# Patient Record
Sex: MALE | Race: WHITE | NOT HISPANIC OR LATINO | Employment: OTHER | URBAN - NONMETROPOLITAN AREA
[De-identification: names, ages, dates, MRNs, and addresses within clinical notes are randomized per-mention and may not be internally consistent; named-entity substitution may affect disease eponyms.]

---

## 2017-01-03 RX ORDER — ALBUTEROL SULFATE 2.5 MG/3ML
2.5 SOLUTION RESPIRATORY (INHALATION) EVERY 4 HOURS PRN
Qty: 120 VIAL | Refills: 11 | Status: SHIPPED | OUTPATIENT
Start: 2017-01-03 | End: 2022-07-11

## 2017-01-03 RX ORDER — LOVASTATIN 40 MG/1
TABLET ORAL
Qty: 30 TABLET | Refills: 11 | Status: SHIPPED | OUTPATIENT
Start: 2017-01-03 | End: 2017-02-21 | Stop reason: SDUPTHER

## 2017-01-16 RX ORDER — LOSARTAN POTASSIUM 100 MG/1
100 TABLET ORAL DAILY
Qty: 30 TABLET | Refills: 11 | Status: SHIPPED | OUTPATIENT
Start: 2017-01-16 | End: 2017-05-11 | Stop reason: SDUPTHER

## 2017-01-16 RX ORDER — LOSARTAN POTASSIUM 100 MG/1
TABLET ORAL
Qty: 30 TABLET | Refills: 11 | Status: SHIPPED | OUTPATIENT
Start: 2017-01-16 | End: 2017-01-16 | Stop reason: SDUPTHER

## 2017-01-20 ENCOUNTER — OFFICE VISIT (OUTPATIENT)
Dept: PULMONOLOGY | Facility: CLINIC | Age: 42
End: 2017-01-20

## 2017-01-20 VITALS
WEIGHT: 198 LBS | OXYGEN SATURATION: 96 % | DIASTOLIC BLOOD PRESSURE: 78 MMHG | HEIGHT: 73 IN | SYSTOLIC BLOOD PRESSURE: 122 MMHG | RESPIRATION RATE: 18 BRPM | HEART RATE: 106 BPM | BODY MASS INDEX: 26.24 KG/M2

## 2017-01-20 DIAGNOSIS — R06.83 SNORING: ICD-10-CM

## 2017-01-20 DIAGNOSIS — J44.9 CHRONIC OBSTRUCTIVE PULMONARY DISEASE, UNSPECIFIED COPD TYPE (HCC): ICD-10-CM

## 2017-01-20 DIAGNOSIS — J30.89 OTHER ALLERGIC RHINITIS: ICD-10-CM

## 2017-01-20 DIAGNOSIS — R06.02 SHORTNESS OF BREATH: Primary | ICD-10-CM

## 2017-01-20 DIAGNOSIS — J45.40 MODERATE PERSISTENT ASTHMA WITHOUT COMPLICATION: ICD-10-CM

## 2017-01-20 DIAGNOSIS — G47.19 EXCESSIVE DAYTIME SLEEPINESS: ICD-10-CM

## 2017-01-20 PROCEDURE — 95012 NITRIC OXIDE EXP GAS DETER: CPT | Performed by: INTERNAL MEDICINE

## 2017-01-20 PROCEDURE — 99205 OFFICE O/P NEW HI 60 MIN: CPT | Performed by: INTERNAL MEDICINE

## 2017-01-20 RX ORDER — MONTELUKAST SODIUM 10 MG/1
10 TABLET ORAL NIGHTLY
Qty: 30 TABLET | Refills: 5 | Status: SHIPPED | OUTPATIENT
Start: 2017-01-20 | End: 2021-03-17

## 2017-01-20 NOTE — LETTER
"January 25, 2017     Janak Robertson DO  [address] [address]    Patient: Topher Stoll   YOB: 1975   Date of Visit: 1/20/2017       Dear Dr. Marcelo DO:    Topher Stoll was in my office today. Below is a copy of my note.    If you have questions, please do not hesitate to call me. I look forward to following Topher along with you.         Sincerely,        Melina Olivas MD        CC: Liza Puente RN    CONSULT NOTE    Chief Complaint   Patient presents with   • COPD       Subjective   Topher Stoll is a 41 y.o. male.     History of Present Illness   Patient comes in today for consultation because of shortness of breath for the past few years    The patient says that his shortness of breath is more pronounced during the evening. It is also occasionally associated with wheezing. There have been some episodes of cough that usually follow activity.  He also reports tightness, especially when the weather is humid.    The patient denies any pets at home. There also seems to be a seasonal variation to the symptoms, although he is not entirely sure.    He describes occasional nighttime symptoms.  He also snores and has a tendency to have excessive daytime sleepiness during the morning.  He was actually prescribed oxygen that he uses at night.    The patient does have a family history of asthma, in his brother.  He used to smoke 2 packs per day for about 15 years having quit 10 years ago.    He describes at least 6-7 episodes of \"bronchitis\" per year, that required prednisone    He is complaining of runny nose and dribbling in the back of his throat, that is definitely worse during spring    The following portions of the patient's history were reviewed and updated as appropriate: allergies, current medications, past family history, past medical history, past social history and past surgical history.    Review of Systems   HENT: Negative for sinus pressure, sneezing and sore throat.  " "  Respiratory: Negative for cough, shortness of breath and wheezing.    Cardiovascular: Negative for palpitations and leg swelling.   Psychiatric/Behavioral: Positive for sleep disturbance.   All other systems reviewed and are negative.      Past Medical History   Diagnosis Date   • Abdominal adhesions    • Cervical radiculopathy    • Colitis    • Colitis    • H/O chest x-ray 04/14/2016     No active disease   • H/O chest x-ray 03/28/2016     No active disease by portable imaging   • H/O chest x-ray 03/12/2016     No acute cardiopulmonary process   • Headache    • History of recreational drug use    • Left hip pain    • Lesion of oral mucosa    • Low back pain    • Obstructive chronic bronchitis with exacerbation        Social History   Substance Use Topics   • Smoking status: Former Smoker   • Smokeless tobacco: Current User     Types: Chew   • Alcohol use No      Comment: stopped drinking alcohol         Objective   Visit Vitals   • /78   • Pulse 106   • Resp 18   • Ht 73\" (185.4 cm)   • Wt 198 lb (89.8 kg)   • SpO2 96%   • BMI 26.12 kg/m2     Physical Exam  Constitutional:            General: No acute distress noted. Able to communicate appropriately    Head/Face/Eyes:            No significant facial abnormalities seen.            Extra ocular movement was intact.            Pupils appeared equal    ENT:            Hearing was intact            Sinuses were non tender on palpation.            Nostril showed moderate erythema.            Oropharynx was cobblestoned and crowded.    Neck:             Supple. No JVD noted.              Thyroid gland did not seem to be enlarged    Cardiovascular:              S1 + S2. Regular.    Respiratory:            Respiratory effort was adequate.            Somewhat hyperresonant to percussion.            Good Air Entry Bilaterally with out wheezing or crackles heard.    Musculoskeletal/Extremities:             Normal Gait.              No clubbing, cyanosis noted in the " upper extremities.             No edema noted in the lower extremities bilaterally.    Neurologic/Psychiatric:             Affect appeared fair.             Awake, alert and oriented x 3.             Able to follow simple commands.    Skin:             No rash noted.             Warm and dry.      Assessment/Plan   Topher was seen today for copd.    Diagnoses and all orders for this visit:    Shortness of breath  -     Cancel: Nitric Oxide; Future  -     Nitric Oxide    COPD  -     Pulmonary Function Test; Future    Moderate persistent asthma without complication  -     Cancel: Nitric Oxide; Future  -     Nitric Oxide    Snoring    Excessive daytime sleepiness    Other allergic rhinitis    Other orders  -     Discontinue: Umeclidinium-Vilanterol (ANORO ELLIPTA) 62.5-25 MCG/INH aerosol powder ; Inhale 1 puff Daily for 30 days.  -     montelukast (SINGULAIR) 10 MG tablet; Take 1 tablet by mouth Every Night for 30 days.  -     Fluticasone Furoate-Vilanterol (BREO ELLIPTA) 200-25 MCG/INH aerosol powder ; Inhale 1 puff Daily for 30 days. Rinse mouth with water after use.         Return in about 10 weeks (around 3/31/2017) for Recheck, PFTs, Give pt sleep questionairre.    DISCUSSION(if any):  I have reviewed the patient's imaging studies and shared them with him. Chest x-ray suggests possible hyperinflation?     I have also reviewed his last ER visit that mentions COPD exacerbation.     Laboratory workup was also reviewed which showed normal alpha-1 antitrypsin levels.  His last ABG showed a pH of 7.46 with a PCO2 37 and PO2 68    ==============================  ==============================    I had a long discussion with the patient and told him that his symptoms are more consistent with asthma rather than COPD.    I have started him on Breo along with Singulair    Patient was advised to use albuterol based rescue inhaler for when necessary purposes    Patient was also advised to keep a log of the use of rescue  inhaler.    Although he does not have continuous symptoms suggestive of allergic rhinitis, this seems to be seasonal allergic rhinitis symptoms.    I've asked him to use Flonase twice daily for now    His FeNO level was 86.      Errors in dictation may reflect use of voice recognition software and not all errors in transcription may have been detected prior to signing    This document was electronically signed by Melina Olivas MD January 25, 2017  4:53 PM

## 2017-01-20 NOTE — MR AVS SNAPSHOT
Topher Stoll   1/20/2017 9:15 AM   Office Visit    Dept Phone:  [phone]   Encounter #:  [id number]    Provider:  Melina Olivas MD   Department:  [facility] PULMONARY CRITICAL CARE AND SLEEP                Your Full Care Plan              Today's Medication Changes          These changes are accurate as of: 1/20/17  9:36 AM.  If you have any questions, ask your nurse or doctor.               New Medication(s)Ordered:     Fluticasone Furoate-Vilanterol 200-25 MCG/INH aerosol powder    Commonly known as:  BREO ELLIPTA   Inhale 1 puff Daily for 30 days. Rinse mouth with water after use.   Started by:  Melina Olivas MD       montelukast 10 MG tablet   Commonly known as:  SINGULAIR   Take 1 tablet by mouth Every Night for 30 days.   Started by:  Melina Olivas MD            Where to Get Your Medications      These medications were sent to [address] [phone]  - [phone] [address] [address]     Phone:  [phone]     Fluticasone Furoate-Vilanterol 200-25 MCG/INH aerosol powder     montelukast 10 MG tablet                  Your Updated Medication List          This list is accurate as of: 1/20/17  9:36 AM.  Always use your most recent med list.                * albuterol 108 (90 BASE) MCG/ACT inhaler   Commonly known as:  PROAIR HFA   Inhale 2 puffs Every 4 (Four) Hours As Needed for wheezing. NHALE 1 TO 2 PUFFS EVERY 4 TO 6 HOURS AS NEEDED       * albuterol (2.5 MG/3ML) 0.083% nebulizer solution   Commonly known as:  PROVENTIL   Take 2.5 mg by nebulization Every 4 (Four) Hours As Needed for wheezing.       azithromycin 500 MG tablet   Commonly known as:  ZITHROMAX   Take 1 tablet by mouth Daily.       cefuroxime 500 MG tablet   Commonly known as:  CEFTIN   Take 1 tablet by mouth 2 (Two) Times a Day.       cyclobenzaprine 10 MG tablet   Commonly  known as:  FLEXERIL   Take 1 tablet by mouth 2 (Two) Times a Day As Needed for muscle spasms.       divalproex 500 MG 24 hr tablet   Commonly known as:  DEPAKOTE ER   Take 1 tablet by mouth Daily.       docusate sodium 100 MG capsule   Commonly known as:  COLACE       fluticasone 50 MCG/ACT nasal spray   Commonly known as:  FLONASE   2 sprays into each nostril Daily. USE 1 TO 2 SPRAYS IN EACH NOSTRIL ONCE DAILY       Fluticasone Furoate-Vilanterol 200-25 MCG/INH aerosol powder    Commonly known as:  BREO ELLIPTA   Inhale 1 puff Daily for 30 days. Rinse mouth with water after use.       hydrOXYzine 25 MG tablet   Commonly known as:  ATARAX   1 or 2 tablets every 8 hours as needed for anxiety.       ibuprofen 800 MG tablet   Commonly known as:  ADVIL,MOTRIN       lidocaine 2 % jelly   Commonly known as:  XYLOCAINE       losartan 100 MG tablet   Commonly known as:  COZAAR   Take 1 tablet by mouth Daily.       lovastatin 40 MG tablet   Commonly known as:  MEVACOR   take 1 tablet by mouth every evening       metoprolol tartrate 25 MG tablet   Commonly known as:  LOPRESSOR       montelukast 10 MG tablet   Commonly known as:  SINGULAIR   Take 1 tablet by mouth Every Night for 30 days.       naltrexone 50 MG tablet   Commonly known as:  DEPADE       NEXIUM 40 MG capsule   Generic drug:  esomeprazole   take 1 capsule by mouth once daily       predniSONE 10 MG tablet   Commonly known as:  DELTASONE   6 tablets day 1 5 tablets day 2 4 tablets day 3 3 tablets day 4 2 tablets day 5 1 tablet day 6 Then stop.       * Notice:  This list has 2 medication(s) that are the same as other medications prescribed for you. Read the directions carefully, and ask your doctor or other care provider to review them with you.            You Were Diagnosed With        Codes Comments    Shortness of breath    -  Primary ICD-10-CM: R06.02  ICD-9-CM: 786.05     Chronic obstructive pulmonary disease, unspecified COPD type     ICD-10-CM:  "J44.9  ICD-9-CM: 496     Moderate persistent asthma without complication     ICD-10-CM: J45.40  ICD-9-CM: 493.90     Snoring     ICD-10-CM: R06.83  ICD-9-CM: 786.09     Excessive daytime sleepiness     ICD-10-CM: G47.19  ICD-9-CM: 780.54     Other allergic rhinitis     ICD-10-CM: J30.89  ICD-9-CM: 477.8       Instructions     None    Patient Instructions History      Upcoming Appointments     Visit Type Date Time Department    NEW PATIENT 1/20/2017  9:15 AM [facility]    FOLLOW UP 1/30/2017 10:15 AM [facility]    FOLLOW UP 4/3/2017 11:00 AM [facility]    PFT 4/3/2017 10:00 AM [facility]      MyChart Signup     Our records indicate that your [facility] [employer] account has been deactivated. If you would like to reactivate your account, please email [email or web address] or call [phone] to talk to our [employer] staff.             Other Info from Your Visit           Your Appointments     Jan 30, 2017 10:15 AM EST   Follow Up with Janak Robertson DO   [facility] PRIMARY CARE (--)    [address] [facility] [address]  [facility] [address] [phone]           Arrive 15 minutes prior to appointment.            Apr 03, 2017 10:00 AM EDT   PFT with [facility] PFT [facility] PULMONARY CRITICAL CARE AND SLEEP (--)    [address]  [facility] [address] [phone]            Apr 03, 2017 11:00 AM EDT   Follow Up with Melina Olivas MD   [facility] PULMONARY CRITICAL CARE AND SLEEP (--)    [address]  [facility] [address] [phone]           Arrive 15 minutes prior to appointment.              Allergies     Doxycycline        Reason for Visit     COPD           Vital Signs     Blood Pressure Pulse Respirations Height Weight Oxygen Saturation    122/78 106 18 73\" (185.4 cm) 198 lb (89.8 kg) 96%    Body Mass Index Smoking Status                " 26.12 kg/m2 Former Smoker          Problems and Diagnoses Noted     Shortness of breath    -  Primary    Chronic airway obstruction        Asthma        Snoring        Excessive daytime sleepiness        Other allergic rhinitis

## 2017-01-20 NOTE — PROGRESS NOTES
"CONSULT NOTE    Chief Complaint   Patient presents with   • COPD       Subjective   Topher Stoll is a 41 y.o. male.     History of Present Illness   Patient comes in today for consultation because of shortness of breath for the past few years    The patient says that his shortness of breath is more pronounced during the evening. It is also occasionally associated with wheezing. There have been some episodes of cough that usually follow activity.  He also reports tightness, especially when the weather is humid.    The patient denies any pets at home. There also seems to be a seasonal variation to the symptoms, although he is not entirely sure.    He describes occasional nighttime symptoms.  He also snores and has a tendency to have excessive daytime sleepiness during the morning.  He was actually prescribed oxygen that he uses at night.    The patient does have a family history of asthma, in his brother.  He used to smoke 2 packs per day for about 15 years having quit 10 years ago.    He describes at least 6-7 episodes of \"bronchitis\" per year, that required prednisone    He is complaining of runny nose and dribbling in the back of his throat, that is definitely worse during spring    The following portions of the patient's history were reviewed and updated as appropriate: allergies, current medications, past family history, past medical history, past social history and past surgical history.    Review of Systems   HENT: Negative for sinus pressure, sneezing and sore throat.    Respiratory: Negative for cough, shortness of breath and wheezing.    Cardiovascular: Negative for palpitations and leg swelling.   Psychiatric/Behavioral: Positive for sleep disturbance.   All other systems reviewed and are negative.      Past Medical History   Diagnosis Date   • Abdominal adhesions    • Cervical radiculopathy    • Colitis    • Colitis    • H/O chest x-ray 04/14/2016     No active disease   • H/O chest x-ray 03/28/2016     " "No active disease by portable imaging   • H/O chest x-ray 03/12/2016     No acute cardiopulmonary process   • Headache    • History of recreational drug use    • Left hip pain    • Lesion of oral mucosa    • Low back pain    • Obstructive chronic bronchitis with exacerbation        Social History   Substance Use Topics   • Smoking status: Former Smoker   • Smokeless tobacco: Current User     Types: Chew   • Alcohol use No      Comment: stopped drinking alcohol         Objective   Visit Vitals   • /78   • Pulse 106   • Resp 18   • Ht 73\" (185.4 cm)   • Wt 198 lb (89.8 kg)   • SpO2 96%   • BMI 26.12 kg/m2     Physical Exam  Constitutional:            General: No acute distress noted. Able to communicate appropriately    Head/Face/Eyes:            No significant facial abnormalities seen.            Extra ocular movement was intact.            Pupils appeared equal    ENT:            Hearing was intact            Sinuses were non tender on palpation.            Nostril showed moderate erythema.            Oropharynx was cobblestoned and crowded.    Neck:             Supple. No JVD noted.              Thyroid gland did not seem to be enlarged    Cardiovascular:              S1 + S2. Regular.    Respiratory:            Respiratory effort was adequate.            Somewhat hyperresonant to percussion.            Good Air Entry Bilaterally with out wheezing or crackles heard.    Musculoskeletal/Extremities:             Normal Gait.              No clubbing, cyanosis noted in the upper extremities.             No edema noted in the lower extremities bilaterally.    Neurologic/Psychiatric:             Affect appeared fair.             Awake, alert and oriented x 3.             Able to follow simple commands.    Skin:             No rash noted.             Warm and dry.      Assessment/Plan   Topher was seen today for copd.    Diagnoses and all orders for this visit:    Shortness of breath  -     Cancel: Nitric Oxide; " Future  -     Nitric Oxide    COPD  -     Pulmonary Function Test; Future    Moderate persistent asthma without complication  -     Cancel: Nitric Oxide; Future  -     Nitric Oxide    Snoring    Excessive daytime sleepiness    Other allergic rhinitis    Other orders  -     Discontinue: Umeclidinium-Vilanterol (ANORO ELLIPTA) 62.5-25 MCG/INH aerosol powder ; Inhale 1 puff Daily for 30 days.  -     montelukast (SINGULAIR) 10 MG tablet; Take 1 tablet by mouth Every Night for 30 days.  -     Fluticasone Furoate-Vilanterol (BREO ELLIPTA) 200-25 MCG/INH aerosol powder ; Inhale 1 puff Daily for 30 days. Rinse mouth with water after use.         Return in about 10 weeks (around 3/31/2017) for Recheck, PFTs, Give pt sleep questionairre.    DISCUSSION(if any):  I have reviewed the patient's imaging studies and shared them with him. Chest x-ray suggests possible hyperinflation?     I have also reviewed his last ER visit that mentions COPD exacerbation.     Laboratory workup was also reviewed which showed normal alpha-1 antitrypsin levels.  His last ABG showed a pH of 7.46 with a PCO2 37 and PO2 68    ==============================  ==============================    I had a long discussion with the patient and told him that his symptoms are more consistent with asthma rather than COPD.    I have started him on Breo along with Singulair    Patient was advised to use albuterol based rescue inhaler for when necessary purposes    Patient was also advised to keep a log of the use of rescue inhaler.    Although he does not have continuous symptoms suggestive of allergic rhinitis, this seems to be seasonal allergic rhinitis symptoms.    I've asked him to use Flonase twice daily for now    His FeNO level was 86.      Errors in dictation may reflect use of voice recognition software and not all errors in transcription may have been detected prior to signing    This document was electronically signed by Melina Olivas MD January 25,  2017  4:53 PM

## 2017-02-01 RX ORDER — FLUTICASONE PROPIONATE 50 MCG
SPRAY, SUSPENSION (ML) NASAL
Qty: 1 BOTTLE | Refills: 3 | Status: SHIPPED | OUTPATIENT
Start: 2017-02-01 | End: 2017-02-13 | Stop reason: SDUPTHER

## 2017-02-11 DIAGNOSIS — R06.83 SNORING: ICD-10-CM

## 2017-02-11 DIAGNOSIS — G47.19 EXCESSIVE DAYTIME SLEEPINESS: ICD-10-CM

## 2017-02-11 DIAGNOSIS — G47.33 OSA (OBSTRUCTIVE SLEEP APNEA): Primary | ICD-10-CM

## 2017-02-13 ENCOUNTER — OFFICE VISIT (OUTPATIENT)
Dept: INTERNAL MEDICINE | Facility: CLINIC | Age: 42
End: 2017-02-13

## 2017-02-13 VITALS
BODY MASS INDEX: 27.3 KG/M2 | WEIGHT: 206 LBS | HEART RATE: 89 BPM | DIASTOLIC BLOOD PRESSURE: 90 MMHG | SYSTOLIC BLOOD PRESSURE: 120 MMHG | HEIGHT: 73 IN | OXYGEN SATURATION: 93 %

## 2017-02-13 DIAGNOSIS — Z12.5 ENCOUNTER FOR SPECIAL SCREENING EXAMINATION FOR NEOPLASM OF PROSTATE: ICD-10-CM

## 2017-02-13 DIAGNOSIS — E78.00 PURE HYPERCHOLESTEROLEMIA: ICD-10-CM

## 2017-02-13 DIAGNOSIS — J44.1 CHRONIC OBSTRUCTIVE PULMONARY DISEASE WITH ACUTE EXACERBATION (HCC): ICD-10-CM

## 2017-02-13 DIAGNOSIS — B18.1 CHRONIC VIRAL HEPATITIS B WITHOUT DELTA AGENT AND WITHOUT COMA (HCC): ICD-10-CM

## 2017-02-13 DIAGNOSIS — B18.2 HEP C W/O COMA, CHRONIC (HCC): ICD-10-CM

## 2017-02-13 DIAGNOSIS — I10 ESSENTIAL HYPERTENSION: Primary | ICD-10-CM

## 2017-02-13 DIAGNOSIS — Z79.899 ENCOUNTER FOR LONG-TERM CURRENT USE OF MEDICATION: ICD-10-CM

## 2017-02-13 DIAGNOSIS — E55.9 VITAMIN D DEFICIENCY: ICD-10-CM

## 2017-02-13 DIAGNOSIS — Z13.21 ENCOUNTER FOR VITAMIN DEFICIENCY SCREENING: ICD-10-CM

## 2017-02-13 PROCEDURE — 99214 OFFICE O/P EST MOD 30 MIN: CPT | Performed by: FAMILY MEDICINE

## 2017-02-13 RX ORDER — FLUTICASONE PROPIONATE 50 MCG
2 SPRAY, SUSPENSION (ML) NASAL DAILY
Qty: 3 BOTTLE | Refills: 11 | Status: SHIPPED | OUTPATIENT
Start: 2017-02-13 | End: 2017-07-06

## 2017-02-13 NOTE — PROGRESS NOTES
"Subjective   Topher Stoll is a 41 y.o. male.     History of Present Illness   Topher has been doing well, and gained 50 lbs eating.  He needs to have yearly labs.  He hasn't seen anyone for his hep C has of yet.  He needs that.  He is not fasting today.      The following portions of the patient's history were reviewed and updated as appropriate: allergies, current medications, past social history and problem list.    Review of Systems   Constitutional: Negative for appetite change, chills, fatigue, fever and unexpected weight change.   HENT: Negative for congestion, ear pain, hearing loss, nosebleeds, postnasal drip, rhinorrhea, sore throat, tinnitus and trouble swallowing.    Eyes: Negative for photophobia, discharge and visual disturbance.   Respiratory: Negative for cough, chest tightness, shortness of breath and wheezing.    Cardiovascular: Negative for chest pain, palpitations and leg swelling.   Gastrointestinal: Negative for abdominal distention, abdominal pain, blood in stool, constipation, diarrhea, nausea and vomiting.   Endocrine: Negative for cold intolerance, heat intolerance, polydipsia, polyphagia and polyuria.   Musculoskeletal: Negative for arthralgias, back pain, joint swelling, myalgias, neck pain and neck stiffness.   Skin: Negative for color change, pallor, rash and wound.   Allergic/Immunologic: Negative for environmental allergies, food allergies and immunocompromised state.   Neurological: Negative for dizziness, tremors, seizures, weakness, numbness and headaches.   Hematological: Negative for adenopathy. Does not bruise/bleed easily.   Psychiatric/Behavioral: Negative for agitation, behavioral problems, confusion, hallucinations, self-injury and suicidal ideas. The patient is not nervous/anxious.        Objective   Visit Vitals   • /90   • Pulse 89   • Ht 73\" (185.4 cm)   • Wt 206 lb (93.4 kg)   • SpO2 93%   • BMI 27.18 kg/m2     Physical Exam   Constitutional: He is oriented to " person, place, and time. He appears well-developed and well-nourished. No distress.   HENT:   Head: Normocephalic and atraumatic.   Right Ear: External ear normal.   Left Ear: External ear normal.   Nose: Nose normal.   Mouth/Throat: Oropharynx is clear and moist.   Eyes: Conjunctivae and EOM are normal. Pupils are equal, round, and reactive to light. Right eye exhibits no discharge. Left eye exhibits no discharge. No scleral icterus.   Neck: Normal range of motion. Neck supple. No JVD present. No tracheal deviation present. No thyromegaly present.   Cardiovascular: Normal rate, regular rhythm, normal heart sounds and intact distal pulses.  Exam reveals no gallop and no friction rub.    No murmur heard.  Pulmonary/Chest: Effort normal and breath sounds normal. No stridor. No respiratory distress. He has no wheezes. He has no rales. He exhibits no tenderness.   Abdominal: Soft. Bowel sounds are normal. He exhibits no distension and no mass. There is no tenderness. There is no rebound and no guarding. No hernia.   Musculoskeletal: Normal range of motion. He exhibits no edema, tenderness or deformity.   Lymphadenopathy:     He has no cervical adenopathy.   Neurological: He is alert and oriented to person, place, and time. He has normal reflexes. He displays normal reflexes. No cranial nerve deficit. He exhibits normal muscle tone.   Skin: Skin is warm and dry. No rash noted. No erythema. No pallor.   Psychiatric: He has a normal mood and affect. His behavior is normal. Judgment normal.       Assessment/Plan   Problem List Items Addressed This Visit        Cardiovascular and Mediastinum    Hypertension - Primary    Relevant Orders    Lipid Panel    Comprehensive Metabolic Panel    CBC & Differential    T4, Free    TSH    Vitamin D 25 Hydroxy    POC Urinalysis Dipstick, Automated    Hyperlipidemia    Relevant Orders    Lipid Panel    Comprehensive Metabolic Panel    CBC & Differential    T4, Free    TSH    Vitamin D 25  Hydroxy    POC Urinalysis Dipstick, Automated       Respiratory    Chronic obstructive pulmonary disease with acute exacerbation    Relevant Medications    fluticasone (FLONASE) 50 MCG/ACT nasal spray    Other Relevant Orders    Lipid Panel    Comprehensive Metabolic Panel    CBC & Differential    T4, Free    TSH    Vitamin D 25 Hydroxy    POC Urinalysis Dipstick, Automated       Digestive    Vitamin D deficiency    Relevant Orders    Lipid Panel    Comprehensive Metabolic Panel    CBC & Differential    T4, Free    TSH    Vitamin D 25 Hydroxy    POC Urinalysis Dipstick, Automated    Chronic viral hepatitis B without delta agent and without coma    Relevant Orders    Ambulatory Referral to Gastroenterology    Hep C w/o coma, chronic    Relevant Orders    Ambulatory Referral to Gastroenterology       Other    Encounter for long-term current use of medication    Relevant Orders    Lipid Panel    Comprehensive Metabolic Panel    CBC & Differential    T4, Free    TSH    Vitamin D 25 Hydroxy    POC Urinalysis Dipstick, Automated    Encounter for special screening examination for neoplasm of prostate    Relevant Orders    Lipid Panel    Comprehensive Metabolic Panel    CBC & Differential    T4, Free    TSH    Vitamin D 25 Hydroxy    POC Urinalysis Dipstick, Automated    Encounter for vitamin deficiency screening    Relevant Orders    Lipid Panel    Comprehensive Metabolic Panel    CBC & Differential    T4, Free    TSH    Vitamin D 25 Hydroxy    POC Urinalysis Dipstick, Automated        Labs, get into hepatology clinic, and fu in a month.

## 2017-02-20 ENCOUNTER — HOSPITAL ENCOUNTER (OUTPATIENT)
Dept: SLEEP MEDICINE | Facility: HOSPITAL | Age: 42
Setting detail: THERAPIES SERIES
End: 2017-02-20
Attending: INTERNAL MEDICINE

## 2017-02-20 DIAGNOSIS — G47.19 EXCESSIVE DAYTIME SLEEPINESS: ICD-10-CM

## 2017-02-20 DIAGNOSIS — G47.33 OSA (OBSTRUCTIVE SLEEP APNEA): ICD-10-CM

## 2017-02-20 DIAGNOSIS — R06.83 SNORING: ICD-10-CM

## 2017-02-20 PROCEDURE — 95806 SLEEP STUDY UNATT&RESP EFFT: CPT | Performed by: INTERNAL MEDICINE

## 2017-02-21 RX ORDER — LOVASTATIN 40 MG/1
40 TABLET ORAL NIGHTLY
Qty: 90 TABLET | Refills: 3 | Status: SHIPPED | OUTPATIENT
Start: 2017-02-21

## 2017-02-23 DIAGNOSIS — G47.33 OSA (OBSTRUCTIVE SLEEP APNEA): Primary | ICD-10-CM

## 2017-03-16 ENCOUNTER — TELEPHONE (OUTPATIENT)
Dept: INTERNAL MEDICINE | Facility: CLINIC | Age: 42
End: 2017-03-16

## 2017-03-16 RX ORDER — PREDNISONE 10 MG/1
TABLET ORAL
Qty: 21 TABLET | Refills: 0 | Status: SHIPPED | OUTPATIENT
Start: 2017-03-16 | End: 2017-03-27

## 2017-03-16 NOTE — TELEPHONE ENCOUNTER
----- Message from Estefani Joyner sent at 3/16/2017 11:10 AM EDT -----  Contact: Mom  Patient wanted to know if Prednisone could be called in for him. States that he has wheezing.    ##[facility]##

## 2017-03-27 ENCOUNTER — OFFICE VISIT (OUTPATIENT)
Dept: GASTROENTEROLOGY | Facility: CLINIC | Age: 42
End: 2017-03-27

## 2017-03-27 VITALS
DIASTOLIC BLOOD PRESSURE: 74 MMHG | SYSTOLIC BLOOD PRESSURE: 122 MMHG | WEIGHT: 204 LBS | TEMPERATURE: 98 F | RESPIRATION RATE: 18 BRPM | BODY MASS INDEX: 27.04 KG/M2 | HEART RATE: 93 BPM | HEIGHT: 73 IN

## 2017-03-27 DIAGNOSIS — B18.1 HEPATITIS B, CHRONIC (HCC): ICD-10-CM

## 2017-03-27 DIAGNOSIS — R19.5 OCCULT GI BLEEDING: ICD-10-CM

## 2017-03-27 DIAGNOSIS — B18.2 CHRONIC HEPATITIS C WITHOUT HEPATIC COMA (HCC): Primary | ICD-10-CM

## 2017-03-27 DIAGNOSIS — R12 HEARTBURN: ICD-10-CM

## 2017-03-27 PROCEDURE — 99204 OFFICE O/P NEW MOD 45 MIN: CPT | Performed by: INTERNAL MEDICINE

## 2017-03-27 NOTE — PATIENT INSTRUCTIONS
1. Anti-reflux measures.  2. Low fat diet.  3. Counseling hepatitis B.  4. Counseling hepatitis C.  5. Avoidance of alcohol.  6. Avoidance of drugs of all sorts including marijuana.  7. Avoidance of smoking.  8. Tylenol warning.  However, Tylenol remains safe medication for this patient.  The patient may take Tylenol-acetaminophen 500 mg every 6 hours as needed.  Caution should be exercised by taking medications that may potentially  containing Tylenol suggest NyQuil.  9. Check HCV RNA by PCR quantitative with reflex to genotype.  10. Check hepatitis B surface antigen, hepatitis B surface antibody, hepatitis B core IgM, hepatitis B core total antibody, hepatitis B antigen and antibody.  11. RUQ US:  CBD size, pancreas, and liver   12. Follow-up:  6 weeks.  13. Discussed with the patient in detail.  Opportunity was given to ask questions.  14. The patient may need further evaluation of heme occult positive stools in the future.

## 2017-03-27 NOTE — PROGRESS NOTES
"[address] [facility] [address]    (H) [phone]  (W)     Chief Complaint   Patient presents with   • Liver Eval     History of Present Illness     The patient has history of abnormal liver function tests for the fifth the last 1 year or so.  The patient was found to have \"hepatitis B and hepatitis C\" perhaps a few months ago.  The patient has a long-standing history of drug use.  She started using drugs perhaps at age 14 or 15 initially marijuana and later pain pills.  In 2015 the patient started using IVDA.  Finally the patient quit drugs altogether in October 2016.  Upon review of records in March 2015 the patient was noted to have mild elevation of alkaline phosphatase.  However in April 2016 the patient was noted to have elevation of AST at 184 and ALT elevation of to 97.  In June 2016 he was found to have total bilirubin of 1.6  and ALT of 905.  In May 2016 the patient was found to have hepatitis B surface antigen positive, and HCV antibody positive.  He had undergone cholec: A 19 clean ystectomy in 2009.  He did not have stone disease.  He was also noted to have Hemoccult-positive stools upon review of records.  In May 2016 the patient was found to be HIV negative.    There is history of significant alcohol use over the years.  The patient started alcohol at age 13.  On average the patient after the age of 17 would drink about 12 cans of beer a day.  Later the patient continued to drink alcohol on daily basis.  On the weekends he would drink about 24-30 cans of beer in addition.  The patient quit alcohol in 2012.  There is history of smoking.  He also quit smoking perhaps in 2010.  There is history of multiple sexual partners over the years perhaps over 30-40.  He does not use protection.    The patient has a history of reflux off and on for the last several years.  The reflux is moderately severe.  Symptoms are described as retrosternal burning sensation, and indigestion.  There is " history of occasional regurgitative symptoms.  Frequency being several times per week.  The symptoms are worse at night.  The patient takes acid suppressive therapy with reasonable control of his symptoms.    The patient denies diarrhea or constipation.There is no history of overt GI bleed (Hematemesis, melena or hematochezia).  There is no dysphagia or odynophagia.  He denies recent weight loss.  The patient has reasonably good energy level.     Review of Systems   Constitutional: Negative for appetite change, chills, fatigue, fever and unexpected weight change.   HENT: Negative for mouth sores, nosebleeds and trouble swallowing.    Eyes: Negative for discharge and redness.   Respiratory: Positive for apnea. Negative for cough and shortness of breath.    Cardiovascular: Negative for chest pain, palpitations and leg swelling.   Gastrointestinal: Negative for abdominal distention, abdominal pain, anal bleeding, blood in stool, constipation, diarrhea, nausea and vomiting.   Endocrine: Negative for cold intolerance, heat intolerance and polydipsia.   Genitourinary: Negative for dysuria, hematuria and urgency.   Musculoskeletal: Positive for arthralgias. Negative for joint swelling and myalgias.   Skin: Negative for rash.   Allergic/Immunologic: Negative for food allergies and immunocompromised state.   Neurological: Negative for dizziness, seizures, syncope and headaches.   Hematological: Negative for adenopathy. Does not bruise/bleed easily.   Psychiatric/Behavioral: Negative for dysphoric mood. The patient is not nervous/anxious and is not hyperactive.      Patient Active Problem List   Diagnosis   • Abdominal adhesions   • Atopic rhinitis   • Anxiety   • Arthritis   • Cervical radiculopathy   • Chronic pain syndrome   • Non-specific colitis   • Atherosclerosis of coronary artery   • Diverticulosis of intestine   • Gastroesophageal reflux disease   • Headache   • History of recreational drug use   • Hypertension   •  Hyperlipidemia   • Arthralgia of hip   • Lesion of mucosa   • Low back pain   • Chronic obstructive pulmonary disease with acute exacerbation   • Osteoporosis   • Pulmonary emphysema   • Temporomandibular joint disorder   • Vitamin D deficiency   • BMI 25.0-25.9,adult   • Narcotic drug use   • Rhomboid pain   • Encounter for long-term current use of medication   • Encounter for special screening examination for neoplasm of prostate   • Encounter for vitamin deficiency screening   • Chronic viral hepatitis B without delta agent and without coma   • Hep C w/o coma, chronic   • Excessive daytime sleepiness   • GUILLE (obstructive sleep apnea)   • Snoring     Past Medical History:   Diagnosis Date   • Abdominal adhesions    • Asthma    • Cervical radiculopathy    • Colitis    • Colitis    • H/O chest x-ray 04/14/2016    No active disease   • H/O chest x-ray 03/28/2016    No active disease by portable imaging   • H/O chest x-ray 03/12/2016    No acute cardiopulmonary process   • Headache    • Heart attack    • History of recreational drug use    • Kidney cysts    • Left hip pain    • Lesion of oral mucosa    • Liver disease    • Low back pain    • Obstructive chronic bronchitis with exacerbation    • Sleep apnea     mild     Past Surgical History:   Procedure Laterality Date   • BACK SURGERY     • HERNIA REPAIR     • HIP SURGERY     • SMALL INTESTINE SURGERY      Small bowell resection     Family History   Problem Relation Age of Onset   • Arthritis Other    • Hypertension Other    • Migraines Other    • Heart attack Other    • Stroke Other      Social History   Substance Use Topics   • Smoking status: Former Smoker   • Smokeless tobacco: Current User     Types: Chew      Comment: quit 2005   • Alcohol use No      Comment: stopped drinking alcohol       Current Outpatient Prescriptions:   •  albuterol (PROAIR HFA) 108 (90 BASE) MCG/ACT inhaler, Inhale 2 puffs Every 4 (Four) Hours As Needed for wheezing. NHALE 1 TO 2 PUFFS  "EVERY 4 TO 6 HOURS AS NEEDED, Disp: 1 inhaler, Rfl: 11  •  albuterol (PROVENTIL) (2.5 MG/3ML) 0.083% nebulizer solution, Take 2.5 mg by nebulization Every 4 (Four) Hours As Needed for wheezing., Disp: 120 vial, Rfl: 11  •  cyclobenzaprine (FLEXERIL) 10 MG tablet, Take 1 tablet by mouth 2 (Two) Times a Day As Needed for muscle spasms., Disp: 60 tablet, Rfl: 2  •  divalproex (DEPAKOTE ER) 500 MG 24 hr tablet, Take 1 tablet by mouth Daily., Disp: 30 tablet, Rfl: 11  •  fluticasone (FLONASE) 50 MCG/ACT nasal spray, 2 sprays into each nostril Daily., Disp: 3 bottle, Rfl: 11  •  Fluticasone Furoate-Vilanterol (BREO ELLIPTA IN), Inhale., Disp: , Rfl:   •  losartan (COZAAR) 100 MG tablet, Take 1 tablet by mouth Daily., Disp: 30 tablet, Rfl: 11  •  lovastatin (MEVACOR) 40 MG tablet, Take 1 tablet by mouth Every Night., Disp: 90 tablet, Rfl: 3  •  Multiple Vitamins-Minerals (MULTIVITAL PO), Take  by mouth., Disp: , Rfl:   •  NEXIUM 40 MG capsule, take 1 capsule by mouth once daily, Disp: 30 capsule, Rfl: 11  Allergies   Allergen Reactions   • Doxycycline      /74  Pulse 93  Temp 98 °F (36.7 °C)  Resp 18  Ht 73\" (185.4 cm)  Wt 204 lb (92.5 kg)  BMI 26.91 kg/m2  Physical Exam   Constitutional: He is oriented to person, place, and time. He appears well-developed and well-nourished. No distress.   HENT:   Head: Normocephalic and atraumatic.   Right Ear: Hearing and external ear normal.   Left Ear: Hearing and external ear normal.   Nose: Nose normal.   Mouth/Throat: Oropharynx is clear and moist and mucous membranes are normal. Mucous membranes are not pale, not dry and not cyanotic. No oral lesions. No oropharyngeal exudate.   Eyes: Conjunctivae and EOM are normal. Right eye exhibits no discharge. Left eye exhibits no discharge. No scleral icterus.   Neck: Trachea normal. Neck supple. No JVD present. No edema present. No thyroid mass and no thyromegaly present.   Cardiovascular: Normal rate, regular rhythm, S2 normal " and normal heart sounds.  Exam reveals no gallop, no S3 and no friction rub.    No murmur heard.  Pulmonary/Chest: Effort normal and breath sounds normal. No respiratory distress. He has no wheezes. He has no rales. He exhibits no tenderness.   Abdominal: Soft. Normal appearance and bowel sounds are normal. He exhibits no distension, no ascites and no mass. There is no splenomegaly or hepatomegaly. There is no tenderness. There is no rigidity, no rebound and no guarding. No hernia.   Musculoskeletal: He exhibits no tenderness or deformity.       Vascular Status -  His exam exhibits no right foot edema. His exam exhibits no left foot edema.  Lymphadenopathy:     He has no cervical adenopathy.        Left: No supraclavicular adenopathy present.   Neurological: He is alert and oriented to person, place, and time. He has normal strength. No cranial nerve deficit or sensory deficit. He exhibits normal muscle tone. Coordination normal.   Skin: No rash noted. He is not diaphoretic. No cyanosis. No pallor. Nails show no clubbing.   Psychiatric: He has a normal mood and affect. His behavior is normal. Judgment and thought content normal.   Nursing note and vitals reviewed.  Stigmata of chronic liver disease:  None.  Asterixis:  None.    Laboratory Tests:      Upon review of medical records:    Dated April 28, 2016 sodium 150 potassium 3.8 chloride 105 CO2 28 BUN 8 serum creatinine 0.9 and glucose 109.  Calcium 8.9.  Albumin 4.8.  T bili 0.4   alkaline phosphatase 118.  WBC 11.2 hemoglobin 16.4 hematocrit 51 MCV 86.7 and platelet count 280.  Hepatitis B surface antigen positive.  Hepatitis B surface antibody nonreactive.  Hepatitis C virus antibody positive.  Dated April 29, 2016 alpha-1 antitrypsin level 147.  Phenotype MM.    Dated May 20, 2016 rapid HIV 1/2 nonreactive.  Hepatitis C genotype 3.    Dated December 21, 2016 sodium 141 potassium 4.2 chloride 107 CO2 25 BUN 12 serum creatinine 0.8 and glucose 151.   Calcium 8.9.  Albumin 3.7.  T bili 0.3 AST 38 ALT 99 alkaline phosphatase 82.  WBC 22.9 hemoglobin 13.3 hematocrit 40 MCV 83.6 and platelet count 239.    Abdominal Imaging:  Upon Review of medical records:    Dated April 28, 2016 the patient underwent a liver ultrasound which revealed: Liver is normal in size and echogenicity.  No evidence of focal liver mass.  Hepatic vasculature is patent with normal directional flow.  Portal vein measures 16 mm.  Patient is status post cholecystectomy.  Common duct measures 3.4 mm.  Limited images of the right kidney are unremarkable.    Dated June 24, 2016 the patient underwent an ultrasound of the abdomen which revealed: Limited images of the pancreas are unremarkable.  Liver parenchyma is normal in echogenicity.  Patient is status post cholecystectomy.  No ascites.  Common duct measures 5.7 mm.  Limited images of the right kidney are unremarkable.    Dated December 21, 2016 the patient underwent a CT of the abdomen and pelvis without contrast which revealed: Clear lung bases.  Heart size is normal.  Limited noncontrast images of the liver are normal.  Patient is status post cholecystectomy.  Spleen is normal.  No adrenal masses are seen.  Pancreas has an unremarkable unenhanced appearance.  Aorta is normal in caliber.  No significant free fluid or adenopathy.  There is no nephrolithiasis.  No hydronephrosis.  Postsurgical changes are present within the mid jejunum.  No evidence of a bowel obstruction.  Appendix is not identified.  Urinary bladder is unremarkable.  No significant fluid or adenopathy.  The patient is status post left hip arthroplasty.    Assessment and Plan:      Topher was seen today for liver eval.    Diagnoses and all orders for this visit:    Chronic hepatitis C without hepatic coma    Hepatitis B, chronic    Occult GI bleeding    Heartburn  Comments:  Stable.          Discussion:       Plan     Patient Instructions     1. Anti-reflux measures.  2. Low fat  diet.  3. Counseling hepatitis B.  4. Counseling hepatitis C.  5. Avoidance of alcohol.  6. Avoidance of drugs of all sorts including marijuana.  7. Avoidance of smoking.  8. Tylenol warning.  However, Tylenol remains safe medication for this patient.  The patient may take Tylenol-acetaminophen 500 mg every 6 hours as needed.  Caution should be exercised by taking medications that may potentially  containing Tylenol suggest NyQuil.  9. Check HCV RNA by PCR quantitative with reflex to genotype.  10. Check hepatitis B surface antigen, hepatitis B surface antibody, hepatitis B core IgM, hepatitis B core total antibody, hepatitis B antigen and antibody.  11. RUQ US:  CBD size, pancreas, and liver   12. Follow-up:  6 weeks.  13. Discussed with the patient in detail.  Opportunity was given to ask questions.  14. The patient may need further evaluation of heme occult positive stools in the future.        Patient Care Team:  Janak Robertson DO as PCP - General    Rudi Graves MD

## 2017-04-03 ENCOUNTER — PROCEDURE VISIT (OUTPATIENT)
Dept: PULMONOLOGY | Facility: CLINIC | Age: 42
End: 2017-04-03

## 2017-04-03 ENCOUNTER — TRANSCRIBE ORDERS (OUTPATIENT)
Dept: ADMINISTRATIVE | Facility: HOSPITAL | Age: 42
End: 2017-04-03

## 2017-04-03 ENCOUNTER — OFFICE VISIT (OUTPATIENT)
Dept: PULMONOLOGY | Facility: CLINIC | Age: 42
End: 2017-04-03

## 2017-04-03 ENCOUNTER — LAB (OUTPATIENT)
Dept: LAB | Facility: HOSPITAL | Age: 42
End: 2017-04-03

## 2017-04-03 VITALS
RESPIRATION RATE: 18 BRPM | WEIGHT: 204 LBS | HEIGHT: 73 IN | SYSTOLIC BLOOD PRESSURE: 122 MMHG | DIASTOLIC BLOOD PRESSURE: 84 MMHG | BODY MASS INDEX: 27.04 KG/M2 | HEART RATE: 93 BPM | OXYGEN SATURATION: 96 %

## 2017-04-03 DIAGNOSIS — R06.02 SHORTNESS OF BREATH: Primary | ICD-10-CM

## 2017-04-03 DIAGNOSIS — B18.2 CHRONIC HEPATITIS C WITHOUT HEPATIC COMA (HCC): ICD-10-CM

## 2017-04-03 DIAGNOSIS — J30.89 OTHER ALLERGIC RHINITIS: ICD-10-CM

## 2017-04-03 DIAGNOSIS — B19.10 HEP B W/O COMA: ICD-10-CM

## 2017-04-03 DIAGNOSIS — G47.33 OBSTRUCTIVE SLEEP APNEA: ICD-10-CM

## 2017-04-03 DIAGNOSIS — J45.40 MODERATE PERSISTENT ASTHMA WITHOUT COMPLICATION: ICD-10-CM

## 2017-04-03 DIAGNOSIS — J44.9 CHRONIC OBSTRUCTIVE PULMONARY DISEASE, UNSPECIFIED COPD TYPE (HCC): ICD-10-CM

## 2017-04-03 DIAGNOSIS — R79.89 ABNORMAL LFTS: Primary | ICD-10-CM

## 2017-04-03 LAB
ALBUMIN SERPL-MCNC: 5.1 G/DL (ref 3.5–5)
ALBUMIN/GLOB SERPL: 1.2 G/DL (ref 1–2)
ALP SERPL-CCNC: 143 U/L (ref 38–126)
ALT SERPL W P-5'-P-CCNC: 513 U/L (ref 13–69)
ANION GAP SERPL CALCULATED.3IONS-SCNC: 18.2 MMOL/L
AST SERPL-CCNC: 255 U/L (ref 15–46)
BASOPHILS # BLD AUTO: 0.09 10*3/MM3 (ref 0–0.2)
BASOPHILS NFR BLD AUTO: 0.7 % (ref 0–2.5)
BILIRUB SERPL-MCNC: 1 MG/DL (ref 0.2–1.3)
BUN BLD-MCNC: 16 MG/DL (ref 7–20)
BUN/CREAT SERPL: 13.3 (ref 6.3–21.9)
CALCIUM SPEC-SCNC: 10.1 MG/DL (ref 8.4–10.2)
CHLORIDE SERPL-SCNC: 102 MMOL/L (ref 98–107)
CO2 SERPL-SCNC: 28 MMOL/L (ref 26–30)
CREAT BLD-MCNC: 1.2 MG/DL (ref 0.6–1.3)
DEPRECATED RDW RBC AUTO: 41.1 FL (ref 37–54)
EOSINOPHIL # BLD AUTO: 0.55 10*3/MM3 (ref 0–0.7)
EOSINOPHIL NFR BLD AUTO: 4.5 % (ref 0–7)
ERYTHROCYTE [DISTWIDTH] IN BLOOD BY AUTOMATED COUNT: 12.6 % (ref 11.5–14.5)
GFR SERPL CREATININE-BSD FRML MDRD: 66 ML/MIN/1.73
GLOBULIN UR ELPH-MCNC: 4.1 GM/DL
GLUCOSE BLD-MCNC: 90 MG/DL (ref 74–98)
HCT VFR BLD AUTO: 51.9 % (ref 42–52)
HGB BLD-MCNC: 17 G/DL (ref 14–18)
IMM GRANULOCYTES # BLD: 0.03 10*3/MM3 (ref 0–0.06)
IMM GRANULOCYTES NFR BLD: 0.2 % (ref 0–0.6)
LYMPHOCYTES # BLD AUTO: 2.65 10*3/MM3 (ref 0.6–3.4)
LYMPHOCYTES NFR BLD AUTO: 21.5 % (ref 10–50)
MCH RBC QN AUTO: 28.8 PG (ref 27–31)
MCHC RBC AUTO-ENTMCNC: 32.8 G/DL (ref 30–37)
MCV RBC AUTO: 88 FL (ref 80–94)
MONOCYTES # BLD AUTO: 1 10*3/MM3 (ref 0–0.9)
MONOCYTES NFR BLD AUTO: 8.1 % (ref 0–12)
NEUTROPHILS # BLD AUTO: 8.03 10*3/MM3 (ref 2–6.9)
NEUTROPHILS NFR BLD AUTO: 65 % (ref 37–80)
NRBC BLD MANUAL-RTO: 0 /100 WBC (ref 0–0)
PLATELET # BLD AUTO: 221 10*3/MM3 (ref 130–400)
PMV BLD AUTO: 10.9 FL (ref 6–12)
POTASSIUM BLD-SCNC: 4.2 MMOL/L (ref 3.5–5.1)
PROT SERPL-MCNC: 9.2 G/DL (ref 6.3–8.2)
RBC # BLD AUTO: 5.9 10*6/MM3 (ref 4.7–6.1)
SODIUM BLD-SCNC: 144 MMOL/L (ref 137–145)
WBC NRBC COR # BLD: 12.35 10*3/MM3 (ref 4.8–10.8)

## 2017-04-03 PROCEDURE — 86003 ALLG SPEC IGE CRUDE XTRC EA: CPT | Performed by: INTERNAL MEDICINE

## 2017-04-03 PROCEDURE — 36415 COLL VENOUS BLD VENIPUNCTURE: CPT | Performed by: INTERNAL MEDICINE

## 2017-04-03 PROCEDURE — 87350 HEPATITIS BE AG IA: CPT | Performed by: INTERNAL MEDICINE

## 2017-04-03 PROCEDURE — 86709 HEPATITIS A IGM ANTIBODY: CPT | Performed by: INTERNAL MEDICINE

## 2017-04-03 PROCEDURE — 86704 HEP B CORE ANTIBODY TOTAL: CPT | Performed by: INTERNAL MEDICINE

## 2017-04-03 PROCEDURE — 99214 OFFICE O/P EST MOD 30 MIN: CPT | Performed by: INTERNAL MEDICINE

## 2017-04-03 PROCEDURE — 94726 PLETHYSMOGRAPHY LUNG VOLUMES: CPT | Performed by: INTERNAL MEDICINE

## 2017-04-03 PROCEDURE — 86707 HEPATITIS BE ANTIBODY: CPT | Performed by: INTERNAL MEDICINE

## 2017-04-03 PROCEDURE — 87902 NFCT AGT GNTYP ALYS HEP C: CPT | Performed by: INTERNAL MEDICINE

## 2017-04-03 PROCEDURE — 87522 HEPATITIS C REVRS TRNSCRPJ: CPT | Performed by: INTERNAL MEDICINE

## 2017-04-03 PROCEDURE — 80053 COMPREHEN METABOLIC PANEL: CPT | Performed by: INTERNAL MEDICINE

## 2017-04-03 PROCEDURE — 86706 HEP B SURFACE ANTIBODY: CPT | Performed by: INTERNAL MEDICINE

## 2017-04-03 PROCEDURE — 94060 EVALUATION OF WHEEZING: CPT | Performed by: INTERNAL MEDICINE

## 2017-04-03 PROCEDURE — 85025 COMPLETE CBC W/AUTO DIFF WBC: CPT | Performed by: INTERNAL MEDICINE

## 2017-04-03 PROCEDURE — 82785 ASSAY OF IGE: CPT | Performed by: INTERNAL MEDICINE

## 2017-04-03 PROCEDURE — 94729 DIFFUSING CAPACITY: CPT | Performed by: INTERNAL MEDICINE

## 2017-04-03 PROCEDURE — 87340 HEPATITIS B SURFACE AG IA: CPT | Performed by: INTERNAL MEDICINE

## 2017-04-03 PROCEDURE — 86705 HEP B CORE ANTIBODY IGM: CPT | Performed by: INTERNAL MEDICINE

## 2017-04-03 PROCEDURE — 86708 HEPATITIS A ANTIBODY: CPT | Performed by: INTERNAL MEDICINE

## 2017-04-03 RX ORDER — MONTELUKAST SODIUM 10 MG/1
10 TABLET ORAL NIGHTLY
Qty: 30 TABLET | Refills: 5 | Status: SHIPPED | OUTPATIENT
Start: 2017-04-03 | End: 2017-07-06 | Stop reason: SDUPTHER

## 2017-04-03 NOTE — PROGRESS NOTES
"Chief Complaint   Patient presents with   • Follow-up         Subjective   Topher Stoll is a 41 y.o. male.     History of Present Illness   Comes in today to follow-up on shortness of breath, asthma, allergic rhinitis and obstructive sleep apnea.    Despite using his nasal steroids, he is having occasional symptoms of runny nose and dribbling in the back of his throat.    He is using his Breo on a regular basis and feels that his use of rescue inhaler/nebulizer treatments as significantly reduced.  He did however, use his rescue inhaler more than usual during the exacerbation.    He also describes one recent episode of asthma exacerbation requiring treatment.    He continues to have daytime sleepiness and tiredness.  His sleep study confirming sleep apnea but has not been set up with us AutoPap revised yet.  The patient is aware of his sleep study results but unfortunately his DME company has not set him up with an auto Pap, although an order was sent to them a few weeks ago.    The following portions of the patient's history were reviewed and updated as appropriate: allergies, current medications, past family history, past medical history, past social history and past surgical history.    Review of Systems   HENT: Negative for sinus pressure, sneezing and sore throat.    Respiratory: Negative for cough, shortness of breath and wheezing.        Objective   Visit Vitals   • /84   • Pulse 93   • Resp 18   • Ht 73\" (185.4 cm)   • Wt 204 lb (92.5 kg)   • SpO2 96%   • BMI 26.91 kg/m2       Physical Exam   Constitutional: He is oriented to person, place, and time. He appears well-developed and well-nourished.   HENT:   Sinuses were non tender on palpation.  Nostril showed moderate erythema.  Oropharynx was crowded.  Edentulous   Eyes: EOM are normal.   Neck: Neck supple.   Cardiovascular: Normal rate and regular rhythm.    Pulmonary/Chest: Effort normal. No respiratory distress.   Musculoskeletal: He exhibits no " edema.   Neurological: He is alert and oriented to person, place, and time.   Skin: Skin is warm and dry.   Psychiatric: He has a normal mood and affect.       Assessment/Plan   Topher was seen today for follow-up.    Diagnoses and all orders for this visit:    Shortness of breath    Moderate persistent asthma without complication    Obstructive sleep apnea  -     BIPAP / CPAP Adjustment    Other allergic rhinitis  -     IgE; Future  -     Allergens, Zone 8; Future    Other orders  -     montelukast (SINGULAIR) 10 MG tablet; Take 1 tablet by mouth Every Night.         Return in about 3 months (around 7/3/2017) for Recheck.    DISCUSSION (if any):  I have discussed the patient's PFTs with him which confirmed significant reversibility, once again consistent with primarily asthma.  Although COPD remains as another possibility, the reversibility and his seasonal variation to the symptoms are more consistent with moderate persistent asthma.    ============================  ============================    Due to continued symptoms of allergic rhinitis I have ordered IgE/RAST panel.    He clearly has obstructive sleep apnea and will need therapy with auto Pap and this will be set up.      Errors in dictation may reflect use of voice recognition software and not all errors in transcription may have been detected prior to signing    This document was electronically signed by Melina Olivas MD April 3, 2017  11:39 AM

## 2017-04-04 LAB
HAV AB SER QL IA: NEGATIVE
HAV IGM SERPL QL IA: NEGATIVE
HBV CORE AB SER DONR QL IA: POSITIVE
HBV CORE IGM SERPL QL IA: NEGATIVE
HBV E AG SERPL QL IA: NEGATIVE
HBV SURFACE AB SER QL: NON REACTIVE
HBV SURFACE AG SERPL QL IA: NEGATIVE
TOTAL IGE SMQN RAST: 46 IU/ML (ref 0–100)

## 2017-04-05 LAB — HBV E AB SERPL QL IA: POSITIVE

## 2017-04-06 LAB
A ALTERNATA IGE QN: <0.1 KU/L
A FUMIGATUS IGE QN: <0.1 KU/L
AMER ROACH IGE QN: <0.1 KU/L
BAHIA GRASS IGE QN: <0.1 KU/L
BERMUDA GRASS IGE QN: <0.1 KU/L
BOXELDER IGE QN: <0.1 KU/L
C HERBARUM IGE QN: <0.1 KU/L
CAT DANDER IGG QN: <0.1 KU/L
CMN PIGWEED IGE QN: <0.1 KU/L
COMMON RAGWEED IGE QN: <0.1 KU/L
CONV CLASS DESCRIPTION: NORMAL
D FARINAE IGE QN: <0.1 KU/L
D PTERONYSS IGE QN: <0.1 KU/L
DOG DANDER IGE QN: <0.1 KU/L
ENGL PLANTAIN IGE QN: <0.1 KU/L
HAZELNUT POLN IGE QN: <0.1 KU/L
HCV GENTYP SERPL NAA+PROBE: NORMAL
HCV GENTYP SERPL NAA+PROBE: NORMAL
HCV RNA SERPL NAA+PROBE-ACNC: NORMAL IU/ML
HCV RNA SERPL NAA+PROBE-LOG IU: 6.92 LOG10 IU/ML
JOHNSON GRASS IGE QN: <0.1 KU/L
KENT BLUE GRASS IGE QN: <0.1 KU/L
Lab: NORMAL
M RACEMOSUS IGE QN: <0.1 KU/L
MT JUNIPER IGE QN: <0.1 KU/L
MUGWORT IGE QN: <0.1 KU/L
NETTLE IGE QN: <0.1 KU/L
P NOTATUM IGE QN: <0.1 KU/L
REF LAB TEST REF RANGE: NORMAL
S BOTRYOSUM IGE QN: <0.1 KU/L
SHEEP SORREL IGE QN: <0.1 KU/L
SWEET GUM IGE QN: <0.1 KU/L
T011-IGE MAPLE LEAF SYCAMORE: <0.1 KU/L
WHITE ELM IGE QN: <0.1 KU/L
WHITE HICKORY IGE QN: <0.1 KU/L
WHITE MULBERRY IGE QN: <0.1 KU/L
WHITE OAK IGE QN: <0.1 KU/L

## 2017-05-03 ENCOUNTER — OFFICE VISIT (OUTPATIENT)
Dept: INTERNAL MEDICINE | Facility: CLINIC | Age: 42
End: 2017-05-03

## 2017-05-03 VITALS
HEART RATE: 83 BPM | HEIGHT: 73 IN | DIASTOLIC BLOOD PRESSURE: 80 MMHG | OXYGEN SATURATION: 96 % | BODY MASS INDEX: 27.3 KG/M2 | SYSTOLIC BLOOD PRESSURE: 120 MMHG | WEIGHT: 206 LBS

## 2017-05-03 DIAGNOSIS — R51.9 HEADACHE, UNSPECIFIED HEADACHE TYPE: Primary | ICD-10-CM

## 2017-05-03 DIAGNOSIS — I10 ESSENTIAL HYPERTENSION: ICD-10-CM

## 2017-05-03 DIAGNOSIS — J43.1 PANLOBULAR EMPHYSEMA (HCC): ICD-10-CM

## 2017-05-03 DIAGNOSIS — B18.1 CHRONIC VIRAL HEPATITIS B WITHOUT DELTA AGENT AND WITHOUT COMA (HCC): ICD-10-CM

## 2017-05-03 DIAGNOSIS — F19.91 HISTORY OF RECREATIONAL DRUG USE: ICD-10-CM

## 2017-05-03 DIAGNOSIS — B18.2 HEP C W/O COMA, CHRONIC (HCC): ICD-10-CM

## 2017-05-03 DIAGNOSIS — E55.9 VITAMIN D DEFICIENCY: ICD-10-CM

## 2017-05-03 DIAGNOSIS — E78.00 PURE HYPERCHOLESTEROLEMIA: ICD-10-CM

## 2017-05-03 PROCEDURE — 99212 OFFICE O/P EST SF 10 MIN: CPT | Performed by: FAMILY MEDICINE

## 2017-05-11 RX ORDER — LOSARTAN POTASSIUM 100 MG/1
100 TABLET ORAL DAILY
Qty: 90 TABLET | Refills: 3 | Status: SHIPPED | OUTPATIENT
Start: 2017-05-11 | End: 2022-05-19 | Stop reason: DRUGHIGH

## 2017-05-14 ENCOUNTER — APPOINTMENT (OUTPATIENT)
Dept: GENERAL RADIOLOGY | Facility: HOSPITAL | Age: 42
End: 2017-05-14

## 2017-05-14 ENCOUNTER — HOSPITAL ENCOUNTER (EMERGENCY)
Facility: HOSPITAL | Age: 42
Discharge: HOME | End: 2017-05-14
Attending: EMERGENCY MEDICINE | Admitting: EMERGENCY MEDICINE

## 2017-05-14 VITALS
DIASTOLIC BLOOD PRESSURE: 85 MMHG | RESPIRATION RATE: 18 BRPM | WEIGHT: 195 LBS | TEMPERATURE: 98.4 F | SYSTOLIC BLOOD PRESSURE: 117 MMHG | OXYGEN SATURATION: 97 % | HEIGHT: 72 IN | HEART RATE: 77 BPM | BODY MASS INDEX: 26.41 KG/M2

## 2017-05-14 DIAGNOSIS — J44.1 COPD EXACERBATION (HCC): Primary | ICD-10-CM

## 2017-05-14 LAB
ALBUMIN SERPL-MCNC: 4.6 G/DL (ref 3.5–5)
ALBUMIN/GLOB SERPL: 1.3 G/DL (ref 1–2)
ALP SERPL-CCNC: 99 U/L (ref 38–126)
ALT SERPL W P-5'-P-CCNC: 147 U/L (ref 13–69)
ANION GAP SERPL CALCULATED.3IONS-SCNC: 16.6 MMOL/L
AST SERPL-CCNC: 76 U/L (ref 15–46)
BASOPHILS # BLD AUTO: 0.06 10*3/MM3 (ref 0–0.2)
BASOPHILS NFR BLD AUTO: 0.6 % (ref 0–2.5)
BILIRUB SERPL-MCNC: 0.8 MG/DL (ref 0.2–1.3)
BUN BLD-MCNC: 12 MG/DL (ref 7–20)
BUN/CREAT SERPL: 12 (ref 6.3–21.9)
CALCIUM SPEC-SCNC: 9.1 MG/DL (ref 8.4–10.2)
CHLORIDE SERPL-SCNC: 105 MMOL/L (ref 98–107)
CO2 SERPL-SCNC: 26 MMOL/L (ref 26–30)
CREAT BLD-MCNC: 1 MG/DL (ref 0.6–1.3)
DEPRECATED RDW RBC AUTO: 40.5 FL (ref 37–54)
EOSINOPHIL # BLD AUTO: 0.58 10*3/MM3 (ref 0–0.7)
EOSINOPHIL NFR BLD AUTO: 6.2 % (ref 0–7)
ERYTHROCYTE [DISTWIDTH] IN BLOOD BY AUTOMATED COUNT: 13 % (ref 11.5–14.5)
GFR SERPL CREATININE-BSD FRML MDRD: 82 ML/MIN/1.73
GLOBULIN UR ELPH-MCNC: 3.6 GM/DL
GLUCOSE BLD-MCNC: 98 MG/DL (ref 74–98)
HCT VFR BLD AUTO: 49.1 % (ref 42–52)
HGB BLD-MCNC: 16.4 G/DL (ref 14–18)
HOLD SPECIMEN: NORMAL
HOLD SPECIMEN: NORMAL
IMM GRANULOCYTES # BLD: 0.02 10*3/MM3 (ref 0–0.06)
IMM GRANULOCYTES NFR BLD: 0.2 % (ref 0–0.6)
LYMPHOCYTES # BLD AUTO: 2.89 10*3/MM3 (ref 0.6–3.4)
LYMPHOCYTES NFR BLD AUTO: 31.1 % (ref 10–50)
MCH RBC QN AUTO: 28.8 PG (ref 27–31)
MCHC RBC AUTO-ENTMCNC: 33.4 G/DL (ref 30–37)
MCV RBC AUTO: 86.3 FL (ref 80–94)
MONOCYTES # BLD AUTO: 1 10*3/MM3 (ref 0–0.9)
MONOCYTES NFR BLD AUTO: 10.8 % (ref 0–12)
NEUTROPHILS # BLD AUTO: 4.75 10*3/MM3 (ref 2–6.9)
NEUTROPHILS NFR BLD AUTO: 51.1 % (ref 37–80)
NRBC BLD MANUAL-RTO: 0 /100 WBC (ref 0–0)
NT-PROBNP SERPL-MCNC: 15.4 PG/ML (ref 0–125)
PLATELET # BLD AUTO: 242 10*3/MM3 (ref 130–400)
PMV BLD AUTO: 10.1 FL (ref 6–12)
POTASSIUM BLD-SCNC: 4.6 MMOL/L (ref 3.5–5.1)
PROT SERPL-MCNC: 8.2 G/DL (ref 6.3–8.2)
RBC # BLD AUTO: 5.69 10*6/MM3 (ref 4.7–6.1)
SODIUM BLD-SCNC: 143 MMOL/L (ref 137–145)
TROPONIN I SERPL-MCNC: <0.012 NG/ML (ref 0–0.03)
WBC NRBC COR # BLD: 9.3 10*3/MM3 (ref 4.8–10.8)
WHOLE BLOOD HOLD SPECIMEN: NORMAL
WHOLE BLOOD HOLD SPECIMEN: NORMAL

## 2017-05-14 PROCEDURE — 94799 UNLISTED PULMONARY SVC/PX: CPT

## 2017-05-14 PROCEDURE — 93005 ELECTROCARDIOGRAM TRACING: CPT | Performed by: EMERGENCY MEDICINE

## 2017-05-14 PROCEDURE — 71020 HC CHEST PA AND LATERAL: CPT

## 2017-05-14 PROCEDURE — 94640 AIRWAY INHALATION TREATMENT: CPT

## 2017-05-14 PROCEDURE — 84484 ASSAY OF TROPONIN QUANT: CPT | Performed by: EMERGENCY MEDICINE

## 2017-05-14 PROCEDURE — 83880 ASSAY OF NATRIURETIC PEPTIDE: CPT | Performed by: EMERGENCY MEDICINE

## 2017-05-14 PROCEDURE — 99284 EMERGENCY DEPT VISIT MOD MDM: CPT

## 2017-05-14 PROCEDURE — 80053 COMPREHEN METABOLIC PANEL: CPT | Performed by: EMERGENCY MEDICINE

## 2017-05-14 PROCEDURE — 85025 COMPLETE CBC W/AUTO DIFF WBC: CPT | Performed by: EMERGENCY MEDICINE

## 2017-05-14 RX ORDER — SODIUM CHLORIDE 0.9 % (FLUSH) 0.9 %
10 SYRINGE (ML) INJECTION AS NEEDED
Status: DISCONTINUED | OUTPATIENT
Start: 2017-05-14 | End: 2017-05-14 | Stop reason: HOSPADM

## 2017-05-14 RX ORDER — IPRATROPIUM BROMIDE AND ALBUTEROL SULFATE 2.5; .5 MG/3ML; MG/3ML
3 SOLUTION RESPIRATORY (INHALATION) ONCE
Status: COMPLETED | OUTPATIENT
Start: 2017-05-14 | End: 2017-05-14

## 2017-05-14 RX ORDER — METHYLPREDNISOLONE 4 MG/1
TABLET ORAL
Qty: 21 TABLET | Refills: 0 | Status: SHIPPED | OUTPATIENT
Start: 2017-05-14 | End: 2017-07-06

## 2017-05-14 RX ORDER — LEVOFLOXACIN 500 MG/1
500 TABLET, FILM COATED ORAL DAILY
Qty: 7 TABLET | Refills: 0 | Status: SHIPPED | OUTPATIENT
Start: 2017-05-14 | End: 2017-05-21

## 2017-05-14 RX ADMIN — IPRATROPIUM BROMIDE AND ALBUTEROL SULFATE 3 ML: .5; 3 SOLUTION RESPIRATORY (INHALATION) at 15:16

## 2017-05-26 LAB — VALPROATE SERPL-MCNC: 29.2 MCG/ML (ref 50–100)

## 2017-05-30 ENCOUNTER — TELEPHONE (OUTPATIENT)
Dept: INTERNAL MEDICINE | Facility: CLINIC | Age: 42
End: 2017-05-30

## 2017-05-30 DIAGNOSIS — J44.1 CHRONIC OBSTRUCTIVE PULMONARY DISEASE WITH ACUTE EXACERBATION (HCC): Primary | ICD-10-CM

## 2017-05-30 RX ORDER — PREDNISONE 10 MG/1
TABLET ORAL
Qty: 21 TABLET | Refills: 1 | Status: SHIPPED | OUTPATIENT
Start: 2017-05-30 | End: 2017-07-06

## 2017-07-06 ENCOUNTER — OFFICE VISIT (OUTPATIENT)
Dept: PULMONOLOGY | Facility: CLINIC | Age: 42
End: 2017-07-06

## 2017-07-06 VITALS
BODY MASS INDEX: 27.09 KG/M2 | OXYGEN SATURATION: 96 % | HEART RATE: 77 BPM | HEIGHT: 72 IN | SYSTOLIC BLOOD PRESSURE: 108 MMHG | DIASTOLIC BLOOD PRESSURE: 62 MMHG | RESPIRATION RATE: 16 BRPM | WEIGHT: 200 LBS

## 2017-07-06 DIAGNOSIS — J30.89 OTHER ALLERGIC RHINITIS: ICD-10-CM

## 2017-07-06 DIAGNOSIS — G47.33 OBSTRUCTIVE SLEEP APNEA: ICD-10-CM

## 2017-07-06 DIAGNOSIS — J45.40 MODERATE PERSISTENT ASTHMA WITHOUT COMPLICATION: ICD-10-CM

## 2017-07-06 DIAGNOSIS — R06.02 SHORTNESS OF BREATH: Primary | ICD-10-CM

## 2017-07-06 PROCEDURE — 99214 OFFICE O/P EST MOD 30 MIN: CPT | Performed by: INTERNAL MEDICINE

## 2017-07-06 PROCEDURE — 95012 NITRIC OXIDE EXP GAS DETER: CPT | Performed by: INTERNAL MEDICINE

## 2017-07-06 RX ORDER — ALBUTEROL SULFATE 90 UG/1
2 AEROSOL, METERED RESPIRATORY (INHALATION) EVERY 4 HOURS PRN
Qty: 1 INHALER | Refills: 5 | Status: SHIPPED | OUTPATIENT
Start: 2017-07-06 | End: 2017-08-04 | Stop reason: SDUPTHER

## 2017-07-06 RX ORDER — MONTELUKAST SODIUM 10 MG/1
10 TABLET ORAL NIGHTLY
Qty: 30 TABLET | Refills: 5 | Status: SHIPPED | OUTPATIENT
Start: 2017-07-06 | End: 2018-06-05 | Stop reason: SDUPTHER

## 2017-07-06 RX ORDER — FLUNISOLIDE 0.25 MG/ML
1 SOLUTION NASAL EVERY 12 HOURS
Qty: 1 BOTTLE | Refills: 5 | Status: SHIPPED | OUTPATIENT
Start: 2017-07-06 | End: 2017-12-19 | Stop reason: SDUPTHER

## 2017-07-06 NOTE — PROGRESS NOTES
"Chief Complaint   Patient presents with   • Follow-up   • Shortness of Breath   • Sleep Apnea         Subjective   Topher Stoll is a 41 y.o. male.     History of Present Illness   Patient comes back today for follow up of Sleep apnea. Patient doesn't report any issues with the machine or mask. Patient says that the compliance with the use of the equipment is good.     Patient's symptoms of sleep disturbance/daytime sleepiness have been helped greatly with the use of Auto-PAP, as prescribed.    His asthma is under very good control and apart from one recent exacerbation, 4 weeks ago, he has not had any significant issues.  He is using his medications as prescribed.    His allergic rhinitis is also under very good control with nasal steroids although he has occasional issues despite using Flonase on a regular basis.    The following portions of the patient's history were reviewed and updated as appropriate: allergies, current medications, past family history, past medical history, past social history and past surgical history.    Review of Systems   HENT: Negative for sinus pressure, sneezing and sore throat.    Respiratory: Negative for cough, shortness of breath and wheezing.        Objective   Visit Vitals   • /62   • Pulse 77   • Resp 16   • Ht 72\" (182.9 cm)   • Wt 200 lb (90.7 kg)   • SpO2 96%   • BMI 27.12 kg/m2       Physical Exam   Constitutional: He is oriented to person, place, and time. He appears well-developed and well-nourished.   HENT:   Head: Atraumatic.   Crowded oropharynx.   Nasal erythema noted.   Eyes: EOM are normal.   Neck: Neck supple. No JVD present. No tracheal deviation present. No thyromegaly present.   Cardiovascular: Normal rate and regular rhythm.    Pulmonary/Chest: Effort normal and breath sounds normal. He has no wheezes. He has no rales.   Musculoskeletal: Normal range of motion.   Gait was normal.   Neurological: He is alert and oriented to person, place, and time.   Skin: " Skin is warm and dry.   Vitals reviewed.      Assessment/Plan   Topher was seen today for follow-up, shortness of breath and sleep apnea.    Diagnoses and all orders for this visit:    Shortness of breath    Moderate persistent asthma without complication  -     Nitric Oxide    Obstructive sleep apnea  -     BIPAP / CPAP Adjustment    Other allergic rhinitis    Other orders  -     BREO ELLIPTA 200-25 MCG/INH aerosol powder ; Inhale 1 puff Daily. Rinse mouth with water after use.  -     montelukast (SINGULAIR) 10 MG tablet; Take 1 tablet by mouth Every Night.  -     albuterol (VENTOLIN HFA) 108 (90 BASE) MCG/ACT inhaler; Inhale 2 puffs Every 4 (Four) Hours As Needed for Wheezing or Shortness of Air.  -     flunisolide (NASALIDE) 25 MCG/ACT (0.025%) solution nasal spray; Inhale 1 spray Every 12 (Twelve) Hours.         Return in about 4 months (around 11/6/2017) for Recheck, For Delmy.    DISCUSSION (if any):  Continue treatment with auto PAP.    The patient will benefit from either a chinstrap or fullface mask and this will be requested from his DME company.    Patient's compliance data was reviewed and the compliance is greater than 70%    Humidification setup, hose and mask care discussed.    Asked to have DME service the equipment atleast once every 3-6 months or so.    Use every night for atleast 4 hours stressed.    His FeNO level was 43.    I advised him to continue current treatment for asthma including Singulair, Breo and albuterol MDI.    He was advised to continue nasal steroids for his underlying allergic rhinitis.       Errors in dictation may reflect use of voice recognition software and not all errors in transcription may have been detected prior to signing    This document was electronically signed by Melina Olivas MD July 6, 2017  10:11 AM

## 2017-07-14 ENCOUNTER — APPOINTMENT (OUTPATIENT)
Dept: CT IMAGING | Facility: HOSPITAL | Age: 42
End: 2017-07-14

## 2017-07-14 ENCOUNTER — HOSPITAL ENCOUNTER (EMERGENCY)
Facility: HOSPITAL | Age: 42
Discharge: HOME | End: 2017-07-15
Attending: STUDENT IN AN ORGANIZED HEALTH CARE EDUCATION/TRAINING PROGRAM | Admitting: STUDENT IN AN ORGANIZED HEALTH CARE EDUCATION/TRAINING PROGRAM

## 2017-07-14 DIAGNOSIS — R11.0 NAUSEA: ICD-10-CM

## 2017-07-14 DIAGNOSIS — R10.84 GENERALIZED ABDOMINAL PAIN: Primary | ICD-10-CM

## 2017-07-14 LAB
ALBUMIN SERPL-MCNC: 4.7 G/DL (ref 3.5–5)
ALBUMIN/GLOB SERPL: 1.5 G/DL (ref 1–2)
ALP SERPL-CCNC: 125 U/L (ref 38–126)
ALT SERPL W P-5'-P-CCNC: 52 U/L (ref 13–69)
ANION GAP SERPL CALCULATED.3IONS-SCNC: 18.3 MMOL/L
AST SERPL-CCNC: 32 U/L (ref 15–46)
BACTERIA UR QL AUTO: ABNORMAL /HPF
BASOPHILS # BLD AUTO: 0.09 10*3/MM3 (ref 0–0.2)
BASOPHILS NFR BLD AUTO: 0.9 % (ref 0–2.5)
BILIRUB SERPL-MCNC: 0.9 MG/DL (ref 0.2–1.3)
BILIRUB UR QL STRIP: ABNORMAL
BUN BLD-MCNC: 13 MG/DL (ref 7–20)
BUN/CREAT SERPL: 13 (ref 6.3–21.9)
CALCIUM SPEC-SCNC: 9.3 MG/DL (ref 8.4–10.2)
CHLORIDE SERPL-SCNC: 104 MMOL/L (ref 98–107)
CLARITY UR: CLEAR
CO2 SERPL-SCNC: 24 MMOL/L (ref 26–30)
COLOR UR: ABNORMAL
CREAT BLD-MCNC: 1 MG/DL (ref 0.6–1.3)
DEPRECATED RDW RBC AUTO: 42.7 FL (ref 37–54)
EOSINOPHIL # BLD AUTO: 0.67 10*3/MM3 (ref 0–0.7)
EOSINOPHIL NFR BLD AUTO: 6.3 % (ref 0–7)
ERYTHROCYTE [DISTWIDTH] IN BLOOD BY AUTOMATED COUNT: 14.1 % (ref 11.5–14.5)
GFR SERPL CREATININE-BSD FRML MDRD: 82 ML/MIN/1.73
GLOBULIN UR ELPH-MCNC: 3.1 GM/DL
GLUCOSE BLD-MCNC: 97 MG/DL (ref 74–98)
GLUCOSE UR STRIP-MCNC: NEGATIVE MG/DL
HCT VFR BLD AUTO: 44.1 % (ref 42–52)
HGB BLD-MCNC: 15.1 G/DL (ref 14–18)
HGB UR QL STRIP.AUTO: NEGATIVE
HOLD SPECIMEN: NORMAL
HOLD SPECIMEN: NORMAL
HYALINE CASTS UR QL AUTO: ABNORMAL /LPF
IMM GRANULOCYTES # BLD: 0.01 10*3/MM3 (ref 0–0.06)
IMM GRANULOCYTES NFR BLD: 0.1 % (ref 0–0.6)
KETONES UR QL STRIP: ABNORMAL
LEUKOCYTE ESTERASE UR QL STRIP.AUTO: NEGATIVE
LIPASE SERPL-CCNC: 127 U/L (ref 23–300)
LYMPHOCYTES # BLD AUTO: 3.02 10*3/MM3 (ref 0.6–3.4)
LYMPHOCYTES NFR BLD AUTO: 28.6 % (ref 10–50)
MCH RBC QN AUTO: 28.6 PG (ref 27–31)
MCHC RBC AUTO-ENTMCNC: 34.2 G/DL (ref 30–37)
MCV RBC AUTO: 83.5 FL (ref 80–94)
MONOCYTES # BLD AUTO: 1.06 10*3/MM3 (ref 0–0.9)
MONOCYTES NFR BLD AUTO: 10 % (ref 0–12)
MUCOUS THREADS URNS QL MICRO: ABNORMAL /HPF
NEUTROPHILS # BLD AUTO: 5.71 10*3/MM3 (ref 2–6.9)
NEUTROPHILS NFR BLD AUTO: 54.1 % (ref 37–80)
NITRITE UR QL STRIP: NEGATIVE
NRBC BLD MANUAL-RTO: 0 /100 WBC (ref 0–0)
PH UR STRIP.AUTO: 6 [PH] (ref 5–8)
PLATELET # BLD AUTO: 241 10*3/MM3 (ref 130–400)
PMV BLD AUTO: 10.2 FL (ref 6–12)
POTASSIUM BLD-SCNC: 3.3 MMOL/L (ref 3.5–5.1)
PROT SERPL-MCNC: 7.8 G/DL (ref 6.3–8.2)
PROT UR QL STRIP: ABNORMAL
RBC # BLD AUTO: 5.28 10*6/MM3 (ref 4.7–6.1)
RBC # UR: ABNORMAL /HPF
REF LAB TEST METHOD: ABNORMAL
SODIUM BLD-SCNC: 143 MMOL/L (ref 137–145)
SP GR UR STRIP: >=1.03 (ref 1–1.03)
SQUAMOUS #/AREA URNS HPF: ABNORMAL /HPF
UROBILINOGEN UR QL STRIP: ABNORMAL
WBC NRBC COR # BLD: 10.56 10*3/MM3 (ref 4.8–10.8)
WBC UR QL AUTO: ABNORMAL /HPF
WHOLE BLOOD HOLD SPECIMEN: NORMAL
WHOLE BLOOD HOLD SPECIMEN: NORMAL

## 2017-07-14 PROCEDURE — 96361 HYDRATE IV INFUSION ADD-ON: CPT

## 2017-07-14 PROCEDURE — 99284 EMERGENCY DEPT VISIT MOD MDM: CPT

## 2017-07-14 PROCEDURE — 96360 HYDRATION IV INFUSION INIT: CPT

## 2017-07-14 PROCEDURE — 94640 AIRWAY INHALATION TREATMENT: CPT

## 2017-07-14 PROCEDURE — 0 IOPAMIDOL 61 % SOLUTION: Performed by: STUDENT IN AN ORGANIZED HEALTH CARE EDUCATION/TRAINING PROGRAM

## 2017-07-14 PROCEDURE — 83690 ASSAY OF LIPASE: CPT | Performed by: PHYSICIAN ASSISTANT

## 2017-07-14 PROCEDURE — 0 DIATRIZOATE MEGLUMINE & SODIUM PER 1 ML: Performed by: STUDENT IN AN ORGANIZED HEALTH CARE EDUCATION/TRAINING PROGRAM

## 2017-07-14 PROCEDURE — 81001 URINALYSIS AUTO W/SCOPE: CPT | Performed by: PHYSICIAN ASSISTANT

## 2017-07-14 PROCEDURE — 74177 CT ABD & PELVIS W/CONTRAST: CPT

## 2017-07-14 PROCEDURE — 80053 COMPREHEN METABOLIC PANEL: CPT | Performed by: PHYSICIAN ASSISTANT

## 2017-07-14 PROCEDURE — 85025 COMPLETE CBC W/AUTO DIFF WBC: CPT | Performed by: PHYSICIAN ASSISTANT

## 2017-07-14 RX ORDER — IPRATROPIUM BROMIDE AND ALBUTEROL SULFATE 2.5; .5 MG/3ML; MG/3ML
3 SOLUTION RESPIRATORY (INHALATION) ONCE
Status: COMPLETED | OUTPATIENT
Start: 2017-07-14 | End: 2017-07-14

## 2017-07-14 RX ORDER — SODIUM CHLORIDE 0.9 % (FLUSH) 0.9 %
10 SYRINGE (ML) INJECTION AS NEEDED
Status: DISCONTINUED | OUTPATIENT
Start: 2017-07-14 | End: 2017-07-15 | Stop reason: HOSPADM

## 2017-07-14 RX ADMIN — IOPAMIDOL 90 ML: 612 INJECTION, SOLUTION INTRAVENOUS at 23:37

## 2017-07-14 RX ADMIN — SODIUM CHLORIDE 1000 ML: 9 INJECTION, SOLUTION INTRAVENOUS at 21:02

## 2017-07-14 RX ADMIN — IPRATROPIUM BROMIDE AND ALBUTEROL SULFATE 3 ML: .5; 3 SOLUTION RESPIRATORY (INHALATION) at 22:46

## 2017-07-14 RX ADMIN — DIATRIZOATE MEGLUMINE AND DIATRIZOATE SODIUM 30 ML: 660; 100 LIQUID ORAL; RECTAL at 23:37

## 2017-07-15 VITALS
HEIGHT: 73 IN | HEART RATE: 70 BPM | SYSTOLIC BLOOD PRESSURE: 128 MMHG | BODY MASS INDEX: 26.51 KG/M2 | TEMPERATURE: 97.8 F | RESPIRATION RATE: 16 BRPM | DIASTOLIC BLOOD PRESSURE: 96 MMHG | WEIGHT: 200 LBS | OXYGEN SATURATION: 95 %

## 2017-07-15 PROCEDURE — 63710000001 PROMETHAZINE PER 25 MG: Performed by: PHYSICIAN ASSISTANT

## 2017-07-15 RX ORDER — PROMETHAZINE HYDROCHLORIDE 25 MG/1
25 TABLET ORAL EVERY 6 HOURS PRN
Qty: 15 TABLET | Refills: 0 | Status: SHIPPED | OUTPATIENT
Start: 2017-07-15 | End: 2018-03-17

## 2017-07-15 RX ORDER — PROMETHAZINE HYDROCHLORIDE 25 MG/1
25 TABLET ORAL ONCE
Status: COMPLETED | OUTPATIENT
Start: 2017-07-15 | End: 2017-07-15

## 2017-07-15 RX ADMIN — PROMETHAZINE HYDROCHLORIDE 25 MG: 25 TABLET ORAL at 00:36
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
## 2017-07-18 ENCOUNTER — HOSPITAL ENCOUNTER (EMERGENCY)
Facility: HOSPITAL | Age: 42
Discharge: HOME | End: 2017-07-18
Attending: EMERGENCY MEDICINE | Admitting: EMERGENCY MEDICINE

## 2017-07-18 ENCOUNTER — APPOINTMENT (OUTPATIENT)
Dept: GENERAL RADIOLOGY | Facility: HOSPITAL | Age: 42
End: 2017-07-18
Attending: EMERGENCY MEDICINE

## 2017-07-18 VITALS
BODY MASS INDEX: 26.51 KG/M2 | RESPIRATION RATE: 20 BRPM | SYSTOLIC BLOOD PRESSURE: 134 MMHG | DIASTOLIC BLOOD PRESSURE: 98 MMHG | WEIGHT: 200 LBS | TEMPERATURE: 98 F | HEART RATE: 80 BPM | HEIGHT: 73 IN | OXYGEN SATURATION: 95 %

## 2017-07-18 DIAGNOSIS — J44.1 COPD EXACERBATION (HCC): Primary | ICD-10-CM

## 2017-07-18 LAB
ALBUMIN SERPL-MCNC: 5 G/DL (ref 3.5–5)
ALBUMIN/GLOB SERPL: 1.4 G/DL (ref 1–2)
ALP SERPL-CCNC: 148 U/L (ref 38–126)
ALT SERPL W P-5'-P-CCNC: 80 U/L (ref 13–69)
ANION GAP SERPL CALCULATED.3IONS-SCNC: 19.9 MMOL/L
ARTERIAL PATENCY WRIST A: POSITIVE
ARTERIAL PATENCY WRIST A: POSITIVE
AST SERPL-CCNC: 61 U/L (ref 15–46)
BASE EXCESS BLDA CALC-SCNC: -2 MMOL/L
BASE EXCESS BLDA CALC-SCNC: -2.7 MMOL/L
BASOPHILS # BLD AUTO: 0.07 10*3/MM3 (ref 0–0.2)
BASOPHILS NFR BLD AUTO: 0.9 % (ref 0–2.5)
BDY SITE: ABNORMAL
BDY SITE: ABNORMAL
BILIRUB SERPL-MCNC: 0.7 MG/DL (ref 0.2–1.3)
BUN BLD-MCNC: 15 MG/DL (ref 7–20)
BUN/CREAT SERPL: 13.6 (ref 6.3–21.9)
CALCIUM SPEC-SCNC: 9.9 MG/DL (ref 8.4–10.2)
CHLORIDE SERPL-SCNC: 104 MMOL/L (ref 98–107)
CO2 SERPL-SCNC: 25 MMOL/L (ref 26–30)
COHGB MFR BLD: 0.7 %
COHGB MFR BLD: 0.9 %
CREAT BLD-MCNC: 1.1 MG/DL (ref 0.6–1.3)
D-LACTATE SERPL-SCNC: 1.9 MMOL/L (ref 0.5–2)
DEPRECATED RDW RBC AUTO: 43.6 FL (ref 37–54)
EOSINOPHIL # BLD AUTO: 0.36 10*3/MM3 (ref 0–0.7)
EOSINOPHIL NFR BLD AUTO: 4.6 % (ref 0–7)
ERYTHROCYTE [DISTWIDTH] IN BLOOD BY AUTOMATED COUNT: 14.1 % (ref 11.5–14.5)
GFR SERPL CREATININE-BSD FRML MDRD: 73 ML/MIN/1.73
GLOBULIN UR ELPH-MCNC: 3.5 GM/DL
GLUCOSE BLD-MCNC: 148 MG/DL (ref 74–98)
HCO3 BLDA-SCNC: 21.7 MMOL/L (ref 22–28)
HCO3 BLDA-SCNC: 22.3 MMOL/L (ref 22–28)
HCT VFR BLD AUTO: 51.9 % (ref 42–52)
HGB BLD-MCNC: 17.4 G/DL (ref 14–18)
HGB BLDA-MCNC: 16.6 G/DL (ref 12–18)
HGB BLDA-MCNC: 16.8 G/DL (ref 12–18)
HOLD SPECIMEN: NORMAL
HOLD SPECIMEN: NORMAL
HOROWITZ INDEX BLD+IHG-RTO: 21 %
HOROWITZ INDEX BLD+IHG-RTO: 21 %
IMM GRANULOCYTES # BLD: 0.02 10*3/MM3 (ref 0–0.06)
IMM GRANULOCYTES NFR BLD: 0.3 % (ref 0–0.6)
LYMPHOCYTES # BLD AUTO: 1.23 10*3/MM3 (ref 0.6–3.4)
LYMPHOCYTES NFR BLD AUTO: 15.8 % (ref 10–50)
MCH RBC QN AUTO: 28.5 PG (ref 27–31)
MCHC RBC AUTO-ENTMCNC: 33.5 G/DL (ref 30–37)
MCV RBC AUTO: 85.1 FL (ref 80–94)
METHGB BLD QL: 0.6 %
METHGB BLD QL: 0.7 %
MODALITY: ABNORMAL
MODALITY: ABNORMAL
MONOCYTES # BLD AUTO: 1.07 10*3/MM3 (ref 0–0.9)
MONOCYTES NFR BLD AUTO: 13.8 % (ref 0–12)
NEUTROPHILS # BLD AUTO: 5.02 10*3/MM3 (ref 2–6.9)
NEUTROPHILS NFR BLD AUTO: 64.6 % (ref 37–80)
NRBC BLD MANUAL-RTO: 0 /100 WBC (ref 0–0)
NT-PROBNP SERPL-MCNC: 46.6 PG/ML (ref 0–125)
OXYHGB MFR BLDV: 93.8 % (ref 94–99)
OXYHGB MFR BLDV: 94.4 % (ref 94–99)
PCO2 BLDA: 33.3 MM HG (ref 35–45)
PCO2 BLDA: 33.7 MM HG (ref 35–45)
PH BLDA: 7.42 PH UNITS
PH BLDA: 7.43 PH UNITS
PLATELET # BLD AUTO: 238 10*3/MM3 (ref 130–400)
PMV BLD AUTO: 9.7 FL (ref 6–12)
PO2 BLDA: 70.5 MM HG (ref 75–100)
PO2 BLDA: 77 MM HG (ref 75–100)
POTASSIUM BLD-SCNC: 3.9 MMOL/L (ref 3.5–5.1)
PROT SERPL-MCNC: 8.5 G/DL (ref 6.3–8.2)
RBC # BLD AUTO: 6.1 10*6/MM3 (ref 4.7–6.1)
SAO2 % BLDCOA: 95 %
SAO2 % BLDCOA: 95.9 %
SODIUM BLD-SCNC: 145 MMOL/L (ref 137–145)
TROPONIN I SERPL-MCNC: <0.012 NG/ML (ref 0–0.03)
WBC NRBC COR # BLD: 7.77 10*3/MM3 (ref 4.8–10.8)
WHOLE BLOOD HOLD SPECIMEN: NORMAL
WHOLE BLOOD HOLD SPECIMEN: NORMAL

## 2017-07-18 PROCEDURE — 83050 HGB METHEMOGLOBIN QUAN: CPT

## 2017-07-18 PROCEDURE — 96375 TX/PRO/DX INJ NEW DRUG ADDON: CPT

## 2017-07-18 PROCEDURE — 25010000002 MAGNESIUM SULFATE 2 GM/50ML SOLUTION: Performed by: EMERGENCY MEDICINE

## 2017-07-18 PROCEDURE — 96361 HYDRATE IV INFUSION ADD-ON: CPT

## 2017-07-18 PROCEDURE — 25010000002 METHYLPREDNISOLONE PER 125 MG: Performed by: EMERGENCY MEDICINE

## 2017-07-18 PROCEDURE — 83605 ASSAY OF LACTIC ACID: CPT | Performed by: EMERGENCY MEDICINE

## 2017-07-18 PROCEDURE — 36600 WITHDRAWAL OF ARTERIAL BLOOD: CPT

## 2017-07-18 PROCEDURE — 99284 EMERGENCY DEPT VISIT MOD MDM: CPT

## 2017-07-18 PROCEDURE — 71020 HC CHEST PA AND LATERAL: CPT

## 2017-07-18 PROCEDURE — 93005 ELECTROCARDIOGRAM TRACING: CPT | Performed by: EMERGENCY MEDICINE

## 2017-07-18 PROCEDURE — 94799 UNLISTED PULMONARY SVC/PX: CPT

## 2017-07-18 PROCEDURE — 94640 AIRWAY INHALATION TREATMENT: CPT

## 2017-07-18 PROCEDURE — 83880 ASSAY OF NATRIURETIC PEPTIDE: CPT | Performed by: EMERGENCY MEDICINE

## 2017-07-18 PROCEDURE — 82805 BLOOD GASES W/O2 SATURATION: CPT

## 2017-07-18 PROCEDURE — 87040 BLOOD CULTURE FOR BACTERIA: CPT | Performed by: EMERGENCY MEDICINE

## 2017-07-18 PROCEDURE — 80053 COMPREHEN METABOLIC PANEL: CPT | Performed by: EMERGENCY MEDICINE

## 2017-07-18 PROCEDURE — 96365 THER/PROPH/DIAG IV INF INIT: CPT

## 2017-07-18 PROCEDURE — 82375 ASSAY CARBOXYHB QUANT: CPT

## 2017-07-18 PROCEDURE — 85025 COMPLETE CBC W/AUTO DIFF WBC: CPT | Performed by: EMERGENCY MEDICINE

## 2017-07-18 PROCEDURE — 84484 ASSAY OF TROPONIN QUANT: CPT | Performed by: EMERGENCY MEDICINE

## 2017-07-18 RX ORDER — MAGNESIUM SULFATE HEPTAHYDRATE 40 MG/ML
2 INJECTION, SOLUTION INTRAVENOUS ONCE
Status: COMPLETED | OUTPATIENT
Start: 2017-07-18 | End: 2017-07-18

## 2017-07-18 RX ORDER — DICYCLOMINE HCL 20 MG
20 TABLET ORAL EVERY 6 HOURS PRN
Qty: 10 TABLET | Refills: 0 | Status: ON HOLD | OUTPATIENT
Start: 2017-07-18 | End: 2019-06-17

## 2017-07-18 RX ORDER — SODIUM CHLORIDE 0.9 % (FLUSH) 0.9 %
10 SYRINGE (ML) INJECTION AS NEEDED
Status: DISCONTINUED | OUTPATIENT
Start: 2017-07-18 | End: 2017-07-18 | Stop reason: HOSPADM

## 2017-07-18 RX ORDER — DIPHENOXYLATE HYDROCHLORIDE AND ATROPINE SULFATE 2.5; .025 MG/1; MG/1
1 TABLET ORAL 4 TIMES DAILY PRN
Qty: 8 TABLET | Refills: 0 | Status: ON HOLD | OUTPATIENT
Start: 2017-07-18 | End: 2019-06-17

## 2017-07-18 RX ORDER — IPRATROPIUM BROMIDE AND ALBUTEROL SULFATE 2.5; .5 MG/3ML; MG/3ML
9 SOLUTION RESPIRATORY (INHALATION) ONCE
Status: COMPLETED | OUTPATIENT
Start: 2017-07-18 | End: 2017-07-18

## 2017-07-18 RX ORDER — PREDNISONE 50 MG/1
50 TABLET ORAL DAILY
Qty: 4 TABLET | Refills: 0 | Status: SHIPPED | OUTPATIENT
Start: 2017-07-18 | End: 2017-09-25 | Stop reason: DRUGHIGH

## 2017-07-18 RX ORDER — IPRATROPIUM BROMIDE AND ALBUTEROL SULFATE 2.5; .5 MG/3ML; MG/3ML
3 SOLUTION RESPIRATORY (INHALATION) ONCE
Status: DISCONTINUED | OUTPATIENT
Start: 2017-07-18 | End: 2017-07-18

## 2017-07-18 RX ORDER — LEVOFLOXACIN 750 MG/1
750 TABLET ORAL DAILY
Qty: 7 TABLET | Refills: 0 | Status: SHIPPED | OUTPATIENT
Start: 2017-07-18 | End: 2017-08-08 | Stop reason: ALTCHOICE

## 2017-07-18 RX ORDER — METHYLPREDNISOLONE SODIUM SUCCINATE 125 MG/2ML
125 INJECTION, POWDER, LYOPHILIZED, FOR SOLUTION INTRAMUSCULAR; INTRAVENOUS ONCE
Status: COMPLETED | OUTPATIENT
Start: 2017-07-18 | End: 2017-07-18

## 2017-07-18 RX ADMIN — MAGNESIUM SULFATE HEPTAHYDRATE 2 G: 40 INJECTION, SOLUTION INTRAVENOUS at 09:53

## 2017-07-18 RX ADMIN — IPRATROPIUM BROMIDE AND ALBUTEROL SULFATE 9 ML: .5; 3 SOLUTION RESPIRATORY (INHALATION) at 09:05

## 2017-07-18 RX ADMIN — SODIUM CHLORIDE 1000 ML: 9 INJECTION, SOLUTION INTRAVENOUS at 09:14

## 2017-07-18 RX ADMIN — METHYLPREDNISOLONE SODIUM SUCCINATE 125 MG: 125 INJECTION, POWDER, FOR SOLUTION INTRAMUSCULAR; INTRAVENOUS at 09:08

## 2017-07-18 NOTE — ED PROVIDER NOTES
TRIAGE CHIEF COMPLAINT:     Nursing and triage notes reviewed    Chief Complaint   Patient presents with   • Shortness of Breath      HPI: Topher Stoll is a 41 y.o. male who presents to the emergency department complaining of Shortness of breath, cough, and sputum production.  Patient has a history of COPD.  Patient states over the past 3-4 days he has had a worsening shortness of breath, cough, and whitish colored sputum production.  Patient states that he has not had any fevers or chills.  He denies chest pain.  He states his inhalers don't seem to be working as much as usual.  Denies abdominal pain, nausea, or vomiting.  Patient does not use any oxygen at home.     REVIEW OF SYSTEMS: Otherwise negative unless stated above     PAST MEDICAL HISTORY:   Past Medical History:   Diagnosis Date   • Abdominal adhesions    • Asthma    • Cervical radiculopathy    • Colitis    • Colitis    • H/O chest x-ray 04/14/2016    No active disease   • H/O chest x-ray 03/28/2016    No active disease by portable imaging   • H/O chest x-ray 03/12/2016    No acute cardiopulmonary process   • Headache    • Heart attack    • History of recreational drug use    • Kidney cysts    • Left hip pain    • Lesion of oral mucosa    • Liver disease    • Low back pain    • Obstructive chronic bronchitis with exacerbation    • Sleep apnea     mild        FAMILY HISTORY:   Family History   Problem Relation Age of Onset   • Arthritis Other    • Hypertension Other    • Migraines Other    • Heart attack Other    • Stroke Other         SOCIAL HISTORY:   Social History     Social History   • Marital status:      Spouse name: N/A   • Number of children: N/A   • Years of education: N/A     Occupational History   • Not on file.     Social History Main Topics   • Smoking status: Former Smoker   • Smokeless tobacco: Current User     Types: Chew      Comment: quit 2005   • Alcohol use No      Comment: stopped drinking alcohol   • Drug use: No       Comment: Narcotic drug use   • Sexual activity: Not on file     Other Topics Concern   • Not on file     Social History Narrative        SURGICAL HISTORY:   Past Surgical History:   Procedure Laterality Date   • BACK SURGERY     • HERNIA REPAIR     • HIP SURGERY     • SMALL INTESTINE SURGERY      Small bowell resection        CURRENT MEDICATIONS:      Medication List      ASK your doctor about these medications          * albuterol (2.5 MG/3ML) 0.083% nebulizer solution   Commonly known as:  PROVENTIL   Take 2.5 mg by nebulization Every 4 (Four) Hours As Needed for wheezing.       * albuterol 108 (90 BASE) MCG/ACT inhaler   Commonly known as:  VENTOLIN HFA   Inhale 2 puffs Every 4 (Four) Hours As Needed for Wheezing or Shortness of   Air.       * BREO ELLIPTA 200-25 MCG/INH aerosol powder    Generic drug:  Fluticasone Furoate-Vilanterol   Inhale 1 puff Daily. Rinse mouth with water after use.       * BREO ELLIPTA 200-25 MCG/INH aerosol powder    Generic drug:  Fluticasone Furoate-Vilanterol   inhale 1 puff by mouth daily *RINSE MOUTH WITH WATER AFTER USE*       divalproex 500 MG 24 hr tablet   Commonly known as:  DEPAKOTE ER   Take 1 tablet by mouth Daily.       flunisolide 25 MCG/ACT (0.025%) solution nasal spray   Commonly known as:  NASALIDE   Inhale 1 spray Every 12 (Twelve) Hours.       losartan 100 MG tablet   Commonly known as:  COZAAR   Take 1 tablet by mouth Daily.       lovastatin 40 MG tablet   Commonly known as:  MEVACOR   Take 1 tablet by mouth Every Night.       montelukast 10 MG tablet   Commonly known as:  SINGULAIR   Take 1 tablet by mouth Every Night.       MULTIVITAL PO       NEXIUM 40 MG capsule   Generic drug:  esomeprazole   take 1 capsule by mouth once daily       promethazine 25 MG tablet   Commonly known as:  PHENERGAN   Take 1 tablet by mouth Every 6 (Six) Hours As Needed for Nausea or   Vomiting for up to 15 doses.       * Notice:  This list has 4 medication(s) that are the same as other    medications prescribed for you. Read the directions carefully, and ask   your doctor or other care provider to review them with you.         ALLERGIES: Doxycycline     PHYSICAL EXAM:   VITAL SIGNS:   Vitals:    07/18/17 0856   BP: (!) 144/116   Pulse: (!) 125   Resp: 26   SpO2: 93%      CONSTITUTIONAL: Awake, oriented, Some increased work of breathing   HENT: Atraumatic, normocephalic, oral mucosa pink and moist, airway patent. Nares patent without drainage. External ears normal.   EYES: Conjunctiva clear   NECK: Trachea midline   CARDIOVASCULAR: Tachycardic with a regular rhythm, No murmurs, rubs, gallops   PULMONARY/CHEST: Decreased air movement with diffuse wheezes in all lung fields, there is an increased respiratory rate.  No retractions are noted.  ABDOMINAL: Non-distended, soft, non-tender - no rebound or guarding   NEUROLOGIC: Non-focal, moving all four extremities, no gross sensory or motor deficits.   EXTREMITIES: No clubbing, cyanosis, or edema   SKIN: Warm, Dry, No erythema, No rash     ED COURSE / MEDICAL DECISION MAKING:   Topher Stoll is a 41 y.o. male who presents to the emergency department for evaluation of shortness of breath.  Patient has an oxygen saturation in the mid to low 90s on room air.  He is tachycardic and hypertensive on arrival.  Exam does reveal diffuse wheezes in all lung fields with decreased air movement.  EKG obtained on arrival reveals sinus tachycardia with a rate of 117 bpm.  Some nonspecific findings but no obvious signs of ischemia at this time.  Laboratory tests and a chest x-ray were obtained for further evaluation.  Chest x-ray did not reveal any acute cardiopulmonary process according to the radiologist.  Given patient's tachycardia he did eat the sepsis screening and so a lactic acid and blood cultures were obtained.  Lactic acid was within the normal range.  Patient's white blood count was within the normal range.  Finger of his laboratory testing was clinically  unremarkable including a arterial blood gas.  The patient was treated with IV magnesium, breathing treatments, and IV steroids.  He had significant improvement in his symptoms with these treatments.  Repeat exam revealed decreased respiratory rate, heart rate in the high 70s, and decreased wheezing on exam.  Patient was observed for several hours with continued improvement in his symptoms.  Patient did mention that he was still having some abdominal cramping for which she was seen in the emergency department several days ago.  These records were reviewed.  Patient had a CAT scan that was unremarkable.  Patient unable to produce stool sample so that we could send stool studies.  We'll trial some anti-spasmodic medications and patient is to be placed on Levaquin for his COPD exacerbation.  This will help cover any GI issues the patient is currently having.  I will have him follow with his primary care physician for this issue and return to the emergency department should any of his symptoms continued to grow worse or not began to improve or should he develop any new symptoms.  Patient and family were agreeable to this plan.  Patient discharged in good condition.    DECISION TO DISCHARGE/ADMIT: see ED care timeline     FINAL IMPRESSION:   1 -- COPD exacerbation   2 --   3 --     Electronically signed by: Dinorah Garcia MD, 7/18/2017 9:00 AM       Dinorah Garcia MD  07/18/17 6896

## 2017-07-21 ENCOUNTER — TELEPHONE (OUTPATIENT)
Dept: INTERNAL MEDICINE | Facility: CLINIC | Age: 42
End: 2017-07-21

## 2017-07-21 DIAGNOSIS — J44.1 COPD EXACERBATION (HCC): Primary | ICD-10-CM

## 2017-07-21 RX ORDER — PREDNISONE 10 MG/1
TABLET ORAL
Qty: 21 TABLET | Refills: 0 | Status: SHIPPED | OUTPATIENT
Start: 2017-07-21 | End: 2017-08-04 | Stop reason: SDUPTHER

## 2017-07-21 NOTE — TELEPHONE ENCOUNTER
----- Message from Rosey Warren MA sent at 7/21/2017 11:48 AM EDT -----  Regarding: FW: Med Rx request  Contact: [phone]      ----- Message -----     From: Joi Kahn     Sent: 7/21/2017  11:44 AM       To: Rosey Warren MA  Subject: Med Rx request                                   Pt called requesting rx for Prednisone to [facility].  Sts that Dr Hoffmann gives this to him when he has respiratory issues and he is having difficulty with breathing today.  Please call if any questions.

## 2017-07-23 LAB
BACTERIA SPEC AEROBE CULT: NORMAL
BACTERIA SPEC AEROBE CULT: NORMAL

## 2017-08-04 ENCOUNTER — OFFICE VISIT (OUTPATIENT)
Dept: INTERNAL MEDICINE | Facility: CLINIC | Age: 42
End: 2017-08-04

## 2017-08-04 VITALS
HEART RATE: 119 BPM | WEIGHT: 188 LBS | HEIGHT: 73 IN | BODY MASS INDEX: 24.92 KG/M2 | DIASTOLIC BLOOD PRESSURE: 70 MMHG | OXYGEN SATURATION: 95 % | SYSTOLIC BLOOD PRESSURE: 110 MMHG

## 2017-08-04 DIAGNOSIS — J44.1 COPD EXACERBATION (HCC): ICD-10-CM

## 2017-08-04 DIAGNOSIS — J43.1 PANLOBULAR EMPHYSEMA (HCC): Primary | ICD-10-CM

## 2017-08-04 DIAGNOSIS — R10.31 RIGHT LOWER QUADRANT ABDOMINAL PAIN: ICD-10-CM

## 2017-08-04 PROCEDURE — 99213 OFFICE O/P EST LOW 20 MIN: CPT | Performed by: FAMILY MEDICINE

## 2017-08-04 RX ORDER — ALBUTEROL SULFATE 90 UG/1
2 AEROSOL, METERED RESPIRATORY (INHALATION) EVERY 4 HOURS PRN
Qty: 1 INHALER | Refills: 11 | Status: SHIPPED | OUTPATIENT
Start: 2017-08-04 | End: 2020-02-17 | Stop reason: SDUPTHER

## 2017-08-04 RX ORDER — PREDNISONE 10 MG/1
TABLET ORAL
Qty: 21 TABLET | Refills: 0 | Status: SHIPPED | OUTPATIENT
Start: 2017-08-04 | End: 2017-09-25 | Stop reason: DRUGHIGH

## 2017-08-04 NOTE — PROGRESS NOTES
"Subjective   Topher Stoll is a 41 y.o. male.     History of Present Illness   Topher has been doing well. He needs to have lipids and lft's checked.  He is doing ok with breathing lately.  He did need prednisone a few weeks ago.  VS. NAD.    He has a sore area in his right abdomen that he's noticed a lot of diarrhea lately with as well.    The following portions of the patient's history were reviewed and updated as appropriate: allergies, current medications, past social history and problem list.    Review of Systems   Constitutional: Negative for appetite change, chills, fatigue, fever and unexpected weight change.   HENT: Negative for congestion, ear pain, hearing loss, nosebleeds, postnasal drip, rhinorrhea, sore throat, tinnitus and trouble swallowing.    Eyes: Negative for photophobia, discharge and visual disturbance.   Respiratory: Negative for cough, chest tightness, shortness of breath and wheezing.    Cardiovascular: Negative for chest pain, palpitations and leg swelling.   Gastrointestinal: Negative for abdominal distention, abdominal pain, blood in stool, constipation, diarrhea, nausea and vomiting.   Endocrine: Negative for cold intolerance, heat intolerance, polydipsia, polyphagia and polyuria.   Musculoskeletal: Negative for arthralgias, back pain, joint swelling, myalgias, neck pain and neck stiffness.   Skin: Negative for color change, pallor, rash and wound.   Allergic/Immunologic: Negative for environmental allergies, food allergies and immunocompromised state.   Neurological: Negative for dizziness, tremors, seizures, weakness, numbness and headaches.   Hematological: Negative for adenopathy. Does not bruise/bleed easily.   Psychiatric/Behavioral: Negative for agitation, behavioral problems, confusion, hallucinations, self-injury and suicidal ideas. The patient is not nervous/anxious.        Objective   /70  Pulse 119  Ht 73\" (185.4 cm)  Wt 188 lb (85.3 kg)  SpO2 95%  BMI 24.8 " kg/m2  Physical Exam   Constitutional: He is oriented to person, place, and time. He appears well-developed and well-nourished. No distress.   HENT:   Head: Normocephalic and atraumatic.   Right Ear: External ear normal.   Left Ear: External ear normal.   Nose: Nose normal.   Mouth/Throat: Oropharynx is clear and moist.   Eyes: Conjunctivae and EOM are normal. Pupils are equal, round, and reactive to light. Right eye exhibits no discharge. Left eye exhibits no discharge. No scleral icterus.   Neck: Normal range of motion. Neck supple. No JVD present. No tracheal deviation present. No thyromegaly present.   Cardiovascular: Normal rate, regular rhythm, normal heart sounds and intact distal pulses.  Exam reveals no gallop and no friction rub.    No murmur heard.  Pulmonary/Chest: Effort normal and breath sounds normal. No stridor. No respiratory distress. He has no wheezes. He has no rales. He exhibits no tenderness.   Abdominal: Soft. Bowel sounds are normal. He exhibits distension. He exhibits no mass. There is tenderness. There is no rebound and no guarding. No hernia.   Distention noted throughout.  Tenderness in the RLQ area under previous scar that could be a bit of hernia ventrally.   Musculoskeletal: Normal range of motion. He exhibits no edema, tenderness or deformity.   Lymphadenopathy:     He has no cervical adenopathy.   Neurological: He is alert and oriented to person, place, and time. He has normal reflexes. He displays normal reflexes. No cranial nerve deficit. He exhibits normal muscle tone.   Skin: Skin is warm and dry. No rash noted. No erythema. No pallor.   Psychiatric: He has a normal mood and affect. His behavior is normal. Judgment normal.       Assessment/Plan   Problem List Items Addressed This Visit        Respiratory    Pulmonary emphysema - Primary    Relevant Medications    albuterol (VENTOLIN HFA) 108 (90 BASE) MCG/ACT inhaler    predniSONE (DELTASONE) 10 MG tablet       Nervous and  Auditory    Right lower quadrant abdominal pain    Relevant Orders    CT Abdomen Pelvis Without Contrast      Other Visit Diagnoses     COPD exacerbation        Relevant Medications    albuterol (VENTOLIN HFA) 108 (90 BASE) MCG/ACT inhaler    predniSONE (DELTASONE) 10 MG tablet

## 2017-08-07 ENCOUNTER — APPOINTMENT (OUTPATIENT)
Dept: CT IMAGING | Facility: HOSPITAL | Age: 42
End: 2017-08-07

## 2017-08-07 ENCOUNTER — HOSPITAL ENCOUNTER (EMERGENCY)
Facility: HOSPITAL | Age: 42
Discharge: HOME | End: 2017-08-07
Attending: EMERGENCY MEDICINE | Admitting: EMERGENCY MEDICINE

## 2017-08-07 VITALS
RESPIRATION RATE: 16 BRPM | BODY MASS INDEX: 23.19 KG/M2 | WEIGHT: 175 LBS | OXYGEN SATURATION: 96 % | SYSTOLIC BLOOD PRESSURE: 113 MMHG | HEART RATE: 89 BPM | HEIGHT: 73 IN | TEMPERATURE: 98.8 F | DIASTOLIC BLOOD PRESSURE: 87 MMHG

## 2017-08-07 DIAGNOSIS — R10.84 GENERALIZED ABDOMINAL PAIN: Primary | ICD-10-CM

## 2017-08-07 LAB
ALBUMIN SERPL-MCNC: 4.9 G/DL (ref 3.5–5)
ALBUMIN/GLOB SERPL: 1.4 G/DL (ref 1–2)
ALP SERPL-CCNC: 121 U/L (ref 38–126)
ALT SERPL W P-5'-P-CCNC: 46 U/L (ref 13–69)
ANION GAP SERPL CALCULATED.3IONS-SCNC: 21 MMOL/L
AST SERPL-CCNC: 40 U/L (ref 15–46)
BACTERIA UR QL AUTO: ABNORMAL /HPF
BASOPHILS # BLD AUTO: 0.04 10*3/MM3 (ref 0–0.2)
BASOPHILS NFR BLD AUTO: 0.4 % (ref 0–2.5)
BILIRUB SERPL-MCNC: 1.3 MG/DL (ref 0.2–1.3)
BILIRUB UR QL STRIP: NEGATIVE
BUN BLD-MCNC: 52 MG/DL (ref 7–20)
BUN/CREAT SERPL: 37.1 (ref 6.3–21.9)
CALCIUM SPEC-SCNC: 10.1 MG/DL (ref 8.4–10.2)
CHLORIDE SERPL-SCNC: 88 MMOL/L (ref 98–107)
CLARITY UR: CLEAR
CO2 SERPL-SCNC: 25 MMOL/L (ref 26–30)
COLOR UR: YELLOW
CREAT BLD-MCNC: 1.4 MG/DL (ref 0.6–1.3)
DEPRECATED RDW RBC AUTO: 39.4 FL (ref 37–54)
EOSINOPHIL # BLD AUTO: 0.11 10*3/MM3 (ref 0–0.7)
EOSINOPHIL NFR BLD AUTO: 1.1 % (ref 0–7)
ERYTHROCYTE [DISTWIDTH] IN BLOOD BY AUTOMATED COUNT: 14 % (ref 11.5–14.5)
GFR SERPL CREATININE-BSD FRML MDRD: 56 ML/MIN/1.73
GLOBULIN UR ELPH-MCNC: 3.5 GM/DL
GLUCOSE BLD-MCNC: 181 MG/DL (ref 74–98)
GLUCOSE UR STRIP-MCNC: NEGATIVE MG/DL
HCT VFR BLD AUTO: 52.1 % (ref 42–52)
HGB BLD-MCNC: 18.4 G/DL (ref 14–18)
HGB UR QL STRIP.AUTO: ABNORMAL
HOLD SPECIMEN: NORMAL
HOLD SPECIMEN: NORMAL
HYALINE CASTS UR QL AUTO: ABNORMAL /LPF
IMM GRANULOCYTES # BLD: 0.03 10*3/MM3 (ref 0–0.06)
IMM GRANULOCYTES NFR BLD: 0.3 % (ref 0–0.6)
KETONES UR QL STRIP: NEGATIVE
LEUKOCYTE ESTERASE UR QL STRIP.AUTO: NEGATIVE
LIPASE SERPL-CCNC: 428 U/L (ref 23–300)
LYMPHOCYTES # BLD AUTO: 1.87 10*3/MM3 (ref 0.6–3.4)
LYMPHOCYTES NFR BLD AUTO: 19 % (ref 10–50)
MCH RBC QN AUTO: 28.9 PG (ref 27–31)
MCHC RBC AUTO-ENTMCNC: 35.3 G/DL (ref 30–37)
MCV RBC AUTO: 81.9 FL (ref 80–94)
MONOCYTES # BLD AUTO: 1.08 10*3/MM3 (ref 0–0.9)
MONOCYTES NFR BLD AUTO: 11 % (ref 0–12)
NEUTROPHILS # BLD AUTO: 6.7 10*3/MM3 (ref 2–6.9)
NEUTROPHILS NFR BLD AUTO: 68.2 % (ref 37–80)
NITRITE UR QL STRIP: NEGATIVE
NRBC BLD MANUAL-RTO: 0 /100 WBC (ref 0–0)
PH UR STRIP.AUTO: 5.5 [PH] (ref 5–8)
PLATELET # BLD AUTO: 247 10*3/MM3 (ref 130–400)
PMV BLD AUTO: 10.8 FL (ref 6–12)
POTASSIUM BLD-SCNC: 4 MMOL/L (ref 3.5–5.1)
PROT SERPL-MCNC: 8.4 G/DL (ref 6.3–8.2)
PROT UR QL STRIP: NEGATIVE
RBC # BLD AUTO: 6.36 10*6/MM3 (ref 4.7–6.1)
RBC # UR: ABNORMAL /HPF
REF LAB TEST METHOD: ABNORMAL
SODIUM BLD-SCNC: 130 MMOL/L (ref 137–145)
SP GR UR STRIP: 1.01 (ref 1–1.03)
SQUAMOUS #/AREA URNS HPF: ABNORMAL /HPF
UROBILINOGEN UR QL STRIP: ABNORMAL
WBC NRBC COR # BLD: 9.83 10*3/MM3 (ref 4.8–10.8)
WBC UR QL AUTO: ABNORMAL /HPF
WHOLE BLOOD HOLD SPECIMEN: NORMAL
WHOLE BLOOD HOLD SPECIMEN: NORMAL

## 2017-08-07 PROCEDURE — 0 IOPAMIDOL 61 % SOLUTION: Performed by: EMERGENCY MEDICINE

## 2017-08-07 PROCEDURE — 25010000002 ONDANSETRON PER 1 MG: Performed by: PHYSICIAN ASSISTANT

## 2017-08-07 PROCEDURE — 85025 COMPLETE CBC W/AUTO DIFF WBC: CPT | Performed by: PHYSICIAN ASSISTANT

## 2017-08-07 PROCEDURE — 96361 HYDRATE IV INFUSION ADD-ON: CPT

## 2017-08-07 PROCEDURE — 99284 EMERGENCY DEPT VISIT MOD MDM: CPT

## 2017-08-07 PROCEDURE — 74177 CT ABD & PELVIS W/CONTRAST: CPT

## 2017-08-07 PROCEDURE — 96374 THER/PROPH/DIAG INJ IV PUSH: CPT

## 2017-08-07 PROCEDURE — 25010000002 KETOROLAC TROMETHAMINE PER 15 MG: Performed by: PHYSICIAN ASSISTANT

## 2017-08-07 PROCEDURE — 80053 COMPREHEN METABOLIC PANEL: CPT | Performed by: PHYSICIAN ASSISTANT

## 2017-08-07 PROCEDURE — 83690 ASSAY OF LIPASE: CPT | Performed by: PHYSICIAN ASSISTANT

## 2017-08-07 PROCEDURE — 96375 TX/PRO/DX INJ NEW DRUG ADDON: CPT

## 2017-08-07 PROCEDURE — 81001 URINALYSIS AUTO W/SCOPE: CPT | Performed by: PHYSICIAN ASSISTANT

## 2017-08-07 RX ORDER — PROMETHAZINE HYDROCHLORIDE 25 MG/1
25 TABLET ORAL EVERY 6 HOURS PRN
Qty: 15 TABLET | Refills: 0 | Status: SHIPPED | OUTPATIENT
Start: 2017-08-07 | End: 2018-03-17

## 2017-08-07 RX ORDER — ONDANSETRON 2 MG/ML
4 INJECTION INTRAMUSCULAR; INTRAVENOUS ONCE
Status: COMPLETED | OUTPATIENT
Start: 2017-08-07 | End: 2017-08-07

## 2017-08-07 RX ORDER — SODIUM CHLORIDE 0.9 % (FLUSH) 0.9 %
10 SYRINGE (ML) INJECTION AS NEEDED
Status: DISCONTINUED | OUTPATIENT
Start: 2017-08-07 | End: 2017-08-07 | Stop reason: HOSPADM

## 2017-08-07 RX ORDER — KETOROLAC TROMETHAMINE 30 MG/ML
30 INJECTION, SOLUTION INTRAMUSCULAR; INTRAVENOUS ONCE
Status: COMPLETED | OUTPATIENT
Start: 2017-08-07 | End: 2017-08-07

## 2017-08-07 RX ADMIN — IOPAMIDOL 100 ML: 612 INJECTION, SOLUTION INTRAVENOUS at 19:13

## 2017-08-07 RX ADMIN — KETOROLAC TROMETHAMINE 30 MG: 30 INJECTION, SOLUTION INTRAMUSCULAR at 17:04

## 2017-08-07 RX ADMIN — ONDANSETRON 4 MG: 2 INJECTION INTRAMUSCULAR; INTRAVENOUS at 17:04

## 2017-08-07 RX ADMIN — SODIUM CHLORIDE 1000 ML: 9 INJECTION, SOLUTION INTRAVENOUS at 17:04
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
## 2017-08-08 ENCOUNTER — HOSPITAL ENCOUNTER (OUTPATIENT)
Dept: CT IMAGING | Facility: HOSPITAL | Age: 42
End: 2017-08-08
Attending: FAMILY MEDICINE

## 2017-08-08 ENCOUNTER — OFFICE VISIT (OUTPATIENT)
Dept: INTERNAL MEDICINE | Facility: CLINIC | Age: 42
End: 2017-08-08

## 2017-08-08 VITALS
DIASTOLIC BLOOD PRESSURE: 70 MMHG | BODY MASS INDEX: 23.09 KG/M2 | HEART RATE: 68 BPM | OXYGEN SATURATION: 97 % | SYSTOLIC BLOOD PRESSURE: 110 MMHG | HEIGHT: 73 IN

## 2017-08-08 DIAGNOSIS — B18.1 CHRONIC VIRAL HEPATITIS B WITHOUT DELTA AGENT AND WITHOUT COMA (HCC): ICD-10-CM

## 2017-08-08 DIAGNOSIS — K52.9 COLITIS: Primary | ICD-10-CM

## 2017-08-08 DIAGNOSIS — B18.2 HEP C W/O COMA, CHRONIC (HCC): ICD-10-CM

## 2017-08-08 PROCEDURE — 99213 OFFICE O/P EST LOW 20 MIN: CPT | Performed by: FAMILY MEDICINE

## 2017-08-08 RX ORDER — METRONIDAZOLE 500 MG/1
500 TABLET ORAL 3 TIMES DAILY
Qty: 30 TABLET | Refills: 1 | Status: SHIPPED | OUTPATIENT
Start: 2017-08-08 | End: 2018-03-17

## 2017-08-08 RX ORDER — CIPROFLOXACIN 500 MG/1
500 TABLET, FILM COATED ORAL 2 TIMES DAILY
Qty: 20 TABLET | Refills: 1 | Status: SHIPPED | OUTPATIENT
Start: 2017-08-08 | End: 2017-10-13 | Stop reason: SDUPTHER

## 2017-08-08 NOTE — PROGRESS NOTES
"Subjective   Topher Stoll is a 41 y.o. male.     History of Present Illness   Topher has been having RUQ pain and nausea. He has had cholecystectomy. He had CT in the ER yesterday, and it was normal.  He points to RUQ and states his pain is when he has gas. Could be hepatic flexure adhesions/colitis.      The following portions of the patient's history were reviewed and updated as appropriate: allergies, current medications, past social history and problem list.    Review of Systems   Constitutional: Negative for appetite change, chills, fatigue, fever and unexpected weight change.   HENT: Negative for congestion, ear pain, hearing loss, nosebleeds, postnasal drip, rhinorrhea, sore throat, tinnitus and trouble swallowing.    Eyes: Negative for photophobia, discharge and visual disturbance.   Respiratory: Negative for cough, chest tightness, shortness of breath and wheezing.    Cardiovascular: Negative for chest pain, palpitations and leg swelling.   Gastrointestinal: Positive for abdominal pain and nausea. Negative for abdominal distention, blood in stool, constipation, diarrhea and vomiting.   Endocrine: Negative for cold intolerance, heat intolerance, polydipsia, polyphagia and polyuria.   Musculoskeletal: Negative for arthralgias, back pain, joint swelling, myalgias, neck pain and neck stiffness.   Skin: Negative for color change, pallor, rash and wound.   Allergic/Immunologic: Negative for environmental allergies, food allergies and immunocompromised state.   Neurological: Negative for dizziness, tremors, seizures, weakness, numbness and headaches.   Hematological: Negative for adenopathy. Does not bruise/bleed easily.   Psychiatric/Behavioral: Negative for agitation, behavioral problems, confusion, hallucinations, self-injury and suicidal ideas. The patient is not nervous/anxious.        Objective   /70  Pulse 68  Ht 73\" (185.4 cm)  SpO2 97%  BMI 23.09 kg/m2  Physical Exam   Constitutional: He is " oriented to person, place, and time. He appears well-developed and well-nourished. No distress.   HENT:   Head: Normocephalic and atraumatic.   Right Ear: External ear normal.   Left Ear: External ear normal.   Nose: Nose normal.   Mouth/Throat: Oropharynx is clear and moist.   Eyes: Conjunctivae and EOM are normal. Pupils are equal, round, and reactive to light. Right eye exhibits no discharge. Left eye exhibits no discharge. No scleral icterus.   Neck: Normal range of motion. Neck supple. No JVD present. No tracheal deviation present. No thyromegaly present.   Cardiovascular: Normal rate, regular rhythm, normal heart sounds and intact distal pulses.  Exam reveals no gallop and no friction rub.    No murmur heard.  Pulmonary/Chest: Effort normal and breath sounds normal. No stridor. No respiratory distress. He has no wheezes. He has no rales. He exhibits no tenderness.   Abdominal: Soft. Bowel sounds are normal. He exhibits no distension and no mass. There is tenderness. There is guarding. There is no rebound. No hernia.   UQ pain with no gallbladder present.  Negative CT.   Musculoskeletal: Normal range of motion. He exhibits no edema, tenderness or deformity.   Lymphadenopathy:     He has no cervical adenopathy.   Neurological: He is alert and oriented to person, place, and time. He has normal reflexes. He displays normal reflexes. No cranial nerve deficit. He exhibits normal muscle tone.   Skin: Skin is warm and dry. No rash noted. No erythema. No pallor.   Psychiatric: He has a normal mood and affect. His behavior is normal. Judgment normal.       Assessment/Plan   Problem List Items Addressed This Visit        Digestive    Chronic viral hepatitis B without delta agent and without coma    Relevant Orders    HCV RNA By PCR, Qn Rfx Dolores    Hep C w/o coma, chronic    Relevant Orders    HCV RNA By PCR, Qn Rfx Dolores    Colitis - Primary    Relevant Medications    ciprofloxacin (CIPRO) 500 MG tablet    metroNIDAZOLE  (FLAGYL) 500 MG tablet    Other Relevant Orders    HCV RNA By PCR, Qn Rfx Dolores

## 2017-09-25 DIAGNOSIS — J44.1 COPD EXACERBATION (HCC): ICD-10-CM

## 2017-09-25 RX ORDER — PREDNISONE 10 MG/1
TABLET ORAL
Qty: 21 TABLET | Refills: 0 | Status: SHIPPED | OUTPATIENT
Start: 2017-09-25 | End: 2017-10-13 | Stop reason: SDUPTHER

## 2017-09-25 RX ORDER — PREDNISONE 50 MG/1
50 TABLET ORAL DAILY
Qty: 4 TABLET | Refills: 0 | OUTPATIENT
Start: 2017-09-25

## 2017-09-29 DIAGNOSIS — J44.1 COPD EXACERBATION (HCC): ICD-10-CM

## 2017-10-02 RX ORDER — PREDNISONE 10 MG/1
TABLET ORAL
Qty: 21 TABLET | Refills: 0 | OUTPATIENT
Start: 2017-10-02

## 2017-10-13 ENCOUNTER — OFFICE VISIT (OUTPATIENT)
Dept: INTERNAL MEDICINE | Facility: CLINIC | Age: 42
End: 2017-10-13

## 2017-10-13 VITALS
DIASTOLIC BLOOD PRESSURE: 80 MMHG | BODY MASS INDEX: 24.78 KG/M2 | OXYGEN SATURATION: 97 % | HEART RATE: 68 BPM | HEIGHT: 73 IN | SYSTOLIC BLOOD PRESSURE: 120 MMHG | WEIGHT: 187 LBS

## 2017-10-13 DIAGNOSIS — J44.1 COPD EXACERBATION (HCC): ICD-10-CM

## 2017-10-13 DIAGNOSIS — M25.551 RIGHT HIP PAIN: ICD-10-CM

## 2017-10-13 DIAGNOSIS — B07.9 VIRAL WARTS, UNSPECIFIED TYPE: ICD-10-CM

## 2017-10-13 DIAGNOSIS — L03.114 CELLULITIS OF LEFT UPPER EXTREMITY: Primary | ICD-10-CM

## 2017-10-13 PROCEDURE — 99213 OFFICE O/P EST LOW 20 MIN: CPT | Performed by: FAMILY MEDICINE

## 2017-10-13 PROCEDURE — 17110 DESTRUCTION B9 LES UP TO 14: CPT | Performed by: FAMILY MEDICINE

## 2017-10-13 RX ORDER — PREDNISONE 10 MG/1
TABLET ORAL
Qty: 21 TABLET | Refills: 1 | Status: SHIPPED | OUTPATIENT
Start: 2017-10-13 | End: 2017-11-30 | Stop reason: SDUPTHER

## 2017-10-13 RX ORDER — CIPROFLOXACIN 500 MG/1
500 TABLET, FILM COATED ORAL 2 TIMES DAILY
Qty: 20 TABLET | Refills: 1 | Status: SHIPPED | OUTPATIENT
Start: 2017-10-13 | End: 2018-03-17

## 2017-10-13 NOTE — PROGRESS NOTES
Subjective   Topher Stoll is a 41 y.o. male.     History of Present Illness   Topher has a cut on his left middle finger at the palmar MCP joint from knive blade.  It's been healing well, but is red and hard with scab that's deep and the surrounding skin is edematous.  He also has a knot on his head he wants checked out.  He had left hip replaced in the past from prednisone use.  He's having a lot of right hip pain now as well.        The following portions of the patient's history were reviewed and updated as appropriate: allergies, current medications, past social history and problem list.    Review of Systems   Constitutional: Negative for appetite change, chills, fatigue, fever and unexpected weight change.   HENT: Negative for congestion, ear pain, hearing loss, nosebleeds, postnasal drip, rhinorrhea, sore throat, tinnitus and trouble swallowing.    Eyes: Negative for photophobia, discharge and visual disturbance.   Respiratory: Negative for cough, chest tightness, shortness of breath and wheezing.    Cardiovascular: Negative for chest pain, palpitations and leg swelling.   Gastrointestinal: Negative for abdominal distention, abdominal pain, blood in stool, constipation, diarrhea, nausea and vomiting.   Endocrine: Negative for cold intolerance, heat intolerance, polydipsia, polyphagia and polyuria.   Musculoskeletal: Positive for arthralgias. Negative for back pain, joint swelling, myalgias, neck pain and neck stiffness.   Skin: Negative for color change, pallor, rash and wound.        Laceration  Knot on head   Allergic/Immunologic: Negative for environmental allergies, food allergies and immunocompromised state.   Neurological: Negative for dizziness, tremors, seizures, weakness, numbness and headaches.   Hematological: Negative for adenopathy. Does not bruise/bleed easily.   Psychiatric/Behavioral: Negative for agitation, behavioral problems, confusion, hallucinations, self-injury and suicidal ideas. The  "patient is not nervous/anxious.        Objective   /80  Pulse 68  Ht 73\" (185.4 cm)  Wt 187 lb (84.8 kg)  SpO2 97%  BMI 24.67 kg/m2  Physical Exam   Constitutional: He is oriented to person, place, and time. He appears well-developed and well-nourished. No distress.   HENT:   Head: Normocephalic and atraumatic.   Right Ear: External ear normal.   Left Ear: External ear normal.   Nose: Nose normal.   Mouth/Throat: Oropharynx is clear and moist.   Eyes: Conjunctivae and EOM are normal. Pupils are equal, round, and reactive to light. Right eye exhibits no discharge. Left eye exhibits no discharge. No scleral icterus.   Neck: Normal range of motion. Neck supple. No JVD present. No tracheal deviation present. No thyromegaly present.   Cardiovascular: Normal rate, regular rhythm, normal heart sounds and intact distal pulses.  Exam reveals no gallop and no friction rub.    No murmur heard.  Pulmonary/Chest: Effort normal and breath sounds normal. No stridor. No respiratory distress. He has no wheezes. He has no rales. He exhibits no tenderness.   Abdominal: Soft. Bowel sounds are normal. He exhibits no distension and no mass. There is no tenderness. There is no rebound and no guarding. No hernia.   Musculoskeletal: Normal range of motion. He exhibits no edema, tenderness or deformity.   Right hip pain with flexion, ext, abduction   Lymphadenopathy:     He has no cervical adenopathy.   Neurological: He is alert and oriented to person, place, and time. He has normal reflexes. He displays normal reflexes. No cranial nerve deficit. He exhibits normal muscle tone.   Skin: Skin is warm and dry. No rash noted. No erythema. No pallor.   Wound noted on left middle finger, palmar surface, at the MCP joint.  Erythema and edema around a 1cm central scabbed over wound.    Plantar wart on head.   Psychiatric: He has a normal mood and affect. His behavior is normal. Judgment normal.       Cryo to plantar wart x 3, with no " complications. On left crown of head, 0.5 cm in diameter.    Assessment/Plan   Problem List Items Addressed This Visit        Respiratory    COPD exacerbation    Relevant Medications    predniSONE (DELTASONE) 10 MG tablet       Nervous and Auditory    Right hip pain  Hip xray       Musculoskeletal and Integument    Viral warts  Cryo       Other    Cellulitis of left upper extremity - Primary    Relevant Medications    ciprofloxacin (CIPRO) 500 MG tablet

## 2017-11-03 DIAGNOSIS — R51.9 HEADACHE, UNSPECIFIED HEADACHE TYPE: ICD-10-CM

## 2017-11-03 RX ORDER — DIVALPROEX SODIUM 500 MG/1
TABLET, EXTENDED RELEASE ORAL
Qty: 30 TABLET | Refills: 11 | Status: ON HOLD | OUTPATIENT
Start: 2017-11-03 | End: 2019-06-17

## 2017-11-29 ENCOUNTER — TELEPHONE (OUTPATIENT)
Dept: INTERNAL MEDICINE | Facility: CLINIC | Age: 42
End: 2017-11-29

## 2017-11-29 DIAGNOSIS — J44.1 COPD EXACERBATION (HCC): ICD-10-CM

## 2017-11-30 RX ORDER — PREDNISONE 10 MG/1
TABLET ORAL
Qty: 21 TABLET | Refills: 1 | Status: SHIPPED | OUTPATIENT
Start: 2017-11-30 | End: 2018-05-25

## 2017-12-19 ENCOUNTER — OFFICE VISIT (OUTPATIENT)
Dept: PULMONOLOGY | Facility: CLINIC | Age: 42
End: 2017-12-19

## 2017-12-19 VITALS
HEART RATE: 86 BPM | WEIGHT: 188 LBS | OXYGEN SATURATION: 98 % | HEIGHT: 73 IN | BODY MASS INDEX: 24.92 KG/M2 | SYSTOLIC BLOOD PRESSURE: 122 MMHG | RESPIRATION RATE: 16 BRPM | DIASTOLIC BLOOD PRESSURE: 78 MMHG

## 2017-12-19 DIAGNOSIS — J45.40 MODERATE PERSISTENT ASTHMA WITHOUT COMPLICATION: ICD-10-CM

## 2017-12-19 DIAGNOSIS — M25.552 LEFT HIP PAIN: Primary | ICD-10-CM

## 2017-12-19 DIAGNOSIS — R06.02 SHORTNESS OF BREATH: Primary | ICD-10-CM

## 2017-12-19 DIAGNOSIS — J30.89 OTHER ALLERGIC RHINITIS: ICD-10-CM

## 2017-12-19 DIAGNOSIS — G47.33 OBSTRUCTIVE SLEEP APNEA: ICD-10-CM

## 2017-12-19 PROCEDURE — 99214 OFFICE O/P EST MOD 30 MIN: CPT | Performed by: NURSE PRACTITIONER

## 2017-12-19 RX ORDER — FLUNISOLIDE 0.25 MG/ML
1 SOLUTION NASAL EVERY 12 HOURS
Qty: 1 BOTTLE | Refills: 5 | Status: SHIPPED | OUTPATIENT
Start: 2017-12-19 | End: 2019-01-29 | Stop reason: SDUPTHER

## 2017-12-19 RX ORDER — PREDNISONE 10 MG/1
TABLET ORAL
Refills: 0 | COMMUNITY
Start: 2017-12-06 | End: 2018-05-25

## 2017-12-19 RX ORDER — FLUTICASONE PROPIONATE 50 MCG
SPRAY, SUSPENSION (ML) NASAL
Refills: 1 | COMMUNITY
Start: 2017-12-05 | End: 2017-12-19 | Stop reason: CLARIF

## 2017-12-19 NOTE — PROGRESS NOTES
"Chief Complaint   Patient presents with   • Follow-up   • Shortness of Breath         Subjective   Topher Stoll is a 42 y.o. male.     History of Present Illness   The patient comes in today for follow-up of obstructive sleep apnea and asthma.    He is doing well with AutoPap at the current pressure of 6/14.  He feels more rested since using the machine.  He has received a chinstrap and feels that it helps.  He is not having any difficulty in getting supplies from his [employer] company.    His asthma seems under control at this time.  He continues using Breo daily.  He has not needed his rescue inhaler or nebulizer recently.  He has not been sick or required any steroids for breathing issues since his last visit.    He continues using Nasalide every single day and Singulair at night.    The following portions of the patient's history were reviewed and updated as appropriate: allergies, current medications, past family history, past medical history, past social history and past surgical history.    Review of Systems   HENT: Negative for sinus pressure, sneezing and sore throat.    Respiratory: Positive for cough. Negative for shortness of breath and wheezing.        Objective   Visit Vitals   • /78   • Pulse 86   • Resp 16   • Ht 185.4 cm (72.99\")   • Wt 85.3 kg (188 lb)   • SpO2 98%   • BMI 24.81 kg/m2     Physical Exam   Constitutional: He is oriented to person, place, and time. He appears well-developed.   HENT:   Head: Normocephalic and atraumatic.   Crowded oropharynx with minimal drainage.   Eyes: EOM are normal.   Neck: Neck supple.   Cardiovascular: Normal rate and regular rhythm.    Pulmonary/Chest: Effort normal and breath sounds normal. No respiratory distress. He has no wheezes.   Musculoskeletal: He exhibits no edema.   Gait normal   Neurological: He is alert and oriented to person, place, and time.   Skin: Skin is warm and dry.   Psychiatric: He has a normal mood and affect.   Vitals " reviewed.            Assessment/Plan   Topher was seen today for follow-up and shortness of breath.    Diagnoses and all orders for this visit:    Shortness of breath    Moderate persistent asthma without complication    Obstructive sleep apnea    Other allergic rhinitis    Other orders  -     Fluticasone Furoate-Vilanterol (BREO ELLIPTA) 200-25 MCG/INH aerosol powder ; Inhale 1 puff Daily.  -     flunisolide (NASALIDE) 25 MCG/ACT (0.025%) solution nasal spray; Inhale 1 spray Every 12 (Twelve) Hours.           Return in about 4 months (around 4/19/2018) for Recheck, For Dr. Olivas.    DISCUSSION (if any):  Since she is feeling better and doing well with AutoPap at the current setting of 6/14 I have encouraged him to continue using the machine every night.  He is compliant with CPAP use at 90%.    His asthma is currently controlled on Breo so I have encouraged him to continue this medication.  He has a rescue inhaler to use as needed for shortness of breath or wheezing.    He continues to have minimal postnasal drainage and some cough but I have encouraged him to continue his nasal spray on a regular basis.      Dictated utilizing Dragon dictation.    This document was electronically signed by LISA Hernandez December 19, 2017  1:19 PM

## 2018-01-02 ENCOUNTER — TRANSCRIBE ORDERS (OUTPATIENT)
Dept: ADMINISTRATIVE | Facility: HOSPITAL | Age: 43
End: 2018-01-02

## 2018-01-02 DIAGNOSIS — M25.552 LEFT HIP PAIN: Primary | ICD-10-CM

## 2018-01-09 ENCOUNTER — HOSPITAL ENCOUNTER (OUTPATIENT)
Dept: NUCLEAR MEDICINE | Facility: HOSPITAL | Age: 43
Discharge: HOME | End: 2018-01-09
Attending: ORTHOPAEDIC SURGERY

## 2018-01-09 DIAGNOSIS — M25.552 LEFT HIP PAIN: ICD-10-CM

## 2018-01-09 PROCEDURE — 78315 BONE IMAGING 3 PHASE: CPT

## 2018-01-09 PROCEDURE — A9503 TC99M MEDRONATE: HCPCS | Performed by: ORTHOPAEDIC SURGERY

## 2018-01-09 PROCEDURE — 0 TECHNETIUM MEDRONATE KIT: Performed by: ORTHOPAEDIC SURGERY

## 2018-01-09 RX ORDER — TC 99M MEDRONATE 20 MG/10ML
26.5 INJECTION, POWDER, LYOPHILIZED, FOR SOLUTION INTRAVENOUS
Status: COMPLETED | OUTPATIENT
Start: 2018-01-09 | End: 2018-01-09

## 2018-01-09 RX ADMIN — TC 99M MEDRONATE 26.5 MILLICURIE: 20 INJECTION, POWDER, LYOPHILIZED, FOR SOLUTION INTRAVENOUS at 10:00

## 2018-03-17 ENCOUNTER — HOSPITAL ENCOUNTER (EMERGENCY)
Facility: HOSPITAL | Age: 43
Discharge: HOME | End: 2018-03-17
Attending: EMERGENCY MEDICINE | Admitting: EMERGENCY MEDICINE

## 2018-03-17 ENCOUNTER — APPOINTMENT (OUTPATIENT)
Dept: CT IMAGING | Facility: HOSPITAL | Age: 43
End: 2018-03-17

## 2018-03-17 VITALS
DIASTOLIC BLOOD PRESSURE: 88 MMHG | BODY MASS INDEX: 24.38 KG/M2 | HEART RATE: 86 BPM | RESPIRATION RATE: 18 BRPM | HEIGHT: 72 IN | WEIGHT: 180 LBS | TEMPERATURE: 98.8 F | OXYGEN SATURATION: 98 % | SYSTOLIC BLOOD PRESSURE: 137 MMHG

## 2018-03-17 DIAGNOSIS — R10.13 EPIGASTRIC PAIN: Primary | ICD-10-CM

## 2018-03-17 DIAGNOSIS — K59.00 CONSTIPATION, UNSPECIFIED CONSTIPATION TYPE: ICD-10-CM

## 2018-03-17 DIAGNOSIS — R11.2 NON-INTRACTABLE VOMITING WITH NAUSEA, UNSPECIFIED VOMITING TYPE: ICD-10-CM

## 2018-03-17 LAB
ALBUMIN SERPL-MCNC: 4.6 G/DL (ref 3.5–5)
ALBUMIN/GLOB SERPL: 1.3 G/DL (ref 1–2)
ALP SERPL-CCNC: 132 U/L (ref 38–126)
ALT SERPL W P-5'-P-CCNC: 78 U/L (ref 13–69)
AMPHET+METHAMPHET UR QL: NEGATIVE
AMPHETAMINES UR QL: NEGATIVE
ANION GAP SERPL CALCULATED.3IONS-SCNC: 20.5 MMOL/L
AST SERPL-CCNC: 48 U/L (ref 15–46)
BARBITURATES UR QL SCN: NEGATIVE
BASOPHILS # BLD AUTO: 0.07 10*3/MM3 (ref 0–0.2)
BASOPHILS NFR BLD AUTO: 0.5 % (ref 0–2.5)
BENZODIAZ UR QL SCN: NEGATIVE
BILIRUB SERPL-MCNC: 0.7 MG/DL (ref 0.2–1.3)
BILIRUB UR QL STRIP: NEGATIVE
BUN BLD-MCNC: 11 MG/DL (ref 7–20)
BUN/CREAT SERPL: 13.8 (ref 6.3–21.9)
BUPRENORPHINE SERPL-MCNC: NEGATIVE NG/ML
CALCIUM SPEC-SCNC: 10.2 MG/DL (ref 8.4–10.2)
CANNABINOIDS SERPL QL: POSITIVE
CHLORIDE SERPL-SCNC: 100 MMOL/L (ref 98–107)
CLARITY UR: CLEAR
CO2 SERPL-SCNC: 30 MMOL/L (ref 26–30)
COCAINE UR QL: NEGATIVE
COLOR UR: YELLOW
CREAT BLD-MCNC: 0.8 MG/DL (ref 0.6–1.3)
D-LACTATE SERPL-SCNC: 1.4 MMOL/L (ref 0.5–2)
DEPRECATED RDW RBC AUTO: 40.5 FL (ref 37–54)
EOSINOPHIL # BLD AUTO: 0.15 10*3/MM3 (ref 0–0.7)
EOSINOPHIL NFR BLD AUTO: 1 % (ref 0–7)
ERYTHROCYTE [DISTWIDTH] IN BLOOD BY AUTOMATED COUNT: 13.4 % (ref 11.5–14.5)
GFR SERPL CREATININE-BSD FRML MDRD: 106 ML/MIN/1.73
GLOBULIN UR ELPH-MCNC: 3.5 GM/DL
GLUCOSE BLD-MCNC: 123 MG/DL (ref 74–98)
GLUCOSE UR STRIP-MCNC: NEGATIVE MG/DL
HCT VFR BLD AUTO: 42.5 % (ref 42–52)
HGB BLD-MCNC: 14.2 G/DL (ref 14–18)
HGB UR QL STRIP.AUTO: NEGATIVE
HOLD SPECIMEN: NORMAL
HOLD SPECIMEN: NORMAL
IMM GRANULOCYTES # BLD: 0.14 10*3/MM3 (ref 0–0.06)
IMM GRANULOCYTES NFR BLD: 0.9 % (ref 0–0.6)
KETONES UR QL STRIP: NEGATIVE
LEUKOCYTE ESTERASE UR QL STRIP.AUTO: NEGATIVE
LIPASE SERPL-CCNC: 113 U/L (ref 23–300)
LYMPHOCYTES # BLD AUTO: 4.11 10*3/MM3 (ref 0.6–3.4)
LYMPHOCYTES NFR BLD AUTO: 27.8 % (ref 10–50)
MCH RBC QN AUTO: 27.8 PG (ref 27–31)
MCHC RBC AUTO-ENTMCNC: 33.4 G/DL (ref 30–37)
MCV RBC AUTO: 83.3 FL (ref 80–94)
METHADONE UR QL SCN: NEGATIVE
MONOCYTES # BLD AUTO: 0.99 10*3/MM3 (ref 0–0.9)
MONOCYTES NFR BLD AUTO: 6.7 % (ref 0–12)
NEUTROPHILS # BLD AUTO: 9.34 10*3/MM3 (ref 2–6.9)
NEUTROPHILS NFR BLD AUTO: 63.1 % (ref 37–80)
NITRITE UR QL STRIP: NEGATIVE
NRBC BLD MANUAL-RTO: 0 /100 WBC (ref 0–0)
OPIATES UR QL: NEGATIVE
OXYCODONE UR QL SCN: POSITIVE
PCP UR QL SCN: NEGATIVE
PH UR STRIP.AUTO: 8.5 [PH] (ref 5–8)
PLATELET # BLD AUTO: 461 10*3/MM3 (ref 130–400)
PMV BLD AUTO: 8.5 FL (ref 6–12)
POTASSIUM BLD-SCNC: 3.5 MMOL/L (ref 3.5–5.1)
PROPOXYPH UR QL: NEGATIVE
PROT SERPL-MCNC: 8.1 G/DL (ref 6.3–8.2)
PROT UR QL STRIP: NEGATIVE
RBC # BLD AUTO: 5.1 10*6/MM3 (ref 4.7–6.1)
SODIUM BLD-SCNC: 147 MMOL/L (ref 137–145)
SP GR UR STRIP: 1.01 (ref 1–1.03)
TRICYCLICS UR QL SCN: NEGATIVE
UROBILINOGEN UR QL STRIP: ABNORMAL
WBC NRBC COR # BLD: 14.8 10*3/MM3 (ref 4.8–10.8)
WHOLE BLOOD HOLD SPECIMEN: NORMAL
WHOLE BLOOD HOLD SPECIMEN: NORMAL

## 2018-03-17 PROCEDURE — 80053 COMPREHEN METABOLIC PANEL: CPT | Performed by: EMERGENCY MEDICINE

## 2018-03-17 PROCEDURE — 96374 THER/PROPH/DIAG INJ IV PUSH: CPT

## 2018-03-17 PROCEDURE — 96375 TX/PRO/DX INJ NEW DRUG ADDON: CPT

## 2018-03-17 PROCEDURE — 74177 CT ABD & PELVIS W/CONTRAST: CPT

## 2018-03-17 PROCEDURE — 80306 DRUG TEST PRSMV INSTRMNT: CPT | Performed by: EMERGENCY MEDICINE

## 2018-03-17 PROCEDURE — 25010000002 KETOROLAC TROMETHAMINE PER 15 MG: Performed by: EMERGENCY MEDICINE

## 2018-03-17 PROCEDURE — 85025 COMPLETE CBC W/AUTO DIFF WBC: CPT | Performed by: EMERGENCY MEDICINE

## 2018-03-17 PROCEDURE — 83690 ASSAY OF LIPASE: CPT | Performed by: EMERGENCY MEDICINE

## 2018-03-17 PROCEDURE — 25010000002 DICYCLOMINE PER 20 MG: Performed by: EMERGENCY MEDICINE

## 2018-03-17 PROCEDURE — 99284 EMERGENCY DEPT VISIT MOD MDM: CPT

## 2018-03-17 PROCEDURE — 25010000002 IOPAMIDOL 61 % SOLUTION: Performed by: EMERGENCY MEDICINE

## 2018-03-17 PROCEDURE — 96361 HYDRATE IV INFUSION ADD-ON: CPT

## 2018-03-17 PROCEDURE — 83605 ASSAY OF LACTIC ACID: CPT | Performed by: EMERGENCY MEDICINE

## 2018-03-17 PROCEDURE — 25010000002 PROMETHAZINE PER 50 MG: Performed by: EMERGENCY MEDICINE

## 2018-03-17 PROCEDURE — 81003 URINALYSIS AUTO W/O SCOPE: CPT | Performed by: EMERGENCY MEDICINE

## 2018-03-17 PROCEDURE — 25010000002 ONDANSETRON PER 1 MG: Performed by: EMERGENCY MEDICINE

## 2018-03-17 PROCEDURE — 96372 THER/PROPH/DIAG INJ SC/IM: CPT

## 2018-03-17 RX ORDER — METOCLOPRAMIDE 10 MG/1
10 TABLET ORAL EVERY 6 HOURS PRN
Qty: 10 TABLET | Refills: 0 | Status: ON HOLD | OUTPATIENT
Start: 2018-03-17 | End: 2019-06-17

## 2018-03-17 RX ORDER — POLYETHYLENE GLYCOL 3350 17 G/17G
17 POWDER, FOR SOLUTION ORAL 3 TIMES DAILY PRN
Qty: 500 G | Refills: 0 | Status: ON HOLD | OUTPATIENT
Start: 2018-03-17 | End: 2019-06-17

## 2018-03-17 RX ORDER — FAMOTIDINE 10 MG/ML
20 INJECTION, SOLUTION INTRAVENOUS ONCE
Status: COMPLETED | OUTPATIENT
Start: 2018-03-17 | End: 2018-03-17

## 2018-03-17 RX ORDER — METOCLOPRAMIDE 10 MG/1
10 TABLET ORAL ONCE
Status: COMPLETED | OUTPATIENT
Start: 2018-03-17 | End: 2018-03-17

## 2018-03-17 RX ORDER — DICYCLOMINE HYDROCHLORIDE 10 MG/ML
10 INJECTION INTRAMUSCULAR ONCE
Status: COMPLETED | OUTPATIENT
Start: 2018-03-17 | End: 2018-03-17

## 2018-03-17 RX ORDER — SODIUM CHLORIDE 0.9 % (FLUSH) 0.9 %
10 SYRINGE (ML) INJECTION AS NEEDED
Status: DISCONTINUED | OUTPATIENT
Start: 2018-03-17 | End: 2018-03-17 | Stop reason: HOSPADM

## 2018-03-17 RX ORDER — KETOROLAC TROMETHAMINE 30 MG/ML
30 INJECTION, SOLUTION INTRAMUSCULAR; INTRAVENOUS ONCE
Status: COMPLETED | OUTPATIENT
Start: 2018-03-17 | End: 2018-03-17

## 2018-03-17 RX ORDER — ONDANSETRON 2 MG/ML
8 INJECTION INTRAMUSCULAR; INTRAVENOUS ONCE
Status: COMPLETED | OUTPATIENT
Start: 2018-03-17 | End: 2018-03-17

## 2018-03-17 RX ORDER — PROMETHAZINE HYDROCHLORIDE 25 MG/ML
12.5 INJECTION, SOLUTION INTRAMUSCULAR; INTRAVENOUS ONCE
Status: COMPLETED | OUTPATIENT
Start: 2018-03-17 | End: 2018-03-17

## 2018-03-17 RX ADMIN — FAMOTIDINE 20 MG: 10 INJECTION, SOLUTION INTRAVENOUS at 10:53

## 2018-03-17 RX ADMIN — SODIUM CHLORIDE 1000 ML: 9 INJECTION, SOLUTION INTRAVENOUS at 10:51

## 2018-03-17 RX ADMIN — DICYCLOMINE HYDROCHLORIDE 10 MG: 20 INJECTION, SOLUTION INTRAMUSCULAR at 10:55

## 2018-03-17 RX ADMIN — LIDOCAINE HYDROCHLORIDE: 20 SOLUTION ORAL; TOPICAL at 13:12

## 2018-03-17 RX ADMIN — IOPAMIDOL 100 ML: 612 INJECTION, SOLUTION INTRAVENOUS at 12:01

## 2018-03-17 RX ADMIN — PROMETHAZINE HYDROCHLORIDE 12.5 MG: 25 INJECTION INTRAMUSCULAR; INTRAVENOUS at 10:51

## 2018-03-17 RX ADMIN — METOCLOPRAMIDE HYDROCHLORIDE 10 MG: 10 TABLET ORAL at 13:12

## 2018-03-17 RX ADMIN — KETOROLAC TROMETHAMINE 30 MG: 30 INJECTION, SOLUTION INTRAMUSCULAR; INTRAVENOUS at 10:54

## 2018-03-17 RX ADMIN — ONDANSETRON 8 MG: 2 INJECTION INTRAMUSCULAR; INTRAVENOUS at 11:25

## 2018-03-17 NOTE — ED PROVIDER NOTES
Subjective   History of Present Illness  TRIAGE CHIEF COMPLAINT:   Chief Complaint   Patient presents with   • Abdominal Pain         HPI: Topher Stoll   is a 42 y.o. male   who presents to the emergency department complaining of Abdominal pain, nausea, vomiting.  Patient with a history of asthma, colitis, chronic lower back pain, prior drug abuse, hepatitis C.  Patient states he has had multiple abdominal surgeries in the past.  Reports 5-6 days and constipation and approximately 3 days of epigastric abdominal pain with nausea and vomiting.  Reports multiple episodes of nonbloody nonbilious emesis prior to arrival.  Has not taken anything for pain.  Denies fever, chills, cough, chest pain, shortness of breath, lower abdominal pain, dysuria, medication, melena.            Review of Systems   All other systems reviewed and are negative.      Past Medical History:   Diagnosis Date   • Abdominal adhesions    • Asthma    • Cervical radiculopathy    • Colitis    • Colitis    • H/O chest x-ray 04/14/2016    No active disease   • H/O chest x-ray 03/28/2016    No active disease by portable imaging   • H/O chest x-ray 03/12/2016    No acute cardiopulmonary process   • Headache    • Heart attack    • History of recreational drug use    • Kidney cysts    • Left hip pain    • Lesion of oral mucosa    • Liver disease    • Low back pain    • Obstructive chronic bronchitis with exacerbation    • Sleep apnea     mild       Allergies   Allergen Reactions   • Doxycycline        Past Surgical History:   Procedure Laterality Date   • BACK SURGERY     • HERNIA REPAIR     • SMALL INTESTINE SURGERY      Small bowell resection   • TOTAL HIP ARTHROPLASTY Left        Family History   Problem Relation Age of Onset   • Arthritis Other    • Hypertension Other    • Migraines Other    • Heart attack Other    • Stroke Other        Social History     Social History   • Marital status:      Social History Main Topics   • Smoking status:  Former Smoker   • Smokeless tobacco: Current User     Types: Chew      Comment: quit 2005   • Alcohol use No      Comment: stopped drinking alcohol   • Drug use:       Comment: Narcotic drug use     Other Topics Concern   • Not on file           Objective   Physical Exam    CONSTITUTIONAL: Awake, oriented, appears uncomfortable, ill but non-toxic   HENT: Atraumatic, normocephalic, oral mucosa pink and moist, airway patent. Nares patent without drainage. External ears normal.   EYES: Conjunctiva clear, EOMI, PERRL   NECK: Trachea midline, non-tender, supple   CARDIOVASCULAR: Normal heart rate, Normal rhythm, No murmurs, rubs, gallops   PULMONARY/CHEST: Clear to auscultation, no rhonchi, wheezes, or rales. Symmetrical breath sounds. Non-tender.   ABDOMINAL: Non-distended, soft, mild epigastric tenderness - no rebound or guarding. BS normal.  Well-healed surgical incisions.  NEUROLOGIC: Non-focal, moving all four extremities, no gross sensory or motor deficits.   EXTREMITIES: No clubbing, cyanosis, or edema   SKIN: Warm, Dry, No erythema, No rash     CT Abdomen Pelvis With Contrast   Final Result   Subacute appearing L5 compression fracture      no acute inflammatory changes      Authenticated by Edmar Ashford MD on 03/17/2018 12:04:08 PM              EKG:         Procedures         ED Course  ED Course          ED COURSE / MEDICAL DECISION MAKING:   Nursing notes reviewed.    Patient improved on reevaluation.  Workup unremarkable.  Has intermittent constipation as well as epigastric discomfort with nausea and vomiting.  Prior history of alcohol abuse, may be suffering from gastritis versus peptic ulcer disease.  Takes Nexium at baseline.  Plan for prescriptions, GI referral, close PCP follow-up versus return if worse.    DECISION TO DISCHARGE/ADMIT: see ED care timeline       Electronically signed by: Jake Mejia MD, 3/17/2018 1:48 PM              [facility]    Final diagnoses:   Epigastric pain   Constipation,  unspecified constipation type   Non-intractable vomiting with nausea, unspecified vomiting type            Jkae Mejia MD  03/17/18 1773

## 2018-03-19 ENCOUNTER — EPISODE CHANGES (OUTPATIENT)
Dept: CASE MANAGEMENT | Facility: OTHER | Age: 43
End: 2018-03-19

## 2018-05-09 ENCOUNTER — HOSPITAL ENCOUNTER (EMERGENCY)
Facility: HOSPITAL | Age: 43
Discharge: HOME | End: 2018-05-09
Attending: EMERGENCY MEDICINE | Admitting: EMERGENCY MEDICINE

## 2018-05-09 ENCOUNTER — APPOINTMENT (OUTPATIENT)
Dept: GENERAL RADIOLOGY | Facility: HOSPITAL | Age: 43
End: 2018-05-09

## 2018-05-09 VITALS
BODY MASS INDEX: 25.86 KG/M2 | HEART RATE: 69 BPM | RESPIRATION RATE: 18 BRPM | TEMPERATURE: 98.1 F | HEIGHT: 69 IN | SYSTOLIC BLOOD PRESSURE: 126 MMHG | DIASTOLIC BLOOD PRESSURE: 94 MMHG | OXYGEN SATURATION: 95 % | WEIGHT: 174.6 LBS

## 2018-05-09 DIAGNOSIS — J44.1 COPD EXACERBATION (HCC): Primary | ICD-10-CM

## 2018-05-09 LAB
ALBUMIN SERPL-MCNC: 4.2 G/DL (ref 3.5–5)
ALBUMIN/GLOB SERPL: 1.2 G/DL (ref 1–2)
ALP SERPL-CCNC: 139 U/L (ref 38–126)
ALT SERPL W P-5'-P-CCNC: 97 U/L (ref 13–69)
ANION GAP SERPL CALCULATED.3IONS-SCNC: 17.4 MMOL/L (ref 10–20)
AST SERPL-CCNC: 45 U/L (ref 15–46)
BASOPHILS # BLD AUTO: 0.02 10*3/MM3 (ref 0–0.2)
BASOPHILS NFR BLD AUTO: 0.2 % (ref 0–2.5)
BILIRUB SERPL-MCNC: 0.2 MG/DL (ref 0.2–1.3)
BUN BLD-MCNC: 15 MG/DL (ref 7–20)
BUN/CREAT SERPL: 21.4 (ref 6.3–21.9)
CALCIUM SPEC-SCNC: 9.4 MG/DL (ref 8.4–10.2)
CHLORIDE SERPL-SCNC: 102 MMOL/L (ref 98–107)
CO2 SERPL-SCNC: 29 MMOL/L (ref 26–30)
CREAT BLD-MCNC: 0.7 MG/DL (ref 0.6–1.3)
DEPRECATED RDW RBC AUTO: 41.5 FL (ref 37–54)
EOSINOPHIL # BLD AUTO: 0.06 10*3/MM3 (ref 0–0.7)
EOSINOPHIL NFR BLD AUTO: 0.5 % (ref 0–7)
ERYTHROCYTE [DISTWIDTH] IN BLOOD BY AUTOMATED COUNT: 14.3 % (ref 11.5–14.5)
GFR SERPL CREATININE-BSD FRML MDRD: 123 ML/MIN/1.73
GLOBULIN UR ELPH-MCNC: 3.4 GM/DL
GLUCOSE BLD-MCNC: 105 MG/DL (ref 74–98)
HCT VFR BLD AUTO: 42 % (ref 42–52)
HGB BLD-MCNC: 13.6 G/DL (ref 14–18)
HOLD SPECIMEN: NORMAL
HOLD SPECIMEN: NORMAL
IMM GRANULOCYTES # BLD: 0.05 10*3/MM3 (ref 0–0.06)
IMM GRANULOCYTES NFR BLD: 0.4 % (ref 0–0.6)
LYMPHOCYTES # BLD AUTO: 2.63 10*3/MM3 (ref 0.6–3.4)
LYMPHOCYTES NFR BLD AUTO: 20.7 % (ref 10–50)
MCH RBC QN AUTO: 26 PG (ref 27–31)
MCHC RBC AUTO-ENTMCNC: 32.4 G/DL (ref 30–37)
MCV RBC AUTO: 80.3 FL (ref 80–94)
MONOCYTES # BLD AUTO: 0.54 10*3/MM3 (ref 0–0.9)
MONOCYTES NFR BLD AUTO: 4.3 % (ref 0–12)
NEUTROPHILS # BLD AUTO: 9.38 10*3/MM3 (ref 2–6.9)
NEUTROPHILS NFR BLD AUTO: 73.9 % (ref 37–80)
NRBC BLD MANUAL-RTO: 0 /100 WBC (ref 0–0)
NT-PROBNP SERPL-MCNC: 133 PG/ML (ref 0–125)
PLATELET # BLD AUTO: 326 10*3/MM3 (ref 130–400)
PMV BLD AUTO: 9.6 FL (ref 6–12)
POTASSIUM BLD-SCNC: 3.4 MMOL/L (ref 3.5–5.1)
PROT SERPL-MCNC: 7.6 G/DL (ref 6.3–8.2)
RBC # BLD AUTO: 5.23 10*6/MM3 (ref 4.7–6.1)
SODIUM BLD-SCNC: 145 MMOL/L (ref 137–145)
TROPONIN I SERPL-MCNC: <0.012 NG/ML (ref 0–0.03)
WBC NRBC COR # BLD: 12.68 10*3/MM3 (ref 4.8–10.8)
WHOLE BLOOD HOLD SPECIMEN: NORMAL
WHOLE BLOOD HOLD SPECIMEN: NORMAL

## 2018-05-09 PROCEDURE — 93005 ELECTROCARDIOGRAM TRACING: CPT

## 2018-05-09 PROCEDURE — 96374 THER/PROPH/DIAG INJ IV PUSH: CPT

## 2018-05-09 PROCEDURE — 71046 X-RAY EXAM CHEST 2 VIEWS: CPT

## 2018-05-09 PROCEDURE — 80053 COMPREHEN METABOLIC PANEL: CPT

## 2018-05-09 PROCEDURE — 93005 ELECTROCARDIOGRAM TRACING: CPT | Performed by: EMERGENCY MEDICINE

## 2018-05-09 PROCEDURE — 84484 ASSAY OF TROPONIN QUANT: CPT | Performed by: EMERGENCY MEDICINE

## 2018-05-09 PROCEDURE — 94799 UNLISTED PULMONARY SVC/PX: CPT

## 2018-05-09 PROCEDURE — 83880 ASSAY OF NATRIURETIC PEPTIDE: CPT | Performed by: EMERGENCY MEDICINE

## 2018-05-09 PROCEDURE — 99284 EMERGENCY DEPT VISIT MOD MDM: CPT

## 2018-05-09 PROCEDURE — 85025 COMPLETE CBC W/AUTO DIFF WBC: CPT

## 2018-05-09 PROCEDURE — 25010000002 METHYLPREDNISOLONE PER 125 MG: Performed by: EMERGENCY MEDICINE

## 2018-05-09 PROCEDURE — 94640 AIRWAY INHALATION TREATMENT: CPT

## 2018-05-09 RX ORDER — BENZONATATE 100 MG/1
100 CAPSULE ORAL 3 TIMES DAILY PRN
Qty: 15 CAPSULE | Refills: 0 | Status: ON HOLD | OUTPATIENT
Start: 2018-05-09 | End: 2019-06-17

## 2018-05-09 RX ORDER — PREDNISONE 50 MG/1
50 TABLET ORAL DAILY
Qty: 5 TABLET | Refills: 0 | Status: SHIPPED | OUTPATIENT
Start: 2018-05-09 | End: 2018-05-25

## 2018-05-09 RX ORDER — BENZONATATE 100 MG/1
100 CAPSULE ORAL ONCE
Status: COMPLETED | OUTPATIENT
Start: 2018-05-09 | End: 2018-05-09

## 2018-05-09 RX ORDER — IPRATROPIUM BROMIDE AND ALBUTEROL SULFATE 2.5; .5 MG/3ML; MG/3ML
3 SOLUTION RESPIRATORY (INHALATION) ONCE
Status: COMPLETED | OUTPATIENT
Start: 2018-05-09 | End: 2018-05-09

## 2018-05-09 RX ORDER — SODIUM CHLORIDE 0.9 % (FLUSH) 0.9 %
10 SYRINGE (ML) INJECTION AS NEEDED
Status: DISCONTINUED | OUTPATIENT
Start: 2018-05-09 | End: 2018-05-09 | Stop reason: HOSPADM

## 2018-05-09 RX ORDER — METHYLPREDNISOLONE SODIUM SUCCINATE 125 MG/2ML
125 INJECTION, POWDER, LYOPHILIZED, FOR SOLUTION INTRAMUSCULAR; INTRAVENOUS ONCE
Status: COMPLETED | OUTPATIENT
Start: 2018-05-09 | End: 2018-05-09

## 2018-05-09 RX ADMIN — BENZONATATE 100 MG: 100 CAPSULE, LIQUID FILLED ORAL at 10:50

## 2018-05-09 RX ADMIN — IPRATROPIUM BROMIDE AND ALBUTEROL SULFATE 3 ML: .5; 3 SOLUTION RESPIRATORY (INHALATION) at 10:37

## 2018-05-09 RX ADMIN — METHYLPREDNISOLONE SODIUM SUCCINATE 125 MG: 125 INJECTION, POWDER, FOR SOLUTION INTRAMUSCULAR; INTRAVENOUS at 10:31

## 2018-05-09 NOTE — ED PROVIDER NOTES
Subjective   32-year-old male presents emergency Department with complaints of COPD exacerbation.  Patient states that over the last 5 days he's had wheezing, productive cough, intermittent shortness of breath.  Patient states he's been using his nebulizer as directed.  He is currently on a Medrol Dosepak.  Patient states that his medications are not helping with his symptoms.  Patient's on a CPAP at night.  He is on 2 L nasal cannula as needed.  Patient denies any chest pain.        History provided by:  Patient   used: No    Shortness of Breath   Severity:  Mild  Onset quality:  Gradual  Duration:  5 days  Timing:  Constant  Progression:  Unchanged  Chronicity:  Chronic  Relieved by:  Nothing  Worsened by:  Nothing  Ineffective treatments:  Oxygen  Associated symptoms: cough    Associated symptoms: no abdominal pain, no chest pain, no fever, no neck pain, no rash, no sore throat, no vomiting and no wheezing        Review of Systems   Constitutional: Negative for chills and fever.   HENT: Negative for congestion, rhinorrhea, sore throat and trouble swallowing.    Eyes: Negative for discharge and visual disturbance.   Respiratory: Positive for cough and shortness of breath. Negative for chest tightness and wheezing.    Cardiovascular: Negative for chest pain, palpitations and leg swelling.   Gastrointestinal: Negative for abdominal pain, constipation, diarrhea, nausea and vomiting.   Genitourinary: Negative for dysuria, flank pain and hematuria.   Musculoskeletal: Negative for back pain, myalgias and neck pain.   Skin: Negative for color change and rash.   Neurological: Negative for dizziness, weakness and numbness.   Psychiatric/Behavioral: Negative for self-injury and suicidal ideas.       Past Medical History:   Diagnosis Date   • Abdominal adhesions    • Asthma    • Cervical radiculopathy    • Colitis    • Colitis    • H/O chest x-ray 04/14/2016    No active disease   • H/O chest x-ray  "03/28/2016    No active disease by portable imaging   • H/O chest x-ray 03/12/2016    No acute cardiopulmonary process   • Headache    • Heart attack    • History of recreational drug use    • Kidney cysts    • Left hip pain    • Lesion of oral mucosa    • Liver disease    • Low back pain    • Obstructive chronic bronchitis with exacerbation    • Sleep apnea     mild       Allergies   Allergen Reactions   • Doxycycline Nausea And Vomiting       Past Surgical History:   Procedure Laterality Date   • BACK SURGERY     • HERNIA REPAIR     • SMALL INTESTINE SURGERY      Small bowell resection   • TOTAL HIP ARTHROPLASTY Left        Family History   Problem Relation Age of Onset   • Arthritis Other    • Hypertension Other    • Migraines Other    • Heart attack Other    • Stroke Other        Social History     Social History   • Marital status:      Social History Main Topics   • Smoking status: Former Smoker   • Smokeless tobacco: Current User     Types: Chew      Comment: quit 2005   • Alcohol use No      Comment: stopped drinking alcohol   • Drug use: No      Comment: pt reports \"I QUIT DRUG USE 2 YEARS AGO\"     Other Topics Concern   • Not on file           Objective   Physical Exam   Constitutional: He is oriented to person, place, and time. He appears well-developed and well-nourished.   HENT:   Head: Normocephalic and atraumatic.   Nose: Nose normal.   Mouth/Throat: Oropharynx is clear and moist.   Eyes: Conjunctivae and EOM are normal. Pupils are equal, round, and reactive to light.   Neck: Normal range of motion. Neck supple.   Cardiovascular: Normal rate, regular rhythm, normal heart sounds and intact distal pulses.    Pulmonary/Chest: Effort normal. No respiratory distress. He has wheezes. He exhibits no tenderness.   Abdominal: Soft. Bowel sounds are normal. There is no tenderness. There is no rebound and no guarding.   Musculoskeletal: Normal range of motion. He exhibits no edema, tenderness or " deformity.   Neurological: He is alert and oriented to person, place, and time. No cranial nerve deficit. Coordination normal.   Skin: Skin is warm and dry. No rash noted. No erythema. No pallor.   Psychiatric: He has a normal mood and affect. His behavior is normal. Judgment and thought content normal.   Nursing note and vitals reviewed.      Procedures           ED Course  ED Course                  MDM  Number of Diagnoses or Management Options  COPD exacerbation:   Diagnosis management comments: EKG: Ventricular rate 80, MN interval 146, QTc 420, QRS duration 96, sinus rhythm.      Patient given a DuoNeb, Solu-Medrol, Tessalon Perles.  On reevaluation, patient is breathing easier.  Patient states that he feels much better.  Patient says that he is ready for discharge home.  I'll give the child prescription for prednisone and Tessalon Perles.  Patient told to take medication as directed, to follow-up with his primary care physician this week, and to return to emergency department for worsening symptoms.  Patient is agreeable to plan of care.         Amount and/or Complexity of Data Reviewed  Clinical lab tests: reviewed  Tests in the radiology section of CPT®: reviewed          Final diagnoses:   COPD exacerbation            Temo Foss MD  05/09/18 1112

## 2018-05-20 ENCOUNTER — APPOINTMENT (OUTPATIENT)
Dept: GENERAL RADIOLOGY | Facility: HOSPITAL | Age: 43
End: 2018-05-20

## 2018-05-20 ENCOUNTER — HOSPITAL ENCOUNTER (EMERGENCY)
Facility: HOSPITAL | Age: 43
Discharge: HOME | End: 2018-05-20
Attending: EMERGENCY MEDICINE | Admitting: EMERGENCY MEDICINE

## 2018-05-20 VITALS
TEMPERATURE: 98.1 F | HEART RATE: 75 BPM | RESPIRATION RATE: 18 BRPM | WEIGHT: 175 LBS | SYSTOLIC BLOOD PRESSURE: 108 MMHG | DIASTOLIC BLOOD PRESSURE: 69 MMHG | HEIGHT: 73 IN | BODY MASS INDEX: 23.19 KG/M2 | OXYGEN SATURATION: 96 %

## 2018-05-20 DIAGNOSIS — E86.0 DEHYDRATION: ICD-10-CM

## 2018-05-20 DIAGNOSIS — R42 LIGHTHEADEDNESS: Primary | ICD-10-CM

## 2018-05-20 DIAGNOSIS — R79.89 ELEVATED SERUM CREATININE: ICD-10-CM

## 2018-05-20 LAB
ALBUMIN SERPL-MCNC: 4.2 G/DL (ref 3.5–5)
ALBUMIN/GLOB SERPL: 1.2 G/DL (ref 1–2)
ALP SERPL-CCNC: 152 U/L (ref 38–126)
ALT SERPL W P-5'-P-CCNC: 62 U/L (ref 13–69)
ANION GAP SERPL CALCULATED.3IONS-SCNC: 14.1 MMOL/L (ref 10–20)
AST SERPL-CCNC: 52 U/L (ref 15–46)
BASOPHILS # BLD AUTO: 0.04 10*3/MM3 (ref 0–0.2)
BASOPHILS NFR BLD AUTO: 0.4 % (ref 0–2.5)
BILIRUB SERPL-MCNC: 0.5 MG/DL (ref 0.2–1.3)
BUN BLD-MCNC: 26 MG/DL (ref 7–20)
BUN/CREAT SERPL: 17.3 (ref 6.3–21.9)
CALCIUM SPEC-SCNC: 9.1 MG/DL (ref 8.4–10.2)
CHLORIDE SERPL-SCNC: 97 MMOL/L (ref 98–107)
CO2 SERPL-SCNC: 30 MMOL/L (ref 26–30)
CREAT BLD-MCNC: 1.5 MG/DL (ref 0.6–1.3)
DEPRECATED RDW RBC AUTO: 41.1 FL (ref 37–54)
EOSINOPHIL # BLD AUTO: 0.21 10*3/MM3 (ref 0–0.7)
EOSINOPHIL NFR BLD AUTO: 1.8 % (ref 0–7)
ERYTHROCYTE [DISTWIDTH] IN BLOOD BY AUTOMATED COUNT: 15.1 % (ref 11.5–14.5)
GFR SERPL CREATININE-BSD FRML MDRD: 51 ML/MIN/1.73
GLOBULIN UR ELPH-MCNC: 3.6 GM/DL
GLUCOSE BLD-MCNC: 119 MG/DL (ref 74–98)
HCT VFR BLD AUTO: 44.8 % (ref 42–52)
HGB BLD-MCNC: 14.8 G/DL (ref 14–18)
HOLD SPECIMEN: NORMAL
HOLD SPECIMEN: NORMAL
IMM GRANULOCYTES # BLD: 0.06 10*3/MM3 (ref 0–0.06)
IMM GRANULOCYTES NFR BLD: 0.5 % (ref 0–0.6)
LIPASE SERPL-CCNC: 80 U/L (ref 23–300)
LYMPHOCYTES # BLD AUTO: 2.28 10*3/MM3 (ref 0.6–3.4)
LYMPHOCYTES NFR BLD AUTO: 20 % (ref 10–50)
MCH RBC QN AUTO: 25.8 PG (ref 27–31)
MCHC RBC AUTO-ENTMCNC: 33 G/DL (ref 30–37)
MCV RBC AUTO: 78 FL (ref 80–94)
MONOCYTES # BLD AUTO: 1.01 10*3/MM3 (ref 0–0.9)
MONOCYTES NFR BLD AUTO: 8.9 % (ref 0–12)
NEUTROPHILS # BLD AUTO: 7.79 10*3/MM3 (ref 2–6.9)
NEUTROPHILS NFR BLD AUTO: 68.4 % (ref 37–80)
NRBC BLD MANUAL-RTO: 0 /100 WBC (ref 0–0)
PLATELET # BLD AUTO: 282 10*3/MM3 (ref 130–400)
PMV BLD AUTO: 10.5 FL (ref 6–12)
POTASSIUM BLD-SCNC: 3.1 MMOL/L (ref 3.5–5.1)
PROT SERPL-MCNC: 7.8 G/DL (ref 6.3–8.2)
RBC # BLD AUTO: 5.74 10*6/MM3 (ref 4.7–6.1)
SODIUM BLD-SCNC: 138 MMOL/L (ref 137–145)
TROPONIN I SERPL-MCNC: <0.012 NG/ML (ref 0–0.03)
TROPONIN I SERPL-MCNC: <0.012 NG/ML (ref 0–0.03)
VALPROATE SERPL-MCNC: 16.7 MCG/ML (ref 50–100)
WBC NRBC COR # BLD: 11.39 10*3/MM3 (ref 4.8–10.8)
WHOLE BLOOD HOLD SPECIMEN: NORMAL
WHOLE BLOOD HOLD SPECIMEN: NORMAL

## 2018-05-20 PROCEDURE — 99285 EMERGENCY DEPT VISIT HI MDM: CPT

## 2018-05-20 PROCEDURE — 80053 COMPREHEN METABOLIC PANEL: CPT | Performed by: EMERGENCY MEDICINE

## 2018-05-20 PROCEDURE — 71045 X-RAY EXAM CHEST 1 VIEW: CPT

## 2018-05-20 PROCEDURE — 83690 ASSAY OF LIPASE: CPT | Performed by: EMERGENCY MEDICINE

## 2018-05-20 PROCEDURE — 96360 HYDRATION IV INFUSION INIT: CPT

## 2018-05-20 PROCEDURE — 85025 COMPLETE CBC W/AUTO DIFF WBC: CPT | Performed by: EMERGENCY MEDICINE

## 2018-05-20 PROCEDURE — 93005 ELECTROCARDIOGRAM TRACING: CPT

## 2018-05-20 PROCEDURE — 84484 ASSAY OF TROPONIN QUANT: CPT | Performed by: EMERGENCY MEDICINE

## 2018-05-20 PROCEDURE — 80164 ASSAY DIPROPYLACETIC ACD TOT: CPT | Performed by: EMERGENCY MEDICINE

## 2018-05-20 PROCEDURE — 93005 ELECTROCARDIOGRAM TRACING: CPT | Performed by: EMERGENCY MEDICINE

## 2018-05-20 PROCEDURE — 96361 HYDRATE IV INFUSION ADD-ON: CPT

## 2018-05-20 RX ORDER — SODIUM CHLORIDE 0.9 % (FLUSH) 0.9 %
10 SYRINGE (ML) INJECTION AS NEEDED
Status: DISCONTINUED | OUTPATIENT
Start: 2018-05-20 | End: 2018-05-21 | Stop reason: HOSPADM

## 2018-05-20 RX ORDER — POTASSIUM CHLORIDE 750 MG/1
40 CAPSULE, EXTENDED RELEASE ORAL ONCE
Status: COMPLETED | OUTPATIENT
Start: 2018-05-20 | End: 2018-05-20

## 2018-05-20 RX ADMIN — SODIUM CHLORIDE 1000 ML: 9 INJECTION, SOLUTION INTRAVENOUS at 20:14

## 2018-05-20 RX ADMIN — POTASSIUM CHLORIDE 40 MEQ: 750 CAPSULE, EXTENDED RELEASE ORAL at 21:18

## 2018-05-20 RX ADMIN — SODIUM CHLORIDE 1000 ML: 9 INJECTION, SOLUTION INTRAVENOUS at 22:47

## 2018-05-20 NOTE — ED PROVIDER NOTES
Subjective   Patient presents to the ED with complaints of lightheadedness over the last 2 days. Intermittent. No exacerbating or alleviating factors. Patient says it just comes on. Denies spinning sensation. No falls, syncopal episodes, chest pain, shortness of breath, abdominal pain, numbness, tingling with the lightheadedness. Patient thinks he may be dehydrated. Has not been consuming water.         History provided by:  Patient  Dizziness   Quality:  Lightheadedness  Severity:  Mild  Onset quality:  Gradual  Duration:  2 days  Timing:  Intermittent  Progression:  Unchanged  Context: not with head movement and not with loss of consciousness    Relieved by:  Nothing  Worsened by:  Nothing  Ineffective treatments:  None tried  Associated symptoms: no chest pain, no diarrhea, no headaches, no nausea, no palpitations, no shortness of breath, no syncope, no tinnitus, no vision changes, no vomiting and no weakness        Review of Systems   Constitutional: Negative for chills and fever.   HENT: Negative for congestion, rhinorrhea, sore throat, tinnitus and trouble swallowing.    Eyes: Negative for discharge and visual disturbance.   Respiratory: Negative for cough, chest tightness, shortness of breath and wheezing.    Cardiovascular: Negative for chest pain, palpitations, leg swelling and syncope.   Gastrointestinal: Negative for abdominal pain, constipation, diarrhea, nausea and vomiting.   Genitourinary: Negative for dysuria, flank pain and hematuria.   Musculoskeletal: Negative for back pain, myalgias and neck pain.   Skin: Negative for color change and rash.   Neurological: Positive for dizziness. Negative for weakness, numbness and headaches.   Psychiatric/Behavioral: Negative for self-injury and suicidal ideas.       Past Medical History:   Diagnosis Date   • Abdominal adhesions    • Asthma    • Cervical radiculopathy    • Colitis    • Colitis    • H/O chest x-ray 04/14/2016    No active disease   • H/O chest  "x-ray 03/28/2016    No active disease by portable imaging   • H/O chest x-ray 03/12/2016    No acute cardiopulmonary process   • Headache    • Heart attack    • History of recreational drug use    • Kidney cysts    • Left hip pain    • Lesion of oral mucosa    • Liver disease    • Low back pain    • Obstructive chronic bronchitis with exacerbation    • Sleep apnea     mild       Allergies   Allergen Reactions   • Doxycycline Nausea And Vomiting       Past Surgical History:   Procedure Laterality Date   • BACK SURGERY     • HERNIA REPAIR     • SMALL INTESTINE SURGERY      Small bowell resection   • TOTAL HIP ARTHROPLASTY Left        Family History   Problem Relation Age of Onset   • Arthritis Other    • Hypertension Other    • Migraines Other    • Heart attack Other    • Stroke Other        Social History     Social History   • Marital status:      Social History Main Topics   • Smoking status: Former Smoker   • Smokeless tobacco: Current User     Types: Chew      Comment: quit 2005   • Alcohol use No      Comment: stopped drinking alcohol   • Drug use: No      Comment: pt reports \"I QUIT DRUG USE 2 YEARS AGO\"     Other Topics Concern   • Not on file           Objective   Physical Exam   Constitutional: He is oriented to person, place, and time. He appears well-developed and well-nourished.   HENT:   Head: Normocephalic and atraumatic.   Nose: Nose normal.   Mouth/Throat: Oropharynx is clear and moist.   Eyes: Conjunctivae and EOM are normal. Pupils are equal, round, and reactive to light.   Neck: Normal range of motion. Neck supple.   Cardiovascular: Normal rate, regular rhythm, normal heart sounds and intact distal pulses.    Pulmonary/Chest: Effort normal and breath sounds normal. No respiratory distress. He has no wheezes. He exhibits no tenderness.   Abdominal: Soft. Bowel sounds are normal. There is no tenderness. There is no rebound and no guarding.   Musculoskeletal: Normal range of motion. He " exhibits no edema, tenderness or deformity.   Neurological: He is alert and oriented to person, place, and time. No cranial nerve deficit. Coordination normal.   Skin: Skin is warm and dry. No rash noted. No erythema. No pallor.   Psychiatric: He has a normal mood and affect. His behavior is normal. Judgment and thought content normal.   Nursing note and vitals reviewed.      Procedures           ED Course                  MDM  Number of Diagnoses or Management Options  Dehydration:   Elevated serum creatinine:   Lightheadedness:   Diagnosis management comments: EKG: Ventricular rate 76, CT interval 164, , castration 98, sinus rhythm.  No ischemic changes.  EKG: Ventricular rate 70, CT interval 176, QTc 438, QRS duration 92, sinus rhythm.  No skin changes.    CXR: no acute findings.     Labs obtained.  Patient's creatinine is elevated.  Patient given 2 L of fluids.  Troponin was negative ×2.  Patient's blood pressure is improving with fluids.  On reevaluation, patient states that he is feeling much better.  Patient states that he just been drinking a lot of Mountain Dew instead of water.  Patient states that he has an appointment with his primary care physician tomorrow.  I told him to keep this appointment.  I told him he is going to need to have his lab work redrawn to check and see how his creatinine is doing.  I instructed the patient to drink more water, to take medication as directed, and to return for worsening symptoms. Patient is agreeable with the plan of care.        Amount and/or Complexity of Data Reviewed  Clinical lab tests: reviewed  Tests in the radiology section of CPT®: reviewed          Final diagnoses:   Lightheadedness   Elevated serum creatinine   Dehydration            Temo Foss MD  05/20/18 0412

## 2018-05-25 ENCOUNTER — OFFICE VISIT (OUTPATIENT)
Dept: PULMONOLOGY | Facility: CLINIC | Age: 43
End: 2018-05-25

## 2018-05-25 VITALS
OXYGEN SATURATION: 99 % | DIASTOLIC BLOOD PRESSURE: 78 MMHG | RESPIRATION RATE: 16 BRPM | BODY MASS INDEX: 23.19 KG/M2 | SYSTOLIC BLOOD PRESSURE: 102 MMHG | HEART RATE: 73 BPM | HEIGHT: 73 IN | WEIGHT: 175 LBS

## 2018-05-25 DIAGNOSIS — J30.89 OTHER ALLERGIC RHINITIS: ICD-10-CM

## 2018-05-25 DIAGNOSIS — G47.33 OBSTRUCTIVE SLEEP APNEA: ICD-10-CM

## 2018-05-25 DIAGNOSIS — R06.02 SHORTNESS OF BREATH: ICD-10-CM

## 2018-05-25 DIAGNOSIS — J45.40 MODERATE PERSISTENT ASTHMA WITHOUT COMPLICATION: Primary | ICD-10-CM

## 2018-05-25 PROCEDURE — 99214 OFFICE O/P EST MOD 30 MIN: CPT | Performed by: NURSE PRACTITIONER
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
## 2018-06-05 RX ORDER — MONTELUKAST SODIUM 10 MG/1
TABLET ORAL
Qty: 30 TABLET | Refills: 0 | Status: SHIPPED | OUTPATIENT
Start: 2018-06-05 | End: 2018-07-22 | Stop reason: SDUPTHER

## 2018-07-09 ENCOUNTER — APPOINTMENT (OUTPATIENT)
Dept: GENERAL RADIOLOGY | Facility: HOSPITAL | Age: 43
End: 2018-07-09
Attending: STUDENT IN AN ORGANIZED HEALTH CARE EDUCATION/TRAINING PROGRAM

## 2018-07-09 ENCOUNTER — HOSPITAL ENCOUNTER (EMERGENCY)
Facility: HOSPITAL | Age: 43
Discharge: HOME | End: 2018-07-09
Attending: STUDENT IN AN ORGANIZED HEALTH CARE EDUCATION/TRAINING PROGRAM | Admitting: STUDENT IN AN ORGANIZED HEALTH CARE EDUCATION/TRAINING PROGRAM

## 2018-07-09 VITALS
HEIGHT: 73 IN | RESPIRATION RATE: 22 BRPM | BODY MASS INDEX: 23.86 KG/M2 | SYSTOLIC BLOOD PRESSURE: 120 MMHG | TEMPERATURE: 98.4 F | OXYGEN SATURATION: 94 % | WEIGHT: 180 LBS | HEART RATE: 85 BPM | DIASTOLIC BLOOD PRESSURE: 84 MMHG

## 2018-07-09 DIAGNOSIS — J45.901 ASTHMA WITH ACUTE EXACERBATION, UNSPECIFIED ASTHMA SEVERITY, UNSPECIFIED WHETHER PERSISTENT: Primary | ICD-10-CM

## 2018-07-09 LAB
ALBUMIN SERPL-MCNC: 4.6 G/DL (ref 3.5–5)
ALBUMIN/GLOB SERPL: 1.3 G/DL (ref 1–2)
ALP SERPL-CCNC: 171 U/L (ref 38–126)
ALT SERPL W P-5'-P-CCNC: 102 U/L (ref 13–69)
ANION GAP SERPL CALCULATED.3IONS-SCNC: 17.3 MMOL/L (ref 10–20)
AST SERPL-CCNC: 85 U/L (ref 15–46)
BASOPHILS # BLD AUTO: 0.1 10*3/MM3 (ref 0–0.2)
BASOPHILS NFR BLD AUTO: 1 % (ref 0–2.5)
BILIRUB SERPL-MCNC: 0.5 MG/DL (ref 0.2–1.3)
BUN BLD-MCNC: 10 MG/DL (ref 7–20)
BUN/CREAT SERPL: 12.5 (ref 6.3–21.9)
CALCIUM SPEC-SCNC: 9.8 MG/DL (ref 8.4–10.2)
CHLORIDE SERPL-SCNC: 100 MMOL/L (ref 98–107)
CO2 SERPL-SCNC: 29 MMOL/L (ref 26–30)
CREAT BLD-MCNC: 0.8 MG/DL (ref 0.6–1.3)
DEPRECATED RDW RBC AUTO: 49 FL (ref 37–54)
EOSINOPHIL # BLD AUTO: 0.57 10*3/MM3 (ref 0–0.7)
EOSINOPHIL NFR BLD AUTO: 5.9 % (ref 0–7)
ERYTHROCYTE [DISTWIDTH] IN BLOOD BY AUTOMATED COUNT: 18.9 % (ref 11.5–14.5)
GFR SERPL CREATININE-BSD FRML MDRD: 106 ML/MIN/1.73
GLOBULIN UR ELPH-MCNC: 3.5 GM/DL
GLUCOSE BLD-MCNC: 105 MG/DL (ref 74–98)
HCT VFR BLD AUTO: 47.2 % (ref 42–52)
HGB BLD-MCNC: 15.4 G/DL (ref 14–18)
IMM GRANULOCYTES # BLD: 0.02 10*3/MM3 (ref 0–0.06)
IMM GRANULOCYTES NFR BLD: 0.2 % (ref 0–0.6)
LYMPHOCYTES # BLD AUTO: 1.93 10*3/MM3 (ref 0.6–3.4)
LYMPHOCYTES NFR BLD AUTO: 19.8 % (ref 10–50)
MCH RBC QN AUTO: 25.3 PG (ref 27–31)
MCHC RBC AUTO-ENTMCNC: 32.6 G/DL (ref 30–37)
MCV RBC AUTO: 77.6 FL (ref 80–94)
MONOCYTES # BLD AUTO: 0.63 10*3/MM3 (ref 0–0.9)
MONOCYTES NFR BLD AUTO: 6.5 % (ref 0–12)
NEUTROPHILS # BLD AUTO: 6.49 10*3/MM3 (ref 2–6.9)
NEUTROPHILS NFR BLD AUTO: 66.6 % (ref 37–80)
NRBC BLD MANUAL-RTO: 0 /100 WBC (ref 0–0)
PLATELET # BLD AUTO: 376 10*3/MM3 (ref 130–400)
PMV BLD AUTO: 9.3 FL (ref 6–12)
POTASSIUM BLD-SCNC: 4.3 MMOL/L (ref 3.5–5.1)
PROT SERPL-MCNC: 8.1 G/DL (ref 6.3–8.2)
RBC # BLD AUTO: 6.08 10*6/MM3 (ref 4.7–6.1)
SODIUM BLD-SCNC: 142 MMOL/L (ref 137–145)
WBC NRBC COR # BLD: 9.74 10*3/MM3 (ref 4.8–10.8)

## 2018-07-09 PROCEDURE — 99283 EMERGENCY DEPT VISIT LOW MDM: CPT

## 2018-07-09 PROCEDURE — 96375 TX/PRO/DX INJ NEW DRUG ADDON: CPT

## 2018-07-09 PROCEDURE — 94799 UNLISTED PULMONARY SVC/PX: CPT

## 2018-07-09 PROCEDURE — 85025 COMPLETE CBC W/AUTO DIFF WBC: CPT | Performed by: STUDENT IN AN ORGANIZED HEALTH CARE EDUCATION/TRAINING PROGRAM

## 2018-07-09 PROCEDURE — 94760 N-INVAS EAR/PLS OXIMETRY 1: CPT

## 2018-07-09 PROCEDURE — 25010000002 DEXAMETHASONE SODIUM PHOSPHATE 10 MG/ML SOLUTION: Performed by: STUDENT IN AN ORGANIZED HEALTH CARE EDUCATION/TRAINING PROGRAM

## 2018-07-09 PROCEDURE — 71046 X-RAY EXAM CHEST 2 VIEWS: CPT

## 2018-07-09 PROCEDURE — 96365 THER/PROPH/DIAG IV INF INIT: CPT

## 2018-07-09 PROCEDURE — 25010000002 MAGNESIUM SULFATE 2 GM/50ML SOLUTION: Performed by: STUDENT IN AN ORGANIZED HEALTH CARE EDUCATION/TRAINING PROGRAM

## 2018-07-09 PROCEDURE — 93005 ELECTROCARDIOGRAM TRACING: CPT | Performed by: STUDENT IN AN ORGANIZED HEALTH CARE EDUCATION/TRAINING PROGRAM

## 2018-07-09 PROCEDURE — 94640 AIRWAY INHALATION TREATMENT: CPT

## 2018-07-09 PROCEDURE — 80053 COMPREHEN METABOLIC PANEL: CPT | Performed by: STUDENT IN AN ORGANIZED HEALTH CARE EDUCATION/TRAINING PROGRAM

## 2018-07-09 RX ORDER — ALBUTEROL SULFATE 90 UG/1
2 AEROSOL, METERED RESPIRATORY (INHALATION) EVERY 4 HOURS PRN
Qty: 1 INHALER | Refills: 0 | Status: SHIPPED | OUTPATIENT
Start: 2018-07-09 | End: 2019-01-29 | Stop reason: SDUPTHER

## 2018-07-09 RX ORDER — DEXAMETHASONE SODIUM PHOSPHATE 10 MG/ML
10 INJECTION, SOLUTION INTRAMUSCULAR; INTRAVENOUS ONCE
Status: COMPLETED | OUTPATIENT
Start: 2018-07-09 | End: 2018-07-09

## 2018-07-09 RX ORDER — MONTELUKAST SODIUM 10 MG/1
TABLET ORAL
Qty: 30 TABLET | Refills: 0 | OUTPATIENT
Start: 2018-07-09

## 2018-07-09 RX ORDER — IPRATROPIUM BROMIDE AND ALBUTEROL SULFATE 2.5; .5 MG/3ML; MG/3ML
3 SOLUTION RESPIRATORY (INHALATION) ONCE
Status: COMPLETED | OUTPATIENT
Start: 2018-07-09 | End: 2018-07-09

## 2018-07-09 RX ORDER — MAGNESIUM SULFATE HEPTAHYDRATE 40 MG/ML
2 INJECTION, SOLUTION INTRAVENOUS ONCE
Status: COMPLETED | OUTPATIENT
Start: 2018-07-09 | End: 2018-07-09

## 2018-07-09 RX ADMIN — IPRATROPIUM BROMIDE AND ALBUTEROL SULFATE 3 ML: .5; 3 SOLUTION RESPIRATORY (INHALATION) at 09:14

## 2018-07-09 RX ADMIN — MAGNESIUM SULFATE HEPTAHYDRATE 2 G: 40 INJECTION, SOLUTION INTRAVENOUS at 09:16

## 2018-07-09 RX ADMIN — DEXAMETHASONE SODIUM PHOSPHATE 10 MG: 10 INJECTION, SOLUTION INTRAMUSCULAR; INTRAVENOUS at 09:15

## 2018-07-09 NOTE — ED PROVIDER NOTES
"Subjective   42-year-old male with chronic asthma who presents with 2 days of progressively worsening shortness of breath.  Symptoms are worse with any form of exertion that up until today were better with albuterol.  Denies fever, chills, sweats, nausea, vomiting or diarrhea.  Patient has no chest pain.  He does have severe shortness of breath and wheezing.  He reports compliance with his medication regimen which includes breo ellipta and Singulair            Review of Systems   All other systems reviewed and are negative.      Past Medical History:   Diagnosis Date   • Abdominal adhesions    • Asthma    • Cervical radiculopathy    • Colitis    • Colitis    • H/O chest x-ray 04/14/2016    No active disease   • H/O chest x-ray 03/28/2016    No active disease by portable imaging   • H/O chest x-ray 03/12/2016    No acute cardiopulmonary process   • Headache    • Heart attack    • History of recreational drug use    • Kidney cysts    • Left hip pain    • Lesion of oral mucosa    • Liver disease    • Low back pain    • Obstructive chronic bronchitis with exacerbation (CMS/HCC)    • Sleep apnea     mild       Allergies   Allergen Reactions   • Doxycycline Nausea And Vomiting       Past Surgical History:   Procedure Laterality Date   • BACK SURGERY     • HERNIA REPAIR     • SMALL INTESTINE SURGERY      Small bowell resection   • TOTAL HIP ARTHROPLASTY Left        Family History   Problem Relation Age of Onset   • Arthritis Other    • Hypertension Other    • Migraines Other    • Heart attack Other    • Stroke Other        Social History     Social History   • Marital status:      Social History Main Topics   • Smoking status: Former Smoker   • Smokeless tobacco: Current User     Types: Chew      Comment: quit 2005   • Alcohol use No      Comment: stopped drinking alcohol   • Drug use: No      Comment: pt reports \"I QUIT DRUG USE 2 YEARS AGO\"     Other Topics Concern   • Not on file           Objective   Physical " Exam   Nursing note and vitals reviewed.    GEN: Patient is in moderate respiratory distress and has trouble completing sentences due to work of breathing  Head: Normocephalic, atraumatic  Eyes: Pupils equal round reactive to light  ENT: Posterior pharynx normal in appearance, oral mucosa is moist  Chest: Nontender to palpation  Cardiovascular: Regular rate  Lungs: Tachypneic with a rate of 24, coarse wheezes throughout all airway fields  Abdomen: Soft, nontender, nondistended, no peritoneal signs, prominent midline ventral incision scar that is well-healed  Extremities: No edema, normal appearance  Neuro: GCS 15  Psych: Mood and affect are appropriate    Procedures           ED Course  ED Course as of Jul 09 1051   Mon Jul 09, 2018   0910 EKG shows normal sinus rhythm rate of 92.  No significant ST segments.  Intervals are normal.  This is a normal EKG.  Interpreted by me.  [DT]      ED Course User Index  [DT] Shady Walker MD                  MDM  Number of Diagnoses or Management Options  Asthma with acute exacerbation, unspecified asthma severity, unspecified whether persistent:   Diagnosis management comments: Treatment with a DuoNeb, steroids, and IV magnesium was initiated  Differential diagnosis for this patient would include COPD, asthma, bronchitis, pneumonia, congestive heart failure, pulmonary embolus, acute coronary syndrome, anxiety, or other concerns.  Patient has a prominent history of asthma and clinically that is what he appears to have at this time       XR Chest 2 View (Final result)   Result time 07/09/18 10:06:00   Final result by Megan Lorenzana MD (07/09/18 10:06:00)             Impression:     No acute cardiopulmonary process.           This report was finalized on 7/9/2018 10:06 AM by Megan Lorenzana M.D..        Narrative:     PROCEDURE: XR CHEST 2 VW-        HISTORY: sob     COMPARISON: May 20, 2018.     FINDINGS: The heart is normal in size. The mediastinum is  unremarkable.  The lungs are clear. There is no pneumothorax. There are no acute  osseous abnormalities.                     Amount and/or Complexity of Data Reviewed  Clinical lab tests: reviewed  Tests in the radiology section of CPT®: reviewed  Discussion of test results with the performing providers: yes  Decide to obtain previous medical records or to obtain history from someone other than the patient: yes  Review and summarize past medical records: yes  Independent visualization of images, tracings, or specimens: yes          Final diagnoses:   Asthma with acute exacerbation, unspecified asthma severity, unspecified whether persistent            Shady Walker MD  07/09/18 1055

## 2018-07-11 ENCOUNTER — HOSPITAL ENCOUNTER (EMERGENCY)
Facility: HOSPITAL | Age: 43
Discharge: HOME | End: 2018-07-11
Attending: EMERGENCY MEDICINE | Admitting: EMERGENCY MEDICINE

## 2018-07-11 ENCOUNTER — APPOINTMENT (OUTPATIENT)
Dept: GENERAL RADIOLOGY | Facility: HOSPITAL | Age: 43
End: 2018-07-11

## 2018-07-11 VITALS
HEIGHT: 73 IN | TEMPERATURE: 98.1 F | WEIGHT: 166.8 LBS | RESPIRATION RATE: 18 BRPM | BODY MASS INDEX: 22.11 KG/M2 | DIASTOLIC BLOOD PRESSURE: 89 MMHG | SYSTOLIC BLOOD PRESSURE: 138 MMHG | OXYGEN SATURATION: 93 % | HEART RATE: 66 BPM

## 2018-07-11 DIAGNOSIS — J44.1 COPD EXACERBATION (HCC): Primary | ICD-10-CM

## 2018-07-11 LAB
ALBUMIN SERPL-MCNC: 4.2 G/DL (ref 3.5–5)
ALBUMIN/GLOB SERPL: 1.3 G/DL (ref 1–2)
ALP SERPL-CCNC: 133 U/L (ref 38–126)
ALT SERPL W P-5'-P-CCNC: 66 U/L (ref 13–69)
ANION GAP SERPL CALCULATED.3IONS-SCNC: 14.9 MMOL/L (ref 10–20)
AST SERPL-CCNC: 38 U/L (ref 15–46)
BASOPHILS # BLD AUTO: 0.08 10*3/MM3 (ref 0–0.2)
BASOPHILS NFR BLD AUTO: 0.8 % (ref 0–2.5)
BILIRUB SERPL-MCNC: 0.4 MG/DL (ref 0.2–1.3)
BUN BLD-MCNC: 14 MG/DL (ref 7–20)
BUN/CREAT SERPL: 17.5 (ref 6.3–21.9)
CALCIUM SPEC-SCNC: 9.2 MG/DL (ref 8.4–10.2)
CHLORIDE SERPL-SCNC: 105 MMOL/L (ref 98–107)
CO2 SERPL-SCNC: 27 MMOL/L (ref 26–30)
CREAT BLD-MCNC: 0.8 MG/DL (ref 0.6–1.3)
DEPRECATED RDW RBC AUTO: 51 FL (ref 37–54)
EOSINOPHIL # BLD AUTO: 0.67 10*3/MM3 (ref 0–0.7)
EOSINOPHIL NFR BLD AUTO: 6.6 % (ref 0–7)
ERYTHROCYTE [DISTWIDTH] IN BLOOD BY AUTOMATED COUNT: 18.8 % (ref 11.5–14.5)
GFR SERPL CREATININE-BSD FRML MDRD: 106 ML/MIN/1.73
GLOBULIN UR ELPH-MCNC: 3.2 GM/DL
GLUCOSE BLD-MCNC: 90 MG/DL (ref 74–98)
HCT VFR BLD AUTO: 43.8 % (ref 42–52)
HGB BLD-MCNC: 14.2 G/DL (ref 14–18)
HOLD SPECIMEN: NORMAL
HOLD SPECIMEN: NORMAL
IMM GRANULOCYTES # BLD: 0.03 10*3/MM3 (ref 0–0.06)
IMM GRANULOCYTES NFR BLD: 0.3 % (ref 0–0.6)
LIPASE SERPL-CCNC: 86 U/L (ref 23–300)
LYMPHOCYTES # BLD AUTO: 2.85 10*3/MM3 (ref 0.6–3.4)
LYMPHOCYTES NFR BLD AUTO: 28.2 % (ref 10–50)
MCH RBC QN AUTO: 25.5 PG (ref 27–31)
MCHC RBC AUTO-ENTMCNC: 32.4 G/DL (ref 30–37)
MCV RBC AUTO: 78.8 FL (ref 80–94)
MONOCYTES # BLD AUTO: 0.73 10*3/MM3 (ref 0–0.9)
MONOCYTES NFR BLD AUTO: 7.2 % (ref 0–12)
NEUTROPHILS # BLD AUTO: 5.75 10*3/MM3 (ref 2–6.9)
NEUTROPHILS NFR BLD AUTO: 56.9 % (ref 37–80)
NRBC BLD MANUAL-RTO: 0 /100 WBC (ref 0–0)
NT-PROBNP SERPL-MCNC: 135 PG/ML (ref 0–125)
PLATELET # BLD AUTO: 359 10*3/MM3 (ref 130–400)
PMV BLD AUTO: 9.3 FL (ref 6–12)
POTASSIUM BLD-SCNC: 3.9 MMOL/L (ref 3.5–5.1)
PROT SERPL-MCNC: 7.4 G/DL (ref 6.3–8.2)
RBC # BLD AUTO: 5.56 10*6/MM3 (ref 4.7–6.1)
SODIUM BLD-SCNC: 143 MMOL/L (ref 137–145)
TROPONIN I SERPL-MCNC: <0.012 NG/ML (ref 0–0.03)
WBC NRBC COR # BLD: 10.11 10*3/MM3 (ref 4.8–10.8)
WHOLE BLOOD HOLD SPECIMEN: NORMAL
WHOLE BLOOD HOLD SPECIMEN: NORMAL

## 2018-07-11 PROCEDURE — 25010000002 METHYLPREDNISOLONE PER 40 MG: Performed by: EMERGENCY MEDICINE

## 2018-07-11 PROCEDURE — 71046 X-RAY EXAM CHEST 2 VIEWS: CPT

## 2018-07-11 PROCEDURE — 99284 EMERGENCY DEPT VISIT MOD MDM: CPT

## 2018-07-11 PROCEDURE — 83880 ASSAY OF NATRIURETIC PEPTIDE: CPT | Performed by: EMERGENCY MEDICINE

## 2018-07-11 PROCEDURE — 85025 COMPLETE CBC W/AUTO DIFF WBC: CPT | Performed by: EMERGENCY MEDICINE

## 2018-07-11 PROCEDURE — 80053 COMPREHEN METABOLIC PANEL: CPT | Performed by: EMERGENCY MEDICINE

## 2018-07-11 PROCEDURE — 94799 UNLISTED PULMONARY SVC/PX: CPT

## 2018-07-11 PROCEDURE — 25010000002 ONDANSETRON PER 1 MG: Performed by: EMERGENCY MEDICINE

## 2018-07-11 PROCEDURE — 96375 TX/PRO/DX INJ NEW DRUG ADDON: CPT

## 2018-07-11 PROCEDURE — 94640 AIRWAY INHALATION TREATMENT: CPT

## 2018-07-11 PROCEDURE — 93005 ELECTROCARDIOGRAM TRACING: CPT | Performed by: EMERGENCY MEDICINE

## 2018-07-11 PROCEDURE — 84484 ASSAY OF TROPONIN QUANT: CPT | Performed by: EMERGENCY MEDICINE

## 2018-07-11 PROCEDURE — 83690 ASSAY OF LIPASE: CPT | Performed by: EMERGENCY MEDICINE

## 2018-07-11 PROCEDURE — 94760 N-INVAS EAR/PLS OXIMETRY 1: CPT

## 2018-07-11 PROCEDURE — 96374 THER/PROPH/DIAG INJ IV PUSH: CPT

## 2018-07-11 RX ORDER — IPRATROPIUM BROMIDE AND ALBUTEROL SULFATE 2.5; .5 MG/3ML; MG/3ML
3 SOLUTION RESPIRATORY (INHALATION) ONCE
Status: COMPLETED | OUTPATIENT
Start: 2018-07-11 | End: 2018-07-11

## 2018-07-11 RX ORDER — METHYLPREDNISOLONE SODIUM SUCCINATE 40 MG/ML
80 INJECTION, POWDER, LYOPHILIZED, FOR SOLUTION INTRAMUSCULAR; INTRAVENOUS ONCE
Status: COMPLETED | OUTPATIENT
Start: 2018-07-11 | End: 2018-07-11

## 2018-07-11 RX ORDER — ALBUTEROL SULFATE 2.5 MG/3ML
2.5 SOLUTION RESPIRATORY (INHALATION) ONCE
Status: COMPLETED | OUTPATIENT
Start: 2018-07-11 | End: 2018-07-11

## 2018-07-11 RX ORDER — ONDANSETRON 4 MG/1
4 TABLET, ORALLY DISINTEGRATING ORAL EVERY 8 HOURS PRN
Qty: 12 TABLET | Refills: 0 | Status: ON HOLD | OUTPATIENT
Start: 2018-07-11 | End: 2019-06-17

## 2018-07-11 RX ORDER — ALUMINA, MAGNESIA, AND SIMETHICONE 2400; 2400; 240 MG/30ML; MG/30ML; MG/30ML
15 SUSPENSION ORAL ONCE
Status: COMPLETED | OUTPATIENT
Start: 2018-07-11 | End: 2018-07-11

## 2018-07-11 RX ORDER — FAMOTIDINE 10 MG/ML
20 INJECTION, SOLUTION INTRAVENOUS ONCE
Status: COMPLETED | OUTPATIENT
Start: 2018-07-11 | End: 2018-07-11

## 2018-07-11 RX ORDER — ATROPINE SULFATE 0.4 MG/ML
0.4 AMPUL (ML) INJECTION ONCE
Status: COMPLETED | OUTPATIENT
Start: 2018-07-11 | End: 2018-07-11

## 2018-07-11 RX ORDER — IPRATROPIUM BROMIDE AND ALBUTEROL SULFATE 2.5; .5 MG/3ML; MG/3ML
6 SOLUTION RESPIRATORY (INHALATION) ONCE
Status: COMPLETED | OUTPATIENT
Start: 2018-07-11 | End: 2018-07-11

## 2018-07-11 RX ORDER — ONDANSETRON 2 MG/ML
4 INJECTION INTRAMUSCULAR; INTRAVENOUS ONCE
Status: COMPLETED | OUTPATIENT
Start: 2018-07-11 | End: 2018-07-11

## 2018-07-11 RX ORDER — SODIUM CHLORIDE 0.9 % (FLUSH) 0.9 %
10 SYRINGE (ML) INJECTION AS NEEDED
Status: DISCONTINUED | OUTPATIENT
Start: 2018-07-11 | End: 2018-07-11 | Stop reason: HOSPADM

## 2018-07-11 RX ORDER — RANITIDINE 150 MG/1
150 CAPSULE ORAL 2 TIMES DAILY
Qty: 30 CAPSULE | Refills: 0 | Status: ON HOLD | OUTPATIENT
Start: 2018-07-11 | End: 2019-06-17

## 2018-07-11 RX ORDER — PREDNISONE 10 MG/1
TABLET ORAL
Qty: 36 TABLET | Refills: 0 | OUTPATIENT
Start: 2018-07-11 | End: 2019-01-23

## 2018-07-11 RX ADMIN — IPRATROPIUM BROMIDE AND ALBUTEROL SULFATE 3 ML: .5; 3 SOLUTION RESPIRATORY (INHALATION) at 06:37

## 2018-07-11 RX ADMIN — IPRATROPIUM BROMIDE AND ALBUTEROL SULFATE 6 ML: .5; 3 SOLUTION RESPIRATORY (INHALATION) at 08:02

## 2018-07-11 RX ADMIN — ALBUTEROL SULFATE 2.5 MG: 2.5 SOLUTION RESPIRATORY (INHALATION) at 06:37

## 2018-07-11 RX ADMIN — METHYLPREDNISOLONE SODIUM SUCCINATE 80 MG: 40 INJECTION, POWDER, FOR SOLUTION INTRAMUSCULAR; INTRAVENOUS at 06:36

## 2018-07-11 RX ADMIN — FAMOTIDINE 20 MG: 10 INJECTION INTRAVENOUS at 06:38

## 2018-07-11 RX ADMIN — LIDOCAINE HYDROCHLORIDE 15 ML: 20 SOLUTION ORAL; TOPICAL at 07:03

## 2018-07-11 RX ADMIN — ATROPINE SULFATE 0.4 MG: 0.4 INJECTION, SOLUTION INTRAMUSCULAR; INTRAVENOUS; SUBCUTANEOUS at 07:03

## 2018-07-11 RX ADMIN — ALUMINUM HYDROXIDE, MAGNESIUM HYDROXIDE, AND DIMETHICONE: 400; 400; 40 SUSPENSION ORAL at 07:03

## 2018-07-11 RX ADMIN — ONDANSETRON 4 MG: 2 INJECTION, SOLUTION INTRAMUSCULAR; INTRAVENOUS at 07:03

## 2018-07-11 NOTE — ED PROVIDER NOTES
Subjective   History of Present Illness   42-year-old male with history of COPD presenting with shortness breath, cough, chest tightness and some posttussive emesis.  States he was seen here 2 days ago and had chest x-ray that was reassuring.  Given a dose of Decadron that seemed to help with his symptoms for about 24 hours but then they returned.  Denies any fevers, chills, chest pain but does have some epigastric abdominal pain.  Denies other symptoms.    Review of Systems   Respiratory: Positive for cough, shortness of breath and wheezing.    Gastrointestinal: Positive for abdominal pain and vomiting.   All other systems reviewed and are negative.      Past Medical History:   Diagnosis Date   • Abdominal adhesions    • Asthma    • Cervical radiculopathy    • Colitis    • Colitis    • H/O chest x-ray 04/14/2016    No active disease   • H/O chest x-ray 03/28/2016    No active disease by portable imaging   • H/O chest x-ray 03/12/2016    No acute cardiopulmonary process   • Headache    • Heart attack    • History of recreational drug use    • Kidney cysts    • Left hip pain    • Lesion of oral mucosa    • Liver disease    • Low back pain    • Obstructive chronic bronchitis with exacerbation (CMS/HCC)    • Sleep apnea     mild       Allergies   Allergen Reactions   • Doxycycline Nausea And Vomiting       Past Surgical History:   Procedure Laterality Date   • BACK SURGERY     • HERNIA REPAIR     • SMALL INTESTINE SURGERY      Small bowell resection   • TOTAL HIP ARTHROPLASTY Left        Family History   Problem Relation Age of Onset   • Arthritis Other    • Hypertension Other    • Migraines Other    • Heart attack Other    • Stroke Other        Social History     Social History   • Marital status:      Social History Main Topics   • Smoking status: Former Smoker   • Smokeless tobacco: Current User     Types: Chew      Comment: quit 2005   • Alcohol use No      Comment: stopped drinking alcohol   • Drug use: No  "     Comment: pt reports \"I QUIT DRUG USE 2 YEARS AGO\"     Other Topics Concern   • Not on file           Objective   Physical Exam   Constitutional: He is oriented to person, place, and time. He appears well-developed and well-nourished. No distress.   HENT:   Head: Normocephalic.   Mouth/Throat: Oropharynx is clear and moist.   Eyes: No scleral icterus.   Neck: Normal range of motion. Neck supple. No tracheal deviation present.   Cardiovascular: Normal rate, regular rhythm, normal heart sounds and intact distal pulses.  Exam reveals no gallop and no friction rub.    No murmur heard.  Pulmonary/Chest: Effort normal. No stridor. No respiratory distress. He has wheezes (Diffuse). He has no rales.   Abdominal: Soft. He exhibits no distension and no mass. There is no tenderness (mild epigastric). There is no rebound and no guarding.   Musculoskeletal: He exhibits no edema or deformity.   Neurological: He is alert and oriented to person, place, and time.   Skin: Skin is warm and dry. No rash noted. He is not diaphoretic. No erythema. No pallor.   Psychiatric: He has a normal mood and affect. His behavior is normal.   Nursing note and vitals reviewed.      ECG 12 Lead    Date/Time: 7/11/2018 6:47 AM  Performed by: ARIS GALDAMEZ  Authorized by: ARIS GALDAMEZ   Interpreted by physician  Rhythm: sinus rhythm  Rate: normal  QRS axis: normal  Conduction: conduction normal  ST Segments: ST segments normal  T Waves: T waves normal  Clinical impression: non-specific ECG and low voltage                 ED Course                  MDM  42-year-old male here with cough, wheezing, posttussive emesis and mild epigastric abdominal pain.  Afebrile, vital signs are remarkable for hypertension and slight tachypnea.  He is diffusely wheezy and has some mild epigastric tenderness to palpation.  He sounds like he is having a worsening COPD exacerbation despite receiving Decadron about 36 hours ago.  In terms of his epigastric pain, " this may be gastritis in the setting of steroids versus soreness from posttussive emesis prior low suspicion for a surgical or emergent abdominal process.  Will give a dose of IV steroids, DuoNeb's followed by albuterol breathing treatment, send lab work and a chest x-ray and reassess.  Anticipate he may need to be handed off to the oncoming team.    Final diagnoses:   None      She was checked out to me from the previous physician.  At that time patient awaiting laboratory tests.  His chest x-ray did not reveal any acute abnormalities aside from emphysematous changes.  Patient had improvement with breathing after breathing treatments and steroids.  We will place patient on a prolonged steroid taper as this has helped him in the past.  His labs were otherwise unremarkable.  Patient remained breathing comfortably on room air.  We'll discharge with strict return precautions.       Dinorah Garcia MD  07/11/18 3766

## 2018-07-23 RX ORDER — MONTELUKAST SODIUM 10 MG/1
TABLET ORAL
Qty: 30 TABLET | Refills: 0 | Status: SHIPPED | OUTPATIENT
Start: 2018-07-23 | End: 2018-12-16 | Stop reason: SDUPTHER

## 2018-07-30 ENCOUNTER — EPISODE CHANGES (OUTPATIENT)
Dept: CASE MANAGEMENT | Facility: OTHER | Age: 43
End: 2018-07-30

## 2018-10-24 ENCOUNTER — HOSPITAL ENCOUNTER (EMERGENCY)
Facility: HOSPITAL | Age: 43
Discharge: HOME | End: 2018-10-24
Attending: STUDENT IN AN ORGANIZED HEALTH CARE EDUCATION/TRAINING PROGRAM | Admitting: STUDENT IN AN ORGANIZED HEALTH CARE EDUCATION/TRAINING PROGRAM

## 2018-10-24 ENCOUNTER — APPOINTMENT (OUTPATIENT)
Dept: GENERAL RADIOLOGY | Facility: HOSPITAL | Age: 43
End: 2018-10-24
Attending: STUDENT IN AN ORGANIZED HEALTH CARE EDUCATION/TRAINING PROGRAM

## 2018-10-24 VITALS
DIASTOLIC BLOOD PRESSURE: 85 MMHG | HEIGHT: 73 IN | WEIGHT: 175 LBS | RESPIRATION RATE: 20 BRPM | BODY MASS INDEX: 23.19 KG/M2 | OXYGEN SATURATION: 93 % | HEART RATE: 84 BPM | SYSTOLIC BLOOD PRESSURE: 123 MMHG | TEMPERATURE: 97.6 F

## 2018-10-24 DIAGNOSIS — J44.1 ACUTE EXACERBATION OF CHRONIC OBSTRUCTIVE PULMONARY DISEASE (COPD) (HCC): Primary | ICD-10-CM

## 2018-10-24 LAB
ALBUMIN SERPL-MCNC: 4.5 G/DL (ref 3.5–5)
ALBUMIN/GLOB SERPL: 1.4 G/DL (ref 1–2)
ALP SERPL-CCNC: 102 U/L (ref 38–126)
ALT SERPL W P-5'-P-CCNC: 97 U/L (ref 13–69)
ANION GAP SERPL CALCULATED.3IONS-SCNC: 10.4 MMOL/L (ref 10–20)
AST SERPL-CCNC: 67 U/L (ref 15–46)
BASOPHILS # BLD AUTO: 0.07 10*3/MM3 (ref 0–0.2)
BASOPHILS NFR BLD AUTO: 0.9 % (ref 0–2.5)
BILIRUB SERPL-MCNC: 0.3 MG/DL (ref 0.2–1.3)
BUN BLD-MCNC: 12 MG/DL (ref 7–20)
BUN/CREAT SERPL: 12 (ref 6.3–21.9)
CALCIUM SPEC-SCNC: 9.3 MG/DL (ref 8.4–10.2)
CHLORIDE SERPL-SCNC: 104 MMOL/L (ref 98–107)
CO2 SERPL-SCNC: 30 MMOL/L (ref 26–30)
CREAT BLD-MCNC: 1 MG/DL (ref 0.6–1.3)
DEPRECATED RDW RBC AUTO: 46.8 FL (ref 37–54)
EOSINOPHIL # BLD AUTO: 0.88 10*3/MM3 (ref 0–0.7)
EOSINOPHIL NFR BLD AUTO: 10.8 % (ref 0–7)
ERYTHROCYTE [DISTWIDTH] IN BLOOD BY AUTOMATED COUNT: 15.5 % (ref 11.5–14.5)
GFR SERPL CREATININE-BSD FRML MDRD: 82 ML/MIN/1.73
GLOBULIN UR ELPH-MCNC: 3.3 GM/DL
GLUCOSE BLD-MCNC: 87 MG/DL (ref 74–98)
HCT VFR BLD AUTO: 44.2 % (ref 42–52)
HGB BLD-MCNC: 14.1 G/DL (ref 14–18)
IMM GRANULOCYTES # BLD: 0.02 10*3/MM3 (ref 0–0.06)
IMM GRANULOCYTES NFR BLD: 0.2 % (ref 0–0.6)
LYMPHOCYTES # BLD AUTO: 2.17 10*3/MM3 (ref 0.6–3.4)
LYMPHOCYTES NFR BLD AUTO: 26.7 % (ref 10–50)
MCH RBC QN AUTO: 26.6 PG (ref 27–31)
MCHC RBC AUTO-ENTMCNC: 31.9 G/DL (ref 30–37)
MCV RBC AUTO: 83.2 FL (ref 80–94)
MONOCYTES # BLD AUTO: 0.83 10*3/MM3 (ref 0–0.9)
MONOCYTES NFR BLD AUTO: 10.2 % (ref 0–12)
NEUTROPHILS # BLD AUTO: 4.17 10*3/MM3 (ref 2–6.9)
NEUTROPHILS NFR BLD AUTO: 51.2 % (ref 37–80)
NRBC BLD MANUAL-RTO: 0 /100 WBC (ref 0–0)
PLATELET # BLD AUTO: 231 10*3/MM3 (ref 130–400)
PMV BLD AUTO: 9.2 FL (ref 6–12)
POTASSIUM BLD-SCNC: 4.4 MMOL/L (ref 3.5–5.1)
PROT SERPL-MCNC: 7.8 G/DL (ref 6.3–8.2)
RBC # BLD AUTO: 5.31 10*6/MM3 (ref 4.7–6.1)
SODIUM BLD-SCNC: 140 MMOL/L (ref 137–145)
TROPONIN I SERPL-MCNC: <0.012 NG/ML (ref 0–0.03)
WBC NRBC COR # BLD: 8.14 10*3/MM3 (ref 4.8–10.8)

## 2018-10-24 PROCEDURE — 71046 X-RAY EXAM CHEST 2 VIEWS: CPT

## 2018-10-24 PROCEDURE — 93005 ELECTROCARDIOGRAM TRACING: CPT | Performed by: STUDENT IN AN ORGANIZED HEALTH CARE EDUCATION/TRAINING PROGRAM

## 2018-10-24 PROCEDURE — 84484 ASSAY OF TROPONIN QUANT: CPT | Performed by: STUDENT IN AN ORGANIZED HEALTH CARE EDUCATION/TRAINING PROGRAM

## 2018-10-24 PROCEDURE — 94640 AIRWAY INHALATION TREATMENT: CPT

## 2018-10-24 PROCEDURE — 25010000002 METHYLPREDNISOLONE PER 125 MG: Performed by: STUDENT IN AN ORGANIZED HEALTH CARE EDUCATION/TRAINING PROGRAM

## 2018-10-24 PROCEDURE — 99284 EMERGENCY DEPT VISIT MOD MDM: CPT

## 2018-10-24 PROCEDURE — 96375 TX/PRO/DX INJ NEW DRUG ADDON: CPT

## 2018-10-24 PROCEDURE — 25010000002 MAGNESIUM SULFATE 2 GM/50ML SOLUTION: Performed by: STUDENT IN AN ORGANIZED HEALTH CARE EDUCATION/TRAINING PROGRAM

## 2018-10-24 PROCEDURE — 80053 COMPREHEN METABOLIC PANEL: CPT | Performed by: STUDENT IN AN ORGANIZED HEALTH CARE EDUCATION/TRAINING PROGRAM

## 2018-10-24 PROCEDURE — 85025 COMPLETE CBC W/AUTO DIFF WBC: CPT | Performed by: STUDENT IN AN ORGANIZED HEALTH CARE EDUCATION/TRAINING PROGRAM

## 2018-10-24 PROCEDURE — 94799 UNLISTED PULMONARY SVC/PX: CPT

## 2018-10-24 PROCEDURE — 96366 THER/PROPH/DIAG IV INF ADDON: CPT

## 2018-10-24 PROCEDURE — 96365 THER/PROPH/DIAG IV INF INIT: CPT

## 2018-10-24 RX ORDER — MAGNESIUM SULFATE HEPTAHYDRATE 40 MG/ML
2 INJECTION, SOLUTION INTRAVENOUS ONCE
Status: COMPLETED | OUTPATIENT
Start: 2018-10-24 | End: 2018-10-24

## 2018-10-24 RX ORDER — METHYLPREDNISOLONE SODIUM SUCCINATE 125 MG/2ML
125 INJECTION, POWDER, LYOPHILIZED, FOR SOLUTION INTRAMUSCULAR; INTRAVENOUS ONCE
Status: COMPLETED | OUTPATIENT
Start: 2018-10-24 | End: 2018-10-24

## 2018-10-24 RX ORDER — IPRATROPIUM BROMIDE AND ALBUTEROL SULFATE 2.5; .5 MG/3ML; MG/3ML
3 SOLUTION RESPIRATORY (INHALATION) ONCE
Status: COMPLETED | OUTPATIENT
Start: 2018-10-24 | End: 2018-10-24

## 2018-10-24 RX ORDER — PREDNISONE 20 MG/1
40 TABLET ORAL DAILY
Qty: 10 TABLET | Refills: 0 | OUTPATIENT
Start: 2018-10-24 | End: 2019-01-23

## 2018-10-24 RX ORDER — AZITHROMYCIN 250 MG/1
250 TABLET, FILM COATED ORAL DAILY
Qty: 6 TABLET | Refills: 0 | OUTPATIENT
Start: 2018-10-24 | End: 2019-01-23

## 2018-10-24 RX ADMIN — IPRATROPIUM BROMIDE AND ALBUTEROL SULFATE 3 ML: .5; 3 SOLUTION RESPIRATORY (INHALATION) at 12:04

## 2018-10-24 RX ADMIN — METHYLPREDNISOLONE SODIUM SUCCINATE 125 MG: 125 INJECTION, POWDER, FOR SOLUTION INTRAMUSCULAR; INTRAVENOUS at 09:37

## 2018-10-24 RX ADMIN — MAGNESIUM SULFATE IN WATER 2 G: 40 INJECTION, SOLUTION INTRAVENOUS at 09:37

## 2018-10-24 RX ADMIN — IPRATROPIUM BROMIDE AND ALBUTEROL SULFATE 3 ML: .5; 3 SOLUTION RESPIRATORY (INHALATION) at 09:44

## 2018-10-24 NOTE — ED PROVIDER NOTES
Subjective   42-year-old male that presents with 3 days of progressively worsening shortness of breath.  Symptoms are worse with exertion and currently nothing makes better.  The patient has a long with his doctor tomorrow and was trying to hold out.  He does have a history of COPD.            Review of Systems   All other systems reviewed and are negative.      Past Medical History:   Diagnosis Date   • Abdominal adhesions    • Asthma    • Cervical radiculopathy    • Colitis    • Colitis    • H/O chest x-ray 04/14/2016    No active disease   • H/O chest x-ray 03/28/2016    No active disease by portable imaging   • H/O chest x-ray 03/12/2016    No acute cardiopulmonary process   • Headache    • Heart attack (CMS/HCC)    • History of recreational drug use    • Kidney cysts    • Left hip pain    • Lesion of oral mucosa    • Liver disease    • Low back pain    • Obstructive chronic bronchitis with exacerbation (CMS/HCC)    • Sleep apnea     mild       Allergies   Allergen Reactions   • Doxycycline Nausea And Vomiting       Past Surgical History:   Procedure Laterality Date   • BACK SURGERY     • HERNIA REPAIR     • SMALL INTESTINE SURGERY      Small bowell resection   • TOTAL HIP ARTHROPLASTY Left        Family History   Problem Relation Age of Onset   • Arthritis Other    • Hypertension Other    • Migraines Other    • Heart attack Other    • Stroke Other        Social History     Social History   • Marital status:      Social History Main Topics   • Smoking status: Former Smoker   • Smokeless tobacco: Current User     Types: Chew      Comment: quit 2005   • Alcohol use No      Comment: stopped drinking alcohol   • Drug use: No      Comment: takes suboxone     Other Topics Concern   • Not on file           Objective   Physical Exam   Nursing note and vitals reviewed.    GEN: No acute distress  Head: Normocephalic, atraumatic  Eyes: Pupils equal round reactive to light  ENT: Posterior pharynx normal in  appearance, oral mucosa is moist  Chest: Nontender to palpation  Cardiovascular: Regular rate  Lungs: Prominent coarse wheezes throughout all airway fields  Abdomen: Soft, nontender, nondistended, no peritoneal signs  Extremities: No edema, normal appearance  Neuro: GCS 15  Psych: Mood and affect are appropriate    Procedures           ED Course  ED Course as of Oct 24 1338   Wed Oct 24, 2018   1242 EKG shows sinus rhythm with a rate of 82.  No significant ST segments.  Normal EKG.  Interpreted by me.  [DT]      ED Course User Index  [DT] Shady Walker MD                  MDM  Number of Diagnoses or Management Options  Acute exacerbation of chronic obstructive pulmonary disease (COPD) (CMS/[facility]):   Diagnosis management comments: Differential diagnosis for this patient would include COPD, asthma, bronchitis, pneumonia, congestive heart failure, pulmonary embolus, acute coronary syndrome, anxiety, or other concerns.  Appearance consistent with COPD  No significant laboratory normalities  Chest x-ray shows no acute cardiopulmonary pathology  Patient was treated with IV steroids, IV magnesium, and albuterol nebs with good symptom control       Amount and/or Complexity of Data Reviewed  Clinical lab tests: reviewed  Tests in the radiology section of CPT®: reviewed  Decide to obtain previous medical records or to obtain history from someone other than the patient: yes  Obtain history from someone other than the patient: yes  Review and summarize past medical records: yes  Independent visualization of images, tracings, or specimens: yes          Final diagnoses:   Acute exacerbation of chronic obstructive pulmonary disease (COPD) (CMS/[facility])            Shady Walker MD  10/24/18 1065

## 2018-10-30 ENCOUNTER — PATIENT OUTREACH (OUTPATIENT)
Dept: CASE MANAGEMENT | Facility: OTHER | Age: 43
End: 2018-10-30

## 2018-10-30 NOTE — OUTREACH NOTE
Care Management Plan 10/30/2018   Lifestyle Goals Routine follow-up with doctor(s)   Barriers Other (See Comment)   Barriers SOA with exertion   Self Management Medication Adherence;Use oxygen   Annual Wellness Visit:  Care Coordinator Will Schedule   Care Gaps Addressed Flu Shot   Specific Disease Process Teaching COPD   Does patient have depression diagnosis? No   Advanced Directives: Send Information   Ed Visits past 12 months: 3 or more   Hospitalizations past 12 months 2 or 3   Medication Adherence Medications understood   Health Literacy Good     The main concerns and/or symptoms the patient would like to address are:   Patient states he is still rattling in his chest, and short of breath when he gets up to move around.  Reports he went to see his PCP, Dr. ASTRID Robertson, 10-25-18, the day after being in the ED with SOA.  Patient voiced compliance with getting his Antibiotic filled and taking it, as well as his Prednisone.  States he takes his medication every day as Dr. Robertson prescribes.  Is wearing oxygen at all times; uses CPAP at night; uses Nebulizer for breathing treatments as ordered; uses Inhalers as ordered.  Lives in home with his parents at this time.  Drives self to appointments; parents would drive if needed.      Education/instruction provided by Care Coordinator: Discussed ED instructions; patient voiced understanding.  Discussed COPD management.  Reviewed Gaps in Care and need to schedule Annual Wellness Visit.  Patient gave Care Advisor permission to call PCP and make this appt for him.  Patient states he has taken the Flu shot already this year.  Talked about Advanced Directives; patient requested information be mailed to him.  Care Advisor arranging mailing of information as requested.  Discussed My Chart; patient not interested.    Follow Up Outreach Due: tomorrow or as soon as reach PCP office to make appointment for AWV.    Socorro Almeida RN

## 2018-11-02 ENCOUNTER — PATIENT OUTREACH (OUTPATIENT)
Dept: CASE MANAGEMENT | Facility: OTHER | Age: 43
End: 2018-11-02

## 2018-11-02 NOTE — OUTREACH NOTE
Contacted patient's PCP office, spoke with Sagrario, and obtained date/time for patient's Annual Wellness Visit, Thursday, 12-6-18 at 2:15pm.  Called patient and informed him of his AWV appt.; he wrote it on his calendar and said he would go.  Patient stated he is feeling a lot better now.  Reported no difficulty with his breathing.  Will follow-up as needed.

## 2018-11-09 ENCOUNTER — APPOINTMENT (OUTPATIENT)
Dept: GENERAL RADIOLOGY | Facility: HOSPITAL | Age: 43
End: 2018-11-09
Attending: EMERGENCY MEDICINE

## 2018-11-09 ENCOUNTER — HOSPITAL ENCOUNTER (EMERGENCY)
Facility: HOSPITAL | Age: 43
Discharge: HOME | End: 2018-11-09
Attending: EMERGENCY MEDICINE | Admitting: EMERGENCY MEDICINE

## 2018-11-09 VITALS
TEMPERATURE: 98.4 F | SYSTOLIC BLOOD PRESSURE: 133 MMHG | DIASTOLIC BLOOD PRESSURE: 79 MMHG | WEIGHT: 201.4 LBS | RESPIRATION RATE: 16 BRPM | HEART RATE: 97 BPM | OXYGEN SATURATION: 99 % | BODY MASS INDEX: 26.69 KG/M2 | HEIGHT: 73 IN

## 2018-11-09 DIAGNOSIS — S61.412A LACERATION OF LEFT HAND, FOREIGN BODY PRESENCE UNSPECIFIED, INITIAL ENCOUNTER: ICD-10-CM

## 2018-11-09 DIAGNOSIS — S67.22XA CRUSHING INJURY OF LEFT HAND, INITIAL ENCOUNTER: Primary | ICD-10-CM

## 2018-11-09 PROCEDURE — 99283 EMERGENCY DEPT VISIT LOW MDM: CPT

## 2018-11-09 PROCEDURE — 73130 X-RAY EXAM OF HAND: CPT

## 2018-11-09 PROCEDURE — 73140 X-RAY EXAM OF FINGER(S): CPT

## 2018-11-09 PROCEDURE — 25010000002 TDAP 5-2.5-18.5 LF-MCG/0.5 SUSPENSION: Performed by: EMERGENCY MEDICINE

## 2018-11-09 PROCEDURE — 73110 X-RAY EXAM OF WRIST: CPT

## 2018-11-09 PROCEDURE — 90471 IMMUNIZATION ADMIN: CPT | Performed by: EMERGENCY MEDICINE

## 2018-11-09 PROCEDURE — 90715 TDAP VACCINE 7 YRS/> IM: CPT | Performed by: EMERGENCY MEDICINE

## 2018-11-09 RX ORDER — LIDOCAINE HYDROCHLORIDE 10 MG/ML
10 INJECTION, SOLUTION EPIDURAL; INFILTRATION; INTRACAUDAL; PERINEURAL ONCE
Status: COMPLETED | OUTPATIENT
Start: 2018-11-09 | End: 2018-11-09

## 2018-11-09 RX ADMIN — LIDOCAINE HYDROCHLORIDE 10 ML: 10 INJECTION, SOLUTION EPIDURAL; INFILTRATION; INTRACAUDAL; PERINEURAL at 12:09

## 2018-11-09 RX ADMIN — TETANUS TOXOID, REDUCED DIPHTHERIA TOXOID AND ACELLULAR PERTUSSIS VACCINE, ADSORBED 0.5 ML: 5; 2.5; 8; 8; 2.5 SUSPENSION INTRAMUSCULAR at 13:19

## 2018-11-09 NOTE — ED NOTES
PT has 8 sutures noted to left index finger.  Laceration cleansed with normal saline and chlorhexidine.  Covered with 4 x4's and kerlix.       Adrienne Cazares, RN  11/09/18 7233

## 2018-11-09 NOTE — ED PROVIDER NOTES
Subjective   43-year-old male presents to the ED with chief complaint of left hand injury.  The patient states that he was using hydraulic log splitter when one of the logs kickback and smashed his hand between the log and a steel plate.  He is complaining about pain at the base of the left index finger.  Pain is a dull ache that is worse with range of motion.  He denies numbness or tingling.  Bleeding is controlled from the laceration.  No other injuries.  No prior treatments.  No other complaints this time.            Review of Systems   Musculoskeletal: Positive for joint swelling.        Left Hand injury.   All other systems reviewed and are negative.      Past Medical History:   Diagnosis Date   • Abdominal adhesions    • Asthma    • Cervical radiculopathy    • Colitis    • Colitis    • H/O chest x-ray 04/14/2016    No active disease   • H/O chest x-ray 03/28/2016    No active disease by portable imaging   • H/O chest x-ray 03/12/2016    No acute cardiopulmonary process   • Headache    • Heart attack (CMS/HCC)    • History of recreational drug use    • Kidney cysts    • Left hip pain    • Lesion of oral mucosa    • Liver disease    • Low back pain    • Obstructive chronic bronchitis with exacerbation (CMS/HCC)    • Sleep apnea     mild       Allergies   Allergen Reactions   • Doxycycline Nausea And Vomiting       Past Surgical History:   Procedure Laterality Date   • BACK SURGERY     • HERNIA REPAIR     • SMALL INTESTINE SURGERY      Small bowell resection   • TOTAL HIP ARTHROPLASTY Left        Family History   Problem Relation Age of Onset   • Arthritis Other    • Hypertension Other    • Migraines Other    • Heart attack Other    • Stroke Other        Social History     Social History   • Marital status:      Social History Main Topics   • Smoking status: Former Smoker   • Smokeless tobacco: Current User     Types: Chew      Comment: quit 2005   • Alcohol use No      Comment: stopped drinking alcohol    • Drug use: No      Comment: takes suboxone     Other Topics Concern   • Not on file           Objective   Physical Exam   Constitutional: He is oriented to person, place, and time. He appears well-developed and well-nourished. No distress.   HENT:   Head: Normocephalic and atraumatic.   Nose: Nose normal.   Cardiovascular: Normal rate and regular rhythm.    Pulmonary/Chest: Breath sounds normal. No respiratory distress.   Abdominal: Soft. He exhibits no distension. There is no tenderness.   Musculoskeletal: He exhibits no edema or deformity.   Swelling at the base of the left index finger into the PIP joint.  There is ecchymosis present and tenderness to palpation.  No obvious deformity.  Brisk capillary refill.  Sensation is intact.   Neurological: He is alert and oriented to person, place, and time.   Skin: He is not diaphoretic.   Nursing note and vitals reviewed.      Laceration Repair  Date/Time: 11/9/2018 1:19 PM  Performed by: BRANDON WILLOUGHBY  Authorized by: BRANDON WILLOUGHBY     Consent:     Consent obtained:  Verbal    Consent given by:  Patient    Alternatives discussed:  No treatment  Anesthesia (see MAR for exact dosages):     Anesthesia method:  None  Laceration details:     Location:  Hand    Hand location:  L wrist    Length (cm):  2  Repair type:     Repair type:  Simple  Pre-procedure details:     Preparation:  Patient was prepped and draped in usual sterile fashion  Exploration:     Wound exploration: wound explored through full range of motion      Contaminated: no    Treatment:     Amount of cleaning:  Standard    Irrigation solution:  Tap water    Irrigation method:  Tap    Visualized foreign bodies/material removed: no    Skin repair:     Repair method:  Sutures    Suture size:  5-0    Suture material:  Nylon    Number of sutures:  8  Approximation:     Approximation:  Close    Vermilion border: well-aligned    Post-procedure details:     Dressing:  Open (no dressing)    Patient tolerance of  procedure:  Tolerated well, no immediate complications               ED Course        To left hand injury.  There is a crush injury with a skin tear/laceration at the base of the left index finger on the radial aspect with extension to the palmar surface..  Imaging is negative for acute fractures.  Laceration was repaired.  Patient will be discharged to return in one week for suture removal he is agreeable to this plan.          MDM      Final diagnoses:   Crushing injury of left hand, initial encounter   Laceration of left hand, foreign body presence unspecified, initial encounter            Tanner Ramires, DO  11/09/18 132

## 2018-11-09 NOTE — ED NOTES
No signs and symptoms of reaction noted at injection site.     Adrienne Cazares, RN  11/09/18 9197

## 2018-11-13 ENCOUNTER — EPISODE CHANGES (OUTPATIENT)
Dept: SOCIAL WORK | Facility: HOSPITAL | Age: 43
End: 2018-11-13

## 2018-11-13 ENCOUNTER — EPISODE CHANGES (OUTPATIENT)
Dept: CASE MANAGEMENT | Facility: OTHER | Age: 43
End: 2018-11-13

## 2018-11-16 ENCOUNTER — EPISODE CHANGES (OUTPATIENT)
Dept: CASE MANAGEMENT | Facility: OTHER | Age: 43
End: 2018-11-16

## 2018-11-21 ENCOUNTER — PATIENT OUTREACH (OUTPATIENT)
Dept: CASE MANAGEMENT | Facility: OTHER | Age: 43
End: 2018-11-21

## 2018-11-21 NOTE — OUTREACH NOTE
Follow-up call with patient related to ED visit 11/9/18.  Patient stated he had an accident with his [employer] Splitter and it pinched his hand, cutting it; received 8 stitches.  Reports the stitches were removed a week later, and reports his skin is held together/ intact.  States he puts triple antibiotic ointment on it, and keeps area clean and dry.  No complaints reported about this area.  Patient stated his breathing is doing better.  Reports his blood pressure is not an issue, as he takes his medications daily as prescribed.  No questions or concerns voiced at this time.  Patient voiced understanding of next PCP appt 12-6-18 ([facility]).

## 2018-12-10 RX ORDER — MONTELUKAST SODIUM 10 MG/1
TABLET ORAL
Qty: 30 TABLET | Refills: 0 | OUTPATIENT
Start: 2018-12-10

## 2018-12-17 ENCOUNTER — EPISODE CHANGES (OUTPATIENT)
Dept: CASE MANAGEMENT | Facility: OTHER | Age: 43
End: 2018-12-17

## 2018-12-17 RX ORDER — MONTELUKAST SODIUM 10 MG/1
TABLET ORAL
Qty: 30 TABLET | Refills: 0 | Status: SHIPPED | OUTPATIENT
Start: 2018-12-17 | End: 2019-01-10 | Stop reason: SDUPTHER

## 2019-01-10 RX ORDER — MONTELUKAST SODIUM 10 MG/1
TABLET ORAL
Qty: 30 TABLET | Refills: 0 | Status: SHIPPED | OUTPATIENT
Start: 2019-01-10 | End: 2019-01-29 | Stop reason: SDUPTHER

## 2019-01-21 RX ORDER — MONTELUKAST SODIUM 10 MG/1
TABLET ORAL
Qty: 30 TABLET | Refills: 0 | Status: SHIPPED | OUTPATIENT
Start: 2019-01-21 | End: 2019-01-29

## 2019-01-23 ENCOUNTER — APPOINTMENT (OUTPATIENT)
Dept: GENERAL RADIOLOGY | Facility: HOSPITAL | Age: 44
End: 2019-01-23

## 2019-01-23 ENCOUNTER — HOSPITAL ENCOUNTER (EMERGENCY)
Facility: HOSPITAL | Age: 44
Discharge: HOME | End: 2019-01-23
Attending: EMERGENCY MEDICINE | Admitting: EMERGENCY MEDICINE

## 2019-01-23 VITALS
HEART RATE: 93 BPM | HEIGHT: 73 IN | RESPIRATION RATE: 17 BRPM | OXYGEN SATURATION: 97 % | WEIGHT: 204.8 LBS | SYSTOLIC BLOOD PRESSURE: 118 MMHG | TEMPERATURE: 98 F | DIASTOLIC BLOOD PRESSURE: 76 MMHG | BODY MASS INDEX: 27.14 KG/M2

## 2019-01-23 DIAGNOSIS — M54.2 NECK PAIN: Primary | ICD-10-CM

## 2019-01-23 PROCEDURE — 25010000002 METHYLPREDNISOLONE PER 125 MG: Performed by: EMERGENCY MEDICINE

## 2019-01-23 PROCEDURE — 99283 EMERGENCY DEPT VISIT LOW MDM: CPT

## 2019-01-23 PROCEDURE — 96372 THER/PROPH/DIAG INJ SC/IM: CPT

## 2019-01-23 PROCEDURE — 73030 X-RAY EXAM OF SHOULDER: CPT

## 2019-01-23 RX ORDER — CYCLOBENZAPRINE HCL 10 MG
10 TABLET ORAL 3 TIMES DAILY PRN
Qty: 10 TABLET | Refills: 0 | Status: ON HOLD | OUTPATIENT
Start: 2019-01-23 | End: 2019-06-17

## 2019-01-23 RX ORDER — METHYLPREDNISOLONE SODIUM SUCCINATE 125 MG/2ML
80 INJECTION, POWDER, LYOPHILIZED, FOR SOLUTION INTRAMUSCULAR; INTRAVENOUS ONCE
Status: COMPLETED | OUTPATIENT
Start: 2019-01-23 | End: 2019-01-23

## 2019-01-23 RX ORDER — PREDNISONE 20 MG/1
60 TABLET ORAL DAILY
Qty: 15 TABLET | Refills: 0 | Status: SHIPPED | OUTPATIENT
Start: 2019-01-23 | End: 2019-01-28

## 2019-01-23 RX ORDER — HYDROCODONE BITARTRATE AND ACETAMINOPHEN 5; 325 MG/1; MG/1
2 TABLET ORAL ONCE
Status: COMPLETED | OUTPATIENT
Start: 2019-01-23 | End: 2019-01-23

## 2019-01-23 RX ADMIN — METHYLPREDNISOLONE SODIUM SUCCINATE 80 MG: 125 INJECTION, POWDER, FOR SOLUTION INTRAMUSCULAR; INTRAVENOUS at 09:49

## 2019-01-23 RX ADMIN — HYDROCODONE BITARTRATE AND ACETAMINOPHEN 2 TABLET: 5; 325 TABLET ORAL at 09:48

## 2019-01-23 NOTE — ED PROVIDER NOTES
Subjective   43-year-old male presenting with neck and shoulder pain.  He states that for the last 2-3 weeks he has had pain in his right neck, radiates into his right shoulder and down his right arm.  Worse with any sort of movement.  No alleviating factors.  Has had pain like this on and off for many years but this is been more significant.  Denies any numbness, weakness, chest pain, shortness of breath or other complaints.            Review of Systems   Constitutional: Negative.    HENT: Negative.    Eyes: Negative.    Respiratory: Negative.    Cardiovascular: Negative.    Gastrointestinal: Negative.    Genitourinary: Negative.    Musculoskeletal: Positive for arthralgias and neck pain.   Skin: Negative.    Neurological: Negative.    Psychiatric/Behavioral: Negative.        Past Medical History:   Diagnosis Date   • Abdominal adhesions    • Asthma    • Cervical radiculopathy    • Colitis    • Colitis    • H/O chest x-ray 04/14/2016    No active disease   • H/O chest x-ray 03/28/2016    No active disease by portable imaging   • H/O chest x-ray 03/12/2016    No acute cardiopulmonary process   • Headache    • Heart attack (CMS/HCC)    • History of recreational drug use    • Kidney cysts    • Left hip pain    • Lesion of oral mucosa    • Liver disease    • Low back pain    • Obstructive chronic bronchitis with exacerbation (CMS/HCC)    • Sleep apnea     mild       Allergies   Allergen Reactions   • Doxycycline Nausea And Vomiting       Past Surgical History:   Procedure Laterality Date   • BACK SURGERY     • HERNIA REPAIR     • SMALL INTESTINE SURGERY      Small bowell resection   • TOTAL HIP ARTHROPLASTY Left        Family History   Problem Relation Age of Onset   • Arthritis Other    • Hypertension Other    • Migraines Other    • Heart attack Other    • Stroke Other        Social History     Socioeconomic History   • Marital status:      Spouse name: Not on file   • Number of children: Not on file   • Years  of education: Not on file   • Highest education level: Not on file   Tobacco Use   • Smoking status: Former Smoker   • Smokeless tobacco: Current User     Types: Chew   • Tobacco comment: quit 2005   Substance and Sexual Activity   • Alcohol use: No     Comment: stopped drinking alcohol   • Drug use: No     Comment: takes suboxone           Objective   Physical Exam   Constitutional: He is oriented to person, place, and time. He appears well-developed and well-nourished. No distress.   HENT:   Head: Normocephalic and atraumatic.   Right Ear: External ear normal.   Left Ear: External ear normal.   Nose: Nose normal.   Mouth/Throat: Oropharynx is clear and moist.   Eyes: Conjunctivae and EOM are normal. Pupils are equal, round, and reactive to light.   Neck: Neck supple.   Pain with lateral flexion of the neck to the right, minimal tenderness to the right paraspinal musculature of the cervical spine   Cardiovascular: Normal rate, regular rhythm, normal heart sounds and intact distal pulses.   2+ radials   Pulmonary/Chest: Effort normal and breath sounds normal. No respiratory distress.   Abdominal: Soft. Bowel sounds are normal. He exhibits no distension. There is no tenderness. There is no rebound and no guarding.   Musculoskeletal: Normal range of motion. He exhibits no edema or deformity.   Minimal tenderness right shoulder   Neurological: He is alert and oriented to person, place, and time.   Normal strength and sensation distally   Skin: Skin is warm and dry. No rash noted.   Psychiatric: He has a normal mood and affect. His behavior is normal.   Nursing note and vitals reviewed.      Procedures           ED Course                  MDM  Number of Diagnoses or Management Options  Neck pain:   Diagnosis management comments: 43-year-old male with subacute neck and shoulder pain.  Well-developed, well-nourished man in no distress with normal vital signs and exam as above.  He is neurovascular intact.  Based on his  history and exam I suspect he has either rotator cuff injury or a cervical radiculopathy.  We'll treat symptomatically and check x-ray of the shoulder.  Disposition is likely to home.    DDX: Musculoskeletal pain, strain, fracture, radiculopathy    Imaging is negative. He is feeling better. Will discharge home with outpatient ortho follow up.       Amount and/or Complexity of Data Reviewed  Tests in the radiology section of CPT®: reviewed          Final diagnoses:   Neck pain            Amandeep Branham MD  01/23/19 1035

## 2019-01-29 ENCOUNTER — OFFICE VISIT (OUTPATIENT)
Dept: PULMONOLOGY | Facility: CLINIC | Age: 44
End: 2019-01-29

## 2019-01-29 VITALS
DIASTOLIC BLOOD PRESSURE: 80 MMHG | HEIGHT: 73 IN | BODY MASS INDEX: 27.3 KG/M2 | SYSTOLIC BLOOD PRESSURE: 130 MMHG | RESPIRATION RATE: 18 BRPM | HEART RATE: 86 BPM | WEIGHT: 206 LBS | OXYGEN SATURATION: 95 %

## 2019-01-29 DIAGNOSIS — J30.89 OTHER ALLERGIC RHINITIS: ICD-10-CM

## 2019-01-29 DIAGNOSIS — G47.19 EXCESSIVE DAYTIME SLEEPINESS: ICD-10-CM

## 2019-01-29 DIAGNOSIS — G47.33 OBSTRUCTIVE SLEEP APNEA: Primary | ICD-10-CM

## 2019-01-29 DIAGNOSIS — J45.40 MODERATE PERSISTENT ASTHMA WITHOUT COMPLICATION: ICD-10-CM

## 2019-01-29 PROCEDURE — 99214 OFFICE O/P EST MOD 30 MIN: CPT | Performed by: NURSE PRACTITIONER

## 2019-01-29 RX ORDER — FLUTICASONE PROPIONATE 50 MCG
SPRAY, SUSPENSION (ML) NASAL
Refills: 0 | COMMUNITY
Start: 2019-01-10 | End: 2019-01-29 | Stop reason: SDUPTHER

## 2019-01-29 RX ORDER — FLUTICASONE PROPIONATE 50 MCG
2 SPRAY, SUSPENSION (ML) NASAL DAILY
Qty: 1 BOTTLE | Refills: 5 | Status: SHIPPED | OUTPATIENT
Start: 2019-01-29 | End: 2019-07-31 | Stop reason: SDUPTHER

## 2019-01-29 RX ORDER — VELPATASVIR AND SOFOSBUVIR 100; 400 MG/1; MG/1
TABLET, FILM COATED ORAL
Status: ON HOLD | COMMUNITY
Start: 2019-01-18 | End: 2019-06-17

## 2019-01-29 RX ORDER — MONTELUKAST SODIUM 10 MG/1
10 TABLET ORAL EVERY EVENING
Qty: 30 TABLET | Refills: 5 | Status: SHIPPED | OUTPATIENT
Start: 2019-01-29 | End: 2019-10-11 | Stop reason: SDUPTHER

## 2019-01-29 RX ORDER — ALBUTEROL SULFATE 90 UG/1
2 AEROSOL, METERED RESPIRATORY (INHALATION) EVERY 4 HOURS PRN
Qty: 1 INHALER | Refills: 2 | Status: SHIPPED | OUTPATIENT
Start: 2019-01-29 | End: 2019-06-20 | Stop reason: HOSPADM

## 2019-01-29 RX ORDER — LOSARTAN POTASSIUM 50 MG/1
50 TABLET ORAL DAILY
Refills: 0 | Status: ON HOLD | COMMUNITY
Start: 2019-01-10 | End: 2019-06-17

## 2019-01-29 RX ORDER — TRAZODONE HYDROCHLORIDE 150 MG/1
150 TABLET ORAL NIGHTLY PRN
Refills: 0 | COMMUNITY
Start: 2019-01-11 | End: 2022-10-21 | Stop reason: SDUPTHER

## 2019-01-29 RX ORDER — BUPRENORPHINE HYDROCHLORIDE, NALOXONE HYDROCHLORIDE 8; 2 MG/1; MG/1
2 FILM, SOLUBLE BUCCAL; SUBLINGUAL DAILY
Refills: 0 | Status: ON HOLD | COMMUNITY
Start: 2019-01-11 | End: 2020-01-17 | Stop reason: SDUPTHER

## 2019-01-29 RX ORDER — RANITIDINE 150 MG/1
TABLET ORAL
Refills: 0 | Status: ON HOLD | COMMUNITY
Start: 2019-01-25 | End: 2019-06-17

## 2019-04-18 ENCOUNTER — TRANSCRIBE ORDERS (OUTPATIENT)
Dept: ADMINISTRATIVE | Facility: HOSPITAL | Age: 44
End: 2019-04-18

## 2019-04-18 DIAGNOSIS — G89.29 CHRONIC RIGHT SHOULDER PAIN: Primary | ICD-10-CM

## 2019-04-18 DIAGNOSIS — M25.511 CHRONIC RIGHT SHOULDER PAIN: Primary | ICD-10-CM

## 2019-04-23 ENCOUNTER — HOSPITAL ENCOUNTER (OUTPATIENT)
Dept: MRI IMAGING | Facility: HOSPITAL | Age: 44
Discharge: HOME | End: 2019-04-23
Admitting: ORTHOPAEDIC SURGERY

## 2019-04-23 ENCOUNTER — HOSPITAL ENCOUNTER (OUTPATIENT)
Dept: GENERAL RADIOLOGY | Facility: HOSPITAL | Age: 44
Discharge: HOME | End: 2019-04-23

## 2019-04-23 DIAGNOSIS — M25.511 CHRONIC RIGHT SHOULDER PAIN: ICD-10-CM

## 2019-04-23 DIAGNOSIS — G89.29 CHRONIC RIGHT SHOULDER PAIN: ICD-10-CM

## 2019-04-23 PROCEDURE — 25010000002 IOPAMIDOL 61 % SOLUTION: Performed by: ORTHOPAEDIC SURGERY

## 2019-04-23 PROCEDURE — 77002 NEEDLE LOCALIZATION BY XRAY: CPT

## 2019-04-23 PROCEDURE — 73222 MRI JOINT UPR EXTREM W/DYE: CPT

## 2019-04-23 PROCEDURE — 0 GADOBENATE DIMEGLUMINE 529 MG/ML SOLUTION: Performed by: ORTHOPAEDIC SURGERY

## 2019-04-23 PROCEDURE — A9577 INJ MULTIHANCE: HCPCS | Performed by: ORTHOPAEDIC SURGERY

## 2019-04-23 RX ORDER — LIDOCAINE HYDROCHLORIDE 10 MG/ML
20 INJECTION, SOLUTION INFILTRATION; PERINEURAL ONCE
Status: COMPLETED | OUTPATIENT
Start: 2019-04-23 | End: 2019-04-23

## 2019-04-23 RX ADMIN — IOPAMIDOL 10 ML: 612 INJECTION, SOLUTION INTRAVENOUS at 14:13

## 2019-04-23 RX ADMIN — GADOBENATE DIMEGLUMINE 1 ML: 529 INJECTION, SOLUTION INTRAVENOUS at 13:51

## 2019-04-23 RX ADMIN — LIDOCAINE HYDROCHLORIDE 15 ML: 10 INJECTION, SOLUTION INFILTRATION; PERINEURAL at 14:13

## 2019-05-13 ENCOUNTER — LAB (OUTPATIENT)
Dept: LAB | Facility: HOSPITAL | Age: 44
End: 2019-05-13

## 2019-05-13 ENCOUNTER — TRANSCRIBE ORDERS (OUTPATIENT)
Dept: LAB | Facility: HOSPITAL | Age: 44
End: 2019-05-13

## 2019-05-13 ENCOUNTER — HOSPITAL ENCOUNTER (OUTPATIENT)
Dept: CARDIOLOGY | Facility: HOSPITAL | Age: 44
Discharge: HOME | End: 2019-05-13

## 2019-05-13 ENCOUNTER — HOSPITAL ENCOUNTER (OUTPATIENT)
Dept: GENERAL RADIOLOGY | Facility: HOSPITAL | Age: 44
Discharge: HOME | End: 2019-05-13
Admitting: ORTHOPAEDIC SURGERY

## 2019-05-13 DIAGNOSIS — S43.431A LABRAL TEAR OF SHOULDER, RIGHT, INITIAL ENCOUNTER: ICD-10-CM

## 2019-05-13 DIAGNOSIS — S43.431A LABRAL TEAR OF SHOULDER, RIGHT, INITIAL ENCOUNTER: Primary | ICD-10-CM

## 2019-05-13 LAB
ANION GAP SERPL CALCULATED.3IONS-SCNC: 15.6 MMOL/L (ref 10–20)
BUN BLD-MCNC: 9 MG/DL (ref 7–20)
BUN/CREAT SERPL: 10 (ref 6.3–21.9)
CALCIUM SPEC-SCNC: 9 MG/DL (ref 8.4–10.2)
CHLORIDE SERPL-SCNC: 102 MMOL/L (ref 98–107)
CO2 SERPL-SCNC: 25 MMOL/L (ref 26–30)
CREAT BLD-MCNC: 0.9 MG/DL (ref 0.6–1.3)
DEPRECATED RDW RBC AUTO: 38 FL (ref 37–54)
ERYTHROCYTE [DISTWIDTH] IN BLOOD BY AUTOMATED COUNT: 12.9 % (ref 12.3–15.4)
GFR SERPL CREATININE-BSD FRML MDRD: 92 ML/MIN/1.73
GLUCOSE BLD-MCNC: 120 MG/DL (ref 74–98)
HCT VFR BLD AUTO: 43.9 % (ref 37.5–51)
HGB BLD-MCNC: 14.7 G/DL (ref 13–17.7)
MCH RBC QN AUTO: 27.4 PG (ref 26.6–33)
MCHC RBC AUTO-ENTMCNC: 33.5 G/DL (ref 31.5–35.7)
MCV RBC AUTO: 81.8 FL (ref 79–97)
PLATELET # BLD AUTO: 284 10*3/MM3 (ref 140–450)
PMV BLD AUTO: 9.7 FL (ref 6–12)
POTASSIUM BLD-SCNC: 3.6 MMOL/L (ref 3.5–5.1)
RBC # BLD AUTO: 5.37 10*6/MM3 (ref 4.14–5.8)
SODIUM BLD-SCNC: 139 MMOL/L (ref 137–145)
WBC NRBC COR # BLD: 12.59 10*3/MM3 (ref 3.4–10.8)

## 2019-05-13 PROCEDURE — 80048 BASIC METABOLIC PNL TOTAL CA: CPT

## 2019-05-13 PROCEDURE — 85027 COMPLETE CBC AUTOMATED: CPT

## 2019-05-13 PROCEDURE — 93005 ELECTROCARDIOGRAM TRACING: CPT | Performed by: ORTHOPAEDIC SURGERY

## 2019-05-13 PROCEDURE — 36415 COLL VENOUS BLD VENIPUNCTURE: CPT

## 2019-05-13 PROCEDURE — 71046 X-RAY EXAM CHEST 2 VIEWS: CPT

## 2019-05-14 ENCOUNTER — APPOINTMENT (OUTPATIENT)
Dept: GENERAL RADIOLOGY | Facility: HOSPITAL | Age: 44
End: 2019-05-14

## 2019-05-14 ENCOUNTER — HOSPITAL ENCOUNTER (EMERGENCY)
Facility: HOSPITAL | Age: 44
Discharge: HOME | End: 2019-05-14
Attending: EMERGENCY MEDICINE

## 2019-05-14 VITALS
SYSTOLIC BLOOD PRESSURE: 120 MMHG | OXYGEN SATURATION: 94 % | WEIGHT: 209 LBS | HEART RATE: 80 BPM | HEIGHT: 73 IN | TEMPERATURE: 98.2 F | BODY MASS INDEX: 27.7 KG/M2 | DIASTOLIC BLOOD PRESSURE: 95 MMHG | RESPIRATION RATE: 18 BRPM

## 2019-05-14 DIAGNOSIS — J44.1 COPD WITH ACUTE EXACERBATION (HCC): Primary | ICD-10-CM

## 2019-05-14 LAB
A-A DO2: ABNORMAL MMHG
ALBUMIN SERPL-MCNC: 4.2 G/DL (ref 3.5–5)
ALBUMIN/GLOB SERPL: 1.3 G/DL (ref 1–2)
ALP SERPL-CCNC: 118 U/L (ref 38–126)
ALT SERPL W P-5'-P-CCNC: 25 U/L (ref 13–69)
ANION GAP SERPL CALCULATED.3IONS-SCNC: 13.7 MMOL/L (ref 10–20)
ARTERIAL PATENCY WRIST A: POSITIVE
AST SERPL-CCNC: 27 U/L (ref 15–46)
ATMOSPHERIC PRESS: 735 MMHG
BASE EXCESS BLDA CALC-SCNC: 3.1 MMOL/L (ref 0–2)
BASOPHILS # BLD AUTO: 0.08 10*3/MM3 (ref 0–0.2)
BASOPHILS NFR BLD AUTO: 0.7 % (ref 0–1.5)
BDY SITE: ABNORMAL
BILIRUB SERPL-MCNC: 0.3 MG/DL (ref 0.2–1.3)
BUN BLD-MCNC: 10 MG/DL (ref 7–20)
BUN/CREAT SERPL: 12.5 (ref 6.3–21.9)
CALCIUM SPEC-SCNC: 8.9 MG/DL (ref 8.4–10.2)
CHLORIDE SERPL-SCNC: 100 MMOL/L (ref 98–107)
CO2 SERPL-SCNC: 29 MMOL/L (ref 26–30)
COHGB MFR BLD: <0.6 % (ref 0–2)
CREAT BLD-MCNC: 0.8 MG/DL (ref 0.6–1.3)
DEPRECATED RDW RBC AUTO: 38.4 FL (ref 37–54)
EOSINOPHIL # BLD AUTO: 0.88 10*3/MM3 (ref 0–0.4)
EOSINOPHIL NFR BLD AUTO: 7.6 % (ref 0.3–6.2)
ERYTHROCYTE [DISTWIDTH] IN BLOOD BY AUTOMATED COUNT: 12.7 % (ref 12.3–15.4)
GAS FLOW AIRWAY: 1.5 LPM
GFR SERPL CREATININE-BSD FRML MDRD: 105 ML/MIN/1.73
GLOBULIN UR ELPH-MCNC: 3.3 GM/DL
GLUCOSE BLD-MCNC: 113 MG/DL (ref 74–98)
HCO3 BLDA-SCNC: 28.7 MMOL/L (ref 22–28)
HCT VFR BLD AUTO: 43.1 % (ref 37.5–51)
HCT VFR BLD CALC: 44.1 %
HGB BLD-MCNC: 14.4 G/DL (ref 13–17.7)
HGB BLDA-MCNC: 14.4 G/DL (ref 12–18)
HOLD SPECIMEN: NORMAL
HOROWITZ INDEX BLD+IHG-RTO: 26 %
IMM GRANULOCYTES # BLD AUTO: 0.03 10*3/MM3 (ref 0–0.05)
IMM GRANULOCYTES NFR BLD AUTO: 0.3 % (ref 0–0.5)
LYMPHOCYTES # BLD AUTO: 2.3 10*3/MM3 (ref 0.7–3.1)
LYMPHOCYTES NFR BLD AUTO: 19.8 % (ref 19.6–45.3)
MCH RBC QN AUTO: 27.7 PG (ref 26.6–33)
MCHC RBC AUTO-ENTMCNC: 33.4 G/DL (ref 31.5–35.7)
MCV RBC AUTO: 82.9 FL (ref 79–97)
METHGB BLD QL: 0.8 % (ref 0–1.5)
MODALITY: ABNORMAL
MONOCYTES # BLD AUTO: 0.85 10*3/MM3 (ref 0.1–0.9)
MONOCYTES NFR BLD AUTO: 7.3 % (ref 5–12)
NEUTROPHILS # BLD AUTO: 7.48 10*3/MM3 (ref 1.7–7)
NEUTROPHILS NFR BLD AUTO: 64.3 % (ref 42.7–76)
NOTE: ABNORMAL
NRBC BLD AUTO-RTO: 0 /100 WBC (ref 0–0.2)
NT-PROBNP SERPL-MCNC: 38.7 PG/ML (ref 0–125)
OXYHGB MFR BLDV: 94.2 % (ref 94–99)
PCO2 BLDA: 46.6 MM HG (ref 35–45)
PCO2 TEMP ADJ BLD: ABNORMAL MM HG (ref 35–48)
PH BLDA: 7.4 PH UNITS (ref 7.3–7.5)
PH, TEMP CORRECTED: ABNORMAL PH UNITS
PLATELET # BLD AUTO: 271 10*3/MM3 (ref 140–450)
PMV BLD AUTO: 9.2 FL (ref 6–12)
PO2 BLDA: 74.8 MM HG (ref 75–100)
PO2 TEMP ADJ BLD: ABNORMAL MM HG (ref 83–108)
POTASSIUM BLD-SCNC: 3.7 MMOL/L (ref 3.5–5.1)
PROT SERPL-MCNC: 7.5 G/DL (ref 6.3–8.2)
RBC # BLD AUTO: 5.2 10*6/MM3 (ref 4.14–5.8)
SAO2 % BLDCOA: 95.2 % (ref 94–100)
SODIUM BLD-SCNC: 139 MMOL/L (ref 137–145)
TROPONIN I SERPL-MCNC: <0.012 NG/ML (ref 0–0.03)
VENTILATOR MODE: ABNORMAL
WBC NRBC COR # BLD: 11.62 10*3/MM3 (ref 3.4–10.8)
WHOLE BLOOD HOLD SPECIMEN: NORMAL
WHOLE BLOOD HOLD SPECIMEN: NORMAL

## 2019-05-14 PROCEDURE — 99284 EMERGENCY DEPT VISIT MOD MDM: CPT

## 2019-05-14 PROCEDURE — 25010000002 METHYLPREDNISOLONE PER 125 MG: Performed by: PHYSICIAN ASSISTANT

## 2019-05-14 PROCEDURE — 94640 AIRWAY INHALATION TREATMENT: CPT

## 2019-05-14 PROCEDURE — 82805 BLOOD GASES W/O2 SATURATION: CPT

## 2019-05-14 PROCEDURE — 93005 ELECTROCARDIOGRAM TRACING: CPT | Performed by: EMERGENCY MEDICINE

## 2019-05-14 PROCEDURE — 80053 COMPREHEN METABOLIC PANEL: CPT | Performed by: EMERGENCY MEDICINE

## 2019-05-14 PROCEDURE — 94799 UNLISTED PULMONARY SVC/PX: CPT

## 2019-05-14 PROCEDURE — 96374 THER/PROPH/DIAG INJ IV PUSH: CPT

## 2019-05-14 PROCEDURE — 85025 COMPLETE CBC W/AUTO DIFF WBC: CPT | Performed by: EMERGENCY MEDICINE

## 2019-05-14 PROCEDURE — 82375 ASSAY CARBOXYHB QUANT: CPT

## 2019-05-14 PROCEDURE — 83880 ASSAY OF NATRIURETIC PEPTIDE: CPT | Performed by: EMERGENCY MEDICINE

## 2019-05-14 PROCEDURE — 36600 WITHDRAWAL OF ARTERIAL BLOOD: CPT

## 2019-05-14 PROCEDURE — 63710000001 PREDNISONE PER 1 MG: Performed by: PHYSICIAN ASSISTANT

## 2019-05-14 PROCEDURE — 84484 ASSAY OF TROPONIN QUANT: CPT | Performed by: EMERGENCY MEDICINE

## 2019-05-14 PROCEDURE — 71046 X-RAY EXAM CHEST 2 VIEWS: CPT

## 2019-05-14 PROCEDURE — 83050 HGB METHEMOGLOBIN QUAN: CPT

## 2019-05-14 RX ORDER — PREDNISONE 20 MG/1
40 TABLET ORAL ONCE
Status: COMPLETED | OUTPATIENT
Start: 2019-05-14 | End: 2019-05-14

## 2019-05-14 RX ORDER — IPRATROPIUM BROMIDE AND ALBUTEROL SULFATE 2.5; .5 MG/3ML; MG/3ML
3 SOLUTION RESPIRATORY (INHALATION) ONCE
Status: COMPLETED | OUTPATIENT
Start: 2019-05-14 | End: 2019-05-14

## 2019-05-14 RX ORDER — SODIUM CHLORIDE 0.9 % (FLUSH) 0.9 %
10 SYRINGE (ML) INJECTION AS NEEDED
Status: DISCONTINUED | OUTPATIENT
Start: 2019-05-14 | End: 2019-05-14 | Stop reason: HOSPADM

## 2019-05-14 RX ORDER — AZITHROMYCIN 250 MG/1
TABLET, FILM COATED ORAL
Qty: 6 TABLET | Refills: 0 | Status: ON HOLD | OUTPATIENT
Start: 2019-05-14 | End: 2019-06-17

## 2019-05-14 RX ORDER — METHYLPREDNISOLONE SODIUM SUCCINATE 125 MG/2ML
125 INJECTION, POWDER, LYOPHILIZED, FOR SOLUTION INTRAMUSCULAR; INTRAVENOUS ONCE
Status: COMPLETED | OUTPATIENT
Start: 2019-05-14 | End: 2019-05-14

## 2019-05-14 RX ORDER — PREDNISONE 20 MG/1
20 TABLET ORAL 2 TIMES DAILY
Qty: 10 TABLET | Refills: 0 | Status: ON HOLD | OUTPATIENT
Start: 2019-05-14 | End: 2019-06-17

## 2019-05-14 RX ADMIN — IPRATROPIUM BROMIDE AND ALBUTEROL SULFATE 3 ML: .5; 3 SOLUTION RESPIRATORY (INHALATION) at 14:07

## 2019-05-14 RX ADMIN — PREDNISONE 40 MG: 20 TABLET ORAL at 15:45

## 2019-05-14 RX ADMIN — METHYLPREDNISOLONE SODIUM SUCCINATE 125 MG: 125 INJECTION, POWDER, FOR SOLUTION INTRAMUSCULAR; INTRAVENOUS at 14:04

## 2019-05-14 RX ADMIN — IPRATROPIUM BROMIDE AND ALBUTEROL SULFATE 3 ML: .5; 3 SOLUTION RESPIRATORY (INHALATION) at 14:54

## 2019-05-14 NOTE — ED PROVIDER NOTES
Subjective   Patient is here with complaint of some shortness of air symptoms for the past 3 days, some wheezes he reports, he does have oxygen if he needs it at home does endorse history of COPD no fever chills no chest pain no nausea vomiting reported no abdominal pain reported presents here for further evaluation        History provided by:  Patient      Review of Systems   Constitutional: Negative.    HENT: Negative.    Eyes: Negative.    Respiratory: Positive for cough and shortness of breath.    Cardiovascular: Negative.  Negative for leg swelling.   Gastrointestinal: Negative.    Genitourinary: Negative.    Musculoskeletal: Positive for arthralgias.   Skin: Negative.    Neurological: Negative.    Psychiatric/Behavioral: Negative.    All other systems reviewed and are negative.      Past Medical History:   Diagnosis Date   • Abdominal adhesions    • Asthma    • Cervical radiculopathy    • Colitis    • Colitis    • H/O chest x-ray 04/14/2016    No active disease   • H/O chest x-ray 03/28/2016    No active disease by portable imaging   • H/O chest x-ray 03/12/2016    No acute cardiopulmonary process   • Headache    • Heart attack (CMS/HCC)    • History of recreational drug use    • Kidney cysts    • Left hip pain    • Lesion of oral mucosa    • Liver disease    • Low back pain    • Obstructive chronic bronchitis with exacerbation (CMS/HCC)    • Sleep apnea     mild       Allergies   Allergen Reactions   • Doxycycline Nausea And Vomiting       Past Surgical History:   Procedure Laterality Date   • BACK SURGERY     • HERNIA REPAIR     • SMALL INTESTINE SURGERY      Small bowell resection   • TOTAL HIP ARTHROPLASTY Left        Family History   Problem Relation Age of Onset   • Arthritis Other    • Hypertension Other    • Migraines Other    • Heart attack Other    • Stroke Other        Social History     Socioeconomic History   • Marital status:      Spouse name: Not on file   • Number of children: Not on  file   • Years of education: Not on file   • Highest education level: Not on file   Tobacco Use   • Smoking status: Former Smoker   • Smokeless tobacco: Current User     Types: Chew   • Tobacco comment: quit 2005   Substance and Sexual Activity   • Alcohol use: No     Comment: stopped drinking alcohol   • Drug use: No     Comment: takes suboxone           Objective   Physical Exam   Constitutional: He is oriented to person, place, and time. He appears well-developed and well-nourished. No distress.   Afebrile nontoxic no acute distress   HENT:   Head: Normocephalic and atraumatic.   Mouth/Throat: Oropharynx is clear and moist. No oropharyngeal exudate or posterior oropharyngeal edema.   Posterior pharynx clear uvula midline no airway compromise   Eyes: Conjunctivae and EOM are normal. Pupils are equal, round, and reactive to light.   Neck: Normal range of motion. Neck supple. No tracheal deviation present.   Cardiovascular: Regular rhythm and intact distal pulses.   Pulmonary/Chest: Effort normal. He has wheezes.   Abdominal: Soft. There is no tenderness.   Musculoskeletal: Normal range of motion. He exhibits no edema.        Right lower leg: He exhibits no edema.        Left lower leg: He exhibits no edema.   Lymphadenopathy:     He has no cervical adenopathy.   Neurological: He is alert and oriented to person, place, and time. No cranial nerve deficit or sensory deficit. He exhibits normal muscle tone. Coordination normal.   Skin: Skin is warm and dry. Capillary refill takes less than 2 seconds. No erythema.   Psychiatric: He has a normal mood and affect. His behavior is normal. Judgment and thought content normal.   Nursing note and vitals reviewed.      Procedures           ED Course  ED Course as of May 14 1610   Tue May 14, 2019   1402 EKG interpreted by me reveals sinus rhythm with a rate of 83 bpm.  There are no acute ST segment or T wave changes.  This is a normal-appearing EKG.  [TB]   1445 Patient sitting  up states he does feel better examination he does have some slight expiratory wheezes improved from initial evaluation  [SC]   1512 Chest film per radiology unremarkable no acute cardiopulmonary process  [SC]   1555 Patient feels much better resting comfortably no distress patient eating and drinking fluids we will plan on discharge home strict return to care precautions follow-up with PCP return here for any sudden changes worsening symptoms or concerns  [SC]      ED Course User Index  [SC] Rod Saunders PA-C  [TB] Dinorah Garcia MD                  MDM  Number of Diagnoses or Management Options     Amount and/or Complexity of Data Reviewed  Review and summarize past medical records: yes  Discuss the patient with other providers: yes    Risk of Complications, Morbidity, and/or Mortality  Presenting problems: moderate  Diagnostic procedures: low  Management options: moderate          Final diagnoses:   COPD with acute exacerbation (CMS/[facility])            Rod Saunders PA-C  05/14/19 1603       Rod Saunders PA-C  05/14/19 1610

## 2019-05-14 NOTE — ED NOTES
[facility] CALLED FOR BLOOD GAS AND NEB TX.      Shanell Ruiz, RN  05/14/19 5169

## 2019-06-17 ENCOUNTER — APPOINTMENT (OUTPATIENT)
Dept: CT IMAGING | Facility: HOSPITAL | Age: 44
End: 2019-06-17

## 2019-06-17 ENCOUNTER — HOSPITAL ENCOUNTER (INPATIENT)
Facility: HOSPITAL | Age: 44
LOS: 3 days | Discharge: HOME | End: 2019-06-20
Attending: STUDENT IN AN ORGANIZED HEALTH CARE EDUCATION/TRAINING PROGRAM | Admitting: INTERNAL MEDICINE

## 2019-06-17 DIAGNOSIS — K43.9 HERNIA OF ANTERIOR ABDOMINAL WALL: ICD-10-CM

## 2019-06-17 DIAGNOSIS — K56.690 OTHER PARTIAL INTESTINAL OBSTRUCTION (HCC): Primary | ICD-10-CM

## 2019-06-17 LAB
ALBUMIN SERPL-MCNC: 4.2 G/DL (ref 3.5–5)
ALBUMIN/GLOB SERPL: 1.4 G/DL (ref 1–2)
ALP SERPL-CCNC: 85 U/L (ref 38–126)
ALT SERPL W P-5'-P-CCNC: 34 U/L (ref 13–69)
ANION GAP SERPL CALCULATED.3IONS-SCNC: 13.9 MMOL/L (ref 10–20)
AST SERPL-CCNC: 31 U/L (ref 15–46)
BASOPHILS # BLD AUTO: 0.07 10*3/MM3 (ref 0–0.2)
BASOPHILS NFR BLD AUTO: 0.9 % (ref 0–1.5)
BILIRUB SERPL-MCNC: 0.3 MG/DL (ref 0.2–1.3)
BILIRUB UR QL STRIP: NEGATIVE
BUN BLD-MCNC: 10 MG/DL (ref 7–20)
BUN/CREAT SERPL: 12.5 (ref 6.3–21.9)
CALCIUM SPEC-SCNC: 8.6 MG/DL (ref 8.4–10.2)
CHLORIDE SERPL-SCNC: 102 MMOL/L (ref 98–107)
CLARITY UR: CLEAR
CO2 SERPL-SCNC: 25 MMOL/L (ref 26–30)
COLOR UR: YELLOW
CREAT BLD-MCNC: 0.8 MG/DL (ref 0.6–1.3)
DEPRECATED RDW RBC AUTO: 39.9 FL (ref 37–54)
EOSINOPHIL # BLD AUTO: 0.53 10*3/MM3 (ref 0–0.4)
EOSINOPHIL NFR BLD AUTO: 6.5 % (ref 0.3–6.2)
ERYTHROCYTE [DISTWIDTH] IN BLOOD BY AUTOMATED COUNT: 13.2 % (ref 12.3–15.4)
GFR SERPL CREATININE-BSD FRML MDRD: 105 ML/MIN/1.73
GLOBULIN UR ELPH-MCNC: 3 GM/DL
GLUCOSE BLD-MCNC: 101 MG/DL (ref 74–98)
GLUCOSE UR STRIP-MCNC: NEGATIVE MG/DL
HCT VFR BLD AUTO: 41.8 % (ref 37.5–51)
HGB BLD-MCNC: 13.5 G/DL (ref 13–17.7)
HGB UR QL STRIP.AUTO: NEGATIVE
IMM GRANULOCYTES # BLD AUTO: 0.02 10*3/MM3 (ref 0–0.05)
IMM GRANULOCYTES NFR BLD AUTO: 0.2 % (ref 0–0.5)
KETONES UR QL STRIP: NEGATIVE
LEUKOCYTE ESTERASE UR QL STRIP.AUTO: NEGATIVE
LIPASE SERPL-CCNC: 39 U/L (ref 23–300)
LYMPHOCYTES # BLD AUTO: 1.97 10*3/MM3 (ref 0.7–3.1)
LYMPHOCYTES NFR BLD AUTO: 24.2 % (ref 19.6–45.3)
MCH RBC QN AUTO: 26.7 PG (ref 26.6–33)
MCHC RBC AUTO-ENTMCNC: 32.3 G/DL (ref 31.5–35.7)
MCV RBC AUTO: 82.8 FL (ref 79–97)
MONOCYTES # BLD AUTO: 0.72 10*3/MM3 (ref 0.1–0.9)
MONOCYTES NFR BLD AUTO: 8.9 % (ref 5–12)
NEUTROPHILS # BLD AUTO: 4.82 10*3/MM3 (ref 1.7–7)
NEUTROPHILS NFR BLD AUTO: 59.3 % (ref 42.7–76)
NITRITE UR QL STRIP: NEGATIVE
NRBC BLD AUTO-RTO: 0 /100 WBC (ref 0–0.2)
PH UR STRIP.AUTO: 6.5 [PH] (ref 5–8)
PLATELET # BLD AUTO: 258 10*3/MM3 (ref 140–450)
PMV BLD AUTO: 9.2 FL (ref 6–12)
POTASSIUM BLD-SCNC: 3.9 MMOL/L (ref 3.5–5.1)
PROT SERPL-MCNC: 7.2 G/DL (ref 6.3–8.2)
PROT UR QL STRIP: NEGATIVE
RBC # BLD AUTO: 5.05 10*6/MM3 (ref 4.14–5.8)
SODIUM BLD-SCNC: 137 MMOL/L (ref 137–145)
SP GR UR STRIP: 1.01 (ref 1–1.03)
UROBILINOGEN UR QL STRIP: NORMAL
WBC NRBC COR # BLD: 8.13 10*3/MM3 (ref 3.4–10.8)

## 2019-06-17 PROCEDURE — 74177 CT ABD & PELVIS W/CONTRAST: CPT

## 2019-06-17 PROCEDURE — 80053 COMPREHEN METABOLIC PANEL: CPT | Performed by: PHYSICIAN ASSISTANT

## 2019-06-17 PROCEDURE — 99221 1ST HOSP IP/OBS SF/LOW 40: CPT | Performed by: SURGERY

## 2019-06-17 PROCEDURE — 83690 ASSAY OF LIPASE: CPT | Performed by: PHYSICIAN ASSISTANT

## 2019-06-17 PROCEDURE — 25010000002 MORPHINE PER 10 MG: Performed by: INTERNAL MEDICINE

## 2019-06-17 PROCEDURE — 85025 COMPLETE CBC W/AUTO DIFF WBC: CPT | Performed by: PHYSICIAN ASSISTANT

## 2019-06-17 PROCEDURE — 25010000002 MORPHINE PER 10 MG: Performed by: STUDENT IN AN ORGANIZED HEALTH CARE EDUCATION/TRAINING PROGRAM

## 2019-06-17 PROCEDURE — 25010000002 ONDANSETRON PER 1 MG: Performed by: PHYSICIAN ASSISTANT

## 2019-06-17 PROCEDURE — 25010000002 IOPAMIDOL 61 % SOLUTION: Performed by: STUDENT IN AN ORGANIZED HEALTH CARE EDUCATION/TRAINING PROGRAM

## 2019-06-17 PROCEDURE — 81003 URINALYSIS AUTO W/O SCOPE: CPT | Performed by: PHYSICIAN ASSISTANT

## 2019-06-17 PROCEDURE — 99223 1ST HOSP IP/OBS HIGH 75: CPT | Performed by: INTERNAL MEDICINE

## 2019-06-17 PROCEDURE — 99284 EMERGENCY DEPT VISIT MOD MDM: CPT

## 2019-06-17 RX ORDER — PROMETHAZINE HYDROCHLORIDE 25 MG/ML
12.5 INJECTION, SOLUTION INTRAMUSCULAR; INTRAVENOUS EVERY 6 HOURS PRN
Status: DISCONTINUED | OUTPATIENT
Start: 2019-06-17 | End: 2019-06-20 | Stop reason: HOSPADM

## 2019-06-17 RX ORDER — PROMETHAZINE HYDROCHLORIDE 12.5 MG/1
12.5 TABLET ORAL EVERY 6 HOURS PRN
Status: DISCONTINUED | OUTPATIENT
Start: 2019-06-17 | End: 2019-06-20 | Stop reason: HOSPADM

## 2019-06-17 RX ORDER — SODIUM CHLORIDE 0.9 % (FLUSH) 0.9 %
10 SYRINGE (ML) INJECTION AS NEEDED
Status: DISCONTINUED | OUTPATIENT
Start: 2019-06-17 | End: 2019-06-20 | Stop reason: HOSPADM

## 2019-06-17 RX ORDER — PROMETHAZINE HYDROCHLORIDE 25 MG/1
12.5 SUPPOSITORY RECTAL EVERY 6 HOURS PRN
Status: DISCONTINUED | OUTPATIENT
Start: 2019-06-17 | End: 2019-06-20 | Stop reason: HOSPADM

## 2019-06-17 RX ORDER — FLUTICASONE PROPIONATE 50 MCG
2 SPRAY, SUSPENSION (ML) NASAL DAILY
Status: DISCONTINUED | OUTPATIENT
Start: 2019-06-18 | End: 2019-06-20 | Stop reason: HOSPADM

## 2019-06-17 RX ORDER — BUDESONIDE AND FORMOTEROL FUMARATE DIHYDRATE 80; 4.5 UG/1; UG/1
2 AEROSOL RESPIRATORY (INHALATION)
Status: DISCONTINUED | OUTPATIENT
Start: 2019-06-17 | End: 2019-06-20 | Stop reason: HOSPADM

## 2019-06-17 RX ORDER — ESOMEPRAZOLE MAGNESIUM 40 MG/1
40 CAPSULE, DELAYED RELEASE ORAL
COMMUNITY
End: 2022-03-02 | Stop reason: ALTCHOICE

## 2019-06-17 RX ORDER — MORPHINE SULFATE 2 MG/ML
2 INJECTION, SOLUTION INTRAMUSCULAR; INTRAVENOUS EVERY 4 HOURS PRN
Status: DISCONTINUED | OUTPATIENT
Start: 2019-06-17 | End: 2019-06-20 | Stop reason: HOSPADM

## 2019-06-17 RX ORDER — BISACODYL 10 MG
10 SUPPOSITORY, RECTAL RECTAL DAILY PRN
Status: DISCONTINUED | OUTPATIENT
Start: 2019-06-17 | End: 2019-06-20 | Stop reason: HOSPADM

## 2019-06-17 RX ORDER — NALOXONE HCL 0.4 MG/ML
0.4 VIAL (ML) INJECTION
Status: DISCONTINUED | OUTPATIENT
Start: 2019-06-17 | End: 2019-06-20 | Stop reason: HOSPADM

## 2019-06-17 RX ORDER — SODIUM CHLORIDE 0.9 % (FLUSH) 0.9 %
3 SYRINGE (ML) INJECTION EVERY 12 HOURS SCHEDULED
Status: DISCONTINUED | OUTPATIENT
Start: 2019-06-17 | End: 2019-06-20 | Stop reason: HOSPADM

## 2019-06-17 RX ORDER — SODIUM CHLORIDE 0.9 % (FLUSH) 0.9 %
3-10 SYRINGE (ML) INJECTION AS NEEDED
Status: DISCONTINUED | OUTPATIENT
Start: 2019-06-17 | End: 2019-06-20 | Stop reason: HOSPADM

## 2019-06-17 RX ORDER — MORPHINE SULFATE 4 MG/ML
4 INJECTION, SOLUTION INTRAMUSCULAR; INTRAVENOUS ONCE
Status: COMPLETED | OUTPATIENT
Start: 2019-06-17 | End: 2019-06-17

## 2019-06-17 RX ORDER — PANTOPRAZOLE SODIUM 40 MG/10ML
40 INJECTION, POWDER, LYOPHILIZED, FOR SOLUTION INTRAVENOUS
Status: DISCONTINUED | OUTPATIENT
Start: 2019-06-18 | End: 2019-06-20 | Stop reason: HOSPADM

## 2019-06-17 RX ORDER — SODIUM CHLORIDE, SODIUM LACTATE, POTASSIUM CHLORIDE, CALCIUM CHLORIDE 600; 310; 30; 20 MG/100ML; MG/100ML; MG/100ML; MG/100ML
125 INJECTION, SOLUTION INTRAVENOUS CONTINUOUS
Status: DISCONTINUED | OUTPATIENT
Start: 2019-06-17 | End: 2019-06-20 | Stop reason: HOSPADM

## 2019-06-17 RX ORDER — HYDRALAZINE HYDROCHLORIDE 20 MG/ML
20 INJECTION INTRAMUSCULAR; INTRAVENOUS EVERY 6 HOURS PRN
Status: DISCONTINUED | OUTPATIENT
Start: 2019-06-17 | End: 2019-06-20 | Stop reason: HOSPADM

## 2019-06-17 RX ORDER — ONDANSETRON 2 MG/ML
4 INJECTION INTRAMUSCULAR; INTRAVENOUS ONCE
Status: COMPLETED | OUTPATIENT
Start: 2019-06-17 | End: 2019-06-17

## 2019-06-17 RX ORDER — ALBUTEROL SULFATE 2.5 MG/3ML
2.5 SOLUTION RESPIRATORY (INHALATION) EVERY 4 HOURS PRN
Status: DISCONTINUED | OUTPATIENT
Start: 2019-06-17 | End: 2019-06-20 | Stop reason: HOSPADM

## 2019-06-17 RX ADMIN — SODIUM CHLORIDE 1000 ML: 9 INJECTION, SOLUTION INTRAVENOUS at 16:46

## 2019-06-17 RX ADMIN — ONDANSETRON 4 MG: 2 INJECTION INTRAMUSCULAR; INTRAVENOUS at 16:47

## 2019-06-17 RX ADMIN — SODIUM CHLORIDE, PRESERVATIVE FREE 3 ML: 5 INJECTION INTRAVENOUS at 22:09

## 2019-06-17 RX ADMIN — SODIUM CHLORIDE, POTASSIUM CHLORIDE, SODIUM LACTATE AND CALCIUM CHLORIDE 125 ML/HR: 600; 310; 30; 20 INJECTION, SOLUTION INTRAVENOUS at 22:09

## 2019-06-17 RX ADMIN — MORPHINE SULFATE 4 MG: 4 INJECTION INTRAVENOUS at 16:50

## 2019-06-17 RX ADMIN — MORPHINE SULFATE 2 MG: 2 INJECTION, SOLUTION INTRAMUSCULAR; INTRAVENOUS at 22:09

## 2019-06-17 RX ADMIN — MORPHINE SULFATE 4 MG: 4 INJECTION INTRAVENOUS at 18:40

## 2019-06-17 RX ADMIN — IOPAMIDOL 100 ML: 612 INJECTION, SOLUTION INTRAVENOUS at 17:56

## 2019-06-17 NOTE — ED NOTES
Contacted [facility] for surgery at this time, the call was transferred to DARA Bhandari Chelsea  06/17/19 5596

## 2019-06-17 NOTE — ED PROVIDER NOTES
Subjective   Patient is a 43-year-old male with a history of abdominal adhesions, small bowel obstructions, colitis, MI, liver disease, COPD, and low back pain presenting to the ER for evaluation of abdominal pain.  He states that he has been having stabbing right lower quadrant pain for the past 2 weeks.  Associated symptoms include nausea and dry heaving.  He states that he has had difficulty with bowel movements and has to take a lot of laxatives before he passes stool.  He states his last bowel movement was last night was hard in nature.  He states he has been taking 4-6 over-the-counter laxatives as well as stool softeners and eating prunes to help.  He denies any blood in the bowel movements.  He states the pain is worse when there is pressure on the stomach, denies any alleviating factors.  He states that he had surgery approximately 4 weeks ago on his shoulder but is not currently taking any narcotic medication at home.  He denies any known fever or chills, headache, chest pain, difficulty breathing, cough, emesis, hematemesis, melena or hematochezia, dysuria, hematuria, or any other symptoms.             Review of Systems   All other systems reviewed and are negative.      Past Medical History:   Diagnosis Date   • Abdominal adhesions    • Asthma    • Cervical radiculopathy    • Colitis    • Colitis    • H/O chest x-ray 04/14/2016    No active disease   • H/O chest x-ray 03/28/2016    No active disease by portable imaging   • H/O chest x-ray 03/12/2016    No acute cardiopulmonary process   • Headache    • Heart attack (CMS/HCC)    • History of recreational drug use    • Kidney cysts    • Left hip pain    • Lesion of oral mucosa    • Liver disease    • Low back pain    • Obstructive chronic bronchitis with exacerbation (CMS/HCC)    • Sleep apnea     mild       Allergies   Allergen Reactions   • Doxycycline Nausea And Vomiting       Past Surgical History:   Procedure Laterality Date   • BACK SURGERY     •  HERNIA REPAIR     • SHOULDER LIGAMENT REPAIR      right shoulder   • SMALL INTESTINE SURGERY      Small bowell resection   • TOTAL HIP ARTHROPLASTY Left        Family History   Problem Relation Age of Onset   • Arthritis Other    • Hypertension Other    • Migraines Other    • Heart attack Other    • Stroke Other        Social History     Socioeconomic History   • Marital status:      Spouse name: Not on file   • Number of children: Not on file   • Years of education: Not on file   • Highest education level: Not on file   Tobacco Use   • Smoking status: Former Smoker   • Smokeless tobacco: Current User     Types: Chew   • Tobacco comment: quit 2005   Substance and Sexual Activity   • Alcohol use: No     Comment: stopped drinking alcohol   • Drug use: No     Comment: takes suboxone           Objective   Physical Exam   Nursing note and vitals reviewed.    GEN: No acute distress, sitting upright in stretcher.  He is awake and alert.  He does not appear toxic.  Head: Normocephalic, atraumatic  Eyes: Pupils equal round reactive to light, EOM intact  ENT: Posterior pharynx normal in appearance, oral mucosa is moist, tongue midline.  Chest: Nontender to palpation  Cardiovascular: Regular rate and rhythm   Lungs: Clear to auscultation bilaterally without adventitious sounds,  Abdomen: Large but nondistended.  There is midline scar noted to his abdomen from prior surgeries.  Bowel sounds present all quadrants.  Soft, mildly tender to palpation lateral to the umbilicus on the right side, no guarding or rebound.  Extremities: No edema, normal appearance, full ROM.  Patient has a sling on his right upper extremity.  Radial pulses are 2+.  Posterior tibialis pulses are 2+ and equal.  Neuro: GCS 15  Psych: Mood and affect are appropriate    Procedures           ED Course  ED Course as of Jun 18 0033 Mon Jun 17, 2019   1735 Patient resting comfortably in the stretcher awaiting CT scan. States medication helped with pain.   [LA]   1823 FINAL REPORT    TECHNIQUE:  Routine axial images through the abdomen and pelvis were  obtained following IV and oral contrast administration.    CLINICAL HISTORY:  RLQ pain, nausea, history of bowel obstructions    FINDINGS:  Abdomen: Patient is status post cholecystectomy.  The solid  abdominal organs and ureters are unremarkable.  There are  postoperative changes involving mid small bowel with mild  dilatation of several loops and herniation through the right  anterior lower abdominal wall.  Pelvis: The urinary bladder is  obscured secondary to artifact from bilateral hip arthroplasty.  There is no pelvic or abdominal ascites, adenopathy or acute  osseous abnormality.  Impression     Mid small bowel obstruction, possibly partial, secondary to  lower right anterior abdominal wall hernia.      [LA]   1829 Spoke with Dr. Gomez and he thinks that since the patient has had multiple mesh surgeries at  in the past, it would be better to contact them regarding this obstruction and hernia.  [LA]   1850 Spoke with Dr. Conklin with  Surgery who accepted the patient but they are currently on divert; give that it is not a surgical emergency at this time, will place him on the list for a bed.    [LA]   1925 Spoke with Dr. Bob who does not feel comfortable admitting the patient with no surgical back up in case it becomes emergent.   [LA]   1937 Spoke with Dr. Gomez who states he would be available for consult in case the obstruction became emergent.   [LA]   1953 Dr. Gomez states that he will be available and will come consult the patient at bedside likely this evening, if not first thing in the morning. Consult mary Bob accepted patient for admission to [facility] surgery floor.  [LA]      ED Course User Index  [LA] Elisabet Dillard PA-C                  MDM  Number of Diagnoses or Management Options  Hernia of anterior abdominal wall:   Other partial intestinal obstruction (CMS/HCC):   Diagnosis  management comments: On arrival, patient is afebrile, no acute distress, nontoxic in appearance.  Differential includes bowel obstruction, bowel perforation, constipation, gastritis, colitis, appendicitis, and other concerns.  Lower concern for nephrolithiasis, UTI.  Will obtain basic labs including urine and obtain a CT scan to further evaluate the pain.  Will also give IV fluids, pain and nausea medicine.    Lab work unremarkable.  Patient's pain has improved with morphine.  Urine without signs of infection or hematuria.  CT scan revealed what appears to be a partial mid bowel obstruction secondary to an anterior wall hernia.  Given the patient's previous hernia surgeries have been performed at ,  general surgery was contacted.  Spoke with Dr. Conklin who states that they were currently on divert and given that patient's labs and vitals are stable, not considered a surgical emergency they can accept for transfer at this time, but he was placed on a wait list for an inpatient bed at .  Spoke with Dr. Bob and Dr. Gomez who decided they will admit the patient for pain control while awaiting bed at .  Patient is in agreement with this plan       Amount and/or Complexity of Data Reviewed  Clinical lab tests: reviewed and ordered  Tests in the radiology section of CPT®: reviewed and ordered  Discussion of test results with the performing providers: yes  Review and summarize past medical records: yes  Discuss the patient with other providers: yes  Independent visualization of images, tracings, or specimens: yes    Risk of Complications, Morbidity, and/or Mortality  Presenting problems: moderate  Diagnostic procedures: moderate  Management options: moderate    Patient Progress  Patient progress: stable        Final diagnoses:   Other partial intestinal obstruction (CMS/HCC)   Hernia of anterior abdominal wall            Elisabet Dillard PA-C  06/18/19 0033

## 2019-06-17 NOTE — ED NOTES
Two unsuccessful IV attempts (20 ga left hand and left AC). Bonny RN at bedside to attempt access.      Savanah Hurtado, RN  06/17/19 4395

## 2019-06-18 LAB
ALBUMIN SERPL-MCNC: 4 G/DL (ref 3.5–5)
ALBUMIN/GLOB SERPL: 1.4 G/DL (ref 1–2)
ALP SERPL-CCNC: 97 U/L (ref 38–126)
ALT SERPL W P-5'-P-CCNC: 38 U/L (ref 13–69)
ANION GAP SERPL CALCULATED.3IONS-SCNC: 12.1 MMOL/L (ref 10–20)
AST SERPL-CCNC: 28 U/L (ref 15–46)
BASOPHILS # BLD AUTO: 0.06 10*3/MM3 (ref 0–0.2)
BASOPHILS NFR BLD AUTO: 0.7 % (ref 0–1.5)
BILIRUB SERPL-MCNC: 0.5 MG/DL (ref 0.2–1.3)
BUN BLD-MCNC: 9 MG/DL (ref 7–20)
BUN/CREAT SERPL: 11.3 (ref 6.3–21.9)
CALCIUM SPEC-SCNC: 9.1 MG/DL (ref 8.4–10.2)
CHLORIDE SERPL-SCNC: 104 MMOL/L (ref 98–107)
CO2 SERPL-SCNC: 27 MMOL/L (ref 26–30)
CREAT BLD-MCNC: 0.8 MG/DL (ref 0.6–1.3)
DEPRECATED RDW RBC AUTO: 39.4 FL (ref 37–54)
EOSINOPHIL # BLD AUTO: 0.57 10*3/MM3 (ref 0–0.4)
EOSINOPHIL NFR BLD AUTO: 6.7 % (ref 0.3–6.2)
ERYTHROCYTE [DISTWIDTH] IN BLOOD BY AUTOMATED COUNT: 13.1 % (ref 12.3–15.4)
GFR SERPL CREATININE-BSD FRML MDRD: 105 ML/MIN/1.73
GLOBULIN UR ELPH-MCNC: 2.9 GM/DL
GLUCOSE BLD-MCNC: 88 MG/DL (ref 74–98)
HCT VFR BLD AUTO: 43.3 % (ref 37.5–51)
HGB BLD-MCNC: 14 G/DL (ref 13–17.7)
IMM GRANULOCYTES # BLD AUTO: 0.04 10*3/MM3 (ref 0–0.05)
IMM GRANULOCYTES NFR BLD AUTO: 0.5 % (ref 0–0.5)
LIPASE SERPL-CCNC: 52 U/L (ref 23–300)
LYMPHOCYTES # BLD AUTO: 1.97 10*3/MM3 (ref 0.7–3.1)
LYMPHOCYTES NFR BLD AUTO: 23.3 % (ref 19.6–45.3)
MAGNESIUM SERPL-MCNC: 2 MG/DL (ref 1.6–2.3)
MCH RBC QN AUTO: 26.8 PG (ref 26.6–33)
MCHC RBC AUTO-ENTMCNC: 32.3 G/DL (ref 31.5–35.7)
MCV RBC AUTO: 82.8 FL (ref 79–97)
MONOCYTES # BLD AUTO: 0.85 10*3/MM3 (ref 0.1–0.9)
MONOCYTES NFR BLD AUTO: 10 % (ref 5–12)
NEUTROPHILS # BLD AUTO: 4.97 10*3/MM3 (ref 1.7–7)
NEUTROPHILS NFR BLD AUTO: 58.8 % (ref 42.7–76)
NRBC BLD AUTO-RTO: 0 /100 WBC (ref 0–0.2)
PHOSPHATE SERPL-MCNC: 3.8 MG/DL (ref 2.5–4.5)
PLATELET # BLD AUTO: 266 10*3/MM3 (ref 140–450)
PMV BLD AUTO: 9.2 FL (ref 6–12)
POTASSIUM BLD-SCNC: 4.1 MMOL/L (ref 3.5–5.1)
PROT SERPL-MCNC: 6.9 G/DL (ref 6.3–8.2)
RBC # BLD AUTO: 5.23 10*6/MM3 (ref 4.14–5.8)
SODIUM BLD-SCNC: 139 MMOL/L (ref 137–145)
WBC NRBC COR # BLD: 8.46 10*3/MM3 (ref 3.4–10.8)

## 2019-06-18 PROCEDURE — 94799 UNLISTED PULMONARY SVC/PX: CPT

## 2019-06-18 PROCEDURE — 83690 ASSAY OF LIPASE: CPT | Performed by: INTERNAL MEDICINE

## 2019-06-18 PROCEDURE — 94640 AIRWAY INHALATION TREATMENT: CPT

## 2019-06-18 PROCEDURE — 99231 SBSQ HOSP IP/OBS SF/LOW 25: CPT | Performed by: SURGERY

## 2019-06-18 PROCEDURE — 80053 COMPREHEN METABOLIC PANEL: CPT | Performed by: INTERNAL MEDICINE

## 2019-06-18 PROCEDURE — 87081 CULTURE SCREEN ONLY: CPT | Performed by: INTERNAL MEDICINE

## 2019-06-18 PROCEDURE — 25010000002 MORPHINE PER 10 MG: Performed by: INTERNAL MEDICINE

## 2019-06-18 PROCEDURE — 84100 ASSAY OF PHOSPHORUS: CPT | Performed by: INTERNAL MEDICINE

## 2019-06-18 PROCEDURE — 85025 COMPLETE CBC W/AUTO DIFF WBC: CPT | Performed by: INTERNAL MEDICINE

## 2019-06-18 PROCEDURE — 99232 SBSQ HOSP IP/OBS MODERATE 35: CPT | Performed by: INTERNAL MEDICINE

## 2019-06-18 PROCEDURE — 83735 ASSAY OF MAGNESIUM: CPT | Performed by: INTERNAL MEDICINE

## 2019-06-18 RX ORDER — BUDESONIDE 0.5 MG/2ML
0.5 INHALANT ORAL
Status: DISCONTINUED | OUTPATIENT
Start: 2019-06-18 | End: 2019-06-20 | Stop reason: HOSPADM

## 2019-06-18 RX ADMIN — SODIUM CHLORIDE, PRESERVATIVE FREE 3 ML: 5 INJECTION INTRAVENOUS at 07:49

## 2019-06-18 RX ADMIN — FLUTICASONE PROPIONATE 2 SPRAY: 50 SPRAY, METERED NASAL at 11:46

## 2019-06-18 RX ADMIN — MORPHINE SULFATE 2 MG: 2 INJECTION, SOLUTION INTRAMUSCULAR; INTRAVENOUS at 07:49

## 2019-06-18 RX ADMIN — ALBUTEROL SULFATE 2.5 MG: 2.5 SOLUTION RESPIRATORY (INHALATION) at 13:15

## 2019-06-18 RX ADMIN — BUDESONIDE 0.5 MG: 0.5 INHALANT RESPIRATORY (INHALATION) at 19:57

## 2019-06-18 RX ADMIN — MORPHINE SULFATE 2 MG: 2 INJECTION, SOLUTION INTRAMUSCULAR; INTRAVENOUS at 11:46

## 2019-06-18 RX ADMIN — SODIUM CHLORIDE, POTASSIUM CHLORIDE, SODIUM LACTATE AND CALCIUM CHLORIDE 125 ML/HR: 600; 310; 30; 20 INJECTION, SOLUTION INTRAVENOUS at 06:13

## 2019-06-18 RX ADMIN — MORPHINE SULFATE 2 MG: 2 INJECTION, SOLUTION INTRAMUSCULAR; INTRAVENOUS at 22:54

## 2019-06-18 RX ADMIN — MORPHINE SULFATE 2 MG: 2 INJECTION, SOLUTION INTRAMUSCULAR; INTRAVENOUS at 17:41

## 2019-06-18 RX ADMIN — BUDESONIDE 0.5 MG: 0.5 INHALANT RESPIRATORY (INHALATION) at 13:15

## 2019-06-18 RX ADMIN — MORPHINE SULFATE 2 MG: 2 INJECTION, SOLUTION INTRAMUSCULAR; INTRAVENOUS at 03:13

## 2019-06-18 RX ADMIN — BUDESONIDE AND FORMOTEROL FUMARATE DIHYDRATE 2 PUFF: 80; 4.5 AEROSOL RESPIRATORY (INHALATION) at 07:19

## 2019-06-18 RX ADMIN — ALBUTEROL SULFATE 2.5 MG: 2.5 SOLUTION RESPIRATORY (INHALATION) at 19:56

## 2019-06-18 RX ADMIN — PANTOPRAZOLE SODIUM 40 MG: 40 INJECTION, POWDER, FOR SOLUTION INTRAVENOUS at 05:09

## 2019-06-18 RX ADMIN — BUDESONIDE AND FORMOTEROL FUMARATE DIHYDRATE 2 PUFF: 80; 4.5 AEROSOL RESPIRATORY (INHALATION) at 22:56

## 2019-06-18 RX ADMIN — SODIUM CHLORIDE, POTASSIUM CHLORIDE, SODIUM LACTATE AND CALCIUM CHLORIDE 125 ML/HR: 600; 310; 30; 20 INJECTION, SOLUTION INTRAVENOUS at 22:58

## 2019-06-18 RX ADMIN — ALBUTEROL SULFATE 2.5 MG: 2.5 SOLUTION RESPIRATORY (INHALATION) at 05:45
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
## 2019-06-18 NOTE — ED NOTES
Contacted House Supervisor regarding admission.  She assigned room 412.     Danial April Emily  06/17/19 2013
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
## 2019-06-18 NOTE — PROGRESS NOTES
Discharge Planning Assessment   Rob     Patient Name: Topher Stoll  MRN: [id number]  Today's Date: 6/18/2019    Admit Date: 6/17/2019    Discharge Needs Assessment     Row Name 06/18/19 1030       Living Environment    Lives With  parent(s)    Name(s) of Who Lives With Patient  -- Josee Stoll mother    Current Living Arrangements  home/apartment/condo    Potentially Unsafe Housing Conditions  -- denies porblems    Primary Care Provided by  self    Family Caregiver if Needed  parent(s)    Quality of Family Relationships  supportive    Able to Return to Prior Arrangements  yes       Resource/Environmental Concerns    Resource/Environmental Concerns  none       Transition Planning    Patient/Family Anticipates Transition to  home    Transportation Anticipated  family or friend will provide       Discharge Needs Assessment    Readmission Within the Last 30 Days  no previous admission in last 30 days    Concerns to be Addressed  no discharge needs identified    Equipment Currently Used at Home  oxygen    Equipment Needed After Discharge  none    Discharge Facility/Level of Care Needs  acute [facility]        Discharge Plan     Row Name 06/18/19 1032       Plan    Plan  tranfer to [facility]    Plan Comments  independent of ADL's, lives with parents,  states has oxygen for PRN 2 liters [employer] provider,  waiting on tranfer to [facility]        Destination      No service coordination in this encounter.      Durable Medical Equipment      No service coordination in this encounter.      Dialysis/Infusion      No service coordination in this encounter.      Home Medical Care      No service coordination in this encounter.      Therapy      No service coordination in this encounter.      Community Resources      No service coordination in this encounter.          Demographic Summary     Row Name 06/18/19 1028       General Information    Admission Type  inpatient    Required Notices Provided  Important Message from Medicare     Referral Source  admission list    Reason for Consult  discharge planning    Preferred Language  English        Functional Status     Row Name 06/18/19 1028       Functional Status    Usual Activity Tolerance  good    Current Activity Tolerance  moderate       Functional Status, IADL    Medications  independent    Meal Preparation  independent    Housekeeping  independent    Laundry  independent    Shopping  independent       Mental Status    General Appearance WDL  WDL       Employment/    Employment Status  disabled        Psychosocial    No documentation.       Abuse/Neglect    No documentation.       Legal     Row Name 06/18/19 1029       Financial/Legal    Source of Income  disability    Financial/Environmental Concerns  -- denies problems        Substance Abuse    No documentation.       Patient Forms    No documentation.           Joyce Walsh RN

## 2019-06-18 NOTE — PLAN OF CARE
Problem: Patient Care Overview  Goal: Plan of Care Review  Outcome: Ongoing (interventions implemented as appropriate)   06/18/19 0450   Coping/Psychosocial   Plan of Care Reviewed With patient   Plan of Care Review   Progress no change   OTHER   Outcome Summary New admit. Vital signs stable. Pain controlled per MAR. Awaiting bed at  for transfer.       Problem: Bowel Obstruction (Adult)  Goal: Signs and Symptoms of Listed Potential Problems Will be Absent, Minimized or Managed (Bowel Obstruction)  Outcome: Ongoing (interventions implemented as appropriate)   06/18/19 3594   Goal/Outcome Evaluation   Problems Assessed (Bowel Obstruction) all   Problems Present (Bowel Obstruction) nausea and vomiting;pain;situational response

## 2019-06-18 NOTE — PROGRESS NOTES
LOS: 1 day   Patient Care Team:  Janak Robertson DO as PCP - General  BalTopher cheung MD as PCP - Claims Attributed      Chief Complaint: Abdominal wall hernia with partial small bowel obstruction      Interval History: Patient doing well, minimal abdominal pain.  No nausea vomiting.  Positive flatus but no bowel movement.    Patient Complaints: None    History taken from: patient    Vital Signs  Temp:  [97.7 °F (36.5 °C)-98.8 °F (37.1 °C)] 97.7 °F (36.5 °C)  Heart Rate:  [70-84] 82  Resp:  [16-20] 20  BP: (124-136)/(87-96) 136/91    Physical Exam:     General Appearance:    Alert, cooperative, in no acute distress   Head:    Normocephalic, without obvious abnormality, atraumatic   Lungs:     Clear to auscultation,respirations regular, even and                  unlabored    Heart:    Regular rhythm and normal rate, normal S1 and S2, no            murmur, no gallop, no rub, no click   Abdomen:     Normal bowel sounds, no masses, no organomegaly, soft        with minimal distention.  No palpable defect.  Tender in the right lower quadrant.   Extremities:   Moves all extremities well, no edema, no cyanosis, no             redness   Pulses:   Pulses palpable and equal bilaterally   Skin:   No bleeding, bruising or rash        Results Review:       Lab Results (last 24 hours)     Procedure Component Value Units Date/Time    Comprehensive Metabolic Panel [[id number]] Collected:  06/18/19 0612    Specimen:  Blood Updated:  06/18/19 0647     Glucose 88 mg/dL      BUN 9 mg/dL      Creatinine 0.80 mg/dL      Sodium 139 mmol/L      Potassium 4.1 mmol/L      Chloride 104 mmol/L      CO2 27.0 mmol/L      Calcium 9.1 mg/dL      Total Protein 6.9 g/dL      Albumin 4.00 g/dL      ALT (SGPT) 38 U/L      AST (SGOT) 28 U/L      Alkaline Phosphatase 97 U/L      Total Bilirubin 0.5 mg/dL      eGFR Non African Amer 105 mL/min/1.73      Globulin 2.9 gm/dL      A/G Ratio 1.4 g/dL      BUN/Creatinine Ratio 11.3     Anion Gap 12.1  mmol/L     Narrative:       GFR Normal >60  Chronic Kidney Disease <60  Kidney Failure <15    Lipase [[id number]]  (Normal) Collected:  06/18/19 0612    Specimen:  Blood Updated:  06/18/19 0647     Lipase 52 U/L     Magnesium [[id number]]  (Normal) Collected:  06/18/19 0612    Specimen:  Blood Updated:  06/18/19 0647     Magnesium 2.0 mg/dL     Phosphorus [[id number]]  (Normal) Collected:  06/18/19 0612    Specimen:  Blood Updated:  06/18/19 0647     Phosphorus 3.8 mg/dL     CBC Auto Differential [[id number]]  (Abnormal) Collected:  06/18/19 0612    Specimen:  Blood Updated:  06/18/19 0621     WBC 8.46 10*3/mm3      RBC 5.23 10*6/mm3      Hemoglobin 14.0 g/dL      Hematocrit 43.3 %      MCV 82.8 fL      MCH 26.8 pg      MCHC 32.3 g/dL      RDW 13.1 %      RDW-SD 39.4 fl      MPV 9.2 fL      Platelets 266 10*3/mm3      Neutrophil % 58.8 %      Lymphocyte % 23.3 %      Monocyte % 10.0 %      Eosinophil % 6.7 %      Basophil % 0.7 %      Immature Grans % 0.5 %      Neutrophils, Absolute 4.97 10*3/mm3      Lymphocytes, Absolute 1.97 10*3/mm3      Monocytes, Absolute 0.85 10*3/mm3      Eosinophils, Absolute 0.57 10*3/mm3      Basophils, Absolute 0.06 10*3/mm3      Immature Grans, Absolute 0.04 10*3/mm3      nRBC 0.0 /100 WBC     Comprehensive Metabolic Panel [[id number]]  (Abnormal) Collected:  06/17/19 1644    Specimen:  Blood Updated:  06/17/19 1702     Glucose 101 mg/dL      BUN 10 mg/dL      Creatinine 0.80 mg/dL      Sodium 137 mmol/L      Potassium 3.9 mmol/L      Chloride 102 mmol/L      CO2 25.0 mmol/L      Calcium 8.6 mg/dL      Total Protein 7.2 g/dL      Albumin 4.20 g/dL      ALT (SGPT) 34 U/L      AST (SGOT) 31 U/L      Alkaline Phosphatase 85 U/L      Total Bilirubin 0.3 mg/dL      eGFR Non African Amer 105 mL/min/1.73      Globulin 3.0 gm/dL      A/G Ratio 1.4 g/dL      BUN/Creatinine Ratio 12.5     Anion Gap 13.9 mmol/L     Narrative:       GFR Normal >60  Chronic Kidney Disease <60  Kidney Failure <15     Lipase [[id number]]  (Normal) Collected:  06/17/19 1644    Specimen:  Blood Updated:  06/17/19 1702     Lipase 39 U/L     Urinalysis With Microscopic If Indicated (No Culture) - Urine, Clean Catch [[id number]]  (Normal) Collected:  06/17/19 1645    Specimen:  Urine, Clean Catch Updated:  06/17/19 1656     Color, UA Yellow     Appearance, UA Clear     pH, UA 6.5     Specific Gravity, UA 1.013     Glucose, UA Negative     Ketones, UA Negative     Bilirubin, UA Negative     Blood, UA Negative     Protein, UA Negative     Leuk Esterase, UA Negative     Nitrite, UA Negative     Urobilinogen, UA 0.2 E.U./dL    Narrative:       Urine microscopic not indicated.    CBC & Differential [[id number]] Collected:  06/17/19 1644    Specimen:  Blood Updated:  06/17/19 1650    Narrative:       The following orders were created for panel order CBC & Differential.  Procedure                               Abnormality         Status                     ---------                               -----------         ------                     CBC Auto Differential[[id number]]        Abnormal            Final result                 Please view results for these tests on the individual orders.    CBC Auto Differential [[id number]]  (Abnormal) Collected:  06/17/19 1644    Specimen:  Blood Updated:  06/17/19 1650     WBC 8.13 10*3/mm3      RBC 5.05 10*6/mm3      Hemoglobin 13.5 g/dL      Hematocrit 41.8 %      MCV 82.8 fL      MCH 26.7 pg      MCHC 32.3 g/dL      RDW 13.2 %      RDW-SD 39.9 fl      MPV 9.2 fL      Platelets 258 10*3/mm3      Neutrophil % 59.3 %      Lymphocyte % 24.2 %      Monocyte % 8.9 %      Eosinophil % 6.5 %      Basophil % 0.9 %      Immature Grans % 0.2 %      Neutrophils, Absolute 4.82 10*3/mm3      Lymphocytes, Absolute 1.97 10*3/mm3      Monocytes, Absolute 0.72 10*3/mm3      Eosinophils, Absolute 0.53 10*3/mm3      Basophils, Absolute 0.07 10*3/mm3      Immature Grans, Absolute 0.02 10*3/mm3      nRBC 0.0 /100 WBC                Assessment/Plan       Pulmonary emphysema (CMS/HCC)    Chronic viral hepatitis B without delta agent and without coma (CMS/HCC)    Hep C w/o coma, chronic (CMS/HCC)    Right lower quadrant abdominal pain    Other partial intestinal obstruction (CMS/HCC)      Partial small bowel obstruction secondary to anterior abdominal wall hernia.  Awaiting transfer to  surgical services for repair and evaluation.  Continue n.p.o. status with ice chips only.      Francheska Gomez MD  06/18/19  7:03 AM

## 2019-06-19 ENCOUNTER — APPOINTMENT (OUTPATIENT)
Dept: CT IMAGING | Facility: HOSPITAL | Age: 44
End: 2019-06-19

## 2019-06-19 PROCEDURE — 25010000002 MORPHINE PER 10 MG: Performed by: INTERNAL MEDICINE

## 2019-06-19 PROCEDURE — 94799 UNLISTED PULMONARY SVC/PX: CPT

## 2019-06-19 PROCEDURE — 25010000002 METHYLPREDNISOLONE PER 40 MG: Performed by: INTERNAL MEDICINE

## 2019-06-19 PROCEDURE — 99232 SBSQ HOSP IP/OBS MODERATE 35: CPT | Performed by: INTERNAL MEDICINE

## 2019-06-19 PROCEDURE — 25010000002 ENOXAPARIN PER 10 MG: Performed by: INTERNAL MEDICINE

## 2019-06-19 PROCEDURE — 74176 CT ABD & PELVIS W/O CONTRAST: CPT

## 2019-06-19 RX ORDER — METHYLPREDNISOLONE SODIUM SUCCINATE 40 MG/ML
40 INJECTION, POWDER, LYOPHILIZED, FOR SOLUTION INTRAMUSCULAR; INTRAVENOUS EVERY 12 HOURS
Status: DISCONTINUED | OUTPATIENT
Start: 2019-06-19 | End: 2019-06-20 | Stop reason: HOSPADM

## 2019-06-19 RX ADMIN — ALBUTEROL SULFATE 2.5 MG: 2.5 SOLUTION RESPIRATORY (INHALATION) at 19:29

## 2019-06-19 RX ADMIN — MORPHINE SULFATE 2 MG: 2 INJECTION, SOLUTION INTRAMUSCULAR; INTRAVENOUS at 14:05

## 2019-06-19 RX ADMIN — MORPHINE SULFATE 2 MG: 2 INJECTION, SOLUTION INTRAMUSCULAR; INTRAVENOUS at 22:16

## 2019-06-19 RX ADMIN — ALBUTEROL SULFATE 2.5 MG: 2.5 SOLUTION RESPIRATORY (INHALATION) at 17:12

## 2019-06-19 RX ADMIN — METHYLPREDNISOLONE SODIUM SUCCINATE 40 MG: 40 INJECTION, POWDER, FOR SOLUTION INTRAMUSCULAR; INTRAVENOUS at 14:05

## 2019-06-19 RX ADMIN — PANTOPRAZOLE SODIUM 40 MG: 40 INJECTION, POWDER, FOR SOLUTION INTRAVENOUS at 05:27

## 2019-06-19 RX ADMIN — BARIUM SULFATE 450 ML: 21 SUSPENSION ORAL at 16:45

## 2019-06-19 RX ADMIN — MORPHINE SULFATE 2 MG: 2 INJECTION, SOLUTION INTRAMUSCULAR; INTRAVENOUS at 09:36

## 2019-06-19 RX ADMIN — MORPHINE SULFATE 2 MG: 2 INJECTION, SOLUTION INTRAMUSCULAR; INTRAVENOUS at 18:33

## 2019-06-19 RX ADMIN — BUDESONIDE AND FORMOTEROL FUMARATE DIHYDRATE 2 PUFF: 80; 4.5 AEROSOL RESPIRATORY (INHALATION) at 07:13

## 2019-06-19 RX ADMIN — BUDESONIDE AND FORMOTEROL FUMARATE DIHYDRATE 2 PUFF: 80; 4.5 AEROSOL RESPIRATORY (INHALATION) at 19:28

## 2019-06-19 RX ADMIN — ALBUTEROL SULFATE 2.5 MG: 2.5 SOLUTION RESPIRATORY (INHALATION) at 05:39

## 2019-06-19 RX ADMIN — ENOXAPARIN SODIUM 40 MG: 40 INJECTION SUBCUTANEOUS at 21:41

## 2019-06-19 RX ADMIN — ALBUTEROL SULFATE 2.5 MG: 2.5 SOLUTION RESPIRATORY (INHALATION) at 12:56

## 2019-06-19 RX ADMIN — BUDESONIDE 0.5 MG: 0.5 INHALANT RESPIRATORY (INHALATION) at 19:28

## 2019-06-19 RX ADMIN — FLUTICASONE PROPIONATE 2 SPRAY: 50 SPRAY, METERED NASAL at 09:36

## 2019-06-19 RX ADMIN — SODIUM CHLORIDE, PRESERVATIVE FREE 3 ML: 5 INJECTION INTRAVENOUS at 09:36

## 2019-06-19 RX ADMIN — BUDESONIDE 0.5 MG: 0.5 INHALANT RESPIRATORY (INHALATION) at 07:13

## 2019-06-19 RX ADMIN — SODIUM CHLORIDE, POTASSIUM CHLORIDE, SODIUM LACTATE AND CALCIUM CHLORIDE 125 ML/HR: 600; 310; 30; 20 INJECTION, SOLUTION INTRAVENOUS at 18:33

## 2019-06-19 RX ADMIN — MORPHINE SULFATE 2 MG: 2 INJECTION, SOLUTION INTRAMUSCULAR; INTRAVENOUS at 05:27

## 2019-06-19 RX ADMIN — SODIUM CHLORIDE, POTASSIUM CHLORIDE, SODIUM LACTATE AND CALCIUM CHLORIDE 125 ML/HR: 600; 310; 30; 20 INJECTION, SOLUTION INTRAVENOUS at 06:50

## 2019-06-19 NOTE — PLAN OF CARE
Problem: Pain, Chronic (Adult)  Goal: Identify Related Risk Factors and Signs and Symptoms  Outcome: Ongoing (interventions implemented as appropriate)    Goal: Acceptable Pain/Comfort Level and Functional Ability  Outcome: Ongoing (interventions implemented as appropriate)      Problem: Patient Care Overview  Goal: Plan of Care Review  Outcome: Ongoing (interventions implemented as appropriate)   06/19/19 0353   Coping/Psychosocial   Plan of Care Reviewed With patient   Plan of Care Review   Progress no change   OTHER   Outcome Summary VSS, rested well, pt states passing gas, pain controlled with PRN meds     Goal: Individualization and Mutuality  Outcome: Ongoing (interventions implemented as appropriate)    Goal: Discharge Needs Assessment  Outcome: Ongoing (interventions implemented as appropriate)      Problem: Bowel Obstruction (Adult)  Goal: Signs and Symptoms of Listed Potential Problems Will be Absent, Minimized or Managed (Bowel Obstruction)  Outcome: Ongoing (interventions implemented as appropriate)

## 2019-06-19 NOTE — PLAN OF CARE
Problem: Pain, Chronic (Adult)  Goal: Acceptable Pain/Comfort Level and Functional Ability  Outcome: Ongoing (interventions implemented as appropriate)   06/18/19 2008   Pain, Chronic (Adult)   Acceptable Pain/Comfort Level and Functional Ability making progress toward outcome       Problem: Patient Care Overview  Goal: Plan of Care Review  Outcome: Ongoing (interventions implemented as appropriate)      Problem: Bowel Obstruction (Adult)  Goal: Signs and Symptoms of Listed Potential Problems Will be Absent, Minimized or Managed (Bowel Obstruction)  Outcome: Ongoing (interventions implemented as appropriate)

## 2019-06-19 NOTE — PLAN OF CARE
Problem: Patient Care Overview  Goal: Plan of Care Review  Outcome: Ongoing (interventions implemented as appropriate)   06/19/19 1957   Coping/Psychosocial   Plan of Care Reviewed With patient   Plan of Care Review   Progress improving       Problem: Bowel Obstruction (Adult)  Goal: Signs and Symptoms of Listed Potential Problems Will be Absent, Minimized or Managed (Bowel Obstruction)  Outcome: Ongoing (interventions implemented as appropriate)
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
## 2019-06-20 VITALS
RESPIRATION RATE: 16 BRPM | OXYGEN SATURATION: 97 % | DIASTOLIC BLOOD PRESSURE: 85 MMHG | HEART RATE: 90 BPM | HEIGHT: 73 IN | TEMPERATURE: 97.8 F | SYSTOLIC BLOOD PRESSURE: 125 MMHG | WEIGHT: 217.2 LBS | BODY MASS INDEX: 28.79 KG/M2

## 2019-06-20 LAB
ANION GAP SERPL CALCULATED.3IONS-SCNC: 16 MMOL/L (ref 10–20)
BUN BLD-MCNC: 12 MG/DL (ref 7–20)
BUN/CREAT SERPL: 17.1 (ref 6.3–21.9)
CALCIUM SPEC-SCNC: 9.5 MG/DL (ref 8.4–10.2)
CHLORIDE SERPL-SCNC: 104 MMOL/L (ref 98–107)
CO2 SERPL-SCNC: 22 MMOL/L (ref 26–30)
CREAT BLD-MCNC: 0.7 MG/DL (ref 0.6–1.3)
DEPRECATED RDW RBC AUTO: 37.6 FL (ref 37–54)
ERYTHROCYTE [DISTWIDTH] IN BLOOD BY AUTOMATED COUNT: 12.9 % (ref 12.3–15.4)
GFR SERPL CREATININE-BSD FRML MDRD: 123 ML/MIN/1.73
GLUCOSE BLD-MCNC: 114 MG/DL (ref 74–98)
HCT VFR BLD AUTO: 45.2 % (ref 37.5–51)
HGB BLD-MCNC: 14.7 G/DL (ref 13–17.7)
MCH RBC QN AUTO: 26.2 PG (ref 26.6–33)
MCHC RBC AUTO-ENTMCNC: 32.5 G/DL (ref 31.5–35.7)
MCV RBC AUTO: 80.4 FL (ref 79–97)
MRSA SPEC QL CULT: NORMAL
PLATELET # BLD AUTO: 279 10*3/MM3 (ref 140–450)
PMV BLD AUTO: 9.7 FL (ref 6–12)
POTASSIUM BLD-SCNC: 4 MMOL/L (ref 3.5–5.1)
RBC # BLD AUTO: 5.62 10*6/MM3 (ref 4.14–5.8)
SODIUM BLD-SCNC: 138 MMOL/L (ref 137–145)
WBC NRBC COR # BLD: 17.66 10*3/MM3 (ref 3.4–10.8)

## 2019-06-20 PROCEDURE — 99232 SBSQ HOSP IP/OBS MODERATE 35: CPT | Performed by: SURGERY

## 2019-06-20 PROCEDURE — 25010000002 PROMETHAZINE PER 50 MG: Performed by: INTERNAL MEDICINE

## 2019-06-20 PROCEDURE — 85027 COMPLETE CBC AUTOMATED: CPT | Performed by: INTERNAL MEDICINE

## 2019-06-20 PROCEDURE — 25010000002 MORPHINE PER 10 MG: Performed by: INTERNAL MEDICINE

## 2019-06-20 PROCEDURE — 94799 UNLISTED PULMONARY SVC/PX: CPT

## 2019-06-20 PROCEDURE — 99238 HOSP IP/OBS DSCHRG MGMT 30/<: CPT | Performed by: INTERNAL MEDICINE

## 2019-06-20 PROCEDURE — 80048 BASIC METABOLIC PNL TOTAL CA: CPT | Performed by: INTERNAL MEDICINE

## 2019-06-20 PROCEDURE — 25010000002 METHYLPREDNISOLONE PER 40 MG: Performed by: INTERNAL MEDICINE

## 2019-06-20 RX ORDER — SODIUM CHLORIDE 9 MG/ML
INJECTION, SOLUTION INTRAVENOUS
Status: COMPLETED
Start: 2019-06-20 | End: 2019-06-20

## 2019-06-20 RX ORDER — OXYCODONE HYDROCHLORIDE AND ACETAMINOPHEN 5; 325 MG/1; MG/1
1 TABLET ORAL EVERY 8 HOURS PRN
Qty: 9 TABLET | Refills: 0 | OUTPATIENT
Start: 2019-06-20 | End: 2019-12-27

## 2019-06-20 RX ORDER — PREDNISONE 10 MG/1
TABLET ORAL
Qty: 15 TABLET | Refills: 0 | OUTPATIENT
Start: 2019-06-20 | End: 2019-12-27

## 2019-06-20 RX ADMIN — BUDESONIDE AND FORMOTEROL FUMARATE DIHYDRATE 2 PUFF: 80; 4.5 AEROSOL RESPIRATORY (INHALATION) at 07:11

## 2019-06-20 RX ADMIN — METHYLPREDNISOLONE SODIUM SUCCINATE 40 MG: 40 INJECTION, POWDER, FOR SOLUTION INTRAMUSCULAR; INTRAVENOUS at 01:23

## 2019-06-20 RX ADMIN — PANTOPRAZOLE SODIUM 40 MG: 40 INJECTION, POWDER, FOR SOLUTION INTRAVENOUS at 05:34

## 2019-06-20 RX ADMIN — BUDESONIDE 0.5 MG: 0.5 INHALANT RESPIRATORY (INHALATION) at 07:11

## 2019-06-20 RX ADMIN — SODIUM CHLORIDE 50 ML: 9 INJECTION, SOLUTION INTRAVENOUS at 00:06

## 2019-06-20 RX ADMIN — MORPHINE SULFATE 2 MG: 2 INJECTION, SOLUTION INTRAMUSCULAR; INTRAVENOUS at 07:28

## 2019-06-20 RX ADMIN — PROMETHAZINE HYDROCHLORIDE 12.5 MG: 25 INJECTION INTRAMUSCULAR; INTRAVENOUS at 00:06

## 2019-06-20 RX ADMIN — MORPHINE SULFATE 2 MG: 2 INJECTION, SOLUTION INTRAMUSCULAR; INTRAVENOUS at 12:40

## 2019-06-20 RX ADMIN — ALBUTEROL SULFATE 2.5 MG: 2.5 SOLUTION RESPIRATORY (INHALATION) at 07:11

## 2019-06-20 NOTE — PROGRESS NOTES
LOS: 3 days   Patient Care Team:  Janak Robertson DO as PCP - General  Topher Mckeon MD as PCP - Claims Attributed      Chief Complaint: Incisional hernia with partial bowel obstruction      Interval History: Patient doing well, examination unchanged.  No nausea vomiting.  Patient had a small bowel movement yesterday and flatus.  Repeat CT scan indicates minimal change in area of partial bowel obstruction.  Appears to be chronic in nature with no bowel compromise.  No free fluid.    Patient Complaints: None    History taken from: patient    Vital Signs  Temp:  [98.6 °F (37 °C)-99.3 °F (37.4 °C)] 98.6 °F (37 °C)  Heart Rate:  [69-81] 74  Resp:  [15-18] 15  BP: (118-135)/(78-87) 124/82    Physical Exam:     General Appearance:    Alert, cooperative, in no acute distress   Head:    Normocephalic, without obvious abnormality, atraumatic   Lungs:     Clear to auscultation,respirations regular, even and                  unlabored    Heart:    Regular rhythm and normal rate, normal S1 and S2, no            murmur, no gallop, no rub, no click   Abdomen:     Normal bowel sounds, no masses, no organomegaly, soft        with mild tenderness right periumbilical region, hernia minimally tender.  No redness.  Good bowel sounds.   Extremities:   Moves all extremities well, no edema, no cyanosis, no             redness   Pulses:   Pulses palpable and equal bilaterally   Skin:   No bleeding, bruising or rash        Results Review:       Lab Results (last 24 hours)     Procedure Component Value Units Date/Time    Basic Metabolic Panel [[id number]]  (Abnormal) Collected:  06/20/19 0514    Specimen:  Blood Updated:  06/20/19 0604     Glucose 114 mg/dL      BUN 12 mg/dL      Creatinine 0.70 mg/dL      Sodium 138 mmol/L      Potassium 4.0 mmol/L      Chloride 104 mmol/L      CO2 22.0 mmol/L      Calcium 9.5 mg/dL      eGFR Non African Amer 123 mL/min/1.73      BUN/Creatinine Ratio 17.1     Anion Gap 16.0 mmol/L     Narrative:        GFR Normal >60  Chronic Kidney Disease <60  Kidney Failure <15    CBC (No Diff) [[id number]]  (Abnormal) Collected:  06/20/19 0514    Specimen:  Blood Updated:  06/20/19 0554     WBC 17.66 10*3/mm3      RBC 5.62 10*6/mm3      Hemoglobin 14.7 g/dL      Hematocrit 45.2 %      MCV 80.4 fL      MCH 26.2 pg      MCHC 32.5 g/dL      RDW 12.9 %      RDW-SD 37.6 fl      MPV 9.7 fL      Platelets 279 10*3/mm3     MRSA Screen Culture - Swab, Nares [[id number]]  (Normal) Collected:  06/18/19 1146    Specimen:  Swab from Nares Updated:  06/20/19 0549     MRSA SCREEN CX No Methicillin Resistant Staphylococcus aureus isolated              Assessment/Plan       Pulmonary emphysema (CMS/HCC)    Chronic viral hepatitis B without delta agent and without coma (CMS/HCC)    Hep C w/o coma, chronic (CMS/HCC)    Right lower quadrant abdominal pain    Other partial intestinal obstruction (CMS/HCC)      Incisional hernia, recurrent with a partial small bowel obstruction and bowel involvement.  Appears to be chronic in nature with no bowel compromise.  Awaiting transfer to [facility].  Likely will be able to discharge home and to schedule outpatient follow-up with surgeon who did previous procedures.      Francheska Gomez MD  06/20/19  7:53 AM

## 2019-06-20 NOTE — PROGRESS NOTES
Case Management Discharge Note         Destination      No service has been selected for the patient.      Durable Medical Equipment - Selection Complete      Service Provider Request Status Selected Services Address Phone Number Fax Number    [facility] Selected Durable Medical Equipment [address] [address] [phone]      Dialysis/Infusion      No service has been selected for the patient.      Home Medical Care      No service has been selected for the patient.      Therapy      No service has been selected for the patient.      Community Resources      No service has been selected for the patient.        Transportation Services  Private: Car    Final Discharge Disposition Code: 01 - home or self-care
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
## 2019-06-20 NOTE — PLAN OF CARE
Problem: Patient Care Overview  Goal: Plan of Care Review  Outcome: Ongoing (interventions implemented as appropriate)      Problem: Bowel Obstruction (Adult)  Goal: Signs and Symptoms of Listed Potential Problems Will be Absent, Minimized or Managed (Bowel Obstruction)  Outcome: Ongoing (interventions implemented as appropriate)

## 2019-07-14 ENCOUNTER — HOSPITAL ENCOUNTER (EMERGENCY)
Facility: HOSPITAL | Age: 44
Discharge: HOME | End: 2019-07-14
Attending: EMERGENCY MEDICINE | Admitting: EMERGENCY MEDICINE

## 2019-07-14 ENCOUNTER — APPOINTMENT (OUTPATIENT)
Dept: GENERAL RADIOLOGY | Facility: HOSPITAL | Age: 44
End: 2019-07-14

## 2019-07-14 VITALS
SYSTOLIC BLOOD PRESSURE: 133 MMHG | WEIGHT: 210 LBS | OXYGEN SATURATION: 97 % | HEART RATE: 79 BPM | DIASTOLIC BLOOD PRESSURE: 89 MMHG | RESPIRATION RATE: 18 BRPM | TEMPERATURE: 98 F | BODY MASS INDEX: 27.83 KG/M2 | HEIGHT: 73 IN

## 2019-07-14 DIAGNOSIS — J44.1 COPD EXACERBATION (HCC): Primary | ICD-10-CM

## 2019-07-14 LAB
A-A DO2: ABNORMAL MMHG
ALBUMIN SERPL-MCNC: 4.5 G/DL (ref 3.5–5)
ALBUMIN/GLOB SERPL: 1.3 G/DL (ref 1–2)
ALP SERPL-CCNC: 108 U/L (ref 38–126)
ALT SERPL W P-5'-P-CCNC: 23 U/L (ref 13–69)
ANION GAP SERPL CALCULATED.3IONS-SCNC: 16 MMOL/L (ref 10–20)
ARTERIAL PATENCY WRIST A: POSITIVE
AST SERPL-CCNC: 25 U/L (ref 15–46)
ATMOSPHERIC PRESS: 738 MMHG
BASE EXCESS BLDA CALC-SCNC: -1.3 MMOL/L (ref 0–2)
BASOPHILS # BLD AUTO: 0.09 10*3/MM3 (ref 0–0.2)
BASOPHILS NFR BLD AUTO: 0.9 % (ref 0–1.5)
BDY SITE: ABNORMAL
BILIRUB SERPL-MCNC: 0.3 MG/DL (ref 0.2–1.3)
BUN BLD-MCNC: 12 MG/DL (ref 7–20)
BUN/CREAT SERPL: 12 (ref 6.3–21.9)
CALCIUM SPEC-SCNC: 8.7 MG/DL (ref 8.4–10.2)
CHLORIDE SERPL-SCNC: 100 MMOL/L (ref 98–107)
CO2 SERPL-SCNC: 27 MMOL/L (ref 26–30)
COHGB MFR BLD: <0.6 % (ref 0–2)
CREAT BLD-MCNC: 1 MG/DL (ref 0.6–1.3)
DEPRECATED RDW RBC AUTO: 39.7 FL (ref 37–54)
EOSINOPHIL # BLD AUTO: 0.81 10*3/MM3 (ref 0–0.4)
EOSINOPHIL NFR BLD AUTO: 8.2 % (ref 0.3–6.2)
ERYTHROCYTE [DISTWIDTH] IN BLOOD BY AUTOMATED COUNT: 13.1 % (ref 12.3–15.4)
GAS FLOW AIRWAY: 2 LPM
GFR SERPL CREATININE-BSD FRML MDRD: 81 ML/MIN/1.73
GLOBULIN UR ELPH-MCNC: 3.5 GM/DL
GLUCOSE BLD-MCNC: 110 MG/DL (ref 74–98)
HCO3 BLDA-SCNC: 24.4 MMOL/L (ref 22–28)
HCT VFR BLD AUTO: 46 % (ref 37.5–51)
HCT VFR BLD CALC: 44.6 %
HGB BLD-MCNC: 14.7 G/DL (ref 13–17.7)
HGB BLDA-MCNC: 14.5 G/DL (ref 12–18)
HOLD SPECIMEN: NORMAL
HOROWITZ INDEX BLD+IHG-RTO: 28 %
IMM GRANULOCYTES # BLD AUTO: 0.02 10*3/MM3 (ref 0–0.05)
IMM GRANULOCYTES NFR BLD AUTO: 0.2 % (ref 0–0.5)
LYMPHOCYTES # BLD AUTO: 2.22 10*3/MM3 (ref 0.7–3.1)
LYMPHOCYTES NFR BLD AUTO: 22.4 % (ref 19.6–45.3)
MCH RBC QN AUTO: 26.4 PG (ref 26.6–33)
MCHC RBC AUTO-ENTMCNC: 32 G/DL (ref 31.5–35.7)
MCV RBC AUTO: 82.7 FL (ref 79–97)
METHGB BLD QL: 0.8 % (ref 0–1.5)
MODALITY: ABNORMAL
MONOCYTES # BLD AUTO: 0.88 10*3/MM3 (ref 0.1–0.9)
MONOCYTES NFR BLD AUTO: 8.9 % (ref 5–12)
NEUTROPHILS # BLD AUTO: 5.9 10*3/MM3 (ref 1.7–7)
NEUTROPHILS NFR BLD AUTO: 59.4 % (ref 42.7–76)
NOTE: ABNORMAL
NRBC BLD AUTO-RTO: 0 /100 WBC (ref 0–0.2)
NT-PROBNP SERPL-MCNC: 26.9 PG/ML (ref 0–125)
OXYHGB MFR BLDV: 93.9 % (ref 94–99)
PCO2 BLDA: 43.3 MM HG (ref 35–45)
PCO2 TEMP ADJ BLD: ABNORMAL MM HG (ref 35–48)
PH BLDA: 7.36 PH UNITS (ref 7.3–7.5)
PH, TEMP CORRECTED: ABNORMAL PH UNITS
PLATELET # BLD AUTO: 294 10*3/MM3 (ref 140–450)
PMV BLD AUTO: 9.1 FL (ref 6–12)
PO2 BLDA: 75.5 MM HG (ref 75–100)
PO2 TEMP ADJ BLD: ABNORMAL MM HG (ref 83–108)
POTASSIUM BLD-SCNC: 4 MMOL/L (ref 3.5–5.1)
PROT SERPL-MCNC: 8 G/DL (ref 6.3–8.2)
RBC # BLD AUTO: 5.56 10*6/MM3 (ref 4.14–5.8)
SAO2 % BLDCOA: 94.9 % (ref 94–100)
SODIUM BLD-SCNC: 139 MMOL/L (ref 137–145)
TROPONIN I SERPL-MCNC: <0.012 NG/ML (ref 0–0.03)
VENTILATOR MODE: ABNORMAL
WBC NRBC COR # BLD: 9.92 10*3/MM3 (ref 3.4–10.8)
WHOLE BLOOD HOLD SPECIMEN: NORMAL
WHOLE BLOOD HOLD SPECIMEN: NORMAL

## 2019-07-14 PROCEDURE — 96375 TX/PRO/DX INJ NEW DRUG ADDON: CPT

## 2019-07-14 PROCEDURE — 36600 WITHDRAWAL OF ARTERIAL BLOOD: CPT

## 2019-07-14 PROCEDURE — 84484 ASSAY OF TROPONIN QUANT: CPT

## 2019-07-14 PROCEDURE — 82805 BLOOD GASES W/O2 SATURATION: CPT

## 2019-07-14 PROCEDURE — 80053 COMPREHEN METABOLIC PANEL: CPT

## 2019-07-14 PROCEDURE — 71045 X-RAY EXAM CHEST 1 VIEW: CPT

## 2019-07-14 PROCEDURE — 99284 EMERGENCY DEPT VISIT MOD MDM: CPT

## 2019-07-14 PROCEDURE — 85025 COMPLETE CBC W/AUTO DIFF WBC: CPT

## 2019-07-14 PROCEDURE — 83050 HGB METHEMOGLOBIN QUAN: CPT

## 2019-07-14 PROCEDURE — 83880 ASSAY OF NATRIURETIC PEPTIDE: CPT

## 2019-07-14 PROCEDURE — 96365 THER/PROPH/DIAG IV INF INIT: CPT

## 2019-07-14 PROCEDURE — 25010000002 MAGNESIUM SULFATE 2 GM/50ML SOLUTION: Performed by: PHYSICIAN ASSISTANT

## 2019-07-14 PROCEDURE — 94640 AIRWAY INHALATION TREATMENT: CPT

## 2019-07-14 PROCEDURE — 25010000002 METHYLPREDNISOLONE PER 125 MG: Performed by: PHYSICIAN ASSISTANT

## 2019-07-14 PROCEDURE — 93005 ELECTROCARDIOGRAM TRACING: CPT

## 2019-07-14 PROCEDURE — 82375 ASSAY CARBOXYHB QUANT: CPT

## 2019-07-14 RX ORDER — MAGNESIUM SULFATE HEPTAHYDRATE 40 MG/ML
2 INJECTION, SOLUTION INTRAVENOUS ONCE
Status: COMPLETED | OUTPATIENT
Start: 2019-07-14 | End: 2019-07-14

## 2019-07-14 RX ORDER — SODIUM CHLORIDE 0.9 % (FLUSH) 0.9 %
10 SYRINGE (ML) INJECTION AS NEEDED
Status: DISCONTINUED | OUTPATIENT
Start: 2019-07-14 | End: 2019-07-14 | Stop reason: HOSPADM

## 2019-07-14 RX ORDER — METHYLPREDNISOLONE 4 MG/1
TABLET ORAL
Qty: 21 EACH | Refills: 0 | OUTPATIENT
Start: 2019-07-14 | End: 2019-12-27

## 2019-07-14 RX ORDER — IPRATROPIUM BROMIDE AND ALBUTEROL SULFATE 2.5; .5 MG/3ML; MG/3ML
3 SOLUTION RESPIRATORY (INHALATION) ONCE
Status: COMPLETED | OUTPATIENT
Start: 2019-07-14 | End: 2019-07-14

## 2019-07-14 RX ORDER — METHYLPREDNISOLONE SODIUM SUCCINATE 125 MG/2ML
125 INJECTION, POWDER, LYOPHILIZED, FOR SOLUTION INTRAMUSCULAR; INTRAVENOUS ONCE
Status: COMPLETED | OUTPATIENT
Start: 2019-07-14 | End: 2019-07-14

## 2019-07-14 RX ADMIN — IPRATROPIUM BROMIDE AND ALBUTEROL SULFATE 9 ML: .5; 3 SOLUTION RESPIRATORY (INHALATION) at 13:35

## 2019-07-14 RX ADMIN — MAGNESIUM SULFATE HEPTAHYDRATE 2 G: 40 INJECTION, SOLUTION INTRAVENOUS at 13:32

## 2019-07-14 RX ADMIN — METHYLPREDNISOLONE SODIUM SUCCINATE 125 MG: 125 INJECTION, POWDER, FOR SOLUTION INTRAMUSCULAR; INTRAVENOUS at 13:32

## 2019-07-14 NOTE — ED PROVIDER NOTES
Subjective   43-year-old male with a known history of COPD, presents with shortness of breath and wheezing for 3 to 4 days.  He is had multiple episodes of recurrent COPD exacerbation, he is a previous smoker.  No relief with sitting up, neb treatments, or rest.  He has some chest tightness as well.  Symptoms are worse with any movement or exertion.        History provided by:  Patient   used: No    Shortness of Breath   Severity:  Severe  Onset quality:  Gradual  Timing:  Constant  Progression:  Worsening  Chronicity:  Recurrent  Context: activity and weather changes    Relieved by:  Nothing  Worsened by:  Deep breathing, exertion, movement, stress, coughing and activity  Ineffective treatments:  Sitting up and inhaler  Associated symptoms: abdominal pain, chest pain and wheezing    Risk factors: tobacco use        Review of Systems   Respiratory: Positive for shortness of breath and wheezing.    Cardiovascular: Positive for chest pain.   Gastrointestinal: Positive for abdominal pain.   All other systems reviewed and are negative.      Past Medical History:   Diagnosis Date   • Abdominal adhesions    • Asthma    • Cervical radiculopathy    • Colitis    • Colitis    • H/O chest x-ray 04/14/2016    No active disease   • H/O chest x-ray 03/28/2016    No active disease by portable imaging   • H/O chest x-ray 03/12/2016    No acute cardiopulmonary process   • Headache    • Heart attack (CMS/HCC)    • History of recreational drug use    • Kidney cysts    • Left hip pain    • Lesion of oral mucosa    • Liver disease    • Low back pain    • Obstructive chronic bronchitis with exacerbation (CMS/HCC)    • Sleep apnea     mild       Allergies   Allergen Reactions   • Doxycycline Nausea And Vomiting       Past Surgical History:   Procedure Laterality Date   • BACK SURGERY     • HERNIA REPAIR     • SHOULDER LIGAMENT REPAIR      right shoulder   • SMALL INTESTINE SURGERY      Small bowell resection   • TOTAL  HIP ARTHROPLASTY Left        Family History   Problem Relation Age of Onset   • Arthritis Other    • Hypertension Other    • Migraines Other    • Heart attack Other    • Stroke Other        Social History     Socioeconomic History   • Marital status:      Spouse name: Not on file   • Number of children: Not on file   • Years of education: Not on file   • Highest education level: Not on file   Tobacco Use   • Smoking status: Former Smoker   • Smokeless tobacco: Current User     Types: Chew   • Tobacco comment: quit 2005   Substance and Sexual Activity   • Alcohol use: No     Comment: stopped drinking alcohol   • Drug use: No     Comment: takes suboxone           Objective   Physical Exam   Constitutional: He is oriented to person, place, and time. He appears well-developed and well-nourished.   HENT:   Head: Normocephalic and atraumatic.   Left Ear: External ear normal.   Eyes: EOM are normal. Pupils are equal, round, and reactive to light.   Neck: Normal range of motion.   Cardiovascular: Normal rate and regular rhythm.   Pulmonary/Chest: Effort normal. He has decreased breath sounds. He has wheezes.   Abdominal: Soft. Bowel sounds are normal.   Musculoskeletal: Normal range of motion.   Neurological: He is alert and oriented to person, place, and time.   Skin: Skin is warm and dry.   Psychiatric: He has a normal mood and affect. His behavior is normal. Judgment and thought content normal.   Nursing note and vitals reviewed.      Procedures           ED Course  ED Course as of Jul 14 1446   Sun Jul 14, 2019   1348 EKG interpreted by me reveals normal sinus rhythm at a rate of 95 without ectopy or ischemia normal EKG.  [PF]      ED Course User Index  [PF] Dominick Riley, DO                  MDM  Number of Diagnoses or Management Options  COPD exacerbation (CMS/[facility]): new and requires workup     Amount and/or Complexity of Data Reviewed  Clinical lab tests: reviewed  Tests in the radiology section of CPT®:  reviewed  Decide to obtain previous medical records or to obtain history from someone other than the patient: yes    Risk of Complications, Morbidity, and/or Mortality  Presenting problems: low  Diagnostic procedures: low  Management options: low    Patient Progress  Patient progress: stable        Final diagnoses:   COPD exacerbation (CMS/[facility])            Janak Stone Jr., PA-MAIRA  07/14/19 144

## 2019-07-31 RX ORDER — FLUTICASONE PROPIONATE 50 MCG
SPRAY, SUSPENSION (ML) NASAL
Qty: 48 G | Refills: 1 | Status: SHIPPED | OUTPATIENT
Start: 2019-07-31 | End: 2020-02-24 | Stop reason: SDUPTHER

## 2019-08-14 DIAGNOSIS — G47.33 OSA (OBSTRUCTIVE SLEEP APNEA): Primary | ICD-10-CM

## 2019-08-21 ENCOUNTER — OFFICE VISIT (OUTPATIENT)
Dept: PULMONOLOGY | Facility: CLINIC | Age: 44
End: 2019-08-21

## 2019-08-21 VITALS
RESPIRATION RATE: 18 BRPM | SYSTOLIC BLOOD PRESSURE: 118 MMHG | OXYGEN SATURATION: 97 % | WEIGHT: 208 LBS | DIASTOLIC BLOOD PRESSURE: 70 MMHG | HEART RATE: 92 BPM | BODY MASS INDEX: 27.57 KG/M2 | HEIGHT: 73 IN

## 2019-08-21 DIAGNOSIS — J30.89 OTHER ALLERGIC RHINITIS: ICD-10-CM

## 2019-08-21 DIAGNOSIS — G47.33 OBSTRUCTIVE SLEEP APNEA: ICD-10-CM

## 2019-08-21 DIAGNOSIS — R06.02 SHORTNESS OF BREATH: Primary | ICD-10-CM

## 2019-08-21 DIAGNOSIS — J45.40 MODERATE PERSISTENT ASTHMA WITHOUT COMPLICATION: ICD-10-CM

## 2019-08-21 PROCEDURE — 99214 OFFICE O/P EST MOD 30 MIN: CPT | Performed by: INTERNAL MEDICINE

## 2019-08-21 NOTE — PROGRESS NOTES
"Chief Complaint   Patient presents with   • Follow-up   • Shortness of Breath       Subjective   Topher Stoll is a 43 y.o. male.     History of Present Illness   Patient comes in today for follow up of asthma, allergic rhinitis and shortness of breath. Patient does not report any recent exacerbations requiring emergency room visits or hospitalizations.    Patient is using the rescue inhalers minimally. he is compliant with pulmonary medicines, as prescribed.    Patient says that he has been using his nasal sprays on a regular basis and describes no significant ongoing issues other than occasional congestion.     Patient doesn't report any issues with the device. The patient describes no significant issues with his mask either. Patient says that the compliance with the use of the equipment is good.     Patient says that his symptoms of fatigue & daytime sleepiness have been helped greatly with the use of PAP, as prescribed.       The following portions of the patient's history were reviewed and updated as appropriate: allergies, current medications, past family history, past medical history, past social history and past surgical history.    Review of Systems   HENT: Negative for sinus pressure, sneezing and sore throat.    Respiratory: Negative for cough, shortness of breath and wheezing.        Objective   Visit Vitals  /70   Pulse 92   Resp 18   Ht 185.4 cm (72.99\")   Wt 94.3 kg (208 lb)   SpO2 97%   BMI 27.45 kg/m²       Physical Exam   Constitutional: He is oriented to person, place, and time. He appears well-developed and well-nourished.   HENT:   Head: Normocephalic and atraumatic.   Dentures noted.  Crowded oropharynx.   Eyes: EOM are normal.   Neck: Neck supple. No JVD present.   Cardiovascular: Normal rate and regular rhythm.   Pulmonary/Chest: Effort normal and breath sounds normal. He has no wheezes.   Musculoskeletal:   Gait was normal.   Neurological: He is alert and oriented to person, place, " and time.   Skin: Skin is warm and dry.   Vitals reviewed.      Assessment/Plan   Topher was seen today for follow-up and shortness of breath.    Diagnoses and all orders for this visit:    Shortness of breath  -     Pulmonary Function Test; Future  -     Nitric Oxide; Future  -     Peak Flow; Future    Moderate persistent asthma without complication  -     Pulmonary Function Test; Future  -     Nitric Oxide; Future  -     Peak Flow; Future    Other allergic rhinitis    Obstructive sleep apnea           Return in about 6 months (around 2/21/2020) for Recheck, FeNO on F/Up.    DISCUSSION (if any):  It appears that his symptoms of asthma are under adequate control with the current regimen.    Patient's medications for underlying asthma were reviewed in great detail.    Any needed adjustments to his pulmonary medications, either for clinical or insurance coverage reasons, have been made and are reflected in the orders.    Compliance with medications stressed.     Side effects of prescribed medications discussed with the patient.    I have discussed asthma action plan with him.    The patient was asked to call this office if the symptoms worsen.    Patient was advised to continue his nasal spray, especially given improvement in symptoms overall.    Continue treatment with AutoPAP at a pressure of 6/14, with a nasal mask.    Patient seems to be compliant with PAP device, based on the available data and his account of improved symptoms.     Although his residual AHI is 8.9, his autoPAP is only going up to a max of around 10. He is feeling a lot better therefore we will follow him clinically.    If the AHI remains high, then he may need to be considered for a full face mask?    The patient was once again reminded to continue using the PAP device regularly, every night for atleast 4 hours.    PFTs, FeNO and Peak Flow will be done upon follow up.       Dictated utilizing Dragon dictation.    This document was electronically  signed by Melina Olivas MD on 08/21/19 at 11:25 AM

## 2019-10-11 RX ORDER — MONTELUKAST SODIUM 10 MG/1
10 TABLET ORAL EVERY EVENING
Qty: 30 TABLET | Refills: 0 | Status: SHIPPED | OUTPATIENT
Start: 2019-10-11 | End: 2019-11-05 | Stop reason: SDUPTHER

## 2019-10-28 ENCOUNTER — APPOINTMENT (OUTPATIENT)
Dept: LAB | Facility: HOSPITAL | Age: 44
End: 2019-10-28

## 2019-10-28 ENCOUNTER — TRANSCRIBE ORDERS (OUTPATIENT)
Dept: LAB | Facility: HOSPITAL | Age: 44
End: 2019-10-28

## 2019-10-28 DIAGNOSIS — T84.9XXA: Primary | ICD-10-CM

## 2019-10-28 DIAGNOSIS — Z96.642: Primary | ICD-10-CM

## 2019-10-28 LAB
CRP SERPL-MCNC: 0.76 MG/DL (ref 0–0.5)
ERYTHROCYTE [SEDIMENTATION RATE] IN BLOOD: 23 MM/HR (ref 0–15)

## 2019-10-28 PROCEDURE — 85652 RBC SED RATE AUTOMATED: CPT | Performed by: ORTHOPAEDIC SURGERY

## 2019-10-28 PROCEDURE — 83018 HEAVY METAL QUAN EACH NES: CPT | Performed by: ORTHOPAEDIC SURGERY

## 2019-10-28 PROCEDURE — 36415 COLL VENOUS BLD VENIPUNCTURE: CPT | Performed by: ORTHOPAEDIC SURGERY

## 2019-10-28 PROCEDURE — 86140 C-REACTIVE PROTEIN: CPT | Performed by: ORTHOPAEDIC SURGERY

## 2019-10-28 PROCEDURE — 82495 ASSAY OF CHROMIUM: CPT | Performed by: ORTHOPAEDIC SURGERY

## 2019-10-30 LAB
COBALT SERPL-MCNC: 28.8 UG/L (ref 0–0.9)
CR SERPL-MCNC: 5.9 UG/L (ref 0.1–2.1)

## 2019-11-05 RX ORDER — MONTELUKAST SODIUM 10 MG/1
10 TABLET ORAL EVERY EVENING
Qty: 30 TABLET | Refills: 0 | Status: SHIPPED | OUTPATIENT
Start: 2019-11-05 | End: 2019-12-11 | Stop reason: SDUPTHER

## 2019-12-11 RX ORDER — MONTELUKAST SODIUM 10 MG/1
10 TABLET ORAL EVERY EVENING
Qty: 30 TABLET | Refills: 0 | Status: SHIPPED | OUTPATIENT
Start: 2019-12-11 | End: 2020-01-06

## 2019-12-27 ENCOUNTER — HOSPITAL ENCOUNTER (EMERGENCY)
Facility: HOSPITAL | Age: 44
Discharge: HOME | End: 2019-12-27
Attending: EMERGENCY MEDICINE | Admitting: EMERGENCY MEDICINE

## 2019-12-27 ENCOUNTER — APPOINTMENT (OUTPATIENT)
Dept: CT IMAGING | Facility: HOSPITAL | Age: 44
End: 2019-12-27

## 2019-12-27 VITALS
SYSTOLIC BLOOD PRESSURE: 129 MMHG | RESPIRATION RATE: 18 BRPM | HEIGHT: 70 IN | OXYGEN SATURATION: 96 % | WEIGHT: 211 LBS | TEMPERATURE: 97.6 F | HEART RATE: 73 BPM | BODY MASS INDEX: 30.21 KG/M2 | DIASTOLIC BLOOD PRESSURE: 100 MMHG

## 2019-12-27 DIAGNOSIS — S00.93XA CONTUSION OF HEAD, UNSPECIFIED PART OF HEAD, INITIAL ENCOUNTER: Primary | ICD-10-CM

## 2019-12-27 DIAGNOSIS — S23.9XXA THORACIC BACK SPRAIN, INITIAL ENCOUNTER: ICD-10-CM

## 2019-12-27 DIAGNOSIS — S13.9XXA SPRAIN OF CERVICAL NECK, INITIAL ENCOUNTER: ICD-10-CM

## 2019-12-27 PROCEDURE — 99283 EMERGENCY DEPT VISIT LOW MDM: CPT

## 2019-12-27 PROCEDURE — 72128 CT CHEST SPINE W/O DYE: CPT

## 2019-12-27 PROCEDURE — 70450 CT HEAD/BRAIN W/O DYE: CPT

## 2019-12-27 PROCEDURE — 72125 CT NECK SPINE W/O DYE: CPT

## 2019-12-27 PROCEDURE — 25010000002 DIPHENHYDRAMINE PER 50 MG: Performed by: NURSE PRACTITIONER

## 2019-12-27 PROCEDURE — 25010000002 PROCHLORPERAZINE 10 MG/2ML SOLUTION: Performed by: NURSE PRACTITIONER

## 2019-12-27 PROCEDURE — 96374 THER/PROPH/DIAG INJ IV PUSH: CPT

## 2019-12-27 PROCEDURE — 96372 THER/PROPH/DIAG INJ SC/IM: CPT

## 2019-12-27 RX ORDER — BACLOFEN 10 MG/1
10 TABLET ORAL 2 TIMES DAILY PRN
Qty: 14 TABLET | Refills: 0 | Status: SHIPPED | OUTPATIENT
Start: 2019-12-27 | End: 2020-01-02

## 2019-12-27 RX ORDER — DIPHENHYDRAMINE HYDROCHLORIDE 50 MG/ML
25 INJECTION INTRAMUSCULAR; INTRAVENOUS ONCE
Status: COMPLETED | OUTPATIENT
Start: 2019-12-27 | End: 2019-12-27

## 2019-12-27 RX ORDER — IBUPROFEN 800 MG/1
800 TABLET ORAL
Qty: 21 TABLET | Refills: 0 | OUTPATIENT
Start: 2019-12-27 | End: 2020-01-07

## 2019-12-27 RX ORDER — DIPHENHYDRAMINE HYDROCHLORIDE 50 MG/ML
25 INJECTION INTRAMUSCULAR; INTRAVENOUS ONCE
Status: DISCONTINUED | OUTPATIENT
Start: 2019-12-27 | End: 2019-12-27

## 2019-12-27 RX ORDER — ONDANSETRON 4 MG/1
4 TABLET, FILM COATED ORAL EVERY 8 HOURS PRN
Qty: 15 TABLET | Refills: 0 | Status: SHIPPED | OUTPATIENT
Start: 2019-12-27 | End: 2020-02-21

## 2019-12-27 RX ORDER — PROCHLORPERAZINE EDISYLATE 5 MG/ML
10 INJECTION INTRAMUSCULAR; INTRAVENOUS ONCE
Status: COMPLETED | OUTPATIENT
Start: 2019-12-27 | End: 2019-12-27

## 2019-12-27 RX ADMIN — PROCHLORPERAZINE EDISYLATE 10 MG: 5 INJECTION INTRAMUSCULAR; INTRAVENOUS at 09:09

## 2019-12-27 RX ADMIN — DIPHENHYDRAMINE HYDROCHLORIDE 25 MG: 50 INJECTION INTRAMUSCULAR; INTRAVENOUS at 09:13

## 2020-01-02 ENCOUNTER — HOSPITAL ENCOUNTER (OUTPATIENT)
Dept: GENERAL RADIOLOGY | Facility: HOSPITAL | Age: 45
Discharge: HOME | End: 2020-01-02
Admitting: ORTHOPAEDIC SURGERY

## 2020-01-02 ENCOUNTER — APPOINTMENT (OUTPATIENT)
Dept: PREADMISSION TESTING | Facility: HOSPITAL | Age: 45
End: 2020-01-02

## 2020-01-02 VITALS — HEIGHT: 73 IN | BODY MASS INDEX: 28.55 KG/M2 | WEIGHT: 215.39 LBS

## 2020-01-02 LAB
25(OH)D3 SERPL-MCNC: 15.4 NG/ML (ref 30–100)
ALBUMIN SERPL-MCNC: 4.7 G/DL (ref 3.5–5.2)
ALBUMIN/GLOB SERPL: 1.8 G/DL
ALP SERPL-CCNC: 155 U/L (ref 39–117)
ALT SERPL W P-5'-P-CCNC: 16 U/L (ref 1–41)
ANION GAP SERPL CALCULATED.3IONS-SCNC: 12 MMOL/L (ref 5–15)
APTT PPP: 30.6 SECONDS (ref 24–37)
AST SERPL-CCNC: 18 U/L (ref 1–40)
BASOPHILS # BLD AUTO: 0.05 10*3/MM3 (ref 0–0.2)
BASOPHILS NFR BLD AUTO: 0.5 % (ref 0–1.5)
BILIRUB SERPL-MCNC: 0.2 MG/DL (ref 0.2–1.2)
BUN BLD-MCNC: 15 MG/DL (ref 6–20)
BUN/CREAT SERPL: 15 (ref 7–25)
CALCIUM SPEC-SCNC: 9.2 MG/DL (ref 8.6–10.5)
CHLORIDE SERPL-SCNC: 98 MMOL/L (ref 98–107)
CO2 SERPL-SCNC: 27 MMOL/L (ref 22–29)
CREAT BLD-MCNC: 1 MG/DL (ref 0.76–1.27)
CRP SERPL-MCNC: 1.12 MG/DL (ref 0–0.5)
DEPRECATED RDW RBC AUTO: 40.1 FL (ref 37–54)
EOSINOPHIL # BLD AUTO: 0.51 10*3/MM3 (ref 0–0.4)
EOSINOPHIL NFR BLD AUTO: 5.6 % (ref 0.3–6.2)
ERYTHROCYTE [DISTWIDTH] IN BLOOD BY AUTOMATED COUNT: 13.5 % (ref 12.3–15.4)
ERYTHROCYTE [SEDIMENTATION RATE] IN BLOOD: 45 MM/HR (ref 0–15)
GFR SERPL CREATININE-BSD FRML MDRD: 81 ML/MIN/1.73
GLOBULIN UR ELPH-MCNC: 2.6 GM/DL
GLUCOSE BLD-MCNC: 93 MG/DL (ref 65–99)
HBA1C MFR BLD: 6.3 % (ref 4.8–5.6)
HCT VFR BLD AUTO: 45.1 % (ref 37.5–51)
HGB BLD-MCNC: 14.2 G/DL (ref 13–17.7)
IMM GRANULOCYTES # BLD AUTO: 0.01 10*3/MM3 (ref 0–0.05)
IMM GRANULOCYTES NFR BLD AUTO: 0.1 % (ref 0–0.5)
INR PPP: 1.05 (ref 0.85–1.16)
LYMPHOCYTES # BLD AUTO: 1.99 10*3/MM3 (ref 0.7–3.1)
LYMPHOCYTES NFR BLD AUTO: 21.8 % (ref 19.6–45.3)
MCH RBC QN AUTO: 25.9 PG (ref 26.6–33)
MCHC RBC AUTO-ENTMCNC: 31.5 G/DL (ref 31.5–35.7)
MCV RBC AUTO: 82.1 FL (ref 79–97)
MONOCYTES # BLD AUTO: 0.66 10*3/MM3 (ref 0.1–0.9)
MONOCYTES NFR BLD AUTO: 7.2 % (ref 5–12)
NEUTROPHILS # BLD AUTO: 5.89 10*3/MM3 (ref 1.7–7)
NEUTROPHILS NFR BLD AUTO: 64.8 % (ref 42.7–76)
NRBC BLD AUTO-RTO: 0 /100 WBC (ref 0–0.2)
PLATELET # BLD AUTO: 268 10*3/MM3 (ref 140–450)
PMV BLD AUTO: 9.5 FL (ref 6–12)
POTASSIUM BLD-SCNC: 4.3 MMOL/L (ref 3.5–5.2)
PROT SERPL-MCNC: 7.3 G/DL (ref 6–8.5)
PROTHROMBIN TIME: 13.1 SECONDS (ref 11.2–14.3)
RBC # BLD AUTO: 5.49 10*6/MM3 (ref 4.14–5.8)
SODIUM BLD-SCNC: 137 MMOL/L (ref 136–145)
WBC NRBC COR # BLD: 9.11 10*3/MM3 (ref 3.4–10.8)

## 2020-01-02 PROCEDURE — 36415 COLL VENOUS BLD VENIPUNCTURE: CPT

## 2020-01-02 PROCEDURE — 85025 COMPLETE CBC W/AUTO DIFF WBC: CPT | Performed by: ORTHOPAEDIC SURGERY

## 2020-01-02 PROCEDURE — 93010 ELECTROCARDIOGRAM REPORT: CPT | Performed by: INTERNAL MEDICINE

## 2020-01-02 PROCEDURE — G0480 DRUG TEST DEF 1-7 CLASSES: HCPCS | Performed by: ORTHOPAEDIC SURGERY

## 2020-01-02 PROCEDURE — 86140 C-REACTIVE PROTEIN: CPT | Performed by: ORTHOPAEDIC SURGERY

## 2020-01-02 PROCEDURE — 85652 RBC SED RATE AUTOMATED: CPT | Performed by: ORTHOPAEDIC SURGERY

## 2020-01-02 PROCEDURE — 80053 COMPREHEN METABOLIC PANEL: CPT | Performed by: ORTHOPAEDIC SURGERY

## 2020-01-02 PROCEDURE — 87081 CULTURE SCREEN ONLY: CPT | Performed by: ORTHOPAEDIC SURGERY

## 2020-01-02 PROCEDURE — 71046 X-RAY EXAM CHEST 2 VIEWS: CPT

## 2020-01-02 PROCEDURE — 85730 THROMBOPLASTIN TIME PARTIAL: CPT | Performed by: ORTHOPAEDIC SURGERY

## 2020-01-02 PROCEDURE — 93005 ELECTROCARDIOGRAM TRACING: CPT

## 2020-01-02 PROCEDURE — 82306 VITAMIN D 25 HYDROXY: CPT | Performed by: ORTHOPAEDIC SURGERY

## 2020-01-02 PROCEDURE — 85610 PROTHROMBIN TIME: CPT | Performed by: ORTHOPAEDIC SURGERY

## 2020-01-02 PROCEDURE — 83036 HEMOGLOBIN GLYCOSYLATED A1C: CPT | Performed by: ORTHOPAEDIC SURGERY

## 2020-01-02 ASSESSMENT — HOOS JR
HOOS JR SCORE: 43.335
HOOS JR SCORE: 15

## 2020-01-02 NOTE — DISCHARGE INSTRUCTIONS
The following information and instructions were given:    Do not eat, drink, smoke or chew gum after midnight the night before surgery. This also includes no mints.  Take all routine, prescribed medications including heart and blood pressure medicines with a sip of water unless otherwise instructed by your physician.   Do NOT take diabetic medication unless instructed by your physician.    DO NOT shave for two days before your procedure.  Do not wear makeup.      DO NOT wear fingernail polish (gel/regular) and/or acrylic/artificial nails on the day of surgery.   If you have had a recent manicure and would rather not remove polish or artificial nails, then the minimum requirement is that the polish/artificial nails must be removed from the middle finger on each hand.      If you are having surgery/procedure on an upper extremity, then fingernail polish/artificial fingernails must be removed for surgery.  NO EXCEPTIONS.      If you are having surgery/procedure on a lower extremity, then toenail polish on both extremities must be removed for surgery.  NO EXCEPTIONS.    Remove all jewelry (advise to go to jeweler if unable to remove).  Jewelry, especially rings, can no longer be taped for surgery.    Leave anything you consider valuable at home.    Leave your suitcase in the car until after your surgery.    Bring the following with you the day of your procedure (when applicable)       -picture ID and insurance cards       -Co-pay/deductible required by insurance       -Medications in the original bottles (not a list) including all over-the-counter medications if not brought to PAT       -Copy of advance directive, living will or power of  documents if not brought to PAT       -CPAP or BIPAP mask and tubing (do not bring machine)       -Skin prep instruction(s) sheet       -PAT Pass    Education booklet, brochure, handout or binder related to procedure given to patient.  Education booklet also includes general  information related to their recovery that mentions signs and symptoms of infection and when to call the doctor.    When applicable, an ERAS booklet was given to patient.    Pain Control After Surgery handout given to patient.    Respirex use (handout given to patient) and pneumonia prevention education provided.    Signs and Symptoms of infection were discussed with patient in Pre Admission testing.  Patient instructed to call their doctor if any of the following symptoms are noted during recovery:  fever of 100.4 F or higher, incision that is warm or has increasing bleeding, redness, or drainage.    DVT Prevention instructions given verbally during Pre Admission Testing appointment that stressed the importance of ambulation to improve blood circulation.  Also encouraged patient to perform foot exercises when in bed and that the application of a sequential device might be applied to lower extremities to improve circulation.      Please apply Chlorhexadine wipes to surgical area (if instructed) the night before procedure and the AM of procedure and document date/time of applications on skin prep instruction sheet.

## 2020-01-02 NOTE — PAT
CONTACTED DR. ARELLANO REGARDING SUBOXONE. PER DR. ARELLANO, PATIENT INSTRUCTED TO TAKE USUAL DOSE OF SUBXONE A.M. OF SURGERY

## 2020-01-03 LAB — MRSA SPEC QL CULT: NORMAL

## 2020-01-06 RX ORDER — MONTELUKAST SODIUM 10 MG/1
10 TABLET ORAL EVERY EVENING
Qty: 30 TABLET | Refills: 0 | Status: SHIPPED | OUTPATIENT
Start: 2020-01-06 | End: 2020-02-12 | Stop reason: SDUPTHER

## 2020-01-07 ENCOUNTER — APPOINTMENT (OUTPATIENT)
Dept: GENERAL RADIOLOGY | Facility: HOSPITAL | Age: 45
End: 2020-01-07

## 2020-01-07 ENCOUNTER — HOSPITAL ENCOUNTER (EMERGENCY)
Facility: HOSPITAL | Age: 45
Discharge: HOME | End: 2020-01-07
Attending: EMERGENCY MEDICINE | Admitting: EMERGENCY MEDICINE

## 2020-01-07 VITALS
WEIGHT: 215 LBS | RESPIRATION RATE: 18 BRPM | HEIGHT: 73 IN | OXYGEN SATURATION: 96 % | HEART RATE: 77 BPM | BODY MASS INDEX: 28.49 KG/M2 | SYSTOLIC BLOOD PRESSURE: 105 MMHG | DIASTOLIC BLOOD PRESSURE: 76 MMHG | TEMPERATURE: 98.2 F

## 2020-01-07 DIAGNOSIS — M25.512 LEFT SHOULDER PAIN, UNSPECIFIED CHRONICITY: Primary | ICD-10-CM

## 2020-01-07 LAB
ALBUMIN SERPL-MCNC: 4.6 G/DL (ref 3.5–5.2)
ALBUMIN/GLOB SERPL: 1.4 G/DL
ALP SERPL-CCNC: 163 U/L (ref 39–117)
ALT SERPL W P-5'-P-CCNC: 19 U/L (ref 1–41)
ANION GAP SERPL CALCULATED.3IONS-SCNC: 16.3 MMOL/L (ref 5–15)
AST SERPL-CCNC: 23 U/L (ref 1–40)
BASOPHILS # BLD AUTO: 0.08 10*3/MM3 (ref 0–0.2)
BASOPHILS NFR BLD AUTO: 0.8 % (ref 0–1.5)
BILIRUB SERPL-MCNC: 0.3 MG/DL (ref 0.2–1.2)
BUN BLD-MCNC: 14 MG/DL (ref 6–20)
BUN/CREAT SERPL: 14.7 (ref 7–25)
CALCIUM SPEC-SCNC: 9.6 MG/DL (ref 8.6–10.5)
CHLORIDE SERPL-SCNC: 101 MMOL/L (ref 98–107)
CO2 SERPL-SCNC: 23.7 MMOL/L (ref 22–29)
COTININE UR-MCNC: 328 NG/ML
CREAT BLD-MCNC: 0.95 MG/DL (ref 0.76–1.27)
DEPRECATED RDW RBC AUTO: 40 FL (ref 37–54)
EOSINOPHIL # BLD AUTO: 0.53 10*3/MM3 (ref 0–0.4)
EOSINOPHIL NFR BLD AUTO: 5.4 % (ref 0.3–6.2)
ERYTHROCYTE [DISTWIDTH] IN BLOOD BY AUTOMATED COUNT: 13.6 % (ref 12.3–15.4)
GFR SERPL CREATININE-BSD FRML MDRD: 86 ML/MIN/1.73
GLOBULIN UR ELPH-MCNC: 3.2 GM/DL
GLUCOSE BLD-MCNC: 107 MG/DL (ref 65–99)
HCT VFR BLD AUTO: 44.2 % (ref 37.5–51)
HGB BLD-MCNC: 14.3 G/DL (ref 13–17.7)
HOLD SPECIMEN: NORMAL
HOLD SPECIMEN: NORMAL
IMM GRANULOCYTES # BLD AUTO: 0.02 10*3/MM3 (ref 0–0.05)
IMM GRANULOCYTES NFR BLD AUTO: 0.2 % (ref 0–0.5)
LIPASE SERPL-CCNC: 27 U/L (ref 13–60)
LYMPHOCYTES # BLD AUTO: 2.44 10*3/MM3 (ref 0.7–3.1)
LYMPHOCYTES NFR BLD AUTO: 24.7 % (ref 19.6–45.3)
MCH RBC QN AUTO: 25.9 PG (ref 26.6–33)
MCHC RBC AUTO-ENTMCNC: 32.4 G/DL (ref 31.5–35.7)
MCV RBC AUTO: 80.1 FL (ref 79–97)
MONOCYTES # BLD AUTO: 0.76 10*3/MM3 (ref 0.1–0.9)
MONOCYTES NFR BLD AUTO: 7.7 % (ref 5–12)
NEUTROPHILS # BLD AUTO: 6.04 10*3/MM3 (ref 1.7–7)
NEUTROPHILS NFR BLD AUTO: 61.2 % (ref 42.7–76)
NICOTINE SERPL-MCNC: 22.1 NG/ML
NRBC BLD AUTO-RTO: 0 /100 WBC (ref 0–0.2)
NT-PROBNP SERPL-MCNC: 114.9 PG/ML (ref 5–450)
PLATELET # BLD AUTO: 271 10*3/MM3 (ref 140–450)
PMV BLD AUTO: 9.2 FL (ref 6–12)
POTASSIUM BLD-SCNC: 4 MMOL/L (ref 3.5–5.2)
PROT SERPL-MCNC: 7.8 G/DL (ref 6–8.5)
RBC # BLD AUTO: 5.52 10*6/MM3 (ref 4.14–5.8)
SODIUM BLD-SCNC: 141 MMOL/L (ref 136–145)
TROPONIN T SERPL-MCNC: 0.02 NG/ML (ref 0–0.03)
WBC NRBC COR # BLD: 9.87 10*3/MM3 (ref 3.4–10.8)
WHOLE BLOOD HOLD SPECIMEN: NORMAL
WHOLE BLOOD HOLD SPECIMEN: NORMAL

## 2020-01-07 PROCEDURE — 83880 ASSAY OF NATRIURETIC PEPTIDE: CPT | Performed by: NURSE PRACTITIONER

## 2020-01-07 PROCEDURE — 96374 THER/PROPH/DIAG INJ IV PUSH: CPT

## 2020-01-07 PROCEDURE — 99284 EMERGENCY DEPT VISIT MOD MDM: CPT

## 2020-01-07 PROCEDURE — 83690 ASSAY OF LIPASE: CPT | Performed by: NURSE PRACTITIONER

## 2020-01-07 PROCEDURE — 25010000002 KETOROLAC TROMETHAMINE PER 15 MG: Performed by: NURSE PRACTITIONER

## 2020-01-07 PROCEDURE — 85025 COMPLETE CBC W/AUTO DIFF WBC: CPT | Performed by: NURSE PRACTITIONER

## 2020-01-07 PROCEDURE — 84484 ASSAY OF TROPONIN QUANT: CPT | Performed by: NURSE PRACTITIONER

## 2020-01-07 PROCEDURE — 80053 COMPREHEN METABOLIC PANEL: CPT | Performed by: NURSE PRACTITIONER

## 2020-01-07 PROCEDURE — 71046 X-RAY EXAM CHEST 2 VIEWS: CPT

## 2020-01-07 PROCEDURE — 93005 ELECTROCARDIOGRAM TRACING: CPT

## 2020-01-07 RX ORDER — MELOXICAM 7.5 MG/1
7.5 TABLET ORAL DAILY
Qty: 14 TABLET | Refills: 0 | Status: SHIPPED | OUTPATIENT
Start: 2020-01-07 | End: 2020-02-21

## 2020-01-07 RX ORDER — ASPIRIN 325 MG
325 TABLET, DELAYED RELEASE (ENTERIC COATED) ORAL ONCE
Status: COMPLETED | OUTPATIENT
Start: 2020-01-07 | End: 2020-01-07

## 2020-01-07 RX ORDER — KETOROLAC TROMETHAMINE 30 MG/ML
30 INJECTION, SOLUTION INTRAMUSCULAR; INTRAVENOUS ONCE
Status: COMPLETED | OUTPATIENT
Start: 2020-01-07 | End: 2020-01-07

## 2020-01-07 RX ORDER — METHOCARBAMOL 750 MG/1
750 TABLET, FILM COATED ORAL 3 TIMES DAILY PRN
Qty: 21 TABLET | Refills: 0 | Status: SHIPPED | OUTPATIENT
Start: 2020-01-07 | End: 2020-02-21

## 2020-01-07 RX ORDER — SODIUM CHLORIDE 0.9 % (FLUSH) 0.9 %
10 SYRINGE (ML) INJECTION AS NEEDED
Status: DISCONTINUED | OUTPATIENT
Start: 2020-01-07 | End: 2020-01-07 | Stop reason: HOSPADM

## 2020-01-07 RX ORDER — NITROGLYCERIN 0.4 MG/1
0.4 TABLET SUBLINGUAL
Status: DISCONTINUED | OUTPATIENT
Start: 2020-01-07 | End: 2020-01-07 | Stop reason: HOSPADM

## 2020-01-07 RX ADMIN — NITROGLYCERIN 0.4 MG: 0.4 TABLET SUBLINGUAL at 18:29

## 2020-01-07 RX ADMIN — KETOROLAC TROMETHAMINE 30 MG: 30 INJECTION, SOLUTION INTRAMUSCULAR; INTRAVENOUS at 19:28

## 2020-01-07 RX ADMIN — ASPIRIN 325 MG: 325 TABLET, COATED ORAL at 18:29

## 2020-01-07 NOTE — ED PROVIDER NOTES
Subjective   History of Present Illness  This is a 44 year old male who comes in today complaining of left arm pain radiating up into his neck and chest that started this morning. He reports his chest was hurting and he got up and walked outside and started packing some wood in . He says the pain got worse and has been hurting all day. He complains of nausea without vomiting.   Review of Systems   Constitutional: Negative.    HENT: Negative.    Eyes: Negative.    Respiratory: Negative.    Cardiovascular: Positive for chest pain.   Gastrointestinal: Negative.    Endocrine: Negative.    Genitourinary: Negative.    Musculoskeletal: Negative.    Skin: Negative.    Allergic/Immunologic: Negative.    Neurological: Negative.    Hematological: Negative.    Psychiatric/Behavioral: Negative.        Past Medical History:   Diagnosis Date   • Abdominal adhesions    • Arthritis    • Asthma    • Cervical radiculopathy    • Colitis    • Colitis    • GERD (gastroesophageal reflux disease)    • H/O chest x-ray 04/14/2016    No active disease   • H/O chest x-ray 03/28/2016    No active disease by portable imaging   • H/O chest x-ray 03/12/2016    No acute cardiopulmonary process   • Headache    • Heart attack (CMS/HCC)    • History of recreational drug use    • Kidney cysts    • Left hip pain    • Lesion of oral mucosa    • Liver disease    • Low back pain    • Migraines    • Obstructive chronic bronchitis with exacerbation (CMS/HCC)    • Sleep apnea     mild       Allergies   Allergen Reactions   • Doxycycline Nausea And Vomiting       Past Surgical History:   Procedure Laterality Date   • BACK SURGERY     • HERNIA REPAIR     • SHOULDER LIGAMENT REPAIR      right shoulder   • SMALL INTESTINE SURGERY      Small bowell resection   • TOTAL HIP ARTHROPLASTY Left        Family History   Problem Relation Age of Onset   • Arthritis Other    • Hypertension Other    • Migraines Other    • Heart attack Other    • Stroke Other        Social  History     Socioeconomic History   • Marital status:      Spouse name: Not on file   • Number of children: Not on file   • Years of education: Not on file   • Highest education level: Not on file   Tobacco Use   • Smoking status: Former Smoker   • Smokeless tobacco: Current User     Types: Chew   • Tobacco comment: quit 2005   Substance and Sexual Activity   • Alcohol use: No     Comment: stopped drinking alcohol   • Drug use: Yes     Comment: takes suboxone   • Sexual activity: Defer           Objective   Physical Exam   Constitutional: He appears well-developed and well-nourished.   Nursing note and vitals reviewed.  GEN: No acute distress  Head: Normocephalic, atraumatic  Eyes: Pupils equal round reactive to light  ENT: Posterior pharynx normal in appearance, oral mucosa is moist  Chest: Nontender to palpation  Cardiovascular: Regular rate  Lungs: Clear to auscultation bilaterally  Abdomen: Soft, nontender, nondistended, no peritoneal signs  Extremities: No edema, normal appearance  Neuro: GCS 15  Psych: Mood and affect are appropriate      Procedures           ED Course  ED Course as of Jan 07 1953   Tue Jan 07, 2020   1813 EKG interpreted by me.  Sinus rhythm.  Rate of 70.  Sinus arrhythmia.  No ST segment or T wave abnormalities.  Normal EKG.    [CG]   1919 Still having arm pain, will try tordol      [TW]   1949 Feeling better after tordol. Pain resolved in arm. I have discussed the findings with the patient. I advised him to follow up with ortho. He request [facility] ortho because he is already established.     [TW]      ED Course User Index  [CG] Tanner Ramires B, DO  [TW] Emmie Dee, APRN                                               MDM  Number of Diagnoses or Management Options     Amount and/or Complexity of Data Reviewed  Clinical lab tests: ordered and reviewed  Tests in the radiology section of CPT®: ordered and reviewed  Review and summarize past medical records: yes  Discuss the  patient with other providers: yes  Independent visualization of images, tracings, or specimens: yes    Risk of Complications, Morbidity, and/or Mortality  Presenting problems: moderate  Diagnostic procedures: moderate  Management options: moderate        Final diagnoses:   Left shoulder pain, unspecified chronicity            Emmie Dee, APRN  01/07/20 1953

## 2020-01-08 NOTE — ED NOTES
Pt co increased pain in left axillary radiating to left scapula.      Delmy Hsu, RN  01/07/20 1918

## 2020-01-14 ENCOUNTER — ANESTHESIA EVENT (OUTPATIENT)
Dept: PERIOP | Facility: HOSPITAL | Age: 45
End: 2020-01-14

## 2020-01-15 ENCOUNTER — APPOINTMENT (OUTPATIENT)
Dept: GENERAL RADIOLOGY | Facility: HOSPITAL | Age: 45
End: 2020-01-15

## 2020-01-15 ENCOUNTER — HOSPITAL ENCOUNTER (INPATIENT)
Facility: HOSPITAL | Age: 45
LOS: 5 days | Discharge: HOME | End: 2020-01-20
Attending: ORTHOPAEDIC SURGERY | Admitting: ORTHOPAEDIC SURGERY

## 2020-01-15 ENCOUNTER — ANESTHESIA (OUTPATIENT)
Dept: PERIOP | Facility: HOSPITAL | Age: 45
End: 2020-01-15

## 2020-01-15 DIAGNOSIS — B99.9 INFECTION: ICD-10-CM

## 2020-01-15 DIAGNOSIS — Z96.649 S/P REVISION OF TOTAL HIP: Primary | ICD-10-CM

## 2020-01-15 DIAGNOSIS — F11.90 NARCOTIC DRUG USE: ICD-10-CM

## 2020-01-15 PROBLEM — J44.9 COPD (CHRONIC OBSTRUCTIVE PULMONARY DISEASE): Status: ACTIVE | Noted: 2020-01-15

## 2020-01-15 PROBLEM — M25.552 LEFT HIP PAIN: Status: ACTIVE | Noted: 2020-01-15

## 2020-01-15 PROBLEM — R73.03 PREDIABETES: Status: ACTIVE | Noted: 2020-01-15

## 2020-01-15 LAB
APPEARANCE FLD: ABNORMAL
ARTERIAL PATENCY WRIST A: ABNORMAL
ATMOSPHERIC PRESS: ABNORMAL MM[HG]
BASE EXCESS BLDA CALC-SCNC: -0.7 MMOL/L (ref 0–2)
BODY TEMPERATURE: 37 C
CO2 BLDA-SCNC: 26.1 MMOL/L (ref 22–33)
COHGB MFR BLD: 0.5 % (ref 0–2)
COLOR FLD: ABNORMAL
EPAP: 0
GLUCOSE BLDC GLUCOMTR-MCNC: 158 MG/DL (ref 70–130)
HCO3 BLDA-SCNC: 24.7 MMOL/L (ref 20–26)
HCT VFR BLD CALC: 34.8 %
HGB BLDA-MCNC: 11.4 G/DL (ref 13.5–17.5)
HOROWITZ INDEX BLD+IHG-RTO: 100 %
IPAP: 0
LYMPHOCYTES NFR FLD MANUAL: 21 %
METHGB BLD QL: 1.1 % (ref 0–1.5)
MODALITY: ABNORMAL
MONOCYTES NFR FLD: 4 %
MONOS+MACROS NFR FLD: 3 %
NEUTROPHILS NFR FLD MANUAL: 72 %
NOTE: ABNORMAL
OXYHGB MFR BLDV: 92.4 % (ref 94–99)
PAW @ PEAK INSP FLOW SETTING VENT: 0 CMH2O
PCO2 BLDA: 43 MM HG
PCO2 TEMP ADJ BLD: 43 MM HG (ref 35–48)
PH BLDA: 7.37 PH UNITS (ref 7.35–7.45)
PH, TEMP CORRECTED: 7.37 PH UNITS
PO2 BLDA: 70.2 MM HG (ref 83–108)
PO2 TEMP ADJ BLD: 70.2 MM HG (ref 83–108)
RBC # FLD AUTO: ABNORMAL /MM3
TOTAL RATE: 0 BREATHS/MINUTE
WBC # FLD AUTO: 2046 /MM3

## 2020-01-15 PROCEDURE — 0SHB08Z INSERTION OF SPACER INTO LEFT HIP JOINT, OPEN APPROACH: ICD-10-PCS | Performed by: ORTHOPAEDIC SURGERY

## 2020-01-15 PROCEDURE — 25010000002 FENTANYL CITRATE (PF) 100 MCG/2ML SOLUTION: Performed by: NURSE ANESTHETIST, CERTIFIED REGISTERED

## 2020-01-15 PROCEDURE — C1713 ANCHOR/SCREW BN/BN,TIS/BN: HCPCS | Performed by: ORTHOPAEDIC SURGERY

## 2020-01-15 PROCEDURE — 87076 CULTURE ANAEROBE IDENT EACH: CPT | Performed by: ORTHOPAEDIC SURGERY

## 2020-01-15 PROCEDURE — C1776 JOINT DEVICE (IMPLANTABLE): HCPCS | Performed by: ORTHOPAEDIC SURGERY

## 2020-01-15 PROCEDURE — 87075 CULTR BACTERIA EXCEPT BLOOD: CPT | Performed by: ORTHOPAEDIC SURGERY

## 2020-01-15 PROCEDURE — 25010000002 PROPOFOL 10 MG/ML EMULSION: Performed by: NURSE ANESTHETIST, CERTIFIED REGISTERED

## 2020-01-15 PROCEDURE — 63710000001 INSULIN LISPRO (HUMAN) PER 5 UNITS: Performed by: NURSE PRACTITIONER

## 2020-01-15 PROCEDURE — 87070 CULTURE OTHR SPECIMN AEROBIC: CPT | Performed by: ORTHOPAEDIC SURGERY

## 2020-01-15 PROCEDURE — A9270 NON-COVERED ITEM OR SERVICE: HCPCS | Performed by: ORTHOPAEDIC SURGERY

## 2020-01-15 PROCEDURE — 87116 MYCOBACTERIA CULTURE: CPT | Performed by: ORTHOPAEDIC SURGERY

## 2020-01-15 PROCEDURE — 25010000003 CEFAZOLIN IN DEXTROSE 2-4 GM/100ML-% SOLUTION: Performed by: ORTHOPAEDIC SURGERY

## 2020-01-15 PROCEDURE — 87102 FUNGUS ISOLATION CULTURE: CPT | Performed by: ORTHOPAEDIC SURGERY

## 2020-01-15 PROCEDURE — 25010000002 ROPIVACAINE PER 1 MG: Performed by: NURSE ANESTHETIST, CERTIFIED REGISTERED

## 2020-01-15 PROCEDURE — 0SPB0JZ REMOVAL OF SYNTHETIC SUBSTITUTE FROM LEFT HIP JOINT, OPEN APPROACH: ICD-10-PCS | Performed by: ORTHOPAEDIC SURGERY

## 2020-01-15 PROCEDURE — 82962 GLUCOSE BLOOD TEST: CPT

## 2020-01-15 PROCEDURE — 63710000001 BUDESONIDE-FORMOTEROL 160-4.5 MCG/ACT AEROSOL 6 G INHALER: Performed by: ORTHOPAEDIC SURGERY

## 2020-01-15 PROCEDURE — 25010000002 ONDANSETRON PER 1 MG: Performed by: NURSE ANESTHETIST, CERTIFIED REGISTERED

## 2020-01-15 PROCEDURE — 63710000001 MONTELUKAST 10 MG TABLET: Performed by: ORTHOPAEDIC SURGERY

## 2020-01-15 PROCEDURE — 63710000001 OXYCODONE 5 MG TABLET: Performed by: ORTHOPAEDIC SURGERY

## 2020-01-15 PROCEDURE — 87176 TISSUE HOMOGENIZATION CULTR: CPT | Performed by: ORTHOPAEDIC SURGERY

## 2020-01-15 PROCEDURE — A9270 NON-COVERED ITEM OR SERVICE: HCPCS | Performed by: NURSE PRACTITIONER

## 2020-01-15 PROCEDURE — 0QS7XZZ REPOSITION LEFT UPPER FEMUR, EXTERNAL APPROACH: ICD-10-PCS | Performed by: ORTHOPAEDIC SURGERY

## 2020-01-15 PROCEDURE — 25010000002 VANCOMYCIN 1 G RECONSTITUTED SOLUTION: Performed by: ORTHOPAEDIC SURGERY

## 2020-01-15 PROCEDURE — 94660 CPAP INITIATION&MGMT: CPT

## 2020-01-15 PROCEDURE — 63710000001 TRAZODONE 50 MG TABLET: Performed by: ORTHOPAEDIC SURGERY

## 2020-01-15 PROCEDURE — 25010000002 HYDROMORPHONE PER 4 MG: Performed by: ORTHOPAEDIC SURGERY

## 2020-01-15 PROCEDURE — 94799 UNLISTED PULMONARY SVC/PX: CPT

## 2020-01-15 PROCEDURE — 88305 TISSUE EXAM BY PATHOLOGIST: CPT | Performed by: ORTHOPAEDIC SURGERY

## 2020-01-15 PROCEDURE — 88312 SPECIAL STAINS GROUP 1: CPT | Performed by: ORTHOPAEDIC SURGERY

## 2020-01-15 PROCEDURE — 94640 AIRWAY INHALATION TREATMENT: CPT

## 2020-01-15 PROCEDURE — 63710000001 LOSARTAN 50 MG TABLET: Performed by: ORTHOPAEDIC SURGERY

## 2020-01-15 PROCEDURE — 25010000002 FENTANYL CITRATE (PF) 100 MCG/2ML SOLUTION: Performed by: ANESTHESIOLOGY

## 2020-01-15 PROCEDURE — 25010000002 ROPIVACAINE PER 1 MG: Performed by: ANESTHESIOLOGY

## 2020-01-15 PROCEDURE — 25010000002 HYDROMORPHONE PER 4 MG: Performed by: NURSE ANESTHETIST, CERTIFIED REGISTERED

## 2020-01-15 PROCEDURE — 89051 BODY FLUID CELL COUNT: CPT | Performed by: ORTHOPAEDIC SURGERY

## 2020-01-15 PROCEDURE — 87206 SMEAR FLUORESCENT/ACID STAI: CPT | Performed by: ORTHOPAEDIC SURGERY

## 2020-01-15 PROCEDURE — 87185 SC STD ENZYME DETCJ PER NZM: CPT | Performed by: ORTHOPAEDIC SURGERY

## 2020-01-15 PROCEDURE — 87015 SPECIMEN INFECT AGNT CONCNTJ: CPT | Performed by: ORTHOPAEDIC SURGERY

## 2020-01-15 PROCEDURE — 87205 SMEAR GRAM STAIN: CPT | Performed by: ORTHOPAEDIC SURGERY

## 2020-01-15 PROCEDURE — 36600 WITHDRAWAL OF ARTERIAL BLOOD: CPT

## 2020-01-15 PROCEDURE — 82805 BLOOD GASES W/O2 SATURATION: CPT

## 2020-01-15 PROCEDURE — 25010000002 DEXAMETHASONE PER 1 MG: Performed by: NURSE ANESTHETIST, CERTIFIED REGISTERED

## 2020-01-15 PROCEDURE — 73502 X-RAY EXAM HIP UNI 2-3 VIEWS: CPT

## 2020-01-15 PROCEDURE — 63710000001 ACETAMINOPHEN 500 MG TABLET: Performed by: ORTHOPAEDIC SURGERY

## 2020-01-15 PROCEDURE — 0S9B0ZX DRAINAGE OF LEFT HIP JOINT, OPEN APPROACH, DIAGNOSTIC: ICD-10-PCS | Performed by: ORTHOPAEDIC SURGERY

## 2020-01-15 DEVICE — CABL CERCLG GTR COCR 1.8MM 63.5CM: Type: IMPLANTABLE DEVICE | Site: HIP | Status: FUNCTIONAL

## 2020-01-15 DEVICE — IMPLANTABLE DEVICE: Type: IMPLANTABLE DEVICE | Site: HIP | Status: FUNCTIONAL

## 2020-01-15 DEVICE — CMT BONE REFOBACIN R W/GENT 1X40: Type: IMPLANTABLE DEVICE | Site: HIP | Status: FUNCTIONAL

## 2020-01-15 RX ORDER — ATORVASTATIN CALCIUM 10 MG/1
10 TABLET, FILM COATED ORAL DAILY
Status: DISCONTINUED | OUTPATIENT
Start: 2020-01-16 | End: 2020-01-20 | Stop reason: HOSPADM

## 2020-01-15 RX ORDER — CEFAZOLIN SODIUM 2 G/100ML
2 INJECTION, SOLUTION INTRAVENOUS ONCE
Status: COMPLETED | OUTPATIENT
Start: 2020-01-15 | End: 2020-01-15

## 2020-01-15 RX ORDER — BISACODYL 5 MG/1
10 TABLET, DELAYED RELEASE ORAL DAILY PRN
Status: DISCONTINUED | OUTPATIENT
Start: 2020-01-15 | End: 2020-01-20 | Stop reason: HOSPADM

## 2020-01-15 RX ORDER — KETOROLAC TROMETHAMINE 15 MG/ML
15 INJECTION, SOLUTION INTRAMUSCULAR; INTRAVENOUS EVERY 6 HOURS PRN
Status: DISPENSED | OUTPATIENT
Start: 2020-01-15 | End: 2020-01-17

## 2020-01-15 RX ORDER — ACETAMINOPHEN 500 MG
1000 TABLET ORAL ONCE
Status: COMPLETED | OUTPATIENT
Start: 2020-01-15 | End: 2020-01-15

## 2020-01-15 RX ORDER — VANCOMYCIN HYDROCHLORIDE 1 G/20ML
INJECTION, POWDER, LYOPHILIZED, FOR SOLUTION INTRAVENOUS AS NEEDED
Status: DISCONTINUED | OUTPATIENT
Start: 2020-01-15 | End: 2020-01-15 | Stop reason: HOSPADM

## 2020-01-15 RX ORDER — HYDROMORPHONE HYDROCHLORIDE 1 MG/ML
0.5 INJECTION, SOLUTION INTRAMUSCULAR; INTRAVENOUS; SUBCUTANEOUS
Status: COMPLETED | OUTPATIENT
Start: 2020-01-15 | End: 2020-01-15

## 2020-01-15 RX ORDER — ONDANSETRON 2 MG/ML
INJECTION INTRAMUSCULAR; INTRAVENOUS AS NEEDED
Status: DISCONTINUED | OUTPATIENT
Start: 2020-01-15 | End: 2020-01-15 | Stop reason: SURG

## 2020-01-15 RX ORDER — METHOCARBAMOL 750 MG/1
750 TABLET, FILM COATED ORAL 3 TIMES DAILY PRN
Status: DISCONTINUED | OUTPATIENT
Start: 2020-01-15 | End: 2020-01-20 | Stop reason: HOSPADM

## 2020-01-15 RX ORDER — MELOXICAM 15 MG/1
15 TABLET ORAL ONCE
Status: COMPLETED | OUTPATIENT
Start: 2020-01-15 | End: 2020-01-15

## 2020-01-15 RX ORDER — NICOTINE POLACRILEX 4 MG
15 LOZENGE BUCCAL
Status: DISCONTINUED | OUTPATIENT
Start: 2020-01-15 | End: 2020-01-20 | Stop reason: HOSPADM

## 2020-01-15 RX ORDER — ROPIVACAINE HYDROCHLORIDE 5 MG/ML
INJECTION, SOLUTION EPIDURAL; INFILTRATION; PERINEURAL
Status: DISCONTINUED | OUTPATIENT
Start: 2020-01-15 | End: 2020-01-15 | Stop reason: SURG

## 2020-01-15 RX ORDER — SODIUM CHLORIDE, SODIUM LACTATE, POTASSIUM CHLORIDE, CALCIUM CHLORIDE 600; 310; 30; 20 MG/100ML; MG/100ML; MG/100ML; MG/100ML
9 INJECTION, SOLUTION INTRAVENOUS CONTINUOUS
Status: DISCONTINUED | OUTPATIENT
Start: 2020-01-15 | End: 2020-01-20 | Stop reason: HOSPADM

## 2020-01-15 RX ORDER — FAMOTIDINE 20 MG/1
20 TABLET, FILM COATED ORAL ONCE
Status: COMPLETED | OUTPATIENT
Start: 2020-01-15 | End: 2020-01-15

## 2020-01-15 RX ORDER — ASPIRIN 81 MG/1
81 TABLET ORAL EVERY 12 HOURS SCHEDULED
Status: DISCONTINUED | OUTPATIENT
Start: 2020-01-16 | End: 2020-01-20 | Stop reason: HOSPADM

## 2020-01-15 RX ORDER — CEFAZOLIN SODIUM 2 G/100ML
2 INJECTION, SOLUTION INTRAVENOUS EVERY 8 HOURS
Status: COMPLETED | OUTPATIENT
Start: 2020-01-15 | End: 2020-01-16

## 2020-01-15 RX ORDER — PROMETHAZINE HYDROCHLORIDE 25 MG/1
25 TABLET ORAL ONCE AS NEEDED
Status: DISCONTINUED | OUTPATIENT
Start: 2020-01-15 | End: 2020-01-15 | Stop reason: HOSPADM

## 2020-01-15 RX ORDER — ROPIVACAINE HYDROCHLORIDE 2 MG/ML
10 INJECTION, SOLUTION EPIDURAL; INFILTRATION CONTINUOUS
Status: DISCONTINUED | OUTPATIENT
Start: 2020-01-15 | End: 2020-01-20 | Stop reason: HOSPADM

## 2020-01-15 RX ORDER — FAMOTIDINE 10 MG/ML
20 INJECTION, SOLUTION INTRAVENOUS ONCE
Status: CANCELLED | OUTPATIENT
Start: 2020-01-15 | End: 2020-01-15

## 2020-01-15 RX ORDER — NALOXONE HCL 0.4 MG/ML
0.1 VIAL (ML) INJECTION
Status: DISCONTINUED | OUTPATIENT
Start: 2020-01-15 | End: 2020-01-20 | Stop reason: HOSPADM

## 2020-01-15 RX ORDER — PROMETHAZINE HYDROCHLORIDE 25 MG/1
25 SUPPOSITORY RECTAL ONCE AS NEEDED
Status: DISCONTINUED | OUTPATIENT
Start: 2020-01-15 | End: 2020-01-15 | Stop reason: HOSPADM

## 2020-01-15 RX ORDER — DEXTROSE MONOHYDRATE 25 G/50ML
25 INJECTION, SOLUTION INTRAVENOUS
Status: DISCONTINUED | OUTPATIENT
Start: 2020-01-15 | End: 2020-01-20 | Stop reason: HOSPADM

## 2020-01-15 RX ORDER — SODIUM CHLORIDE 0.9 % (FLUSH) 0.9 %
10 SYRINGE (ML) INJECTION EVERY 12 HOURS SCHEDULED
Status: DISCONTINUED | OUTPATIENT
Start: 2020-01-15 | End: 2020-01-15 | Stop reason: HOSPADM

## 2020-01-15 RX ORDER — AMOXICILLIN 250 MG
2 CAPSULE ORAL 2 TIMES DAILY PRN
Status: DISCONTINUED | OUTPATIENT
Start: 2020-01-15 | End: 2020-01-20 | Stop reason: HOSPADM

## 2020-01-15 RX ORDER — SODIUM CHLORIDE, SODIUM LACTATE, POTASSIUM CHLORIDE, CALCIUM CHLORIDE 600; 310; 30; 20 MG/100ML; MG/100ML; MG/100ML; MG/100ML
100 INJECTION, SOLUTION INTRAVENOUS CONTINUOUS
Status: DISCONTINUED | OUTPATIENT
Start: 2020-01-15 | End: 2020-01-20 | Stop reason: HOSPADM

## 2020-01-15 RX ORDER — MONTELUKAST SODIUM 10 MG/1
10 TABLET ORAL EVERY EVENING
Status: DISCONTINUED | OUTPATIENT
Start: 2020-01-15 | End: 2020-01-20 | Stop reason: HOSPADM

## 2020-01-15 RX ORDER — DOCUSATE SODIUM 100 MG/1
100 CAPSULE, LIQUID FILLED ORAL 2 TIMES DAILY PRN
Status: DISCONTINUED | OUTPATIENT
Start: 2020-01-15 | End: 2020-01-20 | Stop reason: HOSPADM

## 2020-01-15 RX ORDER — BUPIVACAINE HYDROCHLORIDE 5 MG/ML
INJECTION, SOLUTION PERINEURAL
Status: COMPLETED | OUTPATIENT
Start: 2020-01-15 | End: 2020-01-15

## 2020-01-15 RX ORDER — MELOXICAM 7.5 MG/1
15 TABLET ORAL DAILY
Status: DISCONTINUED | OUTPATIENT
Start: 2020-01-16 | End: 2020-01-20 | Stop reason: HOSPADM

## 2020-01-15 RX ORDER — PROMETHAZINE HYDROCHLORIDE 25 MG/ML
6.25 INJECTION, SOLUTION INTRAMUSCULAR; INTRAVENOUS ONCE AS NEEDED
Status: DISCONTINUED | OUTPATIENT
Start: 2020-01-15 | End: 2020-01-15 | Stop reason: HOSPADM

## 2020-01-15 RX ORDER — BISACODYL 10 MG
10 SUPPOSITORY, RECTAL RECTAL DAILY PRN
Status: DISCONTINUED | OUTPATIENT
Start: 2020-01-15 | End: 2020-01-20 | Stop reason: HOSPADM

## 2020-01-15 RX ORDER — BUDESONIDE AND FORMOTEROL FUMARATE DIHYDRATE 160; 4.5 UG/1; UG/1
2 AEROSOL RESPIRATORY (INHALATION)
Status: DISCONTINUED | OUTPATIENT
Start: 2020-01-15 | End: 2020-01-20 | Stop reason: HOSPADM

## 2020-01-15 RX ORDER — FENTANYL CITRATE 50 UG/ML
50 INJECTION, SOLUTION INTRAMUSCULAR; INTRAVENOUS
Status: DISCONTINUED | OUTPATIENT
Start: 2020-01-15 | End: 2020-01-15 | Stop reason: HOSPADM

## 2020-01-15 RX ORDER — KETAMINE HCL IN NACL, ISO-OSM 100MG/10ML
10 SYRINGE (ML) INJECTION ONCE AS NEEDED
Status: COMPLETED | OUTPATIENT
Start: 2020-01-15 | End: 2020-01-15

## 2020-01-15 RX ORDER — OXYCODONE HYDROCHLORIDE 5 MG/1
10 TABLET ORAL EVERY 4 HOURS PRN
Status: DISCONTINUED | OUTPATIENT
Start: 2020-01-15 | End: 2020-01-20 | Stop reason: HOSPADM

## 2020-01-15 RX ORDER — ALBUTEROL SULFATE 2.5 MG/3ML
2.5 SOLUTION RESPIRATORY (INHALATION) EVERY 4 HOURS PRN
Status: DISCONTINUED | OUTPATIENT
Start: 2020-01-15 | End: 2020-01-20 | Stop reason: HOSPADM

## 2020-01-15 RX ORDER — MAGNESIUM HYDROXIDE 1200 MG/15ML
LIQUID ORAL AS NEEDED
Status: DISCONTINUED | OUTPATIENT
Start: 2020-01-15 | End: 2020-01-15 | Stop reason: HOSPADM

## 2020-01-15 RX ORDER — LOSARTAN POTASSIUM 50 MG/1
100 TABLET ORAL DAILY
Status: DISCONTINUED | OUTPATIENT
Start: 2020-01-15 | End: 2020-01-20 | Stop reason: HOSPADM

## 2020-01-15 RX ORDER — PREGABALIN 75 MG/1
75 CAPSULE ORAL ONCE
Status: COMPLETED | OUTPATIENT
Start: 2020-01-15 | End: 2020-01-15

## 2020-01-15 RX ORDER — SODIUM CHLORIDE 0.9 % (FLUSH) 0.9 %
10 SYRINGE (ML) INJECTION AS NEEDED
Status: DISCONTINUED | OUTPATIENT
Start: 2020-01-15 | End: 2020-01-15 | Stop reason: HOSPADM

## 2020-01-15 RX ORDER — ONDANSETRON 2 MG/ML
4 INJECTION INTRAMUSCULAR; INTRAVENOUS EVERY 6 HOURS PRN
Status: DISCONTINUED | OUTPATIENT
Start: 2020-01-15 | End: 2020-01-20 | Stop reason: HOSPADM

## 2020-01-15 RX ORDER — OXYCODONE HYDROCHLORIDE 5 MG/1
5 TABLET ORAL EVERY 4 HOURS PRN
Status: DISCONTINUED | OUTPATIENT
Start: 2020-01-15 | End: 2020-01-20 | Stop reason: HOSPADM

## 2020-01-15 RX ORDER — HYDROMORPHONE HYDROCHLORIDE 1 MG/ML
0.5 INJECTION, SOLUTION INTRAMUSCULAR; INTRAVENOUS; SUBCUTANEOUS
Status: DISCONTINUED | OUTPATIENT
Start: 2020-01-15 | End: 2020-01-20 | Stop reason: HOSPADM

## 2020-01-15 RX ORDER — FENTANYL CITRATE 50 UG/ML
50 INJECTION, SOLUTION INTRAMUSCULAR; INTRAVENOUS
Status: COMPLETED | OUTPATIENT
Start: 2020-01-15 | End: 2020-01-15

## 2020-01-15 RX ORDER — LIDOCAINE HYDROCHLORIDE 10 MG/ML
0.5 INJECTION, SOLUTION EPIDURAL; INFILTRATION; INTRACAUDAL; PERINEURAL ONCE AS NEEDED
Status: COMPLETED | OUTPATIENT
Start: 2020-01-15 | End: 2020-01-15

## 2020-01-15 RX ORDER — PANTOPRAZOLE SODIUM 40 MG/1
40 TABLET, DELAYED RELEASE ORAL EVERY MORNING
Status: DISCONTINUED | OUTPATIENT
Start: 2020-01-16 | End: 2020-01-20 | Stop reason: HOSPADM

## 2020-01-15 RX ORDER — LABETALOL HYDROCHLORIDE 5 MG/ML
10 INJECTION, SOLUTION INTRAVENOUS EVERY 4 HOURS PRN
Status: DISCONTINUED | OUTPATIENT
Start: 2020-01-15 | End: 2020-01-20 | Stop reason: HOSPADM

## 2020-01-15 RX ORDER — ACETAMINOPHEN 500 MG
1000 TABLET ORAL EVERY 8 HOURS
Status: DISCONTINUED | OUTPATIENT
Start: 2020-01-15 | End: 2020-01-20 | Stop reason: HOSPADM

## 2020-01-15 RX ORDER — DEXAMETHASONE SODIUM PHOSPHATE 4 MG/ML
INJECTION, SOLUTION INTRA-ARTICULAR; INTRALESIONAL; INTRAMUSCULAR; INTRAVENOUS; SOFT TISSUE AS NEEDED
Status: DISCONTINUED | OUTPATIENT
Start: 2020-01-15 | End: 2020-01-15 | Stop reason: SURG

## 2020-01-15 RX ORDER — KETAMINE HCL IN NACL, ISO-OSM 100MG/10ML
20 SYRINGE (ML) INJECTION ONCE
Status: COMPLETED | OUTPATIENT
Start: 2020-01-15 | End: 2020-01-15

## 2020-01-15 RX ADMIN — HYDROMORPHONE HYDROCHLORIDE 0.5 MG: 1 INJECTION, SOLUTION INTRAMUSCULAR; INTRAVENOUS; SUBCUTANEOUS at 16:02

## 2020-01-15 RX ADMIN — SODIUM CHLORIDE, POTASSIUM CHLORIDE, SODIUM LACTATE AND CALCIUM CHLORIDE 9 ML/HR: 600; 310; 30; 20 INJECTION, SOLUTION INTRAVENOUS at 09:26

## 2020-01-15 RX ADMIN — HYDROMORPHONE HYDROCHLORIDE 0.5 MG: 1 INJECTION, SOLUTION INTRAMUSCULAR; INTRAVENOUS; SUBCUTANEOUS at 15:53

## 2020-01-15 RX ADMIN — PREGABALIN 75 MG: 75 CAPSULE ORAL at 09:26

## 2020-01-15 RX ADMIN — FENTANYL CITRATE 50 MCG: 0.05 INJECTION, SOLUTION INTRAMUSCULAR; INTRAVENOUS at 16:30

## 2020-01-15 RX ADMIN — MONTELUKAST SODIUM 10 MG: 10 TABLET, COATED ORAL at 18:39

## 2020-01-15 RX ADMIN — SODIUM CHLORIDE, POTASSIUM CHLORIDE, SODIUM LACTATE AND CALCIUM CHLORIDE: 600; 310; 30; 20 INJECTION, SOLUTION INTRAVENOUS at 15:11

## 2020-01-15 RX ADMIN — MELOXICAM 15 MG: 15 TABLET ORAL at 09:26

## 2020-01-15 RX ADMIN — BUPIVACAINE HYDROCHLORIDE 2 ML: 5 INJECTION, SOLUTION PERINEURAL at 11:13

## 2020-01-15 RX ADMIN — FENTANYL CITRATE 50 MCG: 0.05 INJECTION, SOLUTION INTRAMUSCULAR; INTRAVENOUS at 16:11

## 2020-01-15 RX ADMIN — HYDROMORPHONE HYDROCHLORIDE 0.5 MG: 1 INJECTION, SOLUTION INTRAMUSCULAR; INTRAVENOUS; SUBCUTANEOUS at 23:38

## 2020-01-15 RX ADMIN — TRANEXAMIC ACID 1000 MG: 100 INJECTION, SOLUTION INTRAVENOUS at 15:03

## 2020-01-15 RX ADMIN — FENTANYL CITRATE 50 MCG: 0.05 INJECTION, SOLUTION INTRAMUSCULAR; INTRAVENOUS at 15:48

## 2020-01-15 RX ADMIN — ACETAMINOPHEN 1000 MG: 500 TABLET ORAL at 16:38

## 2020-01-15 RX ADMIN — Medication 20 MG: at 16:22

## 2020-01-15 RX ADMIN — FAMOTIDINE 20 MG: 20 TABLET ORAL at 09:26

## 2020-01-15 RX ADMIN — SODIUM CHLORIDE, POTASSIUM CHLORIDE, SODIUM LACTATE AND CALCIUM CHLORIDE 100 ML/HR: 600; 310; 30; 20 INJECTION, SOLUTION INTRAVENOUS at 19:13

## 2020-01-15 RX ADMIN — DEXAMETHASONE SODIUM PHOSPHATE 8 MG: 4 INJECTION, SOLUTION INTRAMUSCULAR; INTRAVENOUS at 11:17

## 2020-01-15 RX ADMIN — CEFAZOLIN SODIUM 2 G: 2 INJECTION, SOLUTION INTRAVENOUS at 11:08

## 2020-01-15 RX ADMIN — ACETAMINOPHEN 1000 MG: 500 TABLET, FILM COATED ORAL at 20:42

## 2020-01-15 RX ADMIN — LIDOCAINE HYDROCHLORIDE 0.5 ML: 10 INJECTION, SOLUTION EPIDURAL; INFILTRATION; INTRACAUDAL; PERINEURAL at 09:26

## 2020-01-15 RX ADMIN — ROPIVACAINE HYDROCHLORIDE 10 ML/HR: 2 INJECTION, SOLUTION EPIDURAL; INFILTRATION at 17:15

## 2020-01-15 RX ADMIN — TRAZODONE HYDROCHLORIDE 150 MG: 100 TABLET ORAL at 22:15

## 2020-01-15 RX ADMIN — TRANEXAMIC ACID 1000 MG: 100 INJECTION, SOLUTION INTRAVENOUS at 11:16

## 2020-01-15 RX ADMIN — SODIUM CHLORIDE, POTASSIUM CHLORIDE, SODIUM LACTATE AND CALCIUM CHLORIDE 100 ML/HR: 600; 310; 30; 20 INJECTION, SOLUTION INTRAVENOUS at 18:34

## 2020-01-15 RX ADMIN — OXYCODONE HYDROCHLORIDE 10 MG: 5 TABLET ORAL at 18:40

## 2020-01-15 RX ADMIN — Medication 10 MG: at 16:31

## 2020-01-15 RX ADMIN — INSULIN LISPRO 2 UNITS: 100 INJECTION, SOLUTION INTRAVENOUS; SUBCUTANEOUS at 20:40

## 2020-01-15 RX ADMIN — CEFAZOLIN SODIUM 2 G: 2 INJECTION, SOLUTION INTRAVENOUS at 15:06

## 2020-01-15 RX ADMIN — HYDROMORPHONE HYDROCHLORIDE 0.5 MG: 1 INJECTION, SOLUTION INTRAMUSCULAR; INTRAVENOUS; SUBCUTANEOUS at 16:07

## 2020-01-15 RX ADMIN — ONDANSETRON 4 MG: 2 INJECTION INTRAMUSCULAR; INTRAVENOUS at 11:17

## 2020-01-15 RX ADMIN — PROPOFOL 60 MCG/KG/MIN: 10 INJECTION, EMULSION INTRAVENOUS at 11:15

## 2020-01-15 RX ADMIN — FENTANYL CITRATE 50 MCG: 0.05 INJECTION, SOLUTION INTRAMUSCULAR; INTRAVENOUS at 16:22

## 2020-01-15 RX ADMIN — FENTANYL CITRATE 50 MCG: 0.05 INJECTION, SOLUTION INTRAMUSCULAR; INTRAVENOUS at 16:05

## 2020-01-15 RX ADMIN — FENTANYL CITRATE 50 MCG: 0.05 INJECTION, SOLUTION INTRAMUSCULAR; INTRAVENOUS at 16:35

## 2020-01-15 RX ADMIN — LOSARTAN POTASSIUM 100 MG: 50 TABLET, FILM COATED ORAL at 18:39

## 2020-01-15 RX ADMIN — BUDESONIDE AND FORMOTEROL FUMARATE DIHYDRATE 2 PUFF: 160; 4.5 AEROSOL RESPIRATORY (INHALATION) at 21:09

## 2020-01-15 RX ADMIN — MUPIROCIN 1 APPLICATION: 20 OINTMENT TOPICAL at 09:25

## 2020-01-15 RX ADMIN — HYDROMORPHONE HYDROCHLORIDE 0.5 MG: 1 INJECTION, SOLUTION INTRAMUSCULAR; INTRAVENOUS; SUBCUTANEOUS at 20:39

## 2020-01-15 RX ADMIN — ACETAMINOPHEN 1000 MG: 500 TABLET ORAL at 09:27

## 2020-01-15 RX ADMIN — SODIUM CHLORIDE, POTASSIUM CHLORIDE, SODIUM LACTATE AND CALCIUM CHLORIDE: 600; 310; 30; 20 INJECTION, SOLUTION INTRAVENOUS at 13:37

## 2020-01-15 RX ADMIN — FENTANYL CITRATE 50 MCG: 0.05 INJECTION, SOLUTION INTRAMUSCULAR; INTRAVENOUS at 15:54

## 2020-01-15 RX ADMIN — FENTANYL CITRATE 50 MCG: 0.05 INJECTION, SOLUTION INTRAMUSCULAR; INTRAVENOUS at 15:59

## 2020-01-15 RX ADMIN — ROPIVACAINE HYDROCHLORIDE 25 ML: 5 INJECTION, SOLUTION EPIDURAL; INFILTRATION; PERINEURAL at 17:06

## 2020-01-15 RX ADMIN — HYDROMORPHONE HYDROCHLORIDE 0.5 MG: 1 INJECTION, SOLUTION INTRAMUSCULAR; INTRAVENOUS; SUBCUTANEOUS at 15:43

## 2020-01-15 NOTE — ANESTHESIA PROCEDURE NOTES
[facility]      Patient reassessed immediately prior to procedure    Patient location during procedure: pre-op  Reason for block: procedure for pain and at surgeon's request  Performed by  CRNA: Emma Sandoval CRNA  Assisted by: Jostin Verduzco MD  Preanesthetic Checklist  Completed: patient identified, site marked, surgical consent, pre-op evaluation, timeout performed, IV checked, risks and benefits discussed and monitors and equipment checked  Prep:  Pt Position: supine  Sterile barriers:cap, gloves and mask  Prep: ChloraPrep  Patient monitoring: blood pressure monitoring, continuous pulse oximetry and EKG  Procedure  Performed under: local infiltration  Guidance:ultrasound guided  Images:still images obtained, printed/placed on chart    Laterality:left  Block Type:fascia iliaca catheter  Injection Technique:catheter  Needle Type:echogenic  Needle Gauge:18 G  Resistance on Injection: none  Catheter Size:20 G (20g)  Cath Depth at skin: 11 cm    Medications Used: ropivacaine (NAROPIN) 0.5 % injection, 25 mL  Med admintered at 1/15/2020 5:06 PM      Medications  Preservative Free Saline:25ml    Post Assessment  Injection Assessment: negative aspiration for heme, no paresthesia on injection and incremental injection  Patient Tolerance:comfortable throughout block  Complications:no  Additional Notes  Procedure:                 Pt placed in supine position.   The insertion site was prepped in sterile fashion with Chlorapreop and clear plastic drapes.  Analgesia was provided by skin infiltration at insertion site with Lidocaine 1% 3mls.  A B-Bettencourt 18 g , 4 inch echogenic Touhy needle was advance In-plane under ultrasound guidance. The   Anterior superior Iliac crest was initially visualized and the probe was directed slightly medially and slightly towards the umbilicus.  The course of the needle was tracked over the sartorius muscle through the fascia Iliacus and into the anterior portion of the Iliacus muscle.  Major  vessels where identified and avoided as where structures of the peritoneal cavity.  LA injection was made incrementally in 1-5ml amounts spread was visualized superiorly below fascia iliacus.  Injection was completed with negative aspiration of blood and negative intravascular injection.  Injection pressures where normal or minimal resistance.  A 20 g B-Bettencourt wire styleted catheter was then advance thru the needle and very easily placed in a superior or cephalad direction.  The catheter was secured at insertion site with skin afix , mastisol, steristreps.  A CHG tegaderm dressing was placed over the insertion site and the nerve catheter labeled and capped.  Thank You.

## 2020-01-15 NOTE — ANESTHESIA POSTPROCEDURE EVALUATION
Patient: Topher Stoll    Procedure Summary     Date:  01/15/20 [facility] / [facility]:   [facility]    Anesthesia Start:  1108 Anesthesia Stop:  1546    Procedure:  TOTAL HIP IMPLANT REMOVAL WITH INSERTION OF ANTIBIOTIC SPACER (Left Hip) Diagnosis:      Surgeon:  aB Ramirez MD Provider:  Jostin Verduzco MD    Anesthesia Type:  spinal ASA Status:  2          Anesthesia Type: spinal    Vitals  Vitals Value Taken Time   BP     Temp     Pulse     Resp     SpO2 100 % 1/15/2020  3:45 PM   Vitals shown include unvalidated device data.        Post Anesthesia Care and Evaluation    Patient location during evaluation: PACU  Patient participation: complete - patient participated  Level of consciousness: awake and alert  Pain score: 0  Pain management: adequate  Airway patency: patent  Anesthetic complications: No anesthetic complications  PONV Status: none  Cardiovascular status: hemodynamically stable and acceptable  Respiratory status: nonlabored ventilation, acceptable and nasal cannula  Hydration status: acceptable

## 2020-01-15 NOTE — ANESTHESIA PROCEDURE NOTES
Spinal Block      Patient reassessed immediately prior to procedure    Patient location during procedure: OR  Indication:at surgeon's request  Performed By  CRNA: Jennifer Armstrong CRNA  Preanesthetic Checklist  Completed: patient identified, site marked, surgical consent, pre-op evaluation, timeout performed, IV checked, risks and benefits discussed and monitors and equipment checked  Spinal Block Prep:  Patient Position:sitting  Sterile Tech:cap, gloves, sterile barriers and mask  Prep:Chloraprep  Patient Monitoring:blood pressure monitoring, continuous pulse oximetry and EKG  Spinal Block Procedure  Approach:midline  Guidance:landmark technique and palpation technique  Location:L4-L5  Needle Type:Sprotte  Needle Gauge:25 G  Placement of Spinal needle event:cerebrospinal fluid aspirated  Paresthesia: no  Fluid Appearance:clear  Medications: bupivacaine (MARCAINE) 0.5 % injection, 2 mL  Med Administered at 1/15/2020 11:13 AM   Post Assessment  Patient Tolerance:patient tolerated the procedure well with no apparent complications  Complications no  Additional Notes  Procedure:  Pt assisted to sitting position, with legs in position of comfort over side of bed.  Pt. instructed in optimal spine presentation, the spine was prepped/ Draped and the skin at insertion site was anesthetized with 1% Lidocaine 2 ml.  The spinal needle was then advanced until CSF flow was obtained and LA was injected:

## 2020-01-15 NOTE — ANESTHESIA PREPROCEDURE EVALUATION
Anesthesia Evaluation     NPO Solid Status: > 8 hours  NPO Liquid Status: > 8 hours           Airway   Mallampati: II  TM distance: >3 FB  Neck ROM: full  No difficulty expected  Dental      Pulmonary     breath sounds clear to auscultation  (+) a smoker Former,   Cardiovascular     ECG reviewed  Rhythm: regular  Rate: normal    (-) murmur      Neuro/Psych  (-) seizures, TIA, CVA  GI/Hepatic/Renal/Endo      Musculoskeletal     Abdominal    Substance History      OB/GYN          Other        ROS/Med Hx Other: Prior  Recreational drug use  Now on suboxone.  Last taken yesterday am                  Anesthesia Plan    ASA 2     spinal   (Spinal)  intravenous induction       Plan discussed with CRNA.

## 2020-01-16 PROBLEM — G89.18 ACUTE POSTOPERATIVE PAIN: Status: ACTIVE | Noted: 2020-01-16

## 2020-01-16 PROBLEM — D62 ACUTE BLOOD LOSS ANEMIA: Status: ACTIVE | Noted: 2020-01-16

## 2020-01-16 PROBLEM — D72.829 LEUKOCYTOSIS: Status: ACTIVE | Noted: 2020-01-16

## 2020-01-16 LAB
ANION GAP SERPL CALCULATED.3IONS-SCNC: 12 MMOL/L (ref 5–15)
BUN BLD-MCNC: 12 MG/DL (ref 6–20)
BUN/CREAT SERPL: 16.2 (ref 7–25)
CALCIUM SPEC-SCNC: 8.4 MG/DL (ref 8.6–10.5)
CHLORIDE SERPL-SCNC: 102 MMOL/L (ref 98–107)
CO2 SERPL-SCNC: 23 MMOL/L (ref 22–29)
CREAT BLD-MCNC: 0.74 MG/DL (ref 0.76–1.27)
DEPRECATED RDW RBC AUTO: 39 FL (ref 37–54)
ERYTHROCYTE [DISTWIDTH] IN BLOOD BY AUTOMATED COUNT: 13.3 % (ref 12.3–15.4)
GFR SERPL CREATININE-BSD FRML MDRD: 114 ML/MIN/1.73
GLUCOSE BLD-MCNC: 126 MG/DL (ref 65–99)
GLUCOSE BLDC GLUCOMTR-MCNC: 122 MG/DL (ref 70–130)
GLUCOSE BLDC GLUCOMTR-MCNC: 127 MG/DL (ref 70–130)
GLUCOSE BLDC GLUCOMTR-MCNC: 127 MG/DL (ref 70–130)
GLUCOSE BLDC GLUCOMTR-MCNC: 136 MG/DL (ref 70–130)
HCT VFR BLD AUTO: 31.5 % (ref 37.5–51)
HGB BLD-MCNC: 10 G/DL (ref 13–17.7)
MCH RBC QN AUTO: 25.6 PG (ref 26.6–33)
MCHC RBC AUTO-ENTMCNC: 31.7 G/DL (ref 31.5–35.7)
MCV RBC AUTO: 80.6 FL (ref 79–97)
PLATELET # BLD AUTO: 296 10*3/MM3 (ref 140–450)
PMV BLD AUTO: 9.6 FL (ref 6–12)
POTASSIUM BLD-SCNC: 4 MMOL/L (ref 3.5–5.2)
RBC # BLD AUTO: 3.91 10*6/MM3 (ref 4.14–5.8)
SODIUM BLD-SCNC: 137 MMOL/L (ref 136–145)
WBC NRBC COR # BLD: 12.56 10*3/MM3 (ref 3.4–10.8)

## 2020-01-16 PROCEDURE — 85027 COMPLETE CBC AUTOMATED: CPT | Performed by: NURSE PRACTITIONER

## 2020-01-16 PROCEDURE — 25010000003 CEFAZOLIN IN DEXTROSE 2-4 GM/100ML-% SOLUTION: Performed by: ORTHOPAEDIC SURGERY

## 2020-01-16 PROCEDURE — 97161 PT EVAL LOW COMPLEX 20 MIN: CPT

## 2020-01-16 PROCEDURE — 97110 THERAPEUTIC EXERCISES: CPT

## 2020-01-16 PROCEDURE — 97116 GAIT TRAINING THERAPY: CPT

## 2020-01-16 PROCEDURE — 25010000002 HYDROMORPHONE PER 4 MG: Performed by: ORTHOPAEDIC SURGERY

## 2020-01-16 PROCEDURE — 94799 UNLISTED PULMONARY SVC/PX: CPT

## 2020-01-16 PROCEDURE — A9270 NON-COVERED ITEM OR SERVICE: HCPCS | Performed by: ORTHOPAEDIC SURGERY

## 2020-01-16 PROCEDURE — 25010000002 KETOROLAC TROMETHAMINE PER 15 MG: Performed by: ORTHOPAEDIC SURGERY

## 2020-01-16 PROCEDURE — 63710000001 PANTOPRAZOLE 40 MG TABLET DELAYED-RELEASE: Performed by: ORTHOPAEDIC SURGERY

## 2020-01-16 PROCEDURE — 25010000002 ROPIVACAINE PER 1 MG: Performed by: NURSE ANESTHETIST, CERTIFIED REGISTERED

## 2020-01-16 PROCEDURE — 82962 GLUCOSE BLOOD TEST: CPT

## 2020-01-16 PROCEDURE — 80048 BASIC METABOLIC PNL TOTAL CA: CPT | Performed by: ORTHOPAEDIC SURGERY

## 2020-01-16 PROCEDURE — 63710000001 ACETAMINOPHEN 500 MG TABLET: Performed by: ORTHOPAEDIC SURGERY

## 2020-01-16 RX ADMIN — MONTELUKAST SODIUM 10 MG: 10 TABLET, COATED ORAL at 18:18

## 2020-01-16 RX ADMIN — ACETAMINOPHEN 1000 MG: 500 TABLET, FILM COATED ORAL at 19:51

## 2020-01-16 RX ADMIN — DOCUSATE SODIUM 100 MG: 100 CAPSULE, LIQUID FILLED ORAL at 20:02

## 2020-01-16 RX ADMIN — HYDROMORPHONE HYDROCHLORIDE 0.5 MG: 1 INJECTION, SOLUTION INTRAMUSCULAR; INTRAVENOUS; SUBCUTANEOUS at 13:02

## 2020-01-16 RX ADMIN — ROPIVACAINE HYDROCHLORIDE 10 ML/HR: 2 INJECTION, SOLUTION EPIDURAL; INFILTRATION at 21:47

## 2020-01-16 RX ADMIN — MELOXICAM 15 MG: 7.5 TABLET ORAL at 08:56

## 2020-01-16 RX ADMIN — ACETAMINOPHEN 1000 MG: 500 TABLET, FILM COATED ORAL at 14:51

## 2020-01-16 RX ADMIN — OXYCODONE HYDROCHLORIDE 10 MG: 5 TABLET ORAL at 21:49

## 2020-01-16 RX ADMIN — OXYCODONE HYDROCHLORIDE 10 MG: 5 TABLET ORAL at 10:31

## 2020-01-16 RX ADMIN — ATORVASTATIN CALCIUM 10 MG: 10 TABLET, FILM COATED ORAL at 08:55

## 2020-01-16 RX ADMIN — KETOROLAC TROMETHAMINE 15 MG: 15 INJECTION, SOLUTION INTRAMUSCULAR; INTRAVENOUS at 15:47

## 2020-01-16 RX ADMIN — CEFAZOLIN SODIUM 2 G: 2 INJECTION, SOLUTION INTRAVENOUS at 06:07

## 2020-01-16 RX ADMIN — TRAZODONE HYDROCHLORIDE 150 MG: 100 TABLET ORAL at 22:49

## 2020-01-16 RX ADMIN — OXYCODONE HYDROCHLORIDE 10 MG: 5 TABLET ORAL at 14:51

## 2020-01-16 RX ADMIN — CEFAZOLIN SODIUM 2 G: 2 INJECTION, SOLUTION INTRAVENOUS at 00:08

## 2020-01-16 RX ADMIN — LOSARTAN POTASSIUM 100 MG: 50 TABLET, FILM COATED ORAL at 08:56

## 2020-01-16 RX ADMIN — ROPIVACAINE HYDROCHLORIDE 10 ML/HR: 2 INJECTION, SOLUTION EPIDURAL; INFILTRATION at 11:24

## 2020-01-16 RX ADMIN — SODIUM CHLORIDE, POTASSIUM CHLORIDE, SODIUM LACTATE AND CALCIUM CHLORIDE 100 ML/HR: 600; 310; 30; 20 INJECTION, SOLUTION INTRAVENOUS at 05:51

## 2020-01-16 RX ADMIN — ASPIRIN 81 MG: 81 TABLET, COATED ORAL at 19:52

## 2020-01-16 RX ADMIN — PANTOPRAZOLE SODIUM 40 MG: 40 TABLET, DELAYED RELEASE ORAL at 06:07

## 2020-01-16 RX ADMIN — METHOCARBAMOL 750 MG: 750 TABLET ORAL at 10:31

## 2020-01-16 RX ADMIN — SODIUM CHLORIDE, POTASSIUM CHLORIDE, SODIUM LACTATE AND CALCIUM CHLORIDE 100 ML/HR: 600; 310; 30; 20 INJECTION, SOLUTION INTRAVENOUS at 18:10

## 2020-01-16 RX ADMIN — BUDESONIDE AND FORMOTEROL FUMARATE DIHYDRATE 2 PUFF: 160; 4.5 AEROSOL RESPIRATORY (INHALATION) at 20:25

## 2020-01-16 RX ADMIN — KETOROLAC TROMETHAMINE 15 MG: 15 INJECTION, SOLUTION INTRAMUSCULAR; INTRAVENOUS at 23:59

## 2020-01-16 RX ADMIN — HYDROMORPHONE HYDROCHLORIDE 0.5 MG: 1 INJECTION, SOLUTION INTRAMUSCULAR; INTRAVENOUS; SUBCUTANEOUS at 08:56

## 2020-01-16 RX ADMIN — ACETAMINOPHEN 1000 MG: 500 TABLET, FILM COATED ORAL at 06:07

## 2020-01-16 RX ADMIN — HYDROMORPHONE HYDROCHLORIDE 0.5 MG: 1 INJECTION, SOLUTION INTRAMUSCULAR; INTRAVENOUS; SUBCUTANEOUS at 05:31

## 2020-01-16 RX ADMIN — BUDESONIDE AND FORMOTEROL FUMARATE DIHYDRATE 2 PUFF: 160; 4.5 AEROSOL RESPIRATORY (INHALATION) at 09:22

## 2020-01-16 RX ADMIN — ASPIRIN 81 MG: 81 TABLET, COATED ORAL at 08:56

## 2020-01-16 RX ADMIN — OXYCODONE HYDROCHLORIDE 10 MG: 5 TABLET ORAL at 18:11

## 2020-01-16 RX ADMIN — HYDROMORPHONE HYDROCHLORIDE 0.5 MG: 1 INJECTION, SOLUTION INTRAMUSCULAR; INTRAVENOUS; SUBCUTANEOUS at 19:52

## 2020-01-17 LAB
ANION GAP SERPL CALCULATED.3IONS-SCNC: 11 MMOL/L (ref 5–15)
BUN BLD-MCNC: 7 MG/DL (ref 6–20)
BUN/CREAT SERPL: 10.3 (ref 7–25)
CALCIUM SPEC-SCNC: 8.4 MG/DL (ref 8.6–10.5)
CHLORIDE SERPL-SCNC: 106 MMOL/L (ref 98–107)
CO2 SERPL-SCNC: 23 MMOL/L (ref 22–29)
CREAT BLD-MCNC: 0.68 MG/DL (ref 0.76–1.27)
GFR SERPL CREATININE-BSD FRML MDRD: 126 ML/MIN/1.73
GLUCOSE BLD-MCNC: 102 MG/DL (ref 65–99)
GLUCOSE BLDC GLUCOMTR-MCNC: 100 MG/DL (ref 70–130)
GLUCOSE BLDC GLUCOMTR-MCNC: 102 MG/DL (ref 70–130)
GLUCOSE BLDC GLUCOMTR-MCNC: 112 MG/DL (ref 70–130)
GLUCOSE BLDC GLUCOMTR-MCNC: 123 MG/DL (ref 70–130)
HCT VFR BLD AUTO: 30.1 % (ref 37.5–51)
HGB BLD-MCNC: 9.2 G/DL (ref 13–17.7)
POTASSIUM BLD-SCNC: 3.9 MMOL/L (ref 3.5–5.2)
SODIUM BLD-SCNC: 140 MMOL/L (ref 136–145)

## 2020-01-17 PROCEDURE — 82962 GLUCOSE BLOOD TEST: CPT

## 2020-01-17 PROCEDURE — 80048 BASIC METABOLIC PNL TOTAL CA: CPT | Performed by: ORTHOPAEDIC SURGERY

## 2020-01-17 PROCEDURE — 97110 THERAPEUTIC EXERCISES: CPT

## 2020-01-17 PROCEDURE — 85018 HEMOGLOBIN: CPT | Performed by: ORTHOPAEDIC SURGERY

## 2020-01-17 PROCEDURE — 94799 UNLISTED PULMONARY SVC/PX: CPT

## 2020-01-17 PROCEDURE — 25010000002 CEFTRIAXONE PER 250 MG: Performed by: INTERNAL MEDICINE

## 2020-01-17 PROCEDURE — 25010000002 VANCOMYCIN 10 G RECONSTITUTED SOLUTION: Performed by: INTERNAL MEDICINE

## 2020-01-17 PROCEDURE — 97116 GAIT TRAINING THERAPY: CPT

## 2020-01-17 PROCEDURE — 85014 HEMATOCRIT: CPT | Performed by: ORTHOPAEDIC SURGERY

## 2020-01-17 PROCEDURE — 94660 CPAP INITIATION&MGMT: CPT

## 2020-01-17 RX ORDER — PSEUDOEPHEDRINE HCL 30 MG
100 TABLET ORAL 2 TIMES DAILY PRN
Start: 2020-01-17 | End: 2020-01-30

## 2020-01-17 RX ORDER — HYDROCODONE BITARTRATE AND ACETAMINOPHEN 7.5; 325 MG/1; MG/1
1 TABLET ORAL EVERY 6 HOURS PRN
Start: 2020-01-17 | End: 2020-02-21

## 2020-01-17 RX ORDER — ASPIRIN 81 MG/1
81 TABLET ORAL EVERY 12 HOURS SCHEDULED
Start: 2020-01-17 | End: 2020-02-21

## 2020-01-17 RX ORDER — ROPIVACAINE HYDROCHLORIDE 2 MG/ML
10 INJECTION, SOLUTION EPIDURAL; INFILTRATION CONTINUOUS
Start: 2020-01-17 | End: 2020-01-20 | Stop reason: HOSPADM

## 2020-01-17 RX ORDER — BUPRENORPHINE HYDROCHLORIDE, NALOXONE HYDROCHLORIDE 8; 2 MG/1; MG/1
2 FILM, SOLUBLE BUCCAL; SUBLINGUAL DAILY
Qty: 30 EACH | Refills: 0 | Status: ON HOLD
Start: 2020-01-17 | End: 2020-03-06 | Stop reason: SDUPTHER

## 2020-01-17 RX ADMIN — OXYCODONE HYDROCHLORIDE 10 MG: 5 TABLET ORAL at 16:37

## 2020-01-17 RX ADMIN — VANCOMYCIN HYDROCHLORIDE 1250 MG: 10 INJECTION, POWDER, LYOPHILIZED, FOR SOLUTION INTRAVENOUS at 10:09

## 2020-01-17 RX ADMIN — DOCUSATE SODIUM 100 MG: 100 CAPSULE, LIQUID FILLED ORAL at 08:24

## 2020-01-17 RX ADMIN — OXYCODONE HYDROCHLORIDE 10 MG: 5 TABLET ORAL at 04:55

## 2020-01-17 RX ADMIN — LOSARTAN POTASSIUM 100 MG: 50 TABLET, FILM COATED ORAL at 08:24

## 2020-01-17 RX ADMIN — ATORVASTATIN CALCIUM 10 MG: 10 TABLET, FILM COATED ORAL at 08:25

## 2020-01-17 RX ADMIN — SODIUM CHLORIDE, POTASSIUM CHLORIDE, SODIUM LACTATE AND CALCIUM CHLORIDE 100 ML/HR: 600; 310; 30; 20 INJECTION, SOLUTION INTRAVENOUS at 04:56

## 2020-01-17 RX ADMIN — ASPIRIN 81 MG: 81 TABLET, COATED ORAL at 08:24

## 2020-01-17 RX ADMIN — BISACODYL 10 MG: 5 TABLET, COATED ORAL at 14:06

## 2020-01-17 RX ADMIN — ACETAMINOPHEN 1000 MG: 500 TABLET, FILM COATED ORAL at 04:54

## 2020-01-17 RX ADMIN — OXYCODONE HYDROCHLORIDE 10 MG: 5 TABLET ORAL at 08:25

## 2020-01-17 RX ADMIN — MONTELUKAST SODIUM 10 MG: 10 TABLET, COATED ORAL at 16:36

## 2020-01-17 RX ADMIN — PANTOPRAZOLE SODIUM 40 MG: 40 TABLET, DELAYED RELEASE ORAL at 04:54

## 2020-01-17 RX ADMIN — METHOCARBAMOL 750 MG: 750 TABLET ORAL at 00:00

## 2020-01-17 RX ADMIN — METHOCARBAMOL 750 MG: 750 TABLET ORAL at 08:24

## 2020-01-17 RX ADMIN — CEFTRIAXONE 2 G: 2 INJECTION, POWDER, FOR SOLUTION INTRAMUSCULAR; INTRAVENOUS at 12:14

## 2020-01-17 RX ADMIN — SENNOSIDES AND DOCUSATE SODIUM 2 TABLET: 8.6; 5 TABLET ORAL at 14:06

## 2020-01-17 RX ADMIN — BUDESONIDE AND FORMOTEROL FUMARATE DIHYDRATE 2 PUFF: 160; 4.5 AEROSOL RESPIRATORY (INHALATION) at 09:21

## 2020-01-17 RX ADMIN — METHOCARBAMOL 750 MG: 750 TABLET ORAL at 16:37

## 2020-01-17 RX ADMIN — ACETAMINOPHEN 1000 MG: 500 TABLET, FILM COATED ORAL at 20:08

## 2020-01-17 RX ADMIN — OXYCODONE HYDROCHLORIDE 5 MG: 5 TABLET ORAL at 20:08

## 2020-01-17 RX ADMIN — TRAZODONE HYDROCHLORIDE 150 MG: 100 TABLET ORAL at 21:01

## 2020-01-17 RX ADMIN — ACETAMINOPHEN 1000 MG: 500 TABLET, FILM COATED ORAL at 14:06

## 2020-01-17 RX ADMIN — BUDESONIDE AND FORMOTEROL FUMARATE DIHYDRATE 2 PUFF: 160; 4.5 AEROSOL RESPIRATORY (INHALATION) at 19:13

## 2020-01-17 RX ADMIN — ASPIRIN 81 MG: 81 TABLET, COATED ORAL at 20:07

## 2020-01-17 RX ADMIN — MELOXICAM 15 MG: 7.5 TABLET ORAL at 08:24

## 2020-01-17 RX ADMIN — OXYCODONE HYDROCHLORIDE 10 MG: 5 TABLET ORAL at 12:13

## 2020-01-17 RX ADMIN — MAGNESIUM HYDROXIDE 10 ML: 2400 SUSPENSION ORAL at 20:08

## 2020-01-18 LAB
ANION GAP SERPL CALCULATED.3IONS-SCNC: 13 MMOL/L (ref 5–15)
BACTERIA SPEC AEROBE CULT: NORMAL
BASOPHILS # BLD AUTO: 0.07 10*3/MM3 (ref 0–0.2)
BASOPHILS NFR BLD AUTO: 0.7 % (ref 0–1.5)
BUN BLD-MCNC: 10 MG/DL (ref 6–20)
BUN/CREAT SERPL: 13 (ref 7–25)
CALCIUM SPEC-SCNC: 9.1 MG/DL (ref 8.6–10.5)
CHLORIDE SERPL-SCNC: 102 MMOL/L (ref 98–107)
CO2 SERPL-SCNC: 24 MMOL/L (ref 22–29)
CREAT BLD-MCNC: 0.77 MG/DL (ref 0.76–1.27)
DEPRECATED RDW RBC AUTO: 40.2 FL (ref 37–54)
EOSINOPHIL # BLD AUTO: 0.28 10*3/MM3 (ref 0–0.4)
EOSINOPHIL NFR BLD AUTO: 2.8 % (ref 0.3–6.2)
ERYTHROCYTE [DISTWIDTH] IN BLOOD BY AUTOMATED COUNT: 13.8 % (ref 12.3–15.4)
GFR SERPL CREATININE-BSD FRML MDRD: 109 ML/MIN/1.73
GLUCOSE BLD-MCNC: 109 MG/DL (ref 65–99)
GLUCOSE BLDC GLUCOMTR-MCNC: 104 MG/DL (ref 70–130)
GLUCOSE BLDC GLUCOMTR-MCNC: 114 MG/DL (ref 70–130)
GLUCOSE BLDC GLUCOMTR-MCNC: 96 MG/DL (ref 70–130)
GLUCOSE BLDC GLUCOMTR-MCNC: 98 MG/DL (ref 70–130)
GRAM STN SPEC: NORMAL
HCT VFR BLD AUTO: 31.4 % (ref 37.5–51)
HGB BLD-MCNC: 10.1 G/DL (ref 13–17.7)
IMM GRANULOCYTES # BLD AUTO: 0.03 10*3/MM3 (ref 0–0.05)
IMM GRANULOCYTES NFR BLD AUTO: 0.3 % (ref 0–0.5)
LYMPHOCYTES # BLD AUTO: 1.87 10*3/MM3 (ref 0.7–3.1)
LYMPHOCYTES NFR BLD AUTO: 18.6 % (ref 19.6–45.3)
MCH RBC QN AUTO: 25.8 PG (ref 26.6–33)
MCHC RBC AUTO-ENTMCNC: 32.2 G/DL (ref 31.5–35.7)
MCV RBC AUTO: 80.3 FL (ref 79–97)
MONOCYTES # BLD AUTO: 0.74 10*3/MM3 (ref 0.1–0.9)
MONOCYTES NFR BLD AUTO: 7.4 % (ref 5–12)
NEUTROPHILS # BLD AUTO: 7.06 10*3/MM3 (ref 1.7–7)
NEUTROPHILS NFR BLD AUTO: 70.2 % (ref 42.7–76)
NRBC BLD AUTO-RTO: 0 /100 WBC (ref 0–0.2)
PLATELET # BLD AUTO: 315 10*3/MM3 (ref 140–450)
PMV BLD AUTO: 9.2 FL (ref 6–12)
POTASSIUM BLD-SCNC: 4.2 MMOL/L (ref 3.5–5.2)
RBC # BLD AUTO: 3.91 10*6/MM3 (ref 4.14–5.8)
SODIUM BLD-SCNC: 139 MMOL/L (ref 136–145)
WBC NRBC COR # BLD: 10.05 10*3/MM3 (ref 3.4–10.8)

## 2020-01-18 PROCEDURE — 82962 GLUCOSE BLOOD TEST: CPT

## 2020-01-18 PROCEDURE — 25010000002 HYDROMORPHONE PER 4 MG: Performed by: ORTHOPAEDIC SURGERY

## 2020-01-18 PROCEDURE — 94799 UNLISTED PULMONARY SVC/PX: CPT

## 2020-01-18 PROCEDURE — 97116 GAIT TRAINING THERAPY: CPT

## 2020-01-18 PROCEDURE — 94660 CPAP INITIATION&MGMT: CPT

## 2020-01-18 PROCEDURE — 97110 THERAPEUTIC EXERCISES: CPT

## 2020-01-18 PROCEDURE — 80048 BASIC METABOLIC PNL TOTAL CA: CPT | Performed by: INTERNAL MEDICINE

## 2020-01-18 PROCEDURE — 25010000002 CEFTRIAXONE PER 250 MG: Performed by: INTERNAL MEDICINE

## 2020-01-18 PROCEDURE — 85025 COMPLETE CBC W/AUTO DIFF WBC: CPT | Performed by: INTERNAL MEDICINE

## 2020-01-18 RX ADMIN — METHOCARBAMOL 750 MG: 750 TABLET ORAL at 08:39

## 2020-01-18 RX ADMIN — HYDROMORPHONE HYDROCHLORIDE 0.5 MG: 1 INJECTION, SOLUTION INTRAMUSCULAR; INTRAVENOUS; SUBCUTANEOUS at 14:16

## 2020-01-18 RX ADMIN — CEFTRIAXONE 2 G: 2 INJECTION, POWDER, FOR SOLUTION INTRAMUSCULAR; INTRAVENOUS at 13:27

## 2020-01-18 RX ADMIN — BUDESONIDE AND FORMOTEROL FUMARATE DIHYDRATE 2 PUFF: 160; 4.5 AEROSOL RESPIRATORY (INHALATION) at 08:09

## 2020-01-18 RX ADMIN — ACETAMINOPHEN 1000 MG: 500 TABLET, FILM COATED ORAL at 05:37

## 2020-01-18 RX ADMIN — OXYCODONE HYDROCHLORIDE 5 MG: 5 TABLET ORAL at 05:39

## 2020-01-18 RX ADMIN — MELOXICAM 15 MG: 7.5 TABLET ORAL at 08:50

## 2020-01-18 RX ADMIN — OXYCODONE HYDROCHLORIDE 5 MG: 5 TABLET ORAL at 13:27

## 2020-01-18 RX ADMIN — TRAZODONE HYDROCHLORIDE 150 MG: 100 TABLET ORAL at 20:42

## 2020-01-18 RX ADMIN — ASPIRIN 81 MG: 81 TABLET, COATED ORAL at 20:42

## 2020-01-18 RX ADMIN — ACETAMINOPHEN 1000 MG: 500 TABLET, FILM COATED ORAL at 13:27

## 2020-01-18 RX ADMIN — MAGNESIUM HYDROXIDE 10 ML: 2400 SUSPENSION ORAL at 20:41

## 2020-01-18 RX ADMIN — ASPIRIN 81 MG: 81 TABLET, COATED ORAL at 08:39

## 2020-01-18 RX ADMIN — METHOCARBAMOL 750 MG: 750 TABLET ORAL at 17:31

## 2020-01-18 RX ADMIN — BUDESONIDE AND FORMOTEROL FUMARATE DIHYDRATE 2 PUFF: 160; 4.5 AEROSOL RESPIRATORY (INHALATION) at 19:40

## 2020-01-18 RX ADMIN — OXYCODONE HYDROCHLORIDE 10 MG: 5 TABLET ORAL at 17:35

## 2020-01-18 RX ADMIN — ACETAMINOPHEN 1000 MG: 500 TABLET, FILM COATED ORAL at 20:41

## 2020-01-18 RX ADMIN — PANTOPRAZOLE SODIUM 40 MG: 40 TABLET, DELAYED RELEASE ORAL at 05:37

## 2020-01-18 RX ADMIN — OXYCODONE HYDROCHLORIDE 10 MG: 5 TABLET ORAL at 09:33

## 2020-01-18 RX ADMIN — BISACODYL 10 MG: 5 TABLET, COATED ORAL at 20:42

## 2020-01-18 RX ADMIN — LOSARTAN POTASSIUM 100 MG: 50 TABLET, FILM COATED ORAL at 08:38

## 2020-01-18 RX ADMIN — ATORVASTATIN CALCIUM 10 MG: 10 TABLET, FILM COATED ORAL at 08:39

## 2020-01-18 RX ADMIN — MONTELUKAST SODIUM 10 MG: 10 TABLET, COATED ORAL at 17:28

## 2020-01-19 LAB
GLUCOSE BLDC GLUCOMTR-MCNC: 107 MG/DL (ref 70–130)
GLUCOSE BLDC GLUCOMTR-MCNC: 110 MG/DL (ref 70–130)
GLUCOSE BLDC GLUCOMTR-MCNC: 133 MG/DL (ref 70–130)
GLUCOSE BLDC GLUCOMTR-MCNC: 97 MG/DL (ref 70–130)

## 2020-01-19 PROCEDURE — 94660 CPAP INITIATION&MGMT: CPT

## 2020-01-19 PROCEDURE — 97116 GAIT TRAINING THERAPY: CPT

## 2020-01-19 PROCEDURE — 97110 THERAPEUTIC EXERCISES: CPT

## 2020-01-19 PROCEDURE — 82962 GLUCOSE BLOOD TEST: CPT

## 2020-01-19 PROCEDURE — 25010000002 CEFTRIAXONE PER 250 MG: Performed by: INTERNAL MEDICINE

## 2020-01-19 PROCEDURE — 94799 UNLISTED PULMONARY SVC/PX: CPT

## 2020-01-19 RX ORDER — CALCIUM CARBONATE 750 MG/1
750 TABLET, CHEWABLE ORAL 3 TIMES DAILY PRN
Status: DISCONTINUED | OUTPATIENT
Start: 2020-01-19 | End: 2020-01-20 | Stop reason: HOSPADM

## 2020-01-19 RX ADMIN — ACETAMINOPHEN 1000 MG: 500 TABLET, FILM COATED ORAL at 20:36

## 2020-01-19 RX ADMIN — TRAZODONE HYDROCHLORIDE 150 MG: 100 TABLET ORAL at 22:24

## 2020-01-19 RX ADMIN — ASPIRIN 81 MG: 81 TABLET, COATED ORAL at 20:36

## 2020-01-19 RX ADMIN — MONTELUKAST SODIUM 10 MG: 10 TABLET, COATED ORAL at 16:40

## 2020-01-19 RX ADMIN — OXYCODONE HYDROCHLORIDE 10 MG: 5 TABLET ORAL at 13:44

## 2020-01-19 RX ADMIN — METHOCARBAMOL 750 MG: 750 TABLET ORAL at 18:31

## 2020-01-19 RX ADMIN — OXYCODONE HYDROCHLORIDE 10 MG: 5 TABLET ORAL at 10:10

## 2020-01-19 RX ADMIN — CALCIUM CARBONATE 750 MG: 750 TABLET ORAL at 16:40

## 2020-01-19 RX ADMIN — OXYCODONE HYDROCHLORIDE 10 MG: 5 TABLET ORAL at 22:20

## 2020-01-19 RX ADMIN — DOCUSATE SODIUM 100 MG: 100 CAPSULE, LIQUID FILLED ORAL at 13:44

## 2020-01-19 RX ADMIN — MAGNESIUM HYDROXIDE 10 ML: 2400 SUSPENSION ORAL at 20:37

## 2020-01-19 RX ADMIN — LOSARTAN POTASSIUM 100 MG: 50 TABLET, FILM COATED ORAL at 08:29

## 2020-01-19 RX ADMIN — ATORVASTATIN CALCIUM 10 MG: 10 TABLET, FILM COATED ORAL at 08:29

## 2020-01-19 RX ADMIN — ACETAMINOPHEN 1000 MG: 500 TABLET, FILM COATED ORAL at 13:44

## 2020-01-19 RX ADMIN — PANTOPRAZOLE SODIUM 40 MG: 40 TABLET, DELAYED RELEASE ORAL at 05:17

## 2020-01-19 RX ADMIN — ASPIRIN 81 MG: 81 TABLET, COATED ORAL at 08:30

## 2020-01-19 RX ADMIN — ACETAMINOPHEN 1000 MG: 500 TABLET, FILM COATED ORAL at 05:17

## 2020-01-19 RX ADMIN — OXYCODONE HYDROCHLORIDE 5 MG: 5 TABLET ORAL at 07:25

## 2020-01-19 RX ADMIN — METHOCARBAMOL 750 MG: 750 TABLET ORAL at 10:10

## 2020-01-19 RX ADMIN — CEFTRIAXONE 2 G: 2 INJECTION, POWDER, FOR SOLUTION INTRAMUSCULAR; INTRAVENOUS at 13:44

## 2020-01-19 RX ADMIN — OXYCODONE HYDROCHLORIDE 10 MG: 5 TABLET ORAL at 01:50

## 2020-01-19 RX ADMIN — MELOXICAM 15 MG: 7.5 TABLET ORAL at 08:29

## 2020-01-19 RX ADMIN — OXYCODONE HYDROCHLORIDE 10 MG: 5 TABLET ORAL at 18:31

## 2020-01-19 RX ADMIN — BUDESONIDE AND FORMOTEROL FUMARATE DIHYDRATE 2 PUFF: 160; 4.5 AEROSOL RESPIRATORY (INHALATION) at 08:52

## 2020-01-20 VITALS
OXYGEN SATURATION: 96 % | TEMPERATURE: 98.3 F | WEIGHT: 215 LBS | RESPIRATION RATE: 18 BRPM | HEART RATE: 96 BPM | HEIGHT: 73 IN | DIASTOLIC BLOOD PRESSURE: 70 MMHG | BODY MASS INDEX: 28.49 KG/M2 | SYSTOLIC BLOOD PRESSURE: 105 MMHG

## 2020-01-20 LAB
BACTERIA FLD CULT: NORMAL
GLUCOSE BLDC GLUCOMTR-MCNC: 114 MG/DL (ref 70–130)
GLUCOSE BLDC GLUCOMTR-MCNC: 71 MG/DL (ref 70–130)
GRAM STN SPEC: NORMAL

## 2020-01-20 PROCEDURE — 97116 GAIT TRAINING THERAPY: CPT

## 2020-01-20 PROCEDURE — 97110 THERAPEUTIC EXERCISES: CPT

## 2020-01-20 PROCEDURE — 94799 UNLISTED PULMONARY SVC/PX: CPT

## 2020-01-20 PROCEDURE — 82962 GLUCOSE BLOOD TEST: CPT

## 2020-01-20 PROCEDURE — 25010000002 CEFTRIAXONE PER 250 MG: Performed by: INTERNAL MEDICINE

## 2020-01-20 RX ORDER — RIFAMPIN 300 MG/1
300 CAPSULE ORAL
Status: DISCONTINUED | OUTPATIENT
Start: 2020-01-21 | End: 2020-01-20 | Stop reason: HOSPADM

## 2020-01-20 RX ORDER — AZITHROMYCIN 500 MG/1
TABLET, FILM COATED ORAL
Qty: 30 TABLET | Refills: 1 | Status: SHIPPED | OUTPATIENT
Start: 2020-01-21 | End: 2020-06-16

## 2020-01-20 RX ORDER — AZITHROMYCIN 250 MG/1
500 TABLET, FILM COATED ORAL
Status: DISCONTINUED | OUTPATIENT
Start: 2020-01-21 | End: 2020-01-20 | Stop reason: HOSPADM

## 2020-01-20 RX ORDER — RIFAMPIN 300 MG/1
600 CAPSULE ORAL
Qty: 60 CAPSULE | Refills: 1 | Status: SHIPPED | OUTPATIENT
Start: 2020-01-21 | End: 2020-02-21

## 2020-01-20 RX ORDER — ETHAMBUTOL HYDROCHLORIDE 400 MG/1
1600 TABLET, FILM COATED ORAL
Status: DISCONTINUED | OUTPATIENT
Start: 2020-01-21 | End: 2020-01-20 | Stop reason: HOSPADM

## 2020-01-20 RX ORDER — ETHAMBUTOL HYDROCHLORIDE 400 MG/1
1600 TABLET, FILM COATED ORAL
Qty: 120 TABLET | Refills: 1 | Status: SHIPPED | OUTPATIENT
Start: 2020-01-21 | End: 2020-02-21

## 2020-01-20 RX ORDER — PROMETHAZINE HYDROCHLORIDE 25 MG/1
25 TABLET ORAL 2 TIMES DAILY PRN
Qty: 40 TABLET | Refills: 1 | Status: SHIPPED | OUTPATIENT
Start: 2020-01-20 | End: 2021-06-18 | Stop reason: HOSPADM

## 2020-01-20 RX ADMIN — LOSARTAN POTASSIUM 100 MG: 50 TABLET, FILM COATED ORAL at 08:09

## 2020-01-20 RX ADMIN — ASPIRIN 81 MG: 81 TABLET, COATED ORAL at 08:09

## 2020-01-20 RX ADMIN — BUDESONIDE AND FORMOTEROL FUMARATE DIHYDRATE 2 PUFF: 160; 4.5 AEROSOL RESPIRATORY (INHALATION) at 08:38

## 2020-01-20 RX ADMIN — ACETAMINOPHEN 1000 MG: 500 TABLET, FILM COATED ORAL at 05:24

## 2020-01-20 RX ADMIN — OXYCODONE HYDROCHLORIDE 10 MG: 5 TABLET ORAL at 14:31

## 2020-01-20 RX ADMIN — ACETAMINOPHEN 1000 MG: 500 TABLET, FILM COATED ORAL at 14:31

## 2020-01-20 RX ADMIN — OXYCODONE HYDROCHLORIDE 10 MG: 5 TABLET ORAL at 10:41

## 2020-01-20 RX ADMIN — MELOXICAM 15 MG: 7.5 TABLET ORAL at 08:10

## 2020-01-20 RX ADMIN — METHOCARBAMOL 750 MG: 750 TABLET ORAL at 08:09

## 2020-01-20 RX ADMIN — CEFTRIAXONE 2 G: 2 INJECTION, POWDER, FOR SOLUTION INTRAMUSCULAR; INTRAVENOUS at 12:58

## 2020-01-20 RX ADMIN — OXYCODONE HYDROCHLORIDE 10 MG: 5 TABLET ORAL at 05:24

## 2020-01-20 RX ADMIN — ATORVASTATIN CALCIUM 10 MG: 10 TABLET, FILM COATED ORAL at 08:10

## 2020-01-20 RX ADMIN — PANTOPRAZOLE SODIUM 40 MG: 40 TABLET, DELAYED RELEASE ORAL at 05:24

## 2020-01-21 ENCOUNTER — READMISSION MANAGEMENT (OUTPATIENT)
Dept: CALL CENTER | Facility: HOSPITAL | Age: 45
End: 2020-01-21

## 2020-01-21 NOTE — OUTREACH NOTE
Prep Survey      Responses   Facility patient discharged from?  [facility]   Is patient eligible?  Yes   Discharge diagnosis  S/P total hip explant, antibiotic spacer placement   Does the patient have one of the following disease processes/diagnoses(primary or secondary)?  Total Joint Replacement   Does the patient have Home health ordered?  No   Is there a DME ordered?  No   Comments regarding appointments  [facility] for PT    Medication alerts for this patient  ASA    Prep survey completed?  Yes          Inessa Conroy RN

## 2020-01-22 ENCOUNTER — READMISSION MANAGEMENT (OUTPATIENT)
Dept: CALL CENTER | Facility: HOSPITAL | Age: 45
End: 2020-01-22

## 2020-01-22 LAB
CYTO UR: NORMAL
LAB AP CASE REPORT: NORMAL
LAB AP CLINICAL INFORMATION: NORMAL
LAB AP DIAGNOSIS COMMENT: NORMAL
PATH REPORT.FINAL DX SPEC: NORMAL
PATH REPORT.GROSS SPEC: NORMAL

## 2020-01-22 NOTE — OUTREACH NOTE
Total Joint Week 1 Survey      Responses   Facility patient discharged from?  [facility]   Does the patient have one of the following disease processes/diagnoses(primary or secondary)?  Total Joint Replacement   Is there a successful TCM telephone encounter documented?  No   Joint surgery performed?  Hip   Week 1 attempt successful?  Yes   Call start time  1448   Rescheduled  Rescheduled-pt requested   Call end time  1448          Jacki Rangel RN

## 2020-01-24 ENCOUNTER — READMISSION MANAGEMENT (OUTPATIENT)
Dept: CALL CENTER | Facility: HOSPITAL | Age: 45
End: 2020-01-24

## 2020-01-24 DIAGNOSIS — Z96.649 S/P REVISION OF TOTAL HIP: Primary | ICD-10-CM

## 2020-01-24 RX ORDER — OXYCODONE HYDROCHLORIDE 5 MG/1
5 TABLET ORAL EVERY 4 HOURS PRN
Qty: 60 TABLET | Refills: 0 | Status: SHIPPED | OUTPATIENT
Start: 2020-01-24 | End: 2020-02-21

## 2020-01-24 NOTE — OUTREACH NOTE
Total Joint Week 1 Survey      Responses   Facility patient discharged from?  [facility]   Does the patient have one of the following disease processes/diagnoses(primary or secondary)?  Total Joint Replacement   Is there a successful TCM telephone encounter documented?  No   Joint surgery performed?  Hip   Week 1 attempt successful?  Yes   Call start time  1426   Call end time  1434   Discharge diagnosis  S/P total hip explant, antibiotic spacer placement   Does the patient have all medications related to this admission filled (includes all antibiotics, pain medications, etc.)  Yes   Is the patient taking all medications as directed (includes completed medication regime)?  Yes   Is the patient able to teach back alternate methods of pain control?  Ice, Reposition   Does the patient have a follow up appointment with their surgeon?  Yes   Has the patient kept scheduled appointments due by today?  Yes   Psychosocial issues?  No   Psychosocial comments  pt reports he is in too much pain to do much moving at this time.  He has a call into surgeon's office for further instruction.   Does the patient have a wound vac in place?  N/A   Has the patient fallen since discharge?  No   Comments  Pt reports he called Dr. Ramirez and asked for more pain control   Did the patient receive a copy of their discharge instructions?  Yes   Nursing interventions  Reviewed instructions with patient   What is the patient's perception of their functional status since discharge?  Improving   Is the patient able to teach back signs and symptoms of infection?  Temp >100.4 for 24h or longer, Incisional drainage, Shortness of breath or chest pain, Increased swelling or redness around incision (not associated with surgical edema)   Is the patient able to teach back how to prevent infection?  Check incision daily   Is the patient able to teach back signs and symptoms of DVT?  Redness in calf, Area hot to touch, Swelling in calf, Severe pain in calf    Is the patient/caregiver able to teach back the hierarchy of who to call/visit for symptoms/problems? PCP, Specialist, Home health nurse, Urgent Care, ED, 911  Yes   Week 1 call completed?  Yes          Kizzy Rodriguez RN

## 2020-01-25 LAB
BACTERIA SPEC ANAEROBE CULT: ABNORMAL
BACTERIA SPEC ANAEROBE CULT: NORMAL

## 2020-01-31 ENCOUNTER — READMISSION MANAGEMENT (OUTPATIENT)
Dept: CALL CENTER | Facility: HOSPITAL | Age: 45
End: 2020-01-31

## 2020-01-31 NOTE — OUTREACH NOTE
Total Joint Week 2 Survey      Responses   Facility patient discharged from?  [facility]   Does the patient have one of the following disease processes/diagnoses(primary or secondary)?  Total Joint Replacement   Joint surgery performed?  Hip   Week 2 attempt successful?  Yes   Call start time  1708   Call end time  1711   Has the patient been back in either the hospital or Emergency Department since discharge?  No   Discharge diagnosis  S/P total hip explant, antibiotic spacer placement   Does the patient have all medications related to this admission filled (includes all antibiotics, pain medications, etc.)  Yes   Is the patient able to teach back alternate methods of pain control?  Ice, Reposition   Comments regarding appointments  [facility] for PT    Does the patient have a follow up appointment with their surgeon?  Yes   Has the patient kept scheduled appointments due by today?  Yes   Has home health visited the patient within 72 hours of discharge?  N/A   Psychosocial issues?  No   Has the patient began therapy sessions (either in the home or as an out patient)?  Yes   Has the patient fallen since discharge?  No   Comments  Staples removed today.    Did the patient receive a copy of their discharge instructions?  Yes   Nursing interventions  Reviewed instructions with patient   What is the patient's perception of their functional status since discharge?  Improving   Is the patient able to teach back signs and symptoms of infection?  Temp >100.4 for 24h or longer, Incisional drainage, Shortness of breath or chest pain, Increased swelling or redness around incision (not associated with surgical edema)   Is the patient able to teach back how to prevent infection?  Check incision daily   Is the patient able to teach back signs and symptoms of DVT?  Redness in calf, Area hot to touch, Swelling in calf, Severe pain in calf   Did the patient implement home safety suggestions from pre-surgery classes if attended?  Yes   Is  the patient/caregiver able to teach back the hierarchy of who to call/visit for symptoms/problems? PCP, Specialist, Home health nurse, Urgent Care, ED, 911  Yes   Week 2 call completed?  Yes          Alberto Cisse RN

## 2020-02-10 DIAGNOSIS — Z96.649 S/P REVISION OF TOTAL HIP: Primary | ICD-10-CM

## 2020-02-10 RX ORDER — OXYCODONE HYDROCHLORIDE 5 MG/1
5 TABLET ORAL EVERY 4 HOURS PRN
Qty: 60 TABLET | Refills: 0 | Status: SHIPPED | OUTPATIENT
Start: 2020-02-10 | End: 2020-02-21

## 2020-02-12 RX ORDER — MONTELUKAST SODIUM 10 MG/1
10 TABLET ORAL EVERY EVENING
Qty: 30 TABLET | Refills: 2 | Status: SHIPPED | OUTPATIENT
Start: 2020-02-12 | End: 2020-02-24 | Stop reason: SDUPTHER

## 2020-02-14 ENCOUNTER — READMISSION MANAGEMENT (OUTPATIENT)
Dept: CALL CENTER | Facility: HOSPITAL | Age: 45
End: 2020-02-14

## 2020-02-14 NOTE — OUTREACH NOTE
Total Joint Month 1 Survey      Responses   Facility patient discharged from?  [facility]   Does the patient have one of the following disease processes/diagnoses(primary or secondary)?  Total Joint Replacement   Joint surgery performed?  Hip   Month 1 attempt successful?  Yes   Call start time  1738   Call end time  1739   Has the patient been back in either the hospital or Emergency Department since discharge?  No   Discharge diagnosis  S/P total hip explant, antibiotic spacer placement   Is the patient taking all medications as directed (includes completed medication regime)?  Yes   Is the patient able to teach back alternate methods of pain control?  Reposition   Has the patient kept scheduled appointments due by today?  Yes   Is the patient still receiving Home Health Services?  N/A   Psychosocial comments  pt reports he is in too much pain to do much moving at this time.  He has a call into surgeon's office for further instruction.   Is the patient still attending therapy sessions(either in the home or as an outpatient)?  Yes   Has the patient fallen since discharge?  No   What is the patient's perception of their functional status since discharge?  Improving   Is the patient able to teach back signs and symptoms of infection?  Temp >100.4 for 24h or longer, Incisional drainage, Shortness of breath or chest pain, Increased swelling or redness around incision (not associated with surgical edema)   Is the patient/caregiver able to teach back the hierarchy of who to call/visit for symptoms/problems? PCP, Specialist, Home health nurse, Urgent Care, ED, 911  Yes   Month 1 call completed?  Yes          Alberto Cisse RN

## 2020-02-17 DIAGNOSIS — J43.1 PANLOBULAR EMPHYSEMA (HCC): ICD-10-CM

## 2020-02-17 RX ORDER — ALBUTEROL SULFATE 90 UG/1
2 AEROSOL, METERED RESPIRATORY (INHALATION) EVERY 4 HOURS PRN
Qty: 1 INHALER | Refills: 2 | Status: SHIPPED | OUTPATIENT
Start: 2020-02-17 | End: 2020-02-24 | Stop reason: SDUPTHER

## 2020-02-21 ENCOUNTER — APPOINTMENT (OUTPATIENT)
Dept: PREADMISSION TESTING | Facility: HOSPITAL | Age: 45
End: 2020-02-21

## 2020-02-21 VITALS — WEIGHT: 208.78 LBS | BODY MASS INDEX: 28.28 KG/M2 | HEIGHT: 72 IN

## 2020-02-21 LAB
25(OH)D3 SERPL-MCNC: 11.7 NG/ML (ref 30–100)
ALBUMIN SERPL-MCNC: 5 G/DL (ref 3.5–5.2)
ALBUMIN/GLOB SERPL: 1.7 G/DL
ALP SERPL-CCNC: 180 U/L (ref 39–117)
ALT SERPL W P-5'-P-CCNC: 26 U/L (ref 1–41)
ANION GAP SERPL CALCULATED.3IONS-SCNC: 15 MMOL/L (ref 5–15)
APTT PPP: 33.4 SECONDS (ref 24–37)
AST SERPL-CCNC: 21 U/L (ref 1–40)
BASOPHILS # BLD AUTO: 0.06 10*3/MM3 (ref 0–0.2)
BASOPHILS NFR BLD AUTO: 0.7 % (ref 0–1.5)
BILIRUB SERPL-MCNC: 0.3 MG/DL (ref 0.2–1.2)
BUN BLD-MCNC: 9 MG/DL (ref 6–20)
BUN/CREAT SERPL: 10.7 (ref 7–25)
CALCIUM SPEC-SCNC: 9.7 MG/DL (ref 8.6–10.5)
CHLORIDE SERPL-SCNC: 99 MMOL/L (ref 98–107)
CO2 SERPL-SCNC: 24 MMOL/L (ref 22–29)
CREAT BLD-MCNC: 0.84 MG/DL (ref 0.76–1.27)
CRP SERPL-MCNC: 1.28 MG/DL (ref 0–0.5)
D DIMER PPP FEU-MCNC: 1.05 MCGFEU/ML (ref 0–0.56)
DEPRECATED RDW RBC AUTO: 42.7 FL (ref 37–54)
EOSINOPHIL # BLD AUTO: 0.26 10*3/MM3 (ref 0–0.4)
EOSINOPHIL NFR BLD AUTO: 3.2 % (ref 0.3–6.2)
ERYTHROCYTE [DISTWIDTH] IN BLOOD BY AUTOMATED COUNT: 14.7 % (ref 12.3–15.4)
ERYTHROCYTE [SEDIMENTATION RATE] IN BLOOD: 38 MM/HR (ref 0–15)
GFR SERPL CREATININE-BSD FRML MDRD: 99 ML/MIN/1.73
GLOBULIN UR ELPH-MCNC: 2.9 GM/DL
GLUCOSE BLD-MCNC: 95 MG/DL (ref 65–99)
HBA1C MFR BLD: 5.1 % (ref 4.8–5.6)
HCT VFR BLD AUTO: 43.5 % (ref 37.5–51)
HGB BLD-MCNC: 13.4 G/DL (ref 13–17.7)
IMM GRANULOCYTES # BLD AUTO: 0.02 10*3/MM3 (ref 0–0.05)
IMM GRANULOCYTES NFR BLD AUTO: 0.2 % (ref 0–0.5)
INR PPP: 1.16 (ref 0.85–1.16)
LYMPHOCYTES # BLD AUTO: 1.56 10*3/MM3 (ref 0.7–3.1)
LYMPHOCYTES NFR BLD AUTO: 19.1 % (ref 19.6–45.3)
MCH RBC QN AUTO: 24.7 PG (ref 26.6–33)
MCHC RBC AUTO-ENTMCNC: 30.8 G/DL (ref 31.5–35.7)
MCV RBC AUTO: 80.3 FL (ref 79–97)
MONOCYTES # BLD AUTO: 0.63 10*3/MM3 (ref 0.1–0.9)
MONOCYTES NFR BLD AUTO: 7.7 % (ref 5–12)
NEUTROPHILS # BLD AUTO: 5.64 10*3/MM3 (ref 1.7–7)
NEUTROPHILS NFR BLD AUTO: 69.1 % (ref 42.7–76)
NRBC BLD AUTO-RTO: 0 /100 WBC (ref 0–0.2)
PLATELET # BLD AUTO: 370 10*3/MM3 (ref 140–450)
PMV BLD AUTO: 10 FL (ref 6–12)
POTASSIUM BLD-SCNC: 4.1 MMOL/L (ref 3.5–5.2)
PROT SERPL-MCNC: 7.9 G/DL (ref 6–8.5)
PROTHROMBIN TIME: 14.2 SECONDS (ref 11.2–14.3)
RBC # BLD AUTO: 5.42 10*6/MM3 (ref 4.14–5.8)
SODIUM BLD-SCNC: 138 MMOL/L (ref 136–145)
WBC NRBC COR # BLD: 8.17 10*3/MM3 (ref 3.4–10.8)

## 2020-02-21 PROCEDURE — 80053 COMPREHEN METABOLIC PANEL: CPT | Performed by: ORTHOPAEDIC SURGERY

## 2020-02-21 PROCEDURE — 85730 THROMBOPLASTIN TIME PARTIAL: CPT | Performed by: ORTHOPAEDIC SURGERY

## 2020-02-21 PROCEDURE — 82306 VITAMIN D 25 HYDROXY: CPT | Performed by: ORTHOPAEDIC SURGERY

## 2020-02-21 PROCEDURE — 36415 COLL VENOUS BLD VENIPUNCTURE: CPT

## 2020-02-21 PROCEDURE — 85025 COMPLETE CBC W/AUTO DIFF WBC: CPT | Performed by: ORTHOPAEDIC SURGERY

## 2020-02-21 PROCEDURE — 83036 HEMOGLOBIN GLYCOSYLATED A1C: CPT | Performed by: ORTHOPAEDIC SURGERY

## 2020-02-21 PROCEDURE — 85652 RBC SED RATE AUTOMATED: CPT | Performed by: ORTHOPAEDIC SURGERY

## 2020-02-21 PROCEDURE — 85379 FIBRIN DEGRADATION QUANT: CPT | Performed by: ORTHOPAEDIC SURGERY

## 2020-02-21 PROCEDURE — 87081 CULTURE SCREEN ONLY: CPT | Performed by: ORTHOPAEDIC SURGERY

## 2020-02-21 PROCEDURE — 86140 C-REACTIVE PROTEIN: CPT | Performed by: ORTHOPAEDIC SURGERY

## 2020-02-21 PROCEDURE — 85610 PROTHROMBIN TIME: CPT | Performed by: ORTHOPAEDIC SURGERY

## 2020-02-21 PROCEDURE — G0480 DRUG TEST DEF 1-7 CLASSES: HCPCS | Performed by: ORTHOPAEDIC SURGERY

## 2020-02-22 LAB — MRSA SPEC QL CULT: NORMAL

## 2020-02-24 ENCOUNTER — OFFICE VISIT (OUTPATIENT)
Dept: PULMONOLOGY | Facility: CLINIC | Age: 45
End: 2020-02-24

## 2020-02-24 VITALS
SYSTOLIC BLOOD PRESSURE: 122 MMHG | HEART RATE: 90 BPM | WEIGHT: 209 LBS | DIASTOLIC BLOOD PRESSURE: 80 MMHG | OXYGEN SATURATION: 97 % | RESPIRATION RATE: 18 BRPM | BODY MASS INDEX: 28.31 KG/M2 | HEIGHT: 72 IN

## 2020-02-24 DIAGNOSIS — J43.1 PANLOBULAR EMPHYSEMA (HCC): ICD-10-CM

## 2020-02-24 DIAGNOSIS — G47.33 OBSTRUCTIVE SLEEP APNEA: ICD-10-CM

## 2020-02-24 DIAGNOSIS — R06.02 SHORTNESS OF BREATH: Primary | ICD-10-CM

## 2020-02-24 DIAGNOSIS — R06.02 SHORTNESS OF BREATH: ICD-10-CM

## 2020-02-24 DIAGNOSIS — J45.40 MODERATE PERSISTENT ASTHMA WITHOUT COMPLICATION: ICD-10-CM

## 2020-02-24 DIAGNOSIS — J30.89 OTHER ALLERGIC RHINITIS: ICD-10-CM

## 2020-02-24 PROCEDURE — 94726 PLETHYSMOGRAPHY LUNG VOLUMES: CPT | Performed by: INTERNAL MEDICINE

## 2020-02-24 PROCEDURE — 95012 NITRIC OXIDE EXP GAS DETER: CPT | Performed by: INTERNAL MEDICINE

## 2020-02-24 PROCEDURE — 99214 OFFICE O/P EST MOD 30 MIN: CPT | Performed by: INTERNAL MEDICINE

## 2020-02-24 PROCEDURE — 94060 EVALUATION OF WHEEZING: CPT | Performed by: INTERNAL MEDICINE

## 2020-02-24 PROCEDURE — 94729 DIFFUSING CAPACITY: CPT | Performed by: INTERNAL MEDICINE

## 2020-02-24 RX ORDER — MONTELUKAST SODIUM 10 MG/1
10 TABLET ORAL EVERY EVENING
Qty: 90 TABLET | Refills: 2 | Status: SHIPPED | OUTPATIENT
Start: 2020-02-24 | End: 2020-06-10

## 2020-02-24 RX ORDER — ALBUTEROL SULFATE 90 UG/1
2 AEROSOL, METERED RESPIRATORY (INHALATION) EVERY 4 HOURS PRN
Qty: 3 INHALER | Refills: 2 | Status: SHIPPED | OUTPATIENT
Start: 2020-02-24 | End: 2021-01-28 | Stop reason: SDUPTHER

## 2020-02-24 RX ORDER — FLUTICASONE PROPIONATE 50 MCG
1 SPRAY, SUSPENSION (ML) NASAL DAILY
Qty: 3 BOTTLE | Refills: 2 | Status: SHIPPED | OUTPATIENT
Start: 2020-02-24 | End: 2020-09-22 | Stop reason: SDUPTHER

## 2020-02-24 NOTE — PROGRESS NOTES
"Chief Complaint   Patient presents with   • Follow-up   • Shortness of Breath         Subjective   Topher Stoll is a 44 y.o. male.     History of Present Illness   Patient comes in today for follow up of asthma, allergic rhinitis and shortness of breath. Patient does not report any recent exacerbations requiring emergency room visits or hospitalizations.    Patient is using the rescue inhalers minimally. he is compliant with pulmonary medicines, as prescribed.    Patient says that he has been using his nasal sprays on a regular basis and describes no significant ongoing issues other than occasional congestion.     Patient doesn't report any issues with the device. The patient describes no significant issues with his mask either.     Patient says that the compliance with the use of the equipment is good.     Patient says that his symptoms of fatigue & daytime sleepiness have been helped greatly with the use of PAP, as prescribed.       The following portions of the patient's history were reviewed and updated as appropriate: allergies, current medications, past family history, past medical history, past social history and past surgical history.    Review of Systems   HENT: Negative for rhinorrhea, sinus pressure, sneezing and sore throat.    Respiratory: Negative for cough, shortness of breath and wheezing.        Objective   Visit Vitals  /80   Pulse 90   Resp 18   Ht 182.9 cm (72\")   Wt 94.8 kg (209 lb)   SpO2 97%   BMI 28.35 kg/m²       Physical Exam   Constitutional: He is oriented to person, place, and time. He appears well-developed and well-nourished.   HENT:   Head: Normocephalic and atraumatic.   Dentures noted.  Crowded oropharynx.  Mild nasal erythema noted.    Eyes: EOM are normal.   Neck: Neck supple. No JVD present.   Cardiovascular: Normal rate and regular rhythm.   Pulmonary/Chest: Effort normal and breath sounds normal. He has no wheezes.   Musculoskeletal:   Used a walker.   Neurological: He " is alert and oriented to person, place, and time.   Skin: Skin is warm and dry.   Vitals reviewed.      Assessment/Plan   Topher was seen today for follow-up and shortness of breath.    Diagnoses and all orders for this visit:    Shortness of breath    Moderate persistent asthma without complication    Other allergic rhinitis    Obstructive sleep apnea    Panlobular emphysema (CMS/HCC)  -     albuterol sulfate HFA (VENTOLIN HFA) 108 (90 Base) MCG/ACT inhaler; Inhale 2 puffs Every 4 (Four) Hours As Needed for Wheezing or Shortness of Air.    Other orders  -     montelukast (SINGULAIR) 10 MG tablet; Take 1 tablet by mouth Every Evening.  -     Fluticasone Furoate-Vilanterol (BREO ELLIPTA) 200-25 MCG/INH inhaler; Inhale 1 puff Daily.  -     fluticasone (FLONASE) 50 MCG/ACT nasal spray; 1 spray into the nostril(s) as directed by provider Daily.           Return in about 5 months (around 7/24/2020) for Recheck, For Delmy, ....Also 11 mths w/ Dr. Olivas.    DISCUSSION (if any):  Last Abs Eos were 260. Have been as high as 880, back in May 2019.     Last PFTs were done in 04/2017. Reviewed. Showed moderate obstruction.     PFTs performed today.  Moderate obstruction noted.    FeNO level was 28 today.    Peak flow was 240 LPM today.?    It appears that his symptoms of asthma are under adequate control with the current regimen.      Patient's medications for underlying asthma were reviewed in great detail.     Any needed adjustments to his pulmonary medications, either for clinical or insurance coverage reasons, have been made and are reflected in the orders.     Compliance with medications stressed.      Side effects of prescribed medications discussed with the patient.     I have discussed asthma action plan with him.     The patient was asked to call this office if the symptoms worsen.     Continue treatment with AutoPAP at a pressure of 6/14, with a nasal mask.     Patient seems to be compliant with PAP device, based on  the available data and his account of improved symptoms.      His residual AHI is now 4.3.    The patient was once again reminded to continue using the PAP device regularly, every night for atleast 4 hours.    Patient was advised to continue his nasal spray, especially given improvement in symptoms overall.       Dictated utilizing Dragon dictation.    This document was electronically signed by Melina Olivas MD on 02/24/20 at 2:40 PM

## 2020-02-25 ENCOUNTER — LAB (OUTPATIENT)
Dept: LAB | Facility: HOSPITAL | Age: 45
End: 2020-02-25

## 2020-02-25 ENCOUNTER — TRANSCRIBE ORDERS (OUTPATIENT)
Dept: LAB | Facility: HOSPITAL | Age: 45
End: 2020-02-25

## 2020-02-25 DIAGNOSIS — T84.52XD INFECTION ASSOCIATED WITH INTERNAL LEFT HIP PROSTHESIS, SUBSEQUENT ENCOUNTER: ICD-10-CM

## 2020-02-25 DIAGNOSIS — T84.52XD INFECTION ASSOCIATED WITH INTERNAL LEFT HIP PROSTHESIS, SUBSEQUENT ENCOUNTER: Primary | ICD-10-CM

## 2020-02-25 DIAGNOSIS — L03.116 CELLULITIS OF LEFT FOOT: ICD-10-CM

## 2020-02-25 DIAGNOSIS — M25.452 EFFUSION OF HIP JOINT, LEFT: ICD-10-CM

## 2020-02-25 LAB
ALBUMIN SERPL-MCNC: 4.4 G/DL (ref 3.5–5.2)
ALBUMIN/GLOB SERPL: 1.3 G/DL
ALP SERPL-CCNC: 156 U/L (ref 39–117)
ALT SERPL W P-5'-P-CCNC: 22 U/L (ref 1–41)
ANION GAP SERPL CALCULATED.3IONS-SCNC: 14 MMOL/L (ref 5–15)
AST SERPL-CCNC: 19 U/L (ref 1–40)
BILIRUB SERPL-MCNC: 0.2 MG/DL (ref 0.2–1.2)
BUN BLD-MCNC: 11 MG/DL (ref 6–20)
BUN/CREAT SERPL: 12.8 (ref 7–25)
CALCIUM SPEC-SCNC: 9.1 MG/DL (ref 8.6–10.5)
CHLORIDE SERPL-SCNC: 102 MMOL/L (ref 98–107)
CO2 SERPL-SCNC: 24 MMOL/L (ref 22–29)
CREAT BLD-MCNC: 0.86 MG/DL (ref 0.76–1.27)
CRP SERPL-MCNC: 1.02 MG/DL (ref 0–0.5)
DEPRECATED RDW RBC AUTO: 42.4 FL (ref 37–54)
ERYTHROCYTE [DISTWIDTH] IN BLOOD BY AUTOMATED COUNT: 14.8 % (ref 12.3–15.4)
ERYTHROCYTE [SEDIMENTATION RATE] IN BLOOD: 26 MM/HR (ref 0–15)
GFR SERPL CREATININE-BSD FRML MDRD: 96 ML/MIN/1.73
GLOBULIN UR ELPH-MCNC: 3.4 GM/DL
GLUCOSE BLD-MCNC: 94 MG/DL (ref 65–99)
HCT VFR BLD AUTO: 42.7 % (ref 37.5–51)
HGB BLD-MCNC: 12.7 G/DL (ref 13–17.7)
MCH RBC QN AUTO: 23.6 PG (ref 26.6–33)
MCHC RBC AUTO-ENTMCNC: 29.7 G/DL (ref 31.5–35.7)
MCV RBC AUTO: 79.4 FL (ref 79–97)
MYCOBACTERIUM SPEC CULT: ABNORMAL
MYCOBACTERIUM SPEC CULT: NORMAL
NIGHT BLUE STAIN TISS: ABNORMAL
NIGHT BLUE STAIN TISS: NORMAL
PLATELET # BLD AUTO: 357 10*3/MM3 (ref 140–450)
PMV BLD AUTO: 10 FL (ref 6–12)
POTASSIUM BLD-SCNC: 4 MMOL/L (ref 3.5–5.2)
PROT SERPL-MCNC: 7.8 G/DL (ref 6–8.5)
RBC # BLD AUTO: 5.38 10*6/MM3 (ref 4.14–5.8)
SODIUM BLD-SCNC: 140 MMOL/L (ref 136–145)
WBC NRBC COR # BLD: 7.66 10*3/MM3 (ref 3.4–10.8)

## 2020-02-25 PROCEDURE — 85652 RBC SED RATE AUTOMATED: CPT

## 2020-02-25 PROCEDURE — 36415 COLL VENOUS BLD VENIPUNCTURE: CPT

## 2020-02-25 PROCEDURE — 86140 C-REACTIVE PROTEIN: CPT

## 2020-02-25 PROCEDURE — 85027 COMPLETE CBC AUTOMATED: CPT

## 2020-02-25 PROCEDURE — 80053 COMPREHEN METABOLIC PANEL: CPT

## 2020-02-26 LAB
COTININE UR-MCNC: 62.9 NG/ML
FUNGUS WND CULT: NORMAL
MYCOBACTERIUM SPEC CULT: NORMAL
NICOTINE SERPL-MCNC: 6.1 NG/ML
NIGHT BLUE STAIN TISS: NORMAL

## 2020-03-05 ENCOUNTER — HOSPITAL ENCOUNTER (INPATIENT)
Facility: HOSPITAL | Age: 45
LOS: 1 days | Discharge: HOME | End: 2020-03-06
Attending: ORTHOPAEDIC SURGERY | Admitting: ORTHOPAEDIC SURGERY

## 2020-03-05 ENCOUNTER — APPOINTMENT (OUTPATIENT)
Dept: GENERAL RADIOLOGY | Facility: HOSPITAL | Age: 45
End: 2020-03-05

## 2020-03-05 ENCOUNTER — ANESTHESIA (OUTPATIENT)
Dept: PERIOP | Facility: HOSPITAL | Age: 45
End: 2020-03-05

## 2020-03-05 ENCOUNTER — ANESTHESIA EVENT (OUTPATIENT)
Dept: PERIOP | Facility: HOSPITAL | Age: 45
End: 2020-03-05

## 2020-03-05 ENCOUNTER — READMISSION MANAGEMENT (OUTPATIENT)
Dept: CALL CENTER | Facility: HOSPITAL | Age: 45
End: 2020-03-05

## 2020-03-05 DIAGNOSIS — F11.90 NARCOTIC DRUG USE: ICD-10-CM

## 2020-03-05 DIAGNOSIS — Z96.642 STATUS POST TOTAL REPLACEMENT OF LEFT HIP: Primary | ICD-10-CM

## 2020-03-05 DIAGNOSIS — M25.552 LEFT HIP PAIN: ICD-10-CM

## 2020-03-05 LAB
ABO GROUP BLD: NORMAL
ABO GROUP BLD: NORMAL
BLD GP AB SCN SERPL QL: NEGATIVE
RH BLD: POSITIVE
RH BLD: POSITIVE
T&S EXPIRATION DATE: NORMAL

## 2020-03-05 PROCEDURE — 87015 SPECIMEN INFECT AGNT CONCNTJ: CPT | Performed by: ORTHOPAEDIC SURGERY

## 2020-03-05 PROCEDURE — C1776 JOINT DEVICE (IMPLANTABLE): HCPCS | Performed by: ORTHOPAEDIC SURGERY

## 2020-03-05 PROCEDURE — C1713 ANCHOR/SCREW BN/BN,TIS/BN: HCPCS | Performed by: ORTHOPAEDIC SURGERY

## 2020-03-05 PROCEDURE — 87070 CULTURE OTHR SPECIMN AEROBIC: CPT | Performed by: ORTHOPAEDIC SURGERY

## 2020-03-05 PROCEDURE — 94640 AIRWAY INHALATION TREATMENT: CPT

## 2020-03-05 PROCEDURE — 25010000002 CLONIDINE PER 1 MG: Performed by: ORTHOPAEDIC SURGERY

## 2020-03-05 PROCEDURE — 86901 BLOOD TYPING SEROLOGIC RH(D): CPT

## 2020-03-05 PROCEDURE — 86900 BLOOD TYPING SEROLOGIC ABO: CPT

## 2020-03-05 PROCEDURE — 25010000003 CEFAZOLIN IN DEXTROSE 2-4 GM/100ML-% SOLUTION: Performed by: ORTHOPAEDIC SURGERY

## 2020-03-05 PROCEDURE — 87102 FUNGUS ISOLATION CULTURE: CPT | Performed by: ORTHOPAEDIC SURGERY

## 2020-03-05 PROCEDURE — 86901 BLOOD TYPING SEROLOGIC RH(D): CPT | Performed by: PHYSICIAN ASSISTANT

## 2020-03-05 PROCEDURE — 25010000003 MEPERIDINE PER 100 MG: Performed by: NURSE ANESTHETIST, CERTIFIED REGISTERED

## 2020-03-05 PROCEDURE — 86850 RBC ANTIBODY SCREEN: CPT | Performed by: PHYSICIAN ASSISTANT

## 2020-03-05 PROCEDURE — 88307 TISSUE EXAM BY PATHOLOGIST: CPT | Performed by: ORTHOPAEDIC SURGERY

## 2020-03-05 PROCEDURE — 87205 SMEAR GRAM STAIN: CPT | Performed by: ORTHOPAEDIC SURGERY

## 2020-03-05 PROCEDURE — 25010000002 VANCOMYCIN 10 G RECONSTITUTED SOLUTION: Performed by: ORTHOPAEDIC SURGERY

## 2020-03-05 PROCEDURE — 25010000002 DEXAMETHASONE PER 1 MG: Performed by: NURSE ANESTHETIST, CERTIFIED REGISTERED

## 2020-03-05 PROCEDURE — 97162 PT EVAL MOD COMPLEX 30 MIN: CPT

## 2020-03-05 PROCEDURE — 0SRB04A REPLACEMENT OF LEFT HIP JOINT WITH CERAMIC ON POLYETHYLENE SYNTHETIC SUBSTITUTE, UNCEMENTED, OPEN APPROACH: ICD-10-PCS | Performed by: ORTHOPAEDIC SURGERY

## 2020-03-05 PROCEDURE — 25010000002 KETOROLAC TROMETHAMINE PER 15 MG: Performed by: ORTHOPAEDIC SURGERY

## 2020-03-05 PROCEDURE — 25010000002 PHENYLEPHRINE PER 1 ML: Performed by: NURSE ANESTHETIST, CERTIFIED REGISTERED

## 2020-03-05 PROCEDURE — 87206 SMEAR FLUORESCENT/ACID STAI: CPT | Performed by: ORTHOPAEDIC SURGERY

## 2020-03-05 PROCEDURE — 86900 BLOOD TYPING SEROLOGIC ABO: CPT | Performed by: PHYSICIAN ASSISTANT

## 2020-03-05 PROCEDURE — 87176 TISSUE HOMOGENIZATION CULTR: CPT | Performed by: ORTHOPAEDIC SURGERY

## 2020-03-05 PROCEDURE — 88331 PATH CONSLTJ SURG 1 BLK 1SPC: CPT | Performed by: PATHOLOGY

## 2020-03-05 PROCEDURE — 25010000002 ONDANSETRON PER 1 MG: Performed by: NURSE ANESTHETIST, CERTIFIED REGISTERED

## 2020-03-05 PROCEDURE — 73502 X-RAY EXAM HIP UNI 2-3 VIEWS: CPT

## 2020-03-05 PROCEDURE — 87075 CULTR BACTERIA EXCEPT BLOOD: CPT | Performed by: ORTHOPAEDIC SURGERY

## 2020-03-05 PROCEDURE — 25010000002 HYDROMORPHONE PER 4 MG: Performed by: ORTHOPAEDIC SURGERY

## 2020-03-05 PROCEDURE — 25010000002 PROPOFOL 10 MG/ML EMULSION: Performed by: NURSE ANESTHETIST, CERTIFIED REGISTERED

## 2020-03-05 PROCEDURE — 25010000002 FENTANYL CITRATE (PF) 100 MCG/2ML SOLUTION: Performed by: NURSE ANESTHETIST, CERTIFIED REGISTERED

## 2020-03-05 PROCEDURE — 25010000002 HYDROMORPHONE PER 4 MG: Performed by: NURSE ANESTHETIST, CERTIFIED REGISTERED

## 2020-03-05 PROCEDURE — 97116 GAIT TRAINING THERAPY: CPT

## 2020-03-05 PROCEDURE — 0SPB08Z REMOVAL OF SPACER FROM LEFT HIP JOINT, OPEN APPROACH: ICD-10-PCS | Performed by: ORTHOPAEDIC SURGERY

## 2020-03-05 PROCEDURE — 87116 MYCOBACTERIA CULTURE: CPT | Performed by: ORTHOPAEDIC SURGERY

## 2020-03-05 DEVICE — SHLL ACET OSSEOTI G7 4H SZG 58MM: Type: IMPLANTABLE DEVICE | Site: HIP | Status: FUNCTIONAL

## 2020-03-05 DEVICE — IMPLANTABLE DEVICE
Type: IMPLANTABLE DEVICE | Site: HIP | Status: FUNCTIONAL
Brand: EQUIVABONE ®

## 2020-03-05 DEVICE — SUT NONABS MAXBRAID/PE NMBR2 HC5 38IN WHT 900333: Type: IMPLANTABLE DEVICE | Site: HIP | Status: FUNCTIONAL

## 2020-03-05 DEVICE — IMPLANTABLE DEVICE: Type: IMPLANTABLE DEVICE | Site: HIP | Status: FUNCTIONAL

## 2020-03-05 DEVICE — SCRW ACET CORT TRILOGY S/TAP 6.5X20: Type: IMPLANTABLE DEVICE | Site: HIP | Status: FUNCTIONAL

## 2020-03-05 DEVICE — LINER G7 2MOBL SZG 46MM: Type: IMPLANTABLE DEVICE | Site: HIP | Status: FUNCTIONAL

## 2020-03-05 DEVICE — SCRW ACET CORT TRILOGY S/TAP 6.5X25: Type: IMPLANTABLE DEVICE | Site: HIP | Status: FUNCTIONAL

## 2020-03-05 RX ORDER — MONTELUKAST SODIUM 10 MG/1
10 TABLET ORAL NIGHTLY
Status: DISCONTINUED | OUTPATIENT
Start: 2020-03-05 | End: 2020-03-06 | Stop reason: HOSPADM

## 2020-03-05 RX ORDER — MEPERIDINE HYDROCHLORIDE 25 MG/ML
12.5 INJECTION INTRAMUSCULAR; INTRAVENOUS; SUBCUTANEOUS
Status: COMPLETED | OUTPATIENT
Start: 2020-03-05 | End: 2020-03-05

## 2020-03-05 RX ORDER — CEFAZOLIN SODIUM 2 G/100ML
2 INJECTION, SOLUTION INTRAVENOUS EVERY 8 HOURS
Status: COMPLETED | OUTPATIENT
Start: 2020-03-05 | End: 2020-03-06

## 2020-03-05 RX ORDER — ASPIRIN 81 MG/1
81 TABLET ORAL EVERY 12 HOURS SCHEDULED
Status: DISCONTINUED | OUTPATIENT
Start: 2020-03-06 | End: 2020-03-06 | Stop reason: HOSPADM

## 2020-03-05 RX ORDER — ATORVASTATIN CALCIUM 10 MG/1
10 TABLET, FILM COATED ORAL DAILY
Status: DISCONTINUED | OUTPATIENT
Start: 2020-03-06 | End: 2020-03-06 | Stop reason: HOSPADM

## 2020-03-05 RX ORDER — ONDANSETRON 2 MG/ML
INJECTION INTRAMUSCULAR; INTRAVENOUS AS NEEDED
Status: DISCONTINUED | OUTPATIENT
Start: 2020-03-05 | End: 2020-03-05 | Stop reason: SURG

## 2020-03-05 RX ORDER — MAGNESIUM HYDROXIDE 1200 MG/15ML
LIQUID ORAL AS NEEDED
Status: DISCONTINUED | OUTPATIENT
Start: 2020-03-05 | End: 2020-03-05 | Stop reason: HOSPADM

## 2020-03-05 RX ORDER — BISACODYL 5 MG/1
10 TABLET, DELAYED RELEASE ORAL DAILY PRN
Status: DISCONTINUED | OUTPATIENT
Start: 2020-03-05 | End: 2020-03-06 | Stop reason: HOSPADM

## 2020-03-05 RX ORDER — KETOROLAC TROMETHAMINE 15 MG/ML
15 INJECTION, SOLUTION INTRAMUSCULAR; INTRAVENOUS EVERY 6 HOURS PRN
Status: DISCONTINUED | OUTPATIENT
Start: 2020-03-05 | End: 2020-03-06 | Stop reason: HOSPADM

## 2020-03-05 RX ORDER — FAMOTIDINE 10 MG/ML
20 INJECTION, SOLUTION INTRAVENOUS ONCE
Status: CANCELLED | OUTPATIENT
Start: 2020-03-05 | End: 2020-03-05

## 2020-03-05 RX ORDER — PROPOFOL 10 MG/ML
VIAL (ML) INTRAVENOUS AS NEEDED
Status: DISCONTINUED | OUTPATIENT
Start: 2020-03-05 | End: 2020-03-05 | Stop reason: SURG

## 2020-03-05 RX ORDER — AMOXICILLIN 250 MG
2 CAPSULE ORAL 2 TIMES DAILY PRN
Status: DISCONTINUED | OUTPATIENT
Start: 2020-03-05 | End: 2020-03-06 | Stop reason: HOSPADM

## 2020-03-05 RX ORDER — ALBUTEROL SULFATE 2.5 MG/3ML
2.5 SOLUTION RESPIRATORY (INHALATION) EVERY 4 HOURS PRN
Status: DISCONTINUED | OUTPATIENT
Start: 2020-03-05 | End: 2020-03-06 | Stop reason: HOSPADM

## 2020-03-05 RX ORDER — FENTANYL CITRATE 50 UG/ML
50 INJECTION, SOLUTION INTRAMUSCULAR; INTRAVENOUS
Status: DISCONTINUED | OUTPATIENT
Start: 2020-03-05 | End: 2020-03-05 | Stop reason: HOSPADM

## 2020-03-05 RX ORDER — SODIUM CHLORIDE, SODIUM LACTATE, POTASSIUM CHLORIDE, CALCIUM CHLORIDE 600; 310; 30; 20 MG/100ML; MG/100ML; MG/100ML; MG/100ML
9 INJECTION, SOLUTION INTRAVENOUS CONTINUOUS
Status: DISCONTINUED | OUTPATIENT
Start: 2020-03-05 | End: 2020-03-06 | Stop reason: HOSPADM

## 2020-03-05 RX ORDER — HYDROMORPHONE HYDROCHLORIDE 1 MG/ML
0.5 INJECTION, SOLUTION INTRAMUSCULAR; INTRAVENOUS; SUBCUTANEOUS
Status: COMPLETED | OUTPATIENT
Start: 2020-03-05 | End: 2020-03-05

## 2020-03-05 RX ORDER — OXYCODONE HYDROCHLORIDE 5 MG/1
10 TABLET ORAL EVERY 4 HOURS PRN
Status: DISCONTINUED | OUTPATIENT
Start: 2020-03-05 | End: 2020-03-06 | Stop reason: HOSPADM

## 2020-03-05 RX ORDER — ACETAMINOPHEN 500 MG
1000 TABLET ORAL ONCE
Status: COMPLETED | OUTPATIENT
Start: 2020-03-05 | End: 2020-03-05

## 2020-03-05 RX ORDER — LABETALOL HYDROCHLORIDE 5 MG/ML
5 INJECTION, SOLUTION INTRAVENOUS
Status: DISCONTINUED | OUTPATIENT
Start: 2020-03-05 | End: 2020-03-05 | Stop reason: HOSPADM

## 2020-03-05 RX ORDER — SODIUM CHLORIDE, SODIUM LACTATE, POTASSIUM CHLORIDE, CALCIUM CHLORIDE 600; 310; 30; 20 MG/100ML; MG/100ML; MG/100ML; MG/100ML
100 INJECTION, SOLUTION INTRAVENOUS CONTINUOUS
Status: DISCONTINUED | OUTPATIENT
Start: 2020-03-05 | End: 2020-03-06 | Stop reason: HOSPADM

## 2020-03-05 RX ORDER — LIDOCAINE HYDROCHLORIDE 10 MG/ML
INJECTION, SOLUTION EPIDURAL; INFILTRATION; INTRACAUDAL; PERINEURAL AS NEEDED
Status: DISCONTINUED | OUTPATIENT
Start: 2020-03-05 | End: 2020-03-05 | Stop reason: SURG

## 2020-03-05 RX ORDER — BISACODYL 10 MG
10 SUPPOSITORY, RECTAL RECTAL DAILY PRN
Status: DISCONTINUED | OUTPATIENT
Start: 2020-03-05 | End: 2020-03-06 | Stop reason: HOSPADM

## 2020-03-05 RX ORDER — TRAMADOL HYDROCHLORIDE 50 MG/1
50 TABLET ORAL EVERY 6 HOURS PRN
Status: DISCONTINUED | OUTPATIENT
Start: 2020-03-05 | End: 2020-03-06 | Stop reason: HOSPADM

## 2020-03-05 RX ORDER — DEXAMETHASONE SODIUM PHOSPHATE 4 MG/ML
INJECTION, SOLUTION INTRA-ARTICULAR; INTRALESIONAL; INTRAMUSCULAR; INTRAVENOUS; SOFT TISSUE AS NEEDED
Status: DISCONTINUED | OUTPATIENT
Start: 2020-03-05 | End: 2020-03-05 | Stop reason: SURG

## 2020-03-05 RX ORDER — MELOXICAM 7.5 MG/1
15 TABLET ORAL DAILY
Status: DISCONTINUED | OUTPATIENT
Start: 2020-03-06 | End: 2020-03-06 | Stop reason: HOSPADM

## 2020-03-05 RX ORDER — OXYCODONE HYDROCHLORIDE 5 MG/1
5 TABLET ORAL EVERY 4 HOURS PRN
Status: DISCONTINUED | OUTPATIENT
Start: 2020-03-05 | End: 2020-03-06 | Stop reason: HOSPADM

## 2020-03-05 RX ORDER — LABETALOL HYDROCHLORIDE 5 MG/ML
10 INJECTION, SOLUTION INTRAVENOUS EVERY 4 HOURS PRN
Status: DISCONTINUED | OUTPATIENT
Start: 2020-03-05 | End: 2020-03-06 | Stop reason: HOSPADM

## 2020-03-05 RX ORDER — SODIUM CHLORIDE 0.9 % (FLUSH) 0.9 %
10 SYRINGE (ML) INJECTION AS NEEDED
Status: DISCONTINUED | OUTPATIENT
Start: 2020-03-05 | End: 2020-03-05 | Stop reason: HOSPADM

## 2020-03-05 RX ORDER — LIDOCAINE HYDROCHLORIDE 10 MG/ML
0.5 INJECTION, SOLUTION EPIDURAL; INFILTRATION; INTRACAUDAL; PERINEURAL ONCE AS NEEDED
Status: COMPLETED | OUTPATIENT
Start: 2020-03-05 | End: 2020-03-05

## 2020-03-05 RX ORDER — ACETAMINOPHEN 500 MG
1000 TABLET ORAL EVERY 8 HOURS
Status: DISCONTINUED | OUTPATIENT
Start: 2020-03-05 | End: 2020-03-06 | Stop reason: HOSPADM

## 2020-03-05 RX ORDER — EPHEDRINE SULFATE 50 MG/ML
5 INJECTION, SOLUTION INTRAVENOUS ONCE AS NEEDED
Status: DISCONTINUED | OUTPATIENT
Start: 2020-03-05 | End: 2020-03-05 | Stop reason: HOSPADM

## 2020-03-05 RX ORDER — MELOXICAM 15 MG/1
15 TABLET ORAL ONCE
Status: COMPLETED | OUTPATIENT
Start: 2020-03-05 | End: 2020-03-05

## 2020-03-05 RX ORDER — CEFAZOLIN SODIUM 2 G/100ML
2 INJECTION, SOLUTION INTRAVENOUS ONCE
Status: COMPLETED | OUTPATIENT
Start: 2020-03-05 | End: 2020-03-05

## 2020-03-05 RX ORDER — DOCUSATE SODIUM 100 MG/1
100 CAPSULE, LIQUID FILLED ORAL 2 TIMES DAILY PRN
Status: DISCONTINUED | OUTPATIENT
Start: 2020-03-05 | End: 2020-03-06 | Stop reason: HOSPADM

## 2020-03-05 RX ORDER — ONDANSETRON 2 MG/ML
4 INJECTION INTRAMUSCULAR; INTRAVENOUS ONCE AS NEEDED
Status: DISCONTINUED | OUTPATIENT
Start: 2020-03-05 | End: 2020-03-05 | Stop reason: HOSPADM

## 2020-03-05 RX ORDER — HYDROMORPHONE HYDROCHLORIDE 1 MG/ML
0.5 INJECTION, SOLUTION INTRAMUSCULAR; INTRAVENOUS; SUBCUTANEOUS
Status: DISCONTINUED | OUTPATIENT
Start: 2020-03-05 | End: 2020-03-06 | Stop reason: HOSPADM

## 2020-03-05 RX ORDER — PREGABALIN 75 MG/1
75 CAPSULE ORAL ONCE
Status: COMPLETED | OUTPATIENT
Start: 2020-03-05 | End: 2020-03-05

## 2020-03-05 RX ORDER — SODIUM CHLORIDE 0.9 % (FLUSH) 0.9 %
10 SYRINGE (ML) INJECTION EVERY 12 HOURS SCHEDULED
Status: DISCONTINUED | OUTPATIENT
Start: 2020-03-05 | End: 2020-03-05 | Stop reason: HOSPADM

## 2020-03-05 RX ORDER — NALOXONE HCL 0.4 MG/ML
0.1 VIAL (ML) INJECTION
Status: DISCONTINUED | OUTPATIENT
Start: 2020-03-05 | End: 2020-03-06 | Stop reason: HOSPADM

## 2020-03-05 RX ORDER — LOSARTAN POTASSIUM 50 MG/1
100 TABLET ORAL DAILY
Status: DISCONTINUED | OUTPATIENT
Start: 2020-03-05 | End: 2020-03-06 | Stop reason: HOSPADM

## 2020-03-05 RX ORDER — NALOXONE HCL 0.4 MG/ML
0.4 VIAL (ML) INJECTION AS NEEDED
Status: DISCONTINUED | OUTPATIENT
Start: 2020-03-05 | End: 2020-03-05 | Stop reason: HOSPADM

## 2020-03-05 RX ORDER — FLUTICASONE PROPIONATE 50 MCG
1 SPRAY, SUSPENSION (ML) NASAL DAILY
Status: DISCONTINUED | OUTPATIENT
Start: 2020-03-05 | End: 2020-03-06 | Stop reason: HOSPADM

## 2020-03-05 RX ORDER — BUPIVACAINE HYDROCHLORIDE 5 MG/ML
INJECTION, SOLUTION PERINEURAL
Status: COMPLETED | OUTPATIENT
Start: 2020-03-05 | End: 2020-03-05

## 2020-03-05 RX ORDER — FAMOTIDINE 20 MG/1
20 TABLET, FILM COATED ORAL ONCE
Status: COMPLETED | OUTPATIENT
Start: 2020-03-05 | End: 2020-03-05

## 2020-03-05 RX ORDER — PANTOPRAZOLE SODIUM 40 MG/1
40 TABLET, DELAYED RELEASE ORAL EVERY MORNING
Status: DISCONTINUED | OUTPATIENT
Start: 2020-03-06 | End: 2020-03-06 | Stop reason: HOSPADM

## 2020-03-05 RX ORDER — BUDESONIDE AND FORMOTEROL FUMARATE DIHYDRATE 160; 4.5 UG/1; UG/1
2 AEROSOL RESPIRATORY (INHALATION)
Status: DISCONTINUED | OUTPATIENT
Start: 2020-03-05 | End: 2020-03-06 | Stop reason: HOSPADM

## 2020-03-05 RX ADMIN — PREGABALIN 75 MG: 75 CAPSULE ORAL at 07:59

## 2020-03-05 RX ADMIN — MEPERIDINE HYDROCHLORIDE 12.5 MG: 25 INJECTION INTRAMUSCULAR; INTRAVENOUS; SUBCUTANEOUS at 13:50

## 2020-03-05 RX ADMIN — CEFAZOLIN SODIUM 2 G: 2 INJECTION, SOLUTION INTRAVENOUS at 18:10

## 2020-03-05 RX ADMIN — OXYCODONE HYDROCHLORIDE 10 MG: 5 TABLET ORAL at 22:26

## 2020-03-05 RX ADMIN — ACETAMINOPHEN 1000 MG: 500 TABLET ORAL at 07:59

## 2020-03-05 RX ADMIN — HYDROMORPHONE HYDROCHLORIDE 0.5 MG: 1 INJECTION, SOLUTION INTRAMUSCULAR; INTRAVENOUS; SUBCUTANEOUS at 14:09

## 2020-03-05 RX ADMIN — HYDROMORPHONE HYDROCHLORIDE 0.5 MG: 1 INJECTION, SOLUTION INTRAMUSCULAR; INTRAVENOUS; SUBCUTANEOUS at 19:07

## 2020-03-05 RX ADMIN — MELOXICAM 15 MG: 15 TABLET ORAL at 07:59

## 2020-03-05 RX ADMIN — VANCOMYCIN HYDROCHLORIDE 1500 MG: 10 INJECTION, POWDER, LYOPHILIZED, FOR SOLUTION INTRAVENOUS at 08:59

## 2020-03-05 RX ADMIN — FAMOTIDINE 20 MG: 20 TABLET ORAL at 07:59

## 2020-03-05 RX ADMIN — PHENYLEPHRINE HYDROCHLORIDE 100 MCG: 10 INJECTION INTRAVENOUS at 11:35

## 2020-03-05 RX ADMIN — SODIUM CHLORIDE, POTASSIUM CHLORIDE, SODIUM LACTATE AND CALCIUM CHLORIDE 9 ML/HR: 600; 310; 30; 20 INJECTION, SOLUTION INTRAVENOUS at 08:00

## 2020-03-05 RX ADMIN — PROPOFOL 100 MCG/KG/MIN: 10 INJECTION, EMULSION INTRAVENOUS at 10:57

## 2020-03-05 RX ADMIN — DEXAMETHASONE SODIUM PHOSPHATE 8 MG: 4 INJECTION, SOLUTION INTRAMUSCULAR; INTRAVENOUS at 13:00

## 2020-03-05 RX ADMIN — SODIUM CHLORIDE, POTASSIUM CHLORIDE, SODIUM LACTATE AND CALCIUM CHLORIDE: 600; 310; 30; 20 INJECTION, SOLUTION INTRAVENOUS at 13:14

## 2020-03-05 RX ADMIN — TRANEXAMIC ACID 1000 MG: 100 INJECTION, SOLUTION INTRAVENOUS at 10:57

## 2020-03-05 RX ADMIN — PROPOFOL 30 MG: 10 INJECTION, EMULSION INTRAVENOUS at 10:49

## 2020-03-05 RX ADMIN — CEFAZOLIN SODIUM 2 G: 2 INJECTION, SOLUTION INTRAVENOUS at 10:46

## 2020-03-05 RX ADMIN — PHENYLEPHRINE HYDROCHLORIDE 100 MCG: 10 INJECTION INTRAVENOUS at 12:04

## 2020-03-05 RX ADMIN — SODIUM CHLORIDE, POTASSIUM CHLORIDE, SODIUM LACTATE AND CALCIUM CHLORIDE 100 ML/HR: 600; 310; 30; 20 INJECTION, SOLUTION INTRAVENOUS at 18:10

## 2020-03-05 RX ADMIN — LIDOCAINE HYDROCHLORIDE 0.3 ML: 10 INJECTION, SOLUTION EPIDURAL; INFILTRATION; INTRACAUDAL; PERINEURAL at 07:59

## 2020-03-05 RX ADMIN — BUDESONIDE AND FORMOTEROL FUMARATE DIHYDRATE 2 PUFF: 160; 4.5 AEROSOL RESPIRATORY (INHALATION) at 19:08

## 2020-03-05 RX ADMIN — HYDROMORPHONE HYDROCHLORIDE 0.5 MG: 1 INJECTION, SOLUTION INTRAMUSCULAR; INTRAVENOUS; SUBCUTANEOUS at 15:35

## 2020-03-05 RX ADMIN — MUPIROCIN 1 APPLICATION: 20 OINTMENT TOPICAL at 07:59

## 2020-03-05 RX ADMIN — TRAZODONE HYDROCHLORIDE 150 MG: 100 TABLET ORAL at 23:26

## 2020-03-05 RX ADMIN — OXYCODONE HYDROCHLORIDE 10 MG: 5 TABLET ORAL at 18:10

## 2020-03-05 RX ADMIN — LIDOCAINE HYDROCHLORIDE 2 ML: 10 INJECTION, SOLUTION EPIDURAL; INFILTRATION; INTRACAUDAL; PERINEURAL at 10:49

## 2020-03-05 RX ADMIN — BUPIVACAINE HYDROCHLORIDE 2 ML: 5 INJECTION, SOLUTION PERINEURAL at 10:54

## 2020-03-05 RX ADMIN — MONTELUKAST SODIUM 10 MG: 10 TABLET, COATED ORAL at 21:19

## 2020-03-05 RX ADMIN — LOSARTAN POTASSIUM 100 MG: 50 TABLET, FILM COATED ORAL at 18:10

## 2020-03-05 RX ADMIN — HYDROMORPHONE HYDROCHLORIDE 0.5 MG: 1 INJECTION, SOLUTION INTRAMUSCULAR; INTRAVENOUS; SUBCUTANEOUS at 14:21

## 2020-03-05 RX ADMIN — HYDROMORPHONE HYDROCHLORIDE 0.5 MG: 1 INJECTION, SOLUTION INTRAMUSCULAR; INTRAVENOUS; SUBCUTANEOUS at 15:30

## 2020-03-05 RX ADMIN — TRANEXAMIC ACID 1000 MG: 100 INJECTION, SOLUTION INTRAVENOUS at 12:46

## 2020-03-05 RX ADMIN — HYDROMORPHONE HYDROCHLORIDE 0.5 MG: 1 INJECTION, SOLUTION INTRAMUSCULAR; INTRAVENOUS; SUBCUTANEOUS at 21:19

## 2020-03-05 RX ADMIN — ACETAMINOPHEN 1000 MG: 500 TABLET, FILM COATED ORAL at 21:19

## 2020-03-05 RX ADMIN — ONDANSETRON 4 MG: 2 INJECTION INTRAMUSCULAR; INTRAVENOUS at 13:00

## 2020-03-05 RX ADMIN — FENTANYL CITRATE 50 MCG: 0.05 INJECTION, SOLUTION INTRAMUSCULAR; INTRAVENOUS at 16:05

## 2020-03-05 RX ADMIN — MEPERIDINE HYDROCHLORIDE 12.5 MG: 25 INJECTION INTRAMUSCULAR; INTRAVENOUS; SUBCUTANEOUS at 13:48

## 2020-03-05 NOTE — ANESTHESIA PROCEDURE NOTES
Spinal Block      Patient reassessed immediately prior to procedure    Patient location during procedure: OR  Indication:at surgeon's request  Performed By  CRNA: Ja Palmer CRNA  Preanesthetic Checklist  Completed: patient identified, site marked, surgical consent, pre-op evaluation, timeout performed, IV checked, risks and benefits discussed and monitors and equipment checked  Spinal Block Prep:  Patient Position:sitting  Sterile Tech:cap, gloves, sterile barriers and mask  Prep:Chloraprep  Patient Monitoring:blood pressure monitoring, continuous pulse oximetry and EKG  Spinal Block Procedure  Approach:midline  Guidance:landmark technique and palpation technique  Location:L4-L5  Needle Type:Sprotte  Needle Gauge:22 G  Placement of Spinal needle event:cerebrospinal fluid aspirated  Paresthesia: no  Fluid Appearance:clear  Medications: bupivacaine (MARCAINE) 0.5 % injection, 2 mL  Med Administered at 3/5/2020 10:54 AM   Post Assessment  Patient Tolerance:patient tolerated the procedure well with no apparent complications  Complications no  Additional Notes  Procedure:  Pt assisted to sitting position, with legs in position of comfort over side of bed.  Pt. instructed in optimal spine presentation, the spine was prepped/ Draped and the skin at insertion site was anesthetized with 1% Lidocaine 2 ml.  The spinal needle was then advanced until CSF flow was obtained and LA was injected:

## 2020-03-05 NOTE — OP NOTE
TOTAL HIP ARTHROPLASTY REVISION  Procedure Report    Patient Name:  Topher Stoll  YOB: 1975    Date of Surgery:  3/5/2020     Indications:The patient is a 44 y.o. male who was admitted to [facility] had a history of a infected left total hip underwent explant placement of antibiotic spacer.  Was evaluated in the outpatient setting and had a normalization of his inflammatory labs as well as a aspirate that was negative for infection decision was made to proceed with reimplantation revision hip of both components  Likely risk benefits of the procedure including but not limited to infection, DVT, pulmonary embolism, leg length discrepancy, recurrent dislocation, possibility of injury to nerves and vessels, and periprosthetic fractures have been discussed with the patient and her daughter in detail. Despite the risks involved the patient elected to proceed with an informed consent was obtained. The patient was seen in the preoperative holding area and the operative extremity was marked.     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 Item Serial No.  Lot No. LRB No. Used   SUT MAXBRAID 2 HC5 38IN T [id number] - [id number] Implant SUT MAXBRAID 2 HC5 38IN T [id number]  [employer] [id number] Left 2   BONEGRAFT SUB EQUIVABONE INJ 2.5CC - [id number] Implant BONEGRAFT SUB EQUIVABONE INJ 2.5CC  [employer] [id number] Left 1   SHLL ACET OSSEOTI G7 4H SZG 58MM - [id number] Implant SHLL ACET OSSEOTI G7 4H SZG 58MM  [employer] [id number] Left 1   SCRW ACET ASHLI TRILOGY S/TAP 6.5X25 - [id number]  Implant SCRW ACET ASHLI TRILOGY S/TAP 6.5X25  [employer] [id number] Left 1   STEM DIST FEM/HIP JAIMIE STS [id number] - [id number] Implant STEM DIST FEM/HIP JAIMIE STS [id number]  [employer] [id number] Left 1   SCRW ACET ASHLI TRILOGY S/TAP 6.5X20 - [id number] Implant SCRW ACET ASHLI TRILOGY S/TAP 6.5X20  [employer] [id number] Left 1   CONE FEM BDY PROX STDOFFST JAIMIE C 60 - [id number] Implant CONE FEM BDY PROX STDOFFST JAIMIE C 60  [employer] [id number] Left 1   HD FEM HIP BIOLOXDELTA CERAM NK 28MM PLS3 - [id number] Implant HD FEM HIP BIOLOXDELTA CERAM NK 28MM PLS3  [employer] [id number] Left 1   BEAR HIP ACTIVE ARTICULATION E1 DUALMOBL [id number] - [id number] Implant BEAR HIP ACTIVE ARTICULATION E1 DUALMOBL [id number]  [employer] [id number] Left 1   LINER G7 2MOBL SZG 46MM - [id number] Implant LINER G7 2MOBL SZG 46MM  [employer] [id number] Left 1       Specimen:          Specimens     ID Source Type Tests Collected By Collected At Frozen?      1 Hip, Left Tissue · FUNGAL CULTURE  · TISSUE / BONE CULTURE  · AFB CULTURE  · ANAEROBIC CULTURE 10 DAY INCUBATION   Ba Ramirez MD 3/5/20 1131      Description: Capsule #1 for culture     2 Hip, Left Tissue · FUNGAL CULTURE  · TISSUE / BONE CULTURE  · AFB CULTURE  · ANAEROBIC CULTURE 10 DAY INCUBATION   Ba Ramirez MD 3/5/20 1132      Description: Anterior capsule for culture     3 Hip, Left Tissue · FUNGAL CULTURE  · TISSUE / BONE CULTURE  · AFB CULTURE  · ANAEROBIC CULTURE 10 DAY INCUBATION   Ba Ramirez MD 3/5/20 1240      Description: Femur for culture     A Hip, Left Tissue · TISSUE PATHOLOGY EXAM   Ba Ramirez MD 3/5/20 1127 Yes     Description: Left Hip Capsule #1-frozen     B Hip, Left Tissue · TISSUE PATHOLOGY EXAM   Ba Ramirez MD 3/5/20 1131 Yes     Description: Anterior capsule for frozen             Findings: Intraoperative frozen sections were less than 2 neutrophils per high-power field    Complications: None    Description of Procedure:     The patient  was transferred to [facility] operating room.  Preoperative antibiotics given IV prior to skin incision as well as 1 G of Tranexemic Acid. Patient Received spinal anesthesia, was Transferred to a regular or table with a pegboard and placed in a lateral decubitus position.  All Bony Prominences Were Padded Adequately. An axillary roll was placed. The Operative Hip Was Then Prepped and Draped in the Usual Sterile Fashion. Multiple Timeouts Were Done Identifying the Correct Patient Surgical Site and Planned Procedure.  A Skin Incision Was Made Overlying the lateral Aspect of the Hip for in line with the previous incision. Skin and Subcutaneous Tissue and Fascia Was Incised in Line with the Skin Incision Overlying the Tensor Fascia Laurie Muscle and gluteus max. Charnley retractors are then placed and the leg was internally rotated in order to expose the posterior aspect of the greater trochanter. We did release of the posterior capsule off of the posterior aspect of the greater trochanter and down into the implant.  Took several samples of capsule and synovium for frozen section as well as culture.  We broke the locking mechanism for the constrained liner following this the leg was then fully internally rotated and the head was dislocated. We removed the femoral component with minimal to no bone loss.  We then turned our attention to the acetabulum using some osteotomes the interface between the cement and implant was broken and the implant was removed without difficulty following this we had remove some of the cement from the internal socket.  This was done with minimal bone loss following this we then began sequential reaming of the socket up to a 57.  There were was one small area of cystic bone defect which we packed with the bone graft.  We then implanted a 58 mm cup and placed 2 screws into the acetabulum in order to provide extra fixation.  We then placed a trial dual mobility liner and turned our  attention back to the femur we began sequential reaming of the femur up to a 19 x 190 stem we placed our stem and then trialed a 60 C cone body with a +3 head found to have adequate restore of leg lengths and good stability throughout range of motion.  Trial components were removed final due mobility liner was impacted and then the 60 C cone body with a +3 head ceramic dual mobility was then placed the hip reduced and found again to be stable with a restoration of leg length and offset.  The wound was then thoroughly irrigated saline we did use of all of the injection cocktail. Betadine irrigation was used followed by saline irrigation was used prior to packing bone graft into the femur. Soft tissue hemostasis was secured second dose of IV TXA was then used sponge and needle and instrument counts were correct FiberWire sutures directly anterior and posterior capsular flaps were tied to each other then closed the fascial layer with a running #2 strata fix followed by 2-0 Vicryl staples for the skin. The patient was then transferred to the recovery room in stable condition patient tolerated the procedure well there were no applications the patient adequate distal pulses and good cap refill.  The patient will be mobilized by physical therapy received antibiotic per infectious disease with likely over a year of chronic treatment due to the type of infection that patient had. The patient was started on DVT prophylaxis with 81 mg aspirin twice daily on starting postoperative day #1.  He will be weightbearing as tolerated with hip flexion precautions. I discussed the satisfactory performance of the procedure with the patient's family and discussed the postoperative management.      Ba Ramirez MD     Date: 3/5/2020  Time: 1:44 PM

## 2020-03-05 NOTE — THERAPY EVALUATION
Patient Name: Topher Stoll  : 1975    MRN: [id number]                              Today's Date: 3/5/2020       Admit Date: 3/5/2020    Visit Dx:     ICD-10-CM ICD-9-CM   1. Left hip pain M25.552 719.45     Patient Active Problem List   Diagnosis   • Abdominal adhesions   • Atopic rhinitis   • Anxiety   • Arthritis   • Cervical radiculopathy   • Chronic pain syndrome   • Non-specific colitis   • Atherosclerosis of coronary artery   • Diverticulosis of intestine   • Gastroesophageal reflux disease   • Headache   • History of recreational drug use   • Hypertension   • Hyperlipidemia   • Arthralgia of hip   • Lesion of mucosa   • Low back pain   • Chronic obstructive pulmonary disease with acute exacerbation (CMS/HCC)   • Osteoporosis   • Pulmonary emphysema (CMS/HCC)   • Temporomandibular joint disorder   • Vitamin D deficiency   • BMI 25.0-25.9,adult   • Narcotic drug use   • Rhomboid pain   • Encounter for long-term current use of medication   • Encounter for special screening examination for neoplasm of prostate   • Encounter for vitamin deficiency screening   • Chronic viral hepatitis B without delta agent and without coma (CMS/HCC)   • Hep C w/o coma, chronic (CMS/HCC)   • Excessive daytime sleepiness   • GUILLE (obstructive sleep apnea)   • Snoring   • Right lower quadrant abdominal pain   • Colitis   • Cellulitis of left upper extremity   • Viral warts   • COPD exacerbation (CMS/HCC)   • Right hip pain   • Crushing injury of left hand   • Laceration of left hand   • Other partial intestinal obstruction (CMS/HCC)   • COPD (chronic obstructive pulmonary disease) (CMS/HCC)   • Prediabetes   • Infection   • Left hip pain   • total hip explant, antibiotic spacer placement 1/15/2020   • Acute blood loss anemia mild, asymptomatic   • Acute postoperative pain   • Leukocytosis   • Status post left hip reimplant revision     Past Medical History:   Diagnosis Date   • Abdominal adhesions    • Arthritis    • Asthma     • Cervical radiculopathy    • Colitis    • GERD (gastroesophageal reflux disease)    • Heart attack (CMS/HCC)    • History of recreational drug use    • Kidney cysts    • Left hip pain    • Liver disease    • Low back pain    • Migraines    • Obstructive chronic bronchitis with exacerbation (CMS/HCC)    • Sleep apnea     mild     Past Surgical History:   Procedure Laterality Date   • BACK SURGERY     • HERNIA REPAIR     • HIP SPACER INSERTION WITH ANTIBIOTIC CEMENT Left 1/15/2020    Procedure: TOTAL HIP IMPLANT REMOVAL WITH INSERTION OF ANTIBIOTIC SPACER LEFT;  Surgeon: Ba Ramirez MD;  Location: [facility];  Service: Orthopedics   • SHOULDER LIGAMENT REPAIR      right shoulder   • SMALL INTESTINE SURGERY      Small bowell resection   • TOTAL HIP ARTHROPLASTY Left      General Information     Row Name 03/05/20 1736          PT Evaluation Time/Intention    Document Type  evaluation  -LR     Mode of Treatment  physical therapy;individual therapy  -LR     Row Name 03/05/20 1736          General Information    Patient Profile Reviewed?  yes  -LR     Prior Level of Function  min assist:;all household mobility;gait;transfer;bed mobility;ADL's;using stairs;dependent:;shopping;driving;cooking;cleaning;community mobility was TTWB after surgery in Jan 2020, used RW for mobility  -LR     Existing Precautions/Restrictions  fall;left;hip, posterior  -LR     Barriers to Rehab  previous functional deficit  -LR     Row Name 03/05/20 1736          Relationship/Environment    Lives With  parent(s) family to assist at all times upon d/c home  -LR     Row Name 03/05/20 1736          Resource/Environmental Concerns    Current Living Arrangements  home/apartment/condo  -LR     Row Name 03/05/20 1736          Home Main Entrance    Number of Stairs, Main Entrance  one  -LR     Stair Railings, Main Entrance  none  -LR     Row Name 03/05/20 1736          Stairs Within Home, Primary    Number of Stairs, Within Home, Primary  none  -LR      Row Name 03/05/20 1736          Cognitive Assessment/Intervention- PT/OT    Orientation Status (Cognition)  oriented x 4  -LR     Row Name 03/05/20 1736          Safety Issues, Functional Mobility    Safety Issues Affecting Function (Mobility)  positioning of assistive device;insight into deficits/self awareness;safety precautions follow-through/compliance;sequencing abilities;safety precaution awareness  -LR     Impairments Affecting Function (Mobility)  pain;strength;range of motion (ROM);motor control;sensation/sensory awareness  -LR     Comment, Safety Issues/Impairments (Mobility)  reported numbness/tingling R foot, impaired sensation R foot; unsteadiness/weakness in LEs with gait, most likely d/t residual effects of spinal  -LR       User Key  (r) = Recorded By, (t) = Taken By, (c) = Cosigned By    Initials Name Provider Type    LR Edith Garcia, PT Physical Therapist        Mobility     Row Name 03/05/20 1736          Bed Mobility Assessment/Treatment    Bed Mobility Assessment/Treatment  supine-sit  -LR     Supine-Sit Novelty (Bed Mobility)  verbal cues;contact guard  -LR     Assistive Device (Bed Mobility)  head of bed elevated;bed rails  -LR     Comment (Bed Mobility)  Verbal cues to move LEs towards EOB and to push up from bed to raise trunk into sitting and to scoot hips out to get feet on floor. Cues to avoid internally rotating at L hip and flexing past 90 degrees during t/f OOB. Denied dizziness upon sitting up.   -LR     Row Name 03/05/20 1736          Transfer Assessment/Treatment    Comment (Transfers)  Verbal cues to push up from bed to stand and to reach back for chair to lower into sitting. Verbal cues to step L LE out before t/f for comfort and to avoid flexing past 90 degrees at hips during t/f. Had difficulty rising into standing.   -LR     Row Name 03/05/20 1736          Sit-Stand Transfer    Sit-Stand Novelty (Transfers)  verbal cues;minimum assist (75% patient  effort);1 person to manage equipment  -LR     Assistive Device (Sit-Stand Transfers)  walker, front-wheeled  -LR     Row Name 03/05/20 1736          Gait/Stairs Assessment/Training    41068 - Gait Training Minutes   6  -LR     [place] Level (Gait)  verbal cues;contact guard;2 person assist  -LR     Assistive Device (Gait)  walker, front-wheeled  -LR     Distance in Feet (Gait)  130  -LR     Pattern (Gait)  step-to  -LR     Deviations/Abnormal Patterns (Gait)  bilateral deviations;ramona decreased;gait speed decreased;stride length decreased;ataxic;left sided deviations;antalgic  -LR     Bilateral Gait Deviations  forward flexed posture;heel strike decreased  -LR     Left Sided Gait Deviations  weight shift ability decreased  -LR     Comment (Gait/Stairs)  Patient ambulated with step to gait pattern at slow pace. Verbal cues for correct sequencing of steps, increased L LE weight bearing/stance phase, and decreased UE weight bearing. Slightly ataxic with gait, particularly on R. Unsteadiness/weakness noted in R LE with weight bearing. No knee buckling/hyperextension noted. Gait limited by weakness and pain.   -LR     Row Name 03/05/20 1736          Mobility Assessment/Intervention    Extremity Weight-bearing Status  left lower extremity  -LR     Left Lower Extremity (Weight-bearing Status)  weight-bearing as tolerated (WBAT)  -LR       User Key  (r) = Recorded By, (t) = Taken By, (c) = Cosigned By    Initials Name Provider Type    LR Edith Garcia, PT Physical Therapist        Obj/Interventions     Row Name 03/05/20 1736          General ROM    GENERAL ROM COMMENTS  R LE AROM WFL; L hip AROM impaired 25%  -LR     Row Name 03/05/20 1736          MMT (Manual Muscle Testing)    General MMT Comments  R LE WFL; L hip functionally 2+/5, unable to perform SLR independently, no knee buckling with gait  -LR     Row Name 03/05/20 1736          Therapeutic Exercise    Lower Extremity (Therapeutic Exercise)   gluteal sets;quad sets, left  -LR     Lower Extremity Range of Motion (Therapeutic Exercise)  ankle dorsiflexion/plantar flexion, left  -LR     Exercise Type (Therapeutic Exercise)  AROM (active range of motion);isotonic contraction, concentric;isometric contraction, static  -LR     Position (Therapeutic Exercise)  supine  -LR     Sets/Reps (Therapeutic Exercise)  x10 reps each  -LR     Comment (Therapeutic Exercise)  cues for technique; able to actively DF bilaterally  -LR     Row Name 03/05/20 1736          Light Touch Sensation Assessment    Right Lower Extremity: Light Touch Sensation Assessment  mild impairment, 75% or more correct responses  -LR     Comment, Right Lower Extremity: Light Touch Sensation Assessment  c/o numbness/tingling in R foot  -LR       User Key  (r) = Recorded By, (t) = Taken By, (c) = Cosigned By    Initials Name Provider Type    Edith Hutson, PT Physical Therapist        Goals/Plan     Row Name 03/05/20 1736          Bed Mobility Goal 1 (PT)    Activity/Assistive Device (Bed Mobility Goal 1, PT)  sit to supine/supine to sit  -LR     [place] Level/Cues Needed (Bed Mobility Goal 1, PT)  conditional independence  -LR     Time Frame (Bed Mobility Goal 1, PT)  long term goal (LTG);3 days  -LR     Progress/Outcomes (Bed Mobility Goal 1, PT)  goal ongoing  -LR     Row Name 03/05/20 1736          Transfer Goal 1 (PT)    Activity/Assistive Device (Transfer Goal 1, PT)  sit-to-stand/stand-to-sit;walker, rolling  -LR     [place] Level/Cues Needed (Transfer Goal 1, PT)  conditional independence  -LR     Time Frame (Transfer Goal 1, PT)  long term goal (LTG);3 days  -LR     Progress/Outcome (Transfer Goal 1, PT)  goal ongoing  -LR     Row Name 03/05/20 1736          Gait Training Goal 1 (PT)    Activity/Assistive Device (Gait Training Goal 1, PT)  gait (walking locomotion);walker, rolling  -LR     [place] Level (Gait Training Goal 1, PT)  conditional independence  -LR      Distance (Gait Goal 1, PT)  500 feet  -LR     Time Frame (Gait Training Goal 1, PT)  long term goal (LTG);3 days  -LR     Progress/Outcome (Gait Training Goal 1, PT)  goal ongoing  -LR     Row Name 03/05/20 1736          Stairs Goal 1 (PT)    Activity/Assistive Device (Stairs Goal 1, PT)  ascending stairs;descending stairs;step-to-step;walker, rolling  -LR     [place] Level/Cues Needed (Stairs Goal 1, PT)  contact guard assist  -LR     Number of Stairs (Stairs Goal 1, PT)  1  -LR     Time Frame (Stairs Goal 1, PT)  long term goal (LTG);3 days  -LR     Progress/Outcome (Stairs Goal 1, PT)  goal ongoing  -LR       User Key  (r) = Recorded By, (t) = Taken By, (c) = Cosigned By    Initials Name Provider Type    Edith Hutson, PT Physical Therapist        Clinical Impression     Row Name 03/05/20 1736          Pain Assessment    Additional Documentation  Pain Scale: Numbers Pre/Post-Treatment (Group)  -LR     Row Name 03/05/20 1736          Pain Scale: Numbers Pre/Post-Treatment    Pain Scale: Numbers, Pretreatment  7/10  -LR     Pain Scale: Numbers, Post-Treatment  8/10  -LR     Pain Location - Side  Left  -LR     Pain Location  hip  -LR     Pain Intervention(s)  Ambulation/increased activity;Repositioned;Cold applied  -LR     Row Name 03/05/20 1736          Plan of Care Review    Plan of Care Reviewed With  patient  -LR     Progress  improving  -LR     Row Name 03/05/20 1736          Physical Therapy Clinical Impression    Patient/Family Goals Statement (PT Clinical Impression)  go home, decrease pain  -LR     Criteria for Skilled Interventions Met (PT Clinical Impression)  yes;treatment indicated  -LR     Rehab Potential (PT Clinical Summary)  good, to achieve stated therapy goals  -LR     Row Name 03/05/20 1736          Positioning and Restraints    Pre-Treatment Position  in bed  -LR     Post Treatment Position  chair  -LR     In Chair  notified nsg;reclined;sitting;call light within  reach;encouraged to call for assist;exit alarm on;legs elevated;compression device;ABD pillow  -LR       User Key  (r) = Recorded By, (t) = Taken By, (c) = Cosigned By    Initials Name Provider Type    Edith Hutson, PT Physical Therapist        Outcome Measures     Row Name 03/05/20 0314          How much help from another person do you currently need...    Turning from your back to your side while in flat bed without using bedrails?  3  -LR     Moving from lying on back to sitting on the side of a flat bed without bedrails?  3  -LR     Moving to and from a bed to a chair (including a wheelchair)?  3  -LR     Standing up from a chair using your arms (e.g., wheelchair, bedside chair)?  3  -LR     Climbing 3-5 steps with a railing?  3  -LR     To walk in hospital room?  3  -LR     AM-PAC 6 Clicks Score (PT)  18  -LR     Row Name 03/05/20 1737          Functional Assessment    Outcome Measure Options  AM-PAC 6 Clicks Basic Mobility (PT)  -LR       User Key  (r) = Recorded By, (t) = Taken By, (c) = Cosigned By    Initials Name Provider Type    Edith Hutson, PT Physical Therapist          PT Recommendation and Plan  Planned Therapy Interventions (PT Eval): balance training, bed mobility training, gait training, home exercise program, stair training, ROM (range of motion), patient/family education, strengthening, transfer training  Outcome Summary/Treatment Plan (PT)  Anticipated Equipment Needs at Discharge (PT): other (see comments)(none)  Anticipated Discharge Disposition (PT): home with assist, home with home health  Plan of Care Reviewed With: patient  Progress: improving  Outcome Summary: Patient ambulated 130 feet with RW and step to gait pattern, limited by pain and fatigue/weakness. Patient c/o numbness/tingling in R foot, demonstrated mild ataxia in B LEs, R > L, during gait, most likely d/t residual effects of spinal. Plan is d/c home with family and HHPT. Will continue to progress as  able. PADD score of 10.     Time Calculation:   PT Charges     Row Name 03/05/20 1736             Time Calculation    Start Time  1736  -LR      PT Received On  03/05/20  -LR      PT Goal Re-Cert Due Date  03/15/20  -LR         Time Calculation- PT    Total Timed Code Minutes- PT  8 minute(s)  -LR         Timed Charges    18534 - PT Therapeutic Exercise Minutes  2  -LR      79667 - Gait Training Minutes   6  -LR        User Key  (r) = Recorded By, (t) = Taken By, (c) = Cosigned By    Initials Name Provider Type    LR Edith Garcia, PT Physical Therapist        Therapy Charges for Today     Code Description Service Date Service Provider Modifiers Qty    68679708894 HC GAIT TRAINING EA 15 MIN 3/5/2020 Edith Garcia, PT GP 1    40573971363 HC PT THER SUPP EA 15 MIN 3/5/2020 Edith Garcia, PT GP 2    29776525856 HC PT EVAL MOD COMPLEXITY 2 3/5/2020 Edith Garcia, PT GP 1          PT G-Codes  Outcome Measure Options: AM-PAC 6 Clicks Basic Mobility (PT)  AM-PAC 6 Clicks Score (PT): 18    Edith Garcia, PT  3/5/2020

## 2020-03-05 NOTE — PLAN OF CARE
Problem: Hip Arthroplasty (Total, Partial) (Adult)  Goal: Signs and Symptoms of Listed Potential Problems Will be Absent, Minimized or Managed (Hip Arthroplasty)  Outcome: Ongoing (interventions implemented as appropriate)  Flowsheets (Taken 3/5/2020 1535)  Problems Assessed (Hip Arthroplasty): all  Problems Present (Hip Arthroplasty): infection; pain; situational response

## 2020-03-05 NOTE — ANESTHESIA PREPROCEDURE EVALUATION
Anesthesia Evaluation     Patient summary reviewed and Nursing notes reviewed   NPO Solid Status: > 8 hours  NPO Liquid Status: > 8 hours           Airway   Mallampati: I  TM distance: >3 FB  Neck ROM: full  No difficulty expected  Dental    (+) edentulous    Pulmonary - normal exam   Cardiovascular   Exercise tolerance: good (4-7 METS)    Rhythm: regular  Rate: normal        Neuro/Psych  GI/Hepatic/Renal/Endo      Musculoskeletal     Abdominal    Substance History      OB/GYN          Other                        Anesthesia Plan    ASA 2     general     intravenous induction     Anesthetic plan, all risks, benefits, and alternatives have been provided, discussed and informed consent has been obtained with: patient.

## 2020-03-05 NOTE — H&P
Pre-Op H&P  Topher Stoll  [id number]  1975    Chief complaint: Left hip pain    HPI:    Patient is a 44 y.o.male who presents with history of left THR approx 10 years ago with increasing pain in the left hip.  Found to have left hip prosthetic joint infection s/p explantation and placement of antibiotic spacer 1/15/20.  Cultures revealed MAC and follows with Dr. Michael GOLDEN.  Here today to undergo left hip revision with replacement    Review of Systems:  General ROS: negative for chills, fever or skin lesions;  No changes since last office visit  Cardiovascular ROS: no chest pain or dyspnea on exertion  Respiratory ROS: no cough, shortness of breath, or wheezing    Allergies:   Allergies   Allergen Reactions   • Doxycycline Nausea And Vomiting       Home Meds:    No current facility-administered medications on file prior to encounter.      Current Outpatient Medications on File Prior to Encounter   Medication Sig Dispense Refill   • albuterol (PROVENTIL) (2.5 MG/3ML) 0.083% nebulizer solution Take 2.5 mg by nebulization Every 4 (Four) Hours As Needed for wheezing. 120 vial 11   • azithromycin (ZITHROMAX) 500 MG tablet Take 1 tablet daily  Indications: Bone and/or Joint Infection (Patient taking differently: Take 500 mg by mouth Daily. Take 1 tablet daily  Indications: Bone and/or Joint Infection) 30 tablet 1   • esomeprazole (nexIUM) 40 MG capsule Take 40 mg by mouth Every Morning Before Breakfast.     • losartan (COZAAR) 100 MG tablet Take 1 tablet by mouth Daily. 90 tablet 3   • lovastatin (MEVACOR) 40 MG tablet Take 1 tablet by mouth Every Night. 90 tablet 3   • promethazine (PHENERGAN) 25 MG tablet Take 1 tablet by mouth 2 (Two) Times a Day As Needed for Nausea or Vomiting. 40 tablet 1   • traZODone (DESYREL) 150 MG tablet Take 150 mg by mouth At Night As Needed.  0   • SUBOXONE 8-2 MG film film Place 2 films under the tongue Daily. Resume when pain rx complete 30 each 0       PMH:   Past Medical History:  "  Diagnosis Date   • Abdominal adhesions    • Arthritis    • Asthma    • Cervical radiculopathy    • Colitis    • GERD (gastroesophageal reflux disease)    • Heart attack (CMS/HCC)    • History of recreational drug use    • Kidney cysts    • Left hip pain    • Liver disease    • Low back pain    • Migraines    • Obstructive chronic bronchitis with exacerbation (CMS/HCC)    • Sleep apnea     mild     PSH:    Past Surgical History:   Procedure Laterality Date   • BACK SURGERY     • HERNIA REPAIR     • HIP SPACER INSERTION WITH ANTIBIOTIC CEMENT Left 1/15/2020    Procedure: TOTAL HIP IMPLANT REMOVAL WITH INSERTION OF ANTIBIOTIC SPACER LEFT;  Surgeon: Ba Ramirez MD;  Location: [facility];  Service: Orthopedics   • SHOULDER LIGAMENT REPAIR      right shoulder   • SMALL INTESTINE SURGERY      Small bowell resection   • TOTAL HIP ARTHROPLASTY Left      Social History:   Tobacco:   Social History     Tobacco Use   Smoking Status Former Smoker   Smokeless Tobacco Current User   • Types: Chew   Tobacco Comment    quit 2005      Alcohol:     Social History     Substance and Sexual Activity   Alcohol Use No    Comment: stopped drinking alcohol       Vitals:           /92 (BP Location: Right arm, Patient Position: Lying)   Pulse 86   Temp 98 °F (36.7 °C) (Temporal)   Resp 18   Ht 182.9 cm (72\")   Wt 94.8 kg (209 lb)   SpO2 97%   BMI 28.35 kg/m²     Physical Exam:  General Appearance:    Alert, cooperative, no distress, appears stated age   Head:    Normocephalic, without obvious abnormality, atraumatic   Lungs:     Clear to auscultation bilaterally, respirations unlabored    Heart:   Regular rate and rhythm, S1 and S2 normal, no murmur, rub    or gallop    Abdomen:    Soft, non-tender.  +bowel sounds   Breast Exam:    deferred   Genitalia:    deferred   Extremities:   Extremities normal, atraumatic, no cyanosis or edema   Skin:   Skin color, texture, turgor normal, no rashes or lesions   Neurologic:   Grossly " intact   Results Review  LABS:  Lab Results   Component Value Date    WBC 7.66 02/25/2020    HGB 12.7 (L) 02/25/2020    HCT 42.7 02/25/2020    MCV 79.4 02/25/2020     02/25/2020    NEUTROABS 5.64 02/21/2020    GLUCOSE 94 02/25/2020    BUN 11 02/25/2020    CREATININE 0.86 02/25/2020    EGFRIFNONA 96 02/25/2020     02/25/2020    K 4.0 02/25/2020     02/25/2020    CO2 24.0 02/25/2020    MG 2.0 06/18/2019    PHOS 3.8 06/18/2019    CALCIUM 9.1 02/25/2020    ALBUMIN 4.40 02/25/2020    AST 19 02/25/2020    ALT 22 02/25/2020    BILITOT 0.2 02/25/2020       RADIOLOGY:  Imaging Results (Last 72 Hours)     ** No results found for the last 72 hours. **          I reviewed the patient's new clinical results.    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

## 2020-03-05 NOTE — PLAN OF CARE
Problem: Patient Care Overview  Goal: Plan of Care Review  Outcome: Ongoing (interventions implemented as appropriate)  Flowsheets (Taken 3/5/2020 9658)  Outcome Summary: Patient ambulated 130 feet with RW and step to gait pattern, limited by pain and fatigue/weakness. Patient c/o numbness/tingling in R foot, demonstrated mild ataxia in B LEs, R > L, during gait, most likely d/t residual effects of spinal. Plan is d/c home with family and HHPT. Will continue to progress as able. PADD score of 10.

## 2020-03-05 NOTE — H&P
Patient Name: Topher Stoll  MRN: [id number]  : 1975  DOS: 3/5/2020    Attending: Ba Ramirez MD    Primary Care Provider: Janak Robertson DO      Chief complaint:  Left hip pain    Subjective   Patient is a 44 y.o. male presented for left hip reimplant revision by Dr. Ramirez.    He underwent surgery under general anesthesia.  He tolerated surgery well and is admitted for further medical management.     He is known to us from previous admission in 2020 for left hip explant with antibiotic spacer placement. He was seen by BRICE Hyman, while inpatient for antibiotic management.  He states he has not been weightbearing on his left hip since that surgery and is using a walker.    When seen in PACU he is doing well.  His pain is well controlled.  He denies nausea, shortness of breath or chest pain.  No history of DVT or PE.    (Above is noted, agree.  Doing well when seen in his room afterwards.  Resting in his recliner having since been seen by physical therapy, ambulated 130 feet with a rolling walker and step to gait pattern.  Limited by pain fatigue and weakness at that time.  No nausea, vomiting, or shortness of breath.  Pain control is adequate currently.)wy.       Allergies   Allergen Reactions   • Doxycycline Nausea And Vomiting       Meds:  Medications Prior to Admission   Medication Sig Dispense Refill Last Dose   • albuterol (PROVENTIL) (2.5 MG/3ML) 0.083% nebulizer solution Take 2.5 mg by nebulization Every 4 (Four) Hours As Needed for wheezing. 120 vial 11 Past Month at Unknown time   • azithromycin (ZITHROMAX) 500 MG tablet Take 1 tablet daily  Indications: Bone and/or Joint Infection (Patient taking differently: Take 500 mg by mouth Daily. Take 1 tablet daily  Indications: Bone and/or Joint Infection) 30 tablet 1 3/4/2020 at 0600   • esomeprazole (nexIUM) 40 MG capsule Take 40 mg by mouth Every Morning Before Breakfast.   3/5/2020 at 0530   • fluticasone (FLONASE) 50 MCG/ACT  nasal spray 1 spray into the nostril(s) as directed by provider Daily. 3 bottle 2 3/5/2020 at 0530   • Fluticasone Furoate-Vilanterol (BREO ELLIPTA) 200-25 MCG/INH inhaler Inhale 1 puff Daily. 180 each 2 3/4/2020 at 0600   • losartan (COZAAR) 100 MG tablet Take 1 tablet by mouth Daily. 90 tablet 3 3/4/2020 at 0600   • lovastatin (MEVACOR) 40 MG tablet Take 1 tablet by mouth Every Night. 90 tablet 3 3/4/2020 at 0600   • montelukast (SINGULAIR) 10 MG tablet Take 1 tablet by mouth Every Evening. 90 tablet 2 3/4/2020 at 0600   • promethazine (PHENERGAN) 25 MG tablet Take 1 tablet by mouth 2 (Two) Times a Day As Needed for Nausea or Vomiting. 40 tablet 1 3/5/2020 at 0530   • traZODone (DESYREL) 150 MG tablet Take 150 mg by mouth At Night As Needed.  0 3/4/2020 at 2100   • albuterol sulfate HFA (VENTOLIN HFA) 108 (90 Base) MCG/ACT inhaler Inhale 2 puffs Every 4 (Four) Hours As Needed for Wheezing or Shortness of Air. 3 inhaler 2 3/2/2020   • SUBOXONE 8-2 MG film film Place 2 films under the tongue Daily. Resume when pain rx complete 30 each 0 3/2/2020       History:   Past Medical History:   Diagnosis Date   • Abdominal adhesions    • Arthritis    • Asthma    • Cervical radiculopathy    • Colitis    • GERD (gastroesophageal reflux disease)    • Heart attack (CMS/HCC)    • History of recreational drug use    • Kidney cysts    • Left hip pain    • Liver disease    • Low back pain    • Migraines    • Obstructive chronic bronchitis with exacerbation (CMS/HCC)    • Sleep apnea     mild     Past Surgical History:   Procedure Laterality Date   • BACK SURGERY     • HERNIA REPAIR     • HIP SPACER INSERTION WITH ANTIBIOTIC CEMENT Left 1/15/2020    Procedure: TOTAL HIP IMPLANT REMOVAL WITH INSERTION OF ANTIBIOTIC SPACER LEFT;  Surgeon: Ba Ramirez MD;  Location: [facility];  Service: Orthopedics   • SHOULDER LIGAMENT REPAIR      right shoulder   • SMALL INTESTINE SURGERY      Small bowell resection   • TOTAL HIP ARTHROPLASTY  "Left      Family History   Problem Relation Age of Onset   • Arthritis Other    • Hypertension Other    • Migraines Other    • Heart attack Other    • Stroke Other      Social History     Tobacco Use   • Smoking status: Former Smoker   • Smokeless tobacco: Current User     Types: Chew   • Tobacco comment: quit 2005   Substance Use Topics   • Alcohol use: No     Comment: stopped drinking alcohol   • Drug use: Yes     Comment: takes suboxone   He lives with his parents.  He has no children.  He is disabled.    Review of Systems  Pertinent items are noted in HPI, all other systems reviewed and negative    Vital Signs  Visit Vitals  /67   Pulse 76   Temp 97.6 °F (36.4 °C) (Temporal)   Resp 16   Ht 182.9 cm (72\")   Wt 94.8 kg (209 lb)   SpO2 98%   BMI 28.35 kg/m²       Physical Exam:    General Appearance:    Alert, cooperative, in no acute distress   Head:    Normocephalic, without obvious abnormality, atraumatic   Eyes:            Lids and lashes normal, conjunctivae and sclerae normal, no   icterus, no pallor, corneas clear   Ears:    Ears appear intact with no abnormalities noted   Neck:   No adenopathy, supple, trachea midline, no thyromegaly   Lungs:     Clear to auscultation, respirations regular, even and                   unlabored    Heart:    Regular rhythm and normal rate, normal S1 and S2, no            murmur, no gallop   Abdomen:     Normal bowel sounds, no masses, no organomegaly, soft        non-tender, non-distended, no guarding, no rebound                 tenderness   Genitalia:    Deferred   Extremities:  Left hip posterior OPTi foam clean dry intact.   Pulses:   Pulses palpable and equal bilaterally   Skin:   No bleeding, bruising or rash   Neurologic:   Cranial nerves 2 - 12 grossly intact, sensation intact. Flexion and dorsiflexion intact bilateral feet.        I reviewed the patient's new clinical results.     Results for BASIM SOLORZANO (MRN [id number]) as of 3/5/2020 13:29   Ref. Range " 2/25/2020 12:09   Glucose Latest Ref Range: 65 - 99 mg/dL 94   Sodium Latest Ref Range: 136 - 145 mmol/L 140   Potassium Latest Ref Range: 3.5 - 5.2 mmol/L 4.0   CO2 Latest Ref Range: 22.0 - 29.0 mmol/L 24.0   Chloride Latest Ref Range: 98 - 107 mmol/L 102   Anion Gap Latest Ref Range: 5.0 - 15.0 mmol/L 14.0   Creatinine Latest Ref Range: 0.76 - 1.27 mg/dL 0.86   BUN Latest Ref Range: 6 - 20 mg/dL 11   BUN/Creatinine Ratio Latest Ref Range: 7.0 - 25.0  12.8   Calcium Latest Ref Range: 8.6 - 10.5 mg/dL 9.1   eGFR Non  Am Latest Ref Range: >60 mL/min/1.73 96   Alkaline Phosphatase Latest Ref Range: 39 - 117 U/L 156 (H)   Total Protein Latest Ref Range: 6.0 - 8.5 g/dL 7.8   ALT (SGPT) Latest Ref Range: 1 - 41 U/L 22   AST (SGOT) Latest Ref Range: 1 - 40 U/L 19   Total Bilirubin Latest Ref Range: 0.2 - 1.2 mg/dL 0.2   Albumin Latest Ref Range: 3.50 - 5.20 g/dL 4.40   Globulin Latest Units: gm/dL 3.4   A/G Ratio Latest Units: g/dL 1.3   C-Reactive Protein Latest Ref Range: 0.00 - 0.50 mg/dL 1.02 (H)   WBC Latest Ref Range: 3.40 - 10.80 10*3/mm3 7.66   RBC Latest Ref Range: 4.14 - 5.80 10*6/mm3 5.38   Hemoglobin Latest Ref Range: 13.0 - 17.7 g/dL 12.7 (L)   Hematocrit Latest Ref Range: 37.5 - 51.0 % 42.7   RDW Latest Ref Range: 12.3 - 15.4 % 14.8   MCV Latest Ref Range: 79.0 - 97.0 fL 79.4   MCH Latest Ref Range: 26.6 - 33.0 pg 23.6 (L)   MCHC Latest Ref Range: 31.5 - 35.7 g/dL 29.7 (L)   MPV Latest Ref Range: 6.0 - 12.0 fL 10.0   Platelets Latest Ref Range: 140 - 450 10*3/mm3 357     Assessment and Plan:     Status post left hip reimplant revision    Left hip pain    total hip explant, antibiotic spacer placement 1/15/2020    Hypertension    Hyperlipidemia    COPD (chronic obstructive pulmonary disease) (CMS/[facility])      Plan  1. PT/OT- early ambulation postop  2. Pain control-prns   3. IS-encourage  4. DVT proph- mechs/ASA  5. Bowel regimen  6. Resume home medications as appropriate  7. Monitor post-op labs  8.  Discharge planning     ?[facility] consult    HTN, Hyperlipidemia  - Continue home cozaar and statin  - Monitor BP   - Holding parameters for BP meds  - Labetalol PRN for SBP>170    COPD  - Continue home singulair, inhalers and PRN nebs  - O2 PRN  - Encourage frequent pulmonary toilet      LISA Prabhakar  03/05/20  3:09 PM     I have personally performed the evaluation on this patient. My history is consistent  with HPI obtained. My exam findings are listed above. I have personally reviewed and discussed the above formulated treatment plan with pt and AH. Jono.

## 2020-03-05 NOTE — ANESTHESIA POSTPROCEDURE EVALUATION
Patient: Topher Stoll    Procedure Summary     Date:  03/05/20 Room / Location:   [facility]    Anesthesia Start:  1046 Anesthesia Stop:      Procedure:  LEFT HIP REIMPLANT REVISION (Left Hip) Diagnosis:      Surgeon:  Ba Ramirez MD Provider:      Anesthesia Type:  general ASA Status:  2          Anesthesia Type: general    Vitals  Vitals Value Taken Time   BP     Temp     Pulse 86 3/5/2020  1:31 PM   Resp     SpO2     Vitals shown include unvalidated device data.        Post Anesthesia Care and Evaluation    Patient location during evaluation: PACU  Patient participation: complete - patient participated  Level of consciousness: awake and alert  Pain score: 0  Pain management: adequate  Airway patency: patent  Anesthetic complications: No anesthetic complications  PONV Status: none  Cardiovascular status: hemodynamically stable and acceptable  Respiratory status: nonlabored ventilation, acceptable and nasal cannula  Hydration status: acceptable

## 2020-03-06 VITALS
DIASTOLIC BLOOD PRESSURE: 79 MMHG | SYSTOLIC BLOOD PRESSURE: 117 MMHG | RESPIRATION RATE: 16 BRPM | OXYGEN SATURATION: 97 % | BODY MASS INDEX: 28.31 KG/M2 | TEMPERATURE: 98.7 F | HEART RATE: 88 BPM | HEIGHT: 72 IN | WEIGHT: 209 LBS

## 2020-03-06 LAB
ANION GAP SERPL CALCULATED.3IONS-SCNC: 11 MMOL/L (ref 5–15)
BUN BLD-MCNC: 6 MG/DL (ref 6–20)
BUN/CREAT SERPL: 8.6 (ref 7–25)
CALCIUM SPEC-SCNC: 8.6 MG/DL (ref 8.6–10.5)
CHLORIDE SERPL-SCNC: 105 MMOL/L (ref 98–107)
CO2 SERPL-SCNC: 24 MMOL/L (ref 22–29)
CREAT BLD-MCNC: 0.7 MG/DL (ref 0.76–1.27)
DEPRECATED RDW RBC AUTO: 41.1 FL (ref 37–54)
ERYTHROCYTE [DISTWIDTH] IN BLOOD BY AUTOMATED COUNT: 14.7 % (ref 12.3–15.4)
GFR SERPL CREATININE-BSD FRML MDRD: 122 ML/MIN/1.73
GLUCOSE BLD-MCNC: 99 MG/DL (ref 65–99)
HCT VFR BLD AUTO: 35.1 % (ref 37.5–51)
HGB BLD-MCNC: 10.8 G/DL (ref 13–17.7)
MCH RBC QN AUTO: 23.9 PG (ref 26.6–33)
MCHC RBC AUTO-ENTMCNC: 30.8 G/DL (ref 31.5–35.7)
MCV RBC AUTO: 77.7 FL (ref 79–97)
PLATELET # BLD AUTO: 257 10*3/MM3 (ref 140–450)
PMV BLD AUTO: 10.6 FL (ref 6–12)
POTASSIUM BLD-SCNC: 3.4 MMOL/L (ref 3.5–5.2)
RBC # BLD AUTO: 4.52 10*6/MM3 (ref 4.14–5.8)
SODIUM BLD-SCNC: 140 MMOL/L (ref 136–145)
WBC NRBC COR # BLD: 10.82 10*3/MM3 (ref 3.4–10.8)

## 2020-03-06 PROCEDURE — 85027 COMPLETE CBC AUTOMATED: CPT | Performed by: NURSE PRACTITIONER

## 2020-03-06 PROCEDURE — 25010000003 CEFAZOLIN IN DEXTROSE 2-4 GM/100ML-% SOLUTION: Performed by: ORTHOPAEDIC SURGERY

## 2020-03-06 PROCEDURE — 25010000002 KETOROLAC TROMETHAMINE PER 15 MG: Performed by: ORTHOPAEDIC SURGERY

## 2020-03-06 PROCEDURE — 97110 THERAPEUTIC EXERCISES: CPT

## 2020-03-06 PROCEDURE — 80048 BASIC METABOLIC PNL TOTAL CA: CPT | Performed by: ORTHOPAEDIC SURGERY

## 2020-03-06 PROCEDURE — 94799 UNLISTED PULMONARY SVC/PX: CPT

## 2020-03-06 PROCEDURE — 97116 GAIT TRAINING THERAPY: CPT

## 2020-03-06 PROCEDURE — 25010000002 HYDROMORPHONE PER 4 MG: Performed by: ORTHOPAEDIC SURGERY

## 2020-03-06 RX ORDER — PSEUDOEPHEDRINE HCL 30 MG
100 TABLET ORAL 2 TIMES DAILY PRN
Start: 2020-03-06 | End: 2020-03-20

## 2020-03-06 RX ORDER — ASPIRIN 81 MG/1
81 TABLET ORAL EVERY 12 HOURS SCHEDULED
Status: ON HOLD
Start: 2020-03-06 | End: 2021-06-16

## 2020-03-06 RX ORDER — BUPRENORPHINE HYDROCHLORIDE, NALOXONE HYDROCHLORIDE 8; 2 MG/1; MG/1
2 FILM, SOLUBLE BUCCAL; SUBLINGUAL DAILY
Qty: 30 EACH | Refills: 0 | Status: ON HOLD
Start: 2020-03-06 | End: 2021-06-16

## 2020-03-06 RX ORDER — AZITHROMYCIN 250 MG/1
500 TABLET, FILM COATED ORAL
Status: DISCONTINUED | OUTPATIENT
Start: 2020-03-06 | End: 2020-03-06 | Stop reason: HOSPADM

## 2020-03-06 RX ORDER — RIFAMPIN 300 MG/1
600 CAPSULE ORAL
Status: DISCONTINUED | OUTPATIENT
Start: 2020-03-06 | End: 2020-03-06 | Stop reason: HOSPADM

## 2020-03-06 RX ORDER — UREA 10 %
1 LOTION (ML) TOPICAL DAILY
Qty: 30 TABLET | Refills: 0 | Status: SHIPPED | OUTPATIENT
Start: 2020-03-06 | End: 2020-04-05

## 2020-03-06 RX ORDER — ETHAMBUTOL HYDROCHLORIDE 400 MG/1
1600 TABLET, FILM COATED ORAL
Qty: 28 TABLET | Refills: 0
Start: 2020-03-06 | End: 2020-03-13

## 2020-03-06 RX ORDER — ETHAMBUTOL HYDROCHLORIDE 400 MG/1
1600 TABLET, FILM COATED ORAL
Status: DISCONTINUED | OUTPATIENT
Start: 2020-03-06 | End: 2020-03-06 | Stop reason: HOSPADM

## 2020-03-06 RX ORDER — OXYCODONE HYDROCHLORIDE 5 MG/1
5 TABLET ORAL EVERY 4 HOURS PRN
Qty: 40 TABLET | Refills: 0 | Status: ON HOLD
Start: 2020-03-06 | End: 2021-06-16

## 2020-03-06 RX ORDER — RIFAMPIN 300 MG/1
600 CAPSULE ORAL
Qty: 14 CAPSULE | Refills: 0
Start: 2020-03-06 | End: 2020-03-13

## 2020-03-06 RX ADMIN — CEFAZOLIN SODIUM 2 G: 2 INJECTION, SOLUTION INTRAVENOUS at 02:36

## 2020-03-06 RX ADMIN — MELOXICAM 15 MG: 7.5 TABLET ORAL at 08:30

## 2020-03-06 RX ADMIN — PANTOPRAZOLE SODIUM 40 MG: 40 TABLET, DELAYED RELEASE ORAL at 08:30

## 2020-03-06 RX ADMIN — AZITHROMYCIN 500 MG: 250 TABLET, FILM COATED ORAL at 10:36

## 2020-03-06 RX ADMIN — KETOROLAC TROMETHAMINE 15 MG: 15 INJECTION, SOLUTION INTRAMUSCULAR; INTRAVENOUS at 10:36

## 2020-03-06 RX ADMIN — ACETAMINOPHEN 1000 MG: 500 TABLET, FILM COATED ORAL at 06:02

## 2020-03-06 RX ADMIN — HYDROMORPHONE HYDROCHLORIDE 0.5 MG: 1 INJECTION, SOLUTION INTRAMUSCULAR; INTRAVENOUS; SUBCUTANEOUS at 00:47

## 2020-03-06 RX ADMIN — OXYCODONE HYDROCHLORIDE 10 MG: 5 TABLET ORAL at 02:36

## 2020-03-06 RX ADMIN — FLUTICASONE PROPIONATE 1 SPRAY: 50 SPRAY, METERED NASAL at 08:31

## 2020-03-06 RX ADMIN — ATORVASTATIN CALCIUM 10 MG: 10 TABLET, FILM COATED ORAL at 08:30

## 2020-03-06 RX ADMIN — RIFAMPIN 600 MG: 300 CAPSULE ORAL at 12:48

## 2020-03-06 RX ADMIN — SODIUM CHLORIDE, POTASSIUM CHLORIDE, SODIUM LACTATE AND CALCIUM CHLORIDE 100 ML/HR: 600; 310; 30; 20 INJECTION, SOLUTION INTRAVENOUS at 06:41

## 2020-03-06 RX ADMIN — NICOTINE POLACRILEX 2 MG: 2 GUM, CHEWING BUCCAL at 12:48

## 2020-03-06 RX ADMIN — OXYCODONE HYDROCHLORIDE 10 MG: 5 TABLET ORAL at 10:36

## 2020-03-06 RX ADMIN — BUDESONIDE AND FORMOTEROL FUMARATE DIHYDRATE 2 PUFF: 160; 4.5 AEROSOL RESPIRATORY (INHALATION) at 08:49

## 2020-03-06 RX ADMIN — OXYCODONE HYDROCHLORIDE 10 MG: 5 TABLET ORAL at 06:41

## 2020-03-06 RX ADMIN — ASPIRIN 81 MG: 81 TABLET, COATED ORAL at 08:29

## 2020-03-06 RX ADMIN — LOSARTAN POTASSIUM 100 MG: 50 TABLET, FILM COATED ORAL at 08:30

## 2020-03-06 RX ADMIN — ETHAMBUTOL HYDROCHLORIDE 1600 MG: 400 TABLET, FILM COATED ORAL at 12:49

## 2020-03-06 RX ADMIN — HYDROMORPHONE HYDROCHLORIDE 0.5 MG: 1 INJECTION, SOLUTION INTRAMUSCULAR; INTRAVENOUS; SUBCUTANEOUS at 08:29

## 2020-03-06 NOTE — DISCHARGE INSTRUCTIONS
INCISION CARE Revision Hip and Knee:  1. You have a sterile dressing in place. Only exchange the dressing if it becomes saturated (fluid draining out the sides of dressing) and notify the office. The dressing is water resistant. During the first 7 days after surgery, it is ok to shower. DO NOT scrub on or around the dressing. Do not submerge the dressing.  2. After 7 days, you can remove your dressing. You will have staples in place on your skin. It is OK to shower with the incision exposed. DO NOT scrub on or around the incision. DO NOT submerge the incision in pools, baths, or hot tubs for 1 month after surgery. Do not touch or pick at the incision. If you have any drainage from the incision after 7 days, cover the incision with sterile gauze and paper tape and alert the office.   3. Staples will be removed between 10-14 days postoperation.  This may be done by either the home health nurse, rehabilitation nurse, or during your return visit to Dr. Ramirez's office.  4. No creams or ointments to the incision for 1 month after surgery. After 1 month, it is recommended to massage the scar with vitamin E cream to help decrease scar formation.  5. Check incision every day and notify surgeon immediately if any of the following signs or symptoms are noted:  o Increase in redness  o Increase in swelling around the incision and of the entire extremity  o Increase in pain  o Drainage oozing from the incision  o Pulling apart of the edges of the incision  o Increase in overall body temperature (greater than 100.5 degrees)    Anticoagulants: You will be discharged on an anticoagulant. This is a prophylactic medication that helps prevent blood clots during your post-operative period. Most patients will be on  Aspirin 81 mg Enteric coated every 12 hours orally for 30 days. Some patients, due to increased risk factors, will need to be on a stronger anticoagulant. Dr. Ramirez will discuss this need with you.   ? While taking the  anticoagulant, you should avoid taking any additional aspirin, and limit ibuprofen (Advil or Motrin), Aleve (Naprosyn) or other non-steroidal anti-inflammatory medications.   ? Notify your surgeon immediately if any evelia bleeding is noted in the urine, stool, vomit, or from the nose or the incision. Blood in the stool will often appear as black rather than red. Blood in urine may appear as pink. Blood in vomit may appear as brown/black like coffee grounds.  ? You will need to apply pressure for longer periods of time to any cuts or abrasions to stop bleeding  ? Avoid alcohol while taking anticoagulants  Sequential Compression Device: You maybe be discharged home with a compression device that helps promote blood flow and prevent clots in your legs. Wear these at all times for the first two weeks.   Mobilization: The best way to avoid a blood clot is to get up and walk. 10 times a day get up and walk for 5 minutes for the first two weeks. Walking for longer periods of time will increase pain and swelling, making therapy more difficult. If taking any long travel (car or plane) in the first 1-2 months, be sure to get up and walk at least every one hour.     Stool Softeners: You will be at greater risk of constipation after surgery because of being less mobile and taking the pain medications.   ? Take stool softeners as instructed by your surgeon while on pain medications. Use over the counter Colace 100 mg 1-2 capsules twice daily.   ? If stools become too loose or too frequent, decreases the dosage or stop the stool softener.  ? If constipation occurs despite use of stool softeners, you are to continue the stool softeners and add a laxative (Milk of Magnesia 1 ounce daily as needed).  ? Dulcolax oral tabs or suppository or a fleets enema can also be utilized for constipation and can be obtained over the counter.   ? If above interventions are unsuccessful in inducing bowel movements, please contact your family  physician's office/surgeon's office.  ? Drink plenty of fluids and eat fruits and vegetables during your recovery time    Pain Medications utilized after surgery are narcotics and the law requires that the following information be given to all patients that are prescribed narcotics:  ? CLASSIFICATION: Pain medications are called Opioids and are narcotics  ? LEGALITIES: It is illegal to share narcotics with others and to drive within 24 hours of taking narcotics  ? POTENTIAL SIDE EFFECTS: Potential side effects of opioids include: nausea, vomiting, itching, dizziness, drowsiness, dry mouth, constipation, and difficulty urinating.  ? POTENTIAL ADVERSE EFFECTS:   o Opioid tolerance can develop with use of pain medications and this simply means that it requires more and more of the medication to control pain; however, this is seen more in patients that use Opioids for longer periods of time.  o Opioid dependence can develop with use of Opioids and this simply means that to stop the medication can cause withdrawal symptoms; however, this is seen with patients that use Opioids for longer periods of time.  o Opioid addiction can develop with use of Opioids and the incidence of this is very unlikely in patients who take the medications as ordered and stop the medications as instructed.  o Opioid overdose can be dangerous, but is unlikely when the medication is taken as ordered and stopped when ordered. It is important not to mix opioids with alcohol or with any type of sedative, such as Benadryl, as this can lead to over sedation and respiratory difficulty.  ? DOSAGE:   o Pain medications may be needed consistently for the first week to decrease pain and promote adequate pain relief and participation in physical therapy.  o After the initial surgical pain begins to resolve, you may begin to decrease the pain medication and only take it as needed. By the end of 6 weeks, you should be off of pain medications.  o You can  decrease your pain medication consumption by slowly spacing out the time in between the medication, and using 650mg Tylenol when the pain is not as severe. Do not exceed 3500mg of Tylenol in 24 hours.   o Refills will not be given by the office during evening hours, on weekends, or after 6 weeks post-op.  o To seek refills on pain medications during the initial 6 week post-operative period, you must call the office 48 hours in advance to request the refill. The office will then notify you when to  the prescription. DO NOT wait until you are out of the medication to request a refill.

## 2020-03-06 NOTE — DISCHARGE SUMMARY
Patient Name: Topher Stoll  MRN: [id number]  : 1975  DOS: 3/6/2020    Attending: Ba Ramirez MD    Primary Care Provider: Janak Robertson DO    Date of Admission:.3/5/2020  7:19 AM    Date of Discharge:  3/6/2020    Discharge Diagnosis:   Status post left hip reimplant revision    Left hip pain    total hip explant, antibiotic spacer placement 1/15/2020    Hypertension    Hyperlipidemia    COPD (chronic obstructive pulmonary disease) (CMS/[facility])    Leukocytosis, likely reactive    Acute blood loss anemia, mild, asymptomatic    Acute postop pain    Hospital Course  Patient is a 44 y.o. male presented for left hip reimplant revision by Dr. Ramirez.     He underwent surgery under general anesthesia.  He tolerated surgery well and was admitted for further medical management.     He is known to us from previous admission in 2020 for left hip explant with antibiotic spacer placement. He was seen by [facility], while inpatient for antibiotic management.  He states he has not been weightbearing on his left hip since that surgery and is using a walker.  He was seen by Dr. Rodriguez [facility], while inpatient. He received IV abx while inpatient and is discharged on oral abx. He will follow-up outpatient.     The patient has done well postop. The patient has been able to ambulate 220 feet with PT.    The patient has had good pain control with PO pain medications.  Adjustments were made to pain medications to optimize postop pain management. Risks and benefits of opiate medications discussed with patient.    The patient was placed on DVT prophylaxis including aspirin. The patient was encouraged to use IS for atelectasis prophylaxis.   The patient was placed on a bowel regimen to prevent constipation while on pain medication.   The patient's H/H was monitored with a slight decrease that remained asymptomatic.    It is felt by all involved that the patient can discharge home at this time, and the patient has  no further questions        Procedures Performed  Pre-op Diagnosis:   Infection of prosthetic total hip joint, sequela [[id number]]       Post-Op Diagnosis Codes:     * Infection of prosthetic total hip joint, sequela [T84.59XS, Z96.649]     Procedure/CPT® Codes:     Procedure(s):  LEFT HIP REIMPLANT REVISION     Staff:  Surgeon(s):  Ba Ramirez MD    Pertinent Test Results:    I reviewed the patient's new clinical results.   Results from last 7 days   Lab Units 03/06/20  0616   WBC 10*3/mm3 10.82*   HEMOGLOBIN g/dL 10.8*   HEMATOCRIT % 35.1*   PLATELETS 10*3/mm3 257     Results for BASIM SOLORZANO (MRN [id number]) as of 3/7/2020 17:12   Ref. Range 2/25/2020 12:09   Hemoglobin Latest Ref Range: 13.0 - 17.7 g/dL 12.7 (L)   Hematocrit Latest Ref Range: 37.5 - 51.0 % 42.7     Results from last 7 days   Lab Units 03/06/20  0616   SODIUM mmol/L 140   POTASSIUM mmol/L 3.4*   CHLORIDE mmol/L 105   CO2 mmol/L 24.0   BUN mg/dL 6   CREATININE mg/dL 0.70*   CALCIUM mg/dL 8.6   GLUCOSE mg/dL 99     Microbiology Results (last 21 days)     Collected Updated Procedure Result Status    03/05/2020 1240 03/05/2020 1339 Fungus Culture - Tissue, Hip, Left [[id number]]   Tissue from Hip, Left    In process No component results              03/05/2020 1240 03/07/2020 1007 Tissue / Bone Culture - Tissue, Hip, Left [[id number]]   Tissue from Hip, Left    Preliminary result Tissue Culture No growth at 2 days P   Gram Stain No organisms seen P    Many (4+) WBCs per low power field P              03/05/2020 1240 03/06/2020 1310 AFB Culture - Tissue, Hip, Left [[id number]]   Tissue from Hip, Left    Preliminary result AFB Stain No acid fast bacilli seen on concentrated smear P              03/05/2020 1240 03/05/2020 1339 Anaerobic Culture 10 Day Incubation - Tissue, Hip, Left [[id number]]   Tissue from Hip, Left    In process No component results              03/05/2020 1132 03/05/2020 1337 Fungus Culture - Tissue, Hip, Left [[id number]]    Tissue from Hip, Left    In process No component results              03/05/2020 1132 03/07/2020 1007 Tissue / Bone Culture - Tissue, Hip, Left [[id number]]   Tissue from Hip, Left    Preliminary result Tissue Culture No growth at 2 days P   Gram Stain No organisms seen P    Moderate (3+) WBCs per low power field P              03/05/2020 1132 03/06/2020 1310 AFB Culture - Tissue, Hip, Left [[id number]]   Tissue from Hip, Left    Preliminary result AFB Stain No acid fast bacilli seen on concentrated smear P              03/05/2020 1132 03/05/2020 1337 Anaerobic Culture 10 Day Incubation - Tissue, Hip, Left [[id number]]   Tissue from Hip, Left    In process No component results              03/05/2020 1131 03/05/2020 1335 Fungus Culture - Tissue, Hip, Left [[id number]]   Tissue from Hip, Left    In process No component results              03/05/2020 1131 03/07/2020 1007 Tissue / Bone Culture - Tissue, Hip, Left [[id number]]   Tissue from Hip, Left    Preliminary result Tissue Culture No growth at 2 days P   Gram Stain No organisms seen P    Occasional WBCs per low power field P              03/05/2020 1131 03/06/2020 1310 AFB Culture - Tissue, Hip, Left [[id number]]   Tissue from Hip, Left    Preliminary result AFB Stain No acid fast bacilli seen on concentrated smear P              03/05/2020 1131 03/05/2020 1335 Anaerobic Culture 10 Day Incubation - Tissue, Hip, Left [[id number]]   Tissue from Hip, Left    In process No component results              02/21/2020 1042 02/22/2020 1216 MRSA Screen Culture - Swab, Nares [[id number]]   Swab from Nares    Final result MRSA Screen Cx No Methicillin Resistant Staphylococcus aureus isolated                  I reviewed the patient's new imaging including images and reports.      Physical therapy: Pt increased ambulation distance to 220 feet using RW and CGA. Gait limited by pain and fatigue. Bed mobility requiring supervision and STS requiring CGA. Pt N/T in R foot has improved  "today. Reviewed HEP and posterior hip precautions via handout. Anticipate pt d/c home with assist and HHPT.    Discharge Assessment:    Vital Signs  Visit Vitals  /79 (BP Location: Right arm, Patient Position: Lying)   Pulse 88   Temp 98.7 °F (37.1 °C) (Oral)   Resp 16   Ht 182.9 cm (72.01\")   Wt 94.8 kg (209 lb)   SpO2 97%   BMI 28.34 kg/m²     Temp (24hrs), Av.9 °F (36.6 °C), Min:96.4 °F (35.8 °C), Max:99.2 °F (37.3 °C)      General Appearance:    Alert, cooperative, in no acute distress   Lungs:     Clear to auscultation,respirations regular, even and                   unlabored    Heart:    Regular rhythm and normal rate, normal S1 and S2   Abdomen:     Normal bowel sounds, no masses, no organomegaly, soft        non-tender, non-distended, no guarding, no rebound                 tenderness   Extremities:   Moves all extremities well, no edema, no cyanosis, no              Redness. Left hip posterior OPTi foam clean dry intact.   Pulses:   Pulses palpable and equal bilaterally   Skin:   No bleeding, bruising or rash   Neurologic:   Cranial nerves 2 - 12 grossly intact, sensation intact. Flexion and dorsiflexion intact bilateral feet.       Discharge Disposition: Home    Discharge Medications     Discharge Medications      New Medications      Instructions Start Date   aspirin 81 MG EC tablet   81 mg, Oral, Every 12 Hours Scheduled, For 1 month      docusate sodium 100 MG capsule   100 mg, Oral, 2 Times Daily PRN      ethambutol 400 MG tablet  Commonly known as:  MYAMBUTOL   1,600 mg, Oral, Every 24 Hours Scheduled      Lactobacillus tablet   1 tablet, Oral, Daily      oxyCODONE 5 MG immediate release tablet  Commonly known as:  ROXICODONE   5 mg, Oral, Every 4 Hours PRN      rifAMPin 300 MG capsule  Commonly known as:  RIFADIN   600 mg, Oral, Every 24 Hours Scheduled         Changes to Medications      Instructions Start Date   azithromycin 500 MG tablet  Commonly known as:  ZITHROMAX  What changed:  "   · how much to take  · how to take this  · when to take this   Take 1 tablet daily         Continue These Medications      Instructions Start Date   albuterol (2.5 MG/3ML) 0.083% nebulizer solution  Commonly known as:  PROVENTIL   2.5 mg, Nebulization, Every 4 Hours PRN      albuterol sulfate  (90 Base) MCG/ACT inhaler  Commonly known as:  VENTOLIN HFA   2 puffs, Inhalation, Every 4 Hours PRN      esomeprazole 40 MG capsule  Commonly known as:  nexIUM   40 mg, Oral, Every Morning Before Breakfast      fluticasone 50 MCG/ACT nasal spray  Commonly known as:  FLONASE   1 spray, Nasal, Daily      Fluticasone Furoate-Vilanterol 200-25 MCG/INH inhaler  Commonly known as:  BREO ELLIPTA   1 puff, Inhalation, Daily      losartan 100 MG tablet  Commonly known as:  COZAAR   100 mg, Oral, Daily      lovastatin 40 MG tablet  Commonly known as:  MEVACOR   40 mg, Oral, Nightly      montelukast 10 MG tablet  Commonly known as:  SINGULAIR   10 mg, Oral, Every Evening      promethazine 25 MG tablet  Commonly known as:  PHENERGAN   25 mg, Oral, 2 Times Daily PRN      SUBOXONE 8-2 MG film film  Generic drug:  buprenorphine-naloxone   2 films, Sublingual, Daily, Resume when pain rx complete      traZODone 150 MG tablet  Commonly known as:  DESYREL   150 mg, Oral, Nightly PRN             Discharge Diet: Regular diet    Activity at Discharge: WBAT LLE    Follow-up Appointments  Dr. Ramirez per his orders  Dr. Rodriguez per his orders    Discharge took over 30 min.    LISA Prabhakar  03/06/20  12:16 PM

## 2020-03-06 NOTE — THERAPY TREATMENT NOTE
Patient Name: Topher Stoll  : 1975    MRN: [id number]                              Today's Date: 3/6/2020       Admit Date: 3/5/2020    Visit Dx:     ICD-10-CM ICD-9-CM   1. Left hip pain M25.552 719.45     Patient Active Problem List   Diagnosis   • Abdominal adhesions   • Atopic rhinitis   • Anxiety   • Arthritis   • Cervical radiculopathy   • Chronic pain syndrome   • Non-specific colitis   • Atherosclerosis of coronary artery   • Diverticulosis of intestine   • Gastroesophageal reflux disease   • Headache   • History of recreational drug use   • Hypertension   • Hyperlipidemia   • Arthralgia of hip   • Lesion of mucosa   • Low back pain   • Chronic obstructive pulmonary disease with acute exacerbation (CMS/HCC)   • Osteoporosis   • Pulmonary emphysema (CMS/HCC)   • Temporomandibular joint disorder   • Vitamin D deficiency   • BMI 25.0-25.9,adult   • Narcotic drug use   • Rhomboid pain   • Encounter for long-term current use of medication   • Encounter for special screening examination for neoplasm of prostate   • Encounter for vitamin deficiency screening   • Chronic viral hepatitis B without delta agent and without coma (CMS/HCC)   • Hep C w/o coma, chronic (CMS/HCC)   • Excessive daytime sleepiness   • GUILLE (obstructive sleep apnea)   • Snoring   • Right lower quadrant abdominal pain   • Colitis   • Cellulitis of left upper extremity   • Viral warts   • COPD exacerbation (CMS/HCC)   • Right hip pain   • Crushing injury of left hand   • Laceration of left hand   • Other partial intestinal obstruction (CMS/HCC)   • COPD (chronic obstructive pulmonary disease) (CMS/HCC)   • Prediabetes   • Infection   • Left hip pain   • total hip explant, antibiotic spacer placement 1/15/2020   • Acute blood loss anemia mild, asymptomatic   • Acute postoperative pain   • Leukocytosis   • Status post left hip reimplant revision     Past Medical History:   Diagnosis Date   • Abdominal adhesions    • Arthritis    • Asthma     • Cervical radiculopathy    • Colitis    • GERD (gastroesophageal reflux disease)    • Heart attack (CMS/HCC)    • History of recreational drug use    • Kidney cysts    • Left hip pain    • Liver disease    • Low back pain    • Migraines    • Obstructive chronic bronchitis with exacerbation (CMS/HCC)    • Sleep apnea     mild     Past Surgical History:   Procedure Laterality Date   • BACK SURGERY     • HERNIA REPAIR     • HIP SPACER INSERTION WITH ANTIBIOTIC CEMENT Left 1/15/2020    Procedure: TOTAL HIP IMPLANT REMOVAL WITH INSERTION OF ANTIBIOTIC SPACER LEFT;  Surgeon: Ba Ramirez MD;  Location: [facility];  Service: Orthopedics   • SHOULDER LIGAMENT REPAIR      right shoulder   • SMALL INTESTINE SURGERY      Small bowell resection   • TOTAL HIP ARTHROPLASTY Left      General Information     Row Name 03/06/20 0750          PT Evaluation Time/Intention    Document Type  therapy note (daily note)  -TILA     Mode of Treatment  physical therapy;individual therapy  -TILA     Row Name 03/06/20 [facility]          General Information    Patient Profile Reviewed?  yes  -TILA     Existing Precautions/Restrictions  fall;left;hip, posterior  -TILA     Row Name 03/06/20 [facility]          Cognitive Assessment/Intervention- PT/OT    Orientation Status (Cognition)  oriented x 4  -TILA     Row Name 03/06/20 [facility]          Safety Issues, Functional Mobility    Safety Issues Affecting Function (Mobility)  safety precautions follow-through/compliance;safety precaution awareness;sequencing abilities;awareness of need for assistance  -TILA     Impairments Affecting Function (Mobility)  pain;strength;range of motion (ROM);motor control;sensation/sensory awareness;endurance/activity tolerance  -TILA     Comment, Safety Issues/Impairments (Mobility)  N/T of R foot has improved per pt  -TILA       User Key  (r) = Recorded By, (t) = Taken By, (c) = Cosigned By    Initials Name Provider Type    TILA Bhanu Hoffmann, PT Physical Therapist        Mobility     Row Name  03/06/20 0750          Bed Mobility Assessment/Treatment    Bed Mobility Assessment/Treatment  supine-sit  -TILA     Supine-Sit [place] (Bed Mobility)  verbal cues;supervision  -TILA     Assistive Device (Bed Mobility)  head of bed elevated;bed rails  -TILA     Comment (Bed Mobility)  Verbal cues for LE sequencing off of EOB, use of UEs to push trunk into sitting, and scoot hips forward in order for feet to touch the floor. Cues for maintaining hip precautions.   -TILA     Row Name 03/06/20 0750          Transfer Assessment/Treatment    Comment (Transfers)  Verbal cues for safe hand placement during standing/sitting and moving L LE out for comfort prior to sitting and maintaing posterior hip precautions  -TILA     Row Name 03/06/20 0750          Sit-Stand Transfer    Sit-Stand [place] (Transfers)  verbal cues;contact guard  -TILA     Assistive Device (Sit-Stand Transfers)  walker, front-wheeled  -TILA     Row Name 03/06/20 0750          Gait/Stairs Assessment/Training    Gait/Stairs Assessment/Training  gait/ambulation independence;gait/ambulation assistive device  -TILA     [place] Level (Gait)  verbal cues;contact guard  -TILA     Assistive Device (Gait)  walker, front-wheeled  -     Distance in Feet (Gait)  220 feet  -TILA     Pattern (Gait)  step-through  -TILA     Deviations/Abnormal Patterns (Gait)  bilateral deviations;[place] decreased;gait speed decreased;stride length decreased;left sided deviations;antalgic  -TILA     Bilateral Gait Deviations  forward flexed posture;heel strike decreased  -TILA     Left Sided Gait Deviations  weight shift ability decreased  -TILA     [place] Level (Stairs)  not tested  -TILA     Comment (Gait/Stairs)  Pt ambulation progressed to step through pattern and decreased speed. Verbal cues for LE sequencing, AD managment, increase step length, and WB on L LE. Gait limited by pain and fatigue.   -     Row Name 03/06/20 0750          Mobility Assessment/Intervention    Extremity  Weight-bearing Status  left lower extremity  -TILA     Left Lower Extremity (Weight-bearing Status)  weight-bearing as tolerated (WBAT)  -TILA       User Key  (r) = Recorded By, (t) = Taken By, (c) = Cosigned By    Initials Name Provider Type    Bhanu Cordoba PT Physical Therapist        Obj/Interventions     Row Name 03/06/20 0750          Therapeutic Exercise    Lower Extremity (Therapeutic Exercise)  gluteal sets;quad sets, left;SLR (straight leg raise), left;LAQ (long arc quad), left;heel slides, left  -TILA     Lower Extremity Range of Motion (Therapeutic Exercise)  ankle dorsiflexion/plantar flexion, bilateral;hip abduction/adduction, left  -TILA     Exercise Type (Therapeutic Exercise)  AROM (active range of motion);isometric contraction, static  -TILA     Position (Therapeutic Exercise)  seated;supine  -TILA     Sets/Reps (Therapeutic Exercise)  10x each  -TILA     Expected Outcome (Therapeutic Exercise)  facilitate normal movement patterns;improve functional tolerance, self-care activity  -TLIA     Comment (Therapeutic Exercise)  Cues for technique  -TILA       User Key  (r) = Recorded By, (t) = Taken By, (c) = Cosigned By    Initials Name Provider Type    Bhanu Cordoba, KIM Physical Therapist        Goals/Plan     Row Name 03/06/20 0750          Bed Mobility Goal 1 (PT)    Activity/Assistive Device (Bed Mobility Goal 1, PT)  sit to supine/supine to sit  -TILA     [place] Level/Cues Needed (Bed Mobility Goal 1, PT)  conditional independence  -TILA     Time Frame (Bed Mobility Goal 1, PT)  long term goal (LTG);3 days  -TILA     Progress/Outcomes (Bed Mobility Goal 1, PT)  goal ongoing  -TILA     Row Name 03/06/20 0750          Transfer Goal 1 (PT)    Activity/Assistive Device (Transfer Goal 1, PT)  sit-to-stand/stand-to-sit;walker, rolling  -TILA     [place] Level/Cues Needed (Transfer Goal 1, PT)  conditional independence  -TIAL     Time Frame (Transfer Goal 1, PT)  long term goal (LTG);3 days  -TILA     Progress/Outcome  (Transfer Goal 1, PT)  goal ongoing  -TILA     Row Name 03/06/20 0750          Gait Training Goal 1 (PT)    Activity/Assistive Device (Gait Training Goal 1, PT)  gait (walking locomotion);walker, rolling  -TILA     [place] Level (Gait Training Goal 1, PT)  conditional independence  -TILA     Distance (Gait Goal 1, PT)  500 feet  -TILA     Time Frame (Gait Training Goal 1, PT)  long term goal (LTG);3 days  -TILA     Progress/Outcome (Gait Training Goal 1, PT)  goal ongoing  -TILA     Row Name 03/06/20 0750          Stairs Goal 1 (PT)    Activity/Assistive Device (Stairs Goal 1, PT)  ascending stairs;descending stairs;step-to-step;walker, rolling  -TILA     [place] Level/Cues Needed (Stairs Goal 1, PT)  contact guard assist  -TILA     Number of Stairs (Stairs Goal 1, PT)  1  -TILA     Time Frame (Stairs Goal 1, PT)  long term goal (LTG);3 days  -TILA     Progress/Outcome (Stairs Goal 1, PT)  goal ongoing  -TILA       User Key  (r) = Recorded By, (t) = Taken By, (c) = Cosigned By    Initials Name Provider Type    Bhanu Cordoba, PT Physical Therapist        Clinical Impression     Row Name 03/06/20 4530          Pain Assessment    Additional Documentation  Pain Scale: Numbers Pre/Post-Treatment (Group)  -TILA     Row Name 03/06/20 0750          Pain Scale: Numbers Pre/Post-Treatment    Pain Scale: Numbers, Pretreatment  7/10  -TILA     Pain Scale: Numbers, Post-Treatment  7/10  -TILA     Pain Location - Side  Left  -TILA     Pain Location  hip  -TILA     Pain Intervention(s)  Repositioned;Ambulation/increased activity;Cold applied  -TILA     Row Name 03/06/20 0170          Physical Therapy Clinical Impression    Patient/Family Goals Statement (PT Clinical Impression)  To return home  -TILA     Criteria for Skilled Interventions Met (PT Clinical Impression)  yes;treatment indicated  -TILA     Rehab Potential (PT Clinical Summary)  good, to achieve stated therapy goals  -TILA     Row Name 03/06/20 9480          Positioning and Restraints     Pre-Treatment Position  in bed  -TILA     Post Treatment Position  chair  -TILA     In Chair  notified nsg;reclined;call light within reach;encouraged to call for assist;exit alarm on;with family/caregiver;legs elevated  -TILA       User Key  (r) = Recorded By, (t) = Taken By, (c) = Cosigned By    Initials Name Provider Type    Bhanu Cordoba, PT Physical Therapist        Outcome Measures     Row Name 03/06/20 0750          How much help from another person do you currently need...    Turning from your back to your side while in flat bed without using bedrails?  3  -TILA     Moving from lying on back to sitting on the side of a flat bed without bedrails?  3  -TILA     Moving to and from a bed to a chair (including a wheelchair)?  3  -TILA     Standing up from a chair using your arms (e.g., wheelchair, bedside chair)?  3  -TILA     Climbing 3-5 steps with a railing?  3  -TILA     To walk in hospital room?  3  -TILA     AM-PAC 6 Clicks Score (PT)  18  -TILA     Row Name 03/06/20 0750          Functional Assessment    Outcome Measure Options  AM-PAC 6 Clicks Basic Mobility (PT)  -       User Key  (r) = Recorded By, (t) = Taken By, (c) = Cosigned By    Initials Name Provider Type    Bhanu Cordoba, KIM Physical Therapist          PT Recommendation and Plan  Planned Therapy Interventions (PT Eval): balance training, bed mobility training, gait training, home exercise program, patient/family education, strengthening, stair training, ROM (range of motion), transfer training  Outcome Summary/Treatment Plan (PT)  Anticipated Discharge Disposition (PT): home with assist, home with home health  Plan of Care Reviewed With: patient  Progress: improving  Outcome Summary: Pt increased ambulation distance to 220 feet using RW and CGA. Gait limited by pain and fatigue. Bed mobility requiring supervision and STS requiring CGA. Pt N/T in R foot has improved today. Reviewed HEP and posterior hip precautions via handout. Anticipate pt d/c home with assist  and HHPT.     Time Calculation:   PT Charges     Row Name 03/06/20 0750             Time Calculation    Start Time  0750  -      PT Received On  03/06/20  -TILA      PT Goal Re-Cert Due Date  03/15/20  -         Time Calculation- PT    Total Timed Code Minutes- PT  26 minute(s)  -TILA         Timed Charges    70658 - PT Therapeutic Exercise Minutes  11  -TILA      18519 - Gait Training Minutes   15  -TILA        User Key  (r) = Recorded By, (t) = Taken By, (c) = Cosigned By    Initials Name Provider Type    Bhanu Cordoba, PT Physical Therapist        Therapy Charges for Today     Code Description Service Date Service Provider Modifiers Qty    10804175849 HC PT THER PROC EA 15 MIN 3/6/2020 Bhanu Hoffmann, PT GP 1    95843610443 HC GAIT TRAINING EA 15 MIN 3/6/2020 Bhanu Hoffmann, PT GP 1          PT G-Codes  Outcome Measure Options: AM-PAC 6 Clicks Basic Mobility (PT)  AM-PAC 6 Clicks Score (PT): 18    Bhanu Hoffmann PT  3/6/2020
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
## 2020-03-06 NOTE — PLAN OF CARE
Problem: Patient Care Overview  Goal: Plan of Care Review  Flowsheets (Taken 3/6/2020 5493)  Progress: improving  Plan of Care Reviewed With: patient  Outcome Summary: Pt increased ambulation distance to 220 feet using RW and CGA. Gait limited by pain and fatigue. Bed mobility requiring supervision and STS requiring CGA. Pt N/T in R foot has improved today. Reviewed HEP and posterior hip precautions via handout. Anticipate pt d/c home with assist and HHPT.

## 2020-03-06 NOTE — OUTREACH NOTE
Medical Week 2 Survey      Responses   [facility] patient discharged from?  [place]   Does the patient have one of the following disease processes/diagnoses(primary or secondary)?  Total Joint Replacement   Revoke  Readmitted          Xiomara Lerma RN

## 2020-03-06 NOTE — PROGRESS NOTES
Orthopedic Progress Note      Patient: Topher Stoll  YOB: 1975     Date of Admission: 3/5/2020  7:19 AM Medical Record Number: [id number]     Attending Physician: Ba Ramirez MD    Status Post:  Procedure(s):  LEFT HIP REIMPLANT REVISION Post Operative Day Number: 1    Subjective : No new orthopaedic complaints     Pain Relief: some relief with present medication.     Systemic Complaints: No Complaints  Vitals:    03/05/20 1957 03/05/20 2359 03/06/20 0345 03/06/20 0657   BP: 127/85 113/76 118/79 121/78   BP Location: Right arm Right arm Right arm Right arm   Patient Position: Lying Lying Lying Lying   Pulse: 99 94 81 103   Resp: 18 16 16 16   Temp: 98.2 °F (36.8 °C) 98.4 °F (36.9 °C) 98.5 °F (36.9 °C) 99.2 °F (37.3 °C)   TempSrc: Oral Oral Oral Oral   SpO2: 98% 97% 99% 96%   Weight:       Height:           Physical Exam: 44 y.o. male    General Appearance:       Alert, cooperative, in no acute distress                  Extremities:    Dressing Clean, Dry and Intact             No clinical sign of DVT        Able to do good movements of digits    Pulses:   Pulses palpable and equal bilaterally           Diagnostic Tests:     Results from last 7 days   Lab Units 03/06/20  0616   WBC 10*3/mm3 10.82*   HEMOGLOBIN g/dL 10.8*   HEMATOCRIT % 35.1*   PLATELETS 10*3/mm3 257     Results from last 7 days   Lab Units 03/06/20  0616   SODIUM mmol/L 140   POTASSIUM mmol/L 3.4*   CHLORIDE mmol/L 105   CO2 mmol/L 24.0   BUN mg/dL 6   CREATININE mg/dL 0.70*   GLUCOSE mg/dL 99   CALCIUM mg/dL 8.6         No results found for: URICACID  No results found for: CRYSTAL  Microbiology Results (last 10 days)     Procedure Component Value - Date/Time    Tissue / Bone Culture - Tissue, Hip, Left [[id number]] Collected:  03/05/20 1240    Lab Status:  Preliminary result Specimen:  Tissue from Hip, Left Updated:  03/05/20 1605     Gram Stain No organisms seen      Many (4+) WBCs per low power field    Tissue / Bone  Culture - Tissue, Hip, Left [[id number]] Collected:  03/05/20 1132    Lab Status:  Preliminary result Specimen:  Tissue from Hip, Left Updated:  03/05/20 1605     Gram Stain No organisms seen      Moderate (3+) WBCs per low power field    Tissue / Bone Culture - Tissue, Hip, Left [[id number]] Collected:  03/05/20 1131    Lab Status:  Preliminary result Specimen:  Tissue from Hip, Left Updated:  03/05/20 1525     Gram Stain No organisms seen      Occasional WBCs per low power field        Xr Hip With Or Without Pelvis 2 - 3 View Left    Result Date: 3/6/2020  Left hip revision, prosthetic components in anatomic alignment. Probable incidental small nutrient vessel canal of the femoral diaphysis incidentally noted.   D:  03/05/2020 E:  03/06/2020          Current Medications:  Scheduled Meds:  acetaminophen 1,000 mg Oral Q8H   aspirin 81 mg Oral Q12H   atorvastatin 10 mg Oral Daily   budesonide-formoterol 2 puff Inhalation BID - RT   fluticasone 1 spray Nasal Daily   losartan 100 mg Oral Daily   meloxicam 15 mg Oral Daily   montelukast 10 mg Oral Nightly   pantoprazole 40 mg Oral QAM     Continuous Infusions:  lactated ringers 9 mL/hr Last Rate: 9 mL/hr (03/05/20 0800)   lactated ringers 100 mL/hr    lactated ringers 100 mL/hr Last Rate: 100 mL/hr (03/06/20 0641)     PRN Meds:.albuterol  •  bisacodyl  •  bisacodyl  •  docusate sodium  •  HYDROmorphone **AND** naloxone  •  ketorolac  •  labetalol  •  oxyCODONE  •  oxyCODONE  •  sennosides-docusate  •  sodium chloride  •  traMADol  •  traZODone    Assessment: Status post  LEFT HIP REIMPLANT    Patient Active Problem List   Diagnosis   • Abdominal adhesions   • Atopic rhinitis   • Anxiety   • Arthritis   • Cervical radiculopathy   • Chronic pain syndrome   • Non-specific colitis   • Atherosclerosis of coronary artery   • Diverticulosis of intestine   • Gastroesophageal reflux disease   • Headache   • History of recreational drug use   • Hypertension   • Hyperlipidemia   •  Arthralgia of hip   • Lesion of mucosa   • Low back pain   • Chronic obstructive pulmonary disease with acute exacerbation (CMS/HCC)   • Osteoporosis   • Pulmonary emphysema (CMS/HCC)   • Temporomandibular joint disorder   • Vitamin D deficiency   • BMI 25.0-25.9,adult   • Narcotic drug use   • Rhomboid pain   • Encounter for long-term current use of medication   • Encounter for special screening examination for neoplasm of prostate   • Encounter for vitamin deficiency screening   • Chronic viral hepatitis B without delta agent and without coma (CMS/HCC)   • Hep C w/o coma, chronic (CMS/HCC)   • Excessive daytime sleepiness   • GUILLE (obstructive sleep apnea)   • Snoring   • Right lower quadrant abdominal pain   • Colitis   • Cellulitis of left upper extremity   • Viral warts   • COPD exacerbation (CMS/HCC)   • Right hip pain   • Crushing injury of left hand   • Laceration of left hand   • Other partial intestinal obstruction (CMS/HCC)   • COPD (chronic obstructive pulmonary disease) (CMS/HCC)   • Prediabetes   • Infection   • Left hip pain   • total hip explant, antibiotic spacer placement 1/15/2020   • Acute blood loss anemia mild, asymptomatic   • Acute postoperative pain   • Leukocytosis   • Status post left hip reimplant revision       PLAN:   Continues current post-op course  Anticoagulation: Aspirin started  Mobilize with PT as tolerated per protocol  Antibiotics per infectious disease    Weight Bearing: WBAT  Discharge Plan: OK to plan for discharge in  today to home  from orthopadic perspective.    Ba Ramirez MD    Date: 3/6/2020    Time: 9:36 AM

## 2020-03-07 ENCOUNTER — READMISSION MANAGEMENT (OUTPATIENT)
Dept: CALL CENTER | Facility: HOSPITAL | Age: 45
End: 2020-03-07

## 2020-03-07 LAB
CYTO UR: NORMAL
LAB AP CASE REPORT: NORMAL
LAB AP CLINICAL INFORMATION: NORMAL
Lab: NORMAL
PATH REPORT.FINAL DX SPEC: NORMAL
PATH REPORT.GROSS SPEC: NORMAL

## 2020-03-07 NOTE — OUTREACH NOTE
Prep Survey      Responses   [facility] patient discharged from?  [place]   Is LACE score < 7 ?  No   Eligibility  Readm Mgmt   Discharge diagnosis  s/p Left hip reimplant revision   Does the patient have one of the following disease processes/diagnoses(primary or secondary)?  Total Joint Replacement   Does the patient have Home health ordered?  No   Is there a DME ordered?  No   Comments regarding appointments  Pt to schedule F/U with PCP   Prep survey completed?  Yes          Erin Sahu RN

## 2020-03-08 LAB
BACTERIA SPEC AEROBE CULT: NORMAL
GRAM STN SPEC: NORMAL

## 2020-03-11 ENCOUNTER — READMISSION MANAGEMENT (OUTPATIENT)
Dept: CALL CENTER | Facility: HOSPITAL | Age: 45
End: 2020-03-11

## 2020-03-11 NOTE — OUTREACH NOTE
Total Joint Week 1 Survey      Responses   [facility] patient discharged from?  [place]   Does the patient have one of the following disease processes/diagnoses(primary or secondary)?  Total Joint Replacement   Is there a successful TCM telephone encounter documented?  No   Joint surgery performed?  Hip   Week 1 attempt successful?  Yes   Call start time  1739   Call end time  1743   Discharge diagnosis  s/p Left hip reimplant revision   Does the patient have all medications related to this admission filled (includes all antibiotics, pain medications, etc.)  Yes   Is the patient taking all medications as directed (includes completed medication regime)?  Yes   Is the patient able to teach back alternate methods of pain control?  Ice, Reposition, Correct alignment, Short, frequent activity   Does the patient have a follow up appointment with their surgeon?  Yes   Has the patient kept scheduled appointments due by today?  N/A   What is the Home health agency?   [facility]    Has home health visited the patient within 72 hours of discharge?  Yes   Psychosocial issues?  No   Has the patient began therapy sessions (either in the home or as an out patient)?  Yes   Does the patient have a wound vac in place?  N/A   Has the patient fallen since discharge?  No   Did the patient receive a copy of their discharge instructions?  Yes   Nursing interventions  Reviewed instructions with patient   What is the patient's perception of their functional status since discharge?  Improving   Is the patient able to teach back signs and symptoms of infection?  Temp >100.4 for 24h or longer, Incisional drainage, Blisters around incision, Increased swelling or redness around incision (not associated with surgical edema), Severe discomfort or pain, Changes in mobility, Shortness of breath or chest pain   Is the patient able to teach back how to prevent infection?  Check incision daily, Wash hands before and after touching incision,  Keep incision covered if drainage, Shower only as directed by surgeon, No tub baths, hot tub or swimming, Eat well-balanced diet   Is the patient able to teach back signs and symptoms of DVT?  Redness in calf, Area hot to touch, Shortness of breath or chest pain, Swelling in calf, Severe pain in calf   Is the patient able to teach back home safety measures?  Ability to shower, Accessibility to necessary areas in home, Modifications to reach items, Modifications with ADLs such as dressing, cooking, toileting   Did the patient implement home safety suggestions from pre-surgery classes if attended?  N/A   Is the patient/caregiver able to teach back the hierarchy of who to call/visit for symptoms/problems? PCP, Specialist, Home health nurse, Urgent Care, ED, 911  Yes   Week 1 call completed?  Yes          Ankita Mejia RN

## 2020-03-13 DIAGNOSIS — Z96.642 STATUS POST TOTAL REPLACEMENT OF LEFT HIP: Primary | ICD-10-CM

## 2020-03-13 RX ORDER — OXYCODONE HYDROCHLORIDE 5 MG/1
5 TABLET ORAL EVERY 4 HOURS PRN
Qty: 60 TABLET | Refills: 0 | Status: ON HOLD | OUTPATIENT
Start: 2020-03-13 | End: 2021-06-16

## 2020-03-15 LAB
BACTERIA SPEC ANAEROBE CULT: NORMAL

## 2020-03-18 ENCOUNTER — READMISSION MANAGEMENT (OUTPATIENT)
Dept: CALL CENTER | Facility: HOSPITAL | Age: 45
End: 2020-03-18

## 2020-03-18 NOTE — OUTREACH NOTE
Total Joint Week 2 Survey      Responses   [facility] patient discharged from?  [place]   Does the patient have one of the following disease processes/diagnoses(primary or secondary)?  Total Joint Replacement   Joint surgery performed?  Hip   Week 2 attempt successful?  Yes   Call start time  1724   Call end time  1726   Has the patient been back in either the hospital or Emergency Department since discharge?  No   Discharge diagnosis  s/p Left hip reimplant revision   Is the patient taking all medications as directed (includes completed medication regime)?  Yes   Comments regarding appointments  will see surgeon on Friday for follow up   Does the patient have a follow up appointment with their surgeon?  Yes   Has the patient kept scheduled appointments due by today?  N/A   What is the Home health agency?   [facility]   Psychosocial issues?  No   Has the patient began therapy sessions (either in the home or as an out patient)?  Yes   Has the patient fallen since discharge?  No   What is the patient's perception of their functional status since discharge?  Improving   Is the patient able to teach back signs and symptoms of infection?  Temp >100.4 for 24h or longer, Incisional drainage, Blisters around incision, Increased swelling or redness around incision (not associated with surgical edema), Severe discomfort or pain, Shortness of breath or chest pain, Changes in mobility   Is the patient/caregiver able to teach back the hierarchy of who to call/visit for symptoms/problems? PCP, Specialist, Home health nurse, Urgent Care, ED, 911  Yes   Additional teach back comments  Patient says he is doing really well, no questions or concerns at this time.   Week 2 call completed?  Yes          Narda Perez RN

## 2020-03-24 ENCOUNTER — LAB (OUTPATIENT)
Dept: LAB | Facility: HOSPITAL | Age: 45
End: 2020-03-24

## 2020-03-24 ENCOUNTER — TRANSCRIBE ORDERS (OUTPATIENT)
Dept: LAB | Facility: HOSPITAL | Age: 45
End: 2020-03-24

## 2020-03-24 DIAGNOSIS — M25.452 EFFUSION OF HIP JOINT, LEFT: ICD-10-CM

## 2020-03-24 DIAGNOSIS — T84.52XD INFECTION ASSOCIATED WITH INTERNAL LEFT HIP PROSTHESIS, SUBSEQUENT ENCOUNTER: ICD-10-CM

## 2020-03-24 DIAGNOSIS — L03.116 CELLULITIS OF LEFT FOOT: ICD-10-CM

## 2020-03-24 DIAGNOSIS — T84.52XD INFECTION ASSOCIATED WITH INTERNAL LEFT HIP PROSTHESIS, SUBSEQUENT ENCOUNTER: Primary | ICD-10-CM

## 2020-03-24 LAB
ALBUMIN SERPL-MCNC: 4.7 G/DL (ref 3.5–5.2)
ALBUMIN/GLOB SERPL: 1.6 G/DL
ALP SERPL-CCNC: 171 U/L (ref 39–117)
ALT SERPL W P-5'-P-CCNC: 23 U/L (ref 1–41)
ANION GAP SERPL CALCULATED.3IONS-SCNC: 14 MMOL/L (ref 5–15)
AST SERPL-CCNC: 22 U/L (ref 1–40)
BASOPHILS # BLD AUTO: 0.04 10*3/MM3 (ref 0–0.2)
BASOPHILS NFR BLD AUTO: 0.7 % (ref 0–1.5)
BILIRUB SERPL-MCNC: <0.2 MG/DL (ref 0.2–1.2)
BUN BLD-MCNC: 12 MG/DL (ref 6–20)
BUN/CREAT SERPL: 13.8 (ref 7–25)
CALCIUM SPEC-SCNC: 9 MG/DL (ref 8.6–10.5)
CHLORIDE SERPL-SCNC: 101 MMOL/L (ref 98–107)
CO2 SERPL-SCNC: 24 MMOL/L (ref 22–29)
CREAT BLD-MCNC: 0.87 MG/DL (ref 0.76–1.27)
CRP SERPL-MCNC: 1.82 MG/DL (ref 0–0.5)
DEPRECATED RDW RBC AUTO: 40.9 FL (ref 37–54)
EOSINOPHIL # BLD AUTO: 0.29 10*3/MM3 (ref 0–0.4)
EOSINOPHIL NFR BLD AUTO: 5.4 % (ref 0.3–6.2)
ERYTHROCYTE [DISTWIDTH] IN BLOOD BY AUTOMATED COUNT: 14.9 % (ref 12.3–15.4)
ERYTHROCYTE [SEDIMENTATION RATE] IN BLOOD: 37 MM/HR (ref 0–15)
GFR SERPL CREATININE-BSD FRML MDRD: 95 ML/MIN/1.73
GLOBULIN UR ELPH-MCNC: 3 GM/DL
GLUCOSE BLD-MCNC: 84 MG/DL (ref 65–99)
HCT VFR BLD AUTO: 37 % (ref 37.5–51)
HGB BLD-MCNC: 11.2 G/DL (ref 13–17.7)
IMM GRANULOCYTES # BLD AUTO: 0.01 10*3/MM3 (ref 0–0.05)
IMM GRANULOCYTES NFR BLD AUTO: 0.2 % (ref 0–0.5)
LYMPHOCYTES # BLD AUTO: 1.25 10*3/MM3 (ref 0.7–3.1)
LYMPHOCYTES NFR BLD AUTO: 23.3 % (ref 19.6–45.3)
MCH RBC QN AUTO: 22.8 PG (ref 26.6–33)
MCHC RBC AUTO-ENTMCNC: 30.3 G/DL (ref 31.5–35.7)
MCV RBC AUTO: 75.2 FL (ref 79–97)
MONOCYTES # BLD AUTO: 0.51 10*3/MM3 (ref 0.1–0.9)
MONOCYTES NFR BLD AUTO: 9.5 % (ref 5–12)
NEUTROPHILS # BLD AUTO: 3.27 10*3/MM3 (ref 1.7–7)
NEUTROPHILS NFR BLD AUTO: 60.9 % (ref 42.7–76)
NRBC BLD AUTO-RTO: 0 /100 WBC (ref 0–0.2)
PLATELET # BLD AUTO: 376 10*3/MM3 (ref 140–450)
PMV BLD AUTO: 9.7 FL (ref 6–12)
POTASSIUM BLD-SCNC: 4.1 MMOL/L (ref 3.5–5.2)
PROT SERPL-MCNC: 7.7 G/DL (ref 6–8.5)
RBC # BLD AUTO: 4.92 10*6/MM3 (ref 4.14–5.8)
SODIUM BLD-SCNC: 139 MMOL/L (ref 136–145)
WBC NRBC COR # BLD: 5.37 10*3/MM3 (ref 3.4–10.8)

## 2020-03-24 PROCEDURE — 85652 RBC SED RATE AUTOMATED: CPT

## 2020-03-24 PROCEDURE — 85025 COMPLETE CBC W/AUTO DIFF WBC: CPT

## 2020-03-24 PROCEDURE — 36415 COLL VENOUS BLD VENIPUNCTURE: CPT

## 2020-03-24 PROCEDURE — 80053 COMPREHEN METABOLIC PANEL: CPT

## 2020-03-24 PROCEDURE — 86140 C-REACTIVE PROTEIN: CPT

## 2020-04-02 ENCOUNTER — READMISSION MANAGEMENT (OUTPATIENT)
Dept: CALL CENTER | Facility: HOSPITAL | Age: 45
End: 2020-04-02

## 2020-04-02 NOTE — OUTREACH NOTE
Total Joint Month 1 Survey      Responses   [facility] patient discharged from?  [place]   Does the patient have one of the following disease processes/diagnoses(primary or secondary)?  Total Joint Replacement   Joint surgery performed?  Hip   Month 1 attempt successful?  No   Unsuccessful attempts  Attempt 1          Alberto Cisse RN

## 2020-04-03 ENCOUNTER — READMISSION MANAGEMENT (OUTPATIENT)
Dept: CALL CENTER | Facility: HOSPITAL | Age: 45
End: 2020-04-03

## 2020-04-03 NOTE — OUTREACH NOTE
Total Joint Month 1 Survey      Responses   [facility] [place] by [facility] patient discharged from?  [place]   Does the patient have one of the following disease processes/diagnoses(primary or secondary)?  Total Joint Replacement   Joint surgery performed?  Hip   Month 1 attempt successful?  No   Unsuccessful attempts  Attempt 2          Alberto Cisse RN

## 2020-04-16 LAB
FUNGUS WND CULT: NORMAL
MYCOBACTERIUM SPEC CULT: NORMAL
NIGHT BLUE STAIN TISS: NORMAL

## 2020-04-21 ENCOUNTER — TRANSCRIBE ORDERS (OUTPATIENT)
Dept: LAB | Facility: HOSPITAL | Age: 45
End: 2020-04-21

## 2020-04-21 ENCOUNTER — LAB (OUTPATIENT)
Dept: LAB | Facility: HOSPITAL | Age: 45
End: 2020-04-21

## 2020-04-21 DIAGNOSIS — A31.8 INFECTION DUE TO MYCOBACTERIUM SCROFULACEUM: ICD-10-CM

## 2020-04-21 DIAGNOSIS — M25.452 EFFUSION OF HIP JOINT, LEFT: ICD-10-CM

## 2020-04-21 DIAGNOSIS — R73.03 DIABETES MELLITUS, LATENT: ICD-10-CM

## 2020-04-21 DIAGNOSIS — L03.116 CELLULITIS OF LEFT FOOT: Primary | ICD-10-CM

## 2020-04-21 DIAGNOSIS — L03.116 CELLULITIS OF LEFT FOOT: ICD-10-CM

## 2020-04-21 DIAGNOSIS — A31.1 CUTANEOUS INFECTIOUS DISEASE DUE TO MYCOBACTERIA: ICD-10-CM

## 2020-04-21 LAB
ALBUMIN SERPL-MCNC: 4.6 G/DL (ref 3.5–5.2)
ALBUMIN/GLOB SERPL: 1.4 G/DL
ALP SERPL-CCNC: 165 U/L (ref 39–117)
ALT SERPL W P-5'-P-CCNC: 21 U/L (ref 1–41)
ANION GAP SERPL CALCULATED.3IONS-SCNC: 13 MMOL/L (ref 5–15)
AST SERPL-CCNC: 21 U/L (ref 1–40)
BASOPHILS # BLD AUTO: 0.06 10*3/MM3 (ref 0–0.2)
BASOPHILS NFR BLD AUTO: 0.8 % (ref 0–1.5)
BILIRUB SERPL-MCNC: <0.2 MG/DL (ref 0.2–1.2)
BUN BLD-MCNC: 12 MG/DL (ref 6–20)
BUN/CREAT SERPL: 14.1 (ref 7–25)
CALCIUM SPEC-SCNC: 8.8 MG/DL (ref 8.6–10.5)
CHLORIDE SERPL-SCNC: 106 MMOL/L (ref 98–107)
CO2 SERPL-SCNC: 21 MMOL/L (ref 22–29)
CREAT BLD-MCNC: 0.85 MG/DL (ref 0.76–1.27)
CRP SERPL-MCNC: 0.42 MG/DL (ref 0–0.5)
DEPRECATED RDW RBC AUTO: 40.1 FL (ref 37–54)
EOSINOPHIL # BLD AUTO: 0.47 10*3/MM3 (ref 0–0.4)
EOSINOPHIL NFR BLD AUTO: 6.1 % (ref 0.3–6.2)
ERYTHROCYTE [DISTWIDTH] IN BLOOD BY AUTOMATED COUNT: 15.8 % (ref 12.3–15.4)
ERYTHROCYTE [SEDIMENTATION RATE] IN BLOOD: 20 MM/HR (ref 0–15)
GFR SERPL CREATININE-BSD FRML MDRD: 97 ML/MIN/1.73
GLOBULIN UR ELPH-MCNC: 3.2 GM/DL
GLUCOSE BLD-MCNC: 116 MG/DL (ref 65–99)
HCT VFR BLD AUTO: 43.3 % (ref 37.5–51)
HGB BLD-MCNC: 12.8 G/DL (ref 13–17.7)
IMM GRANULOCYTES # BLD AUTO: 0.01 10*3/MM3 (ref 0–0.05)
IMM GRANULOCYTES NFR BLD AUTO: 0.1 % (ref 0–0.5)
LYMPHOCYTES # BLD AUTO: 1.81 10*3/MM3 (ref 0.7–3.1)
LYMPHOCYTES NFR BLD AUTO: 23.5 % (ref 19.6–45.3)
MCH RBC QN AUTO: 21.5 PG (ref 26.6–33)
MCHC RBC AUTO-ENTMCNC: 29.6 G/DL (ref 31.5–35.7)
MCV RBC AUTO: 72.9 FL (ref 79–97)
MONOCYTES # BLD AUTO: 0.66 10*3/MM3 (ref 0.1–0.9)
MONOCYTES NFR BLD AUTO: 8.6 % (ref 5–12)
NEUTROPHILS # BLD AUTO: 4.7 10*3/MM3 (ref 1.7–7)
NEUTROPHILS NFR BLD AUTO: 60.9 % (ref 42.7–76)
NRBC BLD AUTO-RTO: 0 /100 WBC (ref 0–0.2)
PLATELET # BLD AUTO: 370 10*3/MM3 (ref 140–450)
PMV BLD AUTO: 10.6 FL (ref 6–12)
POTASSIUM BLD-SCNC: 3.7 MMOL/L (ref 3.5–5.2)
PROT SERPL-MCNC: 7.8 G/DL (ref 6–8.5)
RBC # BLD AUTO: 5.94 10*6/MM3 (ref 4.14–5.8)
SODIUM BLD-SCNC: 140 MMOL/L (ref 136–145)
WBC NRBC COR # BLD: 7.71 10*3/MM3 (ref 3.4–10.8)

## 2020-04-21 PROCEDURE — 80053 COMPREHEN METABOLIC PANEL: CPT

## 2020-04-21 PROCEDURE — 85025 COMPLETE CBC W/AUTO DIFF WBC: CPT

## 2020-04-21 PROCEDURE — 36415 COLL VENOUS BLD VENIPUNCTURE: CPT

## 2020-04-21 PROCEDURE — 86140 C-REACTIVE PROTEIN: CPT

## 2020-04-21 PROCEDURE — 85652 RBC SED RATE AUTOMATED: CPT

## 2020-05-04 ENCOUNTER — READMISSION MANAGEMENT (OUTPATIENT)
Dept: CALL CENTER | Facility: HOSPITAL | Age: 45
End: 2020-05-04

## 2020-05-04 NOTE — OUTREACH NOTE
Total Joint Month 2 Survey      Responses   [facility] patient discharged from?  [place]   Does the patient have one of the following disease processes/diagnoses(primary or secondary)?  Total Joint Replacement   Joint surgery performed?  Hip   Month 2 attempt successful?  Yes   Call start time  1706   Call end time  1708   Has the patient been back in either the hospital or Emergency Department since discharge?  No   Is the patient taking all medications as directed (includes completed medication regime)?  Yes   Is the patient able to teach back alternate methods of pain control?  Ice   Has the patient kept scheduled appointments due by today?  Yes   Is the patient still receiving Home Health Services?  No   Is the patient still attending therapy sessions(either in the home or as an outpatient)?  No   Has the patient fallen since discharge?  No   What is the patient's perception of their functional status since discharge?  Improving   Is the patient able to teach back signs and symptoms of infection?  Temp >100.4 for 24h or longer   Is the patient/caregiver able to teach back the hierarchy of who to call/visit for symptoms/problems? PCP, Specialist, Home health nurse, Urgent Care, ED, 911  Yes   Additional teach back comments  pt states finished PT on own, feeling stronger   Month 2 Call Completed?  Yes          Ankita Mejia RN

## 2020-05-10 ENCOUNTER — APPOINTMENT (OUTPATIENT)
Dept: GENERAL RADIOLOGY | Facility: HOSPITAL | Age: 45
End: 2020-05-10

## 2020-05-10 ENCOUNTER — HOSPITAL ENCOUNTER (EMERGENCY)
Facility: HOSPITAL | Age: 45
Discharge: HOME | End: 2020-05-10
Attending: EMERGENCY MEDICINE | Admitting: EMERGENCY MEDICINE

## 2020-05-10 VITALS
HEART RATE: 94 BPM | TEMPERATURE: 98.2 F | OXYGEN SATURATION: 96 % | DIASTOLIC BLOOD PRESSURE: 94 MMHG | HEIGHT: 73 IN | BODY MASS INDEX: 26.51 KG/M2 | SYSTOLIC BLOOD PRESSURE: 128 MMHG | RESPIRATION RATE: 22 BRPM | WEIGHT: 200 LBS

## 2020-05-10 DIAGNOSIS — J44.1 COPD WITH ACUTE EXACERBATION (HCC): Primary | ICD-10-CM

## 2020-05-10 LAB
ALBUMIN SERPL-MCNC: 4.7 G/DL (ref 3.5–5.2)
ALBUMIN/GLOB SERPL: 1.3 G/DL
ALP SERPL-CCNC: 190 U/L (ref 39–117)
ALT SERPL W P-5'-P-CCNC: 22 U/L (ref 1–41)
ANION GAP SERPL CALCULATED.3IONS-SCNC: 17 MMOL/L (ref 5–15)
AST SERPL-CCNC: 26 U/L (ref 1–40)
BASOPHILS # BLD AUTO: 0.08 10*3/MM3 (ref 0–0.2)
BASOPHILS NFR BLD AUTO: 0.9 % (ref 0–1.5)
BILIRUB SERPL-MCNC: <0.2 MG/DL (ref 0.2–1.2)
BUN BLD-MCNC: 12 MG/DL (ref 6–20)
BUN/CREAT SERPL: 12.8 (ref 7–25)
CALCIUM SPEC-SCNC: 9.7 MG/DL (ref 8.6–10.5)
CHLORIDE SERPL-SCNC: 101 MMOL/L (ref 98–107)
CO2 SERPL-SCNC: 22 MMOL/L (ref 22–29)
CREAT BLD-MCNC: 0.94 MG/DL (ref 0.76–1.27)
DEPRECATED RDW RBC AUTO: 42.2 FL (ref 37–54)
EOSINOPHIL # BLD AUTO: 0.79 10*3/MM3 (ref 0–0.4)
EOSINOPHIL NFR BLD AUTO: 9 % (ref 0.3–6.2)
ERYTHROCYTE [DISTWIDTH] IN BLOOD BY AUTOMATED COUNT: 18.3 % (ref 12.3–15.4)
FLUAV AG NPH QL: NEGATIVE
FLUBV AG NPH QL IA: NEGATIVE
GFR SERPL CREATININE-BSD FRML MDRD: 87 ML/MIN/1.73
GLOBULIN UR ELPH-MCNC: 3.6 GM/DL
GLUCOSE BLD-MCNC: 129 MG/DL (ref 65–99)
HCT VFR BLD AUTO: 46.2 % (ref 37.5–51)
HGB BLD-MCNC: 14.4 G/DL (ref 13–17.7)
HOLD SPECIMEN: NORMAL
HOLD SPECIMEN: NORMAL
IMM GRANULOCYTES # BLD AUTO: 0.02 10*3/MM3 (ref 0–0.05)
IMM GRANULOCYTES NFR BLD AUTO: 0.2 % (ref 0–0.5)
LYMPHOCYTES # BLD AUTO: 1.72 10*3/MM3 (ref 0.7–3.1)
LYMPHOCYTES NFR BLD AUTO: 19.5 % (ref 19.6–45.3)
MCH RBC QN AUTO: 22 PG (ref 26.6–33)
MCHC RBC AUTO-ENTMCNC: 31.2 G/DL (ref 31.5–35.7)
MCV RBC AUTO: 70.5 FL (ref 79–97)
MONOCYTES # BLD AUTO: 0.75 10*3/MM3 (ref 0.1–0.9)
MONOCYTES NFR BLD AUTO: 8.5 % (ref 5–12)
NEUTROPHILS # BLD AUTO: 5.45 10*3/MM3 (ref 1.7–7)
NEUTROPHILS NFR BLD AUTO: 61.9 % (ref 42.7–76)
NRBC BLD AUTO-RTO: 0 /100 WBC (ref 0–0.2)
NT-PROBNP SERPL-MCNC: 255.6 PG/ML (ref 5–450)
PLATELET # BLD AUTO: 379 10*3/MM3 (ref 140–450)
PMV BLD AUTO: 9.8 FL (ref 6–12)
POTASSIUM BLD-SCNC: 4.2 MMOL/L (ref 3.5–5.2)
PROT SERPL-MCNC: 8.3 G/DL (ref 6–8.5)
RBC # BLD AUTO: 6.55 10*6/MM3 (ref 4.14–5.8)
RBC MORPH BLD: NORMAL
SMALL PLATELETS BLD QL SMEAR: ADEQUATE
SODIUM BLD-SCNC: 140 MMOL/L (ref 136–145)
TROPONIN T SERPL-MCNC: <0.01 NG/ML (ref 0–0.03)
WBC MORPH BLD: NORMAL
WBC NRBC COR # BLD: 8.81 10*3/MM3 (ref 3.4–10.8)
WHOLE BLOOD HOLD SPECIMEN: NORMAL
WHOLE BLOOD HOLD SPECIMEN: NORMAL

## 2020-05-10 PROCEDURE — 80053 COMPREHEN METABOLIC PANEL: CPT | Performed by: EMERGENCY MEDICINE

## 2020-05-10 PROCEDURE — 96374 THER/PROPH/DIAG INJ IV PUSH: CPT

## 2020-05-10 PROCEDURE — 85007 BL SMEAR W/DIFF WBC COUNT: CPT | Performed by: EMERGENCY MEDICINE

## 2020-05-10 PROCEDURE — 87804 INFLUENZA ASSAY W/OPTIC: CPT | Performed by: PHYSICIAN ASSISTANT

## 2020-05-10 PROCEDURE — 25010000002 METHYLPREDNISOLONE PER 125 MG: Performed by: PHYSICIAN ASSISTANT

## 2020-05-10 PROCEDURE — 71045 X-RAY EXAM CHEST 1 VIEW: CPT

## 2020-05-10 PROCEDURE — 85025 COMPLETE CBC W/AUTO DIFF WBC: CPT | Performed by: EMERGENCY MEDICINE

## 2020-05-10 PROCEDURE — 99283 EMERGENCY DEPT VISIT LOW MDM: CPT

## 2020-05-10 PROCEDURE — 83880 ASSAY OF NATRIURETIC PEPTIDE: CPT | Performed by: EMERGENCY MEDICINE

## 2020-05-10 PROCEDURE — 93005 ELECTROCARDIOGRAM TRACING: CPT | Performed by: EMERGENCY MEDICINE

## 2020-05-10 PROCEDURE — 84484 ASSAY OF TROPONIN QUANT: CPT | Performed by: EMERGENCY MEDICINE

## 2020-05-10 PROCEDURE — 87635 SARS-COV-2 COVID-19 AMP PRB: CPT | Performed by: PHYSICIAN ASSISTANT

## 2020-05-10 RX ORDER — SODIUM CHLORIDE 0.9 % (FLUSH) 0.9 %
10 SYRINGE (ML) INJECTION AS NEEDED
Status: DISCONTINUED | OUTPATIENT
Start: 2020-05-10 | End: 2020-05-10 | Stop reason: HOSPADM

## 2020-05-10 RX ORDER — PREDNISONE 20 MG/1
TABLET ORAL
Qty: 18 TABLET | Refills: 0 | Status: SHIPPED | OUTPATIENT
Start: 2020-05-10 | End: 2020-05-19

## 2020-05-10 RX ORDER — METHYLPREDNISOLONE SODIUM SUCCINATE 125 MG/2ML
125 INJECTION, POWDER, LYOPHILIZED, FOR SOLUTION INTRAMUSCULAR; INTRAVENOUS ONCE
Status: COMPLETED | OUTPATIENT
Start: 2020-05-10 | End: 2020-05-10

## 2020-05-10 RX ADMIN — ALBUTEROL SULFATE 4 MG: 2 SYRUP ORAL at 17:29

## 2020-05-10 RX ADMIN — METHYLPREDNISOLONE SODIUM SUCCINATE 125 MG: 125 INJECTION, POWDER, FOR SOLUTION INTRAMUSCULAR; INTRAVENOUS at 17:29

## 2020-05-10 NOTE — ED PROVIDER NOTES
Subjective   This patient states for the past 2 or 3 days has had progressive worsening shortness of breath.  He has a history of COPD/emphysema.  He has albuterol inhalers, nebulizer machine at home and oxygen at home for as needed use.  He denies any chest pain or pain with a deep breath.  No fevers.  No abdominal pain nausea vomiting or diarrhea.  He states his nebulizers have been helping a little bit at home.          Review of Systems   Constitutional: Negative.  Negative for fever.   HENT: Negative.    Eyes: Negative.    Respiratory: Positive for cough and shortness of breath.    Cardiovascular: Negative.  Negative for chest pain.   Gastrointestinal: Negative.    Genitourinary: Negative.    Musculoskeletal: Negative.    Skin: Negative.    Allergic/Immunologic: Negative.    Neurological: Negative.    Psychiatric/Behavioral: Negative.    All other systems reviewed and are negative.      Past Medical History:   Diagnosis Date   • Abdominal adhesions    • Arthritis    • Asthma    • Cervical radiculopathy    • Colitis    • GERD (gastroesophageal reflux disease)    • Heart attack (CMS/HCC)    • History of recreational drug use    • Kidney cysts    • Left hip pain    • Liver disease    • Low back pain    • Migraines    • Obstructive chronic bronchitis with exacerbation (CMS/HCC)    • Sleep apnea     mild       Allergies   Allergen Reactions   • Doxycycline Nausea And Vomiting       Past Surgical History:   Procedure Laterality Date   • BACK SURGERY     • HERNIA REPAIR     • HIP SPACER INSERTION WITH ANTIBIOTIC CEMENT Left 1/15/2020    Procedure: TOTAL HIP IMPLANT REMOVAL WITH INSERTION OF ANTIBIOTIC SPACER LEFT;  Surgeon: Ba Ramirez MD;  Location: [facility];  Service: Orthopedics   • SHOULDER LIGAMENT REPAIR      right shoulder   • SMALL INTESTINE SURGERY      Small bowell resection   • TOTAL HIP ARTHROPLASTY Left    • TOTAL HIP ARTHROPLASTY REVISION Left 3/5/2020    Procedure: HIP REIMPLANT REVISION LEFT;   Surgeon: Ba Ramirez MD;  Location: [facility];  Service: Orthopedics;  Laterality: Left;       Family History   Problem Relation Age of Onset   • Arthritis Other    • Hypertension Other    • Migraines Other    • Heart attack Other    • Stroke Other        Social History     Socioeconomic History   • Marital status:      Spouse name: Not on file   • Number of children: Not on file   • Years of education: Not on file   • Highest education level: Not on file   Tobacco Use   • Smoking status: Former Smoker   • Smokeless tobacco: Current User     Types: Chew   • Tobacco comment: quit 2005   Substance and Sexual Activity   • Alcohol use: No     Comment: stopped drinking alcohol   • Drug use: Yes     Comment: takes suboxone   • Sexual activity: Defer           Objective   Physical Exam   Constitutional: He is oriented to person, place, and time. He appears well-developed and well-nourished.  Non-toxic appearance. He does not appear ill.   HENT:   Head: Normocephalic and atraumatic.   Eyes: EOM are normal.   Neck: Normal range of motion.   Cardiovascular: Normal rate, regular rhythm and normal heart sounds.   Pulmonary/Chest: Effort normal. He has decreased breath sounds. He has wheezes. He has no rhonchi. He has no rales.   Abdominal: Soft. Bowel sounds are normal. There is no tenderness.   Musculoskeletal:        Right lower leg: Normal. He exhibits no tenderness and no edema.        Left lower leg: Normal. He exhibits no tenderness and no edema.   Neurological: He is alert and oriented to person, place, and time.   Skin: Skin is warm and dry.   Psychiatric: He has a normal mood and affect. His behavior is normal.   Nursing note and vitals reviewed.      Procedures           ED Course  ED Course as of May 10 1755   Sun May 10, 2020   1712 EKG interpreted by me reveals sinus tachycardia rate of 111.  Nonspecific T wave changes.  No ectopy    [PF]   1753 Influenza A Ag, EIA: Negative [TM]   1753 Influenza B Ag,  EIA: Negative [TM]      ED Course User Index  [PF] Dominick Riley W, DO  [TM] Gomez Delgadillo PA-C                                           MDM  44-year-old male no acute distress with exam above coming in for evaluation of short of breath for 3 days.  With his history of COPD emphysema and his breath sounds here consistent with that will treat with oral albuterol, IV steroids, basic labs chest imaging and will swab for COVID-19 and influenza.  Discussed with patient his oxygen saturation 95% on room air at this time and given pandemic we will not use nebulizer here to prevent aerosolization of possible virus particles and he expresses understanding.  He states usually he feels much better after steroids for a day or 2.  Will evaluate with disposition pending but per patient request likely home with supportive therapy.    1716  Work-up reassuring thus far.  Will obtain swab for influenza and COVID-19.  If influenza negative plan to discharge home with steroids.  Patient states he wishes to be discharged and strict return to care precautions given.    Evaluation, management, disposition and decisions made in context of COVID-19 pandemic.  In this patient, the increased risk of nosocomial infection is a particular concern.  In my judgment the balance of clinical factors dictate expedited evaluation and discharge from the ED in the interest of this patient's health and safety.    I discussed with the patient/family that given their symptoms the current recommendation is to self quarantine in their place of residence until COVID-19 test results are back.  They understand if they must leave their home they must practice social distancing. They understand to return to the ER for any worsening symptoms.    Final diagnoses:   COPD with acute exacerbation (CMS/[facility])            Gomez Delgadillo PA-C  05/10/20 1754       Gomez Delgadillo PA-C  05/10/20 1754

## 2020-05-11 ENCOUNTER — EPISODE CHANGES (OUTPATIENT)
Dept: CASE MANAGEMENT | Facility: OTHER | Age: 45
End: 2020-05-11

## 2020-05-11 ENCOUNTER — PATIENT OUTREACH (OUTPATIENT)
Dept: CASE MANAGEMENT | Facility: OTHER | Age: 45
End: 2020-05-11

## 2020-05-11 LAB — SARS-COV-2 RNA RESP QL NAA+PROBE: NOT DETECTED

## 2020-05-11 NOTE — OUTREACH NOTE
Patient Outreach Note    Note created in error- please disregard.    Socorro Almeida RN  Ambulatory     5/11/2020, 17:32

## 2020-05-12 ENCOUNTER — TELEPHONE (OUTPATIENT)
Dept: PREADMISSION TESTING | Facility: HOSPITAL | Age: 45
End: 2020-05-12

## 2020-05-12 ENCOUNTER — EPISODE CHANGES (OUTPATIENT)
Dept: CASE MANAGEMENT | Facility: OTHER | Age: 45
End: 2020-05-12

## 2020-05-12 NOTE — TELEPHONE ENCOUNTER
Called and informed regarding recent COVID19 test results.  Pt states that he still have a cough, but feels better.  Instructed to f/u with PCP.  Verbalized understanding. COVID19 test results.  Pt states that he still have a cough, but feels better.  Instructed to f/u with PCP.  Verbalized understanding.

## 2020-05-15 ENCOUNTER — EPISODE CHANGES (OUTPATIENT)
Dept: CASE MANAGEMENT | Facility: OTHER | Age: 45
End: 2020-05-15

## 2020-05-21 ENCOUNTER — PATIENT OUTREACH (OUTPATIENT)
Dept: CASE MANAGEMENT | Facility: OTHER | Age: 45
End: 2020-05-21

## 2020-05-21 NOTE — OUTREACH NOTE
ED Potential Covid Discharge Follow-up    Called patient in follow up to ED visit 5-10-20, 4th attempt to reach patient, and he answered today.  Patient was tested for Covid-19 virus; he stated he was notified of Negative results.  Patient reports: feeling a lot better; denied SOA.  Stated he is breathing easily now; that the Prednisone that he took really helped him.    Patient lives with his mother; is independent with all daily needs.  Reports no barriers to getting prescriptions filled and taking medications as ordered.  Reports no food or transportation insecurities.    Reviewed with patient: Quarantine precautions (voiced understanding and compliance); ED discharge recommendations, AVS from ED; 24/7 Nurse Triage Line, [phone] (patient declined); Covid-19 Hotline#, [phone] (patient declined).  Discussed importance of close PCP f/u, telehealth and video appts. Patient stated he has been in touch with his PCP office; has an appt next week with Dr. Myers; said Dr. Love is no longer in this office.  No other questions or concerns voiced at this time.      Socorro Almeida RN  Ambulatory     5/21/2020, 11:33

## 2020-05-24 ENCOUNTER — EPISODE CHANGES (OUTPATIENT)
Dept: CASE MANAGEMENT | Facility: OTHER | Age: 45
End: 2020-05-24

## 2020-05-27 ENCOUNTER — HOSPITAL ENCOUNTER (EMERGENCY)
Facility: HOSPITAL | Age: 45
Discharge: HOME | End: 2020-05-27
Attending: EMERGENCY MEDICINE | Admitting: EMERGENCY MEDICINE

## 2020-05-27 ENCOUNTER — APPOINTMENT (OUTPATIENT)
Dept: GENERAL RADIOLOGY | Facility: HOSPITAL | Age: 45
End: 2020-05-27

## 2020-05-27 VITALS
SYSTOLIC BLOOD PRESSURE: 133 MMHG | RESPIRATION RATE: 18 BRPM | WEIGHT: 200 LBS | TEMPERATURE: 99 F | DIASTOLIC BLOOD PRESSURE: 90 MMHG | BODY MASS INDEX: 26.51 KG/M2 | OXYGEN SATURATION: 95 % | HEART RATE: 86 BPM | HEIGHT: 73 IN

## 2020-05-27 DIAGNOSIS — J44.1 COPD EXACERBATION (HCC): Primary | ICD-10-CM

## 2020-05-27 LAB
ALBUMIN SERPL-MCNC: 4.5 G/DL (ref 3.5–5.2)
ALBUMIN/GLOB SERPL: 1.4 G/DL
ALP SERPL-CCNC: 179 U/L (ref 39–117)
ALT SERPL W P-5'-P-CCNC: 34 U/L (ref 1–41)
ANION GAP SERPL CALCULATED.3IONS-SCNC: 14.6 MMOL/L (ref 5–15)
AST SERPL-CCNC: 30 U/L (ref 1–40)
BASOPHILS # BLD AUTO: 0.07 10*3/MM3 (ref 0–0.2)
BASOPHILS NFR BLD AUTO: 0.9 % (ref 0–1.5)
BILIRUB SERPL-MCNC: 0.2 MG/DL (ref 0.2–1.2)
BUN BLD-MCNC: 14 MG/DL (ref 6–20)
BUN/CREAT SERPL: 13.1 (ref 7–25)
CALCIUM SPEC-SCNC: 9.1 MG/DL (ref 8.6–10.5)
CHLORIDE SERPL-SCNC: 101 MMOL/L (ref 98–107)
CO2 SERPL-SCNC: 22.4 MMOL/L (ref 22–29)
CREAT BLD-MCNC: 1.07 MG/DL (ref 0.76–1.27)
DEPRECATED RDW RBC AUTO: 47.4 FL (ref 37–54)
EOSINOPHIL # BLD AUTO: 0.59 10*3/MM3 (ref 0–0.4)
EOSINOPHIL NFR BLD AUTO: 7.4 % (ref 0.3–6.2)
ERYTHROCYTE [DISTWIDTH] IN BLOOD BY AUTOMATED COUNT: 20.1 % (ref 12.3–15.4)
GFR SERPL CREATININE-BSD FRML MDRD: 75 ML/MIN/1.73
GLOBULIN UR ELPH-MCNC: 3.2 GM/DL
GLUCOSE BLD-MCNC: 106 MG/DL (ref 65–99)
HCT VFR BLD AUTO: 45.3 % (ref 37.5–51)
HGB BLD-MCNC: 14.3 G/DL (ref 13–17.7)
HOLD SPECIMEN: NORMAL
HOLD SPECIMEN: NORMAL
IMM GRANULOCYTES # BLD AUTO: 0.02 10*3/MM3 (ref 0–0.05)
IMM GRANULOCYTES NFR BLD AUTO: 0.3 % (ref 0–0.5)
LYMPHOCYTES # BLD AUTO: 1.46 10*3/MM3 (ref 0.7–3.1)
LYMPHOCYTES NFR BLD AUTO: 18.3 % (ref 19.6–45.3)
MCH RBC QN AUTO: 22.2 PG (ref 26.6–33)
MCHC RBC AUTO-ENTMCNC: 31.6 G/DL (ref 31.5–35.7)
MCV RBC AUTO: 70.3 FL (ref 79–97)
MICROCYTES BLD QL: NORMAL
MONOCYTES # BLD AUTO: 0.69 10*3/MM3 (ref 0.1–0.9)
MONOCYTES NFR BLD AUTO: 8.6 % (ref 5–12)
NEUTROPHILS # BLD AUTO: 5.17 10*3/MM3 (ref 1.7–7)
NEUTROPHILS NFR BLD AUTO: 64.5 % (ref 42.7–76)
NRBC BLD AUTO-RTO: 0 /100 WBC (ref 0–0.2)
OVALOCYTES BLD QL SMEAR: NORMAL
PLATELET # BLD AUTO: 337 10*3/MM3 (ref 140–450)
PMV BLD AUTO: 10.2 FL (ref 6–12)
POIKILOCYTOSIS BLD QL SMEAR: NORMAL
POTASSIUM BLD-SCNC: 4 MMOL/L (ref 3.5–5.2)
PROT SERPL-MCNC: 7.7 G/DL (ref 6–8.5)
RBC # BLD AUTO: 6.44 10*6/MM3 (ref 4.14–5.8)
SMALL PLATELETS BLD QL SMEAR: ADEQUATE
SODIUM BLD-SCNC: 138 MMOL/L (ref 136–145)
TROPONIN T SERPL-MCNC: <0.01 NG/ML (ref 0–0.03)
WBC MORPH BLD: NORMAL
WBC NRBC COR # BLD: 8 10*3/MM3 (ref 3.4–10.8)
WHOLE BLOOD HOLD SPECIMEN: NORMAL
WHOLE BLOOD HOLD SPECIMEN: NORMAL

## 2020-05-27 PROCEDURE — 96375 TX/PRO/DX INJ NEW DRUG ADDON: CPT

## 2020-05-27 PROCEDURE — 80053 COMPREHEN METABOLIC PANEL: CPT

## 2020-05-27 PROCEDURE — 25010000002 MAGNESIUM SULFATE 2 GM/50ML SOLUTION: Performed by: EMERGENCY MEDICINE

## 2020-05-27 PROCEDURE — 71045 X-RAY EXAM CHEST 1 VIEW: CPT

## 2020-05-27 PROCEDURE — 84484 ASSAY OF TROPONIN QUANT: CPT

## 2020-05-27 PROCEDURE — 85007 BL SMEAR W/DIFF WBC COUNT: CPT

## 2020-05-27 PROCEDURE — 25010000002 METHYLPREDNISOLONE PER 125 MG: Performed by: EMERGENCY MEDICINE

## 2020-05-27 PROCEDURE — 85025 COMPLETE CBC W/AUTO DIFF WBC: CPT

## 2020-05-27 PROCEDURE — 99283 EMERGENCY DEPT VISIT LOW MDM: CPT

## 2020-05-27 PROCEDURE — 96365 THER/PROPH/DIAG IV INF INIT: CPT

## 2020-05-27 PROCEDURE — 93005 ELECTROCARDIOGRAM TRACING: CPT

## 2020-05-27 RX ORDER — MAGNESIUM SULFATE HEPTAHYDRATE 40 MG/ML
2 INJECTION, SOLUTION INTRAVENOUS ONCE
Status: COMPLETED | OUTPATIENT
Start: 2020-05-27 | End: 2020-05-27

## 2020-05-27 RX ORDER — PREDNISONE 20 MG/1
TABLET ORAL
Qty: 10 TABLET | Refills: 0 | OUTPATIENT
Start: 2020-05-27 | End: 2020-06-16

## 2020-05-27 RX ORDER — METHYLPREDNISOLONE SODIUM SUCCINATE 125 MG/2ML
125 INJECTION, POWDER, LYOPHILIZED, FOR SOLUTION INTRAMUSCULAR; INTRAVENOUS ONCE
Status: COMPLETED | OUTPATIENT
Start: 2020-05-27 | End: 2020-05-27

## 2020-05-27 RX ORDER — SODIUM CHLORIDE 0.9 % (FLUSH) 0.9 %
10 SYRINGE (ML) INJECTION AS NEEDED
Status: DISCONTINUED | OUTPATIENT
Start: 2020-05-27 | End: 2020-05-27 | Stop reason: HOSPADM

## 2020-05-27 RX ADMIN — ALBUTEROL SULFATE 4 MG: 2 SYRUP ORAL at 14:36

## 2020-05-27 RX ADMIN — METHYLPREDNISOLONE SODIUM SUCCINATE 125 MG: 125 INJECTION, POWDER, FOR SOLUTION INTRAMUSCULAR; INTRAVENOUS at 14:35

## 2020-05-27 RX ADMIN — MAGNESIUM SULFATE IN WATER 2 G: 40 INJECTION, SOLUTION INTRAVENOUS at 14:36

## 2020-06-03 ENCOUNTER — READMISSION MANAGEMENT (OUTPATIENT)
Dept: CALL CENTER | Facility: HOSPITAL | Age: 45
End: 2020-06-03

## 2020-06-03 NOTE — OUTREACH NOTE
Total Joint Month 3 Survey      Responses   [facility] patient discharged from?  [place]   Does the patient have one of the following disease processes/diagnoses(primary or secondary)?  Total Joint Replacement   Joint surgery performed?  Hip   Month 3 attempt successful?  Yes   Call start time  1715   Call end time  1724   Has the patient been back in either the hospital or Emergency Department since discharge?  Yes   If the patient has been back to hospital or Emergency Department list date and reason  5/10 and 5/27 for COPD   Discharge diagnosis  s/p left hip reimplant revision   Is patient permission given to speak with other caregiver?  Yes   List who call center can speak with  Josee, mother   Person spoke with today (if not patient) and relationship  Josee, mother   Is the patient taking all medications as directed (includes completed medication regime)?  Yes   Is the patient able to teach back alternate methods of pain control?  Ice   Has the patient kept scheduled appointments due by today?  Yes   Is the patient still receiving Home Health Services?  No   Is the patient still attending therapy sessions(either in the home or as an outpatient)?  No   Has the patient fallen since discharge?  No   What is the patient's perception of their functional status since discharge?  Improving [Mother reports that patient improving from hip surgery, but he has had exacerbations with his COPD. ]   Is the patient able to teach back signs and symptoms of infection?  Incisional drainage, Blisters around incision, Severe discomfort or pain, Increased swelling or redness around incision (not associated with surgical edema), Temp >100.4 for 24h or longer   Is the patient/caregiver able to teach back the hierarchy of who to call/visit for symptoms/problems? PCP, Specialist, Home health nurse, Urgent Care, ED, 911  Yes   Graduated  Yes   Did the patient feel the follow up calls were helpful during their recovery period?   Yes   Was the number of calls appropriate?  Yes          Erin French RN

## 2020-06-10 RX ORDER — MONTELUKAST SODIUM 10 MG/1
10 TABLET ORAL EVERY EVENING
Qty: 30 TABLET | Refills: 2 | Status: SHIPPED | OUTPATIENT
Start: 2020-06-10 | End: 2021-03-16 | Stop reason: SDUPTHER

## 2020-06-16 ENCOUNTER — APPOINTMENT (OUTPATIENT)
Dept: GENERAL RADIOLOGY | Facility: HOSPITAL | Age: 45
End: 2020-06-16

## 2020-06-16 ENCOUNTER — HOSPITAL ENCOUNTER (EMERGENCY)
Facility: HOSPITAL | Age: 45
Discharge: HOME | End: 2020-06-16
Attending: EMERGENCY MEDICINE | Admitting: EMERGENCY MEDICINE

## 2020-06-16 VITALS
DIASTOLIC BLOOD PRESSURE: 92 MMHG | OXYGEN SATURATION: 100 % | WEIGHT: 200 LBS | HEART RATE: 66 BPM | RESPIRATION RATE: 20 BRPM | SYSTOLIC BLOOD PRESSURE: 134 MMHG | BODY MASS INDEX: 26.51 KG/M2 | HEIGHT: 73 IN | TEMPERATURE: 98.2 F

## 2020-06-16 DIAGNOSIS — J44.1 CHRONIC OBSTRUCTIVE PULMONARY DISEASE WITH ACUTE EXACERBATION (HCC): Primary | ICD-10-CM

## 2020-06-16 LAB
A-A DO2: 30.1 MMHG
ALBUMIN SERPL-MCNC: 4.8 G/DL (ref 3.5–5.2)
ALBUMIN/GLOB SERPL: 1.7 G/DL
ALP SERPL-CCNC: 200 U/L (ref 39–117)
ALT SERPL W P-5'-P-CCNC: 17 U/L (ref 1–41)
ANION GAP SERPL CALCULATED.3IONS-SCNC: 9.9 MMOL/L (ref 5–15)
ARTERIAL PATENCY WRIST A: ABNORMAL
AST SERPL-CCNC: 20 U/L (ref 1–40)
ATMOSPHERIC PRESS: 741 MMHG
BASE EXCESS BLDA CALC-SCNC: -1.1 MMOL/L (ref 0–2)
BASOPHILS # BLD AUTO: 0.06 10*3/MM3 (ref 0–0.2)
BASOPHILS NFR BLD AUTO: 0.9 % (ref 0–1.5)
BDY SITE: ABNORMAL
BILIRUB SERPL-MCNC: 0.3 MG/DL (ref 0.2–1.2)
BUN BLD-MCNC: 7 MG/DL (ref 6–20)
BUN/CREAT SERPL: 7.5 (ref 7–25)
CALCIUM SPEC-SCNC: 9.9 MG/DL (ref 8.6–10.5)
CHLORIDE SERPL-SCNC: 104 MMOL/L (ref 98–107)
CO2 SERPL-SCNC: 25.1 MMOL/L (ref 22–29)
COHGB MFR BLD: 0.7 % (ref 0–2)
CREAT BLD-MCNC: 0.93 MG/DL (ref 0.76–1.27)
DEPRECATED RDW RBC AUTO: 54.4 FL (ref 37–54)
EOSINOPHIL # BLD AUTO: 0.64 10*3/MM3 (ref 0–0.4)
EOSINOPHIL NFR BLD AUTO: 10.1 % (ref 0.3–6.2)
ERYTHROCYTE [DISTWIDTH] IN BLOOD BY AUTOMATED COUNT: 22.2 % (ref 12.3–15.4)
GFR SERPL CREATININE-BSD FRML MDRD: 88 ML/MIN/1.73
GLOBULIN UR ELPH-MCNC: 2.8 GM/DL
GLUCOSE BLD-MCNC: 110 MG/DL (ref 65–99)
HCO3 BLDA-SCNC: 23.2 MMOL/L (ref 22–28)
HCT VFR BLD AUTO: 47.5 % (ref 37.5–51)
HCT VFR BLD CALC: 43.4 %
HGB BLD-MCNC: 14.7 G/DL (ref 13–17.7)
HOLD SPECIMEN: NORMAL
HOLD SPECIMEN: NORMAL
HOROWITZ INDEX BLD+IHG-RTO: 21 %
IMM GRANULOCYTES # BLD AUTO: 0.01 10*3/MM3 (ref 0–0.05)
IMM GRANULOCYTES NFR BLD AUTO: 0.2 % (ref 0–0.5)
LYMPHOCYTES # BLD AUTO: 1.28 10*3/MM3 (ref 0.7–3.1)
LYMPHOCYTES NFR BLD AUTO: 20.2 % (ref 19.6–45.3)
MCH RBC QN AUTO: 22.3 PG (ref 26.6–33)
MCHC RBC AUTO-ENTMCNC: 30.9 G/DL (ref 31.5–35.7)
MCV RBC AUTO: 72.2 FL (ref 79–97)
METHGB BLD QL: 0.7 % (ref 0–1.5)
MODALITY: ABNORMAL
MONOCYTES # BLD AUTO: 0.44 10*3/MM3 (ref 0.1–0.9)
MONOCYTES NFR BLD AUTO: 7 % (ref 5–12)
NEUTROPHILS # BLD AUTO: 3.9 10*3/MM3 (ref 1.7–7)
NEUTROPHILS NFR BLD AUTO: 61.6 % (ref 42.7–76)
NOTE: ABNORMAL
NRBC BLD AUTO-RTO: 0 /100 WBC (ref 0–0.2)
NT-PROBNP SERPL-MCNC: 264.5 PG/ML (ref 5–450)
OXYHGB MFR BLDV: 93.7 % (ref 94–99)
PCO2 BLDA: 36.9 MM HG (ref 35–45)
PCO2 TEMP ADJ BLD: ABNORMAL MM[HG]
PH BLDA: 7.41 PH UNITS (ref 7.3–7.5)
PH, TEMP CORRECTED: ABNORMAL
PLAT MORPH BLD: NORMAL
PLATELET # BLD AUTO: 265 10*3/MM3 (ref 140–450)
PMV BLD AUTO: 9.9 FL (ref 6–12)
PO2 BLDA: 74 MM HG (ref 75–100)
PO2 TEMP ADJ BLD: ABNORMAL MM[HG]
POTASSIUM BLD-SCNC: 4.3 MMOL/L (ref 3.5–5.2)
PROT SERPL-MCNC: 7.6 G/DL (ref 6–8.5)
RBC # BLD AUTO: 6.58 10*6/MM3 (ref 4.14–5.8)
RBC MORPH BLD: NORMAL
SAO2 % BLDCOA: 95 % (ref 94–100)
SODIUM BLD-SCNC: 139 MMOL/L (ref 136–145)
TROPONIN T SERPL-MCNC: <0.01 NG/ML (ref 0–0.03)
VENTILATOR MODE: ABNORMAL
WBC MORPH BLD: NORMAL
WBC NRBC COR # BLD: 6.33 10*3/MM3 (ref 3.4–10.8)
WHOLE BLOOD HOLD SPECIMEN: NORMAL
WHOLE BLOOD HOLD SPECIMEN: NORMAL

## 2020-06-16 PROCEDURE — 25010000002 METHYLPREDNISOLONE PER 125 MG: Performed by: EMERGENCY MEDICINE

## 2020-06-16 PROCEDURE — 96375 TX/PRO/DX INJ NEW DRUG ADDON: CPT

## 2020-06-16 PROCEDURE — 84484 ASSAY OF TROPONIN QUANT: CPT | Performed by: EMERGENCY MEDICINE

## 2020-06-16 PROCEDURE — 85025 COMPLETE CBC W/AUTO DIFF WBC: CPT | Performed by: EMERGENCY MEDICINE

## 2020-06-16 PROCEDURE — 94640 AIRWAY INHALATION TREATMENT: CPT

## 2020-06-16 PROCEDURE — 36600 WITHDRAWAL OF ARTERIAL BLOOD: CPT

## 2020-06-16 PROCEDURE — 93005 ELECTROCARDIOGRAM TRACING: CPT | Performed by: EMERGENCY MEDICINE

## 2020-06-16 PROCEDURE — 82375 ASSAY CARBOXYHB QUANT: CPT

## 2020-06-16 PROCEDURE — 83880 ASSAY OF NATRIURETIC PEPTIDE: CPT | Performed by: EMERGENCY MEDICINE

## 2020-06-16 PROCEDURE — 80053 COMPREHEN METABOLIC PANEL: CPT | Performed by: EMERGENCY MEDICINE

## 2020-06-16 PROCEDURE — 71046 X-RAY EXAM CHEST 2 VIEWS: CPT

## 2020-06-16 PROCEDURE — 96365 THER/PROPH/DIAG IV INF INIT: CPT

## 2020-06-16 PROCEDURE — 25010000002 MAGNESIUM SULFATE 2 GM/50ML SOLUTION: Performed by: EMERGENCY MEDICINE

## 2020-06-16 PROCEDURE — 82805 BLOOD GASES W/O2 SATURATION: CPT

## 2020-06-16 PROCEDURE — 85007 BL SMEAR W/DIFF WBC COUNT: CPT | Performed by: EMERGENCY MEDICINE

## 2020-06-16 PROCEDURE — 99284 EMERGENCY DEPT VISIT MOD MDM: CPT

## 2020-06-16 PROCEDURE — 94799 UNLISTED PULMONARY SVC/PX: CPT

## 2020-06-16 PROCEDURE — 83050 HGB METHEMOGLOBIN QUAN: CPT

## 2020-06-16 RX ORDER — METHYLPREDNISOLONE SODIUM SUCCINATE 125 MG/2ML
125 INJECTION, POWDER, LYOPHILIZED, FOR SOLUTION INTRAMUSCULAR; INTRAVENOUS ONCE
Status: COMPLETED | OUTPATIENT
Start: 2020-06-16 | End: 2020-06-16

## 2020-06-16 RX ORDER — MAGNESIUM SULFATE HEPTAHYDRATE 40 MG/ML
2 INJECTION, SOLUTION INTRAVENOUS ONCE
Status: COMPLETED | OUTPATIENT
Start: 2020-06-16 | End: 2020-06-16

## 2020-06-16 RX ORDER — AMOXICILLIN AND CLAVULANATE POTASSIUM 875; 125 MG/1; MG/1
1 TABLET, FILM COATED ORAL 2 TIMES DAILY
Qty: 14 TABLET | Refills: 0 | Status: SHIPPED | OUTPATIENT
Start: 2020-06-16 | End: 2020-06-23

## 2020-06-16 RX ORDER — SODIUM CHLORIDE 0.9 % (FLUSH) 0.9 %
10 SYRINGE (ML) INJECTION AS NEEDED
Status: DISCONTINUED | OUTPATIENT
Start: 2020-06-16 | End: 2020-06-16 | Stop reason: HOSPADM

## 2020-06-16 RX ORDER — PREDNISONE 10 MG/1
TABLET ORAL
Qty: 48 TABLET | Refills: 0 | Status: ON HOLD | OUTPATIENT
Start: 2020-06-16 | End: 2021-06-16

## 2020-06-16 RX ORDER — ACETAMINOPHEN 500 MG
1000 TABLET ORAL ONCE
Status: COMPLETED | OUTPATIENT
Start: 2020-06-16 | End: 2020-06-16

## 2020-06-16 RX ORDER — IPRATROPIUM BROMIDE AND ALBUTEROL SULFATE 2.5; .5 MG/3ML; MG/3ML
9 SOLUTION RESPIRATORY (INHALATION) ONCE
Status: COMPLETED | OUTPATIENT
Start: 2020-06-16 | End: 2020-06-16

## 2020-06-16 RX ADMIN — ACETAMINOPHEN 1000 MG: 500 TABLET, FILM COATED ORAL at 08:11

## 2020-06-16 RX ADMIN — MAGNESIUM SULFATE HEPTAHYDRATE 2 G: 40 INJECTION, SOLUTION INTRAVENOUS at 08:08

## 2020-06-16 RX ADMIN — SODIUM CHLORIDE, POTASSIUM CHLORIDE, SODIUM LACTATE AND CALCIUM CHLORIDE 1000 ML: 600; 310; 30; 20 INJECTION, SOLUTION INTRAVENOUS at 08:08

## 2020-06-16 RX ADMIN — IPRATROPIUM BROMIDE AND ALBUTEROL SULFATE 9 ML: .5; 3 SOLUTION RESPIRATORY (INHALATION) at 07:56

## 2020-06-16 RX ADMIN — METHYLPREDNISOLONE SODIUM SUCCINATE 125 MG: 125 INJECTION, POWDER, FOR SOLUTION INTRAMUSCULAR; INTRAVENOUS at 08:09

## 2020-06-16 NOTE — ED PROVIDER NOTES
Subjective   44-year-old male presenting with cough and shortness of breath.  States her for last 2 days has had increase cough productive of mucus-like sputum, increased shortness of breath and wheezing.  This is associated with chest tightness.  Denies any fevers, chills, nausea, vomiting, abdominal pain.  Has been here recently for similar symptoms and tells me he was tested negative for COVID-19.  No sick contacts since.          Review of Systems   Constitutional: Negative.    HENT: Negative.    Eyes: Negative.    Respiratory: Positive for cough, chest tightness and shortness of breath.    Cardiovascular: Negative.    Gastrointestinal: Negative.    Genitourinary: Negative.    Musculoskeletal: Negative.    Skin: Negative.    Neurological: Negative.    Psychiatric/Behavioral: Negative.        Past Medical History:   Diagnosis Date   • Abdominal adhesions    • Arthritis    • Asthma    • Cervical radiculopathy    • Colitis    • GERD (gastroesophageal reflux disease)    • Heart attack (CMS/HCC)    • History of recreational drug use    • Kidney cysts    • Left hip pain    • Liver disease    • Low back pain    • Migraines    • Obstructive chronic bronchitis with exacerbation (CMS/HCC)    • Sleep apnea     mild       Allergies   Allergen Reactions   • Doxycycline Nausea And Vomiting       Past Surgical History:   Procedure Laterality Date   • BACK SURGERY     • HERNIA REPAIR     • HIP SPACER INSERTION WITH ANTIBIOTIC CEMENT Left 1/15/2020    Procedure: TOTAL HIP IMPLANT REMOVAL WITH INSERTION OF ANTIBIOTIC SPACER LEFT;  Surgeon: Ba Ramirez MD;  Location: [facility];  Service: Orthopedics   • SHOULDER LIGAMENT REPAIR      right shoulder   • SMALL INTESTINE SURGERY      Small bowell resection   • TOTAL HIP ARTHROPLASTY Left    • TOTAL HIP ARTHROPLASTY REVISION Left 3/5/2020    Procedure: HIP REIMPLANT REVISION LEFT;  Surgeon: Ba Ramirez MD;  Location: [facility] OR;  Service: Orthopedics;  Laterality: Left;        Family History   Problem Relation Age of Onset   • Arthritis Other    • Hypertension Other    • Migraines Other    • Heart attack Other    • Stroke Other        Social History     Socioeconomic History   • Marital status:      Spouse name: Not on file   • Number of children: Not on file   • Years of education: Not on file   • Highest education level: Not on file   Tobacco Use   • Smoking status: Former Smoker   • Smokeless tobacco: Current User     Types: Chew   • Tobacco comment: quit 2005   Substance and Sexual Activity   • Alcohol use: No     Comment: stopped drinking alcohol   • Drug use: Not Currently     Comment: takes suboxone   • Sexual activity: Defer           Objective   Physical Exam   Constitutional: He is oriented to person, place, and time. He appears well-developed and well-nourished.   HENT:   Head: Normocephalic and atraumatic.   Right Ear: External ear normal.   Left Ear: External ear normal.   Nose: Nose normal.   Mouth/Throat: Oropharynx is clear and moist.   Eyes: Pupils are equal, round, and reactive to light. Conjunctivae and EOM are normal.   Neck: Normal range of motion. Neck supple.   Cardiovascular: Normal rate, regular rhythm, normal heart sounds and intact distal pulses.   Pulmonary/Chest:   Increased work of breathing, tachypnea, prolonged expiration, diffuse inspiratory and expiratory wheezing   Abdominal: Soft. Bowel sounds are normal. He exhibits no distension. There is no tenderness. There is no rebound and no guarding.   Musculoskeletal: Normal range of motion. He exhibits no edema, tenderness or deformity.   Neurological: He is alert and oriented to person, place, and time.   Skin: Skin is warm and dry. No rash noted.   Psychiatric: He has a normal mood and affect. His behavior is normal.   Nursing note and vitals reviewed.      Procedures           ED Course                                           MDM  Number of Diagnoses or Management Options  Chronic  obstructive pulmonary disease with acute exacerbation (CMS/HCC):   Diagnosis management comments: 44-year-old male with cough and shortness of breath.  Well-developed, well-nourished man who is in mild distress with increased work of breathing, tachypnea, wheezing.  His oxygen saturation is normal though.  Will obtain labs, EKG, chest x-ray.  We will give symptomatic treatment.  Disposition pending work-up and response to therapy.    DDX: COPD, pneumonia, ACS, CHF    EKG interpreted by me: Sinus rhythm, normal rate, some nonspecific ST/T changes, this is an abnormal EKG, this appears fairly similar to previous    Chest x-ray per radiology reveals no acute abnormality.  Lab work is without acute abnormality.  He feels better, but is requesting to go home.  He does still have some wheezing, work of breathing is now normal and his oxygen saturation is 100% on room air.  We will give trial of outpatient therapy, antibiotics and steroids.  Strict return precautions discussed.  He is comfortable with and understanding of the plan.       Amount and/or Complexity of Data Reviewed  Clinical lab tests: reviewed  Tests in the radiology section of CPT®: reviewed  Decide to obtain previous medical records or to obtain history from someone other than the patient: yes        Final diagnoses:   Chronic obstructive pulmonary disease with acute exacerbation (CMS/HCC)            Amandeep Branham MD  06/16/20 8307

## 2020-09-22 ENCOUNTER — OFFICE VISIT (OUTPATIENT)
Dept: PULMONOLOGY | Facility: CLINIC | Age: 45
End: 2020-09-22

## 2020-09-22 VITALS
BODY MASS INDEX: 27.04 KG/M2 | HEIGHT: 73 IN | OXYGEN SATURATION: 97 % | SYSTOLIC BLOOD PRESSURE: 112 MMHG | RESPIRATION RATE: 18 BRPM | WEIGHT: 204 LBS | HEART RATE: 94 BPM | DIASTOLIC BLOOD PRESSURE: 78 MMHG | TEMPERATURE: 97.1 F

## 2020-09-22 DIAGNOSIS — R06.02 SHORTNESS OF BREATH: Primary | ICD-10-CM

## 2020-09-22 DIAGNOSIS — G47.33 OBSTRUCTIVE SLEEP APNEA: ICD-10-CM

## 2020-09-22 DIAGNOSIS — J45.40 MODERATE PERSISTENT ASTHMA WITHOUT COMPLICATION: ICD-10-CM

## 2020-09-22 DIAGNOSIS — J30.89 OTHER ALLERGIC RHINITIS: ICD-10-CM

## 2020-09-22 PROCEDURE — 99214 OFFICE O/P EST MOD 30 MIN: CPT | Performed by: NURSE PRACTITIONER

## 2020-09-22 RX ORDER — METHADONE HYDROCHLORIDE 5 MG/5ML
40 SOLUTION ORAL DAILY
COMMUNITY

## 2020-09-22 RX ORDER — FLUTICASONE PROPIONATE 50 MCG
1 SPRAY, SUSPENSION (ML) NASAL DAILY
Qty: 3 BOTTLE | Refills: 2 | Status: SHIPPED | OUTPATIENT
Start: 2020-09-22 | End: 2021-06-01 | Stop reason: SDUPTHER

## 2020-09-22 NOTE — PROGRESS NOTES
"Chief Complaint   Patient presents with   • Follow-up   • Sleeping Problem         Subjective   Topher Stoll is a 44 y.o. male.     History of Present Illness   Patient is here today for follow up of asthma and shortness of breath.     He had one exacerbation after weed eating and had to go to the emergency room but after receiving nebulized treatments he was released home.    The patient says that he is compliant with pulmonary medicines, as prescribed.  He uses Breo daily and takes Singulair at night.  He uses the rescue inhaler maybe twice a week.  He is not needing to use the nebulizer.    He uses Flonase daily.    He is using CPAP nightly with a nasal mask and is feeling much more rested upon awakening.    He quit smoking 12-14 years ago. He uses smokeless tobacco.     The following portions of the patient's history were reviewed and updated as appropriate: allergies, current medications, past family history, past medical history, past social history and past surgical history.    Review of Systems   HENT: Negative for sinus pressure, sneezing and sore throat.    Respiratory: Negative for cough, chest tightness, shortness of breath and wheezing.        Objective   Visit Vitals  /78   Pulse 94   Temp 97.1 °F (36.2 °C)   Resp 18   Ht 185.4 cm (72.99\")   Wt 92.5 kg (204 lb)   SpO2 97%   BMI 26.92 kg/m²       Physical Exam  Vitals signs reviewed.   HENT:      Head: Normocephalic and atraumatic.      Mouth/Throat:      Mouth: Mucous membranes are moist.      Pharynx: Oropharynx is clear.      Comments: Crowded oropharynx. Edentulous.  Neck:      Musculoskeletal: Neck supple.   Cardiovascular:      Rate and Rhythm: Normal rate and regular rhythm.   Pulmonary:      Effort: Pulmonary effort is normal. No respiratory distress.      Breath sounds: No wheezing.   Musculoskeletal:      Comments: Gait normal.   Skin:     General: Skin is warm.   Neurological:      Mental Status: He is alert and oriented to person, " place, and time.         Assessment/Plan   Topher was seen today for follow-up and sleeping problem.    Diagnoses and all orders for this visit:    Shortness of breath    Obstructive sleep apnea    Moderate persistent asthma without complication    Other allergic rhinitis    Other orders  -     fluticasone (FLONASE) 50 MCG/ACT nasal spray; 1 spray into the nostril(s) as directed by provider Daily.           Return for keep appt in January with Doc..    DISCUSSION (if any):  His symptoms of asthma are under adequate control at this time.  He should continue using Breo, Singulair, and the rescue medication as directed.    Since his exacerbation was triggered by outdoor yard work, we have discussed trigger avoidance and will not increase therapy at this time.    Patient's medications for underlying asthma were reviewed with him in great detail.    Any needed adjustments to his pulmonary medications, either for clinical or insurance coverage reasons, have been made and are reflected in the orders.    Side effects of prescribed medications discussed with the patient.    Asthma action plan with discussed with him.    The patient was asked to call this office if the symptoms worsen.    He should continue to use CPAP nightly.  Unfortunately, I do not have a compliance report.    Dictated utilizing Dragon dictation.    This document was electronically signed by LISA Hernandez September 22, 2020  14:06 EDT

## 2021-01-20 DIAGNOSIS — G47.33 OSA (OBSTRUCTIVE SLEEP APNEA): Primary | ICD-10-CM

## 2021-01-26 DIAGNOSIS — J43.1 PANLOBULAR EMPHYSEMA (HCC): ICD-10-CM

## 2021-01-28 ENCOUNTER — OFFICE VISIT (OUTPATIENT)
Dept: PULMONOLOGY | Facility: CLINIC | Age: 46
End: 2021-01-28

## 2021-01-28 VITALS
WEIGHT: 202 LBS | BODY MASS INDEX: 26.77 KG/M2 | DIASTOLIC BLOOD PRESSURE: 66 MMHG | RESPIRATION RATE: 16 BRPM | SYSTOLIC BLOOD PRESSURE: 122 MMHG | OXYGEN SATURATION: 95 % | HEIGHT: 73 IN | HEART RATE: 80 BPM

## 2021-01-28 DIAGNOSIS — J30.89 OTHER ALLERGIC RHINITIS: ICD-10-CM

## 2021-01-28 DIAGNOSIS — J43.1 PANLOBULAR EMPHYSEMA (HCC): ICD-10-CM

## 2021-01-28 DIAGNOSIS — G47.33 OBSTRUCTIVE SLEEP APNEA: ICD-10-CM

## 2021-01-28 DIAGNOSIS — R06.02 SHORTNESS OF BREATH: Primary | ICD-10-CM

## 2021-01-28 DIAGNOSIS — J45.40 MODERATE PERSISTENT ASTHMA WITHOUT COMPLICATION: ICD-10-CM

## 2021-01-28 PROCEDURE — 99214 OFFICE O/P EST MOD 30 MIN: CPT | Performed by: INTERNAL MEDICINE

## 2021-01-28 RX ORDER — ALBUTEROL SULFATE 90 UG/1
2 AEROSOL, METERED RESPIRATORY (INHALATION) EVERY 4 HOURS PRN
Qty: 18 G | Refills: 5 | Status: SHIPPED | OUTPATIENT
Start: 2021-01-28 | End: 2021-06-01 | Stop reason: SDUPTHER

## 2021-01-28 NOTE — PROGRESS NOTES
"  Chief Complaint   Patient presents with   • Follow-up   • Shortness of Breath       Subjective   Topher Stoll is a 45 y.o. male.     History of Present Illness   Patient was evaluated today for follow up of asthma, GUILLE, allergic rhinitis and shortness of breath. Patient does report one mild recent exacerbation requiring steroids.     Patient is using the rescue inhalers minimally. he is compliant with pulmonary medicines, as prescribed.    Patient says that he has been using his nasal sprays on a regular basis and describes no significant ongoing issues other than occasional congestion.     Patient was also evaluated today for follow up of Sleep apnea. Patient does report improvement earlier but now feels that the PAP device is not relieving most of the symptoms.    Patient reports significant worsening in daytime sleepiness when compared to before. Patient is not snoring again without the CPAP.     Patient is also having occasional leaks with the mask which makes it hard for him to use the mask on a consistent basis.     Patient says that the compliance with the use of the equipment is good.       The following portions of the patient's history were reviewed and updated as appropriate: allergies, current medications, past family history, past medical history, past social history and past surgical history.    Review of Systems   Constitutional: Negative for chills and fever.   HENT: Negative for rhinorrhea, sinus pressure, sneezing and sore throat.    Respiratory: Positive for cough and shortness of breath. Negative for chest tightness and wheezing.    Psychiatric/Behavioral: Positive for sleep disturbance.       Objective   Visit Vitals  /66   Pulse 80   Resp 16   Ht 185.4 cm (73\")   Wt 91.6 kg (202 lb)   SpO2 95%   BMI 26.65 kg/m²       Physical Exam  Vitals signs reviewed.   Constitutional:       Appearance: He is well-developed.   HENT:      Head: Atraumatic.      Mouth/Throat:      Comments: " Oropharynx was crowded.  Neck:      Musculoskeletal: Normal range of motion.      Thyroid: No thyromegaly.      Vascular: No JVD.   Cardiovascular:      Rate and Rhythm: Normal rate.   Pulmonary:      Effort: No respiratory distress.      Breath sounds: Wheezing present.   Musculoskeletal: Normal range of motion.      Comments: Gait was normal.   Skin:     General: Skin is warm and dry.   Neurological:      Mental Status: He is alert and oriented to person, place, and time.   Psychiatric:         Behavior: Behavior normal.         Assessment/Plan   Diagnoses and all orders for this visit:    1. Shortness of breath (Primary)  -     Nitric Oxide; Future    2. Moderate persistent asthma without complication  -     Nitric Oxide; Future    3. Other allergic rhinitis    4. Obstructive sleep apnea  -     BIPAP / CPAP Adjustment    5. Panlobular emphysema (CMS/HCC)  -     albuterol sulfate HFA (Ventolin HFA) 108 (90 Base) MCG/ACT inhaler; Inhale 2 puffs Every 4 (Four) Hours As Needed for Wheezing or Shortness of Air.  Dispense: 18 g; Refill: 5           Return in about 4 months (around 5/28/2021) for Recheck, FeNO F/U, For Delmy, ....Also 9 mths w/ Dr. Olivas.    DISCUSSION (if any):  I reviewed the results of last sleep study in detail. I informed him that the apnea hypopnea index was 8 / hr. This was a home study. This was done in Feb 2017.    I told the patient that his symptoms are consistent with poorly controlled sleep apnea.    It appears that some of his issues are secondary to the mask.  The patient was provided with AirFit F30 mask prescription. We will ask the [employer] company to provide him with the same mask to use at home.     The patient appears to have issues with getting used to the mask at night.  I recommended that he uses the mask for 15-30 minutes every day, to get used to it.    The patient appears to have issues with excessive dryness which could be from multiple etiologies.  For now I recommended that he  contacts his [email or web address] company and learn how to adjust the humidifier.  Another option would be to try heated tubing. This will be considered.     If the symptoms do not improve, even after undertaking the above measures, the patient may have to undergo a titration study.    Patient was advised to continue using PAP for at least 4 hours every night.    Last PFTs, from Feb 2020, showed moderate obstruction.    It appears that his symptoms of asthma are under adequate control with the current regimen.    Patient's medications for underlying asthma were reviewed in great detail.    Patient was informed about the black box warning for elmenusir regarding suicidal thoughts and tendencies.  he denies any ongoing issues for now.     Any needed adjustments to his pulmonary medications, either for clinical or insurance coverage reasons, have been made and are reflected in the orders.    Compliance with medications stressed.     Side effects of prescribed medications discussed with the patient.    The need to continue to be aware of triggers that may cause asthma exacerbation versus progression of disease, was also discussed.    Patient was advised to continue his nasal spray, especially given improvement in symptoms overall.    The patient was asked to call this office if the symptoms worsen.      Dictated utilizing Dragon dictation.    This document was electronically signed by Melina Olivas MD on 01/28/21 at 14:16 EST

## 2021-01-29 RX ORDER — ALBUTEROL SULFATE 90 UG/1
AEROSOL, METERED RESPIRATORY (INHALATION)
Qty: 54 G | OUTPATIENT
Start: 2021-01-29

## 2021-03-01 ENCOUNTER — HOSPITAL ENCOUNTER (EMERGENCY)
Facility: HOSPITAL | Age: 46
Discharge: HOME | End: 2021-03-01
Attending: EMERGENCY MEDICINE | Admitting: EMERGENCY MEDICINE

## 2021-03-01 ENCOUNTER — APPOINTMENT (OUTPATIENT)
Dept: GENERAL RADIOLOGY | Facility: HOSPITAL | Age: 46
End: 2021-03-01

## 2021-03-01 VITALS
RESPIRATION RATE: 22 BRPM | BODY MASS INDEX: 26.51 KG/M2 | HEART RATE: 95 BPM | DIASTOLIC BLOOD PRESSURE: 74 MMHG | HEIGHT: 73 IN | SYSTOLIC BLOOD PRESSURE: 109 MMHG | WEIGHT: 200 LBS | TEMPERATURE: 98.5 F | OXYGEN SATURATION: 94 %

## 2021-03-01 DIAGNOSIS — J44.1 COPD EXACERBATION (HCC): Primary | ICD-10-CM

## 2021-03-01 LAB
ALBUMIN SERPL-MCNC: 4.2 G/DL (ref 3.5–5.2)
ALBUMIN/GLOB SERPL: 1.8 G/DL
ALP SERPL-CCNC: 131 U/L (ref 39–117)
ALT SERPL W P-5'-P-CCNC: 17 U/L (ref 1–41)
ANION GAP SERPL CALCULATED.3IONS-SCNC: 10.5 MMOL/L (ref 5–15)
AST SERPL-CCNC: 20 U/L (ref 1–40)
BASOPHILS # BLD AUTO: 0.06 10*3/MM3 (ref 0–0.2)
BASOPHILS NFR BLD AUTO: 0.6 % (ref 0–1.5)
BILIRUB SERPL-MCNC: 0.2 MG/DL (ref 0–1.2)
BUN SERPL-MCNC: 11 MG/DL (ref 6–20)
BUN/CREAT SERPL: 10.2 (ref 7–25)
CALCIUM SPEC-SCNC: 9 MG/DL (ref 8.6–10.5)
CHLORIDE SERPL-SCNC: 107 MMOL/L (ref 98–107)
CO2 SERPL-SCNC: 25.5 MMOL/L (ref 22–29)
CREAT SERPL-MCNC: 1.08 MG/DL (ref 0.76–1.27)
DEPRECATED RDW RBC AUTO: 42.1 FL (ref 37–54)
EOSINOPHIL # BLD AUTO: 0.5 10*3/MM3 (ref 0–0.4)
EOSINOPHIL NFR BLD AUTO: 5.4 % (ref 0.3–6.2)
ERYTHROCYTE [DISTWIDTH] IN BLOOD BY AUTOMATED COUNT: 13.6 % (ref 12.3–15.4)
GFR SERPL CREATININE-BSD FRML MDRD: 74 ML/MIN/1.73
GLOBULIN UR ELPH-MCNC: 2.3 GM/DL
GLUCOSE SERPL-MCNC: 100 MG/DL (ref 65–99)
HCT VFR BLD AUTO: 45.5 % (ref 37.5–51)
HGB BLD-MCNC: 14.6 G/DL (ref 13–17.7)
HOLD SPECIMEN: NORMAL
HOLD SPECIMEN: NORMAL
IMM GRANULOCYTES # BLD AUTO: 0.03 10*3/MM3 (ref 0–0.05)
IMM GRANULOCYTES NFR BLD AUTO: 0.3 % (ref 0–0.5)
LYMPHOCYTES # BLD AUTO: 2.04 10*3/MM3 (ref 0.7–3.1)
LYMPHOCYTES NFR BLD AUTO: 21.9 % (ref 19.6–45.3)
MCH RBC QN AUTO: 26.8 PG (ref 26.6–33)
MCHC RBC AUTO-ENTMCNC: 32.1 G/DL (ref 31.5–35.7)
MCV RBC AUTO: 83.6 FL (ref 79–97)
MONOCYTES # BLD AUTO: 0.64 10*3/MM3 (ref 0.1–0.9)
MONOCYTES NFR BLD AUTO: 6.9 % (ref 5–12)
NEUTROPHILS NFR BLD AUTO: 6.05 10*3/MM3 (ref 1.7–7)
NEUTROPHILS NFR BLD AUTO: 64.9 % (ref 42.7–76)
NRBC BLD AUTO-RTO: 0 /100 WBC (ref 0–0.2)
NT-PROBNP SERPL-MCNC: 151.5 PG/ML (ref 0–450)
PLATELET # BLD AUTO: 200 10*3/MM3 (ref 140–450)
PMV BLD AUTO: 9.5 FL (ref 6–12)
POTASSIUM SERPL-SCNC: 4.1 MMOL/L (ref 3.5–5.2)
PROT SERPL-MCNC: 6.5 G/DL (ref 6–8.5)
RBC # BLD AUTO: 5.44 10*6/MM3 (ref 4.14–5.8)
SODIUM SERPL-SCNC: 143 MMOL/L (ref 136–145)
TROPONIN T SERPL-MCNC: <0.01 NG/ML (ref 0–0.03)
WBC # BLD AUTO: 9.32 10*3/MM3 (ref 3.4–10.8)
WHOLE BLOOD HOLD SPECIMEN: NORMAL
WHOLE BLOOD HOLD SPECIMEN: NORMAL

## 2021-03-01 PROCEDURE — 93005 ELECTROCARDIOGRAM TRACING: CPT | Performed by: PHYSICIAN ASSISTANT

## 2021-03-01 PROCEDURE — 96374 THER/PROPH/DIAG INJ IV PUSH: CPT

## 2021-03-01 PROCEDURE — 83880 ASSAY OF NATRIURETIC PEPTIDE: CPT | Performed by: PHYSICIAN ASSISTANT

## 2021-03-01 PROCEDURE — 94640 AIRWAY INHALATION TREATMENT: CPT

## 2021-03-01 PROCEDURE — 84484 ASSAY OF TROPONIN QUANT: CPT | Performed by: PHYSICIAN ASSISTANT

## 2021-03-01 PROCEDURE — 99283 EMERGENCY DEPT VISIT LOW MDM: CPT

## 2021-03-01 PROCEDURE — 85025 COMPLETE CBC W/AUTO DIFF WBC: CPT | Performed by: PHYSICIAN ASSISTANT

## 2021-03-01 PROCEDURE — 71045 X-RAY EXAM CHEST 1 VIEW: CPT

## 2021-03-01 PROCEDURE — 80053 COMPREHEN METABOLIC PANEL: CPT | Performed by: PHYSICIAN ASSISTANT

## 2021-03-01 PROCEDURE — 25010000002 METHYLPREDNISOLONE PER 125 MG: Performed by: PHYSICIAN ASSISTANT

## 2021-03-01 RX ORDER — METHYLPREDNISOLONE SODIUM SUCCINATE 125 MG/2ML
125 INJECTION, POWDER, LYOPHILIZED, FOR SOLUTION INTRAMUSCULAR; INTRAVENOUS ONCE
Status: COMPLETED | OUTPATIENT
Start: 2021-03-01 | End: 2021-03-01

## 2021-03-01 RX ORDER — IPRATROPIUM BROMIDE AND ALBUTEROL SULFATE 2.5; .5 MG/3ML; MG/3ML
3 SOLUTION RESPIRATORY (INHALATION) ONCE
Status: COMPLETED | OUTPATIENT
Start: 2021-03-01 | End: 2021-03-01

## 2021-03-01 RX ORDER — SODIUM CHLORIDE 0.9 % (FLUSH) 0.9 %
10 SYRINGE (ML) INJECTION AS NEEDED
Status: DISCONTINUED | OUTPATIENT
Start: 2021-03-01 | End: 2021-03-01 | Stop reason: HOSPADM

## 2021-03-01 RX ORDER — PREDNISONE 20 MG/1
20 TABLET ORAL 2 TIMES DAILY
Qty: 20 TABLET | Refills: 0 | Status: SHIPPED | OUTPATIENT
Start: 2021-03-01 | End: 2021-03-11

## 2021-03-01 RX ADMIN — IPRATROPIUM BROMIDE AND ALBUTEROL SULFATE 3 ML: .5; 3 SOLUTION RESPIRATORY (INHALATION) at 14:29

## 2021-03-01 RX ADMIN — METHYLPREDNISOLONE SODIUM SUCCINATE 125 MG: 125 INJECTION, POWDER, FOR SOLUTION INTRAMUSCULAR; INTRAVENOUS at 15:24

## 2021-03-01 NOTE — ED PROVIDER NOTES
Subjective   45-year-old male presents with cough, shortness of breath, wheezing.  Symptoms for 2 to 3 days.  History of COPD, multiple previous ED visits, he is a previous smoker but has not smoked for several years.  He has a cough, wheezing, and congestion, symptoms are worse with any exertion.  He has been taking his normal home meds including nebulizer machine and inhalers, with no relief, he usually requires steroids when he gets this bad.      History provided by:  Patient   used: No        Review of Systems   Respiratory: Positive for cough, shortness of breath and wheezing.    All other systems reviewed and are negative.      Past Medical History:   Diagnosis Date   • Abdominal adhesions    • Arthritis    • Asthma    • Cervical radiculopathy    • Colitis    • GERD (gastroesophageal reflux disease)    • Heart attack (CMS/HCC)    • History of recreational drug use    • Kidney cysts    • Left hip pain    • Liver disease    • Low back pain    • Migraines    • Obstructive chronic bronchitis with exacerbation (CMS/HCC)    • Sleep apnea     mild       Allergies   Allergen Reactions   • Doxycycline Nausea And Vomiting       Past Surgical History:   Procedure Laterality Date   • BACK SURGERY     • HERNIA REPAIR     • HIP SPACER INSERTION WITH ANTIBIOTIC CEMENT Left 1/15/2020    Procedure: TOTAL HIP IMPLANT REMOVAL WITH INSERTION OF ANTIBIOTIC SPACER LEFT;  Surgeon: Ba Ramirez MD;  Location:  [facility];  Service: Orthopedics   • SHOULDER LIGAMENT REPAIR      right shoulder   • SMALL INTESTINE SURGERY      Small bowell resection   • TOTAL HIP ARTHROPLASTY Left    • TOTAL HIP ARTHROPLASTY REVISION Left 3/5/2020    Procedure: HIP REIMPLANT REVISION LEFT;  Surgeon: Ba Ramirez MD;  Location:  [facility];  Service: Orthopedics;  Laterality: Left;       Family History   Problem Relation Age of Onset   • Arthritis Other    • Hypertension Other    • Migraines Other    • Heart attack Other    •  Stroke Other        Social History     Socioeconomic History   • Marital status:      Spouse name: Not on file   • Number of children: Not on file   • Years of education: Not on file   • Highest education level: Not on file   Tobacco Use   • Smoking status: Former Smoker   • Smokeless tobacco: Current User     Types: Chew   • Tobacco comment: quit 2005   Substance and Sexual Activity   • Alcohol use: No     Comment: stopped drinking alcohol   • Drug use: Not Currently     Comment: takes suboxone   • Sexual activity: Defer           Objective   Physical Exam  Vitals signs and nursing note reviewed.   Constitutional:       Appearance: He is well-developed.   HENT:      Head: Normocephalic and atraumatic.   Neck:      Musculoskeletal: Normal range of motion.   Cardiovascular:      Rate and Rhythm: Normal rate and regular rhythm.   Pulmonary:      Effort: Pulmonary effort is normal.      Breath sounds: Examination of the right-upper field reveals wheezing. Examination of the left-upper field reveals wheezing. Examination of the right-middle field reveals wheezing. Examination of the left-middle field reveals wheezing. Examination of the right-lower field reveals wheezing. Examination of the left-lower field reveals wheezing. Wheezing present.   Abdominal:      General: Bowel sounds are normal.      Palpations: Abdomen is soft.   Musculoskeletal: Normal range of motion.   Skin:     General: Skin is warm and dry.   Neurological:      Mental Status: He is alert and oriented to person, place, and time.   Psychiatric:         Behavior: Behavior normal.         Thought Content: Thought content normal.         Judgment: Judgment normal.         Procedures           ED Course  ED Course as of Mar 01 1619   Mon Mar 01, 2021   1558 EKG interpreted by me reveals sinus rhythm rate 99.  Nonspecific T's T wave changes.  No ectopy.    [PF]      ED Course User Index  [PF] Dominick Riley DO                                            MDM  Number of Diagnoses or Management Options  COPD exacerbation (CMS/[facility]): new and requires workup     Amount and/or Complexity of Data Reviewed  Clinical lab tests: reviewed  Tests in the radiology section of CPT®: reviewed  Discuss the patient with other providers: yes    Risk of Complications, Morbidity, and/or Mortality  Presenting problems: low  Diagnostic procedures: minimal  Management options: minimal    Patient Progress  Patient progress: stable      Final diagnoses:   COPD exacerbation (CMS/[facility])            Janak Stone Jr., PA-C  03/01/21 3540

## 2021-03-17 RX ORDER — MONTELUKAST SODIUM 10 MG/1
10 TABLET ORAL EVERY EVENING
Qty: 30 TABLET | Refills: 2 | Status: SHIPPED | OUTPATIENT
Start: 2021-03-17 | End: 2022-04-09

## 2021-04-04 ENCOUNTER — APPOINTMENT (OUTPATIENT)
Dept: CT IMAGING | Facility: HOSPITAL | Age: 46
End: 2021-04-04

## 2021-04-04 ENCOUNTER — HOSPITAL ENCOUNTER (EMERGENCY)
Facility: HOSPITAL | Age: 46
Discharge: HOME | End: 2021-04-04
Attending: EMERGENCY MEDICINE | Admitting: EMERGENCY MEDICINE

## 2021-04-04 VITALS
OXYGEN SATURATION: 99 % | BODY MASS INDEX: 26.51 KG/M2 | TEMPERATURE: 98.1 F | HEART RATE: 60 BPM | WEIGHT: 200 LBS | HEIGHT: 73 IN | RESPIRATION RATE: 18 BRPM | SYSTOLIC BLOOD PRESSURE: 132 MMHG | DIASTOLIC BLOOD PRESSURE: 104 MMHG

## 2021-04-04 DIAGNOSIS — R11.2 NAUSEA AND VOMITING IN ADULT: Primary | ICD-10-CM

## 2021-04-04 DIAGNOSIS — R10.13 EPIGASTRIC ABDOMINAL PAIN: ICD-10-CM

## 2021-04-04 LAB
ALBUMIN SERPL-MCNC: 4.4 G/DL (ref 3.5–5.2)
ALBUMIN/GLOB SERPL: 1.5 G/DL
ALP SERPL-CCNC: 127 U/L (ref 39–117)
ALT SERPL W P-5'-P-CCNC: 21 U/L (ref 1–41)
ANION GAP SERPL CALCULATED.3IONS-SCNC: 9.9 MMOL/L (ref 5–15)
AST SERPL-CCNC: 29 U/L (ref 1–40)
BASOPHILS # BLD AUTO: 0.06 10*3/MM3 (ref 0–0.2)
BASOPHILS NFR BLD AUTO: 0.6 % (ref 0–1.5)
BILIRUB SERPL-MCNC: 0.6 MG/DL (ref 0–1.2)
BUN SERPL-MCNC: 13 MG/DL (ref 6–20)
BUN/CREAT SERPL: 11.7 (ref 7–25)
CALCIUM SPEC-SCNC: 9.9 MG/DL (ref 8.6–10.5)
CHLORIDE SERPL-SCNC: 104 MMOL/L (ref 98–107)
CO2 SERPL-SCNC: 26.1 MMOL/L (ref 22–29)
CREAT SERPL-MCNC: 1.11 MG/DL (ref 0.76–1.27)
DEPRECATED RDW RBC AUTO: 41.1 FL (ref 37–54)
EOSINOPHIL # BLD AUTO: 0.15 10*3/MM3 (ref 0–0.4)
EOSINOPHIL NFR BLD AUTO: 1.6 % (ref 0.3–6.2)
ERYTHROCYTE [DISTWIDTH] IN BLOOD BY AUTOMATED COUNT: 13.5 % (ref 12.3–15.4)
GFR SERPL CREATININE-BSD FRML MDRD: 71 ML/MIN/1.73
GLOBULIN UR ELPH-MCNC: 2.9 GM/DL
GLUCOSE SERPL-MCNC: 114 MG/DL (ref 65–99)
HCT VFR BLD AUTO: 48.3 % (ref 37.5–51)
HGB BLD-MCNC: 15.7 G/DL (ref 13–17.7)
HOLD SPECIMEN: NORMAL
HOLD SPECIMEN: NORMAL
IMM GRANULOCYTES # BLD AUTO: 0.03 10*3/MM3 (ref 0–0.05)
IMM GRANULOCYTES NFR BLD AUTO: 0.3 % (ref 0–0.5)
LIPASE SERPL-CCNC: 42 U/L (ref 13–60)
LYMPHOCYTES # BLD AUTO: 1.35 10*3/MM3 (ref 0.7–3.1)
LYMPHOCYTES NFR BLD AUTO: 14.1 % (ref 19.6–45.3)
MCH RBC QN AUTO: 27.1 PG (ref 26.6–33)
MCHC RBC AUTO-ENTMCNC: 32.5 G/DL (ref 31.5–35.7)
MCV RBC AUTO: 83.3 FL (ref 79–97)
MONOCYTES # BLD AUTO: 0.57 10*3/MM3 (ref 0.1–0.9)
MONOCYTES NFR BLD AUTO: 6 % (ref 5–12)
NEUTROPHILS NFR BLD AUTO: 7.4 10*3/MM3 (ref 1.7–7)
NEUTROPHILS NFR BLD AUTO: 77.4 % (ref 42.7–76)
NRBC BLD AUTO-RTO: 0 /100 WBC (ref 0–0.2)
PLATELET # BLD AUTO: 251 10*3/MM3 (ref 140–450)
PMV BLD AUTO: 9 FL (ref 6–12)
POTASSIUM SERPL-SCNC: 4.3 MMOL/L (ref 3.5–5.2)
PROT SERPL-MCNC: 7.3 G/DL (ref 6–8.5)
RBC # BLD AUTO: 5.8 10*6/MM3 (ref 4.14–5.8)
SODIUM SERPL-SCNC: 140 MMOL/L (ref 136–145)
WBC # BLD AUTO: 9.56 10*3/MM3 (ref 3.4–10.8)
WHOLE BLOOD HOLD SPECIMEN: NORMAL
WHOLE BLOOD HOLD SPECIMEN: NORMAL

## 2021-04-04 PROCEDURE — 80053 COMPREHEN METABOLIC PANEL: CPT | Performed by: EMERGENCY MEDICINE

## 2021-04-04 PROCEDURE — 96375 TX/PRO/DX INJ NEW DRUG ADDON: CPT

## 2021-04-04 PROCEDURE — 96374 THER/PROPH/DIAG INJ IV PUSH: CPT

## 2021-04-04 PROCEDURE — 25010000002 IOPAMIDOL 61 % SOLUTION: Performed by: EMERGENCY MEDICINE

## 2021-04-04 PROCEDURE — 74177 CT ABD & PELVIS W/CONTRAST: CPT

## 2021-04-04 PROCEDURE — 25010000002 MORPHINE PER 10 MG: Performed by: EMERGENCY MEDICINE

## 2021-04-04 PROCEDURE — 85025 COMPLETE CBC W/AUTO DIFF WBC: CPT | Performed by: EMERGENCY MEDICINE

## 2021-04-04 PROCEDURE — 99283 EMERGENCY DEPT VISIT LOW MDM: CPT

## 2021-04-04 PROCEDURE — 25010000002 PROCHLORPERAZINE 10 MG/2ML SOLUTION: Performed by: EMERGENCY MEDICINE

## 2021-04-04 PROCEDURE — 83690 ASSAY OF LIPASE: CPT | Performed by: EMERGENCY MEDICINE

## 2021-04-04 PROCEDURE — 96361 HYDRATE IV INFUSION ADD-ON: CPT

## 2021-04-04 RX ORDER — ONDANSETRON 4 MG/1
4 TABLET, ORALLY DISINTEGRATING ORAL EVERY 6 HOURS PRN
Qty: 12 TABLET | Refills: 0 | Status: SHIPPED | OUTPATIENT
Start: 2021-04-04 | End: 2021-06-18 | Stop reason: HOSPADM

## 2021-04-04 RX ORDER — DICYCLOMINE HCL 20 MG
20 TABLET ORAL EVERY 6 HOURS PRN
Qty: 60 TABLET | Refills: 0 | Status: ON HOLD | OUTPATIENT
Start: 2021-04-04 | End: 2021-06-16

## 2021-04-04 RX ORDER — PROCHLORPERAZINE EDISYLATE 5 MG/ML
10 INJECTION INTRAMUSCULAR; INTRAVENOUS ONCE
Status: COMPLETED | OUTPATIENT
Start: 2021-04-04 | End: 2021-04-04

## 2021-04-04 RX ORDER — SODIUM CHLORIDE 0.9 % (FLUSH) 0.9 %
10 SYRINGE (ML) INJECTION AS NEEDED
Status: DISCONTINUED | OUTPATIENT
Start: 2021-04-04 | End: 2021-04-04 | Stop reason: HOSPADM

## 2021-04-04 RX ORDER — MORPHINE SULFATE 4 MG/ML
4 INJECTION, SOLUTION INTRAMUSCULAR; INTRAVENOUS ONCE
Status: COMPLETED | OUTPATIENT
Start: 2021-04-04 | End: 2021-04-04

## 2021-04-04 RX ADMIN — IOPAMIDOL 100 ML: 612 INJECTION, SOLUTION INTRAVENOUS at 11:27

## 2021-04-04 RX ADMIN — SODIUM CHLORIDE 1000 ML: 9 INJECTION, SOLUTION INTRAVENOUS at 10:52

## 2021-04-04 RX ADMIN — MORPHINE SULFATE 4 MG: 4 INJECTION, SOLUTION INTRAMUSCULAR; INTRAVENOUS at 11:09

## 2021-04-04 RX ADMIN — PROCHLORPERAZINE EDISYLATE 10 MG: 5 INJECTION INTRAMUSCULAR; INTRAVENOUS at 11:09

## 2021-04-04 NOTE — ED PROVIDER NOTES
Subjective   45-year-old male that presents the emergency department chief complaint 1 week history of nausea, vomiting, diarrhea, left lower quadrant abdominal pain. Patient does state has significant history for pancreatitis. States he is not an alcohol drinker. Patient does have history of chronic bronchitis, asthma, GERD, coronary artery disease.      History provided by:  Patient   used: No    Abdominal Pain  Pain location:  LLQ  Pain quality: sharp and stabbing    Pain radiates to:  Does not radiate  Pain severity:  Moderate  Onset quality:  Gradual  Duration:  1 day  Timing:  Intermittent  Progression:  Worsening  Chronicity:  New  Context: not awakening from sleep and not diet changes    Relieved by:  Nothing  Worsened by:  Nothing  Ineffective treatments:  None tried  Associated symptoms: chills, diarrhea, fatigue, nausea and vomiting    Associated symptoms: no chest pain, no dysuria, no flatus, no hematemesis, no hematochezia, no hematuria, no sore throat and no vaginal bleeding    Risk factors: no alcohol abuse, no aspirin use, not elderly, has not had multiple surgeries, no NSAID use, not pregnant and no recent hospitalization        Review of Systems   Constitutional: Positive for chills and fatigue.   HENT: Negative.  Negative for ear discharge, ear pain, facial swelling and sore throat.    Eyes: Negative.  Negative for pain, discharge and itching.   Respiratory: Negative.  Negative for choking, chest tightness and stridor.    Cardiovascular: Negative.  Negative for chest pain, palpitations and leg swelling.   Gastrointestinal: Positive for abdominal distention, abdominal pain, diarrhea, nausea and vomiting. Negative for flatus, hematemesis and hematochezia.   Genitourinary: Negative.  Negative for dysuria, enuresis, frequency, genital sores, hematuria and vaginal bleeding.   Musculoskeletal: Negative.  Negative for arthralgias, gait problem and joint swelling.   Skin: Negative.   Negative for color change, pallor, rash and wound.   All other systems reviewed and are negative.      Past Medical History:   Diagnosis Date   • Abdominal adhesions    • Arthritis    • Asthma    • Cervical radiculopathy    • Colitis    • GERD (gastroesophageal reflux disease)    • Heart attack (CMS/HCC)    • History of recreational drug use    • Kidney cysts    • Left hip pain    • Liver disease    • Low back pain    • Migraines    • Obstructive chronic bronchitis with exacerbation (CMS/HCC)    • Sleep apnea     mild       Allergies   Allergen Reactions   • Doxycycline Nausea And Vomiting       Past Surgical History:   Procedure Laterality Date   • BACK SURGERY     • HERNIA REPAIR     • HIP SPACER INSERTION WITH ANTIBIOTIC CEMENT Left 1/15/2020    Procedure: TOTAL HIP IMPLANT REMOVAL WITH INSERTION OF ANTIBIOTIC SPACER LEFT;  Surgeon: Ba Ramirez MD;  Location:  [facility];  Service: Orthopedics   • SHOULDER LIGAMENT REPAIR      right shoulder   • SMALL INTESTINE SURGERY      Small bowell resection   • TOTAL HIP ARTHROPLASTY Left    • TOTAL HIP ARTHROPLASTY REVISION Left 3/5/2020    Procedure: HIP REIMPLANT REVISION LEFT;  Surgeon: Ba Ramirez MD;  Location:  [facility];  Service: Orthopedics;  Laterality: Left;       Family History   Problem Relation Age of Onset   • Arthritis Other    • Hypertension Other    • Migraines Other    • Heart attack Other    • Stroke Other        Social History     Socioeconomic History   • Marital status:      Spouse name: Not on file   • Number of children: Not on file   • Years of education: Not on file   • Highest education level: Not on file   Tobacco Use   • Smoking status: Former Smoker   • Smokeless tobacco: Current User     Types: Chew   • Tobacco comment: quit 2005   Substance and Sexual Activity   • Alcohol use: No     Comment: stopped drinking alcohol   • Drug use: Not Currently     Comment: takes suboxone   • Sexual activity: Defer           Objective    Physical Exam  Vitals and nursing note reviewed.   Constitutional:       General: He is not in acute distress.     Appearance: Normal appearance. He is normal weight. He is not ill-appearing, toxic-appearing or diaphoretic.   HENT:      Head: Normocephalic and atraumatic.      Right Ear: Tympanic membrane, ear canal and external ear normal. There is no impacted cerumen.      Left Ear: Tympanic membrane, ear canal and external ear normal. There is no impacted cerumen.      Nose: Nose normal. No congestion or rhinorrhea.      Mouth/Throat:      Mouth: Mucous membranes are moist.      Pharynx: Oropharynx is clear. No oropharyngeal exudate or posterior oropharyngeal erythema.   Eyes:      General: No scleral icterus.        Right eye: No discharge.         Left eye: No discharge.      Extraocular Movements: Extraocular movements intact.      Conjunctiva/sclera: Conjunctivae normal.      Pupils: Pupils are equal, round, and reactive to light.   Neck:      Vascular: No carotid bruit.   Cardiovascular:      Rate and Rhythm: Normal rate and regular rhythm.      Pulses: Normal pulses.      Heart sounds: Normal heart sounds. No murmur heard.   No friction rub. No gallop.    Pulmonary:      Effort: Pulmonary effort is normal. No respiratory distress.      Breath sounds: Normal breath sounds. No stridor. No wheezing, rhonchi or rales.   Chest:      Chest wall: No tenderness.   Abdominal:      General: Abdomen is flat. Bowel sounds are normal. There is no distension.      Palpations: There is no mass.      Tenderness: There is abdominal tenderness. There is guarding and rebound.   Musculoskeletal:         General: No swelling, tenderness, deformity or signs of injury. Normal range of motion.      Cervical back: Normal range of motion and neck supple. No rigidity or tenderness.      Right lower leg: No edema.   Lymphadenopathy:      Cervical: No cervical adenopathy.   Skin:     General: Skin is warm and dry.      Capillary  "Refill: Capillary refill takes less than 2 seconds.      Coloration: Skin is not jaundiced or pale.      Findings: No bruising, erythema, lesion or rash.   Neurological:      General: No focal deficit present.      Mental Status: He is alert and oriented to person, place, and time.      Sensory: No sensory deficit.      Motor: No weakness.      Coordination: Coordination normal.      Gait: Gait normal.      Deep Tendon Reflexes: Reflexes normal.   Psychiatric:         Mood and Affect: Mood normal.         Behavior: Behavior normal.         Thought Content: Thought content normal.         Judgment: Judgment normal.         Procedures           ED Course  ED Course as of Apr 04 1321   Sun Apr 04, 2021   1314 IMPRESSION:  No acute process.       []   1315 Discussed care with patient. Patient came in with c/c \"nausea, vomiting, abdominal pain\" X one week. Patient had work-up that was negative. Will be discharged home.     []      ED Course User Index  [] Isauro Oliveira PA-C                                           MDM    Final diagnoses:   Nausea and vomiting in adult   Epigastric abdominal pain       ED Disposition  ED Disposition     ED Disposition Condition Comment    Discharge Stable           Juan Miguel Myers MD  [address] [place] [address] [place] [address] [phone]    Call in 1 day           Medication List      New Prescriptions    dicyclomine 20 MG tablet  Commonly known as: BENTYL  Take 1 tablet by mouth Every 6 (Six) Hours As Needed (abdominal pain).     ondansetron ODT 4 MG disintegrating tablet  Commonly known as: ZOFRAN-ODT  Place 1 tablet on the tongue Every 6 (Six) Hours As Needed for Nausea or Vomiting.           Where to Get Your Medications      These medications were sent to [id number] - [place], [place]  [facility] - [phone]  - [phone] [address] [address]    " Phone: [phone]   · dicyclomine 20 MG tablet  · ondansetron ODT 4 MG disintegrating tablet          Isauro Oliveira PA-C  04/04/21 5987

## 2021-04-28 ENCOUNTER — HOSPITAL ENCOUNTER (EMERGENCY)
Facility: HOSPITAL | Age: 46
Discharge: HOME | End: 2021-04-28
Attending: EMERGENCY MEDICINE | Admitting: EMERGENCY MEDICINE

## 2021-04-28 ENCOUNTER — APPOINTMENT (OUTPATIENT)
Dept: CT IMAGING | Facility: HOSPITAL | Age: 46
End: 2021-04-28

## 2021-04-28 VITALS
DIASTOLIC BLOOD PRESSURE: 113 MMHG | HEIGHT: 73 IN | SYSTOLIC BLOOD PRESSURE: 145 MMHG | HEART RATE: 84 BPM | TEMPERATURE: 97.9 F | OXYGEN SATURATION: 97 % | RESPIRATION RATE: 18 BRPM | BODY MASS INDEX: 25.05 KG/M2 | WEIGHT: 189 LBS

## 2021-04-28 DIAGNOSIS — R10.9 ABDOMINAL PAIN, UNSPECIFIED ABDOMINAL LOCATION: Primary | ICD-10-CM

## 2021-04-28 LAB
ALBUMIN SERPL-MCNC: 5 G/DL (ref 3.5–5.2)
ALBUMIN/GLOB SERPL: 1.5 G/DL
ALP SERPL-CCNC: 154 U/L (ref 39–117)
ALT SERPL W P-5'-P-CCNC: 18 U/L (ref 1–41)
ANION GAP SERPL CALCULATED.3IONS-SCNC: 13.4 MMOL/L (ref 5–15)
AST SERPL-CCNC: 28 U/L (ref 1–40)
BASOPHILS # BLD AUTO: 0.07 10*3/MM3 (ref 0–0.2)
BASOPHILS NFR BLD AUTO: 0.7 % (ref 0–1.5)
BILIRUB SERPL-MCNC: 0.6 MG/DL (ref 0–1.2)
BUN SERPL-MCNC: 10 MG/DL (ref 6–20)
BUN/CREAT SERPL: 8.6 (ref 7–25)
CALCIUM SPEC-SCNC: 10.2 MG/DL (ref 8.6–10.5)
CHLORIDE SERPL-SCNC: 102 MMOL/L (ref 98–107)
CO2 SERPL-SCNC: 23.6 MMOL/L (ref 22–29)
CREAT SERPL-MCNC: 1.16 MG/DL (ref 0.76–1.27)
DEPRECATED RDW RBC AUTO: 41.7 FL (ref 37–54)
EOSINOPHIL # BLD AUTO: 0.2 10*3/MM3 (ref 0–0.4)
EOSINOPHIL NFR BLD AUTO: 2 % (ref 0.3–6.2)
ERYTHROCYTE [DISTWIDTH] IN BLOOD BY AUTOMATED COUNT: 13.9 % (ref 12.3–15.4)
GFR SERPL CREATININE-BSD FRML MDRD: 68 ML/MIN/1.73
GLOBULIN UR ELPH-MCNC: 3.4 GM/DL
GLUCOSE SERPL-MCNC: 120 MG/DL (ref 65–99)
HCT VFR BLD AUTO: 52.4 % (ref 37.5–51)
HGB BLD-MCNC: 17.4 G/DL (ref 13–17.7)
IMM GRANULOCYTES # BLD AUTO: 0.03 10*3/MM3 (ref 0–0.05)
IMM GRANULOCYTES NFR BLD AUTO: 0.3 % (ref 0–0.5)
LIPASE SERPL-CCNC: 49 U/L (ref 13–60)
LYMPHOCYTES # BLD AUTO: 1.58 10*3/MM3 (ref 0.7–3.1)
LYMPHOCYTES NFR BLD AUTO: 15.6 % (ref 19.6–45.3)
MCH RBC QN AUTO: 27.4 PG (ref 26.6–33)
MCHC RBC AUTO-ENTMCNC: 33.2 G/DL (ref 31.5–35.7)
MCV RBC AUTO: 82.6 FL (ref 79–97)
MONOCYTES # BLD AUTO: 0.59 10*3/MM3 (ref 0.1–0.9)
MONOCYTES NFR BLD AUTO: 5.8 % (ref 5–12)
NEUTROPHILS NFR BLD AUTO: 7.68 10*3/MM3 (ref 1.7–7)
NEUTROPHILS NFR BLD AUTO: 75.6 % (ref 42.7–76)
NRBC BLD AUTO-RTO: 0 /100 WBC (ref 0–0.2)
PLATELET # BLD AUTO: 325 10*3/MM3 (ref 140–450)
PMV BLD AUTO: 9.5 FL (ref 6–12)
POTASSIUM SERPL-SCNC: 5 MMOL/L (ref 3.5–5.2)
PROT SERPL-MCNC: 8.4 G/DL (ref 6–8.5)
RBC # BLD AUTO: 6.34 10*6/MM3 (ref 4.14–5.8)
SODIUM SERPL-SCNC: 139 MMOL/L (ref 136–145)
WBC # BLD AUTO: 10.15 10*3/MM3 (ref 3.4–10.8)

## 2021-04-28 PROCEDURE — 83690 ASSAY OF LIPASE: CPT | Performed by: EMERGENCY MEDICINE

## 2021-04-28 PROCEDURE — 80053 COMPREHEN METABOLIC PANEL: CPT | Performed by: EMERGENCY MEDICINE

## 2021-04-28 PROCEDURE — 25010000002 HALOPERIDOL LACTATE PER 5 MG: Performed by: EMERGENCY MEDICINE

## 2021-04-28 PROCEDURE — 25010000002 KETOROLAC TROMETHAMINE PER 15 MG: Performed by: EMERGENCY MEDICINE

## 2021-04-28 PROCEDURE — 99283 EMERGENCY DEPT VISIT LOW MDM: CPT

## 2021-04-28 PROCEDURE — 96374 THER/PROPH/DIAG INJ IV PUSH: CPT

## 2021-04-28 PROCEDURE — 25010000002 MORPHINE PER 10 MG: Performed by: EMERGENCY MEDICINE

## 2021-04-28 PROCEDURE — 25010000002 ONDANSETRON PER 1 MG: Performed by: EMERGENCY MEDICINE

## 2021-04-28 PROCEDURE — 96375 TX/PRO/DX INJ NEW DRUG ADDON: CPT

## 2021-04-28 PROCEDURE — 74176 CT ABD & PELVIS W/O CONTRAST: CPT

## 2021-04-28 PROCEDURE — 85025 COMPLETE CBC W/AUTO DIFF WBC: CPT | Performed by: EMERGENCY MEDICINE

## 2021-04-28 RX ORDER — ONDANSETRON 4 MG/1
4 TABLET, ORALLY DISINTEGRATING ORAL 4 TIMES DAILY PRN
Qty: 20 TABLET | Refills: 0 | Status: SHIPPED | OUTPATIENT
Start: 2021-04-28 | End: 2022-10-21 | Stop reason: SDUPTHER

## 2021-04-28 RX ORDER — HALOPERIDOL 5 MG/ML
2.5 INJECTION INTRAMUSCULAR ONCE
Status: COMPLETED | OUTPATIENT
Start: 2021-04-28 | End: 2021-04-28

## 2021-04-28 RX ORDER — MORPHINE SULFATE 4 MG/ML
4 INJECTION, SOLUTION INTRAMUSCULAR; INTRAVENOUS ONCE
Status: COMPLETED | OUTPATIENT
Start: 2021-04-28 | End: 2021-04-28

## 2021-04-28 RX ORDER — KETOROLAC TROMETHAMINE 30 MG/ML
30 INJECTION, SOLUTION INTRAMUSCULAR; INTRAVENOUS EVERY 6 HOURS PRN
Status: DISCONTINUED | OUTPATIENT
Start: 2021-04-28 | End: 2021-04-28 | Stop reason: HOSPADM

## 2021-04-28 RX ORDER — SODIUM CHLORIDE 0.9 % (FLUSH) 0.9 %
10 SYRINGE (ML) INJECTION AS NEEDED
Status: DISCONTINUED | OUTPATIENT
Start: 2021-04-28 | End: 2021-04-28 | Stop reason: HOSPADM

## 2021-04-28 RX ORDER — ONDANSETRON 2 MG/ML
4 INJECTION INTRAMUSCULAR; INTRAVENOUS ONCE
Status: COMPLETED | OUTPATIENT
Start: 2021-04-28 | End: 2021-04-28

## 2021-04-28 RX ADMIN — HALOPERIDOL LACTATE 2.5 MG: 5 INJECTION, SOLUTION INTRAMUSCULAR at 08:17

## 2021-04-28 RX ADMIN — MORPHINE SULFATE 4 MG: 4 INJECTION, SOLUTION INTRAMUSCULAR; INTRAVENOUS at 08:01

## 2021-04-28 RX ADMIN — KETOROLAC TROMETHAMINE 30 MG: 30 INJECTION, SOLUTION INTRAMUSCULAR; INTRAVENOUS at 08:46

## 2021-04-28 RX ADMIN — ONDANSETRON 4 MG: 2 INJECTION INTRAMUSCULAR; INTRAVENOUS at 08:02

## 2021-06-01 ENCOUNTER — OFFICE VISIT (OUTPATIENT)
Dept: PULMONOLOGY | Facility: CLINIC | Age: 46
End: 2021-06-01

## 2021-06-01 VITALS
OXYGEN SATURATION: 95 % | DIASTOLIC BLOOD PRESSURE: 62 MMHG | SYSTOLIC BLOOD PRESSURE: 104 MMHG | RESPIRATION RATE: 16 BRPM | HEART RATE: 71 BPM | HEIGHT: 73 IN | BODY MASS INDEX: 26.11 KG/M2 | WEIGHT: 197 LBS

## 2021-06-01 DIAGNOSIS — J45.40 MODERATE PERSISTENT ASTHMA WITHOUT COMPLICATION: ICD-10-CM

## 2021-06-01 DIAGNOSIS — R06.02 SHORTNESS OF BREATH: Primary | ICD-10-CM

## 2021-06-01 DIAGNOSIS — J43.1 PANLOBULAR EMPHYSEMA (HCC): ICD-10-CM

## 2021-06-01 DIAGNOSIS — G47.33 OBSTRUCTIVE SLEEP APNEA: ICD-10-CM

## 2021-06-01 PROCEDURE — 99214 OFFICE O/P EST MOD 30 MIN: CPT | Performed by: NURSE PRACTITIONER

## 2021-06-01 RX ORDER — ALBUTEROL SULFATE 90 UG/1
2 AEROSOL, METERED RESPIRATORY (INHALATION) EVERY 4 HOURS PRN
Qty: 18 G | Refills: 5 | Status: SHIPPED | OUTPATIENT
Start: 2021-06-01 | End: 2021-08-25 | Stop reason: SDUPTHER

## 2021-06-01 RX ORDER — TIOTROPIUM BROMIDE INHALATION SPRAY 1.56 UG/1
2 SPRAY, METERED RESPIRATORY (INHALATION) DAILY
Qty: 12 G | Refills: 2 | Status: SHIPPED | OUTPATIENT
Start: 2021-06-01 | End: 2022-03-21

## 2021-06-01 RX ORDER — FLUTICASONE PROPIONATE 50 MCG
1 SPRAY, SUSPENSION (ML) NASAL DAILY
Qty: 48 G | Refills: 2 | Status: SHIPPED | OUTPATIENT
Start: 2021-06-01 | End: 2022-04-11

## 2021-06-01 NOTE — PROGRESS NOTES
"Chief Complaint   Patient presents with   • Follow-up   • Shortness of Breath         Subjective   Topher Stoll is a 45 y.o. male.     History of Present Illness   Patient is here today for follow up of asthma and shortness of breath.     He has had a couple of his exacerbations since his last appointment.  He has not had to go to the ER or urgent treatment or PCP because he has had prednisone packs at home.  He states he started taken a Dosepak last week and has been on it about 5 days.  He was having increased shortness of breath.    He also notes that he had been working around [employer] which she tries to avoid.    He is also been out of Flonase which he uses on a regular basis and sometimes uses twice a day.    He uses Breo daily and takes Singulair at night.  He uses the albuterol inhaler about twice a week on average.    He has a nebulizer but does not have to use it often.    He is on AutoPap at a pressure of 6/14 and through the month of March to Apr he did well with the machine howeveril his mask was switched and now he is trying to get used to a different facemask.    He states he is also added water to the machine and his dryness has improved.    The following portions of the patient's history were reviewed and updated as appropriate: allergies, current medications, past family history, past medical history, past social history and past surgical history.      Review of Systems   Constitutional: Negative for chills and fever.   HENT: Negative for rhinorrhea, sinus pressure, sneezing and sore throat.    Respiratory: Positive for shortness of breath and wheezing. Negative for cough.    Psychiatric/Behavioral: Negative for sleep disturbance.       Objective   Visit Vitals  /62   Pulse 71   Resp 16   Ht 185.4 cm (73\")   Wt 89.4 kg (197 lb)   SpO2 95%   BMI 25.99 kg/m²       Physical Exam  Vitals reviewed.   HENT:      Head: Atraumatic.      Mouth/Throat:      Mouth: Mucous membranes are moist.      " Comments: Crowded oropharynx. Edentulous.  Eyes:      Extraocular Movements: Extraocular movements intact.   Cardiovascular:      Rate and Rhythm: Normal rate and regular rhythm.   Pulmonary:      Effort: Pulmonary effort is normal. No respiratory distress.      Comments: Somewhat decreased A/E without wheezing.  Musculoskeletal:      Cervical back: Neck supple.      Comments: Gait normal.   Skin:     General: Skin is warm.   Neurological:      Mental Status: He is alert and oriented to person, place, and time.             Assessment/Plan   Diagnoses and all orders for this visit:    1. Shortness of breath (Primary)    2. Moderate persistent asthma without complication    3. Obstructive sleep apnea    4. Panlobular emphysema (CMS/HCC)  -     albuterol sulfate HFA (Ventolin HFA) 108 (90 Base) MCG/ACT inhaler; Inhale 2 puffs Every 4 (Four) Hours As Needed for Wheezing or Shortness of Air.  Dispense: 18 g; Refill: 5    Other orders  -     Fluticasone Furoate-Vilanterol (Breo Ellipta) 200-25 MCG/INH inhaler; Inhale 1 puff Daily.  Dispense: 180 each; Refill: 2  -     fluticasone (FLONASE) 50 MCG/ACT nasal spray; 1 spray into the nostril(s) as directed by provider Daily.  Dispense: 48 g; Refill: 2  -     Tiotropium Bromide Monohydrate (Spiriva Respimat) 1.25 MCG/ACT aerosol solution inhaler; Inhale 2 puffs Daily.  Dispense: 12 g; Refill: 2           Return for keep appt in October.    DISCUSSION (if any):  FeNO today 134 ppb.    His symptoms of asthma are under poor control at this time. He is currently on prednisone and will taper the dose. I have asked him to continue Breo daily and I have added Spiriva daily. He should continue albuterol as needed for shortness of breath and wheezing.     I have asked him to take his Singulair at nighttime rather than in the morning.    He has benefited from Flonase and should continue using it on a regular basis.    Patient's medications for underlying asthma were reviewed with him in  great detail.    Any needed adjustments to his pulmonary medications, either for clinical or insurance coverage reasons, have been made and are reflected in the orders.    Side effects of prescribed medications discussed with the patient.    Asthma action plan with discussed with him.    I have discussed with the patient the importance of avoiding known asthma triggers.  He verbalizes understanding.    He should continue using AutoPap at the current pressure of 6/14 with a full facemask.    He was compliant with AutoPap use from March to April at greater than 80% however this has fallen since his mask change.    I have asked him to wear the mask during the day without it being attached to the machine just to get used to wearing the mask.    The patient was asked to call this office if the symptoms worsen.      Dictated utilizing Dragon dictation.    This document was electronically signed by LISA Hernandez June 1, 2021  13:42 EDT

## 2021-06-15 ENCOUNTER — APPOINTMENT (OUTPATIENT)
Dept: GENERAL RADIOLOGY | Facility: HOSPITAL | Age: 46
End: 2021-06-15

## 2021-06-15 ENCOUNTER — APPOINTMENT (OUTPATIENT)
Dept: CT IMAGING | Facility: HOSPITAL | Age: 46
End: 2021-06-15

## 2021-06-15 ENCOUNTER — HOSPITAL ENCOUNTER (INPATIENT)
Facility: HOSPITAL | Age: 46
LOS: 3 days | Discharge: HOME | End: 2021-06-18
Attending: EMERGENCY MEDICINE | Admitting: INTERNAL MEDICINE

## 2021-06-15 DIAGNOSIS — J18.9 COMMUNITY ACQUIRED PNEUMONIA OF LEFT LOWER LOBE OF LUNG: Primary | ICD-10-CM

## 2021-06-15 DIAGNOSIS — J44.1 COPD EXACERBATION (HCC): ICD-10-CM

## 2021-06-15 LAB
A-A DO2: 124.1 MMHG
ALBUMIN SERPL-MCNC: 4.4 G/DL (ref 3.5–5.2)
ALBUMIN/GLOB SERPL: 1.4 G/DL
ALP SERPL-CCNC: 146 U/L (ref 39–117)
ALT SERPL W P-5'-P-CCNC: 18 U/L (ref 1–41)
ANION GAP SERPL CALCULATED.3IONS-SCNC: 9.3 MMOL/L (ref 5–15)
ARTERIAL PATENCY WRIST A: POSITIVE
AST SERPL-CCNC: 23 U/L (ref 1–40)
ATMOSPHERIC PRESS: 734 MMHG
B PARAPERT DNA SPEC QL NAA+PROBE: NOT DETECTED
B PERT DNA SPEC QL NAA+PROBE: NOT DETECTED
BASE EXCESS BLDA CALC-SCNC: 0.5 MMOL/L (ref 0–2)
BASOPHILS # BLD AUTO: 0.05 10*3/MM3 (ref 0–0.2)
BASOPHILS NFR BLD AUTO: 0.5 % (ref 0–1.5)
BDY SITE: ABNORMAL
BILIRUB SERPL-MCNC: 0.3 MG/DL (ref 0–1.2)
BUN SERPL-MCNC: 16 MG/DL (ref 6–20)
BUN/CREAT SERPL: 16.3 (ref 7–25)
C PNEUM DNA NPH QL NAA+NON-PROBE: NOT DETECTED
CALCIUM SPEC-SCNC: 9.8 MG/DL (ref 8.6–10.5)
CHLORIDE SERPL-SCNC: 101 MMOL/L (ref 98–107)
CO2 SERPL-SCNC: 27.7 MMOL/L (ref 22–29)
COHGB MFR BLD: <0.6 % (ref 0–2)
CREAT SERPL-MCNC: 0.98 MG/DL (ref 0.76–1.27)
CRP SERPL-MCNC: 0.86 MG/DL (ref 0–0.5)
D DIMER PPP FEU-MCNC: 0.98 MCGFEU/ML (ref 0–0.57)
D-LACTATE SERPL-SCNC: 1.1 MMOL/L (ref 0.5–2)
DEPRECATED RDW RBC AUTO: 39.6 FL (ref 37–54)
EOSINOPHIL # BLD AUTO: 0.58 10*3/MM3 (ref 0–0.4)
EOSINOPHIL NFR BLD AUTO: 6.1 % (ref 0.3–6.2)
ERYTHROCYTE [DISTWIDTH] IN BLOOD BY AUTOMATED COUNT: 13.1 % (ref 12.3–15.4)
FLUAV SUBTYP SPEC NAA+PROBE: NOT DETECTED
FLUBV RNA ISLT QL NAA+PROBE: NOT DETECTED
GAS FLOW AIRWAY: 4 LPM
GFR SERPL CREATININE-BSD FRML MDRD: 82 ML/MIN/1.73
GLOBULIN UR ELPH-MCNC: 3.1 GM/DL
GLUCOSE SERPL-MCNC: 121 MG/DL (ref 65–99)
HADV DNA SPEC NAA+PROBE: NOT DETECTED
HCO3 BLDA-SCNC: 26.9 MMOL/L (ref 22–28)
HCOV 229E RNA SPEC QL NAA+PROBE: NOT DETECTED
HCOV HKU1 RNA SPEC QL NAA+PROBE: NOT DETECTED
HCOV NL63 RNA SPEC QL NAA+PROBE: NOT DETECTED
HCOV OC43 RNA SPEC QL NAA+PROBE: NOT DETECTED
HCT VFR BLD AUTO: 50.7 % (ref 37.5–51)
HCT VFR BLD CALC: 50.3 %
HGB BLD-MCNC: 16.4 G/DL (ref 13–17.7)
HMPV RNA NPH QL NAA+NON-PROBE: NOT DETECTED
HPIV1 RNA SPEC QL NAA+PROBE: NOT DETECTED
HPIV2 RNA SPEC QL NAA+PROBE: NOT DETECTED
HPIV3 RNA NPH QL NAA+PROBE: NOT DETECTED
HPIV4 P GENE NPH QL NAA+PROBE: NOT DETECTED
IMM GRANULOCYTES # BLD AUTO: 0.03 10*3/MM3 (ref 0–0.05)
IMM GRANULOCYTES NFR BLD AUTO: 0.3 % (ref 0–0.5)
INHALED O2 CONCENTRATION: 36 %
L PNEUMO1 AG UR QL IA: NEGATIVE
LYMPHOCYTES # BLD AUTO: 1.23 10*3/MM3 (ref 0.7–3.1)
LYMPHOCYTES NFR BLD AUTO: 13 % (ref 19.6–45.3)
Lab: ABNORMAL
M PNEUMO IGG SER IA-ACNC: NOT DETECTED
MAGNESIUM SERPL-MCNC: 2.2 MG/DL (ref 1.6–2.6)
MCH RBC QN AUTO: 27 PG (ref 26.6–33)
MCHC RBC AUTO-ENTMCNC: 32.3 G/DL (ref 31.5–35.7)
MCV RBC AUTO: 83.5 FL (ref 79–97)
METHGB BLD QL: 0.8 % (ref 0–1.5)
MODALITY: ABNORMAL
MONOCYTES # BLD AUTO: 0.53 10*3/MM3 (ref 0.1–0.9)
MONOCYTES NFR BLD AUTO: 5.6 % (ref 5–12)
MRSA DNA SPEC QL NAA+PROBE: NORMAL
NEUTROPHILS NFR BLD AUTO: 7.03 10*3/MM3 (ref 1.7–7)
NEUTROPHILS NFR BLD AUTO: 74.5 % (ref 42.7–76)
NOTE: ABNORMAL
NRBC BLD AUTO-RTO: 0 /100 WBC (ref 0–0.2)
NT-PROBNP SERPL-MCNC: 114.2 PG/ML (ref 0–450)
OXYHGB MFR BLDV: 92.1 % (ref 94–99)
PCO2 BLDA: 48.6 MM HG (ref 35–45)
PCO2 TEMP ADJ BLD: ABNORMAL MM[HG]
PH BLDA: 7.35 PH UNITS (ref 7.35–7.45)
PH, TEMP CORRECTED: ABNORMAL
PLATELET # BLD AUTO: 224 10*3/MM3 (ref 140–450)
PMV BLD AUTO: 9.8 FL (ref 6–12)
PO2 BLDA: 69.7 MM HG (ref 75–100)
PO2 TEMP ADJ BLD: ABNORMAL MM[HG]
POTASSIUM SERPL-SCNC: 4.6 MMOL/L (ref 3.5–5.2)
PROCALCITONIN SERPL-MCNC: 0.03 NG/ML (ref 0–0.25)
PROT SERPL-MCNC: 7.5 G/DL (ref 6–8.5)
RBC # BLD AUTO: 6.07 10*6/MM3 (ref 4.14–5.8)
RHINOVIRUS RNA SPEC NAA+PROBE: NOT DETECTED
RSV RNA NPH QL NAA+NON-PROBE: NOT DETECTED
S PNEUM AG SPEC QL LA: NEGATIVE
SAO2 % BLDCOA: 93.3 % (ref 94–100)
SARS-COV-2 RNA PNL SPEC NAA+PROBE: NOT DETECTED
SODIUM SERPL-SCNC: 138 MMOL/L (ref 136–145)
TROPONIN T SERPL-MCNC: <0.01 NG/ML (ref 0–0.03)
VENTILATOR MODE: ABNORMAL
WBC # BLD AUTO: 9.45 10*3/MM3 (ref 3.4–10.8)

## 2021-06-15 PROCEDURE — 36415 COLL VENOUS BLD VENIPUNCTURE: CPT

## 2021-06-15 PROCEDURE — 94640 AIRWAY INHALATION TREATMENT: CPT

## 2021-06-15 PROCEDURE — 25010000002 AZITHROMYCIN 500 MG/250 ML: Performed by: EMERGENCY MEDICINE

## 2021-06-15 PROCEDURE — 87641 MR-STAPH DNA AMP PROBE: CPT | Performed by: INTERNAL MEDICINE

## 2021-06-15 PROCEDURE — 93005 ELECTROCARDIOGRAM TRACING: CPT

## 2021-06-15 PROCEDURE — 25010000002 IOPAMIDOL 61 % SOLUTION: Performed by: EMERGENCY MEDICINE

## 2021-06-15 PROCEDURE — 87635 SARS-COV-2 COVID-19 AMP PRB: CPT | Performed by: EMERGENCY MEDICINE

## 2021-06-15 PROCEDURE — 94799 UNLISTED PULMONARY SVC/PX: CPT

## 2021-06-15 PROCEDURE — 85379 FIBRIN DEGRADATION QUANT: CPT | Performed by: EMERGENCY MEDICINE

## 2021-06-15 PROCEDURE — 80053 COMPREHEN METABOLIC PANEL: CPT

## 2021-06-15 PROCEDURE — 83605 ASSAY OF LACTIC ACID: CPT | Performed by: INTERNAL MEDICINE

## 2021-06-15 PROCEDURE — 25010000002 CEFTRIAXONE SODIUM-DEXTROSE 1-3.74 GM-%(50ML) RECONSTITUTED SOLUTION: Performed by: EMERGENCY MEDICINE

## 2021-06-15 PROCEDURE — 25010000002 METHYLPREDNISOLONE PER 125 MG: Performed by: EMERGENCY MEDICINE

## 2021-06-15 PROCEDURE — 94660 CPAP INITIATION&MGMT: CPT

## 2021-06-15 PROCEDURE — 84145 PROCALCITONIN (PCT): CPT | Performed by: INTERNAL MEDICINE

## 2021-06-15 PROCEDURE — 25010000002 ENOXAPARIN PER 10 MG: Performed by: INTERNAL MEDICINE

## 2021-06-15 PROCEDURE — 87899 AGENT NOS ASSAY W/OPTIC: CPT | Performed by: INTERNAL MEDICINE

## 2021-06-15 PROCEDURE — 87040 BLOOD CULTURE FOR BACTERIA: CPT | Performed by: NURSE PRACTITIONER

## 2021-06-15 PROCEDURE — 82805 BLOOD GASES W/O2 SATURATION: CPT

## 2021-06-15 PROCEDURE — 82375 ASSAY CARBOXYHB QUANT: CPT

## 2021-06-15 PROCEDURE — 71275 CT ANGIOGRAPHY CHEST: CPT

## 2021-06-15 PROCEDURE — 85025 COMPLETE CBC W/AUTO DIFF WBC: CPT

## 2021-06-15 PROCEDURE — 83050 HGB METHEMOGLOBIN QUAN: CPT

## 2021-06-15 PROCEDURE — 87633 RESP VIRUS 12-25 TARGETS: CPT | Performed by: INTERNAL MEDICINE

## 2021-06-15 PROCEDURE — 36600 WITHDRAWAL OF ARTERIAL BLOOD: CPT

## 2021-06-15 PROCEDURE — 83880 ASSAY OF NATRIURETIC PEPTIDE: CPT

## 2021-06-15 PROCEDURE — 25010000002 METHYLPREDNISOLONE PER 40 MG: Performed by: NURSE PRACTITIONER

## 2021-06-15 PROCEDURE — 83735 ASSAY OF MAGNESIUM: CPT | Performed by: EMERGENCY MEDICINE

## 2021-06-15 PROCEDURE — 86140 C-REACTIVE PROTEIN: CPT | Performed by: INTERNAL MEDICINE

## 2021-06-15 PROCEDURE — 71046 X-RAY EXAM CHEST 2 VIEWS: CPT

## 2021-06-15 PROCEDURE — 99222 1ST HOSP IP/OBS MODERATE 55: CPT | Performed by: NURSE PRACTITIONER

## 2021-06-15 PROCEDURE — 84484 ASSAY OF TROPONIN QUANT: CPT

## 2021-06-15 PROCEDURE — 99284 EMERGENCY DEPT VISIT MOD MDM: CPT

## 2021-06-15 RX ORDER — METHYLPREDNISOLONE SODIUM SUCCINATE 125 MG/2ML
125 INJECTION, POWDER, LYOPHILIZED, FOR SOLUTION INTRAMUSCULAR; INTRAVENOUS ONCE
Status: COMPLETED | OUTPATIENT
Start: 2021-06-15 | End: 2021-06-15

## 2021-06-15 RX ORDER — LOSARTAN POTASSIUM 50 MG/1
100 TABLET ORAL DAILY
Status: DISCONTINUED | OUTPATIENT
Start: 2021-06-15 | End: 2021-06-18 | Stop reason: HOSPADM

## 2021-06-15 RX ORDER — CEFTRIAXONE 1 G/50ML
1 INJECTION, SOLUTION INTRAVENOUS ONCE
Status: COMPLETED | OUTPATIENT
Start: 2021-06-15 | End: 2021-06-15

## 2021-06-15 RX ORDER — IPRATROPIUM BROMIDE AND ALBUTEROL SULFATE 2.5; .5 MG/3ML; MG/3ML
3 SOLUTION RESPIRATORY (INHALATION) ONCE
Status: COMPLETED | OUTPATIENT
Start: 2021-06-15 | End: 2021-06-15

## 2021-06-15 RX ORDER — CEFTRIAXONE 1 G/50ML
1 INJECTION, SOLUTION INTRAVENOUS EVERY 24 HOURS
Status: DISCONTINUED | OUTPATIENT
Start: 2021-06-16 | End: 2021-06-18 | Stop reason: HOSPADM

## 2021-06-15 RX ORDER — BUDESONIDE AND FORMOTEROL FUMARATE DIHYDRATE 160; 4.5 UG/1; UG/1
2 AEROSOL RESPIRATORY (INHALATION)
Status: DISCONTINUED | OUTPATIENT
Start: 2021-06-15 | End: 2021-06-18 | Stop reason: HOSPADM

## 2021-06-15 RX ORDER — SODIUM CHLORIDE 0.9 % (FLUSH) 0.9 %
10 SYRINGE (ML) INJECTION AS NEEDED
Status: DISCONTINUED | OUTPATIENT
Start: 2021-06-15 | End: 2021-06-18 | Stop reason: HOSPADM

## 2021-06-15 RX ORDER — PANTOPRAZOLE SODIUM 40 MG/1
40 TABLET, DELAYED RELEASE ORAL EVERY MORNING
Status: DISCONTINUED | OUTPATIENT
Start: 2021-06-16 | End: 2021-06-18 | Stop reason: HOSPADM

## 2021-06-15 RX ORDER — ATORVASTATIN CALCIUM 10 MG/1
10 TABLET, FILM COATED ORAL NIGHTLY
Status: DISCONTINUED | OUTPATIENT
Start: 2021-06-15 | End: 2021-06-18 | Stop reason: HOSPADM

## 2021-06-15 RX ORDER — ALBUTEROL SULFATE 2.5 MG/3ML
2.5 SOLUTION RESPIRATORY (INHALATION)
Status: DISCONTINUED | OUTPATIENT
Start: 2021-06-15 | End: 2021-06-18 | Stop reason: HOSPADM

## 2021-06-15 RX ORDER — ONDANSETRON 4 MG/1
4 TABLET, FILM COATED ORAL EVERY 6 HOURS PRN
Status: DISCONTINUED | OUTPATIENT
Start: 2021-06-15 | End: 2021-06-18 | Stop reason: HOSPADM

## 2021-06-15 RX ORDER — METHYLPREDNISOLONE SODIUM SUCCINATE 40 MG/ML
40 INJECTION, POWDER, LYOPHILIZED, FOR SOLUTION INTRAMUSCULAR; INTRAVENOUS EVERY 8 HOURS
Status: COMPLETED | OUTPATIENT
Start: 2021-06-15 | End: 2021-06-16

## 2021-06-15 RX ORDER — ONDANSETRON 2 MG/ML
4 INJECTION INTRAMUSCULAR; INTRAVENOUS EVERY 6 HOURS PRN
Status: DISCONTINUED | OUTPATIENT
Start: 2021-06-15 | End: 2021-06-16

## 2021-06-15 RX ORDER — SODIUM CHLORIDE 0.9 % (FLUSH) 0.9 %
10 SYRINGE (ML) INJECTION EVERY 12 HOURS SCHEDULED
Status: DISCONTINUED | OUTPATIENT
Start: 2021-06-15 | End: 2021-06-18 | Stop reason: HOSPADM

## 2021-06-15 RX ADMIN — IOPAMIDOL 100 ML: 612 INJECTION, SOLUTION INTRAVENOUS at 10:29

## 2021-06-15 RX ADMIN — SODIUM CHLORIDE 1000 ML: 9 INJECTION, SOLUTION INTRAVENOUS at 11:32

## 2021-06-15 RX ADMIN — LOSARTAN POTASSIUM 100 MG: 50 TABLET, FILM COATED ORAL at 18:13

## 2021-06-15 RX ADMIN — ATORVASTATIN CALCIUM 10 MG: 10 TABLET, FILM COATED ORAL at 21:15

## 2021-06-15 RX ADMIN — ENOXAPARIN SODIUM 40 MG: 40 INJECTION SUBCUTANEOUS at 21:15

## 2021-06-15 RX ADMIN — AZITHROMYCIN 500 MG: 500 INJECTION, POWDER, LYOPHILIZED, FOR SOLUTION INTRAVENOUS at 12:09

## 2021-06-15 RX ADMIN — IPRATROPIUM BROMIDE AND ALBUTEROL SULFATE 3 ML: .5; 3 SOLUTION RESPIRATORY (INHALATION) at 09:50

## 2021-06-15 RX ADMIN — METHYLPREDNISOLONE SODIUM SUCCINATE 125 MG: 125 INJECTION, POWDER, FOR SOLUTION INTRAMUSCULAR; INTRAVENOUS at 09:13

## 2021-06-15 RX ADMIN — ALBUTEROL SULFATE 2.5 MG: 2.5 SOLUTION RESPIRATORY (INHALATION) at 19:29

## 2021-06-15 RX ADMIN — METHYLPREDNISOLONE SODIUM SUCCINATE 40 MG: 40 INJECTION, POWDER, FOR SOLUTION INTRAMUSCULAR; INTRAVENOUS at 18:13

## 2021-06-15 RX ADMIN — CEFTRIAXONE 1 G: 1 INJECTION, SOLUTION INTRAVENOUS at 11:33

## 2021-06-15 NOTE — ED NOTES
At this time,  was contacted and transferred to .     [facility]  06/15/21 1821

## 2021-06-15 NOTE — ED NOTES
Respiratory called at this time and notified of duo-neb and ABG.      Savanah Hurtado RN  06/15/21 0915

## 2021-06-15 NOTE — H&P
[facility]      Name:  Topher Stoll   Age:  45 y.o.  Sex:  male  :  1975  MRN:  [id number]   Visit Number:  [id number]  Admission Date:  6/15/2021  Date Of Service:  06/15/21  Primary Care Physician:  Juan Miguel Myers MD    Chief Complaint:     Shortness of breath/ Cough    History Of Presenting Illness:      Patient is a 45 year old male who presented to the Emergency Department today with complaints of shortness of breath, cough worse than baseline, wheezing, and increased home oxygen use for the past 4 days.  Patient states he had to use his home PRN oxygen all day yesterday.  Patient with health history significant for COPD with home PRN oxygen use, GERD, GUILLE on home Cpap, and former smoker, and history of recreational drug use on Methadone currently.  Fully vaccinated for Covid.    Work up in the ED significant for unremarkable CMP with the exception of Alk phos-146. CRP-0.86, lactate-1.1, procalcitonin-0.03, d-dimer-0.98, and CBC unremarkable.  CT of chest noted no PE and patchy left lower lobe airspace disease consistent with pneumonia.  ABG noted pH-7.352, pCO2-48.6, pO2-69.7, HCO3-26.9.  Troponin was negative and proBNP-114.  Hospitalist was called for admission for further treatment of pneumonia.      Review Of Systems:    All systems were reviewed and negative except for: shortness of breath, cough worse than baseline    Past Medical History: Patient  has a past medical history of Abdominal adhesions, Arthritis, Asthma, Cervical radiculopathy, Colitis, GERD (gastroesophageal reflux disease), Heart attack (CMS/HCC), History of recreational drug use, Kidney cysts, Left hip pain, Liver disease, Low back pain, Migraines, Obstructive chronic bronchitis with exacerbation (CMS/HCC), and Sleep apnea.    Past Surgical History: Patient  has a past surgical history that includes Back surgery; Hernia repair; Small intestine surgery; Total hip  arthroplasty (Left); Shoulder Ligament Repair; Hip Spacer Insertion (Left, 1/15/2020); and Revision total hip arthroplasty (Left, 3/5/2020).    Social History: Patient  reports that he has quit smoking. His smokeless tobacco use includes chew. He reports previous drug use. He reports that he does not drink alcohol.    Family History: Patient's family history includes Arthritis in an other family member; Heart attack in an other family member; Hypertension in an other family member; Migraines in an other family member; Stroke in an other family member.    Allergies:      Doxycycline    Home Medications:    Prior to Admission Medications     Prescriptions Last Dose Informant Patient Reported? Taking?    albuterol (PROVENTIL) (2.5 MG/3ML) 0.083% nebulizer solution   No No    Take 2.5 mg by nebulization Every 4 (Four) Hours As Needed for wheezing.    albuterol sulfate HFA (Ventolin HFA) 108 (90 Base) MCG/ACT inhaler   No No    Inhale 2 puffs Every 4 (Four) Hours As Needed for Wheezing or Shortness of Air.    aspirin 81 MG EC tablet   No No    Take 1 tablet by mouth Every 12 (Twelve) Hours. For 1 month    dicyclomine (BENTYL) 20 MG tablet   No No    Take 1 tablet by mouth Every 6 (Six) Hours As Needed (abdominal pain).    esomeprazole (nexIUM) 40 MG capsule  Self Yes No    Take 40 mg by mouth Every Morning Before Breakfast.    fluticasone (FLONASE) 50 MCG/ACT nasal spray   No No    1 spray into the nostril(s) as directed by provider Daily.    Fluticasone Furoate-Vilanterol (Breo Ellipta) 200-25 MCG/INH inhaler   No No    Inhale 1 puff Daily.    losartan (COZAAR) 100 MG tablet  Self No No    Take 1 tablet by mouth Daily.    lovastatin (MEVACOR) 40 MG tablet  Self No No    Take 1 tablet by mouth Every Night.    methadone (DOLOPHINE) 5 MG/5ML solution   Yes No    Take 100 mg by mouth Every 6 (Six) Hours As Needed for Moderate Pain . Patient takes 100ml    montelukast (SINGULAIR) 10 MG tablet   No No    Take 1 tablet by  mouth Every Evening.    ondansetron ODT (ZOFRAN-ODT) 4 MG disintegrating tablet   No No    Place 1 tablet on the tongue Every 6 (Six) Hours As Needed for Nausea or Vomiting.    ondansetron ODT (ZOFRAN-ODT) 4 MG disintegrating tablet   No No    Place 1 tablet on the tongue 4 (Four) Times a Day As Needed for Nausea.    oxyCODONE (ROXICODONE) 5 MG immediate release tablet   No No    Take 1 tablet by mouth Every 4 (Four) Hours As Needed for Moderate Pain  or Severe Pain .    oxyCODONE (ROXICODONE) 5 MG immediate release tablet   No No    Take 1 tablet by mouth Every 4 (Four) Hours As Needed for Moderate Pain .    predniSONE (DELTASONE) 10 MG (48) tablet pack   No No    Take as directed    promethazine (PHENERGAN) 25 MG tablet   No No    Take 1 tablet by mouth 2 (Two) Times a Day As Needed for Nausea or Vomiting.    SUBOXONE 8-2 MG film film   No No    Place 2 films under the tongue Daily. Resume when pain rx complete    Tiotropium Bromide Monohydrate (Spiriva Respimat) 1.25 MCG/ACT aerosol solution inhaler   No No    Inhale 2 puffs Daily.    traZODone (DESYREL) 150 MG tablet  Self Yes No    Take 150 mg by mouth At Night As Needed.        ED Medications:    Medications   sodium chloride 0.9 % flush 10 mL (has no administration in time range)   ipratropium-albuterol (DUO-NEB) nebulizer solution 3 mL (3 mL Nebulization Given 6/15/21 0950)   methylPREDNISolone sodium succinate (SOLU-Medrol) injection 125 mg (125 mg Intravenous Given 6/15/21 0913)   iopamidol (ISOVUE-300) 61 % injection 100 mL (100 mL Intravenous Given 6/15/21 1029)   sodium chloride 0.9 % bolus 1,000 mL (0 mL Intravenous Stopped 6/15/21 1415)   AZITHROMYCIN 500 MG/250 ML 0.9% NS IVPB (vial-mate) (0 mg Intravenous Stopped 6/15/21 1309)   cefTRIAXone (ROCEPHIN) IVPB 1 g/50ml dextrose (premix) (0 g Intravenous Stopped 6/15/21 1203)     Vital Signs:  Temp:  [97.5 °F (36.4 °C)] 97.5 °F (36.4 °C)  Heart Rate:  [73-87] 86  Resp:  [18] 18  BP: (113-128)/(76-98)  113/81        06/15/21  0850   Weight: 89.4 kg (197 lb)     Body mass index is 25.99 kg/m².    Physical Exam:     General Appearance:  Alert and cooperative, resting in ED stretcher on exam, pleasant middle aged male   Head:  Atraumatic and normocephalic.   Eyes: Conjunctivae and sclerae normal, no icterus. No pallor.   Ears:  Ears with no abnormalities noted.   Throat: No oral lesions, no thrush, oral mucosa moist.   Neck: Supple, trachea midline   Back:   No kyphoscoliosis present. No tenderness to palpation.   Lungs:   Breath sounds heard bilaterally equally but diminished throughout and tight.  No crackles heard, expiratory wheezing present in all lung fields. No accessory muscle use.  Resting on 5L with sats in the low 90s at rest.   Heart:  Normal S1 and S2, no murmur, no gallop, no rub. No JVD.   Abdomen:   Normal bowel sounds, no masses, no organomegaly. Soft, nontender, nondistended, no rebound tenderness.   Extremities: Supple, no edema, no cyanosis, no clubbing.   Pulses: Pulses palpable bilaterally.   Skin: No bleeding or rash.   Neurologic: Alert and oriented x 3. No facial asymmetry. Moves all four limbs. No tremors.      Laboratory data:    I have reviewed the labs done in the emergency room.    Results from last 7 days   Lab Units 06/15/21  0910   SODIUM mmol/L 138   POTASSIUM mmol/L 4.6   CHLORIDE mmol/L 101   CO2 mmol/L 27.7   BUN mg/dL 16   CREATININE mg/dL 0.98   CALCIUM mg/dL 9.8   BILIRUBIN mg/dL 0.3   ALK PHOS U/L 146*   ALT (SGPT) U/L 18   AST (SGOT) U/L 23   GLUCOSE mg/dL 121*     Results from last 7 days   Lab Units 06/15/21  0910   WBC 10*3/mm3 9.45   HEMOGLOBIN g/dL 16.4   HEMATOCRIT % 50.7   PLATELETS 10*3/mm3 224         Results from last 7 days   Lab Units 06/15/21  0910   TROPONIN T ng/mL <0.010     Results from last 7 days   Lab Units 06/15/21  0910   PROBNP pg/mL 114.2             Results from last 7 days   Lab Units 06/15/21  0958   PH, ARTERIAL pH units 7.352   PO2 ART mm Hg 69.7*    PCO2, ARTERIAL mm Hg 48.6*   HCO3 ART mmol/L 26.9           Invalid input(s): USDES,  BLOODU, NITRITITE, BACT, EP  Pain Management Panel     Pain Management Panel Latest Ref Rng & Units 3/17/2018 7/13/2016    CREATININE UR 20.0 - 300.0 mg/dL - -    AMPHETAMINES SCREEN, URINE Negative Negative Negative    BARBITURATES SCREEN Negative Negative Negative    BENZODIAZEPINE SCREEN, URINE Negative Negative Negative    BUPRENORPHINEUR Negative Negative -    COCAINE SCREEN, URINE Negative Negative Negative    FENTANYL URINE QT Ivcugw=5161 pg/mL - -    METHADONE SCREEN, URINE Negative Negative Negative    METHAMPHETAMINEUR Negative Negative -          EKG:      Normal Sinus Rhythm with rate of 92 BPM with nonspecific T wave abnormality.    Radiology:    XR Chest 2 View    Result Date: 6/15/2021  PROCEDURE: XR CHEST 2 VW-    HISTORY: SOA Triage Protocol  COMPARISON: 03/01/2021.  FINDINGS: The heart is normal in size. The mediastinum is unremarkable. The lungs are clear. There is no pneumothorax. There are no acute osseous abnormalities.      No acute cardiopulmonary process.    This report was finalized on 6/15/2021 9:35 AM by Megan Lorenzana M.D..    CT Chest Pulmonary Embolism    Result Date: 6/15/2021  PROCEDURE: CT CHEST PULMONARY EMBOLISM-  HISTORY: soa, hypoxia, elevated d-dimer  COMPARISON: None .  TECHNIQUE: Multiple axial CT images were obtained from the thoracic inlet through the upper abdomen following the administration of Isovue 300 per the CT PE protocol. Coronal and oblique 3D MIP images were reconstructed from the original axial data set.  FINDINGS: There are no filling defects within the pulmonary arteries to suggest an embolus. The thoracic aorta is normal in caliber with no evidence of dissection. The heart is normal in size. There are no pleural or pericardial effusions. There is no adenopathy. There is patchy left lower lobe airspace disease consistent with pneumonia. The visualized upper abdomen is  unremarkable. Bone windows reveal degenerative change of the right sternoclavicular joint with a joint effusion.      1. No evidence of pulmonary embolus or dissection. 2. Patchy left lower lobe airspace disease is consistent with pneumonia. 3. Degenerative changes of the right sternoclavicular joint with joint effusion.    406.21 mGy.cm   This study was performed with techniques to keep radiation doses as low as reasonably achievable (ALARA). Individualized dose reduction techniques using automated exposure control or adjustment of mA and/or kV according to the patient size were employed.     Images were reviewed, interpreted, and dictated by Dr. Megan Lorenzana M.D. Transcribed by Citlalli Hawk PA-C.  This report was finalized on 6/15/2021 3:22 PM by Megan Lorenzana M.D..      Assessment:    Acute respiratory failure with hypoxia  Pneumonia Left Lower Lobe, POA, Unknown organism  Chronic pain syndrome  GERD  GUILLE    Plan:    Admit patient to Med/ Surg floor with telemetry monitoring.  Rocephin and Azithromycin for antibiotic coverage for pneumonia.  Solumedrol 40mg every 8 hours for 2 days.  Blood cultures x2 ordered.  Regular diet.  Lovenox for DVT prophylaxis.  Home medications as reconciled.  Supportive care as ordered.  Will continue patient's home methadone.  Cpap at HS.  Supplemental oxygen to keep saturations >90%.  Patient is a full code on admission.    Risk Assessment: Moderate due to history  DVT Prophylaxis: Lovenox  Code Status: Full Code  Diet: Regular    Advance Care Planning   ACP discussion was held with the patient during this visit. Patient does not have an advance directive, declines further assistance.      Destiny Nichole, LISA  06/15/21  15:29 EDT    Dictated utilizing Dragon dictation.

## 2021-06-15 NOTE — PLAN OF CARE
Goal Outcome Evaluation:         New admit to floor from ED, WCTM, notify MD for issues or concerns

## 2021-06-15 NOTE — ED PROVIDER NOTES
Subjective   History of Present Illness     45-year-old male with history of COPD and chronic pain disorder presents to ER complaining of difficulty breathing.  Worse over the last couple of weeks.  States cough slightly increased from baseline.  Denies fever or chills.  Patient has been vaccinated for Covid.  No chest pain or leg swelling.  States he uses supplemental oxygen at home as needed.  Symptoms are worse with activity.  Patient states he had some leftover steroids that he took a couple weeks ago.  Does not smoke.  States he is using his inhalers as directed.  Symptoms seem to be moderate.  No other associated signs or symptoms or modifying factors      Review of Systems   Constitutional: Negative for chills and fever.   Respiratory: Positive for cough, shortness of breath and wheezing.    Cardiovascular: Negative for chest pain and palpitations.   Gastrointestinal: Negative for abdominal pain and vomiting.   Musculoskeletal: Negative.    Skin: Negative.    Neurological: Negative.    All other systems reviewed and are negative.      Past Medical History:   Diagnosis Date   • Abdominal adhesions    • Arthritis    • Asthma    • Cervical radiculopathy    • Colitis    • GERD (gastroesophageal reflux disease)    • Heart attack (CMS/HCC)    • History of recreational drug use    • Kidney cysts    • Left hip pain    • Liver disease    • Low back pain    • Migraines    • Obstructive chronic bronchitis with exacerbation (CMS/HCC)    • Sleep apnea     mild       Allergies   Allergen Reactions   • Doxycycline Nausea And Vomiting       Past Surgical History:   Procedure Laterality Date   • BACK SURGERY     • HERNIA REPAIR     • HIP SPACER INSERTION WITH ANTIBIOTIC CEMENT Left 1/15/2020    Procedure: TOTAL HIP IMPLANT REMOVAL WITH INSERTION OF ANTIBIOTIC SPACER LEFT;  Surgeon: Ba Ramirez MD;  Location: [facility];  Service: Orthopedics   • SHOULDER LIGAMENT REPAIR      right shoulder   • SMALL INTESTINE SURGERY       Small bowell resection   • TOTAL HIP ARTHROPLASTY Left    • TOTAL HIP ARTHROPLASTY REVISION Left 3/5/2020    Procedure: HIP REIMPLANT REVISION LEFT;  Surgeon: Ba Ramirez MD;  Location: [facility];  Service: Orthopedics;  Laterality: Left;       Family History   Problem Relation Age of Onset   • Arthritis Other    • Hypertension Other    • Migraines Other    • Heart attack Other    • Stroke Other        Social History     Socioeconomic History   • Marital status:      Spouse name: Not on file   • Number of children: Not on file   • Years of education: Not on file   • Highest education level: Not on file   Tobacco Use   • Smoking status: Former Smoker   • Smokeless tobacco: Current User     Types: Chew   • Tobacco comment: quit 2005   Substance and Sexual Activity   • Alcohol use: No     Comment: stopped drinking alcohol   • Drug use: Not Currently     Comment: takes suboxone   • Sexual activity: Defer           Objective   Physical Exam  Vitals and nursing note reviewed.   Constitutional:       Appearance: He is well-developed. He is not toxic-appearing.   HENT:      Mouth/Throat:      Mouth: Mucous membranes are moist.   Cardiovascular:      Rate and Rhythm: Normal rate and regular rhythm.   Pulmonary:      Comments: Breath sounds are somewhat diminished throughout with moderate expiratory wheezing.  Slightly labored respirations and slight tachypnea  Chest:      Chest wall: No tenderness.   Musculoskeletal:         General: Normal range of motion.      Cervical back: Normal range of motion and neck supple.      Right lower leg: No tenderness. No edema.      Left lower leg: No tenderness. No edema.   Skin:     General: Skin is warm and dry.      Capillary Refill: Capillary refill takes less than 2 seconds.   Neurological:      General: No focal deficit present.      Mental Status: He is alert.   Psychiatric:         Mood and Affect: Mood normal.         Procedures           ED Course  ED Course as of  Pedro 15 1125   Tue Pedro 15, 2021   0908 BP: 115/91 [CS]   0908 Temp: 97.5 °F (36.4 °C) [CS]   0908 Heart Rate: 87 [CS]   0908 Resp: 18 [CS]   0908 Hypoxic   SpO2(!): 87 % [CS]   0919 EKG interpreted by me.  Time 09 100.  Rate 92.  Normal sinus rhythm.  Normal axis.  Nonspecific T wave abnormality    [CS]   0943 PROCEDURE: XR CHEST 2 VW-        HISTORY: SOA Triage Protocol     COMPARISON: 03/01/2021.     FINDINGS: The heart is normal in size. The mediastinum is unremarkable.  The lungs are clear. There is no pneumothorax. There are no acute  osseous abnormalities.     IMPRESSION:  No acute cardiopulmonary process.       [CS]   1008     Blood Gas, Arterial With Co-Ox     06/15 0958      Site Right Radial   Michael's Test Positive   pH, Arterial 7.352   pCO2, Arterial 48.6   pO2, Arterial 69.7   HCO3, Arterial 26.9   Base Excess 0.5   O2 Saturation, Arterial 93.3   Hematocrit, Blood Gas 50.3   Oxyhemoglobin 92.1   Methemoglobin 0.80   Carboxyhemoglobin <0.6   A-a Gradiant 124.1   Barometric Pressure for Blood Gas 734   Modality Nasal Cannula   FIO2 36   Flow Rate 4.0   Ventilator Mode NA   Note    Collected by CB   pH, Temp Corrected    pCO2, Temperature Corrected    pO2, Temperature Corrected                                           D-dimer, Quantitative     06/15 0913      D-Dimer, Quant 0.98                             Comprehensive Metabolic Panel     06/15 0910      Glucose 121   BUN 16   Creatinine 0.98   Sodium 138   Potassium 4.6   Chloride 101   CO2 27.7   Calcium 9.8   Total Protein 7.5   Albumin 4.40   ALT (SGPT) 18   AST (SGOT) 23   Alkaline Phosphatase 146   Total Bilirubin 0.3   eGFR Non  Am 82   Globulin 3.1   A/G Ratio 1.4   BUN/Creatinine Ratio 16.3   Anion Gap 9.3                                 Blood Gas, Arterial -With Co-Ox Panel: Yes     06/15 0907           Troponin     06/15 0910      Troponin T <0.010         Magnesium     06/15 0910      Magnesium 2.2         BNP     06/15 0910      proBNP  114.2         CBC & Differential     06/15 0910      WBC 9.45   RBC 6.07   Hemoglobin 16.4   Hematocrit 50.7   MCV 83.5   MCH 27.0   MCHC 32.3   RDW 13.1   RDW-SD 39.6   MPV 9.8   Platelets 224        [CS]   1116 PROCEDURE: CT CHEST PULMONARY EMBOLISM-     HISTORY: soa, hypoxia, elevated d-dimer     COMPARISON: None .     TECHNIQUE: Multiple axial CT images were obtained from the thoracic  inlet through the upper abdomen following the administration of Isovue  300 per the CT PE protocol. Coronal and oblique 3D MIP images were  reconstructed from the original axial data set.      FINDINGS: There are no filling defects within the pulmonary arteries to  suggest an embolus. The thoracic aorta is normal in caliber with no  evidence of dissection. The heart is normal in size. There are no  pleural or pericardial effusions. There is no adenopathy. There is  patchy left lower lobe airspace disease consistent with pneumonia. The  visualized upper abdomen is unremarkable. Bone windows reveal  degenerative change of the right sternoclavicular joint with a joint  effusion.     IMPRESSION:  1. No evidence of pulmonary embolus or dissection.  2. Patchy left lower lobe airspace disease is consistent with pneumonia.  3. Degenerative changes of the right sternoclavicular joint with joint  effusion.       [CS]   1119 Patient slightly improved after nebulizer treatment and Solu-Medrol.  He still has moderately diminished air movement throughout his lung fields.  He still has moderate to severe wheezing.  I think he would probably benefit from hospital admission.  Still mildly labored breathing. D/w hospitalist--Dr Milligan--he will admit   [CS]      ED Course User Index  [CS] Bang Parra MD                                           [facility]    Final diagnoses:   Community acquired pneumonia of left lower lobe of lung   COPD exacerbation (CMS/HCC)       ED Disposition  ED Disposition     ED Disposition Condition Comment     Decision to Admit  Level of Care: Telemetry [5]   Diagnosis: Community acquired pneumonia of left lower lobe of lung [[id number]]   Admitting Physician: LOLLY BEAVERS [[id number]]   Certification: I Certify That Inpatient Hospital Services Are Medically Necessary For Greater Than 2 Midnights            No follow-up provider specified.       Medication List      No changes were made to your prescriptions during this visit.          Bang Parra MD  06/15/21 1126

## 2021-06-16 LAB
ALBUMIN SERPL-MCNC: 4.1 G/DL (ref 3.5–5.2)
ALBUMIN/GLOB SERPL: 1.4 G/DL
ALP SERPL-CCNC: 128 U/L (ref 39–117)
ALT SERPL W P-5'-P-CCNC: 15 U/L (ref 1–41)
ANION GAP SERPL CALCULATED.3IONS-SCNC: 10 MMOL/L (ref 5–15)
AST SERPL-CCNC: 19 U/L (ref 1–40)
BILIRUB SERPL-MCNC: 0.2 MG/DL (ref 0–1.2)
BUN SERPL-MCNC: 15 MG/DL (ref 6–20)
BUN/CREAT SERPL: 19 (ref 7–25)
CALCIUM SPEC-SCNC: 9.3 MG/DL (ref 8.6–10.5)
CHLORIDE SERPL-SCNC: 102 MMOL/L (ref 98–107)
CO2 SERPL-SCNC: 24 MMOL/L (ref 22–29)
CREAT SERPL-MCNC: 0.79 MG/DL (ref 0.76–1.27)
DEPRECATED RDW RBC AUTO: 39.1 FL (ref 37–54)
ERYTHROCYTE [DISTWIDTH] IN BLOOD BY AUTOMATED COUNT: 12.9 % (ref 12.3–15.4)
GFR SERPL CREATININE-BSD FRML MDRD: 106 ML/MIN/1.73
GLOBULIN UR ELPH-MCNC: 2.9 GM/DL
GLUCOSE SERPL-MCNC: 139 MG/DL (ref 65–99)
HCT VFR BLD AUTO: 45.6 % (ref 37.5–51)
HGB BLD-MCNC: 14.6 G/DL (ref 13–17.7)
MCH RBC QN AUTO: 26.8 PG (ref 26.6–33)
MCHC RBC AUTO-ENTMCNC: 32 G/DL (ref 31.5–35.7)
MCV RBC AUTO: 83.7 FL (ref 79–97)
PLATELET # BLD AUTO: 207 10*3/MM3 (ref 140–450)
PMV BLD AUTO: 10.1 FL (ref 6–12)
POTASSIUM SERPL-SCNC: 4.1 MMOL/L (ref 3.5–5.2)
PROT SERPL-MCNC: 7 G/DL (ref 6–8.5)
RBC # BLD AUTO: 5.45 10*6/MM3 (ref 4.14–5.8)
SODIUM SERPL-SCNC: 136 MMOL/L (ref 136–145)
WBC # BLD AUTO: 9.59 10*3/MM3 (ref 3.4–10.8)

## 2021-06-16 PROCEDURE — 99232 SBSQ HOSP IP/OBS MODERATE 35: CPT | Performed by: NURSE PRACTITIONER

## 2021-06-16 PROCEDURE — 94799 UNLISTED PULMONARY SVC/PX: CPT

## 2021-06-16 PROCEDURE — 25010000002 METHYLPREDNISOLONE PER 40 MG: Performed by: NURSE PRACTITIONER

## 2021-06-16 PROCEDURE — 85027 COMPLETE CBC AUTOMATED: CPT | Performed by: NURSE PRACTITIONER

## 2021-06-16 PROCEDURE — 25010000002 CEFTRIAXONE SODIUM-DEXTROSE 1-3.74 GM-%(50ML) RECONSTITUTED SOLUTION: Performed by: NURSE PRACTITIONER

## 2021-06-16 PROCEDURE — 94760 N-INVAS EAR/PLS OXIMETRY 1: CPT

## 2021-06-16 PROCEDURE — 25010000002 ENOXAPARIN PER 10 MG: Performed by: INTERNAL MEDICINE

## 2021-06-16 PROCEDURE — 80053 COMPREHEN METABOLIC PANEL: CPT | Performed by: NURSE PRACTITIONER

## 2021-06-16 PROCEDURE — 94660 CPAP INITIATION&MGMT: CPT

## 2021-06-16 RX ORDER — METHADONE HYDROCHLORIDE 10 MG/1
60 TABLET ORAL DAILY
Status: DISCONTINUED | OUTPATIENT
Start: 2021-06-16 | End: 2021-06-18 | Stop reason: HOSPADM

## 2021-06-16 RX ORDER — NAPROXEN SODIUM 220 MG
440 TABLET ORAL AS NEEDED
COMMUNITY
End: 2021-09-15

## 2021-06-16 RX ORDER — TRAZODONE HYDROCHLORIDE 50 MG/1
150 TABLET ORAL NIGHTLY PRN
Status: DISCONTINUED | OUTPATIENT
Start: 2021-06-16 | End: 2021-06-18 | Stop reason: HOSPADM

## 2021-06-16 RX ORDER — DOXYCYCLINE 100 MG/1
100 CAPSULE ORAL EVERY 12 HOURS SCHEDULED
Status: DISCONTINUED | OUTPATIENT
Start: 2021-06-16 | End: 2021-06-18 | Stop reason: HOSPADM

## 2021-06-16 RX ORDER — ACETAMINOPHEN 325 MG/1
650 TABLET ORAL EVERY 6 HOURS PRN
Status: DISCONTINUED | OUTPATIENT
Start: 2021-06-16 | End: 2021-06-18 | Stop reason: HOSPADM

## 2021-06-16 RX ADMIN — PANTOPRAZOLE SODIUM 40 MG: 40 TABLET, DELAYED RELEASE ORAL at 06:12

## 2021-06-16 RX ADMIN — ALBUTEROL SULFATE 2.5 MG: 2.5 SOLUTION RESPIRATORY (INHALATION) at 04:04

## 2021-06-16 RX ADMIN — ALBUTEROL SULFATE 2.5 MG: 2.5 SOLUTION RESPIRATORY (INHALATION) at 16:01

## 2021-06-16 RX ADMIN — ALBUTEROL SULFATE 2.5 MG: 2.5 SOLUTION RESPIRATORY (INHALATION) at 12:39

## 2021-06-16 RX ADMIN — METHADONE HYDROCHLORIDE 60 MG: 10 TABLET ORAL at 11:45

## 2021-06-16 RX ADMIN — ALBUTEROL SULFATE 2.5 MG: 2.5 SOLUTION RESPIRATORY (INHALATION) at 00:08

## 2021-06-16 RX ADMIN — TRAZODONE HYDROCHLORIDE 150 MG: 50 TABLET ORAL at 21:57

## 2021-06-16 RX ADMIN — CEFTRIAXONE 1 G: 1 INJECTION, SOLUTION INTRAVENOUS at 10:34

## 2021-06-16 RX ADMIN — BUDESONIDE AND FORMOTEROL FUMARATE DIHYDRATE 2 PUFF: 160; 4.5 AEROSOL RESPIRATORY (INHALATION) at 19:37

## 2021-06-16 RX ADMIN — ALBUTEROL SULFATE 2.5 MG: 2.5 SOLUTION RESPIRATORY (INHALATION) at 19:18

## 2021-06-16 RX ADMIN — DOXYCYCLINE 100 MG: 100 CAPSULE ORAL at 11:45

## 2021-06-16 RX ADMIN — BUDESONIDE AND FORMOTEROL FUMARATE DIHYDRATE 2 PUFF: 160; 4.5 AEROSOL RESPIRATORY (INHALATION) at 08:21

## 2021-06-16 RX ADMIN — DOXYCYCLINE 100 MG: 100 CAPSULE ORAL at 17:15

## 2021-06-16 RX ADMIN — LOSARTAN POTASSIUM 100 MG: 50 TABLET, FILM COATED ORAL at 17:15

## 2021-06-16 RX ADMIN — SODIUM CHLORIDE, PRESERVATIVE FREE 10 ML: 5 INJECTION INTRAVENOUS at 08:21

## 2021-06-16 RX ADMIN — METHYLPREDNISOLONE SODIUM SUCCINATE 40 MG: 40 INJECTION, POWDER, FOR SOLUTION INTRAMUSCULAR; INTRAVENOUS at 01:37

## 2021-06-16 RX ADMIN — ATORVASTATIN CALCIUM 10 MG: 10 TABLET, FILM COATED ORAL at 21:09

## 2021-06-16 RX ADMIN — ENOXAPARIN SODIUM 40 MG: 40 INJECTION SUBCUTANEOUS at 21:10

## 2021-06-16 NOTE — PROGRESS NOTES
[facility]    Name:  Topher Stoll   Age:  45 y.o.  Sex:  male  :  1975  MRN:  [id number]   Visit Number:  [id number]  Admission Date:  6/15/2021  Date Of Service:  21  Primary Care Physician:  Juan Miguel Myers MD     LOS: 1 day :    Chief Complaint:      Pneumonia    Subjective/ Hospital Course:    Patient seen and evaluated today after being admitted yesterday for pneumonia and COPD exacerbation.  Patient examined and was on 5L of oxygen with saturations in the mid-90s.  Turned down to 4L and tolerating well.  Patient states that he tried to walk to the bathroom without his oxygen and he was quite short of breath.  Patient with history of oxygen at home PRN.  Increased shortness of breath the past 4 days with increased cough from baseline on presentation.  Started on Rocephin and Azithromycin and Solumedrol.    Review of Systems:     All systems were reviewed and negative except as mentioned in subjective, assessment and plan.    Vital Signs:    Temp:  [97.5 °F (36.4 °C)-98.3 °F (36.8 °C)] 98 °F (36.7 °C)  Heart Rate:  [72-89] 86  Resp:  [18-22] 18  BP: (113-135)/(76-98) 123/76    Intake and output:    I/O last 3 completed shifts:  In: 1300 [IV Piggyback:1300]  Out: 1950 [Urine:1950]  I/O this shift:  In: 250 [P.O.:200; IV Piggyback:50]  Out: 250 [Urine:250]    Physical Examination:    General Appearance:  Alert and cooperative, resting in bed on exam without any complaints.  States his breathing is better today.   Head:  Atraumatic and normocephalic.   Eyes: Conjunctivae and sclerae normal, no icterus. No pallor.   Throat: No oral lesions, no thrush, oral mucosa moist.   Neck: Supple, trachea midline   Lungs:   Breath sounds heard bilaterally equally.  Expiratory wheezing and rhonchi heard in all lung fields.  Currently on 4L of oxygen, unlabored in conversation.   Heart:  Normal S1 and S2, no murmur, no gallop, no rub. No JVD.   Abdomen:   Normal  bowel sounds, no masses, no organomegaly. Soft, nontender, nondistended, no rebound tenderness.   Extremities: Supple, no edema, no cyanosis, no clubbing.   Skin: No bleeding or rash.   Neurologic: Alert and oriented x 3. No facial asymmetry. Moves all four limbs. No tremors.      Laboratory results:    Results from last 7 days   Lab Units 06/16/21  0539 06/15/21  0910   SODIUM mmol/L 136 138   POTASSIUM mmol/L 4.1 4.6   CHLORIDE mmol/L 102 101   CO2 mmol/L 24.0 27.7   BUN mg/dL 15 16   CREATININE mg/dL 0.79 0.98   CALCIUM mg/dL 9.3 9.8   BILIRUBIN mg/dL 0.2 0.3   ALK PHOS U/L 128* 146*   ALT (SGPT) U/L 15 18   AST (SGOT) U/L 19 23   GLUCOSE mg/dL 139* 121*     Results from last 7 days   Lab Units 06/16/21  0539 06/15/21  0910   WBC 10*3/mm3 9.59 9.45   HEMOGLOBIN g/dL 14.6 16.4   HEMATOCRIT % 45.6 50.7   PLATELETS 10*3/mm3 207 224         Results from last 7 days   Lab Units 06/15/21  0910   TROPONIN T ng/mL <0.010         Results from last 7 days   Lab Units 06/15/21  0958   PH, ARTERIAL pH units 7.352   PO2 ART mm Hg 69.7*   PCO2, ARTERIAL mm Hg 48.6*   HCO3 ART mmol/L 26.9     I have reviewed the patient's laboratory results.    Radiology results:    XR Chest 2 View    Result Date: 6/15/2021  PROCEDURE: XR CHEST 2 VW-    HISTORY: SOA Triage Protocol  COMPARISON: 03/01/2021.  FINDINGS: The heart is normal in size. The mediastinum is unremarkable. The lungs are clear. There is no pneumothorax. There are no acute osseous abnormalities.      Impression: No acute cardiopulmonary process.    This report was finalized on 6/15/2021 9:35 AM by Megan Lorenzana M.D..    CT Chest Pulmonary Embolism    Result Date: 6/15/2021  PROCEDURE: CT CHEST PULMONARY EMBOLISM-  HISTORY: soa, hypoxia, elevated d-dimer  COMPARISON: None .  TECHNIQUE: Multiple axial CT images were obtained from the thoracic inlet through the upper abdomen following the administration of Isovue 300 per the CT PE protocol. Coronal and oblique 3D MIP  images were reconstructed from the original axial data set.  FINDINGS: There are no filling defects within the pulmonary arteries to suggest an embolus. The thoracic aorta is normal in caliber with no evidence of dissection. The heart is normal in size. There are no pleural or pericardial effusions. There is no adenopathy. There is patchy left lower lobe airspace disease consistent with pneumonia. The visualized upper abdomen is unremarkable. Bone windows reveal degenerative change of the right sternoclavicular joint with a joint effusion.      Impression: 1. No evidence of pulmonary embolus or dissection. 2. Patchy left lower lobe airspace disease is consistent with pneumonia. 3. Degenerative changes of the right sternoclavicular joint with joint effusion.    406.21 mGy.cm   This study was performed with techniques to keep radiation doses as low as reasonably achievable (ALARA). Individualized dose reduction techniques using automated exposure control or adjustment of mA and/or kV according to the patient size were employed.     Images were reviewed, interpreted, and dictated by Dr. Megan Lorenzana M.D. Transcribed by Citlalli Hawk PA-C.  This report was finalized on 6/15/2021 3:22 PM by Megan Lorenzana M.D..    I have reviewed the patient's radiology reports.    Medication Review:     I have reviewed the patient's active and prn medications.     Problem List:      Community acquired pneumonia of left lower lobe of lung      Assessment:  Acute respiratory failure with hypoxia  Pneumonia Left Lower Lobe, POA, Unknown organism  Chronic pain syndrome  GERD  GUILLE      Plan:    Continue monitoring patient on Med/ Surg floor.  Changed Azithromycin to Doxycycline PO as Azithromycin causes high risk for QT prolongation with Methadone.  Patient's Methadone continued that he chronically takes at home.  Will titrate oxygen as able.  Patient currently on 4L of supplemental oxygen.  Continue steroids, will transition to  Prednisone PO tomorrow.  Discharge home when stable.    DVT Prophylaxis: Lovenox  Code Status: Full Code  Diet: Regular  Discharge:  Home when stable      LISA Anderson  06/16/21  12:21 EDT    Dictated utilizing Dragon dictation.

## 2021-06-16 NOTE — PLAN OF CARE
Goal Outcome Evaluation:  Plan of Care Reviewed With: patient      Progress: improving  O2 decreased to 4L per NC and maintaining a saturation greater than 90% continued neb tx and abx therapy. No acute events noted during my shift.

## 2021-06-16 NOTE — PLAN OF CARE
Goal Outcome Evaluation:  Plan of Care Reviewed With: patient           Outcome Summary: VSS.  Pt here for PNA.  O2 sats stable on 5L NC.  Bipap was worn for a couple of hours.  IV antibiotics and steroids given .  No other acute events noted.

## 2021-06-16 NOTE — CASE MANAGEMENT/SOCIAL WORK
Discharge Planning Assessment   [facility]     Patient Name: Topher Stoll  MRN: [id number]  Today's Date: 6/16/2021    Admit Date: 6/15/2021    Discharge Needs Assessment     Row Name 06/16/21 1537       Living Environment    Lives With  parent(s)    Name(s) of Who Lives With Patient  Josee Stoll    Current Living Arrangements  home/apartment/condo    Potentially Unsafe Housing Conditions  unable to assess    Primary Care Provided by  self    Provides Primary Care For  no one, unable/limited ability to care for self    Family Caregiver if Needed  none    Quality of Family Relationships  unable to assess    Able to Return to Prior Arrangements  yes    Living Arrangement Comments  Lives in the basement of his parents home, has steps and usually has no problem with them.  Has had recently due to SOA       Resource/Environmental Concerns    Resource/Environmental Concerns  none    Transportation Concerns  car, none       Transition Planning    Patient/Family Anticipates Transition to  home    Patient/Family Anticipated Services at Transition  none    Transportation Anticipated  family or friend will provide       Discharge Needs Assessment    Readmission Within the Last 30 Days  no previous admission in last 30 days    Equipment Currently Used at Home  Cpap; oxygen Has Cpap and home o2 with [facility] [employer].  O2 is at 2 liters as necessary. Does not wear it continuously    Concerns to be Addressed  no discharge needs identified    Anticipated Changes Related to Illness  none    Equipment Needed After Discharge  none    Provided Post Acute Provider List?  N/A    N/A Provider List Comment  No needs    Provided Post Acute Provider Quality & Resource List?  N/A    N/A Quality & Resource List Comment  No needs        Discharge Plan     Row Name 06/16/21 1220       Plan    Plan Spoke to pt in room.  Has no POA or Living Will.  Lives with his parents.  Lives in their basement and assist in caring for them when they need it.  Address  and phone no correct.  Has home o2 that he wears at 2 liters PRN.  Also has a Cpap.  Both O2 and Cpap is provided by [facility] [employer].  No other medical equipment.  Plans to go home at discharge.  Will have transportation.  Denies DC needs.  Has no problem getting meds or other necessities. Will use [facility]. Cm will continue to follow.        Continued Care and Services - Admitted Since 6/15/2021    Coordination has not been started for this encounter.                    Pauline Parra RN

## 2021-06-17 LAB
ANION GAP SERPL CALCULATED.3IONS-SCNC: 5.7 MMOL/L (ref 5–15)
BUN SERPL-MCNC: 20 MG/DL (ref 6–20)
BUN/CREAT SERPL: 23.8 (ref 7–25)
CALCIUM SPEC-SCNC: 9.4 MG/DL (ref 8.6–10.5)
CHLORIDE SERPL-SCNC: 104 MMOL/L (ref 98–107)
CO2 SERPL-SCNC: 28.3 MMOL/L (ref 22–29)
CREAT SERPL-MCNC: 0.84 MG/DL (ref 0.76–1.27)
DEPRECATED RDW RBC AUTO: 40.7 FL (ref 37–54)
ERYTHROCYTE [DISTWIDTH] IN BLOOD BY AUTOMATED COUNT: 13.3 % (ref 12.3–15.4)
GFR SERPL CREATININE-BSD FRML MDRD: 98 ML/MIN/1.73
GLUCOSE SERPL-MCNC: 94 MG/DL (ref 65–99)
HCT VFR BLD AUTO: 45.9 % (ref 37.5–51)
HGB BLD-MCNC: 14.8 G/DL (ref 13–17.7)
MCH RBC QN AUTO: 27.2 PG (ref 26.6–33)
MCHC RBC AUTO-ENTMCNC: 32.2 G/DL (ref 31.5–35.7)
MCV RBC AUTO: 84.2 FL (ref 79–97)
PLATELET # BLD AUTO: 199 10*3/MM3 (ref 140–450)
PMV BLD AUTO: 9.9 FL (ref 6–12)
POTASSIUM SERPL-SCNC: 3.9 MMOL/L (ref 3.5–5.2)
RBC # BLD AUTO: 5.45 10*6/MM3 (ref 4.14–5.8)
SODIUM SERPL-SCNC: 138 MMOL/L (ref 136–145)
WBC # BLD AUTO: 9.4 10*3/MM3 (ref 3.4–10.8)

## 2021-06-17 PROCEDURE — 99232 SBSQ HOSP IP/OBS MODERATE 35: CPT | Performed by: NURSE PRACTITIONER

## 2021-06-17 PROCEDURE — 80048 BASIC METABOLIC PNL TOTAL CA: CPT | Performed by: NURSE PRACTITIONER

## 2021-06-17 PROCEDURE — 94799 UNLISTED PULMONARY SVC/PX: CPT

## 2021-06-17 PROCEDURE — 63710000001 PREDNISONE PER 1 MG: Performed by: NURSE PRACTITIONER

## 2021-06-17 PROCEDURE — 85027 COMPLETE CBC AUTOMATED: CPT | Performed by: NURSE PRACTITIONER

## 2021-06-17 PROCEDURE — 25010000002 CEFTRIAXONE SODIUM-DEXTROSE 1-3.74 GM-%(50ML) RECONSTITUTED SOLUTION: Performed by: NURSE PRACTITIONER

## 2021-06-17 PROCEDURE — 94762 N-INVAS EAR/PLS OXIMTRY CONT: CPT

## 2021-06-17 PROCEDURE — 94660 CPAP INITIATION&MGMT: CPT

## 2021-06-17 PROCEDURE — 25010000002 ENOXAPARIN PER 10 MG: Performed by: INTERNAL MEDICINE

## 2021-06-17 RX ORDER — PREDNISONE 20 MG/1
40 TABLET ORAL
Status: DISCONTINUED | OUTPATIENT
Start: 2021-06-17 | End: 2021-06-18 | Stop reason: HOSPADM

## 2021-06-17 RX ADMIN — PREDNISONE 40 MG: 20 TABLET ORAL at 13:21

## 2021-06-17 RX ADMIN — TRAZODONE HYDROCHLORIDE 150 MG: 50 TABLET ORAL at 21:21

## 2021-06-17 RX ADMIN — ALBUTEROL SULFATE 2.5 MG: 2.5 SOLUTION RESPIRATORY (INHALATION) at 15:36

## 2021-06-17 RX ADMIN — CEFTRIAXONE 1 G: 1 INJECTION, SOLUTION INTRAVENOUS at 10:50

## 2021-06-17 RX ADMIN — METHADONE HYDROCHLORIDE 60 MG: 10 TABLET ORAL at 08:27

## 2021-06-17 RX ADMIN — DOXYCYCLINE 100 MG: 100 CAPSULE ORAL at 08:27

## 2021-06-17 RX ADMIN — PANTOPRAZOLE SODIUM 40 MG: 40 TABLET, DELAYED RELEASE ORAL at 06:13

## 2021-06-17 RX ADMIN — ALBUTEROL SULFATE 2.5 MG: 2.5 SOLUTION RESPIRATORY (INHALATION) at 11:46

## 2021-06-17 RX ADMIN — ENOXAPARIN SODIUM 40 MG: 40 INJECTION SUBCUTANEOUS at 21:22

## 2021-06-17 RX ADMIN — ALBUTEROL SULFATE 2.5 MG: 2.5 SOLUTION RESPIRATORY (INHALATION) at 19:30

## 2021-06-17 RX ADMIN — ATORVASTATIN CALCIUM 10 MG: 10 TABLET, FILM COATED ORAL at 21:21

## 2021-06-17 RX ADMIN — SODIUM CHLORIDE, PRESERVATIVE FREE 10 ML: 5 INJECTION INTRAVENOUS at 21:24

## 2021-06-17 RX ADMIN — DOXYCYCLINE 100 MG: 100 CAPSULE ORAL at 17:28

## 2021-06-17 RX ADMIN — SODIUM CHLORIDE, PRESERVATIVE FREE 10 ML: 5 INJECTION INTRAVENOUS at 08:27

## 2021-06-17 RX ADMIN — LOSARTAN POTASSIUM 100 MG: 50 TABLET, FILM COATED ORAL at 17:28

## 2021-06-17 RX ADMIN — BUDESONIDE AND FORMOTEROL FUMARATE DIHYDRATE 2 PUFF: 160; 4.5 AEROSOL RESPIRATORY (INHALATION) at 19:30

## 2021-06-17 NOTE — PLAN OF CARE
Goal Outcome Evaluation:  Plan of Care Reviewed With: patient      Progress: improving  O2 decreased to 2L per NC and maintaining a saturation greater than 90% continued neb tx and abx therapy. No acute events noted during my shift.

## 2021-06-17 NOTE — PROGRESS NOTES
[facility]    Name:  Topher Stoll   Age:  45 y.o.  Sex:  male  :  1975  MRN:  [id number]   Visit Number:  [id number]  Admission Date:  6/15/2021  Date Of Service:  21  Primary Care Physician:  Juan Miguel Myers MD     LOS: 2 days :    Chief Complaint:      Pneumonia/ COPD    Subjective:    Patient seen and evaluated today after being admitted for pneumonia and COPD exacerbation.  Supplemental oxygen being used at 3L today.  Patient without any complaints today and states that his breathing is better but he is still unable to walk to the bathroom without using any oxygen.  Patient does not use oxygen at baseline, only PRN.    Review of Systems:     All systems were reviewed and negative except as mentioned in subjective, assessment and plan.    Vital Signs:    Temp:  [97.7 °F (36.5 °C)-98.6 °F (37 °C)] 97.7 °F (36.5 °C)  Heart Rate:  [68-80] 77  Resp:  [16-18] 16  BP: (113-159)/(65-91) 113/83    Intake and output:    I/O last 3 completed shifts:  In: 490 [P.O.:440; IV Piggyback:50]  Out: 3600 [Urine:3600]  I/O this shift:  In: 50 [IV Piggyback:50]  Out: 250 [Urine:250]    Physical Examination:    General Appearance:  Alert and cooperative, resting in bed on exam.  Patient pleasant in conversation with family at bedside.  Middle aged male.   Head:  Atraumatic and normocephalic.   Eyes: Conjunctivae and sclerae normal, no icterus. No pallor.   Throat: No oral lesions, no thrush, oral mucosa moist.   Neck: Supple, trachea midline   Lungs:   Breath sounds heard bilaterally equally.  Diffuse expiratory wheezing and rhonchi although moving more air on exam today.  Utilizing 3L of oxygen with stable sats.   Heart:  Normal S1 and S2, no murmur, no gallop, no rub. No JVD.   Abdomen:   Normal bowel sounds, no masses, no organomegaly. Soft, nontender, nondistended, no rebound tenderness.   Extremities: Supple, no edema, no cyanosis, no clubbing.   Skin: No bleeding  or rash.   Neurologic: Alert and oriented x 3. No facial asymmetry. Moves all four limbs. No tremors.      Laboratory results:    Results from last 7 days   Lab Units 06/17/21  0602 06/16/21  0539 06/15/21  0910   SODIUM mmol/L 138 136 138   POTASSIUM mmol/L 3.9 4.1 4.6   CHLORIDE mmol/L 104 102 101   CO2 mmol/L 28.3 24.0 27.7   BUN mg/dL 20 15 16   CREATININE mg/dL 0.84 0.79 0.98   CALCIUM mg/dL 9.4 9.3 9.8   BILIRUBIN mg/dL  --  0.2 0.3   ALK PHOS U/L  --  128* 146*   ALT (SGPT) U/L  --  15 18   AST (SGOT) U/L  --  19 23   GLUCOSE mg/dL 94 139* 121*     Results from last 7 days   Lab Units 06/17/21  0602 06/16/21  0539 06/15/21  0910   WBC 10*3/mm3 9.40 9.59 9.45   HEMOGLOBIN g/dL 14.8 14.6 16.4   HEMATOCRIT % 45.9 45.6 50.7   PLATELETS 10*3/mm3 199 207 224         Results from last 7 days   Lab Units 06/15/21  0910   TROPONIN T ng/mL <0.010     Results from last 7 days   Lab Units 06/15/21  1737   BLOODCX  No growth at 24 hours  No growth at 24 hours     Results from last 7 days   Lab Units 06/15/21  0958   PH, ARTERIAL pH units 7.352   PO2 ART mm Hg 69.7*   PCO2, ARTERIAL mm Hg 48.6*   HCO3 ART mmol/L 26.9     I have reviewed the patient's laboratory results.    Radiology results:    No radiology results from the last 24 hrs  I have reviewed the patient's radiology reports.    Medication Review:     I have reviewed the patient's active and prn medications.     Problem List:      Community acquired pneumonia of left lower lobe of lung      Assessment:  Acute respiratory failure with hypoxia  Pneumonia Left Lower Lobe, POA, Unknown organism  COPD  Chronic pain syndrome  GERD  GUILLE      Plan:    Continue treatment on Med/Surg floor.  Continue antibiotic coverage with Rocephin and Doxycycline PO.  Patient is tolerating Doxycycline PO without any nausea or vomiting.  Patient currently on 3L.  Continue trying to wean oxygen demands.  Walking oximetry tomorrow.  Hopeful for discharge home tomorrow.  Will start patient  on Prednisone 40mg today.    DVT Prophylaxis: Lovenox  Code Status: Full Code  Diet: Regular  Discharge:  Home when stable    Destiny Nichole, APRN  06/17/21  12:24 EDT    Dictated utilizing Dragon dictation.

## 2021-06-17 NOTE — PLAN OF CARE
Goal Outcome Evaluation:  Plan of Care Reviewed With: patient           Outcome Summary: VSS.  NO acute events noted.  O2 sats stable on 3L NC.

## 2021-06-18 VITALS
DIASTOLIC BLOOD PRESSURE: 79 MMHG | WEIGHT: 192.24 LBS | HEART RATE: 81 BPM | RESPIRATION RATE: 18 BRPM | OXYGEN SATURATION: 94 % | BODY MASS INDEX: 25.48 KG/M2 | HEIGHT: 73 IN | SYSTOLIC BLOOD PRESSURE: 116 MMHG | TEMPERATURE: 98.2 F

## 2021-06-18 LAB
ANION GAP SERPL CALCULATED.3IONS-SCNC: 11.1 MMOL/L (ref 5–15)
BUN SERPL-MCNC: 18 MG/DL (ref 6–20)
BUN/CREAT SERPL: 22.5 (ref 7–25)
CALCIUM SPEC-SCNC: 9.1 MG/DL (ref 8.6–10.5)
CHLORIDE SERPL-SCNC: 102 MMOL/L (ref 98–107)
CO2 SERPL-SCNC: 22.9 MMOL/L (ref 22–29)
CREAT SERPL-MCNC: 0.8 MG/DL (ref 0.76–1.27)
DEPRECATED RDW RBC AUTO: 40.1 FL (ref 37–54)
ERYTHROCYTE [DISTWIDTH] IN BLOOD BY AUTOMATED COUNT: 13.2 % (ref 12.3–15.4)
GFR SERPL CREATININE-BSD FRML MDRD: 104 ML/MIN/1.73
GLUCOSE SERPL-MCNC: 83 MG/DL (ref 65–99)
HCT VFR BLD AUTO: 49.6 % (ref 37.5–51)
HGB BLD-MCNC: 16 G/DL (ref 13–17.7)
MCH RBC QN AUTO: 27 PG (ref 26.6–33)
MCHC RBC AUTO-ENTMCNC: 32.3 G/DL (ref 31.5–35.7)
MCV RBC AUTO: 83.8 FL (ref 79–97)
PLATELET # BLD AUTO: 208 10*3/MM3 (ref 140–450)
PMV BLD AUTO: 10.5 FL (ref 6–12)
POTASSIUM SERPL-SCNC: 4.4 MMOL/L (ref 3.5–5.2)
RBC # BLD AUTO: 5.92 10*6/MM3 (ref 4.14–5.8)
SODIUM SERPL-SCNC: 136 MMOL/L (ref 136–145)
WBC # BLD AUTO: 10.87 10*3/MM3 (ref 3.4–10.8)

## 2021-06-18 PROCEDURE — 63710000001 PREDNISONE PER 1 MG: Performed by: NURSE PRACTITIONER

## 2021-06-18 PROCEDURE — 25010000002 CEFTRIAXONE SODIUM-DEXTROSE 1-3.74 GM-%(50ML) RECONSTITUTED SOLUTION: Performed by: NURSE PRACTITIONER

## 2021-06-18 PROCEDURE — 94799 UNLISTED PULMONARY SVC/PX: CPT

## 2021-06-18 PROCEDURE — 94618 PULMONARY STRESS TESTING: CPT

## 2021-06-18 PROCEDURE — 85027 COMPLETE CBC AUTOMATED: CPT | Performed by: NURSE PRACTITIONER

## 2021-06-18 PROCEDURE — 80048 BASIC METABOLIC PNL TOTAL CA: CPT | Performed by: NURSE PRACTITIONER

## 2021-06-18 PROCEDURE — 99238 HOSP IP/OBS DSCHRG MGMT 30/<: CPT | Performed by: NURSE PRACTITIONER

## 2021-06-18 RX ORDER — DOXYCYCLINE 100 MG/1
100 CAPSULE ORAL EVERY 12 HOURS SCHEDULED
Qty: 3 CAPSULE | Refills: 0 | Status: SHIPPED | OUTPATIENT
Start: 2021-06-18 | End: 2021-06-20

## 2021-06-18 RX ORDER — PREDNISONE 20 MG/1
40 TABLET ORAL
Qty: 4 TABLET | Refills: 0 | Status: SHIPPED | OUTPATIENT
Start: 2021-06-20 | End: 2021-06-22

## 2021-06-18 RX ORDER — CEFDINIR 300 MG/1
300 CAPSULE ORAL 2 TIMES DAILY
Qty: 6 CAPSULE | Refills: 0 | Status: SHIPPED | OUTPATIENT
Start: 2021-06-18 | End: 2021-06-21

## 2021-06-18 RX ADMIN — BUDESONIDE AND FORMOTEROL FUMARATE DIHYDRATE 2 PUFF: 160; 4.5 AEROSOL RESPIRATORY (INHALATION) at 06:46

## 2021-06-18 RX ADMIN — PREDNISONE 40 MG: 20 TABLET ORAL at 08:16

## 2021-06-18 RX ADMIN — ALBUTEROL SULFATE 2.5 MG: 2.5 SOLUTION RESPIRATORY (INHALATION) at 10:30

## 2021-06-18 RX ADMIN — PANTOPRAZOLE SODIUM 40 MG: 40 TABLET, DELAYED RELEASE ORAL at 06:23

## 2021-06-18 RX ADMIN — CEFTRIAXONE 1 G: 1 INJECTION, SOLUTION INTRAVENOUS at 12:00

## 2021-06-18 RX ADMIN — METHADONE HYDROCHLORIDE 60 MG: 10 TABLET ORAL at 08:16

## 2021-06-18 RX ADMIN — DOXYCYCLINE 100 MG: 100 CAPSULE ORAL at 08:16

## 2021-06-18 RX ADMIN — SODIUM CHLORIDE, PRESERVATIVE FREE 10 ML: 5 INJECTION INTRAVENOUS at 08:15

## 2021-06-18 RX ADMIN — ALBUTEROL SULFATE 2.5 MG: 2.5 SOLUTION RESPIRATORY (INHALATION) at 06:46

## 2021-06-18 NOTE — PROGRESS NOTES
Exercise Oximetry    Patient Name:Topher Stoll   MRN: [id number]   Date: 06/18/21             ROOM AIR BASELINE   SpO2% 94   Heart Rate 82   Blood Pressure      EXERCISE ON ROOM AIR SpO2% EXERCISE ON O2 @ r/a LPM SpO2%   1 MINUTE 94 1 MINUTE    2 MINUTES 93 2 MINUTES    3 MINUTES 91 3 MINUTES    4 MINUTES 89 4 MINUTES    5 MINUTES 90 5 MINUTES    6 MINUTES 90 6 MINUTES               Distance Walked  200 Distance Walked   Dyspnea (Caitie Scale)  7 Dyspnea (Caitie Scale)   Fatigue (Caitie Scale)  7 Fatigue (Caitie Scale)   SpO2% Post Exercise  90 SpO2% Post Exercise   HR Post Exercise  97 HR Post Exercise   Time to Recovery  2 Time to Recovery     Comments: pt did well, walked unassisted stated he was a little fatigued and SOA, but did ok, sats maintained throughout walk...  No o2 needed at this time

## 2021-06-18 NOTE — CASE MANAGEMENT/SOCIAL WORK
Case Management Discharge Note               Selected Continued Care - Admitted Since 6/15/2021              Durable Medical Equipment Coordination complete    Service Provider Selected Services Address Phone Fax Patient Preferred    PATIENT [facility]  Durable Medical Equipment [address], [place] [id number] [phone] --       Internal Comment last updated by Naa Moore, RN 6/18/2021 3741    Current PRN oxygen/CPAP provider.  No new orders for oxygen.                       Transportation Services  Private: Car    Final Discharge Disposition Code: 01 - home or self-care

## 2021-06-18 NOTE — PLAN OF CARE
Goal Outcome Evaluation:              Outcome Summary: Pt resting well this shift.  Up ad hosea in room.  Continues to wear oxygen at 2 lpm/nc.  Telemetry monitoring continued.  Labs monitored daily.  Pt anticipates being able to return home today.

## 2021-06-18 NOTE — DISCHARGE INSTRUCTIONS
You are being discharged home today.  Continue your home oxygen PRN as you did not need any supplemental when checked here at the hospital.  I am sending you with antibiotics (Doxycycline and Omnicef) to complete 5 days, as well as Prednisone (steroids) burst for 2 additional days.  Continue using your nebulizers at home every 4 hours as needed.  Follow up as scheduled with your PCP on Monday for a recheck.  Return to the hospital with any increased oxygen usage, worsening shortness of breath, or chest pain.

## 2021-06-19 ENCOUNTER — READMISSION MANAGEMENT (OUTPATIENT)
Dept: CALL CENTER | Facility: HOSPITAL | Age: 46
End: 2021-06-19

## 2021-06-19 NOTE — OUTREACH NOTE
Prep Survey      Responses   [facility] discharged from?  [facility]   Is LACE score < 7 ?  No   Emergency Room discharge w/ pulse ox?  No   Eligibility  Readm Mgmt   Discharge diagnosis  COPD   Does the patient have one of the following disease processes/diagnoses(primary or secondary)?  COPD/Pneumonia   Does the patient have Home health ordered?  No   Is there a DME ordered?  No   Prep survey completed?  Yes          Charla Chaparro RN

## 2021-06-20 LAB
BACTERIA SPEC AEROBE CULT: NORMAL
BACTERIA SPEC AEROBE CULT: NORMAL

## 2021-06-23 ENCOUNTER — READMISSION MANAGEMENT (OUTPATIENT)
Dept: CALL CENTER | Facility: HOSPITAL | Age: 46
End: 2021-06-23

## 2021-06-23 NOTE — OUTREACH NOTE
COPD/PN Week 1 Survey      Responses   [facility] patient discharged from?  [facility]   Does the patient have one of the following disease processes/diagnoses(primary or secondary)?  COPD/Pneumonia   Was the primary reason for admission:  Pneumonia   Week 1 attempt successful?  Yes   Call start time  0910   Call end time  0912   Discharge diagnosis  COPD   Meds reviewed with patient/caregiver?  Yes   Is the patient having any side effects they believe may be caused by any medication additions or changes?  No   Does the patient have all medications ordered at discharge?  Yes   Is the patient taking all medications as directed (includes completed medication regime)?  Yes   Does the patient have a primary care provider?   Yes   Does the patient have an appointment with their PCP or specialist within 7 days of discharge?  Yes   Comments regarding PCP  PCP APPOINTMENT IS THIS FRIDAY 6/25/21   Has the patient kept scheduled appointments due by today?  N/A   Has home health visited the patient within 72 hours of discharge?  N/A   Pulse Ox monitoring  None [PATIENT DOES NOT HAVE A PULSE OXIMETER. HE IS SEEING HIS PCP FRIDAY AND IS GOING TO INQUIRE ABOUT GETTING A PRESCRIPTION FOR ONE TO SEE IF INSURANCE WILL COVER IT. ]   Psychosocial issues?  No   Did the patient receive a copy of their discharge instructions?  Yes   Nursing interventions  Reviewed instructions with patient   What is the patient's perception of their health status since discharge?  Improving   Nursing Interventions  Nurse provided patient education   If the patient is a current smoker, are they able to teach back resources for cessation?  Smoking cessation support groups   Is the patient/caregiver able to teach back the hierarchy of who to call/visit for symptoms/problems? PCP, Specialist, Home health nurse, Urgent Care, ED, 911  Yes   Is the patient/caregiver able to teach back signs and symptoms of worsening condition:  Fever/chills, Shortness of  breath, Chest pain   Is the patient/caregiver able to teach back importance of completing antibiotic course of treatment?  Yes   Week 1 call completed?  Yes          Cony Davis LPN

## 2021-07-01 ENCOUNTER — READMISSION MANAGEMENT (OUTPATIENT)
Dept: CALL CENTER | Facility: HOSPITAL | Age: 46
End: 2021-07-01

## 2021-07-01 NOTE — OUTREACH NOTE
COPD/PN Week 2 Survey      Responses   [facility] patient discharged from?  [facility]   Does the patient have one of the following disease processes/diagnoses(primary or secondary)?  COPD/Pneumonia   Was the primary reason for admission:  Pneumonia   Week 2 attempt successful?  Yes   Call start time  1233   Call end time  1235   Is patient permission given to speak with other caregiver?  Yes   List who call center can speak with  MASHA SOLORZANO   Person spoke with today (if not patient) and relationship  MASHA SOLORZANO- MOTHER   Meds reviewed with patient/caregiver?  Yes   Is the patient having any side effects they believe may be caused by any medication additions or changes?  No   Does the patient have all medications ordered at discharge?  Yes   Is the patient taking all medications as directed (includes completed medication regime)?  Yes   Does the patient have a primary care provider?   Yes   Comments regarding PCP  PATIENT HAS SEEN HIS PCP   Has the patient kept scheduled appointments due by today?  Yes   Has home health visited the patient within 72 hours of discharge?  N/A   Pulse Ox monitoring  None   Psychosocial issues?  No   Did the patient receive a copy of their discharge instructions?  Yes   Nursing interventions  Reviewed instructions with patient   What is the patient's perception of their health status since discharge?  Improving   Nursing Interventions  Nurse provided patient education   If the patient is a current smoker, are they able to teach back resources for cessation?  Smoking cessation classes   Is the patient/caregiver able to teach back the hierarchy of who to call/visit for symptoms/problems? PCP, Specialist, Home health nurse, Urgent Care, ED, 911  Yes   Is the patient able to teach back COPD zones?  Yes   Nursing interventions  Education provided on various zones   Patient reports what zone on this call?  Green Zone   Green Zone  Reports doing well, Breathing without shortness of breath,  Usual activity and exercise level, Usual amount of phlegm/mucus without difficulty coughing up, Sleeping well, Appetite is good   Green Zone interventions:  Take daily medications, Use oxygen as prescribed, Avoid indoor/outdoor triggers, Continue regular exercise/diet plan   Is the patient/caregiver able to teach back signs and symptoms of worsening condition:  Fever/chills, Chest pain, Shortness of breath   Is the patient/caregiver able to teach back importance of completing antibiotic course of treatment?  Yes   Week 2 call completed?  Yes          Cony Davis LPN

## 2021-07-09 ENCOUNTER — READMISSION MANAGEMENT (OUTPATIENT)
Dept: CALL CENTER | Facility: HOSPITAL | Age: 46
End: 2021-07-09

## 2021-07-09 NOTE — OUTREACH NOTE
COPD/PN Week 4 Survey      Responses   [facility] patient discharged from?  [facility]   Does the patient have one of the following disease processes/diagnoses(primary or secondary)?  COPD/Pneumonia   Was the primary reason for admission:  Pneumonia   Call start time  1541   Call end time  1554   Discharge diagnosis  COPD   Meds reviewed with patient/caregiver?  Yes   Is the patient having any side effects they believe may be caused by any medication additions or changes?  No   Is the patient taking all medications as directed (includes completed medication regime)?  Yes   Medication comments  Enc'd to complete antibiotics and steroids until completely gone   Has the patient kept scheduled appointments due by today?  Yes   Pulse Ox monitoring  Intermittent   Pulse Ox device source  Patient   O2 Sat comments  Oxygen at 2lpm per nc prn,  enc'd to wear oxygen keep sats above 90% as he reports they have decreased to 70% at times.     O2 Sat: education provided  Sat levels, Monitoring frequency, When to seek care   Psychosocial issues?  No   What is the patient's perception of their health status since discharge?  Worsening [Felt good for about one week then started feeling worse with cough, congestion,  f/u with MD who reststarted steroids/antibiotics]   Nursing Interventions  Nurse provided patient education   Are the patient's immunizations up to date?   Yes   If the patient is a current smoker, are they able to teach back resources for cessation?  Not a smoker [Quit smoking 15 years ago]   Is the patient/caregiver able to teach back the hierarchy of who to call/visit for symptoms/problems? PCP, Specialist, Home health nurse, Urgent Care, ED, 911  Yes   Is the patient/caregiver able to teach back signs and symptoms of worsening condition:  Fever/chills, Chest pain, Shortness of breath [Enc'd to use IS, Flutter valve as he reported he has one for use.  Reviewed all his respiratory treatments and enc't to use as  ordered.  Also discussed nurse call center line for other questions,  v/u.Pt understands when to return to ED.]   Is the patient/caregiver able to teach back importance of completing antibiotic course of treatment?  Yes          Farrah Callejas RN

## 2021-07-16 ENCOUNTER — APPOINTMENT (OUTPATIENT)
Dept: GENERAL RADIOLOGY | Facility: HOSPITAL | Age: 46
End: 2021-07-16

## 2021-07-16 ENCOUNTER — HOSPITAL ENCOUNTER (EMERGENCY)
Facility: HOSPITAL | Age: 46
Discharge: HOME | End: 2021-07-16
Attending: EMERGENCY MEDICINE | Admitting: EMERGENCY MEDICINE

## 2021-07-16 VITALS
HEART RATE: 80 BPM | WEIGHT: 190 LBS | TEMPERATURE: 98.2 F | DIASTOLIC BLOOD PRESSURE: 95 MMHG | BODY MASS INDEX: 25.18 KG/M2 | OXYGEN SATURATION: 96 % | HEIGHT: 73 IN | RESPIRATION RATE: 16 BRPM | SYSTOLIC BLOOD PRESSURE: 126 MMHG

## 2021-07-16 DIAGNOSIS — J44.1 COPD EXACERBATION (HCC): Primary | ICD-10-CM

## 2021-07-16 LAB
A-A DO2: 13.2 MMHG
ALBUMIN SERPL-MCNC: 4.5 G/DL (ref 3.5–5.2)
ALBUMIN/GLOB SERPL: 1.6 G/DL
ALP SERPL-CCNC: 124 U/L (ref 39–117)
ALT SERPL W P-5'-P-CCNC: 21 U/L (ref 1–41)
ANION GAP SERPL CALCULATED.3IONS-SCNC: 11.4 MMOL/L (ref 5–15)
ARTERIAL PATENCY WRIST A: ABNORMAL
AST SERPL-CCNC: 18 U/L (ref 1–40)
ATMOSPHERIC PRESS: 736 MMHG
BASE EXCESS BLDA CALC-SCNC: 0.3 MMOL/L (ref 0–2)
BASOPHILS # BLD AUTO: 0.05 10*3/MM3 (ref 0–0.2)
BASOPHILS NFR BLD AUTO: 0.7 % (ref 0–1.5)
BDY SITE: ABNORMAL
BILIRUB SERPL-MCNC: 0.4 MG/DL (ref 0–1.2)
BUN SERPL-MCNC: 15 MG/DL (ref 6–20)
BUN/CREAT SERPL: 14.7 (ref 7–25)
CALCIUM SPEC-SCNC: 8.8 MG/DL (ref 8.6–10.5)
CHLORIDE SERPL-SCNC: 99 MMOL/L (ref 98–107)
CO2 SERPL-SCNC: 26.6 MMOL/L (ref 22–29)
COHGB MFR BLD: <0.6 % (ref 0–2)
CREAT SERPL-MCNC: 1.02 MG/DL (ref 0.76–1.27)
DEPRECATED RDW RBC AUTO: 40.9 FL (ref 37–54)
EOSINOPHIL # BLD AUTO: 0.78 10*3/MM3 (ref 0–0.4)
EOSINOPHIL NFR BLD AUTO: 11.6 % (ref 0.3–6.2)
ERYTHROCYTE [DISTWIDTH] IN BLOOD BY AUTOMATED COUNT: 13.4 % (ref 12.3–15.4)
GAS FLOW AIRWAY: 2 LPM
GFR SERPL CREATININE-BSD FRML MDRD: 79 ML/MIN/1.73
GLOBULIN UR ELPH-MCNC: 2.8 GM/DL
GLUCOSE SERPL-MCNC: 102 MG/DL (ref 65–99)
HCO3 BLDA-SCNC: 26.5 MMOL/L (ref 22–28)
HCT VFR BLD AUTO: 50.4 % (ref 37.5–51)
HCT VFR BLD CALC: 50.1 %
HGB BLD-MCNC: 16.1 G/DL (ref 13–17.7)
HOLD SPECIMEN: NORMAL
HOLD SPECIMEN: NORMAL
IMM GRANULOCYTES # BLD AUTO: 0.02 10*3/MM3 (ref 0–0.05)
IMM GRANULOCYTES NFR BLD AUTO: 0.3 % (ref 0–0.5)
LYMPHOCYTES # BLD AUTO: 1.67 10*3/MM3 (ref 0.7–3.1)
LYMPHOCYTES NFR BLD AUTO: 24.7 % (ref 19.6–45.3)
Lab: ABNORMAL
MCH RBC QN AUTO: 26.7 PG (ref 26.6–33)
MCHC RBC AUTO-ENTMCNC: 31.9 G/DL (ref 31.5–35.7)
MCV RBC AUTO: 83.6 FL (ref 79–97)
METHGB BLD QL: 0.7 % (ref 0–1.5)
MODALITY: ABNORMAL
MONOCYTES # BLD AUTO: 0.62 10*3/MM3 (ref 0.1–0.9)
MONOCYTES NFR BLD AUTO: 9.2 % (ref 5–12)
NEUTROPHILS NFR BLD AUTO: 3.61 10*3/MM3 (ref 1.7–7)
NEUTROPHILS NFR BLD AUTO: 53.5 % (ref 42.7–76)
NOTE: ABNORMAL
NRBC BLD AUTO-RTO: 0 /100 WBC (ref 0–0.2)
NT-PROBNP SERPL-MCNC: 112.9 PG/ML (ref 0–450)
OXYHGB MFR BLDV: 94.3 % (ref 94–99)
PCO2 BLDA: 47.3 MM HG (ref 35–45)
PCO2 TEMP ADJ BLD: ABNORMAL MM[HG]
PH BLDA: 7.36 PH UNITS (ref 7.35–7.45)
PH, TEMP CORRECTED: ABNORMAL
PLATELET # BLD AUTO: 205 10*3/MM3 (ref 140–450)
PMV BLD AUTO: 9.9 FL (ref 6–12)
PO2 BLDA: 78.1 MM HG (ref 75–100)
PO2 TEMP ADJ BLD: ABNORMAL MM[HG]
POTASSIUM SERPL-SCNC: 4.2 MMOL/L (ref 3.5–5.2)
PROT SERPL-MCNC: 7.3 G/DL (ref 6–8.5)
RBC # BLD AUTO: 6.03 10*6/MM3 (ref 4.14–5.8)
SAO2 % BLDCOA: 95.4 % (ref 94–100)
SODIUM SERPL-SCNC: 137 MMOL/L (ref 136–145)
TROPONIN T SERPL-MCNC: <0.01 NG/ML (ref 0–0.03)
VENTILATOR MODE: ABNORMAL
WBC # BLD AUTO: 6.75 10*3/MM3 (ref 3.4–10.8)
WHOLE BLOOD HOLD SPECIMEN: NORMAL

## 2021-07-16 PROCEDURE — 93005 ELECTROCARDIOGRAM TRACING: CPT | Performed by: PHYSICIAN ASSISTANT

## 2021-07-16 PROCEDURE — 83880 ASSAY OF NATRIURETIC PEPTIDE: CPT | Performed by: EMERGENCY MEDICINE

## 2021-07-16 PROCEDURE — 25010000002 MAGNESIUM SULFATE 2 GM/50ML SOLUTION: Performed by: PHYSICIAN ASSISTANT

## 2021-07-16 PROCEDURE — 96365 THER/PROPH/DIAG IV INF INIT: CPT

## 2021-07-16 PROCEDURE — 99283 EMERGENCY DEPT VISIT LOW MDM: CPT

## 2021-07-16 PROCEDURE — 85025 COMPLETE CBC W/AUTO DIFF WBC: CPT | Performed by: EMERGENCY MEDICINE

## 2021-07-16 PROCEDURE — 36600 WITHDRAWAL OF ARTERIAL BLOOD: CPT

## 2021-07-16 PROCEDURE — 71045 X-RAY EXAM CHEST 1 VIEW: CPT

## 2021-07-16 PROCEDURE — 84484 ASSAY OF TROPONIN QUANT: CPT | Performed by: EMERGENCY MEDICINE

## 2021-07-16 PROCEDURE — 83050 HGB METHEMOGLOBIN QUAN: CPT

## 2021-07-16 PROCEDURE — 82375 ASSAY CARBOXYHB QUANT: CPT

## 2021-07-16 PROCEDURE — 94640 AIRWAY INHALATION TREATMENT: CPT

## 2021-07-16 PROCEDURE — 25010000002 METHYLPREDNISOLONE PER 125 MG: Performed by: PHYSICIAN ASSISTANT

## 2021-07-16 PROCEDURE — 96375 TX/PRO/DX INJ NEW DRUG ADDON: CPT

## 2021-07-16 PROCEDURE — 93005 ELECTROCARDIOGRAM TRACING: CPT | Performed by: EMERGENCY MEDICINE

## 2021-07-16 PROCEDURE — 80053 COMPREHEN METABOLIC PANEL: CPT | Performed by: EMERGENCY MEDICINE

## 2021-07-16 PROCEDURE — 82805 BLOOD GASES W/O2 SATURATION: CPT

## 2021-07-16 PROCEDURE — 99284 EMERGENCY DEPT VISIT MOD MDM: CPT

## 2021-07-16 RX ORDER — METHYLPREDNISOLONE SODIUM SUCCINATE 125 MG/2ML
125 INJECTION, POWDER, LYOPHILIZED, FOR SOLUTION INTRAMUSCULAR; INTRAVENOUS ONCE
Status: COMPLETED | OUTPATIENT
Start: 2021-07-16 | End: 2021-07-16

## 2021-07-16 RX ORDER — MAGNESIUM SULFATE HEPTAHYDRATE 40 MG/ML
2 INJECTION, SOLUTION INTRAVENOUS ONCE
Status: COMPLETED | OUTPATIENT
Start: 2021-07-16 | End: 2021-07-16

## 2021-07-16 RX ORDER — SODIUM CHLORIDE 0.9 % (FLUSH) 0.9 %
10 SYRINGE (ML) INJECTION AS NEEDED
Status: DISCONTINUED | OUTPATIENT
Start: 2021-07-16 | End: 2021-07-16 | Stop reason: HOSPADM

## 2021-07-16 RX ORDER — IPRATROPIUM BROMIDE AND ALBUTEROL SULFATE 2.5; .5 MG/3ML; MG/3ML
9 SOLUTION RESPIRATORY (INHALATION) ONCE
Status: COMPLETED | OUTPATIENT
Start: 2021-07-16 | End: 2021-07-16

## 2021-07-16 RX ORDER — PREDNISONE 10 MG/1
TABLET ORAL
Qty: 45 TABLET | Refills: 0 | Status: SHIPPED | OUTPATIENT
Start: 2021-07-16 | End: 2021-09-15

## 2021-07-16 RX ADMIN — MAGNESIUM SULFATE HEPTAHYDRATE 2 G: 2 INJECTION, SOLUTION INTRAVENOUS at 14:24

## 2021-07-16 RX ADMIN — METHYLPREDNISOLONE SODIUM SUCCINATE 125 MG: 125 INJECTION, POWDER, FOR SOLUTION INTRAMUSCULAR; INTRAVENOUS at 14:24

## 2021-07-16 RX ADMIN — IPRATROPIUM BROMIDE AND ALBUTEROL SULFATE 9 ML: .5; 3 SOLUTION RESPIRATORY (INHALATION) at 14:08

## 2021-07-16 NOTE — DISCHARGE INSTRUCTIONS
I sent in a longer taper of prednisone.  Take it as prescribed.  Continue using your albuterol nebulizer that you have at home.  Use your oxygen as needed for shortness of breath.  Return to the ED with new or worsening symptoms.  Otherwise follow-up with your primary care doctor.
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
## 2021-07-21 ENCOUNTER — READMISSION MANAGEMENT (OUTPATIENT)
Dept: CALL CENTER | Facility: HOSPITAL | Age: 46
End: 2021-07-21

## 2021-07-21 NOTE — OUTREACH NOTE
COPD/PN Week 4 Survey      Responses   [facility] patient discharged from?  [facility]   Does the patient have one of the following disease processes/diagnoses(primary or secondary)?  COPD/Pneumonia   Was the primary reason for admission:  Pneumonia   Week 4 attempt successful?  Yes   Call start time  0932   Call end time  0947   Discharge diagnosis  COPD   Person spoke with today (if not patient) and relationship  MASHA SOLORZANO- MOTHER   Medication alerts for this patient  Went to ER recently, waited for few hrs, did not see him, so he went to his PCP. PCP gave him more steroids/abx.    Meds reviewed with patient/caregiver?  Yes   Is the patient taking all medications as directed (includes completed medication regime)?  Yes   Pulse Ox monitoring  Intermittent   Pulse Ox device source  Patient   O2 Sat comments  RA 82% encouraged/educated on what low O2 sats mean for his body, healing and future lung fxn. He was told to wear when he needed it, they did not understand that sats <90 is a need. Advised him to wear continuously right now to allow for healing as he is 4wks DC & no better. Advised to do IS q3hr WA to help lung fxn.    O2 Sat: education provided  Sat levels   Psychosocial issues?  No   Psychosocial comments  lives w/parents as his respiratory fxn is so poor they felt he was unsafe being alone.   Comments  Pt still doing poorly per Mother. Unable to perform normal daily tasks w/o extreme fatigue & SOA. He has been consistently having sats in 80's per Mother. Advised to wear O2 continuously at this time to allow his body to heal while on this round of meds, then he can try to wean off after f/u with MD and discussing with MD.    What is the patient's perception of their health status since discharge?  Worsening   If the patient is a current smoker, are they able to teach back resources for cessation?  Not a smoker   Is the patient/caregiver able to teach back the hierarchy of who to call/visit for  symptoms/problems? PCP, Specialist, Home health nurse, Urgent Care, ED, 911  Yes   Patient reports what zone on this call?  Yellow Zone   Yellow Zone  Increased shortness of air, Unable to complete daily activities, Increased or thicker phlegm/mucus, Using quick relief inhaler/nebulizer more often, Poor sleep and symptoms work patient up   Yellow interventions  Continue to use daily medications, Use quick relief inhaler as ordered, Use oxygen as ordered, Do not smoke, Get plenty of rest   Is the patient/caregiver able to teach back signs and symptoms of worsening condition:  Fever/chills, Shortness of breath, Chest pain   Is the patient/caregiver able to teach back importance of completing antibiotic course of treatment?  Yes   Week 4 call completed?  Yes   Would the patient like one additional call?  Yes          Charla Ham RN

## 2021-08-02 ENCOUNTER — READMISSION MANAGEMENT (OUTPATIENT)
Dept: CALL CENTER | Facility: HOSPITAL | Age: 46
End: 2021-08-02

## 2021-08-02 NOTE — OUTREACH NOTE
COPD/PN Week 5 Survey      Responses   [facility] patient discharged from?  [facility]   Does the patient have one of the following disease processes/diagnoses(primary or secondary)?  COPD/Pneumonia   Was the primary reason for admission:  Pneumonia   Week 5 attempt successful?  No          Cony Davis LPN

## 2021-08-24 DIAGNOSIS — J43.1 PANLOBULAR EMPHYSEMA (HCC): ICD-10-CM

## 2021-08-24 RX ORDER — ALBUTEROL SULFATE 90 UG/1
AEROSOL, METERED RESPIRATORY (INHALATION)
Qty: 18 G | Refills: 5 | OUTPATIENT
Start: 2021-08-24

## 2021-08-25 DIAGNOSIS — J43.1 PANLOBULAR EMPHYSEMA (HCC): ICD-10-CM

## 2021-08-25 RX ORDER — ALBUTEROL SULFATE 90 UG/1
2 AEROSOL, METERED RESPIRATORY (INHALATION) EVERY 4 HOURS PRN
Qty: 18 G | Refills: 5 | Status: SHIPPED | OUTPATIENT
Start: 2021-08-25 | End: 2022-04-25 | Stop reason: SDUPTHER

## 2021-09-01 ENCOUNTER — HOSPITAL ENCOUNTER (EMERGENCY)
Facility: HOSPITAL | Age: 46
Discharge: HOME | End: 2021-09-01
Attending: EMERGENCY MEDICINE | Admitting: EMERGENCY MEDICINE

## 2021-09-01 ENCOUNTER — APPOINTMENT (OUTPATIENT)
Dept: GENERAL RADIOLOGY | Facility: HOSPITAL | Age: 46
End: 2021-09-01

## 2021-09-01 VITALS
WEIGHT: 190 LBS | HEIGHT: 73 IN | HEART RATE: 86 BPM | SYSTOLIC BLOOD PRESSURE: 110 MMHG | RESPIRATION RATE: 34 BRPM | DIASTOLIC BLOOD PRESSURE: 76 MMHG | TEMPERATURE: 98.3 F | OXYGEN SATURATION: 94 % | BODY MASS INDEX: 25.18 KG/M2

## 2021-09-01 DIAGNOSIS — J44.1 COPD EXACERBATION (HCC): Primary | ICD-10-CM

## 2021-09-01 LAB
A-A DO2: 10.4 MMHG
ALBUMIN SERPL-MCNC: 4.3 G/DL (ref 3.5–5.2)
ALBUMIN/GLOB SERPL: 1.2 G/DL
ALP SERPL-CCNC: 129 U/L (ref 39–117)
ALT SERPL W P-5'-P-CCNC: 12 U/L (ref 1–41)
ANION GAP SERPL CALCULATED.3IONS-SCNC: 9.9 MMOL/L (ref 5–15)
ARTERIAL PATENCY WRIST A: ABNORMAL
AST SERPL-CCNC: 16 U/L (ref 1–40)
ATMOSPHERIC PRESS: 726 MMHG
ATMOSPHERIC PRESS: 729 MMHG
BASE EXCESS BLDA CALC-SCNC: 1.6 MMOL/L (ref 0–2)
BASE EXCESS BLDV CALC-SCNC: 3.9 MMOL/L (ref 0–2)
BASOPHILS # BLD AUTO: 0.08 10*3/MM3 (ref 0–0.2)
BASOPHILS NFR BLD AUTO: 0.6 % (ref 0–1.5)
BDY SITE: ABNORMAL
BDY SITE: ABNORMAL
BILIRUB SERPL-MCNC: 0.2 MG/DL (ref 0–1.2)
BUN SERPL-MCNC: 10 MG/DL (ref 6–20)
BUN/CREAT SERPL: 8.8 (ref 7–25)
CALCIUM SPEC-SCNC: 9.2 MG/DL (ref 8.6–10.5)
CHLORIDE SERPL-SCNC: 101 MMOL/L (ref 98–107)
CO2 SERPL-SCNC: 30.1 MMOL/L (ref 22–29)
COHGB MFR BLD: 0.7 % (ref 0–2)
COHGB MFR BLD: 1 % (ref 0–5)
CREAT SERPL-MCNC: 1.13 MG/DL (ref 0.76–1.27)
D-LACTATE SERPL-SCNC: 1.7 MMOL/L (ref 0.5–2)
DEPRECATED RDW RBC AUTO: 42.1 FL (ref 37–54)
EOSINOPHIL # BLD AUTO: 1.09 10*3/MM3 (ref 0–0.4)
EOSINOPHIL NFR BLD AUTO: 8.8 % (ref 0.3–6.2)
ERYTHROCYTE [DISTWIDTH] IN BLOOD BY AUTOMATED COUNT: 13.4 % (ref 12.3–15.4)
GAS FLOW AIRWAY: 2 LPM
GAS FLOW AIRWAY: 4 LPM
GFR SERPL CREATININE-BSD FRML MDRD: 70 ML/MIN/1.73
GLOBULIN UR ELPH-MCNC: 3.5 GM/DL
GLUCOSE SERPL-MCNC: 132 MG/DL (ref 65–99)
HCO3 BLDA-SCNC: 29.8 MMOL/L (ref 22–28)
HCO3 BLDV-SCNC: 32 MMOL/L (ref 22–28)
HCT VFR BLD AUTO: 50.3 % (ref 37.5–51)
HCT VFR BLD CALC: 46.8 %
HGB BLD-MCNC: 15.8 G/DL (ref 13–17.7)
HOLD SPECIMEN: NORMAL
HOLD SPECIMEN: NORMAL
IMM GRANULOCYTES # BLD AUTO: 0.03 10*3/MM3 (ref 0–0.05)
IMM GRANULOCYTES NFR BLD AUTO: 0.2 % (ref 0–0.5)
LYMPHOCYTES # BLD AUTO: 1.82 10*3/MM3 (ref 0.7–3.1)
LYMPHOCYTES NFR BLD AUTO: 14.7 % (ref 19.6–45.3)
Lab: ABNORMAL
Lab: ABNORMAL
MCH RBC QN AUTO: 27 PG (ref 26.6–33)
MCHC RBC AUTO-ENTMCNC: 31.4 G/DL (ref 31.5–35.7)
MCV RBC AUTO: 85.8 FL (ref 79–97)
METHGB BLD QL: 0.5 % (ref 0–1.5)
METHGB BLD QL: 0.6 % (ref 0–3)
MODALITY: ABNORMAL
MODALITY: ABNORMAL
MONOCYTES # BLD AUTO: 0.63 10*3/MM3 (ref 0.1–0.9)
MONOCYTES NFR BLD AUTO: 5.1 % (ref 5–12)
NEUTROPHILS NFR BLD AUTO: 70.6 % (ref 42.7–76)
NEUTROPHILS NFR BLD AUTO: 8.69 10*3/MM3 (ref 1.7–7)
NOTE: ABNORMAL
NOTE: ABNORMAL
NRBC BLD AUTO-RTO: 0 /100 WBC (ref 0–0.2)
OXYHGB MFR BLDV: 49 % (ref 40–70)
OXYHGB MFR BLDV: 91.2 % (ref 94–99)
PCO2 BLDA: 60.6 MM HG (ref 35–45)
PCO2 BLDV: 61.9 MM HG (ref 40–50)
PCO2 TEMP ADJ BLD: ABNORMAL MM[HG]
PH BLDA: 7.3 PH UNITS (ref 7.35–7.45)
PH BLDV: 7.32 PH UNITS (ref 7.32–7.42)
PH, TEMP CORRECTED: ABNORMAL
PLATELET # BLD AUTO: 212 10*3/MM3 (ref 140–450)
PMV BLD AUTO: 9.4 FL (ref 6–12)
PO2 BLDA: 63.8 MM HG (ref 75–100)
PO2 BLDV: 25.7 MM HG (ref 30–50)
PO2 TEMP ADJ BLD: ABNORMAL MM[HG]
POTASSIUM SERPL-SCNC: 4.2 MMOL/L (ref 3.5–5.2)
PROT SERPL-MCNC: 7.8 G/DL (ref 6–8.5)
RBC # BLD AUTO: 5.86 10*6/MM3 (ref 4.14–5.8)
SAO2 % BLDCOA: 92.3 % (ref 94–100)
SAO2 % BLDCOV: 49.8 % (ref 45–75)
SARS-COV-2 RNA PNL SPEC NAA+PROBE: NOT DETECTED
SODIUM SERPL-SCNC: 141 MMOL/L (ref 136–145)
VENTILATOR MODE: ABNORMAL
VENTILATOR MODE: ABNORMAL
WBC # BLD AUTO: 12.34 10*3/MM3 (ref 3.4–10.8)
WHOLE BLOOD HOLD SPECIMEN: NORMAL
WHOLE BLOOD HOLD SPECIMEN: NORMAL

## 2021-09-01 PROCEDURE — 96366 THER/PROPH/DIAG IV INF ADDON: CPT

## 2021-09-01 PROCEDURE — 25010000002 ONDANSETRON PER 1 MG: Performed by: EMERGENCY MEDICINE

## 2021-09-01 PROCEDURE — 94799 UNLISTED PULMONARY SVC/PX: CPT

## 2021-09-01 PROCEDURE — 96365 THER/PROPH/DIAG IV INF INIT: CPT

## 2021-09-01 PROCEDURE — 83050 HGB METHEMOGLOBIN QUAN: CPT

## 2021-09-01 PROCEDURE — 87040 BLOOD CULTURE FOR BACTERIA: CPT | Performed by: EMERGENCY MEDICINE

## 2021-09-01 PROCEDURE — 82375 ASSAY CARBOXYHB QUANT: CPT

## 2021-09-01 PROCEDURE — 36600 WITHDRAWAL OF ARTERIAL BLOOD: CPT

## 2021-09-01 PROCEDURE — 25010000002 MAGNESIUM SULFATE 2 GM/50ML SOLUTION: Performed by: EMERGENCY MEDICINE

## 2021-09-01 PROCEDURE — 80053 COMPREHEN METABOLIC PANEL: CPT | Performed by: EMERGENCY MEDICINE

## 2021-09-01 PROCEDURE — 82820 HEMOGLOBIN-OXYGEN AFFINITY: CPT

## 2021-09-01 PROCEDURE — 99284 EMERGENCY DEPT VISIT MOD MDM: CPT

## 2021-09-01 PROCEDURE — 87635 SARS-COV-2 COVID-19 AMP PRB: CPT

## 2021-09-01 PROCEDURE — 96375 TX/PRO/DX INJ NEW DRUG ADDON: CPT

## 2021-09-01 PROCEDURE — 25010000002 METHYLPREDNISOLONE PER 125 MG: Performed by: EMERGENCY MEDICINE

## 2021-09-01 PROCEDURE — 83605 ASSAY OF LACTIC ACID: CPT | Performed by: EMERGENCY MEDICINE

## 2021-09-01 PROCEDURE — 82805 BLOOD GASES W/O2 SATURATION: CPT

## 2021-09-01 PROCEDURE — 94640 AIRWAY INHALATION TREATMENT: CPT

## 2021-09-01 PROCEDURE — 93005 ELECTROCARDIOGRAM TRACING: CPT | Performed by: EMERGENCY MEDICINE

## 2021-09-01 PROCEDURE — 93005 ELECTROCARDIOGRAM TRACING: CPT

## 2021-09-01 PROCEDURE — 85025 COMPLETE CBC W/AUTO DIFF WBC: CPT | Performed by: EMERGENCY MEDICINE

## 2021-09-01 PROCEDURE — 71045 X-RAY EXAM CHEST 1 VIEW: CPT

## 2021-09-01 RX ORDER — IPRATROPIUM BROMIDE AND ALBUTEROL SULFATE 2.5; .5 MG/3ML; MG/3ML
6 SOLUTION RESPIRATORY (INHALATION) ONCE
Status: COMPLETED | OUTPATIENT
Start: 2021-09-01 | End: 2021-09-01

## 2021-09-01 RX ORDER — PREDNISONE 20 MG/1
TABLET ORAL
Qty: 18 TABLET | Refills: 0 | Status: SHIPPED | OUTPATIENT
Start: 2021-09-01 | End: 2021-09-10

## 2021-09-01 RX ORDER — ONDANSETRON 2 MG/ML
4 INJECTION INTRAMUSCULAR; INTRAVENOUS ONCE
Status: COMPLETED | OUTPATIENT
Start: 2021-09-01 | End: 2021-09-01

## 2021-09-01 RX ORDER — METHYLPREDNISOLONE SODIUM SUCCINATE 125 MG/2ML
125 INJECTION, POWDER, LYOPHILIZED, FOR SOLUTION INTRAMUSCULAR; INTRAVENOUS ONCE
Status: COMPLETED | OUTPATIENT
Start: 2021-09-01 | End: 2021-09-01

## 2021-09-01 RX ORDER — IPRATROPIUM BROMIDE AND ALBUTEROL SULFATE 2.5; .5 MG/3ML; MG/3ML
3 SOLUTION RESPIRATORY (INHALATION) ONCE
Status: COMPLETED | OUTPATIENT
Start: 2021-09-01 | End: 2021-09-01

## 2021-09-01 RX ORDER — MAGNESIUM SULFATE HEPTAHYDRATE 40 MG/ML
2 INJECTION, SOLUTION INTRAVENOUS ONCE
Status: COMPLETED | OUTPATIENT
Start: 2021-09-01 | End: 2021-09-01

## 2021-09-01 RX ORDER — DOXYCYCLINE 100 MG/1
100 CAPSULE ORAL ONCE
Status: COMPLETED | OUTPATIENT
Start: 2021-09-01 | End: 2021-09-01

## 2021-09-01 RX ORDER — SODIUM CHLORIDE 0.9 % (FLUSH) 0.9 %
10 SYRINGE (ML) INJECTION AS NEEDED
Status: DISCONTINUED | OUTPATIENT
Start: 2021-09-01 | End: 2021-09-01 | Stop reason: HOSPADM

## 2021-09-01 RX ORDER — DOXYCYCLINE 100 MG/1
100 CAPSULE ORAL 2 TIMES DAILY
Qty: 20 CAPSULE | Refills: 0 | Status: SHIPPED | OUTPATIENT
Start: 2021-09-01 | End: 2021-09-15

## 2021-09-01 RX ADMIN — IPRATROPIUM BROMIDE AND ALBUTEROL SULFATE 3 ML: .5; 3 SOLUTION RESPIRATORY (INHALATION) at 08:41

## 2021-09-01 RX ADMIN — DOXYCYCLINE 100 MG: 100 CAPSULE ORAL at 09:47

## 2021-09-01 RX ADMIN — MAGNESIUM SULFATE HEPTAHYDRATE 2 G: 2 INJECTION, SOLUTION INTRAVENOUS at 09:47

## 2021-09-01 RX ADMIN — MAGNESIUM SULFATE HEPTAHYDRATE 2 G: 2 INJECTION, SOLUTION INTRAVENOUS at 06:04

## 2021-09-01 RX ADMIN — METHYLPREDNISOLONE SODIUM SUCCINATE 125 MG: 125 INJECTION, POWDER, FOR SOLUTION INTRAMUSCULAR; INTRAVENOUS at 06:03

## 2021-09-01 RX ADMIN — IPRATROPIUM BROMIDE AND ALBUTEROL SULFATE 6 ML: .5; 3 SOLUTION RESPIRATORY (INHALATION) at 07:25

## 2021-09-01 RX ADMIN — ONDANSETRON 4 MG: 2 INJECTION INTRAMUSCULAR; INTRAVENOUS at 06:42

## 2021-09-01 NOTE — ED PROVIDER NOTES
Subjective   History of Present Illness    Chief Complaint: Shortness of breath  History of Present Illness: 45-year-old male presents with shortness of breath and sputum, history of COPD, as needed nasal cannula oxygen, 2 L at home.  Onset: 2-week  Duration: Persist  Exacerbating / Alleviating factors: Home medications without relief  Associated symptoms: None      Nurses Notes reviewed and agree, including vitals, allergies, social history and prior medical history.     REVIEW OF SYSTEMS: All systems reviewed and not pertinent unless noted.    Positive for: Shortness of breath    Negative for: Fever hemoptysis syncope chest pain palpitations  Review of Systems    Past Medical History:   Diagnosis Date   • Abdominal adhesions    • Arthritis    • Asthma    • Cervical radiculopathy    • Colitis    • GERD (gastroesophageal reflux disease)    • Heart attack (CMS/HCC)    • History of recreational drug use    • Kidney cysts    • Left hip pain    • Liver disease    • Low back pain    • Migraines    • Obstructive chronic bronchitis with exacerbation (CMS/HCC)    • Sleep apnea     mild       Allergies   Allergen Reactions   • Doxycycline Nausea And Vomiting       Past Surgical History:   Procedure Laterality Date   • BACK SURGERY     • HERNIA REPAIR     • HIP SPACER INSERTION WITH ANTIBIOTIC CEMENT Left 1/15/2020    Procedure: TOTAL HIP IMPLANT REMOVAL WITH INSERTION OF ANTIBIOTIC SPACER LEFT;  Surgeon: Ba Ramirez MD;  Location:  [facility] OR;  Service: Orthopedics   • SHOULDER LIGAMENT REPAIR      right shoulder   • SMALL INTESTINE SURGERY      Small bowell resection   • TOTAL HIP ARTHROPLASTY Left    • TOTAL HIP ARTHROPLASTY REVISION Left 3/5/2020    Procedure: HIP REIMPLANT REVISION LEFT;  Surgeon: Ba Ramirez MD;  Location:  [facility] OR;  Service: Orthopedics;  Laterality: Left;       Family History   Problem Relation Age of Onset   • Arthritis Other    • Hypertension Other    • Migraines Other    • Heart attack  Other    • Stroke Other        Social History     Socioeconomic History   • Marital status:      Spouse name: Not on file   • Number of children: Not on file   • Years of education: Not on file   • Highest education level: Not on file   Tobacco Use   • Smoking status: Former Smoker   • Smokeless tobacco: Current User     Types: Chew   • Tobacco comment: quit 2005   Substance and Sexual Activity   • Alcohol use: No     Comment: stopped drinking alcohol   • Drug use: Not Currently     Comment: takes suboxone   • Sexual activity: Defer           Objective   Physical Exam    CONSTITUTIONAL: Well developed, nontoxic 45-year-old  male,  in no acute distress.  VITAL SIGNS: per nursing, reviewed and noted  SKIN: exposed skin with no rashes, ulcerations or petechiae.  EYES: perrla. EOMI.  ENT: Normal voice.  Patient maintained wearing a mask throughout patient encounter due to coronavirus pandemic  RESPIRATORY: Extensive diffuse wheezes  CARDIOVASCULAR:  regular rate and rhythm, no murmurs.  Good Peripheral pulses. Good cap refill to extremities.   GI: Abdomen soft, nontender, normal bowel sounds. No hernia. No ascites.  MUSCULOSKELETAL:  No tenderness. Full ROM. Strength and tone grossly normal.  no spasms. no neck or back tenderness or spasm.   NEUROLOGIC: Alert, oriented x 3. No gross deficits. GCS 15.   PSYCH: appropriate affect.  : no bladder tenderness or distention, no CVA tenderness      Procedures     No attending physician procedures were performed on this patient.      ED Course  ED Course as of Sep 01 1147   Wed Sep 01, 2021   0604 pH, Arterial(!!): 7.299 [PF]   0636 pCO2, Arterial(!!): 60.6 [PF]   0636 Lactate: 1.7 [PF]   0636 WBC(!): 12.34 [PF]   0636 Hemoglobin: 15.8 [PF]   0636 Hematocrit: 50.3 [PF]   0636 Platelets: 212 [PF]   0636 Glucose(!): 132 [PF]   0636 BUN: 10 [PF]   0636 Creatinine: 1.13 [PF]   0636 Sodium: 141 [PF]   0636 Potassium: 4.2 [PF]   0636 Chloride: 101 [PF]   0636  CO2(!): 30.1 [PF]   0636 Calcium: 9.2 [PF]   0636 Total Protein: 7.8 [PF]   0636 Albumin: 4.30 [PF]   0636 ALT (SGPT): 12 [PF]   0636 AST (SGOT): 16 [PF]   0636 Alkaline Phosphatase(!): 129 [PF]   0636 Total Bilirubin: 0.2 [PF]   0636 eGFR Non  Am: 70 [PF]   0636 Lactate: 1.7 [PF]   0637 Chest x-ray interpreted by me shows no evidence of any cardiomegaly, effusion, infiltrate, or bony abnormality.    [PF]      ED Course User Index  [PF] Dominick Riley W, DO                                           MDM  45-year-old male presented with shortness of breath and productive cough.  Work-up suggestive of COPD exacerbation.  Covid pending.  Patient be signed out to oncoming physician final disposition.      Covid negative.      Final diagnoses:   COPD exacerbation (CMS/[facility])       ED Disposition  ED Disposition     ED Disposition Condition Comment    Discharge Stable           No follow-up provider specified.       Medication List      No changes were made to your prescriptions during this visit.         Patient signed out to me from previous physician.  Patient awaiting results from breathing treatments and lab test.  Patient mildly acidotic with a pH of 7.29 with elevated CO2 at 60 on arrival.  Other laboratory tests are largely unremarkable.  Spoke with patient and indicated that I can make a good case for admitting him to the hospital, patient states he would actually prefer to be discharged home.  He states he could do home breathing treatments at home oxygen as needed but wishes to not be admitted to the hospital due to the ongoing Covid pandemic.  He states he would prefer not to be exposed to anything and believes he would be in better isolation at home.  Repeat blood gas showed improved pH.  Advised patient that he will need to wear oxygen for the next few days and use his breathing treatments.  Will provide steroid and antibiotic therapy.  Very strict return precautions were discussed.     Dinorah Garcia  MD Darius  09/01/21 0045

## 2021-09-03 NOTE — PAT
JASSON FLORES NOTIFIED REGARDING VIT D 11.7, SED RATE 38, CRP 1.28, AND D DIMER 1.05. NICOTINE STILL PENDING. NO NEW ORDERS RECEIVED.    Surgery Consult Note: General/Vascular/Colorectal    Date of Service:  9/2/2021    CHIEF COMPLAINT: I am seeing this patient at the request of Dr. Stanley for evaluation of vomiting.     HISTORY OF PRESENT ILLNESS: Ebony Wesley is a 65 year old female who presented for elective surgery on 8/30/21. She presented for elective CABG, 3 vessel. She was extubated the day of surgery. She reports her last BM was prior to arrival on Sunday 8/29. She usually has 1 BM per day. She was advanced to a cardiac diet today but had vomiting after lunch. She denies increases shortness of breath. NG tube was placed after abdominal X ray showed dilated small bowel. She has had roughly 100 ml of output from the NG. She denies passing flatus. She reports prior lap devi but otherwise denies abdominal surgery. She reports being up to chair and standing but has not been ambulating. She does not feel bloated and denies abdominal pain.       Past Medical History:   Diagnosis Date   • CKD (chronic kidney disease) stage 3, GFR 30-59 ml/min (CMS/[facility])    • Discoid lupus    • DM type 2 (diabetes mellitus, type 2) (CMS/[facility])    • Essential (primary) hypertension    • Fracture of left humerus 2016    Left   • Gastroesophageal reflux disease    • Sleep apnea     CPAP        Past Surgical History:   Procedure Laterality Date   • Ankle surgery     • Cardiac surgery      heart CAP per pt   • Colonoscopy diagnostic  08/01/2015   • Coronary artery bypass graft N/A 08/30/2021    LIMA-LAD,SVG-OM,SVG-RCA; [facility], Dr. Kevin Stanley   • Tonsillectomy and adenoidectomy         CURRENT MEDICATIONS:   Current Facility-Administered Medications   Medication   • metoCLOPramide (REGLAN) injection 5 mg   • midodrine (PROAMATINE) tablet 5 mg   • [START ON 9/3/2021] metoPROLOL succinate (TOPROL-XL) ER tablet 12.5 mg   • [START ON 9/3/2021] insulin lispro (ADMELOG, HumaLOG) sliding scale injection   • sodium chloride 0.9% infusion   • furosemide (LASIX INJECT)  injection 40 mg   • oxyCODONE (IMM REL) (ROXICODONE) tablet 5 mg   • dextrose 50 % injection 25 g   • dextrose 50 % injection 12.5 g   • glucagon (GLUCAGEN) injection 1 mg   • dextrose (GLUTOSE) 40 % gel 15 g   • dextrose (GLUTOSE) 40 % gel 30 g   • ferrous sulfate (65 mg Fe per 325 mg) tablet 325 mg   • ascorbic acid (VITAMIN C) tablet 500 mg   • [Held by provider] heparin (porcine) injection 5,000 Units   • sodium chloride 0.9% infusion   • fentaNYL (SUBLIMAZE) injection 25-50 mcg   • ondansetron (ZOFRAN ODT) disintegrating tablet 4 mg    Or   • ondansetron (ZOFRAN) injection 4 mg   • metoPROLOL (LOPRESSOR) injection 5 mg   • calcium chloride 1 g in dextrose 5 % 50 mL total volume IVPB   • calcium chloride 0.5 g in dextrose 5 % 50 mL total volume IVPB   • [Held by provider] aspirin chewable 81 mg   • [Held by provider] clopidogrel (PLAVIX) tablet 75 mg   • atorvastatin (LIPITOR) tablet 40 mg   • potassium CHLORIDE 60 mEq in sodium chloride 0.9 % 150 mL total volume IVPB   • potassium CHLORIDE 40 mEq/100 mL IVPB premix   • potassium CHLORIDE 20 MEQ/50ML IVPB premix 20 mEq   • aluminum-magnesium hydroxide-simethicone (MAALOX) 200-200-20 MG/5ML suspension 30 mL   • polyethylene glycol (MIRALAX) packet 17 g   • docusate sodium-sennosides (SENOKOT S) 50-8.6 MG 2 tablet   • bisacodyl (DULCOLAX) suppository 10 mg   • magnesium hydroxide (MILK OF MAGNESIA) 400 MG/5ML suspension 30 mL   • docusate sodium-sennosides (SENOKOT S) 50-8.6 MG 2 tablet   • albumin human 5 % injection 12.5 g   • lactated ringers bolus 500 mL   • nitroGLYcerin (NITROSTAT) sublingual tablet 0.4 mg   • magnesium sulfate 1 g in dextrose 5% 100 mL IVPB premix    Or   • magnesium sulfate 2 g in 50 mL premix IVPB   • gabapentin (NEURONTIN) capsule 300 mg   • acetaminophen (TYLENOL) tablet 650 mg    Or   • acetaminophen (TYLENOL) suppository 650 mg   • AMIODarone (PACERONE) tablet 400 mg   • calcium gluconate 2 g in dextrose 5 % 50 mL total volume IVPB     And   • calcium gluconate 1 g in dextrose 5 % 50 mL total volume IVPB    And   • magnesium sulfate 1 g in dextrose 5% 100 mL IVPB premix   • ipratropium-albuterol (DUONEB) 0.5-2.5 (3) MG/3ML nebulizer solution 3 mL        ALLERGIES:  ALLERGIES:  No Known Allergies     SOCIAL HISTORY:   Social History     Tobacco Use   • Smoking status: Current Every Day Smoker     Packs/day: 0.50     Years: 54.00     Pack years: 27.00     Types: Cigarettes     Start date: 1/1/1967   • Smokeless tobacco: Never Used   • Tobacco comment: 2-3   Substance Use Topics   • Alcohol use: No       FAMILY HISTORY:   Family History   Problem Relation Age of Onset   • COPD Mother    • Gastrointestinal Father         Bleeding ulcers   • Gastrointestinal Sister         ulcers   • Myocardial Infarction Sister    • Cancer Brother         Bone Cancer   • Cancer, Pancreatic Brother    • COPD Brother    • Leukemia Maternal Grandmother    • Emphysema Maternal Grandmother    • Myocardial Infarction Maternal Grandfather    • Cancer, Pancreatic Paternal Grandmother    • Myocardial Infarction Paternal Grandfather        REVIEW OF SYSTEMS:   CONSTITUTIONAL: is negative for night sweats and fever  CARDIOVASCULAR: is negative for palpitations, and syncope,  RESPIRATORY: is negative for bronchitis and productive cough  GI: is negative for <<is POSITIVE for nausea and vomiting>>  : is negative for pain with urination, blood in urine and flank pain  HEME: is negative for easy bruising and recent bleeding  MS: is negative for bone pain, joint pain  NEURO: is negative for recent falls, dizziness and history of TIA  PSYCH: is negative for suicidal ideation, significant depression, anxiety  LYMPH: no axillary or inguinal adenopathy    All other systems were reviewed and are negative    PHYSICAL EXAM:  Visit Vitals  /58   Pulse 85   Temp 100 °F (37.8 °C)   Resp 20   Ht 5' 8\" (1.727 m)   Wt (!) 144.9 kg   LMP  (LMP Unknown)   SpO2 92%   BMI 48.57 kg/m²        Body mass index is 48.57 kg/m².    Constitutional: alert and oriented x 3, no acute distress, able to answer questions  Respiratory: clear to auscultation  Cardiovascular: heart is regular rate and rhythm  Chest: sternotomy incision dressed  GI: abdomen is soft, non distended, non tender; there is a lower midline incision with the appearance of ventral hernia but this is soft and reducible  Musculoskeletal: limited range of motion with extremity edema  Neuro: normal sensation     IMAGING: abdominal X ray reviewed showing small bowel dilation with gas in right colon    LABS: labs reviewed, pertinent labs noted: electrolytes with elevated creatinine, albumin 2.8, WBC elevated at 13K    IMPRESSION:  65 year old woman with small bowel dilation and vomiting, POD #3 after CABG x 3 vessels    PLAN:   1. NG tube was placed, x ray confirmed placement and output has been roughly 100 ml of bile tinged fluid. Abdominal exam shows likely ventral hernias, obese but not tender. Will order daily suppository to start tomorrow to stimulate GI function. Encouraged her to be out of bed more.   2. Continue NPO with NG tube to suction, ok to have ice chips  3. If her stomach is fully decompressed overnight, will consider gastrograffin study via NG tube based on her clinical response, she has not had CT so it is unclear if her slow GI function is due to ileus or obstruction, although treatment would be the same at this point.  4. Will continue to follow    A total of 19 minutes was spent in total floor time, including face to face time with the patient.

## 2021-09-06 LAB
BACTERIA SPEC AEROBE CULT: NORMAL
BACTERIA SPEC AEROBE CULT: NORMAL

## 2021-09-15 ENCOUNTER — OFFICE VISIT (OUTPATIENT)
Dept: GASTROENTEROLOGY | Facility: CLINIC | Age: 46
End: 2021-09-15

## 2021-09-15 VITALS
RESPIRATION RATE: 16 BRPM | BODY MASS INDEX: 25.71 KG/M2 | HEART RATE: 92 BPM | WEIGHT: 194 LBS | DIASTOLIC BLOOD PRESSURE: 78 MMHG | TEMPERATURE: 98.3 F | HEIGHT: 73 IN | SYSTOLIC BLOOD PRESSURE: 117 MMHG

## 2021-09-15 DIAGNOSIS — R10.33 PERIUMBILICAL ABDOMINAL PAIN: Chronic | ICD-10-CM

## 2021-09-15 DIAGNOSIS — K21.9 GASTROESOPHAGEAL REFLUX DISEASE, UNSPECIFIED WHETHER ESOPHAGITIS PRESENT: Chronic | ICD-10-CM

## 2021-09-15 DIAGNOSIS — R11.0 NAUSEA: Chronic | ICD-10-CM

## 2021-09-15 DIAGNOSIS — K62.89 ANAL OR RECTAL PAIN: Chronic | ICD-10-CM

## 2021-09-15 DIAGNOSIS — R74.8 ELEVATED ALKALINE PHOSPHATASE LEVEL: Chronic | ICD-10-CM

## 2021-09-15 DIAGNOSIS — Z86.19 HISTORY OF HEPATITIS B: Chronic | ICD-10-CM

## 2021-09-15 DIAGNOSIS — Z12.11 ENCOUNTER FOR SCREENING FOR MALIGNANT NEOPLASM OF COLON: ICD-10-CM

## 2021-09-15 DIAGNOSIS — Z86.19 HISTORY OF HEPATITIS C: Chronic | ICD-10-CM

## 2021-09-15 DIAGNOSIS — K62.5 RECTAL BLEEDING: Chronic | ICD-10-CM

## 2021-09-15 DIAGNOSIS — K59.00 CONSTIPATION, UNSPECIFIED CONSTIPATION TYPE: Primary | Chronic | ICD-10-CM

## 2021-09-15 PROCEDURE — 99214 OFFICE O/P EST MOD 30 MIN: CPT | Performed by: NURSE PRACTITIONER

## 2021-09-15 RX ORDER — DICYCLOMINE HCL 20 MG
20 TABLET ORAL AS NEEDED
COMMUNITY
End: 2022-03-02

## 2021-09-15 RX ORDER — SODIUM CHLORIDE 9 MG/ML
70 INJECTION, SOLUTION INTRAVENOUS CONTINUOUS PRN
Status: CANCELLED | OUTPATIENT
Start: 2021-09-15
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
## 2021-09-15 NOTE — PATIENT INSTRUCTIONS
Antireflux measures: Avoid fried, fatty foods, alcohol, chocolate, coffee, tea,  soft drinks, peppermint and spearmint, spicy foods, tomatoes and tomato based foods, onions, peppers, and smoking.   Other antireflux measures include weight reduction if overweight, avoiding tight clothing around the abdomen, elevating the head of the bed 6 inches with blocks under the head board, and don't drink or eat before going to bed and avoid lying down immediately after meals.  Pantoprazole 40 mg 1 by mouth in the am 30 minutes before breakfast.  Zofran 4 mg 1 po every 8 hours as needed for nausea.   The patient should eat 4-5 very small meals throughout the day. Avoid large meals.  It is recommended to eat a softer diet. Meats are best consumed ground. Fruits and vegetables are best consumed cooked or steamed and then mashed.   Low fiber, low fat diet with liberal water intake.   May take milk of magnesia at bedtime for constipation.  Continue to avoid all drugs and alcohol.   Colonoscopy: The indications, technique, alternatives and potential risk and complications were discussed with the patient including but not limited to bleeding, perforations, missing lesions and anesthetic complications. The patient understands and wishes to proceed with the procedure and has given their verbal consent. Written patient education information was given to the patient.   The patient will call if they have further questions before procedure.

## 2021-10-28 ENCOUNTER — OFFICE VISIT (OUTPATIENT)
Dept: PHARMACY | Facility: HOSPITAL | Age: 46
End: 2021-10-28

## 2021-10-28 ENCOUNTER — OFFICE VISIT (OUTPATIENT)
Dept: PULMONOLOGY | Facility: CLINIC | Age: 46
End: 2021-10-28

## 2021-10-28 ENCOUNTER — HOSPITAL ENCOUNTER (OUTPATIENT)
Dept: GENERAL RADIOLOGY | Facility: HOSPITAL | Age: 46
Discharge: HOME | End: 2021-10-28

## 2021-10-28 VITALS
OXYGEN SATURATION: 88 % | RESPIRATION RATE: 16 BRPM | BODY MASS INDEX: 25.45 KG/M2 | HEART RATE: 90 BPM | WEIGHT: 192 LBS | SYSTOLIC BLOOD PRESSURE: 102 MMHG | DIASTOLIC BLOOD PRESSURE: 70 MMHG | HEIGHT: 73 IN

## 2021-10-28 DIAGNOSIS — R06.02 SHORTNESS OF BREATH: Primary | ICD-10-CM

## 2021-10-28 DIAGNOSIS — J45.51 SEVERE PERSISTENT ASTHMA WITH ACUTE EXACERBATION: ICD-10-CM

## 2021-10-28 DIAGNOSIS — J43.1 PANLOBULAR EMPHYSEMA (HCC): ICD-10-CM

## 2021-10-28 DIAGNOSIS — J30.89 OTHER ALLERGIC RHINITIS: ICD-10-CM

## 2021-10-28 DIAGNOSIS — R06.02 SHORTNESS OF BREATH: ICD-10-CM

## 2021-10-28 DIAGNOSIS — J45.40 MODERATE PERSISTENT ASTHMA WITHOUT COMPLICATION: ICD-10-CM

## 2021-10-28 DIAGNOSIS — J45.40 MODERATE PERSISTENT ASTHMA WITHOUT COMPLICATION: Primary | ICD-10-CM

## 2021-10-28 DIAGNOSIS — R09.02 HYPOXIA: ICD-10-CM

## 2021-10-28 PROCEDURE — 99214 OFFICE O/P EST MOD 30 MIN: CPT | Performed by: INTERNAL MEDICINE

## 2021-10-28 PROCEDURE — 95012 NITRIC OXIDE EXP GAS DETER: CPT | Performed by: INTERNAL MEDICINE

## 2021-10-28 PROCEDURE — 71046 X-RAY EXAM CHEST 2 VIEWS: CPT

## 2021-10-28 PROCEDURE — 96372 THER/PROPH/DIAG INJ SC/IM: CPT | Performed by: INTERNAL MEDICINE

## 2021-10-28 RX ORDER — METHYLPREDNISOLONE ACETATE 80 MG/ML
80 INJECTION, SUSPENSION INTRA-ARTICULAR; INTRALESIONAL; INTRAMUSCULAR; SOFT TISSUE ONCE
Status: COMPLETED | OUTPATIENT
Start: 2021-10-28 | End: 2021-10-28

## 2021-10-28 RX ORDER — OMEPRAZOLE 40 MG/1
40 CAPSULE, DELAYED RELEASE ORAL DAILY
COMMUNITY
Start: 2021-10-18 | End: 2022-11-01 | Stop reason: SDUPTHER

## 2021-10-28 RX ORDER — METHYLPREDNISOLONE ACETATE 80 MG/ML
80 INJECTION, SUSPENSION INTRA-ARTICULAR; INTRALESIONAL; INTRAMUSCULAR; SOFT TISSUE ONCE
Qty: 1 ML | Refills: 0 | Status: SHIPPED | OUTPATIENT
Start: 2021-10-28 | End: 2021-10-29

## 2021-10-28 RX ADMIN — METHYLPREDNISOLONE ACETATE 80 MG: 80 INJECTION, SUSPENSION INTRA-ARTICULAR; INTRALESIONAL; INTRAMUSCULAR; SOFT TISSUE at 11:26

## 2021-10-28 NOTE — PROGRESS NOTES
Pt here for Depo-Medrol injection    Gluteal injection given in the Right Upper Outer Quadrant.    NDC 5634-2071-40  LOT # [id number]  EXP: 10/2022    Pt will follow up with Dr. Olivas

## 2021-11-04 RX ORDER — PREDNISONE 20 MG/1
TABLET ORAL
Qty: 10 TABLET | Refills: 0 | OUTPATIENT
Start: 2021-11-04 | End: 2021-12-21

## 2021-11-11 ENCOUNTER — TELEPHONE (OUTPATIENT)
Dept: GASTROENTEROLOGY | Facility: CLINIC | Age: 46
End: 2021-11-11

## 2021-11-11 DIAGNOSIS — Z12.11 ENCOUNTER FOR SCREENING FOR MALIGNANT NEOPLASM OF COLON: Primary | ICD-10-CM

## 2021-11-11 RX ORDER — BISACODYL 5 MG/1
TABLET, DELAYED RELEASE ORAL
Qty: 4 TABLET | Refills: 0 | Status: SHIPPED | OUTPATIENT
Start: 2021-11-11 | End: 2022-01-04 | Stop reason: HOSPADM

## 2021-11-11 RX ORDER — POLYETHYLENE GLYCOL 3350 17 G/17G
POWDER, FOR SOLUTION ORAL
Qty: 238 G | Refills: 0 | Status: SHIPPED | OUTPATIENT
Start: 2021-11-11 | End: 2022-01-04 | Stop reason: HOSPADM

## 2021-11-11 RX ORDER — MAGNESIUM CARB/ALUMINUM HYDROX 105-160MG
TABLET,CHEWABLE ORAL
Qty: 296 ML | Refills: 0 | Status: SHIPPED | OUTPATIENT
Start: 2021-11-11 | End: 2022-01-04 | Stop reason: HOSPADM

## 2021-12-03 ENCOUNTER — APPOINTMENT (OUTPATIENT)
Dept: GENERAL RADIOLOGY | Facility: HOSPITAL | Age: 46
End: 2021-12-03

## 2021-12-03 ENCOUNTER — HOSPITAL ENCOUNTER (EMERGENCY)
Facility: HOSPITAL | Age: 46
Discharge: HOME | End: 2021-12-03
Attending: EMERGENCY MEDICINE | Admitting: EMERGENCY MEDICINE

## 2021-12-03 VITALS
WEIGHT: 200 LBS | RESPIRATION RATE: 22 BRPM | HEART RATE: 85 BPM | DIASTOLIC BLOOD PRESSURE: 89 MMHG | TEMPERATURE: 98.1 F | BODY MASS INDEX: 26.51 KG/M2 | HEIGHT: 73 IN | OXYGEN SATURATION: 96 % | SYSTOLIC BLOOD PRESSURE: 123 MMHG

## 2021-12-03 DIAGNOSIS — J44.1 COPD EXACERBATION (HCC): Primary | ICD-10-CM

## 2021-12-03 LAB
A-A DO2: ABNORMAL
ALBUMIN SERPL-MCNC: 4.3 G/DL (ref 3.5–5.2)
ALBUMIN/GLOB SERPL: 1.4 G/DL
ALP SERPL-CCNC: 120 U/L (ref 39–117)
ALT SERPL W P-5'-P-CCNC: 30 U/L (ref 1–41)
ANION GAP SERPL CALCULATED.3IONS-SCNC: 11.5 MMOL/L (ref 5–15)
ARTERIAL PATENCY WRIST A: ABNORMAL
AST SERPL-CCNC: 22 U/L (ref 1–40)
ATMOSPHERIC PRESS: 736 MMHG
BASE EXCESS BLDA CALC-SCNC: 2.8 MMOL/L (ref 0–2)
BASOPHILS # BLD AUTO: 0.06 10*3/MM3 (ref 0–0.2)
BASOPHILS NFR BLD AUTO: 0.7 % (ref 0–1.5)
BDY SITE: ABNORMAL
BILIRUB SERPL-MCNC: 0.3 MG/DL (ref 0–1.2)
BUN SERPL-MCNC: 13 MG/DL (ref 6–20)
BUN/CREAT SERPL: 14.9 (ref 7–25)
CALCIUM SPEC-SCNC: 9.4 MG/DL (ref 8.6–10.5)
CHLORIDE SERPL-SCNC: 101 MMOL/L (ref 98–107)
CO2 SERPL-SCNC: 28.5 MMOL/L (ref 22–29)
COHGB MFR BLD: 0.7 % (ref 0–2)
CREAT SERPL-MCNC: 0.87 MG/DL (ref 0.76–1.27)
DEPRECATED RDW RBC AUTO: 39.8 FL (ref 37–54)
EOSINOPHIL # BLD AUTO: 0.5 10*3/MM3 (ref 0–0.4)
EOSINOPHIL NFR BLD AUTO: 6 % (ref 0.3–6.2)
ERYTHROCYTE [DISTWIDTH] IN BLOOD BY AUTOMATED COUNT: 12.7 % (ref 12.3–15.4)
GAS FLOW AIRWAY: 4 LPM
GFR SERPL CREATININE-BSD FRML MDRD: 94 ML/MIN/1.73
GLOBULIN UR ELPH-MCNC: 3 GM/DL
GLUCOSE SERPL-MCNC: 107 MG/DL (ref 65–99)
HCO3 BLDA-SCNC: 32.4 MMOL/L (ref 22–28)
HCT VFR BLD AUTO: 50.1 % (ref 37.5–51)
HCT VFR BLD CALC: 47.3 %
HGB BLD-MCNC: 15.9 G/DL (ref 13–17.7)
HOLD SPECIMEN: NORMAL
HOLD SPECIMEN: NORMAL
IMM GRANULOCYTES # BLD AUTO: 0.03 10*3/MM3 (ref 0–0.05)
IMM GRANULOCYTES NFR BLD AUTO: 0.4 % (ref 0–0.5)
LYMPHOCYTES # BLD AUTO: 2.21 10*3/MM3 (ref 0.7–3.1)
LYMPHOCYTES NFR BLD AUTO: 26.4 % (ref 19.6–45.3)
Lab: ABNORMAL
MCH RBC QN AUTO: 27.1 PG (ref 26.6–33)
MCHC RBC AUTO-ENTMCNC: 31.7 G/DL (ref 31.5–35.7)
MCV RBC AUTO: 85.3 FL (ref 79–97)
METHGB BLD QL: 0.6 % (ref 0–1.5)
MODALITY: ABNORMAL
MONOCYTES # BLD AUTO: 0.68 10*3/MM3 (ref 0.1–0.9)
MONOCYTES NFR BLD AUTO: 8.1 % (ref 5–12)
NEUTROPHILS NFR BLD AUTO: 4.88 10*3/MM3 (ref 1.7–7)
NEUTROPHILS NFR BLD AUTO: 58.4 % (ref 42.7–76)
NOTE: ABNORMAL
NRBC BLD AUTO-RTO: 0 /100 WBC (ref 0–0.2)
NT-PROBNP SERPL-MCNC: 125.1 PG/ML (ref 0–450)
OXYHGB MFR BLDV: 95.4 % (ref 94–99)
PCO2 BLDA: 71.6 MM HG (ref 35–45)
PCO2 TEMP ADJ BLD: ABNORMAL MM[HG]
PH BLDA: 7.26 PH UNITS (ref 7.35–7.45)
PH, TEMP CORRECTED: ABNORMAL
PLATELET # BLD AUTO: 222 10*3/MM3 (ref 140–450)
PMV BLD AUTO: 9.7 FL (ref 6–12)
PO2 BLDA: 91.3 MM HG (ref 75–100)
PO2 TEMP ADJ BLD: ABNORMAL MM[HG]
POTASSIUM SERPL-SCNC: 4.2 MMOL/L (ref 3.5–5.2)
PROT SERPL-MCNC: 7.3 G/DL (ref 6–8.5)
RBC # BLD AUTO: 5.87 10*6/MM3 (ref 4.14–5.8)
SAO2 % BLDCOA: 96.7 % (ref 94–100)
SODIUM SERPL-SCNC: 141 MMOL/L (ref 136–145)
TROPONIN T SERPL-MCNC: <0.01 NG/ML (ref 0–0.03)
VENTILATOR MODE: ABNORMAL
WBC NRBC COR # BLD: 8.36 10*3/MM3 (ref 3.4–10.8)
WHOLE BLOOD HOLD SPECIMEN: NORMAL
WHOLE BLOOD HOLD SPECIMEN: NORMAL

## 2021-12-03 PROCEDURE — 99283 EMERGENCY DEPT VISIT LOW MDM: CPT

## 2021-12-03 PROCEDURE — 83880 ASSAY OF NATRIURETIC PEPTIDE: CPT | Performed by: EMERGENCY MEDICINE

## 2021-12-03 PROCEDURE — 96375 TX/PRO/DX INJ NEW DRUG ADDON: CPT

## 2021-12-03 PROCEDURE — 80053 COMPREHEN METABOLIC PANEL: CPT | Performed by: EMERGENCY MEDICINE

## 2021-12-03 PROCEDURE — 25010000002 MAGNESIUM SULFATE 2 GM/50ML SOLUTION: Performed by: EMERGENCY MEDICINE

## 2021-12-03 PROCEDURE — 93005 ELECTROCARDIOGRAM TRACING: CPT | Performed by: EMERGENCY MEDICINE

## 2021-12-03 PROCEDURE — 84484 ASSAY OF TROPONIN QUANT: CPT | Performed by: EMERGENCY MEDICINE

## 2021-12-03 PROCEDURE — 36415 COLL VENOUS BLD VENIPUNCTURE: CPT

## 2021-12-03 PROCEDURE — 25010000002 PROCHLORPERAZINE 10 MG/2ML SOLUTION: Performed by: EMERGENCY MEDICINE

## 2021-12-03 PROCEDURE — 25010000002 ONDANSETRON PER 1 MG: Performed by: EMERGENCY MEDICINE

## 2021-12-03 PROCEDURE — 83050 HGB METHEMOGLOBIN QUAN: CPT

## 2021-12-03 PROCEDURE — 94640 AIRWAY INHALATION TREATMENT: CPT

## 2021-12-03 PROCEDURE — 82805 BLOOD GASES W/O2 SATURATION: CPT

## 2021-12-03 PROCEDURE — 96365 THER/PROPH/DIAG IV INF INIT: CPT

## 2021-12-03 PROCEDURE — 25010000002 METHYLPREDNISOLONE PER 125 MG: Performed by: EMERGENCY MEDICINE

## 2021-12-03 PROCEDURE — 85025 COMPLETE CBC W/AUTO DIFF WBC: CPT | Performed by: EMERGENCY MEDICINE

## 2021-12-03 PROCEDURE — 36600 WITHDRAWAL OF ARTERIAL BLOOD: CPT

## 2021-12-03 PROCEDURE — 71045 X-RAY EXAM CHEST 1 VIEW: CPT

## 2021-12-03 PROCEDURE — 82375 ASSAY CARBOXYHB QUANT: CPT

## 2021-12-03 RX ORDER — ONDANSETRON 2 MG/ML
4 INJECTION INTRAMUSCULAR; INTRAVENOUS ONCE
Status: COMPLETED | OUTPATIENT
Start: 2021-12-03 | End: 2021-12-03

## 2021-12-03 RX ORDER — SODIUM CHLORIDE 0.9 % (FLUSH) 0.9 %
10 SYRINGE (ML) INJECTION AS NEEDED
Status: DISCONTINUED | OUTPATIENT
Start: 2021-12-03 | End: 2021-12-03 | Stop reason: HOSPADM

## 2021-12-03 RX ORDER — MAGNESIUM SULFATE HEPTAHYDRATE 40 MG/ML
2 INJECTION, SOLUTION INTRAVENOUS ONCE
Status: COMPLETED | OUTPATIENT
Start: 2021-12-03 | End: 2021-12-03

## 2021-12-03 RX ORDER — IPRATROPIUM BROMIDE AND ALBUTEROL SULFATE 2.5; .5 MG/3ML; MG/3ML
6 SOLUTION RESPIRATORY (INHALATION) ONCE
Status: COMPLETED | OUTPATIENT
Start: 2021-12-03 | End: 2021-12-03

## 2021-12-03 RX ORDER — DOXYCYCLINE 100 MG/1
100 CAPSULE ORAL 2 TIMES DAILY
Qty: 14 CAPSULE | Refills: 0 | OUTPATIENT
Start: 2021-12-03 | End: 2021-12-21

## 2021-12-03 RX ORDER — PREDNISONE 20 MG/1
20 TABLET ORAL 2 TIMES DAILY
Qty: 10 TABLET | Refills: 0 | Status: SHIPPED | OUTPATIENT
Start: 2021-12-03 | End: 2021-12-08

## 2021-12-03 RX ORDER — PROCHLORPERAZINE EDISYLATE 5 MG/ML
5 INJECTION INTRAMUSCULAR; INTRAVENOUS EVERY 6 HOURS PRN
Status: DISCONTINUED | OUTPATIENT
Start: 2021-12-03 | End: 2021-12-03 | Stop reason: HOSPADM

## 2021-12-03 RX ORDER — METHYLPREDNISOLONE SODIUM SUCCINATE 125 MG/2ML
125 INJECTION, POWDER, LYOPHILIZED, FOR SOLUTION INTRAMUSCULAR; INTRAVENOUS ONCE
Status: COMPLETED | OUTPATIENT
Start: 2021-12-03 | End: 2021-12-03

## 2021-12-03 RX ADMIN — ONDANSETRON 4 MG: 2 INJECTION INTRAMUSCULAR; INTRAVENOUS at 06:48

## 2021-12-03 RX ADMIN — MAGNESIUM SULFATE HEPTAHYDRATE 2 G: 40 INJECTION, SOLUTION INTRAVENOUS at 06:59

## 2021-12-03 RX ADMIN — IPRATROPIUM BROMIDE AND ALBUTEROL SULFATE 6 ML: .5; 2.5 SOLUTION RESPIRATORY (INHALATION) at 07:18

## 2021-12-03 RX ADMIN — PROCHLORPERAZINE EDISYLATE 5 MG: 5 INJECTION INTRAMUSCULAR; INTRAVENOUS at 07:14

## 2021-12-03 RX ADMIN — METHYLPREDNISOLONE SODIUM SUCCINATE 125 MG: 125 INJECTION, POWDER, FOR SOLUTION INTRAMUSCULAR; INTRAVENOUS at 06:59

## 2021-12-03 NOTE — ED PROVIDER NOTES
Subjective   46-year-old male presenting with shortness of breath.  He states that the last few days he has had increasing shortness of breath.  Has had some productive cough.  Has been wearing his supplemental oxygen for the last few days.  No chest pain, abdominal pain.  Has had some nausea and one episode of vomiting this morning.  He does have a history of COPD.          Review of Systems   Constitutional: Negative.    HENT: Negative.    Eyes: Negative.    Respiratory: Positive for cough and shortness of breath.    Cardiovascular: Negative.    Gastrointestinal: Positive for nausea and vomiting. Negative for abdominal pain.   Genitourinary: Negative.    Musculoskeletal: Negative.    Skin: Negative.    Neurological: Negative.    Psychiatric/Behavioral: Negative.        Past Medical History:   Diagnosis Date   • Abdominal adhesions    • Anxiety    • Arthritis    • Asthma    • Cervical radiculopathy    • Colitis    • COPD (chronic obstructive pulmonary disease) (HCC)    • GERD (gastroesophageal reflux disease)    • Heart attack (HCC)    • History of hepatitis C     Treated with Epclusa in 2019   • History of recreational drug use    • HTN (hypertension)    • Kidney cysts    • Left hip pain    • Liver disease    • Low back pain    • Migraines    • Obstructive chronic bronchitis with exacerbation (HCC)    • Sleep apnea     mild   • Tattoos        Allergies   Allergen Reactions   • Doxycycline Nausea And Vomiting       Past Surgical History:   Procedure Laterality Date   • BACK SURGERY     • COLONOSCOPY  2014   • HERNIA REPAIR     • HIP SPACER INSERTION WITH ANTIBIOTIC CEMENT Left 1/15/2020    Procedure: TOTAL HIP IMPLANT REMOVAL WITH INSERTION OF ANTIBIOTIC SPACER LEFT;  Surgeon: Ba Ramirez MD;  Location: [facility];  Service: Orthopedics   • SHOULDER LIGAMENT REPAIR      right shoulder   • SMALL INTESTINE SURGERY      Small bowell resection   • TOTAL HIP ARTHROPLASTY Left    • TOTAL HIP ARTHROPLASTY REVISION  Left 3/5/2020    Procedure: HIP REIMPLANT REVISION LEFT;  Surgeon: Ba Ramirez MD;  Location: [facility];  Service: Orthopedics;  Laterality: Left;   • UPPER GASTROINTESTINAL ENDOSCOPY  2014       Family History   Problem Relation Age of Onset   • Arthritis Other    • Hypertension Other    • Migraines Other    • Heart attack Other    • Stroke Other    • Colon cancer Neg Hx        Social History     Socioeconomic History   • Marital status:    Tobacco Use   • Smoking status: Former Smoker   • Smokeless tobacco: Current User     Types: Chew   • Tobacco comment: quit 2005   Vaping Use   • Vaping Use: Never used   Substance and Sexual Activity   • Alcohol use: No     Comment: stopped drinking alcohol   • Drug use: Not Currently     Comment: takes suboxone   • Sexual activity: Defer           Objective   Physical Exam  Vitals reviewed.   Constitutional:       Appearance: Normal appearance. He is not toxic-appearing or diaphoretic.      Comments: Ill-appearing   HENT:      Head: Normocephalic and atraumatic.      Right Ear: External ear normal.      Left Ear: External ear normal.      Nose: Nose normal.      Mouth/Throat:      Mouth: Mucous membranes are moist.      Pharynx: Oropharynx is clear.   Eyes:      Extraocular Movements: Extraocular movements intact.      Conjunctiva/sclera: Conjunctivae normal.      Pupils: Pupils are equal, round, and reactive to light.   Cardiovascular:      Rate and Rhythm: Normal rate and regular rhythm.      Pulses: Normal pulses.      Heart sounds: Normal heart sounds.   Pulmonary:      Comments: Increased work of breathing, prolonged expiration, diffuse tight expiratory and expiratory wheezing  Abdominal:      General: Bowel sounds are normal. There is no distension.      Tenderness: There is no abdominal tenderness.   Musculoskeletal:         General: No swelling, tenderness or deformity. Normal range of motion.      Cervical back: Normal range of motion and neck supple.    Skin:     General: Skin is warm and dry.      Capillary Refill: Capillary refill takes less than 2 seconds.      Findings: No rash.   Neurological:      General: No focal deficit present.      Mental Status: He is alert and oriented to person, place, and time.   Psychiatric:         Mood and Affect: Mood normal.         Behavior: Behavior normal.         Procedures           ED Course                                                 MDM  Number of Diagnoses or Management Options  Diagnosis management comments: 46-year-old male with shortness of breath.  Ill-appearing man with exam as above.  He does have increased work of breathing, wheezing.  He is profoundly hypoxic on room air.  Will obtain labs, ABG, chest x-ray and EKG.  Disposition pending work-up and response to therapy.    DDx: COPD, pneumonia, CHF, ACS    EKG interpreted by me: Sinus tachycardia,  some nonspecific ST/T wave changes, this is an abnormal EKG        Tanner Ramires DO  I received this patient on signout from Dr. Branham.  Patient presented to the ED with shortness of breath wheezing and cough.  Initially was hypoxic on room air.  According to Dr. Branham patient had increased respiratory effort with diffuse wheezing throughout.  He was treated symptomatically for COPD exacerbation with steroids mag DuoNeb's.  On reevaluation patient was resting comfortably.  Was able to decrease the patient's oxygen requirements to 2 L nasal cannula with a pulse ox of 98%.  ABG did show some mild hypercapnia but the patient symptoms improved dramatically.  Good aeration of bilateral lung fields and improvement in his respiratory status.  Appropriate for discharge follow-up outpatient as he.  Strict return precaution given.  Will discharge on steroids with antibiotics.      Final diagnoses:   COPD exacerbation ([facility])          Tanner Ramires,   12/03/21 0901

## 2021-12-03 NOTE — ED NOTES
Report from night shift. Pt is still having N/V. New meds ordered. Pt is alecia, family at the bedside.      Goltz, Patrick, RN  12/03/21 0745

## 2021-12-21 ENCOUNTER — HOSPITAL ENCOUNTER (EMERGENCY)
Facility: HOSPITAL | Age: 46
Discharge: HOME | End: 2021-12-21
Attending: EMERGENCY MEDICINE | Admitting: EMERGENCY MEDICINE

## 2021-12-21 ENCOUNTER — APPOINTMENT (OUTPATIENT)
Dept: CT IMAGING | Facility: HOSPITAL | Age: 46
End: 2021-12-21

## 2021-12-21 ENCOUNTER — APPOINTMENT (OUTPATIENT)
Dept: GENERAL RADIOLOGY | Facility: HOSPITAL | Age: 46
End: 2021-12-21

## 2021-12-21 VITALS
HEIGHT: 73 IN | HEART RATE: 90 BPM | SYSTOLIC BLOOD PRESSURE: 120 MMHG | RESPIRATION RATE: 20 BRPM | WEIGHT: 190 LBS | BODY MASS INDEX: 25.18 KG/M2 | TEMPERATURE: 98.5 F | OXYGEN SATURATION: 95 % | DIASTOLIC BLOOD PRESSURE: 73 MMHG

## 2021-12-21 DIAGNOSIS — J44.1 CHRONIC OBSTRUCTIVE PULMONARY DISEASE WITH ACUTE EXACERBATION (HCC): Primary | ICD-10-CM

## 2021-12-21 LAB
ALBUMIN SERPL-MCNC: 4.6 G/DL (ref 3.5–5.2)
ALBUMIN/GLOB SERPL: 1.6 G/DL
ALP SERPL-CCNC: 119 U/L (ref 39–117)
ALT SERPL W P-5'-P-CCNC: 25 U/L (ref 1–41)
ANION GAP SERPL CALCULATED.3IONS-SCNC: 11 MMOL/L (ref 5–15)
AST SERPL-CCNC: 17 U/L (ref 1–40)
BASOPHILS # BLD AUTO: 0.06 10*3/MM3 (ref 0–0.2)
BASOPHILS NFR BLD AUTO: 0.7 % (ref 0–1.5)
BILIRUB SERPL-MCNC: 0.3 MG/DL (ref 0–1.2)
BUN SERPL-MCNC: 9 MG/DL (ref 6–20)
BUN/CREAT SERPL: 9.3 (ref 7–25)
CALCIUM SPEC-SCNC: 9.5 MG/DL (ref 8.6–10.5)
CHLORIDE SERPL-SCNC: 100 MMOL/L (ref 98–107)
CO2 SERPL-SCNC: 31 MMOL/L (ref 22–29)
CREAT SERPL-MCNC: 0.97 MG/DL (ref 0.76–1.27)
D DIMER PPP FEU-MCNC: 1.08 MCGFEU/ML (ref 0–0.57)
DEPRECATED RDW RBC AUTO: 40.6 FL (ref 37–54)
EOSINOPHIL # BLD AUTO: 0.66 10*3/MM3 (ref 0–0.4)
EOSINOPHIL NFR BLD AUTO: 7.7 % (ref 0.3–6.2)
ERYTHROCYTE [DISTWIDTH] IN BLOOD BY AUTOMATED COUNT: 13 % (ref 12.3–15.4)
GFR SERPL CREATININE-BSD FRML MDRD: 83 ML/MIN/1.73
GLOBULIN UR ELPH-MCNC: 2.9 GM/DL
GLUCOSE SERPL-MCNC: 107 MG/DL (ref 65–99)
HCT VFR BLD AUTO: 49.7 % (ref 37.5–51)
HGB BLD-MCNC: 15.8 G/DL (ref 13–17.7)
HOLD SPECIMEN: NORMAL
HOLD SPECIMEN: NORMAL
IMM GRANULOCYTES # BLD AUTO: 0.01 10*3/MM3 (ref 0–0.05)
IMM GRANULOCYTES NFR BLD AUTO: 0.1 % (ref 0–0.5)
LYMPHOCYTES # BLD AUTO: 1.76 10*3/MM3 (ref 0.7–3.1)
LYMPHOCYTES NFR BLD AUTO: 20.4 % (ref 19.6–45.3)
MCH RBC QN AUTO: 27.1 PG (ref 26.6–33)
MCHC RBC AUTO-ENTMCNC: 31.8 G/DL (ref 31.5–35.7)
MCV RBC AUTO: 85.2 FL (ref 79–97)
MONOCYTES # BLD AUTO: 0.6 10*3/MM3 (ref 0.1–0.9)
MONOCYTES NFR BLD AUTO: 7 % (ref 5–12)
NEUTROPHILS NFR BLD AUTO: 5.53 10*3/MM3 (ref 1.7–7)
NEUTROPHILS NFR BLD AUTO: 64.1 % (ref 42.7–76)
NRBC BLD AUTO-RTO: 0 /100 WBC (ref 0–0.2)
NT-PROBNP SERPL-MCNC: 102.2 PG/ML (ref 0–450)
PLATELET # BLD AUTO: 181 10*3/MM3 (ref 140–450)
PMV BLD AUTO: 10 FL (ref 6–12)
POTASSIUM SERPL-SCNC: 4.3 MMOL/L (ref 3.5–5.2)
PROCALCITONIN SERPL-MCNC: 0.04 NG/ML (ref 0–0.25)
PROT SERPL-MCNC: 7.5 G/DL (ref 6–8.5)
RBC # BLD AUTO: 5.83 10*6/MM3 (ref 4.14–5.8)
SARS-COV-2 RNA PNL SPEC NAA+PROBE: NOT DETECTED
SODIUM SERPL-SCNC: 142 MMOL/L (ref 136–145)
TROPONIN T SERPL-MCNC: <0.01 NG/ML (ref 0–0.03)
WBC NRBC COR # BLD: 8.62 10*3/MM3 (ref 3.4–10.8)
WHOLE BLOOD HOLD SPECIMEN: NORMAL
WHOLE BLOOD HOLD SPECIMEN: NORMAL

## 2021-12-21 PROCEDURE — 96365 THER/PROPH/DIAG IV INF INIT: CPT

## 2021-12-21 PROCEDURE — 94640 AIRWAY INHALATION TREATMENT: CPT

## 2021-12-21 PROCEDURE — 84145 PROCALCITONIN (PCT): CPT | Performed by: EMERGENCY MEDICINE

## 2021-12-21 PROCEDURE — 84484 ASSAY OF TROPONIN QUANT: CPT | Performed by: EMERGENCY MEDICINE

## 2021-12-21 PROCEDURE — 71045 X-RAY EXAM CHEST 1 VIEW: CPT

## 2021-12-21 PROCEDURE — 25010000002 MAGNESIUM SULFATE 2 GM/50ML SOLUTION: Performed by: EMERGENCY MEDICINE

## 2021-12-21 PROCEDURE — 99284 EMERGENCY DEPT VISIT MOD MDM: CPT

## 2021-12-21 PROCEDURE — 25010000002 METHYLPREDNISOLONE PER 125 MG: Performed by: EMERGENCY MEDICINE

## 2021-12-21 PROCEDURE — 87635 SARS-COV-2 COVID-19 AMP PRB: CPT | Performed by: EMERGENCY MEDICINE

## 2021-12-21 PROCEDURE — 25010000002 ONDANSETRON PER 1 MG: Performed by: EMERGENCY MEDICINE

## 2021-12-21 PROCEDURE — 85025 COMPLETE CBC W/AUTO DIFF WBC: CPT | Performed by: EMERGENCY MEDICINE

## 2021-12-21 PROCEDURE — 71275 CT ANGIOGRAPHY CHEST: CPT

## 2021-12-21 PROCEDURE — 83880 ASSAY OF NATRIURETIC PEPTIDE: CPT | Performed by: EMERGENCY MEDICINE

## 2021-12-21 PROCEDURE — 93005 ELECTROCARDIOGRAM TRACING: CPT | Performed by: EMERGENCY MEDICINE

## 2021-12-21 PROCEDURE — 80053 COMPREHEN METABOLIC PANEL: CPT | Performed by: EMERGENCY MEDICINE

## 2021-12-21 PROCEDURE — 25010000002 IOPAMIDOL 61 % SOLUTION: Performed by: EMERGENCY MEDICINE

## 2021-12-21 PROCEDURE — 94799 UNLISTED PULMONARY SVC/PX: CPT

## 2021-12-21 PROCEDURE — 85379 FIBRIN DEGRADATION QUANT: CPT | Performed by: EMERGENCY MEDICINE

## 2021-12-21 PROCEDURE — 96375 TX/PRO/DX INJ NEW DRUG ADDON: CPT

## 2021-12-21 RX ORDER — MAGNESIUM SULFATE HEPTAHYDRATE 40 MG/ML
2 INJECTION, SOLUTION INTRAVENOUS ONCE
Status: COMPLETED | OUTPATIENT
Start: 2021-12-21 | End: 2021-12-21

## 2021-12-21 RX ORDER — METHYLPREDNISOLONE SODIUM SUCCINATE 125 MG/2ML
80 INJECTION, POWDER, LYOPHILIZED, FOR SOLUTION INTRAMUSCULAR; INTRAVENOUS ONCE
Status: COMPLETED | OUTPATIENT
Start: 2021-12-21 | End: 2021-12-21

## 2021-12-21 RX ORDER — AMOXICILLIN AND CLAVULANATE POTASSIUM 875; 125 MG/1; MG/1
1 TABLET, FILM COATED ORAL 2 TIMES DAILY
Qty: 14 TABLET | Refills: 0 | Status: SHIPPED | OUTPATIENT
Start: 2021-12-21 | End: 2021-12-28

## 2021-12-21 RX ORDER — IPRATROPIUM BROMIDE AND ALBUTEROL SULFATE 2.5; .5 MG/3ML; MG/3ML
3 SOLUTION RESPIRATORY (INHALATION) ONCE
Status: COMPLETED | OUTPATIENT
Start: 2021-12-21 | End: 2021-12-21

## 2021-12-21 RX ORDER — ONDANSETRON 2 MG/ML
4 INJECTION INTRAMUSCULAR; INTRAVENOUS ONCE
Status: COMPLETED | OUTPATIENT
Start: 2021-12-21 | End: 2021-12-21

## 2021-12-21 RX ORDER — PREDNISONE 10 MG/1
TABLET ORAL
Qty: 48 TABLET | Refills: 0 | Status: SHIPPED | OUTPATIENT
Start: 2021-12-21 | End: 2022-02-07

## 2021-12-21 RX ORDER — SODIUM CHLORIDE 0.9 % (FLUSH) 0.9 %
10 SYRINGE (ML) INJECTION AS NEEDED
Status: DISCONTINUED | OUTPATIENT
Start: 2021-12-21 | End: 2021-12-21 | Stop reason: HOSPADM

## 2021-12-21 RX ADMIN — IOPAMIDOL 100 ML: 612 INJECTION, SOLUTION INTRAVENOUS at 07:01

## 2021-12-21 RX ADMIN — ONDANSETRON 4 MG: 2 INJECTION INTRAMUSCULAR; INTRAVENOUS at 05:57

## 2021-12-21 RX ADMIN — IPRATROPIUM BROMIDE AND ALBUTEROL SULFATE 3 ML: .5; 2.5 SOLUTION RESPIRATORY (INHALATION) at 06:21

## 2021-12-21 RX ADMIN — MAGNESIUM SULFATE HEPTAHYDRATE 2 G: 40 INJECTION, SOLUTION INTRAVENOUS at 05:45

## 2021-12-21 RX ADMIN — METHYLPREDNISOLONE SODIUM SUCCINATE 80 MG: 125 INJECTION, POWDER, FOR SOLUTION INTRAMUSCULAR; INTRAVENOUS at 05:43

## 2021-12-21 NOTE — ED PROVIDER NOTES
Subjective   46-year-old male with a past medical history significant for asthma and COPD who previously has had as needed oxygen but has been wearing 2 L nasal cannula at home at all times for the last 2 months presents to the ED for chief complaint of shortness of breath.  Patient recently finished treatment for a acute COPD exacerbation.  States that his symptoms have worsened over the last 3 to 4 days.  Dyspnea on exertion.  He notes that while checking his pulse ox at home if he walks more than 5 or 6 steps his pulse ox drops to 81% even on 2 L.  He is coughing up a small amount of sputum.  Has some chest tightness.  No fever chills.  No chest pain.  No other complaints at this time.          Review of Systems   Constitutional: Negative for fatigue and fever.   Respiratory: Positive for cough, chest tightness, shortness of breath and wheezing.    Cardiovascular: Negative for chest pain and palpitations.   All other systems reviewed and are negative.      Past Medical History:   Diagnosis Date   • Abdominal adhesions    • Anxiety    • Arthritis    • Asthma    • Cervical radiculopathy    • Colitis    • COPD (chronic obstructive pulmonary disease) (HCC)    • GERD (gastroesophageal reflux disease)    • Heart attack (HCC)    • History of hepatitis C     Treated with Epclusa in 2019   • History of recreational drug use    • HTN (hypertension)    • Kidney cysts    • Left hip pain    • Liver disease    • Low back pain    • Migraines    • Obstructive chronic bronchitis with exacerbation (HCC)    • Sleep apnea     mild   • Tattoos        Allergies   Allergen Reactions   • Doxycycline Nausea And Vomiting       Past Surgical History:   Procedure Laterality Date   • BACK SURGERY     • COLONOSCOPY  2014   • HERNIA REPAIR     • HIP SPACER INSERTION WITH ANTIBIOTIC CEMENT Left 1/15/2020    Procedure: TOTAL HIP IMPLANT REMOVAL WITH INSERTION OF ANTIBIOTIC SPACER LEFT;  Surgeon: Ba Ramirez MD;  Location: [facility];   Service: Orthopedics   • SHOULDER LIGAMENT REPAIR      right shoulder   • SMALL INTESTINE SURGERY      Small bowell resection   • TOTAL HIP ARTHROPLASTY Left    • TOTAL HIP ARTHROPLASTY REVISION Left 3/5/2020    Procedure: HIP REIMPLANT REVISION LEFT;  Surgeon: Ba Ramirez MD;  Location: [facility];  Service: Orthopedics;  Laterality: Left;   • UPPER GASTROINTESTINAL ENDOSCOPY  2014       Family History   Problem Relation Age of Onset   • Arthritis Other    • Hypertension Other    • Migraines Other    • Heart attack Other    • Stroke Other    • Colon cancer Neg Hx        Social History     Socioeconomic History   • Marital status:    Tobacco Use   • Smoking status: Former Smoker   • Smokeless tobacco: Current User     Types: Chew   • Tobacco comment: quit 2005   Vaping Use   • Vaping Use: Never used   Substance and Sexual Activity   • Alcohol use: No     Comment: stopped drinking alcohol   • Drug use: Not Currently     Comment: takes suboxone   • Sexual activity: Defer           Objective   Physical Exam  Vitals and nursing note reviewed.   Constitutional:       General: He is not in acute distress.     Appearance: He is well-developed. He is not diaphoretic.   HENT:      Head: Normocephalic and atraumatic.      Nose: Nose normal.   Eyes:      Conjunctiva/sclera: Conjunctivae normal.      Pupils: Pupils are equal, round, and reactive to light.   Cardiovascular:      Rate and Rhythm: Normal rate and regular rhythm.   Pulmonary:      Effort: Tachypnea and accessory muscle usage present. No respiratory distress.      Breath sounds: Wheezing present.      Comments: Mild tachypnea.  Coarse breath sounds with rhonchi and wheezing in the bilateral lung fields.  Mild conversational dyspnea  Abdominal:      General: There is no distension.      Palpations: Abdomen is soft.      Tenderness: There is no abdominal tenderness.   Musculoskeletal:         General: No deformity.   Neurological:      Mental Status: He is  alert and oriented to person, place, and time.      Cranial Nerves: No cranial nerve deficit.      Coordination: Coordination normal.         Procedures           ED Course  ED Course as of 12/21/21 0821   Tue Dec 21, 2021   0552 EKG interpreted by me.  Sinus rhythm.  Tachycardic.  Rate of 105.  Low voltage in chest leads.  Nonspecific T wave changes.  Abnormal EKG artifact present. [CG]      ED Course User Index  [CG] Tanner Ramires, DO                                                 MDM  Number of Diagnoses or Management Options  Chronic obstructive pulmonary disease with acute exacerbation (HCC)  Diagnosis management comments: 08:21 EST Work-up here overall reassuring.  He feels much better, resting comfortably with clear breath sounds.  We discussed admission versus trial of outpatient therapy and he wishes to go home.  I do feel that is a reasonable plan.  Encouraged him to continue wearing his oxygen and use breathing treatments.  We discussed return precautions.  He is comfortable with and understanding of the plan.       Amount and/or Complexity of Data Reviewed  Clinical lab tests: reviewed  Tests in the radiology section of CPT®: reviewed        Final diagnoses:   Chronic obstructive pulmonary disease with acute exacerbation (HCC)          Amandeep Branham MD  12/21/21 0815

## 2022-01-04 ENCOUNTER — HOSPITAL ENCOUNTER (OUTPATIENT)
Facility: HOSPITAL | Age: 47
Setting detail: HOSPITAL OUTPATIENT SURGERY
Discharge: HOME | End: 2022-01-04
Attending: INTERNAL MEDICINE | Admitting: INTERNAL MEDICINE

## 2022-01-04 ENCOUNTER — ANESTHESIA (OUTPATIENT)
Dept: GASTROENTEROLOGY | Facility: HOSPITAL | Age: 47
End: 2022-01-04

## 2022-01-04 ENCOUNTER — ANESTHESIA EVENT (OUTPATIENT)
Dept: GASTROENTEROLOGY | Facility: HOSPITAL | Age: 47
End: 2022-01-04

## 2022-01-04 VITALS
TEMPERATURE: 97.2 F | OXYGEN SATURATION: 96 % | DIASTOLIC BLOOD PRESSURE: 67 MMHG | HEIGHT: 73 IN | RESPIRATION RATE: 18 BRPM | HEART RATE: 70 BPM | WEIGHT: 190 LBS | SYSTOLIC BLOOD PRESSURE: 99 MMHG | BODY MASS INDEX: 25.18 KG/M2

## 2022-01-04 DIAGNOSIS — Z12.11 ENCOUNTER FOR SCREENING FOR MALIGNANT NEOPLASM OF COLON: ICD-10-CM

## 2022-01-04 PROCEDURE — 88305 TISSUE EXAM BY PATHOLOGIST: CPT | Performed by: INTERNAL MEDICINE

## 2022-01-04 PROCEDURE — 25010000002 PROPOFOL 1000 MG/100ML EMULSION: Performed by: NURSE ANESTHETIST, CERTIFIED REGISTERED

## 2022-01-04 PROCEDURE — 45380 COLONOSCOPY AND BIOPSY: CPT | Performed by: INTERNAL MEDICINE

## 2022-01-04 RX ORDER — KETAMINE HYDROCHLORIDE 50 MG/ML
INJECTION, SOLUTION, CONCENTRATE INTRAMUSCULAR; INTRAVENOUS AS NEEDED
Status: DISCONTINUED | OUTPATIENT
Start: 2022-01-04 | End: 2022-01-04 | Stop reason: SURG

## 2022-01-04 RX ORDER — PROPOFOL 10 MG/ML
INJECTION, EMULSION INTRAVENOUS AS NEEDED
Status: DISCONTINUED | OUTPATIENT
Start: 2022-01-04 | End: 2022-01-04 | Stop reason: SURG

## 2022-01-04 RX ORDER — SODIUM CHLORIDE 9 MG/ML
70 INJECTION, SOLUTION INTRAVENOUS CONTINUOUS PRN
Status: DISCONTINUED | OUTPATIENT
Start: 2022-01-04 | End: 2022-01-04 | Stop reason: HOSPADM

## 2022-01-04 RX ORDER — LIDOCAINE HYDROCHLORIDE 20 MG/ML
INJECTION, SOLUTION INTRAVENOUS AS NEEDED
Status: DISCONTINUED | OUTPATIENT
Start: 2022-01-04 | End: 2022-01-04 | Stop reason: SURG

## 2022-01-04 RX ORDER — SIMETHICONE 20 MG/.3ML
EMULSION ORAL AS NEEDED
Status: DISCONTINUED | OUTPATIENT
Start: 2022-01-04 | End: 2022-01-04 | Stop reason: HOSPADM

## 2022-01-04 RX ADMIN — PROPOFOL 100 MG: 10 INJECTION, EMULSION INTRAVENOUS at 09:18

## 2022-01-04 RX ADMIN — LIDOCAINE HYDROCHLORIDE 50 MG: 20 INJECTION, SOLUTION INTRAVENOUS at 09:18

## 2022-01-04 RX ADMIN — PROPOFOL 100 MG: 10 INJECTION, EMULSION INTRAVENOUS at 09:31

## 2022-01-04 RX ADMIN — KETAMINE HYDROCHLORIDE 25 MG: 50 INJECTION, SOLUTION INTRAMUSCULAR; INTRAVENOUS at 09:18

## 2022-01-04 RX ADMIN — SODIUM CHLORIDE 70 ML/HR: 9 INJECTION, SOLUTION INTRAVENOUS at 08:17

## 2022-01-04 NOTE — H&P
[facility]  HISTORY AND PHYSICAL    Patient Name: Topher Sotll  : 1975  MRN: [id number]    Chief Complaint:   For screening colonoscopy    History Of Presenting Illness:    Rectal bleeding  Change in bowel habit  Colon cancer screening    Past Medical History:   Diagnosis Date   • Abdominal adhesions    • Anxiety    • Arthritis    • Asthma    • Cervical radiculopathy    • Colitis    • COPD (chronic obstructive pulmonary disease) (HCC)    • GERD (gastroesophageal reflux disease)    • Heart attack (HCC)    • History of hepatitis C     Treated with Epclusa in 2019   • History of recreational drug use    • HTN (hypertension)    • Kidney cysts    • Left hip pain    • Liver disease    • Low back pain    • Migraines    • Obstructive chronic bronchitis with exacerbation (HCC)    • Sleep apnea     mild   • Tattoos        Past Surgical History:   Procedure Laterality Date   • BACK SURGERY     • COLONOSCOPY     • HERNIA REPAIR     • HIP SPACER INSERTION WITH ANTIBIOTIC CEMENT Left 1/15/2020    Procedure: TOTAL HIP IMPLANT REMOVAL WITH INSERTION OF ANTIBIOTIC SPACER LEFT;  Surgeon: Ba Ramirez MD;  Location:  [facility];  Service: Orthopedics   • SHOULDER LIGAMENT REPAIR      right shoulder   • SMALL INTESTINE SURGERY      Small bowell resection   • TOTAL HIP ARTHROPLASTY Left    • TOTAL HIP ARTHROPLASTY REVISION Left 3/5/2020    Procedure: HIP REIMPLANT REVISION LEFT;  Surgeon: Ba Ramirez MD;  Location:  [facility];  Service: Orthopedics;  Laterality: Left;   • UPPER GASTROINTESTINAL ENDOSCOPY         Social History     Socioeconomic History   • Marital status:    Tobacco Use   • Smoking status: Former Smoker   • Smokeless tobacco: Current User     Types: Chew   • Tobacco comment: quit    Vaping Use   • Vaping Use: Never used   Substance and Sexual Activity   • Alcohol use: No     Comment: stopped drinking alcohol   • Drug use: Not Currently     Comment: takes suboxone   •  Sexual activity: Defer       Family History   Problem Relation Age of Onset   • Arthritis Other    • Hypertension Other    • Migraines Other    • Heart attack Other    • Stroke Other    • Colon cancer Neg Hx        Prior to Admission Medications:  Medications Prior to Admission   Medication Sig Dispense Refill Last Dose   • albuterol (PROVENTIL) (2.5 MG/3ML) 0.083% nebulizer solution Take 2.5 mg by nebulization Every 4 (Four) Hours As Needed for wheezing. 120 vial 11 Past Week at Unknown time   • bisacodyl (DULCOLAX) 5 MG EC tablet Take as directed for colon prep 4 tablet 0 1/3/2022 at Unknown time   • esomeprazole (nexIUM) 40 MG capsule Take 40 mg by mouth Every Morning Before Breakfast.   1/3/2022 at 0600   • fluticasone (FLONASE) 50 MCG/ACT nasal spray 1 spray into the nostril(s) as directed by provider Daily. 48 g 2 Past Week at Unknown time   • Fluticasone Furoate-Vilanterol (Breo Ellipta) 200-25 MCG/INH inhaler Inhale 1 puff Daily. 180 each 2 Past Week at Unknown time   • losartan (COZAAR) 100 MG tablet Take 1 tablet by mouth Daily. (Patient taking differently: Take 50 mg by mouth 2 (Two) Times a Day.) 90 tablet 3 1/3/2022 at 0600   • lovastatin (MEVACOR) 40 MG tablet Take 1 tablet by mouth Every Night. 90 tablet 3 1/3/2022 at 0600   • magnesium citrate 1.745 GM/30ML solution solution Use as directed for colonoscopy prep. 296 mL 0 1/3/2022   • methadone (DOLOPHINE) 5 MG/5ML solution Take 60 mg by mouth Daily. Patient takes 60 mg once per day in mornings, states took a dose 6/15/21 around 0700   1/3/2022 at 0600   • montelukast (SINGULAIR) 10 MG tablet Take 1 tablet by mouth Every Evening. 30 tablet 2 1/3/2022 at 0600   • omeprazole (priLOSEC) 40 MG capsule Take 40 mg by mouth Daily.   1/3/2022 at 0600   • ondansetron ODT (ZOFRAN-ODT) 4 MG disintegrating tablet Place 1 tablet on the tongue 4 (Four) Times a Day As Needed for Nausea. 20 tablet 0 1/3/2022 at 1400   • polyethylene glycol (MiraLax) 17 GM/SCOOP  powder Take as directed for colonoscopy prep 238 g 0 1/3/2022 at Unknown time   • Tiotropium Bromide Monohydrate (Spiriva Respimat) 1.25 MCG/ACT aerosol solution inhaler Inhale 2 puffs Daily. 12 g 2 1/3/2022 at Unknown time   • traZODone (DESYREL) 150 MG tablet Take 150 mg by mouth At Night As Needed.  0 1/3/2022 at 1700   • albuterol sulfate HFA (Ventolin HFA) 108 (90 Base) MCG/ACT inhaler Inhale 2 puffs Every 4 (Four) Hours As Needed for Wheezing or Shortness of Air. 18 g 5 Unknown at Unknown time   • dicyclomine (BENTYL) 20 MG tablet Take 20 mg by mouth As Needed.      • predniSONE (DELTASONE) 10 MG (48) dose pack Take as directed 48 tablet 0        Allergies:  Allergies   Allergen Reactions   • Doxycycline Nausea And Vomiting        Vitals: Temp:  [97.6 °F (36.4 °C)] 97.6 °F (36.4 °C)  Heart Rate:  [80] 80  Resp:  [18] 18  BP: (119)/(84) 119/84    Review Of Systems:  Constitutional:  Negative for chills, fever, and unexpected weight change.  Respiratory:  Negative for cough, chest tightness, shortness of breath, and wheezing.  Cardiovascular:  Negative for chest pain, palpitations, and leg swelling.  Gastrointestinal:  Negative for abdominal distention, abdominal pain, Nausea, vomiting.  Neurological:  Negative for Weakness, numbness, and headaches.     Physical Exam:    General Appearance:  Alert, cooperative, in no acute distress.   Lungs:   Clear to auscultation, respirations regular, even and                 unlabored.   Heart:  Regular rhythm and normal rate.   Abdomen:   Normal bowel sounds, no masses, no organomegaly. Soft, non-tender, non-distended   Neurologic: Alert and oriented x 3. Moves all four limbs equally       Plan: COLONOSCOPY (N/A)     Giancarlo Ruiz MD  1/4/2022

## 2022-01-04 NOTE — DISCHARGE INSTRUCTIONS
Rest today    No pushing,pulling,tugging,heavy lifting, or strenuous activity   No major decision making,driving,or drinking alcoholic beverages for 24 hours due to the sedation you received  Always use good hand hygiene/washing technique  No driving on pain medication.    To assist you in voiding:  Drink plenty of fluids  Listen to running water while attempting to void.    If you are unable to urinate and you have an uncomfortable urge to void or it has been   6 hours since you were discharged, return to the Emergency Room.    - Discharge patient to home (ambulatory).   - High fiber diet.   - Continue present medications.   - Will consider Movantik or symproic as op along with MoM , depend on his symptoms  - Await pathology results.   - Repeat colonoscopy in 10 years for screening purposes.   - Return to GI office in 8 weeks.

## 2022-01-04 NOTE — ANESTHESIA POSTPROCEDURE EVALUATION
Patient: Topher Stoll    Procedure Summary       Date: 01/04/22 Room / Location: [facility] ENDOSCOPY 2 / [facility] ENDOSCOPY    Anesthesia Start: 0921 Anesthesia Stop: 0953    Procedure: COLONOSCOPY with biopsies (N/A Anus) Diagnosis:       Encounter for screening for malignant neoplasm of colon      (Encounter for screening for malignant neoplasm of colon [Z12.11])    Surgeons: Giancarlo Ruiz MD Provider: Shar Goins CRNA    Anesthesia Type: MAC ASA Status: 2            Anesthesia Type: MAC    Vitals  Vitals Value Taken Time   BP 99/67 01/04/22 1015   Temp 97.2 °F (36.2 °C) 01/04/22 0945   Pulse 70 01/04/22 1015   Resp 18 01/04/22 1015   SpO2 96 % 01/04/22 1015           Post Anesthesia Care and Evaluation    Patient location during evaluation: bedside  Patient participation: complete - patient participated  Level of consciousness: awake  Pain score: 0  Pain management: adequate  Airway patency: patent  Anesthetic complications: No anesthetic complications  PONV Status: controlled  Cardiovascular status: acceptable and stable  Respiratory status: acceptable and room air  Hydration status: acceptable

## 2022-01-04 NOTE — ANESTHESIA PREPROCEDURE EVALUATION
Anesthesia Evaluation     Patient summary reviewed and Nursing notes reviewed   NPO Solid Status: > 8 hours  NPO Liquid Status: > 8 hours           Airway   Mallampati: I  TM distance: >3 FB  Neck ROM: full  Possible difficult intubation  Dental    (+) edentulous    Pulmonary - normal exam   Cardiovascular   Exercise tolerance: good (4-7 METS)    Rhythm: regular  Rate: normal        Neuro/Psych  GI/Hepatic/Renal/Endo      Musculoskeletal     Abdominal    Substance History      OB/GYN          Other                          Anesthesia Plan    ASA 2     MAC     intravenous induction     Anesthetic plan, all risks, benefits, and alternatives have been provided, discussed and informed consent has been obtained with: patient.    Plan discussed with CRNA.

## 2022-01-11 LAB
LAB AP CASE REPORT: NORMAL
PATH REPORT.FINAL DX SPEC: NORMAL

## 2022-01-31 RX ORDER — PREDNISONE 20 MG/1
TABLET ORAL
Qty: 10 TABLET | Refills: 0 | OUTPATIENT
Start: 2022-01-31 | End: 2022-02-07

## 2022-02-07 PROCEDURE — U0004 COV-19 TEST NON-CDC HGH THRU: HCPCS | Performed by: NURSE PRACTITIONER

## 2022-02-07 PROCEDURE — U0005 INFEC AGEN DETEC AMPLI PROBE: HCPCS | Performed by: NURSE PRACTITIONER

## 2022-03-02 ENCOUNTER — OFFICE VISIT (OUTPATIENT)
Dept: GASTROENTEROLOGY | Facility: CLINIC | Age: 47
End: 2022-03-02

## 2022-03-02 VITALS
HEIGHT: 72 IN | DIASTOLIC BLOOD PRESSURE: 66 MMHG | WEIGHT: 203 LBS | RESPIRATION RATE: 18 BRPM | BODY MASS INDEX: 27.5 KG/M2 | OXYGEN SATURATION: 97 % | HEART RATE: 72 BPM | TEMPERATURE: 97.5 F | SYSTOLIC BLOOD PRESSURE: 112 MMHG

## 2022-03-02 DIAGNOSIS — K62.5 RECTAL BLEEDING: ICD-10-CM

## 2022-03-02 DIAGNOSIS — K21.9 GASTROESOPHAGEAL REFLUX DISEASE, UNSPECIFIED WHETHER ESOPHAGITIS PRESENT: ICD-10-CM

## 2022-03-02 DIAGNOSIS — K58.1 IRRITABLE BOWEL SYNDROME WITH CONSTIPATION: ICD-10-CM

## 2022-03-02 DIAGNOSIS — R10.33 PERIUMBILICAL ABDOMINAL PAIN: ICD-10-CM

## 2022-03-02 DIAGNOSIS — K59.03 DRUG-INDUCED CONSTIPATION: Primary | Chronic | ICD-10-CM

## 2022-03-02 DIAGNOSIS — R11.0 NAUSEA: ICD-10-CM

## 2022-03-02 DIAGNOSIS — K62.89 ANAL OR RECTAL PAIN: ICD-10-CM

## 2022-03-02 PROCEDURE — 99214 OFFICE O/P EST MOD 30 MIN: CPT | Performed by: NURSE PRACTITIONER

## 2022-03-02 NOTE — PATIENT INSTRUCTIONS
1. Antireflux measures: Avoid fried, fatty foods, alcohol, chocolate, coffee, tea,  soft drinks, peppermint and spearmint, spicy foods, tomatoes and tomato based foods, onions, peppers, and smoking.   Other antireflux measures include weight reduction if overweight, avoiding tight clothing around the abdomen, elevating the head of the bed 6 inches with blocks under the head board, and don't drink or eat before going to bed and avoid lying down immediately after meals.  Omeprazole 40 mg 1 by mouth in the am 30 minutes before breakfast.  Zofran 4 mg 1 po every 8 hours as needed for nausea.  High fiber, low fat diet with liberal water intake.   Continue Milk of magnesia daily as directed.  Advised to exercise 30 minutes 4-5 days per week.   Advised to lose 15-20 pounds in the next 6-12 months.  Colonoscopy for screening in 10 years, January 2032.  Follow up: 1 year or sooner if needed
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
## 2022-03-04 ENCOUNTER — OFFICE VISIT (OUTPATIENT)
Dept: INTERNAL MEDICINE | Facility: CLINIC | Age: 47
End: 2022-03-04

## 2022-03-04 VITALS
DIASTOLIC BLOOD PRESSURE: 80 MMHG | SYSTOLIC BLOOD PRESSURE: 122 MMHG | BODY MASS INDEX: 27.22 KG/M2 | HEIGHT: 72 IN | HEART RATE: 83 BPM | TEMPERATURE: 97.3 F | OXYGEN SATURATION: 95 % | WEIGHT: 201 LBS

## 2022-03-04 DIAGNOSIS — I25.10 ATHEROSCLEROSIS OF NATIVE CORONARY ARTERY OF NATIVE HEART WITHOUT ANGINA PECTORIS: ICD-10-CM

## 2022-03-04 DIAGNOSIS — J43.1 PANLOBULAR EMPHYSEMA: Primary | ICD-10-CM

## 2022-03-04 DIAGNOSIS — I10 PRIMARY HYPERTENSION: ICD-10-CM

## 2022-03-04 DIAGNOSIS — E78.2 MIXED HYPERLIPIDEMIA: ICD-10-CM

## 2022-03-04 PROBLEM — K62.5 RECTAL BLEEDING: Status: RESOLVED | Noted: 2021-09-15 | Resolved: 2022-03-04

## 2022-03-04 PROBLEM — D72.829 LEUKOCYTOSIS: Status: RESOLVED | Noted: 2020-01-16 | Resolved: 2022-03-04

## 2022-03-04 PROBLEM — J18.9 COMMUNITY ACQUIRED PNEUMONIA OF LEFT LOWER LOBE OF LUNG: Status: RESOLVED | Noted: 2021-06-15 | Resolved: 2022-03-04

## 2022-03-04 PROBLEM — K60.2 ANAL FISSURE: Status: ACTIVE | Noted: 2021-07-22

## 2022-03-04 PROBLEM — B99.9 INFECTION: Status: RESOLVED | Noted: 2020-01-15 | Resolved: 2022-03-04

## 2022-03-04 PROBLEM — S61.412A LACERATION OF LEFT HAND: Status: RESOLVED | Noted: 2018-11-09 | Resolved: 2022-03-04

## 2022-03-04 PROBLEM — K60.2 ANAL FISSURE: Status: RESOLVED | Noted: 2021-07-22 | Resolved: 2022-03-04

## 2022-03-04 PROBLEM — E78.5 DYSLIPIDEMIA: Status: RESOLVED | Noted: 2018-09-25 | Resolved: 2022-03-04

## 2022-03-04 PROBLEM — R11.0 NAUSEA: Chronic | Status: RESOLVED | Noted: 2021-09-15 | Resolved: 2022-03-04

## 2022-03-04 PROBLEM — Z13.21 ENCOUNTER FOR VITAMIN DEFICIENCY SCREENING: Status: RESOLVED | Noted: 2017-02-13 | Resolved: 2022-03-04

## 2022-03-04 PROBLEM — Z12.5 ENCOUNTER FOR SPECIAL SCREENING EXAMINATION FOR NEOPLASM OF PROSTATE: Status: RESOLVED | Noted: 2017-02-13 | Resolved: 2022-03-04

## 2022-03-04 PROBLEM — M25.552 LEFT HIP PAIN: Status: RESOLVED | Noted: 2020-01-15 | Resolved: 2022-03-04

## 2022-03-04 PROBLEM — G89.18 ACUTE POSTOPERATIVE PAIN: Status: RESOLVED | Noted: 2020-01-16 | Resolved: 2022-03-04

## 2022-03-04 PROBLEM — Z79.899 ENCOUNTER FOR LONG-TERM CURRENT USE OF MEDICATION: Status: RESOLVED | Noted: 2017-02-13 | Resolved: 2022-03-04

## 2022-03-04 PROBLEM — R74.8 ELEVATED ALKALINE PHOSPHATASE LEVEL: Chronic | Status: RESOLVED | Noted: 2021-09-15 | Resolved: 2022-03-04

## 2022-03-04 PROBLEM — L03.114 CELLULITIS OF LEFT UPPER EXTREMITY: Status: RESOLVED | Noted: 2017-10-13 | Resolved: 2022-03-04

## 2022-03-04 PROBLEM — E78.5 DYSLIPIDEMIA: Status: ACTIVE | Noted: 2018-09-25

## 2022-03-04 PROBLEM — I21.9 MYOCARDIAL INFARCTION (HCC): Status: ACTIVE | Noted: 2018-09-25

## 2022-03-04 PROBLEM — S67.22XA CRUSHING INJURY OF LEFT HAND: Status: RESOLVED | Noted: 2018-11-09 | Resolved: 2022-03-04

## 2022-03-04 PROBLEM — D62 ACUTE BLOOD LOSS ANEMIA: Status: RESOLVED | Noted: 2020-01-16 | Resolved: 2022-03-04

## 2022-03-04 PROBLEM — K56.690 OTHER PARTIAL INTESTINAL OBSTRUCTION: Status: RESOLVED | Noted: 2019-06-17 | Resolved: 2022-03-04

## 2022-03-04 PROBLEM — M25.551 RIGHT HIP PAIN: Status: RESOLVED | Noted: 2017-10-13 | Resolved: 2022-03-04

## 2022-03-04 PROBLEM — R10.31 RIGHT LOWER QUADRANT ABDOMINAL PAIN: Status: RESOLVED | Noted: 2017-08-04 | Resolved: 2022-03-04

## 2022-03-04 PROBLEM — F11.20 OPIOID DEPENDENCE ON AGONIST THERAPY: Status: ACTIVE | Noted: 2018-09-26

## 2022-03-04 PROBLEM — B07.9 VIRAL WARTS: Status: RESOLVED | Noted: 2017-10-13 | Resolved: 2022-03-04

## 2022-03-04 PROBLEM — G43.909 MIGRAINE: Status: ACTIVE | Noted: 2018-09-25

## 2022-03-04 PROBLEM — I21.9 MYOCARDIAL INFARCTION: Status: RESOLVED | Noted: 2018-09-25 | Resolved: 2022-03-04

## 2022-03-04 PROBLEM — R10.84 GENERALIZED ABDOMINAL PAIN: Status: RESOLVED | Noted: 2021-07-22 | Resolved: 2022-03-04

## 2022-03-04 PROBLEM — R10.33 PERIUMBILICAL ABDOMINAL PAIN: Status: RESOLVED | Noted: 2021-09-15 | Resolved: 2022-03-04

## 2022-03-04 PROBLEM — K62.89 ANAL OR RECTAL PAIN: Status: RESOLVED | Noted: 2021-09-15 | Resolved: 2022-03-04

## 2022-03-04 PROBLEM — G43.909 MIGRAINE: Status: RESOLVED | Noted: 2018-09-25 | Resolved: 2022-03-04

## 2022-03-04 PROBLEM — K52.9 COLITIS: Status: RESOLVED | Noted: 2017-08-08 | Resolved: 2022-03-04

## 2022-03-04 PROBLEM — J44.1 COPD EXACERBATION: Status: RESOLVED | Noted: 2017-10-13 | Resolved: 2022-03-04

## 2022-03-04 PROBLEM — R10.84 GENERALIZED ABDOMINAL PAIN: Status: ACTIVE | Noted: 2021-07-22

## 2022-03-04 PROBLEM — R73.03 PREDIABETES: Status: RESOLVED | Noted: 2020-01-15 | Resolved: 2022-03-04

## 2022-03-04 PROCEDURE — 99214 OFFICE O/P EST MOD 30 MIN: CPT | Performed by: INTERNAL MEDICINE

## 2022-03-04 RX ORDER — ASPIRIN 81 MG/1
81 TABLET ORAL DAILY
Start: 2022-03-04

## 2022-03-04 RX ORDER — PREDNISONE 10 MG/1
10 TABLET ORAL DAILY
Qty: 14 TABLET | Refills: 0 | Status: SHIPPED | OUTPATIENT
Start: 2022-03-04 | End: 2022-03-23

## 2022-03-04 NOTE — PROGRESS NOTES
Subjective  Topher Stoll is a 46 y.o. male    HPI 46-year-old male with a history of bilateral hip replacements with a history of DJD history of significant alcohol use in the past history of narcotic addiction in the past currently attending a methadone clinic.  He was treated successfully apparently for hep C at [facility] in the past.  Coming in to establish care here he has hypertension and dyslipidemia history of significant COPD with multiple exacerbations.  He has been evaluated by pulmonology in the past.  Currently chews tobacco does not drink alcohol    The following portions of the patient's history were reviewed and updated as appropriate: allergies, current medications, past family history, past medical history, past social history, past surgical history, and problem list.     Review of Systems   Constitutional: Positive for fatigue. Negative for activity change, appetite change and fever.   HENT: Negative for congestion, ear discharge, ear pain and trouble swallowing.    Eyes: Negative for photophobia and visual disturbance.   Respiratory: Negative for cough and shortness of breath.    Cardiovascular: Negative for chest pain and palpitations.   Gastrointestinal: Negative for abdominal distention, abdominal pain, constipation, diarrhea, nausea and vomiting.   Endocrine: Negative.    Genitourinary: Negative for dysuria, hematuria and urgency.   Musculoskeletal: Positive for arthralgias and gait problem. Negative for back pain, joint swelling and myalgias.   Skin: Negative for color change and rash.   Allergic/Immunologic: Negative.    Neurological: Negative for dizziness, weakness, light-headedness and headaches.   Hematological: Negative for adenopathy. Does not bruise/bleed easily.   Psychiatric/Behavioral: Positive for sleep disturbance. Negative for agitation, confusion and dysphoric mood. The patient is not nervous/anxious.        Visit Vitals  /80   Pulse 83   Temp 97.3 °F (36.3 °C)  "(Temporal)   Ht 182.9 cm (72\")   Wt 91.2 kg (201 lb)   SpO2 95%   BMI 27.26 kg/m²       Objective  Physical Exam  Constitutional:       General: He is not in acute distress.     Appearance: He is well-developed.   HENT:      Nose: Nose normal.   Eyes:      General: No scleral icterus.     Conjunctiva/sclera: Conjunctivae normal.   Neck:      Thyroid: No thyromegaly.      Trachea: No tracheal deviation.   Cardiovascular:      Rate and Rhythm: Normal rate and regular rhythm.      Heart sounds: No murmur heard.  No friction rub.   Pulmonary:      Effort: No respiratory distress.      Breath sounds: No wheezing or rales.   Abdominal:      General: There is no distension.      Palpations: Abdomen is soft. There is no mass.      Tenderness: There is no abdominal tenderness. There is no guarding.   Musculoskeletal:         General: Deformity present. Normal range of motion.   Lymphadenopathy:      Cervical: No cervical adenopathy.   Skin:     General: Skin is warm and dry.      Findings: No erythema or rash.   Neurological:      Mental Status: He is alert and oriented to person, place, and time.      Cranial Nerves: No cranial nerve deficit.      Coordination: Coordination normal.      Deep Tendon Reflexes: Reflexes are normal and symmetric.   Psychiatric:         Behavior: Behavior normal.         Thought Content: Thought content normal.         Judgment: Judgment normal.         Diagnoses and all orders for this visit:    Panlobular emphysema (HCC) has had a few exacerbations requiring hospitalization.  Albuterol through nebulizer on a as needed basis trial of Trelegy instead of Spiriva and Breo if it is covered by his insurance    Mixed hyperlipidemia continue with the dietary restrictions discussed compliance with medications    Primary hypertension stable with current meds and low-salt diet    Atherosclerosis of native coronary artery of native heart without angina pectoris  Stable angina free  Other orders  -     " Fluticasone-Umeclidin-Vilant (TRELEGY) 200-62.5-25 MCG/INH inhaler; Inhale 1 puff Daily.  -     aspirin (aspirin) 81 MG EC tablet; Take 1 tablet by mouth Daily.

## 2022-03-21 RX ORDER — TIOTROPIUM BROMIDE INHALATION SPRAY 1.56 UG/1
SPRAY, METERED RESPIRATORY (INHALATION)
Qty: 12 G | Refills: 2 | Status: SHIPPED | OUTPATIENT
Start: 2022-03-21 | End: 2022-03-23 | Stop reason: CLARIF

## 2022-04-11 RX ORDER — FLUTICASONE PROPIONATE 50 MCG
SPRAY, SUSPENSION (ML) NASAL
Qty: 48 G | Refills: 2 | Status: SHIPPED | OUTPATIENT
Start: 2022-04-11 | End: 2022-04-21 | Stop reason: SDUPTHER

## 2022-04-13 RX ORDER — MONTELUKAST SODIUM 10 MG/1
TABLET ORAL
Qty: 30 TABLET | Refills: 0 | Status: SHIPPED | OUTPATIENT
Start: 2022-04-13 | End: 2022-04-21

## 2022-04-21 ENCOUNTER — OFFICE VISIT (OUTPATIENT)
Dept: INTERNAL MEDICINE | Facility: CLINIC | Age: 47
End: 2022-04-21

## 2022-04-21 VITALS
DIASTOLIC BLOOD PRESSURE: 60 MMHG | WEIGHT: 201 LBS | SYSTOLIC BLOOD PRESSURE: 110 MMHG | HEIGHT: 73 IN | BODY MASS INDEX: 26.64 KG/M2 | OXYGEN SATURATION: 98 % | HEART RATE: 88 BPM | TEMPERATURE: 96.8 F

## 2022-04-21 DIAGNOSIS — F11.20 OPIOID DEPENDENCE ON AGONIST THERAPY: ICD-10-CM

## 2022-04-21 DIAGNOSIS — J43.1 PANLOBULAR EMPHYSEMA: ICD-10-CM

## 2022-04-21 DIAGNOSIS — Z23 NEED FOR VACCINATION: Primary | ICD-10-CM

## 2022-04-21 DIAGNOSIS — I25.10 ATHEROSCLEROSIS OF NATIVE CORONARY ARTERY OF NATIVE HEART WITHOUT ANGINA PECTORIS: ICD-10-CM

## 2022-04-21 PROCEDURE — G0009 ADMIN PNEUMOCOCCAL VACCINE: HCPCS | Performed by: INTERNAL MEDICINE

## 2022-04-21 PROCEDURE — 90677 PCV20 VACCINE IM: CPT | Performed by: INTERNAL MEDICINE

## 2022-04-21 PROCEDURE — 1159F MED LIST DOCD IN RCRD: CPT | Performed by: INTERNAL MEDICINE

## 2022-04-21 PROCEDURE — 1170F FXNL STATUS ASSESSED: CPT | Performed by: INTERNAL MEDICINE

## 2022-04-21 PROCEDURE — G0439 PPPS, SUBSEQ VISIT: HCPCS | Performed by: INTERNAL MEDICINE

## 2022-04-21 RX ORDER — FLUTICASONE PROPIONATE 50 MCG
2 SPRAY, SUSPENSION (ML) NASAL DAILY
Qty: 18.2 ML | Refills: 3 | Status: SHIPPED | OUTPATIENT
Start: 2022-04-21 | End: 2022-04-25 | Stop reason: SDUPTHER

## 2022-04-21 NOTE — PROGRESS NOTES
The ABCs of the Annual Wellness Visit  Subsequent Medicare Wellness Visit    Chief Complaint   Patient presents with   • Medicare Wellness-subsequent      Subjective    History of Present Illness:  Topher Stoll is a 46 y.o. male who presents for a Subsequent Medicare Wellness Visit.    The following portions of the patient's history were reviewed and   updated as appropriate: allergies, current medications, past family history, past medical history, past social history, past surgical history and problem list.    Compared to one year ago, the patient feels his physical   health is the same.    Compared to one year ago, the patient feels his mental   health is the same.    Recent Hospitalizations:  This patient has had a [facility] admission record on file within the last 365 days.    Current Medical Providers:  Patient Care Team:  Joshua Hoffmann MD as PCP - General (Internal Medicine)    Outpatient Medications Prior to Visit   Medication Sig Dispense Refill   • albuterol (PROVENTIL) (2.5 MG/3ML) 0.083% nebulizer solution Take 2.5 mg by nebulization Every 4 (Four) Hours As Needed for wheezing. 120 vial 11   • albuterol sulfate HFA (Ventolin HFA) 108 (90 Base) MCG/ACT inhaler Inhale 2 puffs Every 4 (Four) Hours As Needed for Wheezing or Shortness of Air. 18 g 5   • aspirin (aspirin) 81 MG EC tablet Take 1 tablet by mouth Daily.     • fluticasone (FLONASE) 50 MCG/ACT nasal spray INSTILL 1 SPRAY INTO EACH NOSTRIL DAILY (Patient taking differently: 2 sprays into the nostril(s) as directed by provider Daily.) 48 g 2   • Fluticasone-Umeclidin-Vilant (TRELEGY) 200-62.5-25 MCG/INH inhaler Inhale 1 puff Daily. 28 each 11   • losartan (COZAAR) 100 MG tablet Take 1 tablet by mouth Daily. (Patient taking differently: Take 50 mg by mouth 2 (Two) Times a Day.) 90 tablet 3   • lovastatin (MEVACOR) 40 MG tablet Take 1 tablet by mouth Every Night. 90 tablet 3   • methadone (DOLOPHINE) 5 MG/5ML solution Take 60 mg by mouth  Daily. Patient takes 60 mg once per day in mornings, states took a dose 6/15/21 around 0700     • montelukast (SINGULAIR) 10 MG tablet TAKE 1 TABLET BY MOUTH EVERY EVENING 30 tablet 0   • omeprazole (priLOSEC) 40 MG capsule Take 40 mg by mouth Daily.     • ondansetron ODT (ZOFRAN-ODT) 4 MG disintegrating tablet Place 1 tablet on the tongue 4 (Four) Times a Day As Needed for Nausea. 20 tablet 0   • traZODone (DESYREL) 150 MG tablet Take 150 mg by mouth At Night As Needed.  0   • predniSONE (DELTASONE) 10 MG (21) dose pack Take  by mouth Daily. Use as directed on package 21 tablet 0     No facility-administered medications prior to visit.       Opioid medication/s are on active medication list.  and I have evaluated his active treatment plan and pain score trends (see table).  Vitals:    04/21/22 1326   PainSc: 0-No pain     I have reviewed the chart for potential of high risk medication and harmful drug interactions in the elderly.            Aspirin is on active medication list. Aspirin use is indicated based on review of current medical condition/s. Pros and cons of this therapy have been discussed today. Benefits of this medication outweigh potential harm.  Patient has been encouraged to continue taking this medication.  .      Patient Active Problem List   Diagnosis   • Atherosclerosis of coronary artery   • Hypertension   • Hyperlipidemia   • Osteoporosis   • Pulmonary emphysema (HCC)   • Chronic viral hepatitis B without delta agent and without coma (HCC)   • Hep C w/o coma, chronic (HCC)   • COPD (chronic obstructive pulmonary disease) (HCC)   • total hip explant, antibiotic spacer placement 1/15/2020   • Status post left hip reimplant revision   • History of hepatitis B   • Constipation   • Opioid dependence on agonist therapy (HCC)     Advance Care Planning  Advance Directive is not on file.  ACP discussion was held with the patient during this visit. Patient does not have an advance directive, declines  "further assistance.    Review of Systems   Constitutional: Positive for fatigue. Negative for activity change, appetite change and fever.   HENT: Negative for congestion, ear discharge, ear pain and trouble swallowing.    Eyes: Negative for photophobia and visual disturbance.   Respiratory: Negative for cough and shortness of breath.    Cardiovascular: Negative for chest pain and palpitations.   Gastrointestinal: Negative for abdominal distention, constipation, diarrhea, nausea and vomiting.   Genitourinary: Negative for dysuria, hematuria and urgency.   Musculoskeletal: Positive for arthralgias. Negative for back pain, joint swelling and myalgias.   Skin: Negative for color change and rash.   Neurological: Negative for dizziness, weakness, light-headedness and confusion.   Hematological: Negative for adenopathy. Does not bruise/bleed easily.   Psychiatric/Behavioral: Positive for sleep disturbance. Negative for agitation and dysphoric mood. The patient is not nervous/anxious.         Objective    Vitals:    04/21/22 1326   BP: 110/60   Pulse: 88   Temp: 96.8 °F (36 °C)   TempSrc: Infrared   SpO2: 98%   Weight: 91.2 kg (201 lb)   Height: 185.4 cm (73\")   PainSc: 0-No pain     BMI Readings from Last 1 Encounters:   04/21/22 26.52 kg/m²   BMI is above normal parameters. Recommendations include: exercise counseling and nutrition counseling    Does the patient have evidence of cognitive impairment? No    Physical Exam  Constitutional:       General: He is not in acute distress.     Appearance: He is well-developed.   HENT:      Nose: Nose normal.   Eyes:      General: No scleral icterus.     Conjunctiva/sclera: Conjunctivae normal.   Neck:      Thyroid: No thyromegaly.      Trachea: No tracheal deviation.   Cardiovascular:      Rate and Rhythm: Normal rate and regular rhythm.      Heart sounds: No murmur heard.    No friction rub.   Pulmonary:      Effort: No respiratory distress.      Breath sounds: No wheezing or " rales.   Abdominal:      General: There is no distension.      Palpations: Abdomen is soft. There is no mass.      Tenderness: There is no abdominal tenderness. There is no guarding.   Musculoskeletal:         General: Deformity present. Normal range of motion.   Lymphadenopathy:      Cervical: No cervical adenopathy.   Skin:     General: Skin is warm and dry.      Findings: No erythema or rash.   Neurological:      Mental Status: He is alert and oriented to person, place, and time.      Cranial Nerves: No cranial nerve deficit.      Coordination: Coordination normal.      Deep Tendon Reflexes: Reflexes are normal and symmetric.   Psychiatric:         Behavior: Behavior normal.         Thought Content: Thought content normal.         Judgment: Judgment normal.                 HEALTH RISK ASSESSMENT    Smoking Status:  Social History     Tobacco Use   Smoking Status Former Smoker   • Packs/day: 2.00   • Years: 15.00   • Pack years: 30.00   • Quit date:    • Years since quittin.3   Smokeless Tobacco Current User   • Types: Chew     Alcohol Consumption:  Social History     Substance and Sexual Activity   Alcohol Use No    Comment: stopped drinking alcohol     Fall Risk Screen:    [facility] Fall Risk Assessment has not been completed.    Depression Screening:  PHQ-2/PHQ-9 Depression Screening 2022   Retired PHQ-9 Total Score -   Retired Total Score -   Little Interest or Pleasure in Doing Things 0-->not at all   Feeling Down, Depressed or Hopeless 0-->not at all   PHQ-9: Brief Depression Severity Measure Score 0       Health Habits and Functional and Cognitive Screening:  Functional & Cognitive Status 2022   Do you have difficulty preparing food and eating? No   Do you have difficulty bathing yourself, getting dressed or grooming yourself? No   Do you have difficulty using the toilet? No   Do you have difficulty moving around from place to place? No   Do you have trouble with steps or getting out of a  bed or a chair? No   Current Diet Well Balanced Diet   Dental Exam Up to date   Eye Exam Up to date   Exercise (times per week) 0 times per week   Current Exercises Include No Regular Exercise   Do you need help using the phone?  No   Are you deaf or do you have serious difficulty hearing?  No   Do you need help with transportation? No   Do you need help shopping? No   Do you need help preparing meals?  No   Do you need help with housework?  No   Do you need help with laundry? No   Do you need help taking your medications? No   Do you need help managing money? No   Do you ever drive or ride in a car without wearing a seat belt? No   Have you felt unusual stress, anger or loneliness in the last month? No   Who do you live with? Alone   If you need help, do you have trouble finding someone available to you? No   Have you been bothered in the last four weeks by sexual problems? No   Do you have difficulty concentrating, remembering or making decisions? No       Age-appropriate Screening Schedule:  Refer to the list below for future screening recommendations based on patient's age, sex and/or medical conditions. Orders for these recommended tests are listed in the plan section. The patient has been provided with a written plan.    Health Maintenance   Topic Date Due   • DXA SCAN  Never done   • LIPID PANEL  12/10/2019   • INFLUENZA VACCINE  08/01/2022   • TDAP/TD VACCINES (2 - Td or Tdap) 11/09/2028              Assessment/Plan   CMS Preventative Services Quick Reference  Risk Factors Identified During Encounter  Cardiovascular Disease  Polypharmacy  Tobacco Use/Dependance (use dotphrase .tobaccocessation for documentation)  The above risks/problems have been discussed with the patient.  Follow up actions/plans if indicated are seen below in the Assessment/Plan Section.  Pertinent information has been shared with the patient in the After Visit Summary.    Diagnoses and all orders for this visit:    1. Need for  vaccination (Primary)  -     Pneumococcal Conjugate Vaccine 20-Valent All    2. Atherosclerosis of native coronary artery of native heart without angina pectoris stable angina free continue with aspirin and statin    3. Panlobular emphysema (HCC) now off tobacco use continue with Trelegy albuterol through neb machine as needed    4. Opioid dependence on agonist therapy (HCC) stable on daily methadone.  Following up with behavioral health        Follow Up:   No follow-ups on file.     An After Visit Summary and PPPS were made available to the patient.

## 2022-04-25 ENCOUNTER — OFFICE VISIT (OUTPATIENT)
Dept: PULMONOLOGY | Facility: CLINIC | Age: 47
End: 2022-04-25

## 2022-04-25 VITALS
WEIGHT: 196 LBS | HEIGHT: 73 IN | BODY MASS INDEX: 25.98 KG/M2 | SYSTOLIC BLOOD PRESSURE: 118 MMHG | HEART RATE: 84 BPM | DIASTOLIC BLOOD PRESSURE: 68 MMHG | RESPIRATION RATE: 18 BRPM | OXYGEN SATURATION: 96 %

## 2022-04-25 DIAGNOSIS — G47.33 OBSTRUCTIVE SLEEP APNEA: ICD-10-CM

## 2022-04-25 DIAGNOSIS — J30.89 OTHER ALLERGIC RHINITIS: ICD-10-CM

## 2022-04-25 DIAGNOSIS — J82.83 EOSINOPHILIC ASTHMA: ICD-10-CM

## 2022-04-25 DIAGNOSIS — J45.50 SEVERE PERSISTENT ASTHMA WITHOUT COMPLICATION: Primary | ICD-10-CM

## 2022-04-25 DIAGNOSIS — J43.1 PANLOBULAR EMPHYSEMA: ICD-10-CM

## 2022-04-25 PROCEDURE — 99214 OFFICE O/P EST MOD 30 MIN: CPT | Performed by: NURSE PRACTITIONER

## 2022-04-25 RX ORDER — FLUTICASONE PROPIONATE 50 MCG
2 SPRAY, SUSPENSION (ML) NASAL DAILY
Qty: 18.2 ML | Refills: 3 | Status: SHIPPED | OUTPATIENT
Start: 2022-04-25 | End: 2022-07-11 | Stop reason: SDUPTHER

## 2022-04-25 RX ORDER — ALBUTEROL SULFATE 90 UG/1
2 AEROSOL, METERED RESPIRATORY (INHALATION) EVERY 4 HOURS PRN
Qty: 18 G | Refills: 5 | Status: SHIPPED | OUTPATIENT
Start: 2022-04-25 | End: 2022-07-11 | Stop reason: SDUPTHER

## 2022-05-02 ENCOUNTER — LAB (OUTPATIENT)
Dept: LAB | Facility: HOSPITAL | Age: 47
End: 2022-05-02

## 2022-05-02 DIAGNOSIS — J45.50 SEVERE PERSISTENT ASTHMA WITHOUT COMPLICATION: ICD-10-CM

## 2022-05-02 LAB
BASOPHILS # BLD AUTO: 0.05 10*3/MM3 (ref 0–0.2)
BASOPHILS NFR BLD AUTO: 0.6 % (ref 0–1.5)
DEPRECATED RDW RBC AUTO: 39.1 FL (ref 37–54)
EOSINOPHIL # BLD AUTO: 0.47 10*3/MM3 (ref 0–0.4)
EOSINOPHIL NFR BLD AUTO: 6.1 % (ref 0.3–6.2)
ERYTHROCYTE [DISTWIDTH] IN BLOOD BY AUTOMATED COUNT: 13 % (ref 12.3–15.4)
HCT VFR BLD AUTO: 45.6 % (ref 37.5–51)
HGB BLD-MCNC: 14.7 G/DL (ref 13–17.7)
IMM GRANULOCYTES # BLD AUTO: 0.02 10*3/MM3 (ref 0–0.05)
IMM GRANULOCYTES NFR BLD AUTO: 0.3 % (ref 0–0.5)
LYMPHOCYTES # BLD AUTO: 1.71 10*3/MM3 (ref 0.7–3.1)
LYMPHOCYTES NFR BLD AUTO: 22.2 % (ref 19.6–45.3)
MCH RBC QN AUTO: 27.1 PG (ref 26.6–33)
MCHC RBC AUTO-ENTMCNC: 32.2 G/DL (ref 31.5–35.7)
MCV RBC AUTO: 84 FL (ref 79–97)
MONOCYTES # BLD AUTO: 0.68 10*3/MM3 (ref 0.1–0.9)
MONOCYTES NFR BLD AUTO: 8.8 % (ref 5–12)
NEUTROPHILS NFR BLD AUTO: 4.79 10*3/MM3 (ref 1.7–7)
NEUTROPHILS NFR BLD AUTO: 62 % (ref 42.7–76)
NRBC BLD AUTO-RTO: 0.1 /100 WBC (ref 0–0.2)
PLATELET # BLD AUTO: 213 10*3/MM3 (ref 140–450)
PMV BLD AUTO: 11.1 FL (ref 6–12)
RBC # BLD AUTO: 5.43 10*6/MM3 (ref 4.14–5.8)
WBC NRBC COR # BLD: 7.72 10*3/MM3 (ref 3.4–10.8)

## 2022-05-02 PROCEDURE — 36415 COLL VENOUS BLD VENIPUNCTURE: CPT

## 2022-05-02 PROCEDURE — 85025 COMPLETE CBC W/AUTO DIFF WBC: CPT

## 2022-05-06 RX ORDER — BENRALIZUMAB 30 MG/ML
30 INJECTION, SOLUTION SUBCUTANEOUS TAKE AS DIRECTED
Qty: 1 ML | Refills: 12 | Status: SHIPPED | OUTPATIENT
Start: 2022-05-06 | End: 2022-09-26 | Stop reason: SDUPTHER

## 2022-05-06 NOTE — PROGRESS NOTES
Please call the patient regarding his lab result.  Absolute eosinophil count remains elevated.  I discussed asthma treatment with the patient at his last visit in the form of Fasenra/Dupixent.  I will order this for the patient and we can start the process of approval.

## 2022-05-15 ENCOUNTER — APPOINTMENT (OUTPATIENT)
Dept: GENERAL RADIOLOGY | Facility: HOSPITAL | Age: 47
End: 2022-05-15

## 2022-05-15 ENCOUNTER — HOSPITAL ENCOUNTER (INPATIENT)
Facility: HOSPITAL | Age: 47
LOS: 2 days | Discharge: HOME | End: 2022-05-17
Attending: EMERGENCY MEDICINE | Admitting: INTERNAL MEDICINE

## 2022-05-15 DIAGNOSIS — J96.21 ACUTE ON CHRONIC RESPIRATORY FAILURE WITH HYPOXIA AND HYPERCAPNIA: Primary | ICD-10-CM

## 2022-05-15 DIAGNOSIS — U07.1 COVID-19: ICD-10-CM

## 2022-05-15 DIAGNOSIS — J96.22 ACUTE ON CHRONIC RESPIRATORY FAILURE WITH HYPOXIA AND HYPERCAPNIA: Primary | ICD-10-CM

## 2022-05-15 PROBLEM — J44.9 COPD WITHOUT EXACERBATION: Status: ACTIVE | Noted: 2022-05-15

## 2022-05-15 PROBLEM — J96.01 ACUTE HYPOXEMIC RESPIRATORY FAILURE DUE TO COVID-19: Status: ACTIVE | Noted: 2022-05-15

## 2022-05-15 PROBLEM — F11.90 OPIATE USE: Status: ACTIVE | Noted: 2022-05-15

## 2022-05-15 LAB
A-A DO2: 6.8 MMHG
ALBUMIN SERPL-MCNC: 4.5 G/DL (ref 3.5–5.2)
ALBUMIN/GLOB SERPL: 1.8 G/DL
ALP SERPL-CCNC: 153 U/L (ref 39–117)
ALT SERPL W P-5'-P-CCNC: 25 U/L (ref 1–41)
ANION GAP SERPL CALCULATED.3IONS-SCNC: 10.4 MMOL/L (ref 5–15)
ARTERIAL PATENCY WRIST A: ABNORMAL
AST SERPL-CCNC: 22 U/L (ref 1–40)
ATMOSPHERIC PRESS: 734 MMHG
BASE EXCESS BLDA CALC-SCNC: 0.2 MMOL/L (ref 0–2)
BASOPHILS # BLD AUTO: 0.06 10*3/MM3 (ref 0–0.2)
BASOPHILS NFR BLD AUTO: 0.8 % (ref 0–1.5)
BDY SITE: ABNORMAL
BILIRUB SERPL-MCNC: 0.3 MG/DL (ref 0–1.2)
BUN SERPL-MCNC: 15 MG/DL (ref 6–20)
BUN/CREAT SERPL: 15.3 (ref 7–25)
CALCIUM SPEC-SCNC: 9.2 MG/DL (ref 8.6–10.5)
CHLORIDE SERPL-SCNC: 100 MMOL/L (ref 98–107)
CO2 SERPL-SCNC: 27.6 MMOL/L (ref 22–29)
COHGB MFR BLD: <0.7 % (ref 0–2)
CREAT SERPL-MCNC: 0.98 MG/DL (ref 0.76–1.27)
D-LACTATE SERPL-SCNC: 1.7 MMOL/L (ref 0.5–2)
DEPRECATED RDW RBC AUTO: 38.9 FL (ref 37–54)
EGFRCR SERPLBLD CKD-EPI 2021: 95.7 ML/MIN/1.73
EOSINOPHIL # BLD AUTO: 0.82 10*3/MM3 (ref 0–0.4)
EOSINOPHIL NFR BLD AUTO: 10.6 % (ref 0.3–6.2)
ERYTHROCYTE [DISTWIDTH] IN BLOOD BY AUTOMATED COUNT: 12.7 % (ref 12.3–15.4)
FLUAV RNA RESP QL NAA+PROBE: NOT DETECTED
FLUBV RNA RESP QL NAA+PROBE: NOT DETECTED
GAS FLOW AIRWAY: 3 LPM
GLOBULIN UR ELPH-MCNC: 2.5 GM/DL
GLUCOSE SERPL-MCNC: 119 MG/DL (ref 65–99)
HCO3 BLDA-SCNC: 27.4 MMOL/L (ref 22–28)
HCT VFR BLD AUTO: 47 % (ref 37.5–51)
HCT VFR BLD CALC: 43.3 %
HGB BLD-MCNC: 15.1 G/DL (ref 13–17.7)
HOLD SPECIMEN: NORMAL
HOLD SPECIMEN: NORMAL
IMM GRANULOCYTES # BLD AUTO: 0.01 10*3/MM3 (ref 0–0.05)
IMM GRANULOCYTES NFR BLD AUTO: 0.1 % (ref 0–0.5)
LYMPHOCYTES # BLD AUTO: 2.02 10*3/MM3 (ref 0.7–3.1)
LYMPHOCYTES NFR BLD AUTO: 26.1 % (ref 19.6–45.3)
Lab: ABNORMAL
MCH RBC QN AUTO: 27.1 PG (ref 26.6–33)
MCHC RBC AUTO-ENTMCNC: 32.1 G/DL (ref 31.5–35.7)
MCV RBC AUTO: 84.2 FL (ref 79–97)
METHGB BLD QL: 0.6 % (ref 0–1.5)
MODALITY: ABNORMAL
MONOCYTES # BLD AUTO: 0.56 10*3/MM3 (ref 0.1–0.9)
MONOCYTES NFR BLD AUTO: 7.2 % (ref 5–12)
NEUTROPHILS NFR BLD AUTO: 4.27 10*3/MM3 (ref 1.7–7)
NEUTROPHILS NFR BLD AUTO: 55.2 % (ref 42.7–76)
NOTE: ABNORMAL
NRBC BLD AUTO-RTO: 0 /100 WBC (ref 0–0.2)
NT-PROBNP SERPL-MCNC: 100.9 PG/ML (ref 0–450)
OXYHGB MFR BLDV: 94.1 % (ref 94–99)
PCO2 BLDA: 53.5 MM HG (ref 35–45)
PCO2 TEMP ADJ BLD: ABNORMAL MM[HG]
PH BLDA: 7.32 PH UNITS (ref 7.35–7.45)
PH, TEMP CORRECTED: ABNORMAL
PLATELET # BLD AUTO: 213 10*3/MM3 (ref 140–450)
PMV BLD AUTO: 9.7 FL (ref 6–12)
PO2 BLDA: 77.1 MM HG (ref 75–100)
PO2 TEMP ADJ BLD: ABNORMAL MM[HG]
POTASSIUM SERPL-SCNC: 4 MMOL/L (ref 3.5–5.2)
PROCALCITONIN SERPL-MCNC: 0.03 NG/ML (ref 0–0.25)
PROT SERPL-MCNC: 7 G/DL (ref 6–8.5)
RBC # BLD AUTO: 5.58 10*6/MM3 (ref 4.14–5.8)
SAO2 % BLDCOA: 95.2 % (ref 94–100)
SARS-COV-2 RNA RESP QL NAA+PROBE: DETECTED
SODIUM SERPL-SCNC: 138 MMOL/L (ref 136–145)
TROPONIN T SERPL-MCNC: <0.01 NG/ML (ref 0–0.03)
VENTILATOR MODE: ABNORMAL
WBC NRBC COR # BLD: 7.74 10*3/MM3 (ref 3.4–10.8)
WHOLE BLOOD HOLD COAG: NORMAL
WHOLE BLOOD HOLD SPECIMEN: NORMAL

## 2022-05-15 PROCEDURE — 85025 COMPLETE CBC W/AUTO DIFF WBC: CPT | Performed by: EMERGENCY MEDICINE

## 2022-05-15 PROCEDURE — 82805 BLOOD GASES W/O2 SATURATION: CPT

## 2022-05-15 PROCEDURE — 25010000002 ENOXAPARIN PER 10 MG: Performed by: NURSE PRACTITIONER

## 2022-05-15 PROCEDURE — 80053 COMPREHEN METABOLIC PANEL: CPT | Performed by: EMERGENCY MEDICINE

## 2022-05-15 PROCEDURE — 99284 EMERGENCY DEPT VISIT MOD MDM: CPT

## 2022-05-15 PROCEDURE — 82375 ASSAY CARBOXYHB QUANT: CPT

## 2022-05-15 PROCEDURE — 25010000002 METHYLPREDNISOLONE PER 125 MG: Performed by: EMERGENCY MEDICINE

## 2022-05-15 PROCEDURE — 71045 X-RAY EXAM CHEST 1 VIEW: CPT

## 2022-05-15 PROCEDURE — 87636 SARSCOV2 & INF A&B AMP PRB: CPT | Performed by: EMERGENCY MEDICINE

## 2022-05-15 PROCEDURE — 63710000001 DEXAMETHASONE PER 0.25 MG: Performed by: NURSE PRACTITIONER

## 2022-05-15 PROCEDURE — 36415 COLL VENOUS BLD VENIPUNCTURE: CPT

## 2022-05-15 PROCEDURE — 83605 ASSAY OF LACTIC ACID: CPT | Performed by: EMERGENCY MEDICINE

## 2022-05-15 PROCEDURE — 87040 BLOOD CULTURE FOR BACTERIA: CPT | Performed by: EMERGENCY MEDICINE

## 2022-05-15 PROCEDURE — 99222 1ST HOSP IP/OBS MODERATE 55: CPT | Performed by: NURSE PRACTITIONER

## 2022-05-15 PROCEDURE — 83880 ASSAY OF NATRIURETIC PEPTIDE: CPT | Performed by: EMERGENCY MEDICINE

## 2022-05-15 PROCEDURE — 36600 WITHDRAWAL OF ARTERIAL BLOOD: CPT

## 2022-05-15 PROCEDURE — 84484 ASSAY OF TROPONIN QUANT: CPT | Performed by: EMERGENCY MEDICINE

## 2022-05-15 PROCEDURE — 93005 ELECTROCARDIOGRAM TRACING: CPT | Performed by: EMERGENCY MEDICINE

## 2022-05-15 PROCEDURE — 83050 HGB METHEMOGLOBIN QUAN: CPT

## 2022-05-15 PROCEDURE — 84145 PROCALCITONIN (PCT): CPT | Performed by: EMERGENCY MEDICINE

## 2022-05-15 RX ORDER — ATORVASTATIN CALCIUM 10 MG/1
10 TABLET, FILM COATED ORAL DAILY
Refills: 3 | Status: DISCONTINUED | OUTPATIENT
Start: 2022-05-15 | End: 2022-05-17 | Stop reason: HOSPADM

## 2022-05-15 RX ORDER — ALBUTEROL SULFATE 90 UG/1
2 AEROSOL, METERED RESPIRATORY (INHALATION)
Status: DISCONTINUED | OUTPATIENT
Start: 2022-05-15 | End: 2022-05-15

## 2022-05-15 RX ORDER — METHYLPREDNISOLONE SODIUM SUCCINATE 125 MG/2ML
125 INJECTION, POWDER, LYOPHILIZED, FOR SOLUTION INTRAMUSCULAR; INTRAVENOUS ONCE
Status: COMPLETED | OUTPATIENT
Start: 2022-05-15 | End: 2022-05-15

## 2022-05-15 RX ORDER — METHADONE HYDROCHLORIDE 10 MG/1
55 TABLET ORAL DAILY
Status: DISCONTINUED | OUTPATIENT
Start: 2022-05-16 | End: 2022-05-17 | Stop reason: HOSPADM

## 2022-05-15 RX ORDER — ENOXAPARIN SODIUM 100 MG/ML
40 INJECTION SUBCUTANEOUS EVERY 24 HOURS
Status: DISCONTINUED | OUTPATIENT
Start: 2022-05-15 | End: 2022-05-17 | Stop reason: HOSPADM

## 2022-05-15 RX ORDER — LOSARTAN POTASSIUM 50 MG/1
50 TABLET ORAL 2 TIMES DAILY
Status: DISCONTINUED | OUTPATIENT
Start: 2022-05-15 | End: 2022-05-17 | Stop reason: HOSPADM

## 2022-05-15 RX ORDER — DEXAMETHASONE SODIUM PHOSPHATE 4 MG/ML
6 INJECTION, SOLUTION INTRA-ARTICULAR; INTRALESIONAL; INTRAMUSCULAR; INTRAVENOUS; SOFT TISSUE DAILY
Status: DISCONTINUED | OUTPATIENT
Start: 2022-05-15 | End: 2022-05-17 | Stop reason: HOSPADM

## 2022-05-15 RX ORDER — ASPIRIN 81 MG/1
81 TABLET ORAL DAILY
Status: DISCONTINUED | OUTPATIENT
Start: 2022-05-15 | End: 2022-05-17 | Stop reason: HOSPADM

## 2022-05-15 RX ORDER — ALBUTEROL SULFATE 90 UG/1
2 AEROSOL, METERED RESPIRATORY (INHALATION) EVERY 4 HOURS PRN
Status: DISCONTINUED | OUTPATIENT
Start: 2022-05-15 | End: 2022-05-17 | Stop reason: HOSPADM

## 2022-05-15 RX ORDER — BUDESONIDE AND FORMOTEROL FUMARATE DIHYDRATE 160; 4.5 UG/1; UG/1
2 AEROSOL RESPIRATORY (INHALATION)
Status: DISCONTINUED | OUTPATIENT
Start: 2022-05-15 | End: 2022-05-17 | Stop reason: HOSPADM

## 2022-05-15 RX ORDER — PANTOPRAZOLE SODIUM 40 MG/1
40 TABLET, DELAYED RELEASE ORAL EVERY MORNING
Status: DISCONTINUED | OUTPATIENT
Start: 2022-05-16 | End: 2022-05-17 | Stop reason: HOSPADM

## 2022-05-15 RX ORDER — SODIUM CHLORIDE 0.9 % (FLUSH) 0.9 %
10 SYRINGE (ML) INJECTION AS NEEDED
Status: DISCONTINUED | OUTPATIENT
Start: 2022-05-15 | End: 2022-05-17 | Stop reason: HOSPADM

## 2022-05-15 RX ORDER — ALBUTEROL SULFATE 90 UG/1
2 AEROSOL, METERED RESPIRATORY (INHALATION) EVERY 4 HOURS PRN
Status: DISCONTINUED | OUTPATIENT
Start: 2022-05-15 | End: 2022-05-15

## 2022-05-15 RX ADMIN — BUDESONIDE AND FORMOTEROL FUMARATE DIHYDRATE 2 PUFF: 160; 4.5 AEROSOL RESPIRATORY (INHALATION) at 14:54

## 2022-05-15 RX ADMIN — ENOXAPARIN SODIUM 40 MG: 40 INJECTION SUBCUTANEOUS at 14:53

## 2022-05-15 RX ADMIN — BUDESONIDE AND FORMOTEROL FUMARATE DIHYDRATE 2 PUFF: 160; 4.5 AEROSOL RESPIRATORY (INHALATION) at 20:42

## 2022-05-15 RX ADMIN — DEXAMETHASONE 6 MG: 2 TABLET ORAL at 14:53

## 2022-05-15 RX ADMIN — ASPIRIN 81 MG: 81 TABLET, COATED ORAL at 14:53

## 2022-05-15 RX ADMIN — Medication 10 ML: at 20:42

## 2022-05-15 RX ADMIN — LOSARTAN POTASSIUM 50 MG: 50 TABLET, FILM COATED ORAL at 20:42

## 2022-05-15 RX ADMIN — ALBUTEROL SULFATE 2 PUFF: 90 AEROSOL, METERED RESPIRATORY (INHALATION) at 14:53

## 2022-05-15 RX ADMIN — TIOTROPIUM BROMIDE INHALATION SPRAY 2 PUFF: 3.12 SPRAY, METERED RESPIRATORY (INHALATION) at 14:54

## 2022-05-15 RX ADMIN — SODIUM CHLORIDE 1000 ML: 9 INJECTION, SOLUTION INTRAVENOUS at 10:37

## 2022-05-15 RX ADMIN — ATORVASTATIN CALCIUM 10 MG: 10 TABLET, FILM COATED ORAL at 14:54

## 2022-05-15 RX ADMIN — METHYLPREDNISOLONE SODIUM SUCCINATE 125 MG: 125 INJECTION, POWDER, FOR SOLUTION INTRAMUSCULAR; INTRAVENOUS at 10:37

## 2022-05-15 NOTE — ED PROVIDER NOTES
TRIAGE CHIEF COMPLAINT:     Nursing and triage notes reviewed    Chief Complaint   Patient presents with   • Shortness of Breath      HPI: Topher Stoll is a 46 y.o. male who presents to the emergency department complaining of a 1 to 2-day history of shortness of breath, cough, wheezing.  Patient states symptoms significantly worsened yesterday evening and this morning.  Patient does have a history of COPD and normally wears 2 L of oxygen via nasal cannula continuously.  Patient states that he went to urgent care and they directed him to the emergency department due to low oxygen.  Patient denies any chest pain.  He states the cough he has is nonproductive.  He has had audible wheezes at home.  He has tried breathing treatments and nebulizer machines without improvement in his symptoms.  He denies having a fever.    REVIEW OF SYSTEMS: All other systems reviewed and are negative     PAST MEDICAL HISTORY:   Past Medical History:   Diagnosis Date   • Abdominal adhesions    • Anxiety    • Arthritis    • Asthma    • Cervical radiculopathy    • Colitis    • COPD (chronic obstructive pulmonary disease) (HCC)    • GERD (gastroesophageal reflux disease)    • Heart attack (HCC)    • History of hepatitis C     Treated with Epclusa in 2019   • History of recreational drug use    • HTN (hypertension)    • Kidney cysts    • Left hip pain    • Liver disease    • Low back pain    • Migraines    • Obstructive chronic bronchitis with exacerbation (HCC)    • Sleep apnea     mild   • Tattoos         FAMILY HISTORY:   Family History   Problem Relation Age of Onset   • Arthritis Other    • Hypertension Other    • Migraines Other    • Heart attack Other    • Stroke Other    • Colon cancer Neg Hx         SOCIAL HISTORY:   Social History     Socioeconomic History   • Marital status:    Tobacco Use   • Smoking status: Former Smoker     Packs/day: 2.00     Years: 15.00     Pack years: 30.00     Quit date: 2005     Years since  quittin.3   • Smokeless tobacco: Current User     Types: Chew   Vaping Use   • Vaping Use: Never used   Substance and Sexual Activity   • Alcohol use: No     Comment: stopped drinking alcohol   • Drug use: Not Currently     Comment: takes suboxone   • Sexual activity: Defer        SURGICAL HISTORY:   Past Surgical History:   Procedure Laterality Date   • BACK SURGERY     • COLONOSCOPY     • COLONOSCOPY N/A 2022    Procedure: COLONOSCOPY with biopsies;  Surgeon: Giancarlo Ruiz MD;  Location:  [facility];  Service: Gastroenterology;  Laterality: N/A;   • HERNIA REPAIR     • HIP SPACER INSERTION WITH ANTIBIOTIC CEMENT Left 1/15/2020    Procedure: TOTAL HIP IMPLANT REMOVAL WITH INSERTION OF ANTIBIOTIC SPACER LEFT;  Surgeon: Ba Ramirez MD;  Location:  [facility];  Service: Orthopedics   • SHOULDER LIGAMENT REPAIR      right shoulder   • SMALL INTESTINE SURGERY      Small bowell resection   • TOTAL HIP ARTHROPLASTY Left    • TOTAL HIP ARTHROPLASTY Right    • TOTAL HIP ARTHROPLASTY REVISION Left 3/5/2020    Procedure: HIP REIMPLANT REVISION LEFT;  Surgeon: Ba Ramirez MD;  Location:  [facility];  Service: Orthopedics;  Laterality: Left;   • UPPER GASTROINTESTINAL ENDOSCOPY          CURRENT MEDICATIONS:      Medication List      ASK your doctor about these medications    * albuterol (2.5 MG/3ML) 0.083% nebulizer solution  Commonly known as: PROVENTIL  Take 2.5 mg by nebulization Every 4 (Four) Hours As Needed for wheezing.     * albuterol sulfate  (90 Base) MCG/ACT inhaler  Commonly known as: Ventolin HFA  Inhale 2 puffs Every 4 (Four) Hours As Needed for Wheezing or Shortness of Air.     aspirin 81 MG EC tablet  Take 1 tablet by mouth Daily.     Fasenra Pen 30 MG/ML solution auto-injector  Generic drug: Benralizumab  Inject 30 mg under the skin into the appropriate area as directed Take As Directed.     fluticasone 50 MCG/ACT nasal spray  Commonly known as: FLONASE  2 sprays into  the nostril(s) as directed by provider Daily.     Fluticasone-Umeclidin-Vilant 200-62.5-25 MCG/INH inhaler  Commonly known as: TRELEGY  Inhale 1 puff Daily.     losartan 100 MG tablet  Commonly known as: COZAAR  Take 1 tablet by mouth Daily.     lovastatin 40 MG tablet  Commonly known as: MEVACOR  Take 1 tablet by mouth Every Night.     methadone 5 MG/5ML solution  Commonly known as: DOLOPHINE     omeprazole 40 MG capsule  Commonly known as: priLOSEC     ondansetron ODT 4 MG disintegrating tablet  Commonly known as: ZOFRAN-ODT  Place 1 tablet on the tongue 4 (Four) Times a Day As Needed for Nausea.     traZODone 150 MG tablet  Commonly known as: DESYREL         * This list has 2 medication(s) that are the same as other medications prescribed for you. Read the directions carefully, and ask your doctor or other care provider to review them with you.                 ALLERGIES: Doxycycline     PHYSICAL EXAM:   VITAL SIGNS:   Vitals:    05/15/22 0955   BP: 121/84   Pulse: 92   Resp: 18   Temp: 97.8 °F (36.6 °C)   SpO2: (!) 86%      CONSTITUTIONAL: Awake, oriented, appears dyspneic  HENT: Atraumatic, normocephalic, oral mucosa pink and moist, airway patent. Nares patent without drainage. External ears normal.   EYES: Conjunctivae clear   NECK: Trachea midline, nontender, supple   CARDIOVASCULAR: Normal heart rate, Normal rhythm, No murmurs, rubs, gallops   PULMONARY/CHEST: Diffuse wheezes in all lung fields to auscultation.  Mildly increased work of breathing.  Patient can speak in full sentences.  ABDOMINAL: Nondistended, soft, nontender  NEUROLOGIC: Nonfocal, moving all four extremities, no gross sensory or motor deficits.   EXTREMITIES: No clubbing, cyanosis, or edema   SKIN: Warm, Dry, No erythema, No rash     ED COURSE / MEDICAL DECISION MAKING:   Topher Stoll is a 46 y.o. male who presents to the emergency department for evaluation of shortness of breath, wheezing, cough.  The patient does appear dyspneic on  arrival but is not in severe respiratory distress.  He does have intense wheezes in all lung fields.  Will obtain chest x-ray, EKG, labs for further evaluation.    EKG on arrival interpreted by me reveals sinus rhythm with rate of 95 bpm.  There is locators voltage in chest leads.  There are some nonspecific T wave changes.  This is an atypical appearing EKG.    Chest x-ray per my interpretation reveals some chronic findings but no acute infiltrate.    Laboratory testing reveals mildly elevated CO2 at 53 and some acidosis with a pH of 7.31.  Other labs largely unremarkable.  Patient did test positive for COVID-19.  Patient had tested positive in February, uncertain if this is still positive from previous infection or patient has a new infection from COVID-19.    Patient given albuterol and steroid with some improvement in his symptoms.  However he was still requiring increased amounts of oxygen.  Given this with his respiratory failure and acidosis we will admit to the hospitalist service for further treatment.    DECISION TO DISCHARGE/ADMIT: see ED care timeline     FINAL IMPRESSION:   1 --respiratory failure with hypoxia  2 --COVID-19  3 --     Electronically signed by: Dinorah Garcia MD, 5/15/2022 10:16 Dinorah Boateng MD  05/15/22 [id number]

## 2022-05-15 NOTE — PLAN OF CARE
Goal Outcome Evaluation:  Plan of Care Reviewed With: patient      Patient admitted from ED, tested positive for COVID but also states that he was positive in February from [facility]. Attempted to call Urgent care, closed at this time. Will notify Infection control to follow up   Progress: no change

## 2022-05-16 LAB
ANION GAP SERPL CALCULATED.3IONS-SCNC: 14.2 MMOL/L (ref 5–15)
BUN SERPL-MCNC: 16 MG/DL (ref 6–20)
BUN/CREAT SERPL: 21.1 (ref 7–25)
CALCIUM SPEC-SCNC: 9.8 MG/DL (ref 8.6–10.5)
CHLORIDE SERPL-SCNC: 99 MMOL/L (ref 98–107)
CO2 SERPL-SCNC: 21.8 MMOL/L (ref 22–29)
CREAT SERPL-MCNC: 0.76 MG/DL (ref 0.76–1.27)
DEPRECATED RDW RBC AUTO: 37.7 FL (ref 37–54)
EGFRCR SERPLBLD CKD-EPI 2021: 111.6 ML/MIN/1.73
ERYTHROCYTE [DISTWIDTH] IN BLOOD BY AUTOMATED COUNT: 12.5 % (ref 12.3–15.4)
GLUCOSE SERPL-MCNC: 133 MG/DL (ref 65–99)
HCT VFR BLD AUTO: 48 % (ref 37.5–51)
HGB BLD-MCNC: 15.8 G/DL (ref 13–17.7)
MCH RBC QN AUTO: 27.2 PG (ref 26.6–33)
MCHC RBC AUTO-ENTMCNC: 32.9 G/DL (ref 31.5–35.7)
MCV RBC AUTO: 82.8 FL (ref 79–97)
PLATELET # BLD AUTO: 238 10*3/MM3 (ref 140–450)
PMV BLD AUTO: 10.5 FL (ref 6–12)
POTASSIUM SERPL-SCNC: 4.5 MMOL/L (ref 3.5–5.2)
QT INTERVAL: 412 MS
QTC INTERVAL: 469 MS
RBC # BLD AUTO: 5.8 10*6/MM3 (ref 4.14–5.8)
SODIUM SERPL-SCNC: 135 MMOL/L (ref 136–145)
TROPONIN T SERPL-MCNC: <0.01 NG/ML (ref 0–0.03)
WBC NRBC COR # BLD: 16.04 10*3/MM3 (ref 3.4–10.8)

## 2022-05-16 PROCEDURE — 63710000001 DEXAMETHASONE PER 0.25 MG: Performed by: NURSE PRACTITIONER

## 2022-05-16 PROCEDURE — 80048 BASIC METABOLIC PNL TOTAL CA: CPT | Performed by: NURSE PRACTITIONER

## 2022-05-16 PROCEDURE — 85027 COMPLETE CBC AUTOMATED: CPT | Performed by: NURSE PRACTITIONER

## 2022-05-16 PROCEDURE — 99232 SBSQ HOSP IP/OBS MODERATE 35: CPT | Performed by: NURSE PRACTITIONER

## 2022-05-16 PROCEDURE — 25010000002 ENOXAPARIN PER 10 MG: Performed by: NURSE PRACTITIONER

## 2022-05-16 PROCEDURE — 84484 ASSAY OF TROPONIN QUANT: CPT | Performed by: NURSE PRACTITIONER

## 2022-05-16 PROCEDURE — 93005 ELECTROCARDIOGRAM TRACING: CPT | Performed by: NURSE PRACTITIONER

## 2022-05-16 RX ORDER — TRAZODONE HYDROCHLORIDE 50 MG/1
150 TABLET ORAL NIGHTLY PRN
Status: DISCONTINUED | OUTPATIENT
Start: 2022-05-16 | End: 2022-05-17 | Stop reason: HOSPADM

## 2022-05-16 RX ADMIN — TRAZODONE HYDROCHLORIDE 150 MG: 50 TABLET ORAL at 20:42

## 2022-05-16 RX ADMIN — MAGNESIUM HYDROXIDE 10 ML: 2400 SUSPENSION ORAL at 20:42

## 2022-05-16 RX ADMIN — DEXAMETHASONE 6 MG: 2 TABLET ORAL at 09:08

## 2022-05-16 RX ADMIN — BUDESONIDE AND FORMOTEROL FUMARATE DIHYDRATE 2 PUFF: 160; 4.5 AEROSOL RESPIRATORY (INHALATION) at 15:31

## 2022-05-16 RX ADMIN — ASPIRIN 81 MG: 81 TABLET, COATED ORAL at 09:08

## 2022-05-16 RX ADMIN — Medication 10 ML: at 20:42

## 2022-05-16 RX ADMIN — PANTOPRAZOLE SODIUM 40 MG: 40 TABLET, DELAYED RELEASE ORAL at 06:25

## 2022-05-16 RX ADMIN — ENOXAPARIN SODIUM 40 MG: 40 INJECTION SUBCUTANEOUS at 15:32

## 2022-05-16 RX ADMIN — BUDESONIDE AND FORMOTEROL FUMARATE DIHYDRATE 2 PUFF: 160; 4.5 AEROSOL RESPIRATORY (INHALATION) at 20:42

## 2022-05-16 RX ADMIN — LOSARTAN POTASSIUM 50 MG: 50 TABLET, FILM COATED ORAL at 09:08

## 2022-05-16 RX ADMIN — ATORVASTATIN CALCIUM 10 MG: 10 TABLET, FILM COATED ORAL at 09:08

## 2022-05-16 RX ADMIN — LOSARTAN POTASSIUM 50 MG: 50 TABLET, FILM COATED ORAL at 20:42

## 2022-05-16 RX ADMIN — METHADONE HYDROCHLORIDE 55 MG: 10 TABLET ORAL at 09:08

## 2022-05-16 RX ADMIN — TIOTROPIUM BROMIDE INHALATION SPRAY 2 PUFF: 3.12 SPRAY, METERED RESPIRATORY (INHALATION) at 15:31

## 2022-05-16 NOTE — CASE MANAGEMENT/SOCIAL WORK
Discharge Planning Assessment   [facility]     Patient Name: Topher Stoll  MRN: [id number]  Today's Date: 5/16/2022    Admit Date: 5/15/2022     Discharge Needs Assessment     Row Name 05/16/22 1533       Living Environment    People in Home alone    Current Living Arrangements home    Provides Primary Care For no one    Family Caregiver if Needed parent(s)    Quality of Family Relationships involved    Able to Return to Prior Arrangements yes       Resource/Environmental Concerns    Resource/Environmental Concerns none    Transportation Concerns none       Transition Planning    Patient/Family Anticipates Transition to home    Transportation Anticipated car, drives self;family or friend will provide       Discharge Needs Assessment    Equipment Currently Used at Home nebulizer;oxygen;pulse ox    Concerns to be Addressed discharge planning               Discharge Plan     Row Name 05/16/22 1536       Plan    Plan Spoke to pt  on phone regarding discharge plans ,Confirmed address and phone number He lives alone in the basement .He has Oxygen [employer]  2 LNC PRN  Confirmed  Original order is 2 LNC continual he has a Pulse ox plan is to return home  He has transportation home              Continued Care and Services - Admitted Since 5/15/2022    Coordination has not been started for this encounter.       Expected Discharge Date and Time     Expected Discharge Date Expected Discharge Time    May 18, 2022          Demographic Summary     Row Name 05/16/22 1532       General Information    Admission Type inpatient    Referral Source admission list               Functional Status     Row Name 05/16/22 1532       Functional Status, IADL    Medications independent    Meal Preparation independent    Housekeeping independent    Laundry independent    Shopping independent       Mental Status    General Appearance WDL WDL               Psychosocial    No documentation.                Abuse/Neglect    No documentation.                 Legal    No documentation.                Substance Abuse    No documentation.                Patient Forms    No documentation.                   Joyce Fournier RN
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
## 2022-05-16 NOTE — PLAN OF CARE
Goal Outcome Evaluation:  Plan of Care Reviewed With: patient        Progress: no change  Outcome Evaluation: VSS; no acute events overnight; continue O2

## 2022-05-16 NOTE — PLAN OF CARE
Goal Outcome Evaluation:      Progress: improving    VSS. C/o chest pain early this shift; troponin & stat ekg obtained, provider notified. No other acute events to report. Plan to discharge tomorrow, Will continue to monitor

## 2022-05-16 NOTE — THERAPY DISCHARGE NOTE
Patient reports that he is walking in his room and is not having difficulty.  He does not feel he needs therapy.  PT order will be completed.

## 2022-05-17 ENCOUNTER — READMISSION MANAGEMENT (OUTPATIENT)
Dept: CALL CENTER | Facility: HOSPITAL | Age: 47
End: 2022-05-17

## 2022-05-17 VITALS
DIASTOLIC BLOOD PRESSURE: 87 MMHG | OXYGEN SATURATION: 98 % | BODY MASS INDEX: 26.91 KG/M2 | TEMPERATURE: 97.7 F | RESPIRATION RATE: 17 BRPM | WEIGHT: 203.04 LBS | HEIGHT: 73 IN | SYSTOLIC BLOOD PRESSURE: 123 MMHG | HEART RATE: 66 BPM

## 2022-05-17 PROBLEM — D89.831 CYTOKINE RELEASE SYNDROME, GRADE 1: Status: ACTIVE | Noted: 2022-05-17

## 2022-05-17 LAB
ANION GAP SERPL CALCULATED.3IONS-SCNC: 12.9 MMOL/L (ref 5–15)
BUN SERPL-MCNC: 18 MG/DL (ref 6–20)
BUN/CREAT SERPL: 20.5 (ref 7–25)
CALCIUM SPEC-SCNC: 9.7 MG/DL (ref 8.6–10.5)
CHLORIDE SERPL-SCNC: 99 MMOL/L (ref 98–107)
CO2 SERPL-SCNC: 26.1 MMOL/L (ref 22–29)
CREAT SERPL-MCNC: 0.88 MG/DL (ref 0.76–1.27)
EGFRCR SERPLBLD CKD-EPI 2021: 106.7 ML/MIN/1.73
GLUCOSE SERPL-MCNC: 113 MG/DL (ref 65–99)
POTASSIUM SERPL-SCNC: 4.2 MMOL/L (ref 3.5–5.2)
SODIUM SERPL-SCNC: 138 MMOL/L (ref 136–145)

## 2022-05-17 PROCEDURE — 80048 BASIC METABOLIC PNL TOTAL CA: CPT | Performed by: NURSE PRACTITIONER

## 2022-05-17 PROCEDURE — 99238 HOSP IP/OBS DSCHRG MGMT 30/<: CPT | Performed by: NURSE PRACTITIONER

## 2022-05-17 PROCEDURE — 63710000001 DEXAMETHASONE PER 0.25 MG: Performed by: NURSE PRACTITIONER

## 2022-05-17 RX ORDER — DEXAMETHASONE 6 MG/1
6 TABLET ORAL DAILY
Qty: 7 TABLET | Refills: 0 | Status: SHIPPED | OUTPATIENT
Start: 2022-05-18 | End: 2022-05-25

## 2022-05-17 RX ADMIN — LOSARTAN POTASSIUM 50 MG: 50 TABLET, FILM COATED ORAL at 09:30

## 2022-05-17 RX ADMIN — ATORVASTATIN CALCIUM 10 MG: 10 TABLET, FILM COATED ORAL at 09:31

## 2022-05-17 RX ADMIN — METHADONE HYDROCHLORIDE 55 MG: 10 TABLET ORAL at 09:30

## 2022-05-17 RX ADMIN — DEXAMETHASONE 6 MG: 2 TABLET ORAL at 09:30

## 2022-05-17 RX ADMIN — PANTOPRAZOLE SODIUM 40 MG: 40 TABLET, DELAYED RELEASE ORAL at 06:30

## 2022-05-17 RX ADMIN — BUDESONIDE AND FORMOTEROL FUMARATE DIHYDRATE 2 PUFF: 160; 4.5 AEROSOL RESPIRATORY (INHALATION) at 09:30

## 2022-05-17 RX ADMIN — TIOTROPIUM BROMIDE INHALATION SPRAY 2 PUFF: 3.12 SPRAY, METERED RESPIRATORY (INHALATION) at 09:29

## 2022-05-17 RX ADMIN — ASPIRIN 81 MG: 81 TABLET, COATED ORAL at 09:30

## 2022-05-17 NOTE — PLAN OF CARE
Goal Outcome Evaluation:  Plan of Care Reviewed With: patient        Progress: improving  Outcome Evaluation: VSS; no acute events overnight; plan for dc to home

## 2022-05-17 NOTE — DISCHARGE INSTRUCTIONS
Patient is stable for discharge home today.  Will discharge with Decadron to complete 10 days of therapy.  Resume home inhaler regimen and nebulizers as needed.  Follow up with Dr. Tate in one week.  Follow up with pulmonology as scheduled.  Return to the hospital with any increased shortness of breath, chest pain, or palpitations.  Isolation for additional 5 days as illness started 9 days ago.  Continue home oxygen as needed and check your pulse oximetry (oxygen levels) if you feel worsening shortness of breath.
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
## 2022-05-18 ENCOUNTER — TRANSITIONAL CARE MANAGEMENT TELEPHONE ENCOUNTER (OUTPATIENT)
Dept: CALL CENTER | Facility: HOSPITAL | Age: 47
End: 2022-05-18

## 2022-05-18 NOTE — OUTREACH NOTE
Call Center TCM Note    Flowsheet Row Responses   [facility] [place] patient discharged from? [facility]   Does the patient have one of the following disease processes/diagnoses(primary or secondary)? COVID-19   COVID-19 underlying condition? COPD   TCM attempt successful? Yes   Call start time 1007   Call end time 1009   Discharge diagnosis Acute on Chronic Respiratory Failure Acute COVID 19 infection  COPD with likely exacerbation    Person spoke with today (if not patient) and relationship Patient   Meds reviewed with patient/caregiver? Yes   Is the patient having any side effects they believe may be caused by any medication additions or changes? No   Does the patient have all medications ordered at discharge? Yes   Is the patient taking all medications as directed (includes completed medication regime)? Yes   Does the patient have a primary care provider?  Yes   Comments regarding [facility] fu appt on 5/19/22 at 3:00 PM   Does the patient have an appointment with their PCP or specialist within 7 days of discharge? Yes   Has the patient kept scheduled appointments due by today? N/A   What DME was ordered? Oxygen [employer] 2 LNC PRN he has a Pulse ox [employer]   Psychosocial issues? No   Did the patient receive a copy of their discharge instructions? Yes   Did the patient receive a copy of COVID-19 specific instructions? Yes   Nursing interventions Reviewed instructions with patient   What is the patient's perception of their health status since discharge? Improving   Does the patient have any of the following symptoms? None   Patient reports what zone on this call? Green Zone   Green Zone Reports doing well   Green Zone interventions: Take daily medications, Use oxygen as prescribed   TCM call completed? Yes   Wrap up additional comments Pt states he is doing ok, and denies SOB, cough. Short call as pt shares he was on the other telephone line with another. Pt verified [facility] fu appt on 5/19/22. No  questions/concerns.          Renee Blevins RN    5/18/2022, 10:11 EDT

## 2022-05-18 NOTE — OUTREACH NOTE
Prep Survey    Flowsheet Row Responses   [facility] facility patient discharged from? [facility]   Is LACE score < 7 ? No   Emergency Room discharge w/ pulse ox? No   Eligibility [facility]   Date of Admission 05/15/22   Date of Discharge 05/17/22   Discharge Disposition Home or Self Care   Discharge diagnosis Acute on Chronic Respiratory Failure Acute COVID 19 infection  COPD with likely exacerbation    Does the patient have one of the following disease processes/diagnoses(primary or secondary)? COVID-19   Does the patient have Home health ordered? No   Is there a DME ordered? Yes   What DME was ordered? Oxygen [employer] 2 LNC PRN he has a Pulse ox [facility]   Prep survey completed? Yes          OZZY MARIE - Registered Nurse

## 2022-05-18 NOTE — CASE MANAGEMENT/SOCIAL WORK
Case Management Discharge Note                Selected Continued Care - Discharged on 5/17/2022 Admission date: 5/15/2022 - Discharge disposition: Home or Self Care    Destination    No services have been selected for the patient.              Durable Medical Equipment    No services have been selected for the patient.              Dialysis/Infusion    No services have been selected for the patient.              Home Medical Care    No services have been selected for the patient.              Therapy    No services have been selected for the patient.              Community Resources    No services have been selected for the patient.              Community & DME    No services have been selected for the patient.                  Transportation Services  Private: Car    Final Discharge Disposition Code: 01 - home or self-care

## 2022-05-19 ENCOUNTER — TELEMEDICINE (OUTPATIENT)
Dept: INTERNAL MEDICINE | Facility: CLINIC | Age: 47
End: 2022-05-19

## 2022-05-19 ENCOUNTER — READMISSION MANAGEMENT (OUTPATIENT)
Dept: CALL CENTER | Facility: HOSPITAL | Age: 47
End: 2022-05-19

## 2022-05-19 VITALS
BODY MASS INDEX: 26.79 KG/M2 | SYSTOLIC BLOOD PRESSURE: 116 MMHG | TEMPERATURE: 100.1 F | DIASTOLIC BLOOD PRESSURE: 81 MMHG | HEART RATE: 78 BPM | HEIGHT: 73 IN | OXYGEN SATURATION: 92 %

## 2022-05-19 DIAGNOSIS — J44.9 COPD WITHOUT EXACERBATION: Primary | ICD-10-CM

## 2022-05-19 DIAGNOSIS — I10 PRIMARY HYPERTENSION: ICD-10-CM

## 2022-05-19 DIAGNOSIS — B18.1 CHRONIC VIRAL HEPATITIS B WITHOUT DELTA AGENT AND WITHOUT COMA: ICD-10-CM

## 2022-05-19 PROBLEM — J96.22 ACUTE ON CHRONIC RESPIRATORY FAILURE WITH HYPOXIA AND HYPERCAPNIA: Status: RESOLVED | Noted: 2022-05-15 | Resolved: 2022-05-19

## 2022-05-19 PROBLEM — J96.21 ACUTE ON CHRONIC RESPIRATORY FAILURE WITH HYPOXIA AND HYPERCAPNIA: Status: RESOLVED | Noted: 2022-05-15 | Resolved: 2022-05-19

## 2022-05-19 PROCEDURE — 99214 OFFICE O/P EST MOD 30 MIN: CPT | Performed by: INTERNAL MEDICINE

## 2022-05-19 RX ORDER — LOSARTAN POTASSIUM 50 MG/1
50 TABLET ORAL 2 TIMES DAILY
COMMUNITY
Start: 2022-05-18 | End: 2022-08-01

## 2022-05-19 NOTE — OUTREACH NOTE
COVID-19 Week 1 Survey    Flowsheet Row Responses   [facility] patient discharged from? [facility]   Does the patient have one of the following disease processes/diagnoses(primary or secondary)? COVID-19   COVID-19 underlying condition? COPD   Call Number Call 2   Week 1 Call successful? Yes   Call start time 1254   Call end time 1302   Discharge diagnosis Acute on Chronic Respiratory Failure Acute COVID 19 infection  COPD with likely exacerbation    Person spoke with today (if not patient) and relationship Patient   Meds reviewed with patient/caregiver? Yes   Does the patient have all medications ordered at discharge? Yes   Is the patient taking all medications as directed (includes completed medication regime)? Yes   Medication comments Patient has albuterol rescue inhaler. He reports that he has used since getting home   Does the patient have a primary care provider?  Yes   Comments regarding PCP  hospital fu appt on 5/19/22 at 3:00 PM / telehealth   Does the patient have an appointment with their PCP or specialist within 7 days of discharge? Yes   Has the patient kept scheduled appointments due by today? N/A   Has home health visited the patient within 72 hours of discharge? N/A   DME comments Has home O2 for prn use   Psychosocial issues? No   Did the patient receive a copy of their discharge instructions? Yes   Did the patient receive a copy of COVID-19 specific instructions? Yes   Nursing interventions Reviewed instructions with patient   What is the patient's perception of their health status since discharge? Improving   Does the patient have any of the following symptoms? Fever/chills, Loss of taste/smell  [temp 100.9 today. Reports taste is off. ]   Nursing Interventions Nurse provided patient education   Pulse Ox monitoring Intermittent   Pulse Ox device source Patient   O2 Sat comments Patient reports sats are mid 90's on room air today which is better than his normal.    O2 Sat: education provided  Sat levels, Monitoring frequency, When to seek care   O2 Sat education comments ADvisd to seek emergency care of O2 sats remain in the 80's with wearing O2, resting and use of rescue inhaler.    Is the patient/caregiver able to teach back steps to recovery at home? Set small, achievable goals for return to baseline health, Rest and rebuild strength, gradually increase activity, Eat a well-balance diet   Is the patient/caregiver able to teach back the hierarchy of who to call/visit for symptoms/problems? PCP, Specialist, Home health nurse, Urgent Care, ED, 911 Yes   Is the patient able to teach back COPD zones? Yes   Patient reports what zone on this call? Green Zone  [Patient reports really feels between green and yellow, but closer to green. ]   Green Zone Reports doing well, Usual activity and exercise level, Usual amount of phlegm/mucus without difficulty coughing up   Green Zone interventions: Take daily medications, Use oxygen as prescribed, Continue regular exercise/diet plan   COVID-19 call completed? Yes   Wrap up additional comments Patient to drink plenty of fluids and take a dose of regular tylenol for fever today.           OZZY ALMARAZ - Registered Nurse

## 2022-05-19 NOTE — PROGRESS NOTES
This visit has been rescheduled as a phone visit to comply with patient safety concerns in accordance with CDC recommendations. Total time of discussion was 22 minutes.  Subjective  Topher Stoll is a 46 y.o. male    HPI 46-year-old male with a recent diagnosis of COVID and significant shortness of breath admitted to the hospital given IV steroids with significant improvement and discharge after that.  Now doing reasonably well at home has some fatigue.  Complains of loss of taste and smell denies fever or chills  Longstanding history of COPD with multiple exacerbations has primary hypertension and chronic hep B.  He is currently on methadone therapy  The following portions of the patient's history were reviewed and updated as appropriate: allergies, current medications, past family history, past medical history, past social history, past surgical history, and problem list.     Review of Systems   Constitutional: Positive for fatigue. Negative for activity change, appetite change and fever.   HENT: Negative for congestion, ear discharge, ear pain and trouble swallowing.    Eyes: Negative for photophobia and visual disturbance.   Respiratory: Positive for shortness of breath. Negative for cough.    Cardiovascular: Negative for chest pain and palpitations.   Gastrointestinal: Negative for abdominal distention, abdominal pain, constipation, diarrhea, nausea and vomiting.   Endocrine: Negative.    Genitourinary: Negative for dysuria, hematuria and urgency.   Musculoskeletal: Positive for arthralgias. Negative for back pain, joint swelling and myalgias.   Skin: Negative for color change and rash.   Allergic/Immunologic: Negative.    Neurological: Negative for dizziness, weakness, light-headedness and headaches.   Hematological: Negative for adenopathy. Does not bruise/bleed easily.   Psychiatric/Behavioral: Positive for sleep disturbance. Negative for agitation, confusion and dysphoric mood. The patient is not  "nervous/anxious.        Visit Vitals  /81 Comment: patient reports   Pulse 78 Comment: patient reports   Temp 100.1 °F (37.8 °C) Comment: patient reports   Ht 185.4 cm (73\")   SpO2 92% Comment: patient reports   BMI 26.79 kg/m²       Objective  Physical Exam    Diagnoses and all orders for this visit:    COPD without exacerbation (HCC) continue with inhalers.  Denies severe shortness of breath    Chronic viral hepatitis B without delta agent and without coma (HCC) stable    Primary hypertension continue with current meds and low-salt diet    Other orders  -     losartan (COZAAR) 50 MG tablet; Take 50 mg by mouth 2 (Two) Times a Day.        "

## 2022-05-20 ENCOUNTER — READMISSION MANAGEMENT (OUTPATIENT)
Dept: CALL CENTER | Facility: HOSPITAL | Age: 47
End: 2022-05-20

## 2022-05-20 LAB
BACTERIA SPEC AEROBE CULT: NORMAL
BACTERIA SPEC AEROBE CULT: NORMAL

## 2022-05-20 NOTE — OUTREACH NOTE
COVID-19 Week 1 Survey    Flowsheet Row Responses   [facility] patient discharged from? [facility]   Does the patient have one of the following disease processes/diagnoses(primary or secondary)? COVID-19   COVID-19 underlying condition? COPD   Call Number Call 3   Week 1 Call successful? Yes   Call start time 1102   Call end time 1104   Discharge diagnosis Acute on Chronic Respiratory Failure Acute COVID 19 infection  COPD with likely exacerbation    Person spoke with today (if not patient) and relationship Patient   Meds reviewed with patient/caregiver? Yes   Is the patient having any side effects they believe may be caused by any medication additions or changes? No   Does the patient have all medications ordered at discharge? Yes   Is the patient taking all medications as directed (includes completed medication regime)? Yes   Does the patient have a primary care provider?  Yes   Does the patient have an appointment with their PCP or specialist within 7 days of discharge? Yes   Has the patient kept scheduled appointments due by today? Yes   Has home health visited the patient within 72 hours of discharge? N/A   What DME was ordered? Oxygen Licae 2 LNC PRN he has a Pulse ox [facility]   DME comments Has home O2 for prn use   Psychosocial issues? No   Comments Patient still with loss of taste.    Did the patient receive a copy of their discharge instructions? Yes   Did the patient receive a copy of COVID-19 specific instructions? Yes   Nursing interventions Reviewed instructions with patient   What is the patient's perception of their health status since discharge? Improving   Does the patient have any of the following symptoms? Fever/chills, Loss of taste/smell   Nursing Interventions Nurse provided patient education   Pulse Ox monitoring Intermittent   Pulse Ox device source Patient   O2 Sat comments Patient reports sats are mid 90's on room air today which is better than his normal.    O2 Sat: education provided  Sat levels, Monitoring frequency, When to seek care   Is the patient/caregiver able to teach back steps to recovery at home? Set small, achievable goals for return to baseline health, Rest and rebuild strength, gradually increase activity, Eat a well-balance diet   If the patient is a current smoker, are they able to teach back resources for cessation? Not a smoker   Is the patient/caregiver able to teach back the hierarchy of who to call/visit for symptoms/problems? PCP, Specialist, Home health nurse, Urgent Care, ED, 911 Yes   Nursing interventions Education provided on various zones   Patient reports what zone on this call? Green Zone   Green Zone Reports doing well, Usual activity and exercise level, Usual amount of phlegm/mucus without difficulty coughing up   Green Zone interventions: Take daily medications, Use oxygen as prescribed, Continue regular exercise/diet plan   COVID-19 call completed? Yes   Wrap up additional comments Patient reports biggest issue today is loss of taste. No questions or concerns.           TEA MARIE - Registered Nurse

## 2022-05-23 ENCOUNTER — READMISSION MANAGEMENT (OUTPATIENT)
Dept: CALL CENTER | Facility: HOSPITAL | Age: 47
End: 2022-05-23

## 2022-05-23 NOTE — OUTREACH NOTE
COVID-19 Week 2 Survey    Flowsheet Row Responses   [facility] facility patient discharged from? [facility]   Does the patient have one of the following disease processes/diagnoses(primary or secondary)? COVID-19   COVID-19 underlying condition? COPD   Call Number Call 1   COVID-19 Week 2: Call 1 attempt successful? Yes   Call start time 1256   Call end time 1258   Discharge diagnosis Acute on Chronic Respiratory Failure Acute COVID 19 infection  COPD with likely exacerbation    Meds reviewed with patient/caregiver? Yes   Is the patient taking all medications as directed (includes completed medication regime)? Yes   Comments regarding PCP  hospital fu appt on 5/19/22 at 3:00 PM / telehealth   Has the patient kept scheduled appointments due by today? Yes   Psychosocial issues? Yes   What is the patient's perception of their health status since discharge? Improving   Does the patient have any of the following symptoms? Loss of taste/smell  [Pt cannot taste]   Pulse Ox monitoring Intermittent   O2 Sat comments In the 90's    Is the patient/caregiver able to teach back steps to recovery at home? Rest and rebuild strength, gradually increase activity, Eat a well-balance diet   COVID-19 call completed? Yes          SARA MARIE - Registered Nurse

## 2022-05-24 RX ORDER — MONTELUKAST SODIUM 10 MG/1
TABLET ORAL
Qty: 90 TABLET | OUTPATIENT
Start: 2022-05-24

## 2022-05-26 NOTE — ED NOTES
Called lab to draw repeat troponin.      Delmy Hsu RN  05/20/18 2814    
For information on Fall & Injury Prevention, visit: [email or web address] OR  [email or web address] OR  https://www.cdc.gov/steadi/patient.html

## 2022-05-30 ENCOUNTER — READMISSION MANAGEMENT (OUTPATIENT)
Dept: CALL CENTER | Facility: HOSPITAL | Age: 47
End: 2022-05-30

## 2022-05-30 NOTE — OUTREACH NOTE
COVID-19 Week 3 Survey    Flowsheet Row Responses   [facility] discharged from? [facility]   Does the patient have one of the following disease processes/diagnoses(primary or secondary)? COVID-19   COVID-19 underlying condition? COPD   Call Number Call 1   COVID-19 Week 3: Call 1 attempt successful? No   Discharge diagnosis Acute on Chronic Respiratory Failure Acute COVID 19 infection  COPD with likely exacerbation           BALBIR WRAY - Registered Nurse

## 2022-06-06 ENCOUNTER — READMISSION MANAGEMENT (OUTPATIENT)
Dept: CALL CENTER | Facility: HOSPITAL | Age: 47
End: 2022-06-06

## 2022-06-06 NOTE — OUTREACH NOTE
COVID-19 Week 4 Survey    Flowsheet Row Responses   [facility] patient discharged from? [facility]   Does the patient have one of the following disease processes/diagnoses(primary or secondary)? COVID-19   COVID-19 underlying condition? COPD   Call Number Call 1   COVID-19 Week 4: Call 1 attempt successful? Yes   Call start time 1225   Call end time 1229   Discharge diagnosis Acute on Chronic Respiratory Failure Acute COVID 19 infection  COPD with likely exacerbation    Meds reviewed with patient/caregiver? Yes   Is the patient having any side effects they believe may be caused by any medication additions or changes? No   Does the patient have all medications ordered at discharge? Yes   Has the patient kept scheduled appointments due by today? Yes   Is the patient still receiving Home Health Services? No   What is the patient's perception of their health status since discharge? Improving   Does the patient have any of the following symptoms? Shortness of breath, Loss of taste/smell  [taste almost back]   Nursing Interventions Nurse provided patient education   Pulse Ox monitoring Intermittent   Pulse Ox device source Patient   O2 Sat comments 89-95% depending on activity, recovers to >90% when sitting   O2 Sat: education provided When to seek care, Monitoring frequency, Sat levels   Is the patient/caregiver able to teach back steps to recovery at home? Set small, achievable goals for return to baseline health, Rest and rebuild strength, gradually increase activity, Eat a well-balance diet   Is the patient/caregiver able to teach back the hierarchy of who to call/visit for symptoms/problems? PCP, Specialist, Home health nurse, Urgent Care, ED, 911 Yes   Is the patient able to teach back COPD zones? Yes   Patient reports what zone on this call? Green Zone   Green Zone Reports doing well, Breathing without shortness of breath, Usual activity and exercise level, Usual amount of phlegm/mucus without difficulty  coughing up, Sleeping well, Appetite is good   Green Zone interventions: Take daily medications, Continue regular exercise/diet plan, Use oxygen as prescribed, Avoid indoor/outdoor triggers   Is the patient interested in additional calls from an ambulatory ?  NOTE:  applies to high risk patients requiring additional follow-up. No   Did the patient feel the follow up calls were helpful during their recovery period? Yes   Was the number of calls appropriate? Yes          KALANI LAKHANI - Registered Nurse

## 2022-06-13 ENCOUNTER — TELEPHONE (OUTPATIENT)
Dept: INTERNAL MEDICINE | Facility: CLINIC | Age: 47
End: 2022-06-13

## 2022-06-13 NOTE — TELEPHONE ENCOUNTER
Caller: IVETTE    Relationship:     REPRESENTATIVE FROM [facility]    Best call back number:      [phone]    Equipment requested:     COMPRESSOR    Reason for the request:     CALLER REQUESTED FORM SENT TO OFFICE PREVIOUSLY FOR COMPLETION BE RETURN FAXED IN ORDER TO COMPLETE MEDICARE COVERAGE AND BILLING    Prescribing Provider:     DR CRESPO    Additional information or concerns:     FAX NUMBER:    [phone]

## 2022-06-16 ENCOUNTER — HOSPITAL ENCOUNTER (EMERGENCY)
Facility: HOSPITAL | Age: 47
Discharge: LEFT BEFORE DISCHARGE | End: 2022-06-16

## 2022-06-16 VITALS
OXYGEN SATURATION: 95 % | SYSTOLIC BLOOD PRESSURE: 120 MMHG | HEART RATE: 78 BPM | HEIGHT: 73 IN | DIASTOLIC BLOOD PRESSURE: 89 MMHG | RESPIRATION RATE: 16 BRPM | BODY MASS INDEX: 25.84 KG/M2 | TEMPERATURE: 98.3 F | WEIGHT: 195 LBS

## 2022-06-16 PROCEDURE — 99211 OFF/OP EST MAY X REQ PHY/QHP: CPT

## 2022-06-19 ENCOUNTER — APPOINTMENT (OUTPATIENT)
Dept: GENERAL RADIOLOGY | Facility: HOSPITAL | Age: 47
End: 2022-06-19

## 2022-06-19 ENCOUNTER — HOSPITAL ENCOUNTER (EMERGENCY)
Facility: HOSPITAL | Age: 47
Discharge: ANOTHER HOSPITAL | End: 2022-06-20
Attending: EMERGENCY MEDICINE | Admitting: EMERGENCY MEDICINE

## 2022-06-19 ENCOUNTER — APPOINTMENT (OUTPATIENT)
Dept: CT IMAGING | Facility: HOSPITAL | Age: 47
End: 2022-06-19

## 2022-06-19 DIAGNOSIS — K56.609 SMALL BOWEL OBSTRUCTION: Primary | ICD-10-CM

## 2022-06-19 LAB
ALBUMIN SERPL-MCNC: 4.4 G/DL (ref 3.5–5.2)
ALBUMIN/GLOB SERPL: 1.6 G/DL
ALP SERPL-CCNC: 117 U/L (ref 39–117)
ALT SERPL W P-5'-P-CCNC: 21 U/L (ref 1–41)
ANION GAP SERPL CALCULATED.3IONS-SCNC: 14.4 MMOL/L (ref 5–15)
AST SERPL-CCNC: 19 U/L (ref 1–40)
BASOPHILS # BLD AUTO: 0.03 10*3/MM3 (ref 0–0.2)
BASOPHILS NFR BLD AUTO: 0.2 % (ref 0–1.5)
BILIRUB SERPL-MCNC: 0.3 MG/DL (ref 0–1.2)
BILIRUB UR QL STRIP: NEGATIVE
BUN SERPL-MCNC: 14 MG/DL (ref 6–20)
BUN/CREAT SERPL: 15.2 (ref 7–25)
CALCIUM SPEC-SCNC: 9.6 MG/DL (ref 8.6–10.5)
CHLORIDE SERPL-SCNC: 96 MMOL/L (ref 98–107)
CLARITY UR: CLEAR
CO2 SERPL-SCNC: 23.6 MMOL/L (ref 22–29)
COLOR UR: YELLOW
CREAT SERPL-MCNC: 0.92 MG/DL (ref 0.76–1.27)
D-LACTATE SERPL-SCNC: 1.4 MMOL/L (ref 0.5–2)
DEPRECATED RDW RBC AUTO: 38.5 FL (ref 37–54)
EGFRCR SERPLBLD CKD-EPI 2021: 103.2 ML/MIN/1.73
EOSINOPHIL # BLD AUTO: 0.01 10*3/MM3 (ref 0–0.4)
EOSINOPHIL NFR BLD AUTO: 0.1 % (ref 0.3–6.2)
ERYTHROCYTE [DISTWIDTH] IN BLOOD BY AUTOMATED COUNT: 12.9 % (ref 12.3–15.4)
GLOBULIN UR ELPH-MCNC: 2.7 GM/DL
GLUCOSE SERPL-MCNC: 113 MG/DL (ref 65–99)
GLUCOSE UR STRIP-MCNC: NEGATIVE MG/DL
HCT VFR BLD AUTO: 47.4 % (ref 37.5–51)
HGB BLD-MCNC: 15.4 G/DL (ref 13–17.7)
HGB UR QL STRIP.AUTO: NEGATIVE
IMM GRANULOCYTES # BLD AUTO: 0.08 10*3/MM3 (ref 0–0.05)
IMM GRANULOCYTES NFR BLD AUTO: 0.5 % (ref 0–0.5)
KETONES UR QL STRIP: NEGATIVE
LEUKOCYTE ESTERASE UR QL STRIP.AUTO: NEGATIVE
LIPASE SERPL-CCNC: 32 U/L (ref 13–60)
LYMPHOCYTES # BLD AUTO: 1.48 10*3/MM3 (ref 0.7–3.1)
LYMPHOCYTES NFR BLD AUTO: 10.1 % (ref 19.6–45.3)
MCH RBC QN AUTO: 26.9 PG (ref 26.6–33)
MCHC RBC AUTO-ENTMCNC: 32.5 G/DL (ref 31.5–35.7)
MCV RBC AUTO: 82.9 FL (ref 79–97)
MONOCYTES # BLD AUTO: 0.64 10*3/MM3 (ref 0.1–0.9)
MONOCYTES NFR BLD AUTO: 4.4 % (ref 5–12)
NEUTROPHILS NFR BLD AUTO: 12.39 10*3/MM3 (ref 1.7–7)
NEUTROPHILS NFR BLD AUTO: 84.7 % (ref 42.7–76)
NITRITE UR QL STRIP: NEGATIVE
NRBC BLD AUTO-RTO: 0 /100 WBC (ref 0–0.2)
PH UR STRIP.AUTO: 7 [PH] (ref 5–8)
PLATELET # BLD AUTO: 273 10*3/MM3 (ref 140–450)
PMV BLD AUTO: 9.6 FL (ref 6–12)
POTASSIUM SERPL-SCNC: 4.4 MMOL/L (ref 3.5–5.2)
PROT SERPL-MCNC: 7.1 G/DL (ref 6–8.5)
PROT UR QL STRIP: NEGATIVE
RBC # BLD AUTO: 5.72 10*6/MM3 (ref 4.14–5.8)
SARS-COV-2 RNA PNL SPEC NAA+PROBE: NOT DETECTED
SODIUM SERPL-SCNC: 134 MMOL/L (ref 136–145)
SP GR UR STRIP: 1.03 (ref 1–1.03)
UROBILINOGEN UR QL STRIP: NORMAL
WBC NRBC COR # BLD: 14.63 10*3/MM3 (ref 3.4–10.8)

## 2022-06-19 PROCEDURE — 25010000002 DIPHENHYDRAMINE PER 50 MG: Performed by: PHYSICIAN ASSISTANT

## 2022-06-19 PROCEDURE — 83605 ASSAY OF LACTIC ACID: CPT | Performed by: PHYSICIAN ASSISTANT

## 2022-06-19 PROCEDURE — 81003 URINALYSIS AUTO W/O SCOPE: CPT | Performed by: PHYSICIAN ASSISTANT

## 2022-06-19 PROCEDURE — 96374 THER/PROPH/DIAG INJ IV PUSH: CPT

## 2022-06-19 PROCEDURE — 36415 COLL VENOUS BLD VENIPUNCTURE: CPT

## 2022-06-19 PROCEDURE — 25010000002 MORPHINE PER 10 MG: Performed by: EMERGENCY MEDICINE

## 2022-06-19 PROCEDURE — 25010000002 METOCLOPRAMIDE PER 10 MG: Performed by: PHYSICIAN ASSISTANT

## 2022-06-19 PROCEDURE — 96375 TX/PRO/DX INJ NEW DRUG ADDON: CPT

## 2022-06-19 PROCEDURE — 71045 X-RAY EXAM CHEST 1 VIEW: CPT

## 2022-06-19 PROCEDURE — 96376 TX/PRO/DX INJ SAME DRUG ADON: CPT

## 2022-06-19 PROCEDURE — 25010000002 ONDANSETRON PER 1 MG: Performed by: PHYSICIAN ASSISTANT

## 2022-06-19 PROCEDURE — 83690 ASSAY OF LIPASE: CPT | Performed by: PHYSICIAN ASSISTANT

## 2022-06-19 PROCEDURE — 74177 CT ABD & PELVIS W/CONTRAST: CPT

## 2022-06-19 PROCEDURE — 99284 EMERGENCY DEPT VISIT MOD MDM: CPT

## 2022-06-19 PROCEDURE — 0 DIATRIZOATE MEGLUMINE & SODIUM PER 1 ML: Performed by: EMERGENCY MEDICINE

## 2022-06-19 PROCEDURE — 80053 COMPREHEN METABOLIC PANEL: CPT | Performed by: PHYSICIAN ASSISTANT

## 2022-06-19 PROCEDURE — 74018 RADEX ABDOMEN 1 VIEW: CPT

## 2022-06-19 PROCEDURE — 25010000002 ONDANSETRON PER 1 MG: Performed by: EMERGENCY MEDICINE

## 2022-06-19 PROCEDURE — 87635 SARS-COV-2 COVID-19 AMP PRB: CPT | Performed by: PHYSICIAN ASSISTANT

## 2022-06-19 PROCEDURE — 85025 COMPLETE CBC W/AUTO DIFF WBC: CPT | Performed by: PHYSICIAN ASSISTANT

## 2022-06-19 PROCEDURE — 25010000002 IOPAMIDOL 61 % SOLUTION: Performed by: EMERGENCY MEDICINE

## 2022-06-19 RX ORDER — METOCLOPRAMIDE HYDROCHLORIDE 5 MG/ML
10 INJECTION INTRAMUSCULAR; INTRAVENOUS ONCE
Status: COMPLETED | OUTPATIENT
Start: 2022-06-19 | End: 2022-06-19

## 2022-06-19 RX ORDER — ONDANSETRON 2 MG/ML
4 INJECTION INTRAMUSCULAR; INTRAVENOUS ONCE
Status: COMPLETED | OUTPATIENT
Start: 2022-06-19 | End: 2022-06-19

## 2022-06-19 RX ORDER — DIPHENHYDRAMINE HYDROCHLORIDE 50 MG/ML
25 INJECTION INTRAMUSCULAR; INTRAVENOUS ONCE
Status: COMPLETED | OUTPATIENT
Start: 2022-06-19 | End: 2022-06-19

## 2022-06-19 RX ORDER — MORPHINE SULFATE 4 MG/ML
4 INJECTION, SOLUTION INTRAMUSCULAR; INTRAVENOUS ONCE
Status: COMPLETED | OUTPATIENT
Start: 2022-06-19 | End: 2022-06-19

## 2022-06-19 RX ORDER — MORPHINE SULFATE 2 MG/ML
2 INJECTION, SOLUTION INTRAMUSCULAR; INTRAVENOUS ONCE
Status: COMPLETED | OUTPATIENT
Start: 2022-06-19 | End: 2022-06-19

## 2022-06-19 RX ADMIN — ONDANSETRON 4 MG: 2 INJECTION INTRAMUSCULAR; INTRAVENOUS at 18:48

## 2022-06-19 RX ADMIN — ONDANSETRON 4 MG: 2 INJECTION INTRAMUSCULAR; INTRAVENOUS at 22:33

## 2022-06-19 RX ADMIN — IOPAMIDOL 100 ML: 612 INJECTION, SOLUTION INTRAVENOUS at 20:31

## 2022-06-19 RX ADMIN — DIPHENHYDRAMINE HYDROCHLORIDE 25 MG: 50 INJECTION INTRAMUSCULAR; INTRAVENOUS at 21:06

## 2022-06-19 RX ADMIN — DIATRIZOATE MEGLUMINE AND DIATRIZOATE SODIUM 30 ML: 660; 100 LIQUID ORAL; RECTAL at 20:31

## 2022-06-19 RX ADMIN — MORPHINE SULFATE 4 MG: 4 INJECTION, SOLUTION INTRAMUSCULAR; INTRAVENOUS at 21:07

## 2022-06-19 RX ADMIN — MORPHINE SULFATE 2 MG: 2 INJECTION, SOLUTION INTRAMUSCULAR; INTRAVENOUS at 22:33

## 2022-06-19 RX ADMIN — SODIUM CHLORIDE 1000 ML: 9 INJECTION, SOLUTION INTRAVENOUS at 18:47

## 2022-06-19 RX ADMIN — METOCLOPRAMIDE 10 MG: 5 INJECTION, SOLUTION INTRAMUSCULAR; INTRAVENOUS at 21:06

## 2022-06-19 NOTE — ED PROVIDER NOTES
Subjective   History of Present Illness   Patient is a 46-year-old male with history of COPD, CAD, GERD, hypertension, liver disease, multiple small bowel obstructions status post multiple surgeries for repair, among other comorbidities presenting to the ER with complaints of generalized abdominal pain and constipation, concern for another bowel obstruction.  Patient states that it has been a couple of years since his last surgery.  He states that he has had them all performed at .  He states that he has not vomited yet but feels like he is nauseous and could vomit.  He states that his last bowel movement was Friday, 2 days ago.  He states that he takes milk of magnesia every night to keep his bowels regular and prevent obstructions.  He states that today he has taken milk of magnesia, MiraLAX, and stool softeners with no success.  He also states that he did have a small amount of blood in his stool Friday when he had a bowel movement.  He states that he also coughed up a little bit of blood but states that he believes this is related to his emphysema and COPD as he has not had any other respiratory symptoms.  He states he is still passing gas but has been unable to have a bowel movement.    Review of Systems   Respiratory: Positive for cough.         Blood-tinged sputum   Gastrointestinal: Positive for abdominal pain, blood in stool and nausea.   All other systems reviewed and are negative.      Past Medical History:   Diagnosis Date   • Abdominal adhesions    • Anxiety    • Arthritis    • Asthma    • Cervical radiculopathy    • Colitis    • COPD (chronic obstructive pulmonary disease) (HCC)    • GERD (gastroesophageal reflux disease)    • Heart attack (HCC)    • History of hepatitis C     Treated with Epclusa in 2019   • History of recreational drug use    • HTN (hypertension)    • Kidney cysts    • Left hip pain    • Liver disease    • Low back pain    • Migraines    • Obstructive chronic bronchitis with  exacerbation (HCC)    • Sleep apnea     mild   • Tattoos        Allergies   Allergen Reactions   • Doxycycline Nausea And Vomiting       Past Surgical History:   Procedure Laterality Date   • BACK SURGERY     • COLONOSCOPY     • COLONOSCOPY N/A 2022    Procedure: COLONOSCOPY with biopsies;  Surgeon: Giancarlo Ruiz MD;  Location:  [facility];  Service: Gastroenterology;  Laterality: N/A;   • HERNIA REPAIR     • HIP SPACER INSERTION WITH ANTIBIOTIC CEMENT Left 1/15/2020    Procedure: TOTAL HIP IMPLANT REMOVAL WITH INSERTION OF ANTIBIOTIC SPACER LEFT;  Surgeon: Ba Ramirez MD;  Location:  [facility];  Service: Orthopedics   • SHOULDER LIGAMENT REPAIR      right shoulder   • SMALL INTESTINE SURGERY      Small bowell resection   • TOTAL HIP ARTHROPLASTY Left    • TOTAL HIP ARTHROPLASTY Right    • TOTAL HIP ARTHROPLASTY REVISION Left 3/5/2020    Procedure: HIP REIMPLANT REVISION LEFT;  Surgeon: Ba Ramirez MD;  Location:  [facility];  Service: Orthopedics;  Laterality: Left;   • UPPER GASTROINTESTINAL ENDOSCOPY         Family History   Problem Relation Age of Onset   • Arthritis Other    • Hypertension Other    • Migraines Other    • Heart attack Other    • Stroke Other    • Colon cancer Neg Hx        Social History     Socioeconomic History   • Marital status:    Tobacco Use   • Smoking status: Former Smoker     Packs/day: 2.00     Years: 15.00     Pack years: 30.00     Quit date:      Years since quittin.4   • Smokeless tobacco: Current User     Types: Chew   Vaping Use   • Vaping Use: Never used   Substance and Sexual Activity   • Alcohol use: No     Comment: stopped drinking alcohol   • Drug use: Not Currently     Comment: takes suboxone   • Sexual activity: Defer           Objective   Physical Exam  Vitals and nursing note reviewed.   Constitutional:       General: He is not in acute distress.     Appearance: He is not toxic-appearing.   HENT:      Head: Normocephalic  and atraumatic.   Eyes:      Extraocular Movements: Extraocular movements intact.   Cardiovascular:      Rate and Rhythm: Normal rate.      Heart sounds: Normal heart sounds.   Pulmonary:      Effort: Pulmonary effort is normal.      Breath sounds: Normal breath sounds.   Abdominal:      General: Abdomen is flat. Bowel sounds are decreased.      Palpations: Abdomen is soft.      Tenderness: There is generalized abdominal tenderness. There is no guarding or rebound.   Skin:     General: Skin is warm and dry.   Neurological:      General: No focal deficit present.      Mental Status: He is alert and oriented to person, place, and time.   Psychiatric:         Mood and Affect: Mood normal.         Behavior: Behavior normal.         Procedures           ED Course  ED Course as of 06/19/22 2356   Sun Jun 19, 2022 1819 Lactate: 1.4 [AP]   1917 Lipase: 32 [AP]   1917 WBC(!): 14.63 [AP]   1917 RBC: 5.72 [AP]   1917 Hemoglobin: 15.4 [AP]   1917 Hematocrit: 47.4 [AP]   1917 Platelets: 273 [AP]   1917 Glucose(!): 113 [AP]   1917 BUN: 14 [AP]   1917 Creatinine: 0.92 [AP]   1917 Sodium(!): 134 [AP]   1917 Potassium: 4.4 [AP]   1917 Chloride(!): 96 [AP]   1917 CO2: 23.6 [AP]   1917 Calcium: 9.6 [AP]   1917 Total Protein: 7.1 [AP]   1918 Albumin: 4.40 [AP]   1918 ALT (SGPT): 21 [AP]   1918 AST (SGOT): 19 [AP]   1918 Alkaline Phosphatase: 117 [AP]   1918 Total Bilirubin: 0.3 [AP]   1918 Globulin: 2.7 [AP]   1918 A/G Ratio: 1.6 [AP]   1918 BUN/Creatinine Ratio: 15.2 [AP]   1918 Anion Gap: 14.4 [AP]   1918 eGFR: 103.2 [AP]   2139 Spoke with Dr. Gomez, General Surgery, who recommends the patient be transferred to  since he had previous surgeries there and is higher risk for complications. [AP]   2152 Spoke with Dr. Rodrigues, physician at the transfer center at , who tells me that they are on divert and are only accepting surgical emergencies. She states that even though the patient has had multiple abdominal surgeries at  in the  past for SBOs he should be admitted here and that if he gets worse they may be able to transfer him there.  [AP]   4001 Narrative & Impression  FINAL REPORT     TECHNIQUE:  Routine axial images through the abdomen and pelvis were  obtained following IV contrast administration. This study was  performed with techniques to keep radiation doses as low as  reasonably achievable (ALARA). Individualized dose reduction  techniques using automated exposure control or adjustment of mA  and/or kV according to the patient's size were employed.     CLINICAL HISTORY:  abdominal pain, constipation, h/o multiple SBOs s/p multiple  surgeries with last one being a few years ago     FINDINGS:  Abdomen: The gallbladder has been removed.  The solid abdominal  organs and ureters are unremarkable.  The colon is normal  caliber as is the distal small bowel but there are multiple  loops of dilated proximal and mid small bowel consistent with  obstruction but of uncertain etiology.  Pelvis: Artifact from  bilateral hip replacements obscure the lower pelvis.  There is  no gross abnormality of the urinary bladder.  There is no pelvic  or abdominal ascites, adenopathy or acute osseous abnormality.     IMPRESSION:  Proximal and mid small bowel obstruction of uncertain etiology.     Authenticated and Electronically Signed by Kevin Ho M.D. on  06/19/2022 09:46:38 PM          Specimen Collected: 06/19/22 21:46         [AP]   2204 Spoke with Dr. Gomez again who states he will consult on the patient in the morning but asks that we make sure he is on the waiting list at  because if he ends up needing surgery, he will want him transferred.  [AP]   1241 Spoke with Dr. Rodrigues again from  transfer center. She asks that images be power shared so she can discuss with surgery team and she will call me back. [AP]   2751 Nurse with  transfer center called our nurse and told her the patient has been accepted for transfer and has a bed ready but they need  a negative COVID test prior to transfer. [AP]   2340 COVID19: Not Detected [AP]      ED Course User Index  [AP] Tamica Sheehan, SOBEIDA                                                 [facility]   Patient was evaluated in the ER for abdominal pain, nausea, constipation, concern for small bowel obstruction as he has had these before.  Patient hemodynamically stable, afebrile, nontoxic-appearing on exam.  Labs remarkable for leukocytosis but are otherwise unremarkable.  CT of abdomen and pelvis reveals small bowel obstruction.  Spoke with Dr. Gomez and  transfer center as discussed above.   did accept the patient for transfer as he has had multiple abdominal surgeries there in the past. NG tube was placed prior to transfer. Patient agreeable with plan. He was transferred to  for further management.     Final diagnoses:   Small bowel obstruction (HCC)       ED Disposition  ED Disposition     ED Disposition   Transfer to Another Facility     Condition   --    Comment    [facility], 6th floor             No follow-up provider specified.       Medication List      Changed    * albuterol (2.5 MG/3ML) 0.083% nebulizer solution  Commonly known as: PROVENTIL  Take 2.5 mg by nebulization Every 4 (Four) Hours As Needed for wheezing.  What changed: additional instructions     * albuterol sulfate  (90 Base) MCG/ACT inhaler  Commonly known as: Ventolin HFA  Inhale 2 puffs Every 4 (Four) Hours As Needed for Wheezing or Shortness of Air.  What changed: Another medication with the same name was changed. Make sure you understand how and when to take each.     ondansetron ODT 4 MG disintegrating tablet  Commonly known as: ZOFRAN-ODT  Place 1 tablet on the tongue 4 (Four) Times a Day As Needed for Nausea.  What changed: when to take this         * This list has 2 medication(s) that are the same as other medications prescribed for you. Read the directions carefully, and ask your doctor or other care provider to review them with  you.                 Tamica Sheehan PA-C  06/19/22 8817

## 2022-06-20 VITALS
DIASTOLIC BLOOD PRESSURE: 74 MMHG | HEART RATE: 74 BPM | SYSTOLIC BLOOD PRESSURE: 131 MMHG | RESPIRATION RATE: 18 BRPM | HEIGHT: 73 IN | WEIGHT: 202.4 LBS | BODY MASS INDEX: 26.83 KG/M2 | TEMPERATURE: 98.4 F | OXYGEN SATURATION: 95 %

## 2022-06-20 NOTE — ED NOTES
Contacted [facility] to request EMS transport to [facility].

## 2022-07-11 ENCOUNTER — OFFICE VISIT (OUTPATIENT)
Dept: PULMONOLOGY | Facility: CLINIC | Age: 47
End: 2022-07-11

## 2022-07-11 VITALS
HEART RATE: 88 BPM | RESPIRATION RATE: 18 BRPM | OXYGEN SATURATION: 92 % | BODY MASS INDEX: 25.98 KG/M2 | DIASTOLIC BLOOD PRESSURE: 83 MMHG | WEIGHT: 196 LBS | HEIGHT: 73 IN | SYSTOLIC BLOOD PRESSURE: 110 MMHG

## 2022-07-11 DIAGNOSIS — J82.83 EOSINOPHILIC ASTHMA: ICD-10-CM

## 2022-07-11 DIAGNOSIS — J45.50 SEVERE PERSISTENT ASTHMA WITHOUT COMPLICATION: Primary | ICD-10-CM

## 2022-07-11 DIAGNOSIS — J30.89 OTHER ALLERGIC RHINITIS: ICD-10-CM

## 2022-07-11 DIAGNOSIS — R06.02 SOB (SHORTNESS OF BREATH): ICD-10-CM

## 2022-07-11 DIAGNOSIS — J43.1 PANLOBULAR EMPHYSEMA: ICD-10-CM

## 2022-07-11 PROBLEM — J96.01 ACUTE HYPOXEMIC RESPIRATORY FAILURE DUE TO COVID-19 (HCC): Status: RESOLVED | Noted: 2022-05-15 | Resolved: 2022-07-11

## 2022-07-11 PROBLEM — U07.1 ACUTE HYPOXEMIC RESPIRATORY FAILURE DUE TO COVID-19: Status: RESOLVED | Noted: 2022-05-15 | Resolved: 2022-07-11

## 2022-07-11 PROCEDURE — 99214 OFFICE O/P EST MOD 30 MIN: CPT | Performed by: NURSE PRACTITIONER

## 2022-07-11 RX ORDER — FLUTICASONE PROPIONATE 50 MCG
2 SPRAY, SUSPENSION (ML) NASAL DAILY
Qty: 16 G | Refills: 3 | Status: SHIPPED | OUTPATIENT
Start: 2022-07-11 | End: 2023-01-03

## 2022-07-11 RX ORDER — ALBUTEROL SULFATE 90 UG/1
2 AEROSOL, METERED RESPIRATORY (INHALATION) EVERY 4 HOURS PRN
Qty: 18 G | Refills: 5 | Status: SHIPPED | OUTPATIENT
Start: 2022-07-11 | End: 2022-09-26 | Stop reason: SDUPTHER

## 2022-07-11 NOTE — PROGRESS NOTES
"Chief Complaint   Patient presents with   • Shortness of Breath   • Follow-up         Subjective   Topher Stoll is a 46 y.o. male.     History of Present Illness   Patient is here today for follow up of asthma and shortness of breath.     He went to [facility] last week due to increased asthma symptoms including SOB, cough, and wheezing. He is currently on prednisone.     He states that he has had 2 exacerbations since his last visit in May.  However he has not been hospitalized.    The patient says that he is compliant with pulmonary medicines, as prescribed. He was prescribed duonebs by [facility] and has been using it 2-3 times a day. He uses Trelegy daily.     He uses Flonase daily.     He has oxygen to use as needed. He has used it some since he has been sick. He does not sleep with it at night.    He stopped using CPAP as it was not working.     I ordered Fasenra in May however he has not heard anything about approval.    The following portions of the patient's history were reviewed and updated as appropriate: allergies, current medications, past family history, past medical history, past social history and past surgical history.    Review of Systems   HENT: Negative for sinus pressure.    Respiratory: Positive for shortness of breath.    Cardiovascular: Positive for palpitations.   Psychiatric/Behavioral: Negative for sleep disturbance.       Objective   Visit Vitals  /83   Pulse 88   Resp 18   Ht 185.4 cm (73\")   Wt 88.9 kg (196 lb)   SpO2 92%   BMI 25.86 kg/m²          Physical Exam  Vitals reviewed.   HENT:      Head: Atraumatic.      Mouth/Throat:      Mouth: Mucous membranes are moist.      Comments: Edentulous. Crowded oropharynx.   Eyes:      Extraocular Movements: Extraocular movements intact.   Cardiovascular:      Rate and Rhythm: Normal rate and regular rhythm.   Pulmonary:      Effort: Pulmonary effort is normal. No respiratory distress.      Comments: No wheezing noted today.  Musculoskeletal:      " Cervical back: Neck supple.      Comments: Gait normal.   Skin:     General: Skin is warm.   Neurological:      Mental Status: He is alert and oriented to person, place, and time.             Assessment & Plan   Diagnoses and all orders for this visit:    1. Severe persistent asthma without complication (Primary)    2. Other allergic rhinitis    3. Panlobular emphysema (HCC)  -     albuterol sulfate HFA (Ventolin HFA) 108 (90 Base) MCG/ACT inhaler; Inhale 2 puffs Every 4 (Four) Hours As Needed for Wheezing or Shortness of Air.  Dispense: 18 g; Refill: 5    4. Eosinophilic asthma    5. SOB (shortness of breath)  -     Pulmonary Function Test; Future    Other orders  -     fluticasone (FLONASE) 50 MCG/ACT nasal spray; 2 sprays into the nostril(s) as directed by provider Daily.  Dispense: 16 g; Refill: 3  -     Fluticasone-Umeclidin-Vilant (TRELEGY) 200-62.5-25 MCG/INH inhaler; Inhale 1 puff Daily.  Dispense: 28 each; Refill: 5           Return for keep appt in September, PFT at hospital, then 4 months with Dr. Olivas.    DISCUSSION (if any):  His last PFT is consistent with moderate obstruction.     I have ordered a PFT to be completed at the hospital before his next appointment.    I reviewed his absolute eosinophils from May which was over 800.  He certainly has eosinophilic asthma.    His symptoms of asthma are under poor control at this time.  He should continue Trelegy, rescue medication, Flonase all as directed.    Fasenra has been ordered but there is been no paperwork completed that I can tell.  He was seen in [facility] last year but the only therapy I can see that was provided was Depo-Medrol.  The medical assistant has had him fill out the paperwork today before leaving so that hopefully Fasenra can get approved sooner versus later.    It will be very important for him to start a biologic such as Fasenra due to frequent asthma exacerbations on maximum inhaled therapy.    Patient's medications for underlying  asthma were reviewed with him in great detail.    Any needed adjustments to his pulmonary medications, either for clinical or insurance coverage reasons, have been made and are reflected in the orders.    Side effects of prescribed medications discussed with the patient.    Asthma action plan with discussed with him.    The patient was asked to call this office if the symptoms worsen.    He stopped using CPAP as he was not getting favorable results. His sleep study showed only mild sleep apnea with AHI 8/hour.       Dictated utilizing Dragon dictation.    This document was electronically signed by LISA Hernandez July 11, 2022  10:17 EDT

## 2022-07-26 ENCOUNTER — TELEPHONE (OUTPATIENT)
Dept: PULMONOLOGY | Facility: CLINIC | Age: 47
End: 2022-07-26

## 2022-07-26 NOTE — TELEPHONE ENCOUNTER
PT CALLED ABOUT FASENRA BEING ESTIMATED TO DELIVERY 7/28. WANTS TO KNOW IF THERE IS ANYTHING ELSE HE NEEDS TO DO?

## 2022-07-29 ENCOUNTER — LAB (OUTPATIENT)
Dept: PULMONOLOGY | Facility: CLINIC | Age: 47
End: 2022-07-29

## 2022-07-29 DIAGNOSIS — J45.40 MODERATE PERSISTENT ASTHMA WITHOUT COMPLICATION: ICD-10-CM

## 2022-07-29 DIAGNOSIS — J45.50 SEVERE PERSISTENT ASTHMA WITHOUT COMPLICATION: Primary | ICD-10-CM

## 2022-07-29 PROCEDURE — 96372 THER/PROPH/DIAG INJ SC/IM: CPT | Performed by: NURSE PRACTITIONER

## 2022-08-01 ENCOUNTER — APPOINTMENT (OUTPATIENT)
Dept: GENERAL RADIOLOGY | Facility: HOSPITAL | Age: 47
End: 2022-08-01

## 2022-08-01 ENCOUNTER — APPOINTMENT (OUTPATIENT)
Dept: CT IMAGING | Facility: HOSPITAL | Age: 47
End: 2022-08-01

## 2022-08-01 ENCOUNTER — HOSPITAL ENCOUNTER (EMERGENCY)
Facility: HOSPITAL | Age: 47
Discharge: HOME | End: 2022-08-01
Attending: EMERGENCY MEDICINE | Admitting: EMERGENCY MEDICINE

## 2022-08-01 ENCOUNTER — OFFICE VISIT (OUTPATIENT)
Dept: INTERNAL MEDICINE | Facility: CLINIC | Age: 47
End: 2022-08-01

## 2022-08-01 VITALS
WEIGHT: 203 LBS | SYSTOLIC BLOOD PRESSURE: 104 MMHG | BODY MASS INDEX: 26.9 KG/M2 | TEMPERATURE: 98.3 F | DIASTOLIC BLOOD PRESSURE: 76 MMHG | HEIGHT: 73 IN | HEART RATE: 55 BPM | OXYGEN SATURATION: 96 % | RESPIRATION RATE: 20 BRPM

## 2022-08-01 VITALS
BODY MASS INDEX: 26.9 KG/M2 | HEART RATE: 86 BPM | DIASTOLIC BLOOD PRESSURE: 60 MMHG | OXYGEN SATURATION: 96 % | HEIGHT: 73 IN | SYSTOLIC BLOOD PRESSURE: 86 MMHG | WEIGHT: 203 LBS | TEMPERATURE: 97.3 F | RESPIRATION RATE: 15 BRPM

## 2022-08-01 DIAGNOSIS — R51.9 RIGHT-SIDED HEADACHE: ICD-10-CM

## 2022-08-01 DIAGNOSIS — I95.9 HYPOTENSION, UNSPECIFIED HYPOTENSION TYPE: Primary | ICD-10-CM

## 2022-08-01 DIAGNOSIS — I95.1 ORTHOSTATIC HYPOTENSION: Primary | ICD-10-CM

## 2022-08-01 LAB
ALBUMIN SERPL-MCNC: 4.4 G/DL (ref 3.5–5.2)
ALBUMIN/GLOB SERPL: 1.9 G/DL
ALP SERPL-CCNC: 133 U/L (ref 39–117)
ALT SERPL W P-5'-P-CCNC: 23 U/L (ref 1–41)
ANION GAP SERPL CALCULATED.3IONS-SCNC: 11 MMOL/L (ref 5–15)
AST SERPL-CCNC: 21 U/L (ref 1–40)
BACTERIA UR QL AUTO: ABNORMAL /HPF
BASOPHILS # BLD AUTO: 0 10*3/MM3 (ref 0–0.2)
BASOPHILS NFR BLD AUTO: 0 % (ref 0–1.5)
BILIRUB SERPL-MCNC: 0.4 MG/DL (ref 0–1.2)
BILIRUB UR QL STRIP: NEGATIVE
BUN SERPL-MCNC: 11 MG/DL (ref 6–20)
BUN/CREAT SERPL: 9.8 (ref 7–25)
CALCIUM SPEC-SCNC: 9.3 MG/DL (ref 8.6–10.5)
CHLORIDE SERPL-SCNC: 102 MMOL/L (ref 98–107)
CLARITY UR: CLEAR
CO2 SERPL-SCNC: 24 MMOL/L (ref 22–29)
COLOR UR: ABNORMAL
CREAT SERPL-MCNC: 1.12 MG/DL (ref 0.76–1.27)
DEPRECATED RDW RBC AUTO: 39.3 FL (ref 37–54)
EGFRCR SERPLBLD CKD-EPI 2021: 81.5 ML/MIN/1.73
EOSINOPHIL # BLD AUTO: 0 10*3/MM3 (ref 0–0.4)
EOSINOPHIL NFR BLD AUTO: 0 % (ref 0.3–6.2)
ERYTHROCYTE [DISTWIDTH] IN BLOOD BY AUTOMATED COUNT: 13.2 % (ref 12.3–15.4)
GLOBULIN UR ELPH-MCNC: 2.3 GM/DL
GLUCOSE SERPL-MCNC: 107 MG/DL (ref 65–99)
GLUCOSE UR STRIP-MCNC: NEGATIVE MG/DL
HCT VFR BLD AUTO: 44.9 % (ref 37.5–51)
HGB BLD-MCNC: 14.7 G/DL (ref 13–17.7)
HGB UR QL STRIP.AUTO: NEGATIVE
HOLD SPECIMEN: NORMAL
HOLD SPECIMEN: NORMAL
HYALINE CASTS UR QL AUTO: ABNORMAL /LPF
IMM GRANULOCYTES # BLD AUTO: 0.01 10*3/MM3 (ref 0–0.05)
IMM GRANULOCYTES NFR BLD AUTO: 0.2 % (ref 0–0.5)
KETONES UR QL STRIP: ABNORMAL
LEUKOCYTE ESTERASE UR QL STRIP.AUTO: ABNORMAL
LYMPHOCYTES # BLD AUTO: 1.79 10*3/MM3 (ref 0.7–3.1)
LYMPHOCYTES NFR BLD AUTO: 31.8 % (ref 19.6–45.3)
MAGNESIUM SERPL-MCNC: 2.5 MG/DL (ref 1.6–2.6)
MCH RBC QN AUTO: 27 PG (ref 26.6–33)
MCHC RBC AUTO-ENTMCNC: 32.7 G/DL (ref 31.5–35.7)
MCV RBC AUTO: 82.5 FL (ref 79–97)
MONOCYTES # BLD AUTO: 0.66 10*3/MM3 (ref 0.1–0.9)
MONOCYTES NFR BLD AUTO: 11.7 % (ref 5–12)
MUCOUS THREADS URNS QL MICRO: ABNORMAL /HPF
NEUTROPHILS NFR BLD AUTO: 3.17 10*3/MM3 (ref 1.7–7)
NEUTROPHILS NFR BLD AUTO: 56.3 % (ref 42.7–76)
NITRITE UR QL STRIP: NEGATIVE
NRBC BLD AUTO-RTO: 0 /100 WBC (ref 0–0.2)
PH UR STRIP.AUTO: 5.5 [PH] (ref 5–8)
PLATELET # BLD AUTO: 189 10*3/MM3 (ref 140–450)
PMV BLD AUTO: 10.1 FL (ref 6–12)
POTASSIUM SERPL-SCNC: 4.3 MMOL/L (ref 3.5–5.2)
PROT SERPL-MCNC: 6.7 G/DL (ref 6–8.5)
PROT UR QL STRIP: ABNORMAL
RBC # BLD AUTO: 5.44 10*6/MM3 (ref 4.14–5.8)
RBC # UR STRIP: ABNORMAL /HPF
REF LAB TEST METHOD: ABNORMAL
SODIUM SERPL-SCNC: 137 MMOL/L (ref 136–145)
SP GR UR STRIP: >=1.03 (ref 1–1.03)
SQUAMOUS #/AREA URNS HPF: ABNORMAL /HPF
TROPONIN T SERPL-MCNC: <0.01 NG/ML (ref 0–0.03)
UROBILINOGEN UR QL STRIP: ABNORMAL
WBC # UR STRIP: ABNORMAL /HPF
WBC NRBC COR # BLD: 5.63 10*3/MM3 (ref 3.4–10.8)
WHOLE BLOOD HOLD COAG: NORMAL
WHOLE BLOOD HOLD SPECIMEN: NORMAL

## 2022-08-01 PROCEDURE — 81001 URINALYSIS AUTO W/SCOPE: CPT

## 2022-08-01 PROCEDURE — 70450 CT HEAD/BRAIN W/O DYE: CPT

## 2022-08-01 PROCEDURE — 80053 COMPREHEN METABOLIC PANEL: CPT

## 2022-08-01 PROCEDURE — 93005 ELECTROCARDIOGRAM TRACING: CPT

## 2022-08-01 PROCEDURE — 99213 OFFICE O/P EST LOW 20 MIN: CPT | Performed by: NURSE PRACTITIONER

## 2022-08-01 PROCEDURE — 99284 EMERGENCY DEPT VISIT MOD MDM: CPT

## 2022-08-01 PROCEDURE — 71045 X-RAY EXAM CHEST 1 VIEW: CPT

## 2022-08-01 PROCEDURE — 84484 ASSAY OF TROPONIN QUANT: CPT

## 2022-08-01 PROCEDURE — 85025 COMPLETE CBC W/AUTO DIFF WBC: CPT

## 2022-08-01 PROCEDURE — 83735 ASSAY OF MAGNESIUM: CPT

## 2022-08-01 RX ORDER — SODIUM CHLORIDE 0.9 % (FLUSH) 0.9 %
10 SYRINGE (ML) INJECTION AS NEEDED
Status: DISCONTINUED | OUTPATIENT
Start: 2022-08-01 | End: 2022-08-01 | Stop reason: HOSPADM

## 2022-08-01 RX ADMIN — SODIUM CHLORIDE 1000 ML: 9 INJECTION, SOLUTION INTRAVENOUS at 18:14

## 2022-08-01 NOTE — PROGRESS NOTES
Chief Complaint / Reason:      Chief Complaint   Patient presents with   • Dizziness     4-5 days constant. Sometimes its so bad I almost throw up and I am having a hard time with words and putting stuff together. Headaches and neck pain       Subjective     HPI  Patient presents today with complaints of dizziness that has been going on 4 to 5 days and states that is constant worse when changing position also states that he has had headaches and neck pain.  Complains of right-sided neck pain and states that the pain is shooting up into his head his blood pressure is low and he did take his medication this morning.  Patient also states that he has been very nauseous and did take Zofran which has not helped keep him from throwing up but is struggling to focus and expressed himself.  He denies any head injury or trauma.  He does state that he does have a history of stroke but when looking through the chart unable to see documentation as he did have history of heart attack and does have history of drug use but is on methadone.  Patient denies any recent drug use.  Patient does have history of cervical radiculopathy.  History taken from: patient    PMH/FH/Social History were reviewed and updated appropriately in the electronic medical record.   Past Medical History:   Diagnosis Date   • Abdominal adhesions    • Anxiety    • Arthritis    • Asthma    • Cervical radiculopathy    • Colitis    • COPD (chronic obstructive pulmonary disease) (HCC)    • GERD (gastroesophageal reflux disease)    • Heart attack (HCC)    • History of hepatitis C     Treated with Epclusa in 2019   • History of recreational drug use    • HTN (hypertension)    • Kidney cysts    • Left hip pain    • Liver disease    • Low back pain    • Migraines    • Obstructive chronic bronchitis with exacerbation (HCC)    • Sleep apnea     mild   • Tattoos      Past Surgical History:   Procedure Laterality Date   • BACK SURGERY     • COLONOSCOPY  2014   •  COLONOSCOPY N/A 2022    Procedure: COLONOSCOPY with biopsies;  Surgeon: Giancarlo Ruiz MD;  Location:  [facility];  Service: Gastroenterology;  Laterality: N/A;   • HERNIA REPAIR     • HIP SPACER INSERTION WITH ANTIBIOTIC CEMENT Left 1/15/2020    Procedure: TOTAL HIP IMPLANT REMOVAL WITH INSERTION OF ANTIBIOTIC SPACER LEFT;  Surgeon: Ba Ramirez MD;  Location:  [facility];  Service: Orthopedics   • SHOULDER LIGAMENT REPAIR      right shoulder   • SMALL INTESTINE SURGERY      Small bowell resection   • TOTAL HIP ARTHROPLASTY Left    • TOTAL HIP ARTHROPLASTY Right    • TOTAL HIP ARTHROPLASTY REVISION Left 3/5/2020    Procedure: HIP REIMPLANT REVISION LEFT;  Surgeon: Ba Ramirez MD;  Location:  [facility];  Service: Orthopedics;  Laterality: Left;   • UPPER GASTROINTESTINAL ENDOSCOPY       Social History     Socioeconomic History   • Marital status:    Tobacco Use   • Smoking status: Former Smoker     Packs/day: 2.00     Years: 15.00     Pack years: 30.00     Quit date:      Years since quittin.5   • Smokeless tobacco: Current User     Types: Chew   Vaping Use   • Vaping Use: Never used   Substance and Sexual Activity   • Alcohol use: No     Comment: stopped drinking alcohol   • Drug use: Not Currently     Comment: takes suboxone   • Sexual activity: Defer     Family History   Problem Relation Age of Onset   • Arthritis Other    • Hypertension Other    • Migraines Other    • Heart attack Other    • Stroke Other    • Colon cancer Neg Hx        Review of Systems:   Review of Systems   Constitutional: Positive for fatigue.   HENT: Negative.    Respiratory: Negative.    Cardiovascular: Negative.    Gastrointestinal: Positive for nausea.   Musculoskeletal: Positive for arthralgias and myalgias.   Allergic/Immunologic: Positive for environmental allergies.   Neurological: Positive for dizziness, speech difficulty, weakness, numbness, headache, memory problem and confusion. Negative  "for facial asymmetry.   Psychiatric/Behavioral: Positive for sleep disturbance and stress.         All other systems were reviewed and are negative.  Exceptions are noted in the subjective or above.      Objective     Vitals:    08/01/22 1500 08/01/22 1502   BP: (!) 86/68 (!) 86/60   Pulse: 86    Resp: 15    Temp: 97.3 °F (36.3 °C)    SpO2: 96%    Weight: 92.1 kg (203 lb)    Height: 185.4 cm (73\")        Body mass index is 26.78 kg/m².    Physical Exam  Vitals and nursing note reviewed.   Constitutional:       Appearance: He is well-developed. He is ill-appearing.   Cardiovascular:      Rate and Rhythm: Normal rate and regular rhythm.      Pulses: Normal pulses.      Heart sounds: Normal heart sounds.   Pulmonary:      Effort: Pulmonary effort is normal.      Breath sounds: Normal breath sounds.   Chest:      Chest wall: No tenderness.   Musculoskeletal:         General: Tenderness and deformity present.   Skin:     General: Skin is warm and dry.      Capillary Refill: Capillary refill takes less than 2 seconds.   Neurological:      Mental Status: He is alert and oriented to person, place, and time.      Sensory: Sensory deficit present.      Motor: Weakness present.      Comments: Gait abnormality patient sways with ambulation and holds onto wall.  Right  stronger than left tongue midline and moves all extremities.   Psychiatric:         Behavior: Behavior normal.         Thought Content: Thought content normal.         Judgment: Judgment normal.              Results Review:    I reviewed the patient's previous clinical results.       Medication Review:     Current Outpatient Medications:   •  albuterol sulfate HFA (Ventolin HFA) 108 (90 Base) MCG/ACT inhaler, Inhale 2 puffs Every 4 (Four) Hours As Needed for Wheezing or Shortness of Air., Disp: 18 g, Rfl: 5  •  aspirin (aspirin) 81 MG EC tablet, Take 1 tablet by mouth Daily., Disp: , Rfl:   •  Benralizumab (Fasenra Pen) 30 MG/ML solution auto-injector, Inject " 30 mg under the skin into the appropriate area as directed Take As Directed., Disp: 1 mL, Rfl: 12  •  fluticasone (FLONASE) 50 MCG/ACT nasal spray, 2 sprays into the nostril(s) as directed by provider Daily., Disp: 16 g, Rfl: 3  •  Fluticasone-Umeclidin-Vilant (TRELEGY) 200-62.5-25 MCG/INH inhaler, Inhale 1 puff Daily., Disp: 28 each, Rfl: 5  •  ipratropium-albuterol (DUO-NEB) 0.5-2.5 mg/3 ml nebulizer, Take 3 mL by nebulization Every 4 (Four) Hours As Needed for Wheezing or Shortness of Air., Disp: 180 mL, Rfl: 0  •  lovastatin (MEVACOR) 40 MG tablet, Take 1 tablet by mouth Every Night., Disp: 90 tablet, Rfl: 3  •  methadone (DOLOPHINE) 5 MG/5ML solution, Take 60 mg by mouth Daily. Patient takes 60 mg once per day in mornings, states took a dose 6/15/21 around 0700, Disp: , Rfl:   •  omeprazole (priLOSEC) 40 MG capsule, Take 40 mg by mouth Daily., Disp: , Rfl:   •  ondansetron ODT (ZOFRAN-ODT) 4 MG disintegrating tablet, Place 1 tablet on the tongue 4 (Four) Times a Day As Needed for Nausea. (Patient taking differently: Place 4 mg on the tongue Every 8 (Eight) Hours As Needed for Nausea.), Disp: 20 tablet, Rfl: 0  •  predniSONE (DELTASONE) 20 MG tablet, Take 3 pills for 5 days with food, 2 pills for 5 days with food, and 1 pill for 5 days with food., Disp: 30 tablet, Rfl: 0  •  traZODone (DESYREL) 150 MG tablet, Take 150 mg by mouth At Night As Needed., Disp: , Rfl: 0    Current Facility-Administered Medications:   •  Benralizumab solution prefilled syringe 30 mg, 1 each, Subcutaneous, Q28 Days, Delmy Rosas APRN, 30 mg at 07/29/22 1356    Diagnoses and all orders for this visit:    Hypotension, unspecified hypotension type    Right-sided headache      Collaborated with patient's PCP regarding plan of care and discussed differential diagnosis with patient which suggested hypotensive symptoms but may need to rule out stroke due to symptoms and comorbidities.  I contacted [facility]  regarding patient and symptoms.    Return if symptoms worsen or fail to improve.    Rose Buckner, APRN  08/01/2022

## 2022-08-01 NOTE — DISCHARGE INSTRUCTIONS
Please keep a blood pressure diary.  We did provide  To do so.  If you do need to resume your blood pressure medicines, resume your losartan at 50 mg daily instead of the 100 mg daily.  Stay hydrated.

## 2022-08-01 NOTE — ED PROVIDER NOTES
Subjective   History of Present Illness    Chief Complaint: Lightheaded dizzy, low blood pressure  History of Present Illness: 46-year-old male history of hypertension, COPD, here with above complaints over the last 4 to 5 days.  He went to his PCP office today was blood pressure was found to be low and was recommended to stop his blood pressure medications.  Was also recommended to come to ER for further evaluation.  He does report dizziness feels as though he is drunk.  Onset: 4 to 5 days  Duration: Persistent  Exacerbating / Alleviating factors: Worse with change in position  Associated symptoms: None      Nurses Notes reviewed and agree, including vitals, allergies, social history and prior medical history.     REVIEW OF SYSTEMS: All systems reviewed and not pertinent unless noted.    Positive for: Lightheaded dizzy low blood pressure    Negative for: Fever chills cough hemoptysis syncope palpitations abdominal pain back pain urinary symptoms  Review of Systems    Past Medical History:   Diagnosis Date   • Abdominal adhesions    • Anxiety    • Arthritis    • Asthma    • Cervical radiculopathy    • Colitis    • COPD (chronic obstructive pulmonary disease) (HCC)    • GERD (gastroesophageal reflux disease)    • Heart attack (HCC)    • History of hepatitis C     Treated with Epclusa in 2019   • History of recreational drug use    • HTN (hypertension)    • Kidney cysts    • Left hip pain    • Liver disease    • Low back pain    • Migraines    • Obstructive chronic bronchitis with exacerbation (HCC)    • Sleep apnea     mild   • Tattoos        Allergies   Allergen Reactions   • Doxycycline Nausea And Vomiting       Past Surgical History:   Procedure Laterality Date   • BACK SURGERY     • COLONOSCOPY  2014   • COLONOSCOPY N/A 1/4/2022    Procedure: COLONOSCOPY with biopsies;  Surgeon: Giancarlo Ruiz MD;  Location: [facility] ENDOSCOPY;  Service: Gastroenterology;  Laterality: N/A;   • HERNIA REPAIR     • HIP SPACER  "INSERTION WITH ANTIBIOTIC CEMENT Left 1/15/2020    Procedure: TOTAL HIP IMPLANT REMOVAL WITH INSERTION OF ANTIBIOTIC SPACER LEFT;  Surgeon: Ba Ramirez MD;  Location:  [facility] OR;  Service: Orthopedics   • SHOULDER LIGAMENT REPAIR      right shoulder   • SMALL INTESTINE SURGERY      Small bowell resection   • TOTAL HIP ARTHROPLASTY Left    • TOTAL HIP ARTHROPLASTY Right    • TOTAL HIP ARTHROPLASTY REVISION Left 3/5/2020    Procedure: HIP REIMPLANT REVISION LEFT;  Surgeon: Ba Ramirez MD;  Location:  [facility] OR;  Service: Orthopedics;  Laterality: Left;   • UPPER GASTROINTESTINAL ENDOSCOPY         Family History   Problem Relation Age of Onset   • Arthritis Other    • Hypertension Other    • Migraines Other    • Heart attack Other    • Stroke Other    • Colon cancer Neg Hx        Social History     Socioeconomic History   • Marital status:    Tobacco Use   • Smoking status: Former Smoker     Packs/day: 2.00     Years: 15.00     Pack years: 30.00     Quit date:      Years since quittin.5   • Smokeless tobacco: Current User     Types: Chew   Vaping Use   • Vaping Use: Never used   Substance and Sexual Activity   • Alcohol use: No     Comment: stopped drinking alcohol   • Drug use: Not Currently     Comment: takes suboxone   • Sexual activity: Defer           Objective   Physical Exam  /76   Pulse 55   Temp 98.3 °F (36.8 °C) (Oral)   Resp 20   Ht 185.4 cm (73\")   Wt 92.1 kg (203 lb)   SpO2 96%   BMI 26.78 kg/m²     CONSTITUTIONAL: Well developed, nontoxic 46-year-old  male,  in no acute distress.  VITAL SIGNS: per nursing, reviewed and noted  SKIN: exposed skin with no rashes, ulcerations or petechiae  EYES: Grossly EOMI, no icterus  ENT: Normal voice.  Patient maintained wearing a mask throughout patient encounter due to coronavirus pandemic  RESPIRATORY:  No increased work of breathing. No retractions.   CARDIOVASCULAR:  regular rate and rhythm, no murmurs.  Good " Peripheral pulses. Good cap refill to extremities.   GI: Abdomen soft, nontender, normal bowel sounds. No hernia. No ascites.  MUSCULOSKELETAL:  No tenderness. Full ROM. Strength and tone grossly normal.  no spasms. no neck or back tenderness or spasm.   NEUROLOGIC: Alert, oriented x 3. No gross deficits. GCS 15.   PSYCH: appropriate affect.  : no bladder tenderness or distention, no CVA tenderness      Procedures     No attending physician procedures were performed on this patient.      ED Course  ED Course as of 08/01/22 1904   Mon Aug 01, 2022   1701 EKG interpreted by me reveals sinus rhythm rate of 74.  Nonspecific T wave change.  No ectopy no ischemic changes. [PF]      ED Course User Index  [PF] Dominick Riley, DO      XR Chest 1 View    Result Date: 8/1/2022  PROCEDURE: XR CHEST 1 VW-  HISTORY: Weak/Dizzy/AMS triage protocol  COMPARISON:  06/19/2022  FINDINGS:  Portable view of the chest demonstrates left lower lobe atelectasis, similar from prior exam. Right lung is clear. There is no evidence of effusion, pneumothorax or other significant pleural disease. The mediastinum is unremarkable.  The heart size is normal.      Impression: Left lower lobe atelectasis  This report was signed and finalized on 8/1/2022 3:45 PM by Zack Baker MD.    CT head without acute findings per radiologist.                                     MDM  Patient presents with above complaint over the last 4 to 5 days.  CT head without acute findings, no obvious infectious process.  Normal white cell count.  Patient was found to be orthostatic, received IV fluids.  Advised to stop his blood pressure medications as directed by his PCP physician, advised if he did have some rebound hypertension to resume his losartan at 50 mg daily instead of 100 mg daily.  Keep a blood pressure diary, stay hydrated, supportive care, outpatient follow return precautions discussed.  Final diagnoses:   Orthostatic hypotension       ED Disposition  ED  Disposition     ED Disposition   Discharge    Condition   Stable    Comment   --             Joshua Hoffmann MD  [address]  [facility] [address] [phone]          [facility] Emergency Department  [address] [address] [phone]    As needed, If symptoms worsen         Medication List      Changed    ondansetron ODT 4 MG disintegrating tablet  Commonly known as: ZOFRAN-ODT  Place 1 tablet on the tongue 4 (Four) Times a Day As Needed for Nausea.  What changed: when to take this             Dominick Riley,   08/01/22 8206

## 2022-08-03 DIAGNOSIS — J45.50 SEVERE PERSISTENT ASTHMA WITHOUT COMPLICATION: Primary | ICD-10-CM

## 2022-08-03 RX ORDER — BENRALIZUMAB 30 MG/ML
INJECTION, SOLUTION SUBCUTANEOUS
Qty: 1 EACH | Refills: 8 | Status: SHIPPED | OUTPATIENT
Start: 2022-08-03 | End: 2022-09-06

## 2022-08-10 ENCOUNTER — HOSPITAL ENCOUNTER (OUTPATIENT)
Dept: PULMONOLOGY | Facility: HOSPITAL | Age: 47
Discharge: HOME | End: 2022-08-10
Admitting: NURSE PRACTITIONER

## 2022-08-10 DIAGNOSIS — R06.02 SOB (SHORTNESS OF BREATH): ICD-10-CM

## 2022-08-10 PROCEDURE — 94060 EVALUATION OF WHEEZING: CPT

## 2022-08-10 PROCEDURE — 94729 DIFFUSING CAPACITY: CPT

## 2022-08-10 PROCEDURE — 94729 DIFFUSING CAPACITY: CPT | Performed by: INTERNAL MEDICINE

## 2022-08-10 PROCEDURE — 94726 PLETHYSMOGRAPHY LUNG VOLUMES: CPT | Performed by: INTERNAL MEDICINE

## 2022-08-10 PROCEDURE — 94060 EVALUATION OF WHEEZING: CPT | Performed by: INTERNAL MEDICINE

## 2022-08-10 PROCEDURE — 94726 PLETHYSMOGRAPHY LUNG VOLUMES: CPT

## 2022-08-10 PROCEDURE — 94640 AIRWAY INHALATION TREATMENT: CPT

## 2022-08-10 PROCEDURE — 63710000001 ALBUTEROL SULFATE HFA 108 (90 BASE) MCG/ACT AEROSOL SOLUTION 8.5 G INHALER: Performed by: NURSE PRACTITIONER

## 2022-08-10 PROCEDURE — A9270 NON-COVERED ITEM OR SERVICE: HCPCS | Performed by: NURSE PRACTITIONER

## 2022-08-10 RX ORDER — ALBUTEROL SULFATE 90 UG/1
2 AEROSOL, METERED RESPIRATORY (INHALATION)
Status: DISCONTINUED | OUTPATIENT
Start: 2022-08-10 | End: 2022-08-11 | Stop reason: HOSPADM

## 2022-08-10 RX ADMIN — ALBUTEROL SULFATE 2 PUFF: 90 AEROSOL, METERED RESPIRATORY (INHALATION) at 10:30

## 2022-08-29 ENCOUNTER — LAB (OUTPATIENT)
Dept: PULMONOLOGY | Facility: CLINIC | Age: 47
End: 2022-08-29

## 2022-08-29 PROCEDURE — 96372 THER/PROPH/DIAG INJ SC/IM: CPT | Performed by: INTERNAL MEDICINE

## 2022-08-30 RX ORDER — MONTELUKAST SODIUM 10 MG/1
TABLET ORAL
Qty: 90 TABLET | OUTPATIENT
Start: 2022-08-30

## 2022-09-06 DIAGNOSIS — J45.50 SEVERE PERSISTENT ASTHMA WITHOUT COMPLICATION: ICD-10-CM

## 2022-09-06 RX ORDER — BENRALIZUMAB 30 MG/ML
INJECTION, SOLUTION SUBCUTANEOUS
Qty: 1 EACH | Refills: 8 | Status: SHIPPED | OUTPATIENT
Start: 2022-09-06

## 2022-09-26 ENCOUNTER — OFFICE VISIT (OUTPATIENT)
Dept: PULMONOLOGY | Facility: CLINIC | Age: 47
End: 2022-09-26

## 2022-09-26 VITALS
SYSTOLIC BLOOD PRESSURE: 125 MMHG | WEIGHT: 204 LBS | OXYGEN SATURATION: 92 % | DIASTOLIC BLOOD PRESSURE: 83 MMHG | RESPIRATION RATE: 16 BRPM | HEIGHT: 73 IN | BODY MASS INDEX: 27.04 KG/M2 | HEART RATE: 88 BPM

## 2022-09-26 DIAGNOSIS — J45.51 SEVERE PERSISTENT ASTHMA WITH ACUTE EXACERBATION: Primary | ICD-10-CM

## 2022-09-26 DIAGNOSIS — G47.33 OBSTRUCTIVE SLEEP APNEA: ICD-10-CM

## 2022-09-26 DIAGNOSIS — J82.83 EOSINOPHILIC ASTHMA: ICD-10-CM

## 2022-09-26 DIAGNOSIS — R06.02 SOB (SHORTNESS OF BREATH): ICD-10-CM

## 2022-09-26 PROCEDURE — 96372 THER/PROPH/DIAG INJ SC/IM: CPT | Performed by: NURSE PRACTITIONER

## 2022-09-26 PROCEDURE — 99214 OFFICE O/P EST MOD 30 MIN: CPT | Performed by: NURSE PRACTITIONER

## 2022-09-26 RX ORDER — ALBUTEROL SULFATE 90 UG/1
2 AEROSOL, METERED RESPIRATORY (INHALATION) EVERY 6 HOURS PRN
Qty: 18 G | Refills: 3 | Status: SHIPPED | OUTPATIENT
Start: 2022-09-26 | End: 2023-01-23

## 2022-09-26 RX ORDER — ALBUTEROL SULFATE 90 UG/1
2 AEROSOL, METERED RESPIRATORY (INHALATION) EVERY 4 HOURS PRN
Qty: 18 G | Refills: 5 | Status: SHIPPED | OUTPATIENT
Start: 2022-09-26 | End: 2022-09-26

## 2022-09-26 RX ORDER — PREDNISONE 20 MG/1
TABLET ORAL
Qty: 10 TABLET | Refills: 0 | Status: SHIPPED | OUTPATIENT
Start: 2022-09-26 | End: 2022-10-21

## 2022-09-26 RX ORDER — METHYLPREDNISOLONE ACETATE 80 MG/ML
80 INJECTION, SUSPENSION INTRA-ARTICULAR; INTRALESIONAL; INTRAMUSCULAR; SOFT TISSUE ONCE
Status: COMPLETED | OUTPATIENT
Start: 2022-09-26 | End: 2022-09-26

## 2022-09-26 RX ADMIN — METHYLPREDNISOLONE ACETATE 80 MG: 80 INJECTION, SUSPENSION INTRA-ARTICULAR; INTRALESIONAL; INTRAMUSCULAR; SOFT TISSUE at 11:24

## 2022-09-26 NOTE — PROGRESS NOTES
"Chief Complaint   Patient presents with   • Breathing Problem   • Follow-up         Subjective   Topher Stoll is a 46 y.o. male.     History of Present Illness   Patient comes today for follow up of shortness of breath and COPD.     Symptoms have been stable since the last clinic visit. Patient reports no recent exacerbations. Last exacerbation with prednisone with in July 2022. He started Fasenra soon after.     He has been SOB for the last 3 days. He has used the oxygen more. Increased nonproductive cough. He had to use neb in middle of night last night.     Patient is using medications, as prescribed. He is using high dose Trelegy daily. He uses SALEEM seldom, but has needed it the past few days. He is using neb 1-2 times a day.     He started Fasenra and has had 2 injections thus far. He feels like he has had less episodes of SOB since starting.     He states he uses oxygen as needed. He has had oxygen for many years.     Exercise tolerance has also remained stable.     Quit smoking in 2005.    The following portions of the patient's history were reviewed and updated as appropriate: allergies, current medications, past family history, past medical history, past social history and past surgical history.      Review of Systems   HENT: Positive for sinus pressure.    Respiratory: Positive for shortness of breath.    Cardiovascular: Negative for palpitations.   Psychiatric/Behavioral: Positive for sleep disturbance.       Objective   Visit Vitals  /83   Pulse 88   Resp 16   Ht 185.4 cm (73\")   Wt 92.5 kg (204 lb)   SpO2 92% Comment: RA   BMI 26.91 kg/m²         Physical Exam  Vitals reviewed.   HENT:      Head: Atraumatic.      Mouth/Throat:      Mouth: Mucous membranes are moist.      Comments: Crowded oropharynx. Edentulous.  Eyes:      Extraocular Movements: Extraocular movements intact.   Cardiovascular:      Rate and Rhythm: Normal rate and regular rhythm.   Pulmonary:      Effort: Pulmonary effort is " normal. No respiratory distress.      Comments: Scattered wheezing.   Musculoskeletal:      Cervical back: Neck supple.      Comments: Gait normal.   Skin:     General: Skin is warm.   Neurological:      Mental Status: He is alert and oriented to person, place, and time.             Assessment & Plan   Diagnoses and all orders for this visit:    1. Severe persistent asthma with acute exacerbation (Primary)  -     Overnight Sleep Oximetry Study; Future  -     methylPREDNISolone acetate (DEPO-medrol) injection 80 mg    2. Eosinophilic asthma    3. Obstructive sleep apnea  Comments:  does not use CPAP    4. SOB (shortness of breath)  -     Converted Six Minute Walk; Future    Other orders  -     Discontinue: albuterol sulfate HFA (Ventolin HFA) 108 (90 Base) MCG/ACT inhaler; Inhale 2 puffs Every 4 (Four) Hours As Needed for Wheezing or Shortness of Air.  Dispense: 18 g; Refill: 5  -     Fluticasone-Umeclidin-Vilant (TRELEGY) 200-62.5-25 MCG/INH inhaler; Inhale 1 puff Daily.  Dispense: 28 each; Refill: 5  -     albuterol sulfate HFA (Ventolin HFA) 108 (90 Base) MCG/ACT inhaler; Inhale 2 puffs Every 6 (Six) Hours As Needed for Wheezing or Shortness of Air.  Dispense: 18 g; Refill: 3  -     predniSONE (DELTASONE) 20 MG tablet; Take 2 tablets daily for 5 days.  Dispense: 10 tablet; Refill: 0           Return for keep appt in January, 6 MWT.    DISCUSSION (if any):  PFT in August consistent with mild obstruction. This has improved from previous PFT in 2020. FeV1 increased to 85% from 69%.     A 6-minute walk has been ordered for follow-up visit.    For exacerbation of asthma, I have prescribed oral and injectable steroids.     No change to the current medications has been made.  He should continue using high-dose Trelegy, and the rescue medication and Fasenra all as directed.    Compliance with medications stressed.     Side effects of prescribed medications discussed with the patient.    He has mild GUILLE according to last  sleep study at home. However, he could not tolerate CPAP and continued to have elevated residual AHI while using CPAP so was recommended to stop.    Dictated utilizing Dragon dictation.    This document was electronically signed by LISA Hernandez September 26, 2022  13:22 EDT

## 2022-10-10 ENCOUNTER — LAB (OUTPATIENT)
Dept: PULMONOLOGY | Facility: CLINIC | Age: 47
End: 2022-10-10

## 2022-10-21 ENCOUNTER — OFFICE VISIT (OUTPATIENT)
Dept: INTERNAL MEDICINE | Facility: CLINIC | Age: 47
End: 2022-10-21

## 2022-10-21 VITALS
DIASTOLIC BLOOD PRESSURE: 70 MMHG | BODY MASS INDEX: 27.04 KG/M2 | HEART RATE: 86 BPM | TEMPERATURE: 97.1 F | OXYGEN SATURATION: 96 % | WEIGHT: 204 LBS | HEIGHT: 73 IN | SYSTOLIC BLOOD PRESSURE: 118 MMHG

## 2022-10-21 DIAGNOSIS — F11.20 OPIOID DEPENDENCE ON AGONIST THERAPY: ICD-10-CM

## 2022-10-21 DIAGNOSIS — J44.9 COPD WITHOUT EXACERBATION: ICD-10-CM

## 2022-10-21 DIAGNOSIS — I10 PRIMARY HYPERTENSION: Primary | ICD-10-CM

## 2022-10-21 PROCEDURE — 99214 OFFICE O/P EST MOD 30 MIN: CPT | Performed by: INTERNAL MEDICINE

## 2022-10-21 RX ORDER — ONDANSETRON 4 MG/1
4 TABLET, ORALLY DISINTEGRATING ORAL EVERY 8 HOURS PRN
Qty: 45 TABLET | Refills: 0 | Status: SHIPPED | OUTPATIENT
Start: 2022-10-21

## 2022-10-21 RX ORDER — TRAZODONE HYDROCHLORIDE 100 MG/1
TABLET ORAL
COMMUNITY
Start: 2022-10-10

## 2022-10-21 NOTE — PROGRESS NOTES
"Subjective  Topher tSoll is a 46 y.o. male    HPI coming in for follow-up patient with a history of COPD without recent exacerbation he is on Trelegy which he takes regularly.  History of hypertension.  On daily methadone therapy.  No new complaints except for occasional nausea    The following portions of the patient's history were reviewed and updated as appropriate: allergies, current medications, past family history, past medical history, past social history, past surgical history, and problem list.     Review of Systems   Constitutional: Positive for fatigue. Negative for activity change, appetite change and fever.   HENT: Negative for congestion, ear discharge, ear pain and trouble swallowing.    Eyes: Negative for photophobia and visual disturbance.   Respiratory: Positive for shortness of breath. Negative for cough.    Cardiovascular: Negative for chest pain and palpitations.   Gastrointestinal: Positive for nausea. Negative for abdominal distention, abdominal pain, constipation, diarrhea and vomiting.   Endocrine: Negative.    Genitourinary: Negative for dysuria, hematuria and urgency.   Musculoskeletal: Negative for arthralgias, back pain, joint swelling and myalgias.   Skin: Negative for color change and rash.   Allergic/Immunologic: Negative.    Neurological: Negative for dizziness, weakness, light-headedness and headaches.   Hematological: Negative for adenopathy. Does not bruise/bleed easily.   Psychiatric/Behavioral: Negative for agitation, confusion and dysphoric mood. The patient is not nervous/anxious.        Visit Vitals  /70   Pulse 86   Temp 97.1 °F (36.2 °C) (Infrared)   Ht 185.4 cm (73\")   Wt 92.5 kg (204 lb)   SpO2 96%   BMI 26.91 kg/m²       Objective  Physical Exam  Constitutional:       General: He is not in acute distress.     Appearance: He is well-developed.   HENT:      Nose: Nose normal.   Eyes:      General: No scleral icterus.     Conjunctiva/sclera: Conjunctivae normal. "   Neck:      Thyroid: No thyromegaly.      Trachea: No tracheal deviation.   Cardiovascular:      Rate and Rhythm: Normal rate and regular rhythm.      Heart sounds: No murmur heard.    No friction rub.   Pulmonary:      Effort: No respiratory distress.      Breath sounds: No wheezing or rales.   Abdominal:      General: There is no distension.      Palpations: Abdomen is soft. There is no mass.      Tenderness: There is no abdominal tenderness. There is no guarding.   Musculoskeletal:         General: Deformity present. Normal range of motion.   Lymphadenopathy:      Cervical: No cervical adenopathy.   Skin:     General: Skin is warm and dry.      Findings: No erythema or rash.   Neurological:      Mental Status: He is alert and oriented to person, place, and time.      Cranial Nerves: No cranial nerve deficit.      Coordination: Coordination normal.      Deep Tendon Reflexes: Reflexes are normal and symmetric.   Psychiatric:         Behavior: Behavior normal.         Thought Content: Thought content normal.         Judgment: Judgment normal.         Diagnoses and all orders for this visit:    Primary hypertension stable with current meds and low-salt diet    Opioid dependence on agonist therapy (HCC) stable on methadone therapy    COPD without exacerbation ([facility]) stable no recent exacerbation    Other orders  -     traZODone (DESYREL) 100 MG tablet; TAKE 2 TABLETS BY MOUTH AT BEDTIME AS NEEDED

## 2022-11-01 NOTE — TELEPHONE ENCOUNTER
Caller: Topher Stoll    Relationship: Self    Best call back number:[phone]    Requested Prescriptions:   Requested Prescriptions      No prescriptions requested or ordered in this encounter    OMEPRAZOLE    Pharmacy where request should be sent: [id number] [place] [id number] [facility] - [phone]  - [phone] FX     Additional details provided by patient: PATIENT STATED THAT DR CRESPO HAS NOT PRESCRIBED THIS MEDICATION FOR THE PATIENT YET, BUT THAT HE NEEDS HIS REFILL.    Does the patient have less than a 3 day supply:  [] Yes  [x] No    Edelmira Rodrigez Rep   11/01/22 09:47 EDT

## 2022-11-02 RX ORDER — OMEPRAZOLE 40 MG/1
40 CAPSULE, DELAYED RELEASE ORAL DAILY
Qty: 90 CAPSULE | Refills: 3 | Status: SHIPPED | OUTPATIENT
Start: 2022-11-02

## 2022-11-04 DIAGNOSIS — R05.9 COUGH: ICD-10-CM

## 2022-11-04 DIAGNOSIS — J44.1 COPD WITH ACUTE EXACERBATION: Primary | ICD-10-CM

## 2022-11-04 DIAGNOSIS — R06.02 SHORTNESS OF BREATH: ICD-10-CM

## 2022-11-04 DIAGNOSIS — J44.1 COPD WITH ACUTE EXACERBATION: ICD-10-CM

## 2022-11-04 RX ORDER — IPRATROPIUM BROMIDE AND ALBUTEROL SULFATE 2.5; .5 MG/3ML; MG/3ML
3 SOLUTION RESPIRATORY (INHALATION) EVERY 4 HOURS PRN
Qty: 180 ML | Refills: 3 | Status: SHIPPED | OUTPATIENT
Start: 2022-11-04

## 2022-11-04 RX ORDER — IPRATROPIUM BROMIDE AND ALBUTEROL SULFATE 2.5; .5 MG/3ML; MG/3ML
3 SOLUTION RESPIRATORY (INHALATION) EVERY 4 HOURS PRN
Qty: 180 ML | Refills: 3 | Status: SHIPPED | OUTPATIENT
Start: 2022-11-04 | End: 2022-11-04

## 2022-11-13 ENCOUNTER — APPOINTMENT (OUTPATIENT)
Dept: CT IMAGING | Facility: HOSPITAL | Age: 47
End: 2022-11-13

## 2022-11-13 ENCOUNTER — HOSPITAL ENCOUNTER (EMERGENCY)
Facility: HOSPITAL | Age: 47
Discharge: HOME | End: 2022-11-13
Attending: EMERGENCY MEDICINE | Admitting: EMERGENCY MEDICINE

## 2022-11-13 VITALS
HEART RATE: 68 BPM | OXYGEN SATURATION: 94 % | TEMPERATURE: 97.6 F | WEIGHT: 213 LBS | BODY MASS INDEX: 28.23 KG/M2 | RESPIRATION RATE: 16 BRPM | SYSTOLIC BLOOD PRESSURE: 131 MMHG | HEIGHT: 73 IN | DIASTOLIC BLOOD PRESSURE: 100 MMHG

## 2022-11-13 DIAGNOSIS — S30.1XXA CONTUSION OF ABDOMINAL WALL, INITIAL ENCOUNTER: Primary | ICD-10-CM

## 2022-11-13 LAB
ALBUMIN SERPL-MCNC: 4.3 G/DL (ref 3.5–5.2)
ALBUMIN/GLOB SERPL: 1.6 G/DL
ALP SERPL-CCNC: 140 U/L (ref 39–117)
ALT SERPL W P-5'-P-CCNC: 17 U/L (ref 1–41)
ANION GAP SERPL CALCULATED.3IONS-SCNC: 11 MMOL/L (ref 5–15)
AST SERPL-CCNC: 14 U/L (ref 1–40)
BASOPHILS # BLD AUTO: 0.01 10*3/MM3 (ref 0–0.2)
BASOPHILS NFR BLD AUTO: 0.1 % (ref 0–1.5)
BILIRUB SERPL-MCNC: 0.2 MG/DL (ref 0–1.2)
BUN SERPL-MCNC: 17 MG/DL (ref 6–20)
BUN/CREAT SERPL: 16.8 (ref 7–25)
CALCIUM SPEC-SCNC: 9 MG/DL (ref 8.6–10.5)
CHLORIDE SERPL-SCNC: 101 MMOL/L (ref 98–107)
CO2 SERPL-SCNC: 25 MMOL/L (ref 22–29)
CREAT SERPL-MCNC: 1.01 MG/DL (ref 0.76–1.27)
DEPRECATED RDW RBC AUTO: 36.4 FL (ref 37–54)
EGFRCR SERPLBLD CKD-EPI 2021: 91.7 ML/MIN/1.73
EOSINOPHIL # BLD AUTO: 0.01 10*3/MM3 (ref 0–0.4)
EOSINOPHIL NFR BLD AUTO: 0.1 % (ref 0.3–6.2)
ERYTHROCYTE [DISTWIDTH] IN BLOOD BY AUTOMATED COUNT: 12.7 % (ref 12.3–15.4)
GLOBULIN UR ELPH-MCNC: 2.7 GM/DL
GLUCOSE SERPL-MCNC: 123 MG/DL (ref 65–99)
HCT VFR BLD AUTO: 43.4 % (ref 37.5–51)
HGB BLD-MCNC: 14.3 G/DL (ref 13–17.7)
IMM GRANULOCYTES # BLD AUTO: 0.03 10*3/MM3 (ref 0–0.05)
IMM GRANULOCYTES NFR BLD AUTO: 0.4 % (ref 0–0.5)
LYMPHOCYTES # BLD AUTO: 1.46 10*3/MM3 (ref 0.7–3.1)
LYMPHOCYTES NFR BLD AUTO: 17.4 % (ref 19.6–45.3)
MCH RBC QN AUTO: 26.4 PG (ref 26.6–33)
MCHC RBC AUTO-ENTMCNC: 32.9 G/DL (ref 31.5–35.7)
MCV RBC AUTO: 80.1 FL (ref 79–97)
MONOCYTES # BLD AUTO: 0.61 10*3/MM3 (ref 0.1–0.9)
MONOCYTES NFR BLD AUTO: 7.3 % (ref 5–12)
NEUTROPHILS NFR BLD AUTO: 6.28 10*3/MM3 (ref 1.7–7)
NEUTROPHILS NFR BLD AUTO: 74.7 % (ref 42.7–76)
NRBC BLD AUTO-RTO: 0 /100 WBC (ref 0–0.2)
PLATELET # BLD AUTO: 232 10*3/MM3 (ref 140–450)
PMV BLD AUTO: 9.6 FL (ref 6–12)
POTASSIUM SERPL-SCNC: 4.2 MMOL/L (ref 3.5–5.2)
PROT SERPL-MCNC: 7 G/DL (ref 6–8.5)
RBC # BLD AUTO: 5.42 10*6/MM3 (ref 4.14–5.8)
SODIUM SERPL-SCNC: 137 MMOL/L (ref 136–145)
WBC NRBC COR # BLD: 8.4 10*3/MM3 (ref 3.4–10.8)

## 2022-11-13 PROCEDURE — 80053 COMPREHEN METABOLIC PANEL: CPT | Performed by: NURSE PRACTITIONER

## 2022-11-13 PROCEDURE — 71260 CT THORAX DX C+: CPT

## 2022-11-13 PROCEDURE — 25010000002 MORPHINE PER 10 MG: Performed by: NURSE PRACTITIONER

## 2022-11-13 PROCEDURE — 96374 THER/PROPH/DIAG INJ IV PUSH: CPT

## 2022-11-13 PROCEDURE — 85025 COMPLETE CBC W/AUTO DIFF WBC: CPT | Performed by: NURSE PRACTITIONER

## 2022-11-13 PROCEDURE — 99283 EMERGENCY DEPT VISIT LOW MDM: CPT

## 2022-11-13 PROCEDURE — 25010000002 IOPAMIDOL 61 % SOLUTION: Performed by: FAMILY MEDICINE

## 2022-11-13 PROCEDURE — 74177 CT ABD & PELVIS W/CONTRAST: CPT

## 2022-11-13 PROCEDURE — 25010000002 KETOROLAC TROMETHAMINE PER 15 MG: Performed by: NURSE PRACTITIONER

## 2022-11-13 PROCEDURE — 96375 TX/PRO/DX INJ NEW DRUG ADDON: CPT

## 2022-11-13 RX ORDER — SODIUM CHLORIDE 0.9 % (FLUSH) 0.9 %
10 SYRINGE (ML) INJECTION AS NEEDED
Status: DISCONTINUED | OUTPATIENT
Start: 2022-11-13 | End: 2022-11-13 | Stop reason: HOSPADM

## 2022-11-13 RX ORDER — KETOROLAC TROMETHAMINE 30 MG/ML
30 INJECTION, SOLUTION INTRAMUSCULAR; INTRAVENOUS ONCE
Status: COMPLETED | OUTPATIENT
Start: 2022-11-13 | End: 2022-11-13

## 2022-11-13 RX ADMIN — MORPHINE SULFATE 4 MG: 4 INJECTION, SOLUTION INTRAMUSCULAR; INTRAVENOUS at 19:43

## 2022-11-13 RX ADMIN — SODIUM CHLORIDE 1000 ML: 9 INJECTION, SOLUTION INTRAVENOUS at 19:29

## 2022-11-13 RX ADMIN — KETOROLAC TROMETHAMINE 30 MG: 30 INJECTION, SOLUTION INTRAMUSCULAR; INTRAVENOUS at 19:30

## 2022-11-13 RX ADMIN — IOPAMIDOL 100 ML: 612 INJECTION, SOLUTION INTRAVENOUS at 19:20

## 2022-11-13 NOTE — ED PROVIDER NOTES
Subjective  History of Present Illness:    Chief Complaint: Left upper quadrant pain, left chest wall pain  History of Present Illness: This is a 47-year-old male patient comes into the ED today complaining of of pain to his lower left lateral chest wall and left upper quadrant abdomen after falling into a hole hitting on his chest wall.  Patient rates his pain as 7 out of 10 made worse by movement ,  palpation and deep breath.  Patient has had multiple abdominal surgeries for bowel obstruction and adhesions.  Patient states last bowel movement was today, complaining no nausea vomiting      Nurses Notes reviewed and agree, including vitals, allergies, social history and prior medical history.       Allergies:    Doxycycline      Past Surgical History:   Procedure Laterality Date   • BACK SURGERY     • COLONOSCOPY     • COLONOSCOPY N/A 2022    Procedure: COLONOSCOPY with biopsies;  Surgeon: Giancarlo Ruiz MD;  Location:  [facility];  Service: Gastroenterology;  Laterality: N/A;   • HERNIA REPAIR     • HIP SPACER INSERTION WITH ANTIBIOTIC CEMENT Left 1/15/2020    Procedure: TOTAL HIP IMPLANT REMOVAL WITH INSERTION OF ANTIBIOTIC SPACER LEFT;  Surgeon: Ba Ramirez MD;  Location:  [facility];  Service: Orthopedics   • SHOULDER LIGAMENT REPAIR      right shoulder   • SMALL INTESTINE SURGERY      Small bowell resection   • TOTAL HIP ARTHROPLASTY Left    • TOTAL HIP ARTHROPLASTY Right    • TOTAL HIP ARTHROPLASTY REVISION Left 3/5/2020    Procedure: HIP REIMPLANT REVISION LEFT;  Surgeon: Ba Ramirez MD;  Location: [facility];  Service: Orthopedics;  Laterality: Left;   • UPPER GASTROINTESTINAL ENDOSCOPY           Social History     Socioeconomic History   • Marital status:    Tobacco Use   • Smoking status: Former     Packs/day: 2.00     Years: 15.00     Pack years: 30.00     Types: Cigarettes     Quit date:      Years since quittin.8   • Smokeless tobacco: Current     Types:  Chew   Vaping Use   • Vaping Use: Never used   Substance and Sexual Activity   • Alcohol use: No     Comment: stopped drinking alcohol   • Drug use: Not Currently     Comment: takes suboxone   • Sexual activity: Defer         Family History   Problem Relation Age of Onset   • Arthritis Other    • Hypertension Other    • Migraines Other    • Heart attack Other    • Stroke Other    • Colon cancer Neg Hx        REVIEW OF SYSTEMS: All systems reviewed and not pertinent unless noted.    Review of Systems   Cardiovascular: Positive for chest pain.   Gastrointestinal: Positive for abdominal pain.   All other systems reviewed and are negative.      Objective    Physical Exam  Vitals and nursing note reviewed.   Constitutional:       Appearance: Normal appearance.   HENT:      Head: Normocephalic and atraumatic.   Eyes:      Extraocular Movements: Extraocular movements intact.      Pupils: Pupils are equal, round, and reactive to light.   Cardiovascular:      Rate and Rhythm: Normal rate and regular rhythm.      Pulses: Normal pulses.      Heart sounds: Normal heart sounds.   Pulmonary:      Effort: Pulmonary effort is normal.      Breath sounds: Normal breath sounds.   Chest:      Chest wall: Tenderness present.      Comments: Mild tenderness to palpation on the left lateral chest wall around rib 10/11/12    Abdominal:      General: Bowel sounds are normal.      Palpations: Abdomen is soft.      Tenderness: There is abdominal tenderness in the left upper quadrant.   Musculoskeletal:         General: Normal range of motion.      Cervical back: Normal range of motion and neck supple.   Skin:     General: Skin is warm and dry.      Capillary Refill: Capillary refill takes less than 2 seconds.   Neurological:      Mental Status: He is alert.      GCS: GCS eye subscore is 4. GCS verbal subscore is 5. GCS motor subscore is 6.      Cranial Nerves: Cranial nerves are intact.      Sensory: Sensation is intact.      Motor: Motor  function is intact.   Psychiatric:         Attention and Perception: Attention and perception normal.         Mood and Affect: Mood and affect normal.         Speech: Speech normal.           Procedures    ED Course:         Lab Results (last 24 hours)     Procedure Component Value Units Date/Time    Comprehensive Metabolic Panel [[id number]]  (Abnormal) Collected: 11/13/22 1858    Specimen: Blood Updated: 11/13/22 1919     Glucose 123 mg/dL      BUN 17 mg/dL      Creatinine 1.01 mg/dL      Sodium 137 mmol/L      Potassium 4.2 mmol/L      Chloride 101 mmol/L      CO2 25.0 mmol/L      Calcium 9.0 mg/dL      Total Protein 7.0 g/dL      Albumin 4.30 g/dL      ALT (SGPT) 17 U/L      AST (SGOT) 14 U/L      Alkaline Phosphatase 140 U/L      Total Bilirubin 0.2 mg/dL      Globulin 2.7 gm/dL      A/G Ratio 1.6 g/dL      BUN/Creatinine Ratio 16.8     Anion Gap 11.0 mmol/L      eGFR 91.7 mL/min/1.73      Comment: National Kidney Foundation and American Society of Nephrology (ASN) Task Force recommended calculation based on the Chronic Kidney Disease Epidemiology Collaboration (CKD-EPI) equation refit without adjustment for race.       Narrative:      GFR Normal >60  Chronic Kidney Disease <60  Kidney Failure <15      CBC & Differential [[id number]]  (Abnormal) Collected: 11/13/22 1858    Specimen: Blood Updated: 11/13/22 1904    Narrative:      The following orders were created for panel order CBC & Differential.  Procedure                               Abnormality         Status                     ---------                               -----------         ------                     CBC Auto Differential[[id number]]        Abnormal            Final result                 Please view results for these tests on the individual orders.    CBC Auto Differential [[id number]]  (Abnormal) Collected: 11/13/22 1858    Specimen: Blood Updated: 11/13/22 1904     WBC 8.40 10*3/mm3      RBC 5.42 10*6/mm3      Hemoglobin 14.3 g/dL       Hematocrit 43.4 %      MCV 80.1 fL      MCH 26.4 pg      MCHC 32.9 g/dL      RDW 12.7 %      RDW-SD 36.4 fl      MPV 9.6 fL      Platelets 232 10*3/mm3      Neutrophil % 74.7 %      Lymphocyte % 17.4 %      Monocyte % 7.3 %      Eosinophil % 0.1 %      Basophil % 0.1 %      Immature Grans % 0.4 %      Neutrophils, Absolute 6.28 10*3/mm3      Lymphocytes, Absolute 1.46 10*3/mm3      Monocytes, Absolute 0.61 10*3/mm3      Eosinophils, Absolute 0.01 10*3/mm3      Basophils, Absolute 0.01 10*3/mm3      Immature Grans, Absolute 0.03 10*3/mm3      nRBC 0.0 /100 WBC            No radiology results from the last 24 hrs       MDM      Final diagnoses:   Contusion of abdominal wall, initial encounter        Marek Springer, APRN  11/13/22 2005

## 2022-11-27 ENCOUNTER — HOSPITAL ENCOUNTER (EMERGENCY)
Facility: HOSPITAL | Age: 47
Discharge: HOME | End: 2022-11-27
Attending: EMERGENCY MEDICINE | Admitting: EMERGENCY MEDICINE

## 2022-11-27 ENCOUNTER — APPOINTMENT (OUTPATIENT)
Dept: GENERAL RADIOLOGY | Facility: HOSPITAL | Age: 47
End: 2022-11-27

## 2022-11-27 VITALS
OXYGEN SATURATION: 95 % | RESPIRATION RATE: 20 BRPM | HEIGHT: 73 IN | DIASTOLIC BLOOD PRESSURE: 99 MMHG | BODY MASS INDEX: 27.83 KG/M2 | TEMPERATURE: 98.4 F | WEIGHT: 210 LBS | HEART RATE: 77 BPM | SYSTOLIC BLOOD PRESSURE: 129 MMHG

## 2022-11-27 DIAGNOSIS — J44.1 COPD EXACERBATION: Primary | ICD-10-CM

## 2022-11-27 LAB
ALBUMIN SERPL-MCNC: 4.2 G/DL (ref 3.5–5.2)
ALBUMIN/GLOB SERPL: 1.5 G/DL
ALP SERPL-CCNC: 174 U/L (ref 39–117)
ALT SERPL W P-5'-P-CCNC: 23 U/L (ref 1–41)
ANION GAP SERPL CALCULATED.3IONS-SCNC: 11.7 MMOL/L (ref 5–15)
AST SERPL-CCNC: 22 U/L (ref 1–40)
B PARAPERT DNA SPEC QL NAA+PROBE: NOT DETECTED
B PERT DNA SPEC QL NAA+PROBE: NOT DETECTED
BASOPHILS # BLD AUTO: 0.02 10*3/MM3 (ref 0–0.2)
BASOPHILS NFR BLD AUTO: 0.2 % (ref 0–1.5)
BILIRUB SERPL-MCNC: 0.2 MG/DL (ref 0–1.2)
BUN SERPL-MCNC: 16 MG/DL (ref 6–20)
BUN/CREAT SERPL: 15.7 (ref 7–25)
C PNEUM DNA NPH QL NAA+NON-PROBE: NOT DETECTED
CALCIUM SPEC-SCNC: 9 MG/DL (ref 8.6–10.5)
CHLORIDE SERPL-SCNC: 101 MMOL/L (ref 98–107)
CO2 SERPL-SCNC: 24.3 MMOL/L (ref 22–29)
CREAT SERPL-MCNC: 1.02 MG/DL (ref 0.76–1.27)
DEPRECATED RDW RBC AUTO: 37.1 FL (ref 37–54)
EGFRCR SERPLBLD CKD-EPI 2021: 90.7 ML/MIN/1.73
EOSINOPHIL # BLD AUTO: 0 10*3/MM3 (ref 0–0.4)
EOSINOPHIL NFR BLD AUTO: 0 % (ref 0.3–6.2)
ERYTHROCYTE [DISTWIDTH] IN BLOOD BY AUTOMATED COUNT: 13 % (ref 12.3–15.4)
FLUAV SUBTYP SPEC NAA+PROBE: NOT DETECTED
FLUBV RNA ISLT QL NAA+PROBE: NOT DETECTED
GLOBULIN UR ELPH-MCNC: 2.8 GM/DL
GLUCOSE SERPL-MCNC: 118 MG/DL (ref 65–99)
HADV DNA SPEC NAA+PROBE: NOT DETECTED
HCOV 229E RNA SPEC QL NAA+PROBE: NOT DETECTED
HCOV HKU1 RNA SPEC QL NAA+PROBE: NOT DETECTED
HCOV NL63 RNA SPEC QL NAA+PROBE: NOT DETECTED
HCOV OC43 RNA SPEC QL NAA+PROBE: NOT DETECTED
HCT VFR BLD AUTO: 46.8 % (ref 37.5–51)
HGB BLD-MCNC: 15.6 G/DL (ref 13–17.7)
HMPV RNA NPH QL NAA+NON-PROBE: NOT DETECTED
HPIV1 RNA ISLT QL NAA+PROBE: NOT DETECTED
HPIV2 RNA SPEC QL NAA+PROBE: NOT DETECTED
HPIV3 RNA NPH QL NAA+PROBE: NOT DETECTED
HPIV4 P GENE NPH QL NAA+PROBE: NOT DETECTED
IMM GRANULOCYTES # BLD AUTO: 0.02 10*3/MM3 (ref 0–0.05)
IMM GRANULOCYTES NFR BLD AUTO: 0.2 % (ref 0–0.5)
LYMPHOCYTES # BLD AUTO: 1.61 10*3/MM3 (ref 0.7–3.1)
LYMPHOCYTES NFR BLD AUTO: 16.8 % (ref 19.6–45.3)
M PNEUMO IGG SER IA-ACNC: NOT DETECTED
MCH RBC QN AUTO: 26.8 PG (ref 26.6–33)
MCHC RBC AUTO-ENTMCNC: 33.3 G/DL (ref 31.5–35.7)
MCV RBC AUTO: 80.3 FL (ref 79–97)
MONOCYTES # BLD AUTO: 0.6 10*3/MM3 (ref 0.1–0.9)
MONOCYTES NFR BLD AUTO: 6.3 % (ref 5–12)
NEUTROPHILS NFR BLD AUTO: 7.35 10*3/MM3 (ref 1.7–7)
NEUTROPHILS NFR BLD AUTO: 76.5 % (ref 42.7–76)
NRBC BLD AUTO-RTO: 0 /100 WBC (ref 0–0.2)
NT-PROBNP SERPL-MCNC: 47.8 PG/ML (ref 0–450)
PLATELET # BLD AUTO: 217 10*3/MM3 (ref 140–450)
PMV BLD AUTO: 10.2 FL (ref 6–12)
POTASSIUM SERPL-SCNC: 4 MMOL/L (ref 3.5–5.2)
PROT SERPL-MCNC: 7 G/DL (ref 6–8.5)
RBC # BLD AUTO: 5.83 10*6/MM3 (ref 4.14–5.8)
RHINOVIRUS RNA SPEC NAA+PROBE: NOT DETECTED
RSV RNA NPH QL NAA+NON-PROBE: NOT DETECTED
SARS-COV-2 RNA NPH QL NAA+NON-PROBE: NOT DETECTED
SODIUM SERPL-SCNC: 137 MMOL/L (ref 136–145)
TROPONIN T SERPL-MCNC: <0.01 NG/ML (ref 0–0.03)
WBC NRBC COR # BLD: 9.6 10*3/MM3 (ref 3.4–10.8)

## 2022-11-27 PROCEDURE — 80053 COMPREHEN METABOLIC PANEL: CPT | Performed by: NURSE PRACTITIONER

## 2022-11-27 PROCEDURE — 93005 ELECTROCARDIOGRAM TRACING: CPT | Performed by: EMERGENCY MEDICINE

## 2022-11-27 PROCEDURE — 99283 EMERGENCY DEPT VISIT LOW MDM: CPT

## 2022-11-27 PROCEDURE — 94640 AIRWAY INHALATION TREATMENT: CPT

## 2022-11-27 PROCEDURE — 85025 COMPLETE CBC W/AUTO DIFF WBC: CPT | Performed by: NURSE PRACTITIONER

## 2022-11-27 PROCEDURE — 96374 THER/PROPH/DIAG INJ IV PUSH: CPT

## 2022-11-27 PROCEDURE — 0202U NFCT DS 22 TRGT SARS-COV-2: CPT

## 2022-11-27 PROCEDURE — 71045 X-RAY EXAM CHEST 1 VIEW: CPT

## 2022-11-27 PROCEDURE — 83880 ASSAY OF NATRIURETIC PEPTIDE: CPT | Performed by: NURSE PRACTITIONER

## 2022-11-27 PROCEDURE — 25010000002 METHYLPREDNISOLONE PER 125 MG: Performed by: NURSE PRACTITIONER

## 2022-11-27 PROCEDURE — 84484 ASSAY OF TROPONIN QUANT: CPT | Performed by: NURSE PRACTITIONER

## 2022-11-27 PROCEDURE — 94799 UNLISTED PULMONARY SVC/PX: CPT

## 2022-11-27 RX ORDER — METHYLPREDNISOLONE SODIUM SUCCINATE 125 MG/2ML
125 INJECTION, POWDER, LYOPHILIZED, FOR SOLUTION INTRAMUSCULAR; INTRAVENOUS ONCE
Status: COMPLETED | OUTPATIENT
Start: 2022-11-27 | End: 2022-11-27

## 2022-11-27 RX ORDER — IPRATROPIUM BROMIDE AND ALBUTEROL SULFATE 2.5; .5 MG/3ML; MG/3ML
3 SOLUTION RESPIRATORY (INHALATION) ONCE
Status: COMPLETED | OUTPATIENT
Start: 2022-11-27 | End: 2022-11-27

## 2022-11-27 RX ADMIN — IPRATROPIUM BROMIDE AND ALBUTEROL SULFATE 3 ML: 2.5; .5 SOLUTION RESPIRATORY (INHALATION) at 16:26

## 2022-11-27 RX ADMIN — METHYLPREDNISOLONE SODIUM SUCCINATE 125 MG: 125 INJECTION, POWDER, FOR SOLUTION INTRAMUSCULAR; INTRAVENOUS at 15:54

## 2022-12-12 ENCOUNTER — HOSPITAL ENCOUNTER (OUTPATIENT)
Dept: GENERAL RADIOLOGY | Facility: HOSPITAL | Age: 47
Discharge: HOME | End: 2022-12-12

## 2022-12-12 ENCOUNTER — LAB (OUTPATIENT)
Dept: LAB | Facility: HOSPITAL | Age: 47
End: 2022-12-12

## 2022-12-12 ENCOUNTER — TRANSCRIBE ORDERS (OUTPATIENT)
Dept: LAB | Facility: HOSPITAL | Age: 47
End: 2022-12-12

## 2022-12-12 DIAGNOSIS — N30.20 BLADDER INFECTION, CHRONIC: ICD-10-CM

## 2022-12-12 DIAGNOSIS — N30.20 BLADDER INFECTION, CHRONIC: Primary | ICD-10-CM

## 2022-12-12 LAB
ALBUMIN SERPL-MCNC: 4.5 G/DL (ref 3.5–5.2)
ALBUMIN/GLOB SERPL: 2.1 G/DL
ALP SERPL-CCNC: 148 U/L (ref 39–117)
ALT SERPL W P-5'-P-CCNC: 21 U/L (ref 1–41)
ANION GAP SERPL CALCULATED.3IONS-SCNC: 13.5 MMOL/L (ref 5–15)
AST SERPL-CCNC: 16 U/L (ref 1–40)
BACTERIA UR QL AUTO: ABNORMAL /HPF
BILIRUB SERPL-MCNC: 0.4 MG/DL (ref 0–1.2)
BILIRUB UR QL STRIP: NEGATIVE
BUN SERPL-MCNC: 18 MG/DL (ref 6–20)
BUN/CREAT SERPL: 18.8 (ref 7–25)
CALCIUM SPEC-SCNC: 9 MG/DL (ref 8.6–10.5)
CHLORIDE SERPL-SCNC: 101 MMOL/L (ref 98–107)
CLARITY UR: ABNORMAL
CO2 SERPL-SCNC: 23.5 MMOL/L (ref 22–29)
COLOR UR: YELLOW
CREAT SERPL-MCNC: 0.96 MG/DL (ref 0.76–1.27)
EGFRCR SERPLBLD CKD-EPI 2021: 97.5 ML/MIN/1.73
GLOBULIN UR ELPH-MCNC: 2.1 GM/DL
GLUCOSE SERPL-MCNC: 121 MG/DL (ref 65–99)
GLUCOSE UR STRIP-MCNC: NEGATIVE MG/DL
HGB UR QL STRIP.AUTO: NEGATIVE
HYALINE CASTS UR QL AUTO: ABNORMAL /LPF
KETONES UR QL STRIP: ABNORMAL
LEUKOCYTE ESTERASE UR QL STRIP.AUTO: NEGATIVE
NITRITE UR QL STRIP: NEGATIVE
PH UR STRIP.AUTO: 5.5 [PH] (ref 5–8)
POTASSIUM SERPL-SCNC: 3.9 MMOL/L (ref 3.5–5.2)
PROT SERPL-MCNC: 6.6 G/DL (ref 6–8.5)
PROT UR QL STRIP: NEGATIVE
PSA SERPL-MCNC: 0.17 NG/ML (ref 0–4)
RBC # UR STRIP: ABNORMAL /HPF
REF LAB TEST METHOD: ABNORMAL
SODIUM SERPL-SCNC: 138 MMOL/L (ref 136–145)
SP GR UR STRIP: >=1.03 (ref 1–1.03)
SQUAMOUS #/AREA URNS HPF: ABNORMAL /HPF
UROBILINOGEN UR QL STRIP: ABNORMAL
WBC # UR STRIP: ABNORMAL /HPF

## 2022-12-12 PROCEDURE — 36415 COLL VENOUS BLD VENIPUNCTURE: CPT

## 2022-12-12 PROCEDURE — 80053 COMPREHEN METABOLIC PANEL: CPT

## 2022-12-12 PROCEDURE — 74018 RADEX ABDOMEN 1 VIEW: CPT

## 2022-12-12 PROCEDURE — 81001 URINALYSIS AUTO W/SCOPE: CPT

## 2022-12-12 PROCEDURE — 87086 URINE CULTURE/COLONY COUNT: CPT

## 2022-12-12 PROCEDURE — G0103 PSA SCREENING: HCPCS

## 2022-12-13 LAB — BACTERIA SPEC AEROBE CULT: NO GROWTH

## 2022-12-22 ENCOUNTER — TRANSCRIBE ORDERS (OUTPATIENT)
Dept: ADMINISTRATIVE | Facility: HOSPITAL | Age: 47
End: 2022-12-22

## 2022-12-22 DIAGNOSIS — R31.1 BENIGN ESSENTIAL MICROSCOPIC HEMATURIA: Primary | ICD-10-CM

## 2022-12-22 DIAGNOSIS — Z12.31 VISIT FOR SCREENING MAMMOGRAM: ICD-10-CM

## 2023-01-03 PROCEDURE — 36415 COLL VENOUS BLD VENIPUNCTURE: CPT

## 2023-01-03 PROCEDURE — 99285 EMERGENCY DEPT VISIT HI MDM: CPT

## 2023-01-03 RX ORDER — FLUTICASONE PROPIONATE 50 MCG
SPRAY, SUSPENSION (ML) NASAL
Qty: 48 G | Refills: 2 | Status: SHIPPED | OUTPATIENT
Start: 2023-01-03

## 2023-01-04 ENCOUNTER — HOSPITAL ENCOUNTER (INPATIENT)
Facility: HOSPITAL | Age: 48
LOS: 2 days | Discharge: HOME | DRG: 191 | End: 2023-01-06
Attending: EMERGENCY MEDICINE | Admitting: INTERNAL MEDICINE
Payer: MEDICARE

## 2023-01-04 ENCOUNTER — APPOINTMENT (OUTPATIENT)
Dept: GENERAL RADIOLOGY | Facility: HOSPITAL | Age: 48
DRG: 191 | End: 2023-01-04
Payer: MEDICARE

## 2023-01-04 DIAGNOSIS — J96.21 ACUTE ON CHRONIC RESPIRATORY FAILURE WITH HYPOXIA: ICD-10-CM

## 2023-01-04 DIAGNOSIS — J44.1 COPD WITH ACUTE EXACERBATION: Primary | ICD-10-CM

## 2023-01-04 LAB
ALBUMIN SERPL-MCNC: 4.5 G/DL (ref 3.5–5.2)
ALBUMIN/GLOB SERPL: 1.7 G/DL
ALP SERPL-CCNC: 183 U/L (ref 39–117)
ALT SERPL W P-5'-P-CCNC: 22 U/L (ref 1–41)
ANION GAP SERPL CALCULATED.3IONS-SCNC: 10.6 MMOL/L (ref 5–15)
ANION GAP SERPL CALCULATED.3IONS-SCNC: 9.9 MMOL/L (ref 5–15)
AST SERPL-CCNC: 28 U/L (ref 1–40)
BASOPHILS # BLD AUTO: 0 10*3/MM3 (ref 0–0.2)
BASOPHILS # BLD AUTO: 0.01 10*3/MM3 (ref 0–0.2)
BASOPHILS NFR BLD AUTO: 0 % (ref 0–1.5)
BASOPHILS NFR BLD AUTO: 0.1 % (ref 0–1.5)
BILIRUB SERPL-MCNC: 0.4 MG/DL (ref 0–1.2)
BUN SERPL-MCNC: 12 MG/DL (ref 6–20)
BUN SERPL-MCNC: 13 MG/DL (ref 6–20)
BUN/CREAT SERPL: 14.1 (ref 7–25)
BUN/CREAT SERPL: 14.8 (ref 7–25)
CALCIUM SPEC-SCNC: 8.9 MG/DL (ref 8.6–10.5)
CALCIUM SPEC-SCNC: 9.1 MG/DL (ref 8.6–10.5)
CHLORIDE SERPL-SCNC: 97 MMOL/L (ref 98–107)
CHLORIDE SERPL-SCNC: 98 MMOL/L (ref 98–107)
CO2 SERPL-SCNC: 27.1 MMOL/L (ref 22–29)
CO2 SERPL-SCNC: 27.4 MMOL/L (ref 22–29)
CREAT SERPL-MCNC: 0.85 MG/DL (ref 0.76–1.27)
CREAT SERPL-MCNC: 0.88 MG/DL (ref 0.76–1.27)
DEPRECATED RDW RBC AUTO: 40.5 FL (ref 37–54)
DEPRECATED RDW RBC AUTO: 40.7 FL (ref 37–54)
EGFRCR SERPLBLD CKD-EPI 2021: 106.1 ML/MIN/1.73
EGFRCR SERPLBLD CKD-EPI 2021: 107.2 ML/MIN/1.73
EOSINOPHIL # BLD AUTO: 0 10*3/MM3 (ref 0–0.4)
EOSINOPHIL # BLD AUTO: 0.01 10*3/MM3 (ref 0–0.4)
EOSINOPHIL NFR BLD AUTO: 0 % (ref 0.3–6.2)
EOSINOPHIL NFR BLD AUTO: 0.1 % (ref 0.3–6.2)
ERYTHROCYTE [DISTWIDTH] IN BLOOD BY AUTOMATED COUNT: 13.6 % (ref 12.3–15.4)
ERYTHROCYTE [DISTWIDTH] IN BLOOD BY AUTOMATED COUNT: 13.7 % (ref 12.3–15.4)
FLUAV RNA RESP QL NAA+PROBE: NOT DETECTED
FLUBV RNA RESP QL NAA+PROBE: NOT DETECTED
GLOBULIN UR ELPH-MCNC: 2.7 GM/DL
GLUCOSE BLDC GLUCOMTR-MCNC: 139 MG/DL (ref 70–130)
GLUCOSE BLDC GLUCOMTR-MCNC: 148 MG/DL (ref 70–130)
GLUCOSE BLDC GLUCOMTR-MCNC: 156 MG/DL (ref 70–130)
GLUCOSE BLDC GLUCOMTR-MCNC: 196 MG/DL (ref 70–130)
GLUCOSE SERPL-MCNC: 118 MG/DL (ref 65–99)
GLUCOSE SERPL-MCNC: 206 MG/DL (ref 65–99)
HCT VFR BLD AUTO: 42.8 % (ref 37.5–51)
HCT VFR BLD AUTO: 44.2 % (ref 37.5–51)
HGB BLD-MCNC: 14 G/DL (ref 13–17.7)
HGB BLD-MCNC: 14.3 G/DL (ref 13–17.7)
HOLD SPECIMEN: NORMAL
HOLD SPECIMEN: NORMAL
IMM GRANULOCYTES # BLD AUTO: 0.02 10*3/MM3 (ref 0–0.05)
IMM GRANULOCYTES # BLD AUTO: 0.03 10*3/MM3 (ref 0–0.05)
IMM GRANULOCYTES NFR BLD AUTO: 0.2 % (ref 0–0.5)
IMM GRANULOCYTES NFR BLD AUTO: 0.3 % (ref 0–0.5)
LYMPHOCYTES # BLD AUTO: 0.3 10*3/MM3 (ref 0.7–3.1)
LYMPHOCYTES # BLD AUTO: 0.92 10*3/MM3 (ref 0.7–3.1)
LYMPHOCYTES NFR BLD AUTO: 3.7 % (ref 19.6–45.3)
LYMPHOCYTES NFR BLD AUTO: 9 % (ref 19.6–45.3)
MCH RBC QN AUTO: 26.5 PG (ref 26.6–33)
MCH RBC QN AUTO: 26.8 PG (ref 26.6–33)
MCHC RBC AUTO-ENTMCNC: 32.4 G/DL (ref 31.5–35.7)
MCHC RBC AUTO-ENTMCNC: 32.7 G/DL (ref 31.5–35.7)
MCV RBC AUTO: 81.8 FL (ref 79–97)
MCV RBC AUTO: 82 FL (ref 79–97)
MONOCYTES # BLD AUTO: 0.05 10*3/MM3 (ref 0.1–0.9)
MONOCYTES # BLD AUTO: 0.75 10*3/MM3 (ref 0.1–0.9)
MONOCYTES NFR BLD AUTO: 0.6 % (ref 5–12)
MONOCYTES NFR BLD AUTO: 7.3 % (ref 5–12)
NEUTROPHILS NFR BLD AUTO: 7.71 10*3/MM3 (ref 1.7–7)
NEUTROPHILS NFR BLD AUTO: 8.55 10*3/MM3 (ref 1.7–7)
NEUTROPHILS NFR BLD AUTO: 83.2 % (ref 42.7–76)
NEUTROPHILS NFR BLD AUTO: 95.5 % (ref 42.7–76)
NRBC BLD AUTO-RTO: 0 /100 WBC (ref 0–0.2)
NRBC BLD AUTO-RTO: 0 /100 WBC (ref 0–0.2)
NT-PROBNP SERPL-MCNC: 116.2 PG/ML (ref 0–450)
PLATELET # BLD AUTO: 189 10*3/MM3 (ref 140–450)
PLATELET # BLD AUTO: 205 10*3/MM3 (ref 140–450)
PMV BLD AUTO: 9.5 FL (ref 6–12)
PMV BLD AUTO: 9.6 FL (ref 6–12)
POTASSIUM SERPL-SCNC: 4.3 MMOL/L (ref 3.5–5.2)
POTASSIUM SERPL-SCNC: 4.3 MMOL/L (ref 3.5–5.2)
PROT SERPL-MCNC: 7.2 G/DL (ref 6–8.5)
RBC # BLD AUTO: 5.23 10*6/MM3 (ref 4.14–5.8)
RBC # BLD AUTO: 5.39 10*6/MM3 (ref 4.14–5.8)
SARS-COV-2 RNA RESP QL NAA+PROBE: NOT DETECTED
SODIUM SERPL-SCNC: 134 MMOL/L (ref 136–145)
SODIUM SERPL-SCNC: 136 MMOL/L (ref 136–145)
TROPONIN T SERPL-MCNC: <0.01 NG/ML (ref 0–0.03)
WBC NRBC COR # BLD: 10.27 10*3/MM3 (ref 3.4–10.8)
WBC NRBC COR # BLD: 8.08 10*3/MM3 (ref 3.4–10.8)
WHOLE BLOOD HOLD COAG: NORMAL
WHOLE BLOOD HOLD SPECIMEN: NORMAL

## 2023-01-04 PROCEDURE — 25010000002 METHYLPREDNISOLONE PER 125 MG: Performed by: EMERGENCY MEDICINE

## 2023-01-04 PROCEDURE — 94799 UNLISTED PULMONARY SVC/PX: CPT

## 2023-01-04 PROCEDURE — 94664 DEMO&/EVAL PT USE INHALER: CPT

## 2023-01-04 PROCEDURE — 25010000002 KETOROLAC TROMETHAMINE PER 15 MG: Performed by: EMERGENCY MEDICINE

## 2023-01-04 PROCEDURE — 84484 ASSAY OF TROPONIN QUANT: CPT | Performed by: EMERGENCY MEDICINE

## 2023-01-04 PROCEDURE — 25010000002 ONDANSETRON PER 1 MG: Performed by: EMERGENCY MEDICINE

## 2023-01-04 PROCEDURE — 71045 X-RAY EXAM CHEST 1 VIEW: CPT

## 2023-01-04 PROCEDURE — 99222 1ST HOSP IP/OBS MODERATE 55: CPT | Performed by: INTERNAL MEDICINE

## 2023-01-04 PROCEDURE — 85025 COMPLETE CBC W/AUTO DIFF WBC: CPT | Performed by: EMERGENCY MEDICINE

## 2023-01-04 PROCEDURE — 94761 N-INVAS EAR/PLS OXIMETRY MLT: CPT

## 2023-01-04 PROCEDURE — 63710000001 INSULIN ASPART PER 5 UNITS: Performed by: INTERNAL MEDICINE

## 2023-01-04 PROCEDURE — 83880 ASSAY OF NATRIURETIC PEPTIDE: CPT | Performed by: EMERGENCY MEDICINE

## 2023-01-04 PROCEDURE — 85025 COMPLETE CBC W/AUTO DIFF WBC: CPT | Performed by: INTERNAL MEDICINE

## 2023-01-04 PROCEDURE — 25010000002 MAGNESIUM SULFATE 2 GM/50ML SOLUTION: Performed by: EMERGENCY MEDICINE

## 2023-01-04 PROCEDURE — 82962 GLUCOSE BLOOD TEST: CPT

## 2023-01-04 PROCEDURE — 93005 ELECTROCARDIOGRAM TRACING: CPT | Performed by: EMERGENCY MEDICINE

## 2023-01-04 PROCEDURE — 80053 COMPREHEN METABOLIC PANEL: CPT | Performed by: EMERGENCY MEDICINE

## 2023-01-04 PROCEDURE — 87636 SARSCOV2 & INF A&B AMP PRB: CPT | Performed by: EMERGENCY MEDICINE

## 2023-01-04 PROCEDURE — 25010000002 ENOXAPARIN PER 10 MG: Performed by: INTERNAL MEDICINE

## 2023-01-04 PROCEDURE — 25010000002 METHYLPREDNISOLONE PER 125 MG: Performed by: INTERNAL MEDICINE

## 2023-01-04 PROCEDURE — 94640 AIRWAY INHALATION TREATMENT: CPT

## 2023-01-04 RX ORDER — BISACODYL 10 MG
10 SUPPOSITORY, RECTAL RECTAL DAILY PRN
Status: DISCONTINUED | OUTPATIENT
Start: 2023-01-04 | End: 2023-01-06 | Stop reason: HOSPADM

## 2023-01-04 RX ORDER — ONDANSETRON 2 MG/ML
4 INJECTION INTRAMUSCULAR; INTRAVENOUS EVERY 6 HOURS PRN
Status: DISCONTINUED | OUTPATIENT
Start: 2023-01-04 | End: 2023-01-06 | Stop reason: HOSPADM

## 2023-01-04 RX ORDER — SODIUM CHLORIDE 9 MG/ML
40 INJECTION, SOLUTION INTRAVENOUS AS NEEDED
Status: DISCONTINUED | OUTPATIENT
Start: 2023-01-04 | End: 2023-01-06 | Stop reason: HOSPADM

## 2023-01-04 RX ORDER — HYDROCODONE BITARTRATE AND ACETAMINOPHEN 10; 325 MG/1; MG/1
1 TABLET ORAL EVERY 8 HOURS PRN
Status: DISCONTINUED | OUTPATIENT
Start: 2023-01-04 | End: 2023-01-06 | Stop reason: HOSPADM

## 2023-01-04 RX ORDER — DEXTROSE MONOHYDRATE 25 G/50ML
25 INJECTION, SOLUTION INTRAVENOUS
Status: DISCONTINUED | OUTPATIENT
Start: 2023-01-04 | End: 2023-01-06 | Stop reason: HOSPADM

## 2023-01-04 RX ORDER — IPRATROPIUM BROMIDE AND ALBUTEROL SULFATE 2.5; .5 MG/3ML; MG/3ML
3 SOLUTION RESPIRATORY (INHALATION) EVERY 4 HOURS PRN
Status: DISCONTINUED | OUTPATIENT
Start: 2023-01-04 | End: 2023-01-06 | Stop reason: HOSPADM

## 2023-01-04 RX ORDER — POLYETHYLENE GLYCOL 3350 17 G/17G
17 POWDER, FOR SOLUTION ORAL DAILY PRN
Status: DISCONTINUED | OUTPATIENT
Start: 2023-01-04 | End: 2023-01-06 | Stop reason: HOSPADM

## 2023-01-04 RX ORDER — ASPIRIN 81 MG/1
81 TABLET ORAL DAILY
Status: DISCONTINUED | OUTPATIENT
Start: 2023-01-04 | End: 2023-01-06 | Stop reason: HOSPADM

## 2023-01-04 RX ORDER — METHADONE HYDROCHLORIDE 10 MG/1
40 TABLET ORAL DAILY
Status: DISCONTINUED | OUTPATIENT
Start: 2023-01-04 | End: 2023-01-06 | Stop reason: HOSPADM

## 2023-01-04 RX ORDER — PANTOPRAZOLE SODIUM 40 MG/1
40 TABLET, DELAYED RELEASE ORAL EVERY MORNING
Status: DISCONTINUED | OUTPATIENT
Start: 2023-01-04 | End: 2023-01-06 | Stop reason: HOSPADM

## 2023-01-04 RX ORDER — METHYLPREDNISOLONE SODIUM SUCCINATE 125 MG/2ML
60 INJECTION, POWDER, LYOPHILIZED, FOR SOLUTION INTRAMUSCULAR; INTRAVENOUS EVERY 8 HOURS
Status: DISCONTINUED | OUTPATIENT
Start: 2023-01-04 | End: 2023-01-06 | Stop reason: HOSPADM

## 2023-01-04 RX ORDER — BUDESONIDE AND FORMOTEROL FUMARATE DIHYDRATE 160; 4.5 UG/1; UG/1
2 AEROSOL RESPIRATORY (INHALATION)
Status: DISCONTINUED | OUTPATIENT
Start: 2023-01-04 | End: 2023-01-04

## 2023-01-04 RX ORDER — BUDESONIDE AND FORMOTEROL FUMARATE DIHYDRATE 160; 4.5 UG/1; UG/1
2 AEROSOL RESPIRATORY (INHALATION)
Status: DISCONTINUED | OUTPATIENT
Start: 2023-01-05 | End: 2023-01-06 | Stop reason: HOSPADM

## 2023-01-04 RX ORDER — ENOXAPARIN SODIUM 100 MG/ML
40 INJECTION SUBCUTANEOUS DAILY
Status: DISCONTINUED | OUTPATIENT
Start: 2023-01-04 | End: 2023-01-06 | Stop reason: HOSPADM

## 2023-01-04 RX ORDER — ACETAMINOPHEN 325 MG/1
650 TABLET ORAL EVERY 4 HOURS PRN
Status: DISCONTINUED | OUTPATIENT
Start: 2023-01-04 | End: 2023-01-06 | Stop reason: HOSPADM

## 2023-01-04 RX ORDER — ACETAMINOPHEN 160 MG/5ML
650 SOLUTION ORAL EVERY 4 HOURS PRN
Status: DISCONTINUED | OUTPATIENT
Start: 2023-01-04 | End: 2023-01-06 | Stop reason: HOSPADM

## 2023-01-04 RX ORDER — BISACODYL 5 MG/1
5 TABLET, DELAYED RELEASE ORAL DAILY PRN
Status: DISCONTINUED | OUTPATIENT
Start: 2023-01-04 | End: 2023-01-06 | Stop reason: HOSPADM

## 2023-01-04 RX ORDER — KETOROLAC TROMETHAMINE 30 MG/ML
30 INJECTION, SOLUTION INTRAMUSCULAR; INTRAVENOUS ONCE
Status: COMPLETED | OUTPATIENT
Start: 2023-01-04 | End: 2023-01-04

## 2023-01-04 RX ORDER — MAGNESIUM SULFATE HEPTAHYDRATE 40 MG/ML
2 INJECTION, SOLUTION INTRAVENOUS ONCE
Status: COMPLETED | OUTPATIENT
Start: 2023-01-04 | End: 2023-01-04

## 2023-01-04 RX ORDER — TRAZODONE HYDROCHLORIDE 50 MG/1
100 TABLET ORAL NIGHTLY PRN
Status: DISCONTINUED | OUTPATIENT
Start: 2023-01-04 | End: 2023-01-06 | Stop reason: HOSPADM

## 2023-01-04 RX ORDER — CHOLECALCIFEROL (VITAMIN D3) 125 MCG
5 CAPSULE ORAL NIGHTLY PRN
Status: DISCONTINUED | OUTPATIENT
Start: 2023-01-04 | End: 2023-01-06 | Stop reason: HOSPADM

## 2023-01-04 RX ORDER — IPRATROPIUM BROMIDE AND ALBUTEROL SULFATE 2.5; .5 MG/3ML; MG/3ML
3 SOLUTION RESPIRATORY (INHALATION) ONCE
Status: COMPLETED | OUTPATIENT
Start: 2023-01-04 | End: 2023-01-04

## 2023-01-04 RX ORDER — SODIUM CHLORIDE 0.9 % (FLUSH) 0.9 %
10 SYRINGE (ML) INJECTION EVERY 12 HOURS SCHEDULED
Status: DISCONTINUED | OUTPATIENT
Start: 2023-01-04 | End: 2023-01-06 | Stop reason: HOSPADM

## 2023-01-04 RX ORDER — FINASTERIDE 5 MG/1
5 TABLET, FILM COATED ORAL DAILY
Status: DISCONTINUED | OUTPATIENT
Start: 2023-01-04 | End: 2023-01-06 | Stop reason: HOSPADM

## 2023-01-04 RX ORDER — ACETAMINOPHEN 650 MG/1
650 SUPPOSITORY RECTAL EVERY 4 HOURS PRN
Status: DISCONTINUED | OUTPATIENT
Start: 2023-01-04 | End: 2023-01-06 | Stop reason: HOSPADM

## 2023-01-04 RX ORDER — SODIUM CHLORIDE 0.9 % (FLUSH) 0.9 %
10 SYRINGE (ML) INJECTION AS NEEDED
Status: DISCONTINUED | OUTPATIENT
Start: 2023-01-04 | End: 2023-01-06 | Stop reason: HOSPADM

## 2023-01-04 RX ORDER — AMOXICILLIN 250 MG
2 CAPSULE ORAL 2 TIMES DAILY
Status: DISCONTINUED | OUTPATIENT
Start: 2023-01-04 | End: 2023-01-06 | Stop reason: HOSPADM

## 2023-01-04 RX ORDER — IPRATROPIUM BROMIDE AND ALBUTEROL SULFATE 2.5; .5 MG/3ML; MG/3ML
3 SOLUTION RESPIRATORY (INHALATION) EVERY 4 HOURS PRN
Status: DISCONTINUED | OUTPATIENT
Start: 2023-01-04 | End: 2023-01-05

## 2023-01-04 RX ORDER — ATORVASTATIN CALCIUM 10 MG/1
10 TABLET, FILM COATED ORAL DAILY
Status: DISCONTINUED | OUTPATIENT
Start: 2023-01-04 | End: 2023-01-06 | Stop reason: HOSPADM

## 2023-01-04 RX ORDER — ONDANSETRON 2 MG/ML
4 INJECTION INTRAMUSCULAR; INTRAVENOUS ONCE
Status: COMPLETED | OUTPATIENT
Start: 2023-01-04 | End: 2023-01-04

## 2023-01-04 RX ORDER — METHYLPREDNISOLONE SODIUM SUCCINATE 125 MG/2ML
125 INJECTION, POWDER, LYOPHILIZED, FOR SOLUTION INTRAMUSCULAR; INTRAVENOUS ONCE
Status: COMPLETED | OUTPATIENT
Start: 2023-01-04 | End: 2023-01-04

## 2023-01-04 RX ORDER — INSULIN ASPART 100 [IU]/ML
0-7 INJECTION, SOLUTION INTRAVENOUS; SUBCUTANEOUS
Status: DISCONTINUED | OUTPATIENT
Start: 2023-01-04 | End: 2023-01-06 | Stop reason: HOSPADM

## 2023-01-04 RX ORDER — BENZONATATE 100 MG/1
200 CAPSULE ORAL 3 TIMES DAILY PRN
Status: DISCONTINUED | OUTPATIENT
Start: 2023-01-04 | End: 2023-01-06 | Stop reason: HOSPADM

## 2023-01-04 RX ADMIN — KETOROLAC TROMETHAMINE 30 MG: 30 INJECTION, SOLUTION INTRAMUSCULAR; INTRAVENOUS at 01:02

## 2023-01-04 RX ADMIN — IPRATROPIUM BROMIDE AND ALBUTEROL SULFATE 3 ML: 2.5; .5 SOLUTION RESPIRATORY (INHALATION) at 00:31

## 2023-01-04 RX ADMIN — METHYLPREDNISOLONE SODIUM SUCCINATE 60 MG: 125 INJECTION, POWDER, FOR SOLUTION INTRAMUSCULAR; INTRAVENOUS at 15:54

## 2023-01-04 RX ADMIN — MAGNESIUM SULFATE HEPTAHYDRATE 2 G: 40 INJECTION, SOLUTION INTRAVENOUS at 00:30

## 2023-01-04 RX ADMIN — HYDROCODONE BITARTRATE AND ACETAMINOPHEN 1 TABLET: 10; 325 TABLET ORAL at 20:06

## 2023-01-04 RX ADMIN — Medication 10 ML: at 09:36

## 2023-01-04 RX ADMIN — FINASTERIDE 5 MG: 5 TABLET, FILM COATED ORAL at 09:37

## 2023-01-04 RX ADMIN — Medication 5 MG: at 20:06

## 2023-01-04 RX ADMIN — IPRATROPIUM BROMIDE 0.5 MG: 0.5 SOLUTION RESPIRATORY (INHALATION) at 12:50

## 2023-01-04 RX ADMIN — IPRATROPIUM BROMIDE 0.5 MG: 0.5 SOLUTION RESPIRATORY (INHALATION) at 06:53

## 2023-01-04 RX ADMIN — ONDANSETRON 4 MG: 2 INJECTION INTRAMUSCULAR; INTRAVENOUS at 01:02

## 2023-01-04 RX ADMIN — ATORVASTATIN CALCIUM 10 MG: 10 TABLET, FILM COATED ORAL at 09:37

## 2023-01-04 RX ADMIN — PANTOPRAZOLE SODIUM 40 MG: 40 TABLET, DELAYED RELEASE ORAL at 06:31

## 2023-01-04 RX ADMIN — SENNOSIDES AND DOCUSATE SODIUM 2 TABLET: 50; 8.6 TABLET ORAL at 20:05

## 2023-01-04 RX ADMIN — BUDESONIDE AND FORMOTEROL FUMARATE DIHYDRATE 2 PUFF: 160; 4.5 AEROSOL RESPIRATORY (INHALATION) at 06:53

## 2023-01-04 RX ADMIN — SENNOSIDES AND DOCUSATE SODIUM 2 TABLET: 50; 8.6 TABLET ORAL at 09:50

## 2023-01-04 RX ADMIN — INSULIN ASPART 2 UNITS: 100 INJECTION, SOLUTION INTRAVENOUS; SUBCUTANEOUS at 07:41

## 2023-01-04 RX ADMIN — IPRATROPIUM BROMIDE 0.5 MG: 0.5 SOLUTION RESPIRATORY (INHALATION) at 19:46

## 2023-01-04 RX ADMIN — ASPIRIN 81 MG: 81 TABLET, COATED ORAL at 09:37

## 2023-01-04 RX ADMIN — METHYLPREDNISOLONE SODIUM SUCCINATE 60 MG: 125 INJECTION, POWDER, FOR SOLUTION INTRAMUSCULAR; INTRAVENOUS at 07:41

## 2023-01-04 RX ADMIN — METHADONE HYDROCHLORIDE 40 MG: 10 TABLET ORAL at 12:14

## 2023-01-04 RX ADMIN — METHYLPREDNISOLONE SODIUM SUCCINATE 125 MG: 125 INJECTION, POWDER, FOR SOLUTION INTRAMUSCULAR; INTRAVENOUS at 00:31

## 2023-01-04 RX ADMIN — Medication 10 ML: at 20:07

## 2023-01-04 RX ADMIN — TRAZODONE HYDROCHLORIDE 100 MG: 50 TABLET ORAL at 20:06

## 2023-01-04 RX ADMIN — INSULIN ASPART 2 UNITS: 100 INJECTION, SOLUTION INTRAVENOUS; SUBCUTANEOUS at 17:49

## 2023-01-04 RX ADMIN — ENOXAPARIN SODIUM 40 MG: 100 INJECTION SUBCUTANEOUS at 05:40

## 2023-01-04 NOTE — ED NOTES
"Called hospitalist for clarification on orders. Pt states he is \"not a diabetic and why am I getting insulin?\". Dr. Simon notified, orders to treat pt per sliding scale orders received and will be carried out.   "

## 2023-01-04 NOTE — H&P
[facility] HOSPITALIST   HISTORY AND PHYSICAL      Name:  Topher Stoll   Age:  47 y.o.  Sex:  male  :  1975  MRN:  [id number]   Visit Number:  [id number]  Admission Date:  2023  Date Of Service:  23  Primary Care Physician:  Joshua Hoffmann MD    Chief Complaint:     Shortness of breath    History Of Presenting Illness:      Patient is a 47-year-old male with history significant for hypertension, hyperlipidemia, CAD, COPD who presents to the hospital with complaints of shortness of breath.  Patient reports that he has home oxygen 2 L only as needed.  He has been having to wear his oxygen continuously over the last 24 to 48 hours.  He has noticed increasing fatigue and shortness of breath with even mild activity and ADLs.  Admits to chronic cough that is nonproductive.  Denies chest pain, nausea nausea vomiting or diarrhea.  Upon arrival, patient afebrile hemodynamically stable but hypoxic on room air.  Significant labs include negative troponin and normal proBNP.  CMP and CBC fairly unremarkable.  COVID and flu negative.  Chest x-ray personally reviewed, no acute process per my read.  Patient given breathing treatment, magnesium and Solu-Medrol in the ER.  With new oxygen requirement, hospitalist service asked to admit.    Review Of Systems:    All systems were reviewed and negative except as mentioned in history of presenting illness, assessment and plan.    Past Medical History: Patient  has a past medical history of Abdominal adhesions, Anxiety, Arthritis, Asthma, Cervical radiculopathy, Colitis, COPD (chronic obstructive pulmonary disease) (HCC), GERD (gastroesophageal reflux disease), Heart attack (HCC), History of hepatitis C, History of recreational drug use, HTN (hypertension), Kidney cysts, Left hip pain, Liver disease, Low back pain, Migraines, Obstructive chronic bronchitis with exacerbation (HCC), Sleep apnea, and Tattoos.    Past Surgical History: Patient  has a  past surgical history that includes Back surgery; Hernia repair; Small intestine surgery; Total hip arthroplasty (Left); Shoulder Ligament Repair; Hip Spacer Insertion (Left, 1/15/2020); Revision total hip arthroplasty (Left, 3/5/2020); Colonoscopy (2014); Upper gastrointestinal endoscopy (2014); Colonoscopy (N/A, 1/4/2022); and Total hip arthroplasty (Right).    Social History: Patient  reports that he quit smoking about 18 years ago. His smoking use included cigarettes. He has a 30.00 pack-year smoking history. His smokeless tobacco use includes chew. He reports that he does not currently use drugs. He reports that he does not drink alcohol.    Family History:  Patient's family history has been reviewed and found to be noncontributory.     Allergies:      Doxycycline    Home Medications:    Prior to Admission Medications     Prescriptions Last Dose Informant Patient Reported? Taking?    albuterol sulfate HFA (Ventolin HFA) 108 (90 Base) MCG/ACT inhaler   No No    Inhale 2 puffs Every 6 (Six) Hours As Needed for Wheezing or Shortness of Air.    alfuzosin (UROXATRAL) 10 MG 24 hr tablet   Yes No    Take 10 mg by mouth Daily.    aspirin (aspirin) 81 MG EC tablet   No No    Take 1 tablet by mouth Daily.    Benralizumab solution prefilled syringe 30 mg   No No    diazePAM (VALIUM) 10 MG tablet   Yes No    TAKE 1 TABLET BY MOUTH 30 MINUTES BEFORE PROCEDURE    dutasteride (AVODART) 0.5 MG capsule   Yes No    Take 0.5 mg by mouth Daily.    Fasenra 30 MG/ML solution prefilled syringe   No No    INJECT 1 SYRINGE UNDER THE SKIN AT WEEK 4 AND 8, THEN INJECT 1 SYRINGE EVERY 8 WEEKS THEREAFTER.    fluticasone (FLONASE) 50 MCG/ACT nasal spray   No No    INSTILL 1 SPRAY INTO EACH NOSTRIL DAILY    Fluticasone-Umeclidin-Vilant (TRELEGY) 200-62.5-25 MCG/INH inhaler   No No    Inhale 1 puff Daily.    HYDROcodone-acetaminophen (NORCO)  MG per tablet   Yes No    TAKE 1 TABLET BY MOUTH 30 MINUTES BEFORE PROCEDURE THEN 1 TABLET BY  "MOUTH EVERY 6 HOURS AS NEEDED FOR PAIN    ipratropium-albuterol (DUO-NEB) 0.5-2.5 mg/3 ml nebulizer   No No    Take 3 mL by nebulization Every 4 (Four) Hours As Needed for Wheezing or Shortness of Air.    lovastatin (MEVACOR) 40 MG tablet  Self No No    Take 1 tablet by mouth Every Night.    methadone (DOLOPHINE) 5 MG/5ML solution   Yes No    Take 60 mg by mouth Daily. Patient takes 60 mg once per day in mornings, states took a dose 6/15/21 around 0700    omeprazole (priLOSEC) 40 MG capsule   No No    Take 1 capsule by mouth Daily.    ondansetron ODT (ZOFRAN-ODT) 4 MG disintegrating tablet   No No    Place 1 tablet on the tongue Every 8 (Eight) Hours As Needed for Nausea.    predniSONE (DELTASONE) 10 MG tablet   No No    Take 6 today then 5 for 2 days, 4 for 2 days 3 for 2 days to for 2 days 1 for 2 days and half for 2 days    traZODone (DESYREL) 100 MG tablet   Yes No    TAKE 2 TABLETS BY MOUTH AT BEDTIME AS NEEDED        ED Medications:    Medications   sodium chloride 0.9 % flush 10 mL (has no administration in time range)   magnesium sulfate 2g/50 mL (PREMIX) infusion (0 g Intravenous Stopped 1/4/23 0121)   methylPREDNISolone sodium succinate (SOLU-Medrol) injection 125 mg (125 mg Intravenous Given 1/4/23 0031)   ipratropium-albuterol (DUO-NEB) nebulizer solution 3 mL (3 mL Nebulization Given 1/4/23 0031)   ketorolac (TORADOL) injection 30 mg (30 mg Intravenous Given 1/4/23 0102)   ondansetron (ZOFRAN) injection 4 mg (4 mg Intravenous Given 1/4/23 0102)     Vital Signs:  Temp:  [98.9 °F (37.2 °C)] 98.9 °F (37.2 °C)  Heart Rate:  [] 106  Resp:  [18] 18  BP: (119-141)/(74-99) 119/74        01/04/23  0001   Weight: 95.3 kg (210 lb)     Body mass index is 27.71 kg/m².    Physical Exam:     Most recent vital Signs: /74   Pulse 106   Temp 98.9 °F (37.2 °C) (Oral)   Resp 18   Ht 185.4 cm (73\")   Wt 95.3 kg (210 lb)   SpO2 95%   BMI 27.71 kg/m²     Physical Exam  Vitals reviewed.   Constitutional:     "   Appearance: Normal appearance.   HENT:      Head: Normocephalic and atraumatic.      Right Ear: External ear normal.      Left Ear: External ear normal.      Mouth/Throat:      Mouth: Mucous membranes are moist.      Pharynx: Oropharynx is clear.   Eyes:      Extraocular Movements: Extraocular movements intact.      Conjunctiva/sclera: Conjunctivae normal.   Cardiovascular:      Rate and Rhythm: Normal rate and regular rhythm.      Pulses: Normal pulses.      Heart sounds: Normal heart sounds.   Pulmonary:      Effort: Pulmonary effort is normal.      Breath sounds: Wheezing present.   Abdominal:      General: Bowel sounds are normal.      Palpations: Abdomen is soft.   Musculoskeletal:      Right lower leg: No edema.      Left lower leg: No edema.   Skin:     General: Skin is warm and dry.   Neurological:      General: No focal deficit present.      Mental Status: He is alert and oriented to person, place, and time.   Psychiatric:         Behavior: Behavior normal.         Laboratory data:    I have reviewed the labs done in the emergency room.    Results from last 7 days   Lab Units 01/04/23  0012   SODIUM mmol/L 134*   POTASSIUM mmol/L 4.3   CHLORIDE mmol/L 97*   CO2 mmol/L 27.1   BUN mg/dL 13   CREATININE mg/dL 0.88   CALCIUM mg/dL 8.9   BILIRUBIN mg/dL 0.4   ALK PHOS U/L 183*   ALT (SGPT) U/L 22   AST (SGOT) U/L 28   GLUCOSE mg/dL 118*     Results from last 7 days   Lab Units 01/04/23  0012   WBC 10*3/mm3 10.27   HEMOGLOBIN g/dL 14.3   HEMATOCRIT % 44.2   PLATELETS 10*3/mm3 205         Results from last 7 days   Lab Units 01/04/23  0012   TROPONIN T ng/mL <0.010     Results from last 7 days   Lab Units 01/04/23  0012   PROBNP pg/mL 116.2                       Invalid input(s): USDES,  BLOODU, NITRITITE, BACT, EP    Pain Management Panel     Pain Management Panel Latest Ref Rng & Units 3/17/2018 7/13/2016    CREATININE UR 20.0 - 300.0 mg/dL - -    AMPHETAMINES SCREEN, URINE Negative Negative Negative     BARBITURATES SCREEN Negative Negative Negative    BENZODIAZEPINE SCREEN, URINE Negative Negative Negative    BUPRENORPHINEUR Negative Negative -    COCAINE SCREEN, URINE Negative Negative Negative    FENTANYL URINE QT Rgsbnw=4999 pg/mL - -    METHADONE SCREEN, URINE Negative Negative Negative    METHAMPHETAMINEUR Negative Negative -          EKG:      EKG personally reviewed, sinus tachycardia with rate of 105 bpm.  No acute ST or T wave changes.    Radiology:    No radiology results for the last 3 days    Assessment:    1. Acute on chronic hypoxic respiratory insufficiency-POA  2. Acute COPD exacerbation-POA  3. Hypertension  4. Hyperlipidemia  5. GERD  6. Tobacco abuse    Plan:    Will admit patient to the hospital.  Continue to provide supplemental oxygen as necessary to maintain saturation greater than 88%.  Titrate off as able.  Continue treatment with Solu-Medrol and bronchodilators.  DuoNebs as needed.  No indication for antibiotics.  Continue home medications.  Further orders as clinical course dictates.    Risk Assessment: High  DVT Prophylaxis: Lovenox  Code Status: Full  Diet: Cardiac    Advance Care Planning   ACP discussion was declined by the patient. Patient does not have an advance directive, declines further assistance.           Antwon Espinoza DO  01/04/23  03:34 EST    Dictated utilizing Dragon dictation.

## 2023-01-04 NOTE — ED PROVIDER NOTES
Subjective  History of Present Illness:    Chief Complaint: Shortness of breath  History of Present Illness: 47-year-old male presents with shortness of breath and wheezing.  History of COPD/emphysema, as needed home oxygen up to 2 L nasal cannula, worsening symptoms over the last few days.  Reports exertional dyspnea interfering with his ADLs, has been wearing his oxygen continuously over the last 24 to 48 hours, using her medications as prescribed without relief.  Onset: See above timeline  Duration: PersistentAssociated symptoms: None      Nurses Notes reviewed and agree, including vitals, allergies, social history and prior medical history.     REVIEW OF SYSTEMS: All systems reviewed and not pertinent unless noted.  Review of Systems      Positive for: Shortness of breath and wheezing    Negative for: Fever cough hemoptysis palpitations edema sick contacts travel chest pain    Past Medical History:   Diagnosis Date   • Abdominal adhesions    • Anxiety    • Arthritis    • Asthma    • Cervical radiculopathy    • Colitis    • COPD (chronic obstructive pulmonary disease) (HCC)    • GERD (gastroesophageal reflux disease)    • Heart attack (HCC)    • History of hepatitis C     Treated with Epclusa in 2019   • History of recreational drug use    • HTN (hypertension)    • Kidney cysts    • Left hip pain    • Liver disease    • Low back pain    • Migraines    • Obstructive chronic bronchitis with exacerbation (HCC)    • Sleep apnea     mild   • Tattoos        Allergies:    Doxycycline      Past Surgical History:   Procedure Laterality Date   • BACK SURGERY     • COLONOSCOPY  2014   • COLONOSCOPY N/A 1/4/2022    Procedure: COLONOSCOPY with biopsies;  Surgeon: Giancarlo Ruiz MD;  Location: [facility] ENDOSCOPY;  Service: Gastroenterology;  Laterality: N/A;   • HERNIA REPAIR     • HIP SPACER INSERTION WITH ANTIBIOTIC CEMENT Left 1/15/2020    Procedure: TOTAL HIP IMPLANT REMOVAL WITH INSERTION OF ANTIBIOTIC SPACER LEFT;   "Surgeon: Ba Ramirez MD;  Location:  [facility] OR;  Service: Orthopedics   • SHOULDER LIGAMENT REPAIR      right shoulder   • SMALL INTESTINE SURGERY      Small bowell resection   • TOTAL HIP ARTHROPLASTY Left    • TOTAL HIP ARTHROPLASTY Right    • TOTAL HIP ARTHROPLASTY REVISION Left 3/5/2020    Procedure: HIP REIMPLANT REVISION LEFT;  Surgeon: Ba Ramirez MD;  Location:  [facility] OR;  Service: Orthopedics;  Laterality: Left;   • UPPER GASTROINTESTINAL ENDOSCOPY           Social History     Socioeconomic History   • Marital status:    Tobacco Use   • Smoking status: Former     Packs/day: 2.00     Years: 15.00     Pack years: 30.00     Types: Cigarettes     Quit date:      Years since quittin.0   • Smokeless tobacco: Current     Types: Chew   Vaping Use   • Vaping Use: Never used   Substance and Sexual Activity   • Alcohol use: No     Comment: stopped drinking alcohol   • Drug use: Not Currently     Comment: takes suboxone   • Sexual activity: Defer         Family History   Problem Relation Age of Onset   • Arthritis Other    • Hypertension Other    • Migraines Other    • Heart attack Other    • Stroke Other    • Colon cancer Neg Hx        Objective  Physical Exam:  /74   Pulse 106   Temp 98.9 °F (37.2 °C) (Oral)   Resp 18   Ht 185.4 cm (73\")   Wt 95.3 kg (210 lb)   SpO2 95%   BMI 27.71 kg/m²    Initial oxygen saturation was 87% on baseline 2 L in wheelchair from triage  Physical Exam    CONSTITUTIONAL: Well developed, 47-year-old male,  in increased work of breathing on arrival with diffuse expiratory wheeze.  VITAL SIGNS: per nursing, reviewed and noted  SKIN: exposed skin with no rashes, ulcerations or petechiae  EYES: Grossly EOMI, no icterus  ENT: Normal voice.  Moist mucous membranes no oral pharyngeal injury RESPIRATORY: Increased work of breathing on arrival diffuse expiratory wheezes  CARDIOVASCULAR:  regular rate and rhythm, no murmurs.  Good Peripheral pulses. Good cap " refill to extremities.   GI: Abdomen soft, nontender, normal bowel sounds. No hernia. No ascites.  MUSCULOSKELETAL:  No tenderness. Full ROM. Strength and tone grossly normal.  no spasms. no neck or back tenderness or spasm.   NEUROLOGIC: Alert, oriented x 3. No gross deficits. GCS 15.   PSYCH: appropriate affect.  : no bladder tenderness or distention, no CVA tenderness    Critical Care  Performed by: Dominick Riley DO  Authorized by: Dominick Riley DO     Critical care provider statement:     Critical care time (minutes):  32    Critical care time was exclusive of:  Separately billable procedures and treating other patients    Critical care was necessary to treat or prevent imminent or life-threatening deterioration of the following conditions:  Respiratory failure    Critical care was time spent personally by me on the following activities:  Development of treatment plan with patient or surrogate, evaluation of patient's response to treatment, examination of patient, ordering and performing treatments and interventions, ordering and review of laboratory studies, ordering and review of radiographic studies, pulse oximetry, re-evaluation of patient's condition and review of old charts    Care discussed with: admitting provider          ED Course:        ED Course as of 01/04/23 0301   Wed Jan 04, 2023   0123 EKG interpreted by me reveals sinus tachycardia 105.  Low-voltage EKG no ectopy no ischemic changes, nonspecific T wave change.   [PF]      ED Course User Index  [PF] Dominick Riley DO       Lab Results (last 24 hours)     Procedure Component Value Units Date/Time    CBC & Differential [[id number]]  (Abnormal) Collected: 01/04/23 0012    Specimen: Blood Updated: 01/04/23 0033    Narrative:      The following orders were created for panel order CBC & Differential.  Procedure                               Abnormality         Status                     ---------                               -----------          ------                     CBC Auto Differential[[id number]]        Abnormal            Final result                 Please view results for these tests on the individual orders.    Comprehensive Metabolic Panel [[id number]]  (Abnormal) Collected: 01/04/23 0012    Specimen: Blood Updated: 01/04/23 0047     Glucose 118 mg/dL      BUN 13 mg/dL      Creatinine 0.88 mg/dL      Sodium 134 mmol/L      Potassium 4.3 mmol/L      Chloride 97 mmol/L      CO2 27.1 mmol/L      Calcium 8.9 mg/dL      Total Protein 7.2 g/dL      Albumin 4.5 g/dL      ALT (SGPT) 22 U/L      AST (SGOT) 28 U/L      Alkaline Phosphatase 183 U/L      Total Bilirubin 0.4 mg/dL      Globulin 2.7 gm/dL      A/G Ratio 1.7 g/dL      BUN/Creatinine Ratio 14.8     Anion Gap 9.9 mmol/L      eGFR 106.1 mL/min/1.73      Comment: National Kidney Foundation and American Society of Nephrology (ASN) Task Force recommended calculation based on the Chronic Kidney Disease Epidemiology Collaboration (CKD-EPI) equation refit without adjustment for race.       Narrative:      GFR Normal >60  Chronic Kidney Disease <60  Kidney Failure <15      BNP [[id number]]  (Normal) Collected: 01/04/23 0012    Specimen: Blood Updated: 01/04/23 0050     proBNP 116.2 pg/mL     Narrative:      Among patients with dyspnea, NT-proBNP is highly sensitive for the detection of acute congestive heart failure. In addition NT-proBNP of <300 pg/ml effectively rules out acute congestive heart failure with 99% negative predictive value.    Results may be falsely decreased if patient taking Biotin.      Troponin [[id number]]  (Normal) Collected: 01/04/23 0012    Specimen: Blood Updated: 01/04/23 0050     Troponin T <0.010 ng/mL     Narrative:      Troponin T Reference Range:  <= 0.03 ng/mL-   Negative for AMI  >0.03 ng/mL-     Abnormal for myocardial necrosis.  Clinicians would have to utilize clinical acumen, EKG, Troponin and serial changes to determine if it is an Acute Myocardial Infarction or  myocardial injury due to an underlying chronic condition.       Results may be falsely decreased if patient taking Biotin.      CBC Auto Differential [[id number]]  (Abnormal) Collected: 01/04/23 0012    Specimen: Blood Updated: 01/04/23 0033     WBC 10.27 10*3/mm3      RBC 5.39 10*6/mm3      Hemoglobin 14.3 g/dL      Hematocrit 44.2 %      MCV 82.0 fL      MCH 26.5 pg      MCHC 32.4 g/dL      RDW 13.6 %      RDW-SD 40.5 fl      MPV 9.5 fL      Platelets 205 10*3/mm3      Neutrophil % 83.2 %      Lymphocyte % 9.0 %      Monocyte % 7.3 %      Eosinophil % 0.1 %      Basophil % 0.1 %      Immature Grans % 0.3 %      Neutrophils, Absolute 8.55 10*3/mm3      Lymphocytes, Absolute 0.92 10*3/mm3      Monocytes, Absolute 0.75 10*3/mm3      Eosinophils, Absolute 0.01 10*3/mm3      Basophils, Absolute 0.01 10*3/mm3      Immature Grans, Absolute 0.03 10*3/mm3      nRBC 0.0 /100 WBC     COVID-19 and FLU A/B PCR - Swab, Nasopharynx [[id number]]  (Normal) Collected: 01/04/23 0017    Specimen: Swab from Nasopharynx Updated: 01/04/23 0048     COVID19 Not Detected     Influenza A PCR Not Detected     Influenza B PCR Not Detected    Narrative:      Fact sheet for providers: https://www.fda.gov/media/682994/download    Fact sheet for patients: https://www.fda.gov/media/267104/download    Test performed by PCR.           No radiology results from the last 24 hrs   Chest x-ray interpreted by me shows no evidence of any cardiomegaly, effusion, infiltrate, or bony abnormality.  Final result pending per radiologist interpretation    MDM    Initial impression of presenting illness: 47-year-old male presents with shortness of breath, history of COPD as needed home oxygen    DDX: includes but is not limited to: Pneumonia, COPD exacerbation, flu, COVID, CHF    Patient arrives with increased work of breathing, hypoxic on  baseline 2 L nasal cannula with vitals interpreted by myself.  Sats at 87%, tachycardic heart rate 110 afebrile  normotensive    Pertinent features from physical exam: Diffuse expiratory wheezes no edema.    Initial diagnostic plan: Solu-Medrol, magnesium, DuoNeb, shortness of breath labs, chest x-ray    Results from initial plan were reviewed and interpreted by me revealing chest x-ray without acute findings per my interpretation, no evidence of ACS, no elevation in BNP CBC CMP nonactionable.  Flu and COVID-negative  EKG was interpreted by me, see ED course  Diagnostic information from other sources: Information obtained from patient and family    Interventions / Re-evaluation: Continued diffuse expiratory wheezes, appears to be breathing more comfortably however, maintaining oxygen saturations 95% however is requiring 4 L nasal cannula.   when the patient stood for bedside urinal his sats decreased into the 80s. Patient advised that he did not feel safe going home due to his continued shortness of breath and wheezing.      Results/clinical rationale were discussed with patient and family    Consultations/Discussion of results with other physicians: Discussed with Dr. Espinoza hospitalist, will admit    Disposition plan: Patient will be admitted to the hospital for  continued treatment in regards to his COPD exacerbation with acute on chronic respiratory failure with hypoxia.  -----    Final diagnoses:   COPD with acute exacerbation (HCC)   Acute on chronic respiratory failure with hypoxia (HCC)        Dominick Riley,   01/04/23 0302

## 2023-01-04 NOTE — ED NOTES
Per Dr. Simon request, spoke with Emmie at [facility] to verify pt's dose of methadone at 40mg PO once daily; provider notified; awaiting orders.

## 2023-01-04 NOTE — CASE MANAGEMENT/SOCIAL WORK
Discharge Planning Assessment  [facility]     Patient Name: Topher Stoll  MRN: [id number]  Today's Date: 1/4/2023    Admit Date: 1/4/2023    Plan: DCP   Discharge Needs Assessment     Row Name 01/04/23 1205       Living Environment    People in Home parent(s)    Current Living Arrangements home    Primary Care Provided by self    Provides Primary Care For no one    Family Caregiver if Needed parent(s)    Able to Return to Prior Arrangements yes       Resource/Environmental Concerns    Resource/Environmental Concerns none    Transportation Concerns none       Transition Planning    Patient/Family Anticipates Transition to home    Patient/Family Anticipated Services at Transition none    Transportation Anticipated family or friend will provide       Discharge Needs Assessment    Readmission Within the Last 30 Days no previous admission in last 30 days    Equipment Currently Used at Home oxygen    Concerns to be Addressed discharge planning    Anticipated Changes Related to Illness none    Equipment Needed After Discharge other (see comments)  TBD    Discharge Facility/Level of Care Needs other (see comments)  TBD               Discharge Plan     Row Name 01/04/23 1209       Plan    Plan DCP    Patient/Family in Agreement with Plan yes    Plan Comments SW met with pt at bedside for discharge planning. Pt lives in [place] with his parents. Pt is independent with ADL's. Pt does not use any DME for mobility. Pt has home O2 2L NC as needed through [employer]. Pt states family is able to provide transportation at d/c. Pt is not current with HH/OP services. Pt denied the need for STR or HH at d/c at this time. Goal is home. SW/CM will continue to follow for d/c needs.    Final Discharge Disposition Code 30 - still a patient              Continued Care and Services - Admitted Since 1/4/2023    Coordination has not been started for this encounter.          Demographic Summary     Row Name 01/04/23 1204       General  Information    Admission Type inpatient    Arrived From home    Required Notices Provided Important Message from Medicare    Referral Source admission list    Reason for Consult discharge planning    Preferred Language English               Functional Status     Row Name 01/04/23 1204       Functional Status, IADL    Medications independent    Meal Preparation independent    Housekeeping independent    Laundry independent    Shopping independent       Mental Status Summary    Recent Changes in Mental Status/Cognitive Functioning unable to assess       Employment/    Employment Status disabled               Psychosocial     Row Name 01/04/23 1205       Developmental Stage (Eriksson's)    Developmental Stage Stage 7 (35-65 years/Middle Adulthood) Generativity vs. Stagnation               Abuse/Neglect    No documentation.                Legal    No documentation.                Substance Abuse    No documentation.                Patient Forms    No documentation.                   FRED Abebe

## 2023-01-05 LAB
GLUCOSE BLDC GLUCOMTR-MCNC: 126 MG/DL (ref 70–130)
GLUCOSE BLDC GLUCOMTR-MCNC: 127 MG/DL (ref 70–130)
GLUCOSE BLDC GLUCOMTR-MCNC: 128 MG/DL (ref 70–130)
GLUCOSE BLDC GLUCOMTR-MCNC: 129 MG/DL (ref 70–130)
GLUCOSE BLDC GLUCOMTR-MCNC: 154 MG/DL (ref 70–130)

## 2023-01-05 PROCEDURE — 25010000002 CEFTRIAXONE SODIUM-DEXTROSE 1-3.74 GM-%(50ML) RECONSTITUTED SOLUTION: Performed by: INTERNAL MEDICINE

## 2023-01-05 PROCEDURE — 25010000002 ENOXAPARIN PER 10 MG: Performed by: INTERNAL MEDICINE

## 2023-01-05 PROCEDURE — 94664 DEMO&/EVAL PT USE INHALER: CPT

## 2023-01-05 PROCEDURE — 94799 UNLISTED PULMONARY SVC/PX: CPT

## 2023-01-05 PROCEDURE — 82962 GLUCOSE BLOOD TEST: CPT

## 2023-01-05 PROCEDURE — 99232 SBSQ HOSP IP/OBS MODERATE 35: CPT | Performed by: INTERNAL MEDICINE

## 2023-01-05 PROCEDURE — 94761 N-INVAS EAR/PLS OXIMETRY MLT: CPT

## 2023-01-05 PROCEDURE — 25010000002 METHYLPREDNISOLONE PER 125 MG: Performed by: INTERNAL MEDICINE

## 2023-01-05 RX ORDER — IPRATROPIUM BROMIDE AND ALBUTEROL SULFATE 2.5; .5 MG/3ML; MG/3ML
3 SOLUTION RESPIRATORY (INHALATION)
Status: DISCONTINUED | OUTPATIENT
Start: 2023-01-05 | End: 2023-01-06 | Stop reason: HOSPADM

## 2023-01-05 RX ORDER — CALCIUM CARBONATE 200(500)MG
2 TABLET,CHEWABLE ORAL 4 TIMES DAILY PRN
Status: DISCONTINUED | OUTPATIENT
Start: 2023-01-05 | End: 2023-01-06 | Stop reason: HOSPADM

## 2023-01-05 RX ORDER — CEFTRIAXONE 1 G/50ML
1 INJECTION, SOLUTION INTRAVENOUS EVERY 24 HOURS
Status: DISCONTINUED | OUTPATIENT
Start: 2023-01-05 | End: 2023-01-06 | Stop reason: HOSPADM

## 2023-01-05 RX ADMIN — METHYLPREDNISOLONE SODIUM SUCCINATE 60 MG: 125 INJECTION, POWDER, FOR SOLUTION INTRAMUSCULAR; INTRAVENOUS at 16:36

## 2023-01-05 RX ADMIN — CEFTRIAXONE 1 G: 1 INJECTION, SOLUTION INTRAVENOUS at 11:52

## 2023-01-05 RX ADMIN — IPRATROPIUM BROMIDE 0.5 MG: 0.5 SOLUTION RESPIRATORY (INHALATION) at 06:52

## 2023-01-05 RX ADMIN — FINASTERIDE 5 MG: 5 TABLET, FILM COATED ORAL at 09:29

## 2023-01-05 RX ADMIN — METHADONE HYDROCHLORIDE 40 MG: 10 TABLET ORAL at 09:29

## 2023-01-05 RX ADMIN — METHYLPREDNISOLONE SODIUM SUCCINATE 60 MG: 125 INJECTION, POWDER, FOR SOLUTION INTRAMUSCULAR; INTRAVENOUS at 00:06

## 2023-01-05 RX ADMIN — HYDROCODONE BITARTRATE AND ACETAMINOPHEN 1 TABLET: 10; 325 TABLET ORAL at 23:33

## 2023-01-05 RX ADMIN — METHYLPREDNISOLONE SODIUM SUCCINATE 60 MG: 125 INJECTION, POWDER, FOR SOLUTION INTRAMUSCULAR; INTRAVENOUS at 23:34

## 2023-01-05 RX ADMIN — TRAZODONE HYDROCHLORIDE 100 MG: 50 TABLET ORAL at 20:07

## 2023-01-05 RX ADMIN — HYDROCODONE BITARTRATE AND ACETAMINOPHEN 1 TABLET: 10; 325 TABLET ORAL at 06:20

## 2023-01-05 RX ADMIN — BUDESONIDE AND FORMOTEROL FUMARATE DIHYDRATE 2 PUFF: 160; 4.5 AEROSOL RESPIRATORY (INHALATION) at 20:36

## 2023-01-05 RX ADMIN — ATORVASTATIN CALCIUM 10 MG: 10 TABLET, FILM COATED ORAL at 09:29

## 2023-01-05 RX ADMIN — CALCIUM CARBONATE (ANTACID) CHEW TAB 500 MG 2 TABLET: 500 CHEW TAB at 15:31

## 2023-01-05 RX ADMIN — Medication 5 MG: at 20:08

## 2023-01-05 RX ADMIN — SENNOSIDES AND DOCUSATE SODIUM 2 TABLET: 50; 8.6 TABLET ORAL at 09:29

## 2023-01-05 RX ADMIN — PANTOPRAZOLE SODIUM 40 MG: 40 TABLET, DELAYED RELEASE ORAL at 06:20

## 2023-01-05 RX ADMIN — IPRATROPIUM BROMIDE AND ALBUTEROL SULFATE 3 ML: 2.5; .5 SOLUTION RESPIRATORY (INHALATION) at 13:01

## 2023-01-05 RX ADMIN — Medication 10 ML: at 09:30

## 2023-01-05 RX ADMIN — HYDROCODONE BITARTRATE AND ACETAMINOPHEN 1 TABLET: 10; 325 TABLET ORAL at 15:31

## 2023-01-05 RX ADMIN — ASPIRIN 81 MG: 81 TABLET, COATED ORAL at 09:29

## 2023-01-05 RX ADMIN — IPRATROPIUM BROMIDE AND ALBUTEROL SULFATE 3 ML: 2.5; .5 SOLUTION RESPIRATORY (INHALATION) at 20:26

## 2023-01-05 RX ADMIN — SENNOSIDES AND DOCUSATE SODIUM 2 TABLET: 50; 8.6 TABLET ORAL at 20:08

## 2023-01-05 RX ADMIN — ENOXAPARIN SODIUM 40 MG: 100 INJECTION SUBCUTANEOUS at 09:30

## 2023-01-05 RX ADMIN — Medication 10 ML: at 20:08

## 2023-01-05 RX ADMIN — METHYLPREDNISOLONE SODIUM SUCCINATE 60 MG: 125 INJECTION, POWDER, FOR SOLUTION INTRAMUSCULAR; INTRAVENOUS at 09:29

## 2023-01-05 RX ADMIN — CALCIUM CARBONATE (ANTACID) CHEW TAB 500 MG 2 TABLET: 500 CHEW TAB at 21:02

## 2023-01-05 NOTE — PLAN OF CARE
Goal Outcome Evaluation:  Plan of Care Reviewed With: patient        Progress: no change  Outcome Evaluation: VSS.  Pt c/o pain that was controlled with PRN meds.  Pt titrated down from 2L O2 to room air during shift.  No other changes in pt condition to report.  Will monitor.

## 2023-01-05 NOTE — PLAN OF CARE
Goal Outcome Evaluation:  Plan of Care Reviewed With: patient        Progress: no change  Outcome Evaluation: New admit.  VSS.  Pt on 2L O2 NC.  No changes in pt condition to report.  Will monitor.

## 2023-01-05 NOTE — PLAN OF CARE
Goal Outcome Evaluation:              Outcome Evaluation: Pt resting well this shift,  Pain meds as needed,.  Telemetry with SR noted.  FSBS with insulin coverage as ordered.  Continues to receive IV steroids.  Tolerating oxygen at 2 lpm/nc.

## 2023-01-05 NOTE — PROGRESS NOTES
[facility]  INTERNAL MEDICINE PROGRESS NOTE    Name:  Topher Stoll   Age:  47 y.o.  Sex:  male  :  1975  MRN:  [id number]   Visit Number:  [id number]  Admission Date:  2023  Date Of Service:  23  Primary Care Physician:  Joshua Hoffmann MD     LOS: 1 day :  Patient Care Team:  Joshua Hofmfann MD as PCP - General (Internal Medicine)  Daisy Chahal MD (Psychiatry):      Subjective / Interval History:     47-year-old patient who has been admitted because of COPD exacerbation.  He has been on oxygen due to hypoxemia initially and does not use home oxygen regularly.  Patient has still been feeling tired chest tightness and coughing.  Cough has not been productive.      Vital Signs:    Temp:  [97.9 °F (36.6 °C)-98.4 °F (36.9 °C)] 97.9 °F (36.6 °C)  Heart Rate:  [61-97] 61  Resp:  [18-19] 18  BP: ()/(47-96) 92/48    Intake and output:    I/O last 3 completed shifts:  In: 600 [P.O.:600]  Out: 2500 [Urine:2000; Stool:500]  I/O this shift:  In: 360 [P.O.:360]  Out: -     Physical Examination:    General Appearance:    Alert and cooperative, not in any acute distress.   Head:    Atraumatic and normocephalic, without obvious abnormality.   Eyes:            PERRLA,  No pallor. Extraocular movements are within normal limits.   Neck:   Supple,  No lymph glands, no bruit   Lungs:     Chest shape is normal. Breath sounds heard bilaterally equally.  Diffuse wheezing still noted more end expiratory    Heart:    Normal S1 and S2, no murmur,  No JVD   Abdomen:     Normal bowel sounds, no masses, no organomegaly. Soft     nontender, no guarding, no rebound tenderness   Extremities:   Moves all extremities well, no edema, no cyanosis,    Skin:   No  bruising or rash.   Neurologic:   Grossly nonfocal and moves all extremities.      Laboratory results:  Results from last 7 days   Lab Units 23  0614 23  0012   SODIUM mmol/L 136 134*   POTASSIUM mmol/L 4.3 4.3    CHLORIDE mmol/L 98 97*   CO2 mmol/L 27.4 27.1   BUN mg/dL 12 13   CREATININE mg/dL 0.85 0.88   CALCIUM mg/dL 9.1 8.9   BILIRUBIN mg/dL  --  0.4   ALK PHOS U/L  --  183*   ALT (SGPT) U/L  --  22   AST (SGOT) U/L  --  28   GLUCOSE mg/dL 206* 118*     Results from last 7 days   Lab Units 01/04/23  0614 01/04/23  0012   WBC 10*3/mm3 8.08 10.27   HEMOGLOBIN g/dL 14.0 14.3   HEMATOCRIT % 42.8 44.2   PLATELETS 10*3/mm3 189 205         Results from last 7 days   Lab Units 01/04/23  0012   TROPONIN T ng/mL <0.010           Radiology results:    Imaging Results (Last 24 Hours)     ** No results found for the last 24 hours. **          I have reviewed the patient's radiology reports.    Medication Review:     I have reviewed the patients active and prn medications.     Assessment:      COPD with acute exacerbation (HCC)    COPD exacerbation (HCC)    1. Acute on chronic hypoxic respiratory insufficiency-POA  2. Acute COPD exacerbation-POA  3. Hypertension  4. Hyperlipidemia  5. GERD  6. Tobacco abuse      Plan:    COPD exacerbation-he will be he will be continued on his oxygen and he does still need the IV steroids is still wheezing.  We will also add Rocephin to cover for bacterial bronchitis component and there is no pneumonia noted    Hypertension-it is stable and continue the current medicine    Patient has been advised to completely quit smoking and should be pursued outpatient by PCP  Jesus Tate MD  01/05/23  09:32 EST      Please note that portions of this note were completed with a voice recognition program.

## 2023-01-05 NOTE — PROGRESS NOTES
"Adult Nutrition  Assessment/PES    Patient Name:  Topher Stoll  YOB: 1975  MRN: [id number]  Admit Date:  1/4/2023    Assessment Date:  1/5/2023    Comments:    Pt with PMHx including HTN, HLD, COPD, CAD presented to [facility] yesterday 1/4 with c/o SOB. LACE score 11. Pt overweight for age per BMI 26.32. No recent unintentional weight changes reported. Pt currently receiving a heart healthy diet. Inadequate information regarding PO intakes since admission, however PO of 75% this a.m. at breakfast - will monitor for need for ONS. No apparent nutrition-related concerns at this time. Will continue to monitor for PO adequacy, weight, meds, labs, and overall nutrition status and will intervene as appropriate.      Reason for Assessment     Row Name 01/05/23 1052          Reason for Assessment    Reason For Assessment identified at risk by screening criteria     Identified At Risk by Screening Criteria other (see comments)  LACE                  Labs/Tests/Procedures/Meds     Row Name 01/05/23 1052          Labs/Procedures/Meds    Lab Results Reviewed reviewed        Medications    Pertinent Medications Reviewed reviewed, pertinent     Pertinent Medications Comments lipitor, rocephin, lovenox, insulin, methyprednisolone, protonix, docusate, sodium chloride                Physical Findings     Row Name 01/05/23 1053          Physical Findings    Overall Physical Appearance overweight for age per BMI 26.32, David score 23                Estimated/Assessed Needs - Anthropometrics     Row Name 01/05/23 1054 01/05/23 0509       Anthropometrics    Weight -- 90.5 kg (199 lb 8.3 oz)    Height for Calculation 1.854 m (6' 1\") --    Weight for Calculation 80 kg (176 lb 5.9 oz)  IBW --       Estimated/Assessed Needs    Additional Documentation KCAL/KG (Group);Protein Requirements (Group);Estimated Calorie Needs (Group);Fluid Requirements (Group) --       Estimated Calorie Needs    Estimated Calorie Requirement " (kcal/day) 2,000-2,400 --    Estimated Calorie Need Method kcal/kg --       KCAL/KG    KCAL/KG 25 Kcal/Kg (kcal);30 Kcal/Kg (kcal) --    25 Kcal/Kg (kcal) 2000 --    30 Kcal/Kg (kcal) 2400 --       Protein Requirements    Weight Used For Protein Calculations 91 kg (200 lb 9.9 oz)  current --    Est Protein Requirement Amount (gms/kg) 1.0 gm protein  0.8-1.0 g/kg/day ~ 73-91 g/day --    Estimated Protein Requirements (gms/day) 91 --       Fluid Requirements    Fluid Requirements (mL/day) 2000  1 mL/kcal --    RDA Method (mL) 2000 --               Nutrition Prescription Ordered     Row Name 01/05/23 1055          Nutrition Prescription PO    Current PO Diet Regular     Fluid Consistency Thin     Common Modifiers Cardiac;Low Sodium                Evaluation of Received Nutrient/Fluid Intake     Row Name 01/05/23 1055          PO Evaluation    Number of Days PO Intake Evaluated Insufficient Data                   Problem/Interventions:   Problem 1     Row Name 01/05/23 1055          Nutrition Diagnoses Problem 1    Problem 1 Nutrition Appropriate for Condition at this Time                      Intervention Goal     Row Name 01/05/23 1050          Intervention Goal    General Maintain nutrition;Disease management/therapy;Provide information regarding MNT for treatment/condition;Improved nutrition related lab(s);Meet nutritional needs for age/condition     PO Maintain intake;Meet estimated needs     Weight Maintain weight                Nutrition Intervention     Row Name 01/05/23 1052          Nutrition Intervention    RD/Tech Action Follow Tx progress;Encourage intake                Nutrition Prescription     Row Name 01/05/23 1050          Other Orders    Obtain Weight Daily     Obtain Weight Ordered? No, recommended     Supplement Vitamin mineral supplement     Supplement Ordered? No, recommended     Labs Mg++;Na+;K+;Phos     Labs Ordered? No, recommended                Education/Evaluation     Row Name 01/05/23 4965           Education    Education Will Instruct as appropriate        Monitor/Evaluation    Monitor Skin status;Weight;Pertinent labs;PO intake;Per protocol                 Electronically signed by:  Tiana Rodriguez RD  01/05/23 10:57 EST

## 2023-01-06 ENCOUNTER — READMISSION MANAGEMENT (OUTPATIENT)
Dept: CALL CENTER | Facility: HOSPITAL | Age: 48
End: 2023-01-06
Payer: MEDICARE

## 2023-01-06 VITALS
RESPIRATION RATE: 16 BRPM | BODY MASS INDEX: 26.44 KG/M2 | SYSTOLIC BLOOD PRESSURE: 115 MMHG | DIASTOLIC BLOOD PRESSURE: 61 MMHG | WEIGHT: 199.52 LBS | TEMPERATURE: 98 F | OXYGEN SATURATION: 94 % | HEIGHT: 73 IN | HEART RATE: 63 BPM

## 2023-01-06 LAB — GLUCOSE BLDC GLUCOMTR-MCNC: 111 MG/DL (ref 70–130)

## 2023-01-06 PROCEDURE — 94799 UNLISTED PULMONARY SVC/PX: CPT

## 2023-01-06 PROCEDURE — 99238 HOSP IP/OBS DSCHRG MGMT 30/<: CPT | Performed by: INTERNAL MEDICINE

## 2023-01-06 PROCEDURE — 25010000002 ENOXAPARIN PER 10 MG: Performed by: INTERNAL MEDICINE

## 2023-01-06 PROCEDURE — 94761 N-INVAS EAR/PLS OXIMETRY MLT: CPT

## 2023-01-06 PROCEDURE — 25010000002 METHYLPREDNISOLONE PER 125 MG: Performed by: INTERNAL MEDICINE

## 2023-01-06 PROCEDURE — 82962 GLUCOSE BLOOD TEST: CPT

## 2023-01-06 PROCEDURE — 94664 DEMO&/EVAL PT USE INHALER: CPT

## 2023-01-06 RX ORDER — DOXYCYCLINE HYCLATE 100 MG/1
100 CAPSULE ORAL 2 TIMES DAILY
Qty: 10 CAPSULE | Refills: 0 | Status: SHIPPED | OUTPATIENT
Start: 2023-01-06

## 2023-01-06 RX ORDER — PREDNISONE 10 MG/1
10 TABLET ORAL DAILY
Qty: 16 TABLET | Refills: 0 | Status: SHIPPED | OUTPATIENT
Start: 2023-01-06 | End: 2023-02-16

## 2023-01-06 RX ADMIN — Medication 10 ML: at 08:14

## 2023-01-06 RX ADMIN — FINASTERIDE 5 MG: 5 TABLET, FILM COATED ORAL at 08:14

## 2023-01-06 RX ADMIN — IPRATROPIUM BROMIDE AND ALBUTEROL SULFATE 3 ML: 2.5; .5 SOLUTION RESPIRATORY (INHALATION) at 07:03

## 2023-01-06 RX ADMIN — BUDESONIDE AND FORMOTEROL FUMARATE DIHYDRATE 2 PUFF: 160; 4.5 AEROSOL RESPIRATORY (INHALATION) at 07:03

## 2023-01-06 RX ADMIN — ATORVASTATIN CALCIUM 10 MG: 10 TABLET, FILM COATED ORAL at 08:14

## 2023-01-06 RX ADMIN — ASPIRIN 81 MG: 81 TABLET, COATED ORAL at 08:14

## 2023-01-06 RX ADMIN — PANTOPRAZOLE SODIUM 40 MG: 40 TABLET, DELAYED RELEASE ORAL at 06:06

## 2023-01-06 RX ADMIN — SENNOSIDES AND DOCUSATE SODIUM 2 TABLET: 50; 8.6 TABLET ORAL at 08:14

## 2023-01-06 RX ADMIN — ENOXAPARIN SODIUM 40 MG: 100 INJECTION SUBCUTANEOUS at 08:14

## 2023-01-06 RX ADMIN — CALCIUM CARBONATE (ANTACID) CHEW TAB 500 MG 2 TABLET: 500 CHEW TAB at 03:16

## 2023-01-06 RX ADMIN — METHADONE HYDROCHLORIDE 40 MG: 10 TABLET ORAL at 08:13

## 2023-01-06 RX ADMIN — METHYLPREDNISOLONE SODIUM SUCCINATE 60 MG: 125 INJECTION, POWDER, FOR SOLUTION INTRAMUSCULAR; INTRAVENOUS at 08:14

## 2023-01-06 NOTE — DISCHARGE SUMMARY
[facility]   INTERNAL MEDICINE DISCHARGE SUMMARY      Name:  Topher Stoll   Age:  47 y.o.  Sex:  male  :  1975  MRN:  [id number]   Visit Number:  [id number]  Primary Care Physician:  Joshua Hoffmann MD  Date of Discharge:  2023  Admission Date:  2023      Discharge Diagnosis:         COPD with acute exacerbation (HCC)    Atherosclerosis of coronary artery    Primary hypertension    Hyperlipidemia    Pulmonary emphysema (HCC)    Chronic viral hepatitis B without delta agent and without coma (HCC)    Hep C w/o coma, chronic (HCC)    COPD exacerbation (HCC)          Consults:     Consults     No orders found from 2022 to 2023.          Procedures Performed:                 Hospital Course:   The patient was admitted on 2023  Please see H&P for details on admission HPI and ROS.  Patient is a 47-year-old male with history significant for hypertension, hyperlipidemia, CAD, COPD who presents to the hospital with complaints of shortness of breath.  Patient reports that he has home oxygen 2 L only as needed.  He has been having to wear his oxygen continuously over the last 24 to 48 hours.  He has noticed increasing fatigue and shortness of breath with even mild activity and ADLs.  Admits to chronic cough that is nonproductive.  Denies chest pain, nausea nausea vomiting or diarrhea.  Upon arrival, patient afebrile hemodynamically stable but hypoxic on room air.  Significant labs include negative troponin and normal proBNP.  CMP and CBC fairly unremarkable.  COVID and flu negative.  Chest x-ray personally reviewed, no acute process per my read.  Patient given breathing treatment, magnesium and Solu-Medrol in the ER.  With new oxygen requirement, hospitalist service asked to admit    After admission he needed 3 L to maintain his saturation and over the.  Of course of days it has improved back to his baseline he would still need 2 L on ambulation on room air it is staying around  89 at present.  He has been on IV steroids and it has been gradually tapered down and patient will be on 6-day tapering course of prednisone.  Patient did not have any pneumonia chest x-ray was normal but to cover with acute bacterial bronchitis as he was coughing with some mucus he was given Rocephin for 2 days in the hospital and will be discharged on doxycycline for the next 5 days to finish 7-day course of antibiotics  Patient is stable enough his wheezing has resolved so today will be discharged and follow-up with her PCP.  Dr. Hoffmann is his regular PCP and will get an appointment within a week.  Patient does have home oxygen and other medications will be continued as before along with the oral prednisone and doxycycline which has been prescribed  Patient has been answered all the questions queries and is satisfied with the plan of care and will be discharged today    Vital Signs:     Temp:  [97.8 °F (36.6 °C)-98.2 °F (36.8 °C)] 98 °F (36.7 °C)  Heart Rate:  [62-71] 63  Resp:  [16-18] 16  BP: (102-130)/(57-76) 115/61    Physical Exam:     General Appearance:    Alert and cooperative, not in any acute distress.   Head:    Atraumatic and normocephalic, without obvious abnormality.   Eyes:            PERRLA,  No pallor. Extraocular movements are within normal limits.   Neck:   Supple,  No lymph glands, no bruit   Lungs:     Chest shape is normal. Breath sounds heard bilaterally equally.  No crackles or wheezing.     Heart:    Normal S1 and S2, no murmur,  No JVD   Abdomen:     Normal bowel sounds, no masses, no organomegaly. Soft     nontender, no guarding, no rebound tenderness   Extremities:   Moves all extremities well, no edema, no cyanosis,    Skin:   No  bruising or rash.   Neurologic:   Grossly nonfocal and moves all extremities.        Pertinent Lab Results:     Results from last 7 days   Lab Units 01/04/23  0614 01/04/23  0012   SODIUM mmol/L 136 134*   POTASSIUM mmol/L 4.3 4.3   CHLORIDE mmol/L 98 97*    CO2 mmol/L 27.4 27.1   BUN mg/dL 12 13   CREATININE mg/dL 0.85 0.88   CALCIUM mg/dL 9.1 8.9   BILIRUBIN mg/dL  --  0.4   ALK PHOS U/L  --  183*   ALT (SGPT) U/L  --  22   AST (SGOT) U/L  --  28   GLUCOSE mg/dL 206* 118*     Results from last 7 days   Lab Units 01/04/23  0614 01/04/23  0012   WBC 10*3/mm3 8.08 10.27   HEMOGLOBIN g/dL 14.0 14.3   HEMATOCRIT % 42.8 44.2   PLATELETS 10*3/mm3 189 205         No results found for: BLOODCX, URINECX, WOUNDCX, MRSACX    Pertinent Radiology Results:    Imaging Results (Most Recent)     Procedure Component Value Units Date/Time    XR Chest 1 View [[id number]] Collected: 01/04/23 0755     Updated: 01/04/23 0758    Narrative:      CLINICAL INDICATION:    SOA Triage Protocol shortness of breath.      EXAMINATION TECHNIQUE:   XR CHEST 1 VW-     COMPARISON:  Radiographs 11/27/2022.     FINDINGS:  Lungs are clear without evidence of focal airspace consolidation. No  pneumothorax. No pleural effusion. Heart and mediastinal contours are  within normal limits. No acute osseous or soft tissue abnormalities. No  free air under the hemidiaphragms. Bilateral humeral head irregular  sclerotic changes, appears chronic, unchanged.       Impression:      No acute cardiopulmonary findings.     Images personally reviewed, interpreted and dictated by MARLEE Hensley.                This report was signed and finalized on 1/4/2023 7:56 AM by MARLEE Hensley.                  Discharge Disposition:      Home or Self Care    Discharge Medication:         Discharge Medications      New Medications      Instructions Start Date   doxycycline 100 MG capsule  Commonly known as: VIBRAMYCIN   100 mg, Oral, 2 Times Daily         Changes to Medications      Instructions Start Date   predniSONE 10 MG tablet  Commonly known as: DELTASONE  What changed:   · how much to take  · how to take this  · when to take this  · additional instructions   10 mg, Oral, Daily, Take 4 tablets for 2 days then 2  tablets for 2 days and then 1 tablet daily for 2 days         Continue These Medications      Instructions Start Date   albuterol sulfate  (90 Base) MCG/ACT inhaler  Commonly known as: Ventolin HFA   2 puffs, Inhalation, Every 6 Hours PRN      alfuzosin 10 MG 24 hr tablet  Commonly known as: UROXATRAL   10 mg, Oral, Daily      aspirin 81 MG EC tablet   81 mg, Oral, Daily      dutasteride 0.5 MG capsule  Commonly known as: AVODART   0.5 mg, Oral, Daily      Fasenra 30 MG/ML solution prefilled syringe  Generic drug: Benralizumab   INJECT 1 SYRINGE UNDER THE SKIN AT WEEK 4 AND 8, THEN INJECT 1 SYRINGE EVERY 8 WEEKS THEREAFTER.      fluticasone 50 MCG/ACT nasal spray  Commonly known as: FLONASE   INSTILL 1 SPRAY INTO EACH NOSTRIL DAILY      Fluticasone-Umeclidin-Vilant 200-62.5-25 MCG/INH inhaler  Commonly known as: TRELEGY   1 puff, Inhalation, Daily      HYDROcodone-acetaminophen  MG per tablet  Commonly known as: NORCO   TAKE 1 TABLET BY MOUTH 30 MINUTES BEFORE PROCEDURE THEN 1 TABLET BY MOUTH EVERY 6 HOURS AS NEEDED FOR PAIN      ipratropium-albuterol 0.5-2.5 mg/3 ml nebulizer  Commonly known as: DUO-NEB   3 mL, Nebulization, Every 4 Hours PRN      lovastatin 40 MG tablet  Commonly known as: MEVACOR   40 mg, Oral, Nightly      methadone 5 MG/5ML solution  Commonly known as: DOLOPHINE   40 mg, Oral, Daily, Patient takes 60 mg once per day in mornings, states took a dose 6/15/21 around 0700      omeprazole 40 MG capsule  Commonly known as: priLOSEC   40 mg, Oral, Daily      ondansetron ODT 4 MG disintegrating tablet  Commonly known as: ZOFRAN-ODT   4 mg, Translingual, Every 8 Hours PRN      traZODone 100 MG tablet  Commonly known as: DESYREL   TAKE 2 TABLETS BY MOUTH AT BEDTIME AS NEEDED         Stop These Medications    diazePAM 10 MG tablet  Commonly known as: VALIUM            Discharge Diet:       Healthy heart      Follow-up Appointments:      Future Appointments   Date Time Provider Department  Center   1/11/2023 10:15 AM Melina Olivas MD [facility]   1/12/2023  9:30 AM Joshua Hoffmann MD [facility]   1/13/2023  6:30 PM [facility]   3/1/2023 10:30 AM Cleveland Waterman APRN [facility]   4/21/2023 10:45 AM Joshua Hoffmann MD [facility]         Test Results Pending at Discharge:             Jesus Tate MD  01/06/23  09:26 EST    Time:  Discharge 25 min    Please note that portions of this note were completed with a voice recognition program.

## 2023-01-06 NOTE — CASE MANAGEMENT/SOCIAL WORK
Case Management Discharge Note                Selected Continued Care - Discharged on 1/6/2023 Admission date: 1/4/2023 - Discharge disposition: Home or Self Care    Destination    No services have been selected for the patient.               Transportation Services  Private: Car    Final Discharge Disposition Code: 01 - home or self-care

## 2023-01-06 NOTE — PLAN OF CARE
Goal Outcome Evaluation:  Plan of Care Reviewed With: patient        Progress: improving  Outcome Evaluation: No acute events during the night. Vital Signs Stable. PRN medications given to control pain, See MAR. 2LNC in use while asleep. Will continue to monitor.

## 2023-01-07 NOTE — OUTREACH NOTE
Prep Survey    Flowsheet Row Responses   [facility] facility patient discharged from? [place]   Is LACE score < 7 ? No   Eligibility [facility]   Date of Admission 01/04/23   Date of Discharge 01/06/23   Discharge Disposition Home or Self Care   Discharge diagnosis COPD with acute exacerbation Hep C Chronic viral hepatitis B   Does the patient have one of the following disease processes/diagnoses(primary or secondary)? COPD   Does the patient have Home health ordered? No   Is there a DME ordered? Yes   What DME was ordered? Pt has home O2 2L NC as needed through [facility].    Prep survey completed? Yes          OZZY MARIE - Registered Nurse

## 2023-01-09 ENCOUNTER — TRANSITIONAL CARE MANAGEMENT TELEPHONE ENCOUNTER (OUTPATIENT)
Dept: CALL CENTER | Facility: HOSPITAL | Age: 48
End: 2023-01-09
Payer: MEDICARE

## 2023-01-09 NOTE — OUTREACH NOTE
Call Center TCM Note    Flowsheet Row Responses   [facility] patient discharged from? [facility]   Does the patient have one of the following disease processes/diagnoses(primary or secondary)? COPD   TCM attempt successful? Yes   Call start time 0855   Call end time 0857   Discharge diagnosis COPD with acute exacerbation Hep C Chronic viral hepatitis B   Is patient permission given to speak with other caregiver? Yes   List who call center can speak with mother- Josee   Person spoke with today (if not patient) and relationship mother   Does the patient have all medications ordered at discharge? Yes   Comments Hospital f/u with Dr. Hoffmann on 1/12   Does the patient have an appointment with their PCP within 7 days of discharge? Yes   Has home health visited the patient within 72 hours of discharge? N/A   Psychosocial issues? No   Did the patient receive a copy of their discharge instructions? Yes   What is the patient's perception of their health status since discharge? Improving   Nursing Interventions Nurse provided patient education   Is the patient/caregiver able to teach back the hierarchy of who to call/visit for symptoms/problems? PCP, Specialist, Home health nurse, Urgent Care, ED, 911 Yes   Patient reports what zone on this call? Green Zone   Green Zone Reports doing well   TCM call completed? Yes   Wrap up additional comments Per Mother, patient is doing well, no questions, confirmed upcoming appt with PCP for 1/12.   Call end time 0857   Would this patient benefit from a Referral to Amb Social Work? No   Is the patient interested in additional calls from an ambulatory ?  NOTE:  applies to high risk patients requiring additional follow-up. No          Narda Perez RN    1/9/2023, 08:57 EST

## 2023-01-11 ENCOUNTER — OFFICE VISIT (OUTPATIENT)
Dept: PULMONOLOGY | Facility: CLINIC | Age: 48
End: 2023-01-11
Payer: MEDICARE

## 2023-01-11 VITALS
SYSTOLIC BLOOD PRESSURE: 123 MMHG | WEIGHT: 200.4 LBS | OXYGEN SATURATION: 94 % | RESPIRATION RATE: 18 BRPM | BODY MASS INDEX: 26.56 KG/M2 | HEIGHT: 73 IN | HEART RATE: 87 BPM | DIASTOLIC BLOOD PRESSURE: 72 MMHG

## 2023-01-11 DIAGNOSIS — J45.50 SEVERE PERSISTENT ASTHMA WITHOUT COMPLICATION: Primary | ICD-10-CM

## 2023-01-11 DIAGNOSIS — J30.89 OTHER ALLERGIC RHINITIS: ICD-10-CM

## 2023-01-11 PROCEDURE — 99214 OFFICE O/P EST MOD 30 MIN: CPT | Performed by: INTERNAL MEDICINE

## 2023-01-11 NOTE — PROGRESS NOTES
"  Chief Complaint   Patient presents with   • Shortness of Breath   • Follow-up       Subjective   Topher Stoll is a 47 y.o. male.     History of Present Illness   Patient was evaluated today for follow up of asthma, and allergic rhinitis. Patient does report 1 recent exacerbations requiring hospitalization. He says that he was working on a truck in a barn full of dust for about 3 days and then he started getting sick.    Patient is compliant with pulmonary medicines, as prescribed.     he is currently on Trelegy. he is using the rescue inhalers minimally.     he is on Fasenra every 8 weeks and feels that it has \"helped a lot\".     Patient says that he has been using his nasal sprays on a regular basis and describes no significant ongoing issues other than occasional congestion.       The following portions of the patient's history were reviewed and updated as appropriate: allergies, current medications, past family history, past medical history, past social history and past surgical history.    Review of Systems   HENT: Negative for sinus pressure, sneezing and sore throat.    Respiratory: Negative for cough, chest tightness, shortness of breath and wheezing.        Objective   Visit Vitals  /72   Pulse 87   Resp 18   Ht 185.4 cm (72.99\")   Wt 90.9 kg (200 lb 6.4 oz)   SpO2 94%   BMI 26.45 kg/m²       Physical Exam  Vitals reviewed.   Constitutional:       Appearance: He is well-developed.   HENT:      Head: Normocephalic and atraumatic.   Eyes:      Extraocular Movements: Extraocular movements intact.   Cardiovascular:      Rate and Rhythm: Normal rate.   Pulmonary:      Comments: Somewhat hyperresonant to percussion.  Somewhat decreased air entry.  No obvious wheezing noted.   Musculoskeletal:      Cervical back: Neck supple.   Neurological:      Mental Status: He is alert.         Assessment & Plan   Diagnoses and all orders for this visit:    1. Severe persistent asthma without complication " (Primary)  -     Converted Six Minute Walk; Future    2. Other allergic rhinitis           Return in about 6 months (around 7/10/2023) for Recheck, 6MWT F/U, For Delmy Vazquez).    DISCUSSION (if any):  I reviewed the patient's discharge summary that mentioned COPD with exacerbation and emphysema. It also listed Chronic Hepatitis B.     I have personally reviewed images of the last chest x-ray and available CT.  Last chest x-ray was reviewed personally and the results were shared with the patient.  Images reviewed personally.   Results for orders placed during the hospital encounter of 01/04/23    XR Chest 1 View    Narrative  CLINICAL INDICATION:  SOA Triage Protocol shortness of breath.    EXAMINATION TECHNIQUE:  XR CHEST 1 VW-    COMPARISON:  Radiographs 11/27/2022.    FINDINGS:  Lungs are clear without evidence of focal airspace consolidation. No  pneumothorax. No pleural effusion. Heart and mediastinal contours are  within normal limits. No acute osseous or soft tissue abnormalities. No  free air under the hemidiaphragms. Bilateral humeral head irregular  sclerotic changes, appears chronic, unchanged.    Impression  No acute cardiopulmonary findings.    Images personally reviewed, interpreted and dictated by MARLEE Hensley.          This report was signed and finalized on 1/4/2023 7:56 AM by MARLEE Hensley.      Last CT scan was reviewed in great detail with the patient. Images reviewed personally.   Results for orders placed during the hospital encounter of 11/13/22    CT Chest With Contrast Diagnostic    Narrative  FINAL REPORT    TECHNIQUE:  Axial CT images were obtained from the lung apex to the mid  abdomen after IV contrast administration.  Coronal and sagittal  reformatted images generated from the axial data set and  provided for interpretation. This study was performed with  techniques to keep radiation doses as low as reasonably  achievable (ALARA). Individualized dose reduction  techniques  using automated exposure control or adjustment of mA and/or kV  according to the patient's size were employed.    CLINICAL HISTORY:  Chest wall pain    FINDINGS:  Chest:  Lungs/Pleura:  The lungs are clear without suspicious  nodule or consolidation. No pneumothorax or pleural effusion.  Vessels:  No large central pulmonary embolism.  Thoracic aorta  is normal in caliber.   There is coronary artery disease.  Heart/Mediastinum: Heart size is normal. No pericardial  effusion.  Lymph nodes:  No pathologically enlarged thoracic  lymph nodes.  Chest Wall:  There is a chronic appearing  deformity and sclerosis surrounding the right sternoclavicular  joint.  Bones:  No acute fracture.    Impression  No acute findings in the chest to account for the patient's  symptoms.  Chronic appearing deformity of the right  sternoclavicular joint, underlying chronic septic arthritis is  not excluded.    Authenticated and Electronically Signed by Jaodn Maguire MD on  11/13/2022 08:24:21 PM      I have also reviewed laboratory data.  Lab Results   Component Value Date    EOSABS 0.00 01/04/2023    EOSABS 0.01 01/04/2023    EOSABS 0.00 11/27/2022    &   Lab Results   Component Value Date    IGE 46 04/03/2017    &    Lab Results   Component Value Date    CO2 27.4 01/04/2023     ===========================  ===========================    It appears that his symptoms of asthma/COPD are under adequate control with the current regimen.    Patient's medications for underlying asthma were reviewed in great detail.    He was on Singulair before and we will restart it, to optimize treatment.    Patient was informed about the black box warning for Singulair regarding suicidal thoughts and tendencies.  he denies any ongoing issues for now.     Any needed adjustments to his pulmonary medications, either for clinical or insurance coverage reasons, have been made and are reflected in the orders.    Compliance with medications stressed.      Side effects of prescribed medications discussed with the patient.    The need to continue to be aware of triggers that may cause asthma exacerbation versus progression of disease, was also discussed.    Patient was advised to continue his nasal spray, especially given improvement in symptoms overall.    I have discussed asthma action plan with him.    The patient was asked to call this office if the symptoms worsen.    Dictated utilizing Dragon dictation.    This document was electronically signed by Melina Olivas MD on 01/11/23 at 10:33 EST

## 2023-01-13 ENCOUNTER — HOSPITAL ENCOUNTER (OUTPATIENT)
Dept: CT IMAGING | Facility: HOSPITAL | Age: 48
Discharge: HOME | End: 2023-01-13
Payer: MEDICARE

## 2023-01-13 DIAGNOSIS — R31.1 BENIGN ESSENTIAL MICROSCOPIC HEMATURIA: ICD-10-CM

## 2023-01-17 ENCOUNTER — READMISSION MANAGEMENT (OUTPATIENT)
Dept: CALL CENTER | Facility: HOSPITAL | Age: 48
End: 2023-01-17
Payer: MEDICARE

## 2023-01-17 NOTE — OUTREACH NOTE
COPD/PN Week 2 Survey    Flowsheet Row Responses   [facility] patient discharged from? [facility]   Does the patient have one of the following disease processes/diagnoses(primary or secondary)? COPD   Week 2 attempt successful? Yes   Call start time 1214   Call end time 1215   Discharge diagnosis COPD with acute exacerbation Hep C Chronic viral hepatitis B   Meds reviewed with patient/caregiver? Yes   Is the patient having any side effects they believe may be caused by any medication additions or changes? No   Does the patient have all medications ordered at discharge? Yes   Is the patient taking all medications as directed (includes completed medication regime)? Yes   Does the patient have a primary care provider?  Yes   Comments regarding PCP PATIENT HAS SEEN HIS PCP   Has the patient kept scheduled appointments due by today? Yes   Has home health visited the patient within 72 hours of discharge? N/A   Psychosocial issues? No   Did the patient receive a copy of their discharge instructions? Yes   Nursing interventions Reviewed instructions with patient   What is the patient's perception of their health status since discharge? Improving   Nursing Interventions Nurse provided patient education   If the patient is a current smoker, are they able to teach back resources for cessation? Not a smoker   Is the patient/caregiver able to teach back the hierarchy of who to call/visit for symptoms/problems? PCP, Specialist, Home health nurse, Urgent Care, ED, 911 Yes   Is the patient able to teach back COPD zones? Yes   Nursing interventions Education provided on various zones   Patient reports what zone on this call? Green Zone   Green Zone Reports doing well, Breathing without shortness of breath, Usual activity and exercise level, Usual amounts of cough and phlegm/mucous, Slept well last night, Appetite is good   Green Zone interventions: Take daily medications, Use oxygen as prescribed   Week 2 call completed? Yes           LEVI LAKHANI - Licensed Nurse

## 2023-01-23 RX ORDER — ALBUTEROL SULFATE 90 UG/1
AEROSOL, METERED RESPIRATORY (INHALATION)
Qty: 8.5 G | Refills: 3 | Status: SHIPPED | OUTPATIENT
Start: 2023-01-23

## 2023-01-25 ENCOUNTER — READMISSION MANAGEMENT (OUTPATIENT)
Dept: CALL CENTER | Facility: HOSPITAL | Age: 48
End: 2023-01-25
Payer: MEDICARE

## 2023-01-25 NOTE — OUTREACH NOTE
COPD/PN Week 3 Survey    Flowsheet Row Responses   [facility] patient discharged from? [facility]   Does the patient have one of the following disease processes/diagnoses(primary or secondary)? COPD   Week 3 attempt successful? Yes   Call start time 1612   Call end time 1613   Discharge diagnosis COPD with acute exacerbation Hep C Chronic viral hepatitis B   Is patient permission given to speak with other caregiver? Yes   List who call center can speak with mother- Josee   Person spoke with today (if not patient) and relationship mother- Josee    Meds reviewed with patient/caregiver? Yes   Is the patient taking all medications as directed (includes completed medication regime)? Yes   Does the patient have a primary care provider?  Yes   Has the patient kept scheduled appointments due by today? Yes  [1/12/23]   Comments Hospital f/u with Dr. Hoffmann on 1/12   DME comments oxygen prn    Pulse Ox monitoring Intermittent   Pulse Ox device source Patient   O2 Sat comments stays in the 90's    O2 Sat: education provided Sat levels, Monitoring frequency, When to seek care   Psychosocial issues? No   What is the patient's perception of their health status since discharge? Improving   Week 3 call completed? Yes   Graduated/Revoked comments mother states pt is doing well           MARILUZ H - Registered Nurse

## 2023-02-16 RX ORDER — PREDNISONE 20 MG/1
TABLET ORAL
Qty: 10 TABLET | Refills: 0 | Status: SHIPPED | OUTPATIENT
Start: 2023-02-16 | End: 2023-03-23 | Stop reason: SDUPTHER

## 2023-03-23 RX ORDER — PREDNISONE 20 MG/1
TABLET ORAL
Qty: 10 TABLET | Refills: 0 | Status: SHIPPED | OUTPATIENT
Start: 2023-03-23

## 2023-04-03 ENCOUNTER — TELEPHONE (OUTPATIENT)
Dept: INTERNAL MEDICINE | Facility: CLINIC | Age: 48
End: 2023-04-03
Payer: MEDICARE

## 2023-04-03 RX ORDER — METHYLPREDNISOLONE 4 MG/1
TABLET ORAL
Qty: 21 TABLET | Refills: 0 | Status: SHIPPED | OUTPATIENT
Start: 2023-04-03 | End: 2023-04-14

## 2023-04-03 NOTE — TELEPHONE ENCOUNTER
Patient called asking if dr rivera could call him in prednisone. He has copd and states its flared up. He states dr rivera has done this before due to his history. Call him at [phone]

## 2023-04-14 ENCOUNTER — HOSPITAL ENCOUNTER (EMERGENCY)
Facility: HOSPITAL | Age: 48
Discharge: HOME | End: 2023-04-14
Attending: EMERGENCY MEDICINE | Admitting: EMERGENCY MEDICINE
Payer: MEDICARE

## 2023-04-14 ENCOUNTER — APPOINTMENT (OUTPATIENT)
Dept: GENERAL RADIOLOGY | Facility: HOSPITAL | Age: 48
End: 2023-04-14
Payer: MEDICARE

## 2023-04-14 VITALS
SYSTOLIC BLOOD PRESSURE: 116 MMHG | OXYGEN SATURATION: 92 % | TEMPERATURE: 98.6 F | HEIGHT: 73 IN | HEART RATE: 77 BPM | RESPIRATION RATE: 16 BRPM | BODY MASS INDEX: 25.18 KG/M2 | DIASTOLIC BLOOD PRESSURE: 86 MMHG | WEIGHT: 190 LBS

## 2023-04-14 DIAGNOSIS — J44.1 COPD EXACERBATION: Primary | ICD-10-CM

## 2023-04-14 LAB
A-A DO2: ABNORMAL
ALBUMIN SERPL-MCNC: 4.5 G/DL (ref 3.5–5.2)
ALBUMIN/GLOB SERPL: 1.5 G/DL
ALP SERPL-CCNC: 153 U/L (ref 39–117)
ALT SERPL W P-5'-P-CCNC: 14 U/L (ref 1–41)
ANION GAP SERPL CALCULATED.3IONS-SCNC: 7.8 MMOL/L (ref 5–15)
ARTERIAL PATENCY WRIST A: ABNORMAL
AST SERPL-CCNC: 18 U/L (ref 1–40)
ATMOSPHERIC PRESS: 729 MMHG
BASE EXCESS BLDA CALC-SCNC: 1.7 MMOL/L (ref 0–2)
BASOPHILS # BLD AUTO: 0.03 10*3/MM3 (ref 0–0.2)
BASOPHILS NFR BLD AUTO: 0.4 % (ref 0–1.5)
BDY SITE: ABNORMAL
BILIRUB SERPL-MCNC: 0.4 MG/DL (ref 0–1.2)
BUN SERPL-MCNC: 12 MG/DL (ref 6–20)
BUN/CREAT SERPL: 13 (ref 7–25)
CALCIUM SPEC-SCNC: 9 MG/DL (ref 8.6–10.5)
CHLORIDE SERPL-SCNC: 98 MMOL/L (ref 98–107)
CO2 SERPL-SCNC: 29.2 MMOL/L (ref 22–29)
COHGB MFR BLD: <0 % (ref 0–2)
CREAT SERPL-MCNC: 0.92 MG/DL (ref 0.76–1.27)
DEPRECATED RDW RBC AUTO: 38.5 FL (ref 37–54)
EGFRCR SERPLBLD CKD-EPI 2021: 102.6 ML/MIN/1.73
EOSINOPHIL # BLD AUTO: 0.01 10*3/MM3 (ref 0–0.4)
EOSINOPHIL NFR BLD AUTO: 0.1 % (ref 0.3–6.2)
ERYTHROCYTE [DISTWIDTH] IN BLOOD BY AUTOMATED COUNT: 13.2 % (ref 12.3–15.4)
GAS FLOW AIRWAY: 4 LPM
GLOBULIN UR ELPH-MCNC: 3.1 GM/DL
GLUCOSE SERPL-MCNC: 110 MG/DL (ref 65–99)
HCO3 BLDA-SCNC: 29 MMOL/L (ref 22–28)
HCT VFR BLD AUTO: 48.9 % (ref 37.5–51)
HCT VFR BLD CALC: 47 %
HGB BLD-MCNC: 15.4 G/DL (ref 13–17.7)
HOLD SPECIMEN: NORMAL
HOLD SPECIMEN: NORMAL
IMM GRANULOCYTES # BLD AUTO: 0.02 10*3/MM3 (ref 0–0.05)
IMM GRANULOCYTES NFR BLD AUTO: 0.3 % (ref 0–0.5)
LYMPHOCYTES # BLD AUTO: 1.63 10*3/MM3 (ref 0.7–3.1)
LYMPHOCYTES NFR BLD AUTO: 20.6 % (ref 19.6–45.3)
MCH RBC QN AUTO: 25.5 PG (ref 26.6–33)
MCHC RBC AUTO-ENTMCNC: 31.5 G/DL (ref 31.5–35.7)
MCV RBC AUTO: 80.8 FL (ref 79–97)
METHGB BLD QL: 0.6 % (ref 0–1.5)
MODALITY: ABNORMAL
MONOCYTES # BLD AUTO: 0.65 10*3/MM3 (ref 0.1–0.9)
MONOCYTES NFR BLD AUTO: 8.2 % (ref 5–12)
NEUTROPHILS NFR BLD AUTO: 5.59 10*3/MM3 (ref 1.7–7)
NEUTROPHILS NFR BLD AUTO: 70.4 % (ref 42.7–76)
NOTE: ABNORMAL
NRBC BLD AUTO-RTO: 0 /100 WBC (ref 0–0.2)
NT-PROBNP SERPL-MCNC: 88.8 PG/ML (ref 0–450)
OXYHGB MFR BLDV: 95.9 % (ref 94–99)
PCO2 BLDA: 54.6 MM HG (ref 35–45)
PCO2 TEMP ADJ BLD: ABNORMAL MM[HG]
PH BLDA: 7.33 PH UNITS (ref 7.35–7.45)
PH, TEMP CORRECTED: ABNORMAL
PLATELET # BLD AUTO: 209 10*3/MM3 (ref 140–450)
PMV BLD AUTO: 9.7 FL (ref 6–12)
PO2 BLDA: 101 MM HG (ref 75–100)
PO2 TEMP ADJ BLD: ABNORMAL MM[HG]
POTASSIUM SERPL-SCNC: 4.1 MMOL/L (ref 3.5–5.2)
PROT SERPL-MCNC: 7.6 G/DL (ref 6–8.5)
RBC # BLD AUTO: 6.05 10*6/MM3 (ref 4.14–5.8)
SAO2 % BLDCOA: 96.2 % (ref 94–100)
SODIUM SERPL-SCNC: 135 MMOL/L (ref 136–145)
VENTILATOR MODE: ABNORMAL
WBC NRBC COR # BLD: 7.93 10*3/MM3 (ref 3.4–10.8)
WHOLE BLOOD HOLD COAG: NORMAL
WHOLE BLOOD HOLD SPECIMEN: NORMAL

## 2023-04-14 PROCEDURE — 36600 WITHDRAWAL OF ARTERIAL BLOOD: CPT

## 2023-04-14 PROCEDURE — 99284 EMERGENCY DEPT VISIT MOD MDM: CPT

## 2023-04-14 PROCEDURE — 85025 COMPLETE CBC W/AUTO DIFF WBC: CPT | Performed by: PHYSICIAN ASSISTANT

## 2023-04-14 PROCEDURE — 96365 THER/PROPH/DIAG IV INF INIT: CPT

## 2023-04-14 PROCEDURE — 71045 X-RAY EXAM CHEST 1 VIEW: CPT

## 2023-04-14 PROCEDURE — 93005 ELECTROCARDIOGRAM TRACING: CPT

## 2023-04-14 PROCEDURE — 93005 ELECTROCARDIOGRAM TRACING: CPT | Performed by: EMERGENCY MEDICINE

## 2023-04-14 PROCEDURE — 25010000002 LORAZEPAM PER 2 MG: Performed by: EMERGENCY MEDICINE

## 2023-04-14 PROCEDURE — 83880 ASSAY OF NATRIURETIC PEPTIDE: CPT | Performed by: PHYSICIAN ASSISTANT

## 2023-04-14 PROCEDURE — 80053 COMPREHEN METABOLIC PANEL: CPT | Performed by: PHYSICIAN ASSISTANT

## 2023-04-14 PROCEDURE — 25010000002 KETOROLAC TROMETHAMINE PER 15 MG: Performed by: PHYSICIAN ASSISTANT

## 2023-04-14 PROCEDURE — 82375 ASSAY CARBOXYHB QUANT: CPT

## 2023-04-14 PROCEDURE — 96375 TX/PRO/DX INJ NEW DRUG ADDON: CPT

## 2023-04-14 PROCEDURE — 83050 HGB METHEMOGLOBIN QUAN: CPT

## 2023-04-14 PROCEDURE — 25010000002 MAGNESIUM SULFATE 2 GM/50ML SOLUTION: Performed by: PHYSICIAN ASSISTANT

## 2023-04-14 PROCEDURE — 25010000002 METHYLPREDNISOLONE PER 125 MG: Performed by: PHYSICIAN ASSISTANT

## 2023-04-14 PROCEDURE — 82805 BLOOD GASES W/O2 SATURATION: CPT

## 2023-04-14 RX ORDER — SODIUM CHLORIDE 0.9 % (FLUSH) 0.9 %
10 SYRINGE (ML) INJECTION AS NEEDED
Status: DISCONTINUED | OUTPATIENT
Start: 2023-04-14 | End: 2023-04-14 | Stop reason: HOSPADM

## 2023-04-14 RX ORDER — PREDNISONE 20 MG/1
40 TABLET ORAL DAILY
Qty: 10 TABLET | Refills: 0 | Status: SHIPPED | OUTPATIENT
Start: 2023-04-14 | End: 2023-04-19

## 2023-04-14 RX ORDER — MAGNESIUM SULFATE HEPTAHYDRATE 40 MG/ML
2 INJECTION, SOLUTION INTRAVENOUS ONCE
Status: COMPLETED | OUTPATIENT
Start: 2023-04-14 | End: 2023-04-14

## 2023-04-14 RX ORDER — IPRATROPIUM BROMIDE AND ALBUTEROL SULFATE 2.5; .5 MG/3ML; MG/3ML
3 SOLUTION RESPIRATORY (INHALATION) ONCE
Status: COMPLETED | OUTPATIENT
Start: 2023-04-14 | End: 2023-04-14

## 2023-04-14 RX ORDER — KETOROLAC TROMETHAMINE 30 MG/ML
30 INJECTION, SOLUTION INTRAMUSCULAR; INTRAVENOUS ONCE
Status: COMPLETED | OUTPATIENT
Start: 2023-04-14 | End: 2023-04-14

## 2023-04-14 RX ORDER — METHYLPREDNISOLONE SODIUM SUCCINATE 125 MG/2ML
125 INJECTION, POWDER, LYOPHILIZED, FOR SOLUTION INTRAMUSCULAR; INTRAVENOUS ONCE
Status: COMPLETED | OUTPATIENT
Start: 2023-04-14 | End: 2023-04-14

## 2023-04-14 RX ORDER — LORAZEPAM 2 MG/ML
1 INJECTION INTRAMUSCULAR ONCE
Status: COMPLETED | OUTPATIENT
Start: 2023-04-14 | End: 2023-04-14

## 2023-04-14 RX ADMIN — LORAZEPAM 1 MG: 2 INJECTION INTRAMUSCULAR; INTRAVENOUS at 17:46

## 2023-04-14 RX ADMIN — IPRATROPIUM BROMIDE AND ALBUTEROL SULFATE 3 ML: .5; 3 SOLUTION RESPIRATORY (INHALATION) at 16:54

## 2023-04-14 RX ADMIN — MAGNESIUM SULFATE HEPTAHYDRATE 2 G: 40 INJECTION, SOLUTION INTRAVENOUS at 16:54

## 2023-04-14 RX ADMIN — METHYLPREDNISOLONE SODIUM SUCCINATE 125 MG: 125 INJECTION, POWDER, FOR SOLUTION INTRAMUSCULAR; INTRAVENOUS at 16:54

## 2023-04-14 RX ADMIN — KETOROLAC TROMETHAMINE 30 MG: 30 INJECTION, SOLUTION INTRAMUSCULAR; INTRAVENOUS at 17:45

## 2023-04-14 NOTE — ED PROVIDER NOTES
Subjective  History of Present Illness:    Chief Complaint:   Chief Complaint   Patient presents with   • Shortness of Breath   • Headache      History of Present Illness: Topher Stoll is a 47 y.o. male who presents to the emergency department complaining of shortness of breath.  History of COPD has PRN and home but rarely uses it.  Denies chest pain.  He has DuoNebs at home with minimal relief.  No recent antibiotics or steroid use.  Deepa was noted to be hypoxic placed on 4 L remained 86% on 4 L but at time of exam patient is 96% on 4 L nasal cannula.  Also complains of headache  Onset: This morning  Duration: Ongoing  Exacerbating / Alleviating factors: Worse with exertion and talking  Associated symptoms: None      Nurses Notes reviewed and agree, including vitals, allergies, social history and prior medical history.     Review of Systems   Constitutional: Negative.    Eyes: Negative.    Respiratory: Positive for shortness of breath. Negative for cough.    Cardiovascular: Negative.  Negative for chest pain.   Gastrointestinal: Negative.    Genitourinary: Negative.    Musculoskeletal: Negative.    Skin: Negative.    Allergic/Immunologic: Negative.    Neurological: Positive for headaches.   Psychiatric/Behavioral: Negative.    All other systems reviewed and are negative.      Past Medical History:   Diagnosis Date   • Abdominal adhesions    • Anxiety    • Arthritis    • Asthma    • Cervical radiculopathy    • Colitis    • COPD (chronic obstructive pulmonary disease)    • GERD (gastroesophageal reflux disease)    • Heart attack    • History of hepatitis C     Treated with Epclusa in 2019   • History of recreational drug use    • HTN (hypertension)    • Kidney cysts    • Left hip pain    • Liver disease    • Low back pain    • Migraines    • Obstructive chronic bronchitis with exacerbation    • Sleep apnea     mild   • Tattoos        Allergies:    Doxycycline      Past Surgical History:   Procedure Laterality Date  "  • BACK SURGERY     • COLONOSCOPY     • COLONOSCOPY N/A 2022    Procedure: COLONOSCOPY with biopsies;  Surgeon: Giancarlo Ruiz MD;  Location:  [facility];  Service: Gastroenterology;  Laterality: N/A;   • HERNIA REPAIR     • HIP SPACER INSERTION WITH ANTIBIOTIC CEMENT Left 1/15/2020    Procedure: TOTAL HIP IMPLANT REMOVAL WITH INSERTION OF ANTIBIOTIC SPACER LEFT;  Surgeon: Ba Ramirez MD;  Location:  [facility];  Service: Orthopedics   • SHOULDER LIGAMENT REPAIR      right shoulder   • SMALL INTESTINE SURGERY      Small bowell resection   • TOTAL HIP ARTHROPLASTY Left    • TOTAL HIP ARTHROPLASTY Right    • TOTAL HIP ARTHROPLASTY REVISION Left 3/5/2020    Procedure: HIP REIMPLANT REVISION LEFT;  Surgeon: Ba Ramirez MD;  Location:  [facility];  Service: Orthopedics;  Laterality: Left;   • UPPER GASTROINTESTINAL ENDOSCOPY           Social History     Socioeconomic History   • Marital status:    Tobacco Use   • Smoking status: Former     Packs/day: 2.00     Years: 15.00     Pack years: 30.00     Types: Cigarettes     Quit date:      Years since quittin.2   • Smokeless tobacco: Current     Types: Chew   Vaping Use   • Vaping Use: Never used   Substance and Sexual Activity   • Alcohol use: No     Comment: stopped drinking alcohol   • Drug use: Not Currently     Comment: takes suboxone   • Sexual activity: Defer         Family History   Problem Relation Age of Onset   • Arthritis Other    • Hypertension Other    • Migraines Other    • Heart attack Other    • Stroke Other    • Colon cancer Neg Hx        Objective  Physical Exam:  BP (!) 132/102   Pulse 81   Temp 98.6 °F (37 °C) (Oral)   Resp 16   Ht 185.4 cm (73\")   Wt 86.2 kg (190 lb)   SpO2 92%   BMI 25.07 kg/m²      Physical Exam  Vitals and nursing note reviewed.   Constitutional:       Appearance: He is well-developed.   HENT:      Head: Normocephalic and atraumatic.   Eyes:      Extraocular Movements: " Extraocular movements intact.   Cardiovascular:      Rate and Rhythm: Normal rate and regular rhythm.   Pulmonary:      Comments: Labored breathing, increased work of breathing, decreased breath sounds throughout, wheezes throughout  Abdominal:      Palpations: Abdomen is soft.   Musculoskeletal:         General: Normal range of motion.      Right lower leg: No edema.      Left lower leg: No edema.   Skin:     General: Skin is warm and dry.   Neurological:      General: No focal deficit present.      Mental Status: He is alert.   Psychiatric:         Mood and Affect: Mood normal.         Behavior: Behavior normal.           Procedures    ED Course:    ED Course as of 04/14/23 1848 Fri Apr 14, 2023 1628 EKG interpreted by me.  Sinus rhythm.  Rate of 77.  No ST segment or T wave changes.  Normal EKG [CG]      ED Course User Index  [CG] Tanner Ramires,        Lab Results (last 24 hours)     Procedure Component Value Units Date/Time    CBC & Differential [[id number]]  (Abnormal) Collected: 04/14/23 1650    Specimen: Blood Updated: 04/14/23 1703    Narrative:      The following orders were created for panel order CBC & Differential.  Procedure                               Abnormality         Status                     ---------                               -----------         ------                     CBC Auto Differential[[id number]]        Abnormal            Final result                 Please view results for these tests on the individual orders.    Comprehensive Metabolic Panel [[id number]]  (Abnormal) Collected: 04/14/23 1650    Specimen: Blood Updated: 04/14/23 1718     Glucose 110 mg/dL      BUN 12 mg/dL      Creatinine 0.92 mg/dL      Sodium 135 mmol/L      Potassium 4.1 mmol/L      Comment: Slight hemolysis detected by analyzer. Results may be affected.        Chloride 98 mmol/L      CO2 29.2 mmol/L      Calcium 9.0 mg/dL      Total Protein 7.6 g/dL      Albumin 4.5 g/dL      ALT (SGPT) 14 U/L      AST  (SGOT) 18 U/L      Alkaline Phosphatase 153 U/L      Total Bilirubin 0.4 mg/dL      Globulin 3.1 gm/dL      A/G Ratio 1.5 g/dL      BUN/Creatinine Ratio 13.0     Anion Gap 7.8 mmol/L      eGFR 102.6 mL/min/1.73     Narrative:      GFR Normal >60  Chronic Kidney Disease <60  Kidney Failure <15      BNP [[id number]]  (Normal) Collected: 04/14/23 1650    Specimen: Blood Updated: 04/14/23 1717     proBNP 88.8 pg/mL     Narrative:      Among patients with dyspnea, NT-proBNP is highly sensitive for the detection of acute congestive heart failure. In addition NT-proBNP of <300 pg/ml effectively rules out acute congestive heart failure with 99% negative predictive value.      CBC Auto Differential [[id number]]  (Abnormal) Collected: 04/14/23 1650    Specimen: Blood Updated: 04/14/23 1703     WBC 7.93 10*3/mm3      RBC 6.05 10*6/mm3      Hemoglobin 15.4 g/dL      Hematocrit 48.9 %      MCV 80.8 fL      MCH 25.5 pg      MCHC 31.5 g/dL      RDW 13.2 %      RDW-SD 38.5 fl      MPV 9.7 fL      Platelets 209 10*3/mm3      Neutrophil % 70.4 %      Lymphocyte % 20.6 %      Monocyte % 8.2 %      Eosinophil % 0.1 %      Basophil % 0.4 %      Immature Grans % 0.3 %      Neutrophils, Absolute 5.59 10*3/mm3      Lymphocytes, Absolute 1.63 10*3/mm3      Monocytes, Absolute 0.65 10*3/mm3      Eosinophils, Absolute 0.01 10*3/mm3      Basophils, Absolute 0.03 10*3/mm3      Immature Grans, Absolute 0.02 10*3/mm3      nRBC 0.0 /100 WBC     Blood Gas, Arterial With Co-Ox [[id number]]  (Abnormal) Collected: 04/14/23 1707    Specimen: Arterial Blood Updated: 04/14/23 1707     Site Left Radial     Michael's Test N/A     pH, Arterial 7.334 pH units      Comment: 84 Value below reference range        pCO2, Arterial 54.6 mm Hg      Comment: 83 Value above reference range        pO2, Arterial 101.0 mm Hg      HCO3, Arterial 29.0 mmol/L      Comment: 83 Value above reference range        Base Excess, Arterial 1.7 mmol/L      O2 Saturation, Arterial 96.2 %       Hematocrit, Blood Gas 47.0 %      Oxyhemoglobin 95.9 %      Methemoglobin 0.60 %      Carboxyhemoglobin <0.0 %      Comment: 94 Value below reportable range < 0.0        A-a DO2 --     Comment: UNABLE TO CALCULATE        Barometric Pressure for Blood Gas 729 mmHg      Modality Nasal Cannula     Flow Rate 4.0 lpm      Ventilator Mode NA     Comment: Meter: [id number]     :  [id number]        Note --     pH, Temp Corrected --     pCO2, Temperature Corrected --     pO2, Temperature Corrected --           No radiology results from the last 24 hrs       MDM    Topher Stoll is a 47 y.o. male who presents to the emergency department for evaluation of shortness of breath    Differential diagnosis includes OPD exacerbation, pneumonia among other etiologies.    Chest x-ray, CBC, CMP, BNP, ABG ordered for further evaluation of the patient's presentation.    Chart review if available included outside testing, previous visits, prior labs, prior imaging, available notes from prior evaluations or visits with specialists, medication list, allergies, past medical history, past surgical history when applicable.    Patient was treated with Solu-Medrol, magnesium, DuoNeb and Ativan and Toradol    Patient states feeling much more comfortable breath sounds of increased although he still has wheezes he is moving more air.  He seems much more comfortable and is wishing for discharge home.  Did discuss admission but patient states he has as needed oxygen at home wishes to have prednisone and follow-up outpatient treatment as opposed to being admitted.  I feel this is reasonable.  He does look much better.  Case labs management discussed Dr. Ramires.    Plan for disposition is discharged home.  Patient/family comfortable with and understanding of the plan.      Final diagnoses:   COPD exacerbation        Gomez Delgadillo PA-C  04/14/23 [id number]       Gomez Delgadillo PA-C  04/14/23 [id number]

## 2023-04-22 ENCOUNTER — APPOINTMENT (OUTPATIENT)
Dept: GENERAL RADIOLOGY | Facility: HOSPITAL | Age: 48
End: 2023-04-22
Payer: MEDICARE

## 2023-04-22 ENCOUNTER — HOSPITAL ENCOUNTER (EMERGENCY)
Facility: HOSPITAL | Age: 48
Discharge: HOME | End: 2023-04-22
Attending: EMERGENCY MEDICINE
Payer: MEDICARE

## 2023-04-22 VITALS
HEART RATE: 73 BPM | WEIGHT: 190 LBS | DIASTOLIC BLOOD PRESSURE: 80 MMHG | OXYGEN SATURATION: 94 % | RESPIRATION RATE: 20 BRPM | TEMPERATURE: 97.8 F | HEIGHT: 73 IN | SYSTOLIC BLOOD PRESSURE: 120 MMHG | BODY MASS INDEX: 25.18 KG/M2

## 2023-04-22 DIAGNOSIS — J44.1 COPD EXACERBATION: Primary | ICD-10-CM

## 2023-04-22 PROCEDURE — 96365 THER/PROPH/DIAG IV INF INIT: CPT

## 2023-04-22 PROCEDURE — 93005 ELECTROCARDIOGRAM TRACING: CPT | Performed by: EMERGENCY MEDICINE

## 2023-04-22 PROCEDURE — 25010000002 MAGNESIUM SULFATE 2 GM/50ML SOLUTION: Performed by: EMERGENCY MEDICINE

## 2023-04-22 PROCEDURE — 94799 UNLISTED PULMONARY SVC/PX: CPT

## 2023-04-22 PROCEDURE — 99284 EMERGENCY DEPT VISIT MOD MDM: CPT

## 2023-04-22 PROCEDURE — 71045 X-RAY EXAM CHEST 1 VIEW: CPT

## 2023-04-22 PROCEDURE — 25010000002 METHYLPREDNISOLONE PER 125 MG: Performed by: EMERGENCY MEDICINE

## 2023-04-22 PROCEDURE — 94640 AIRWAY INHALATION TREATMENT: CPT

## 2023-04-22 PROCEDURE — 96375 TX/PRO/DX INJ NEW DRUG ADDON: CPT

## 2023-04-22 RX ORDER — GABAPENTIN 800 MG/1
1 TABLET ORAL 3 TIMES DAILY
COMMUNITY
Start: 2023-01-23 | End: 2023-04-30 | Stop reason: HOSPADM

## 2023-04-22 RX ORDER — IPRATROPIUM BROMIDE AND ALBUTEROL SULFATE 2.5; .5 MG/3ML; MG/3ML
3 SOLUTION RESPIRATORY (INHALATION) ONCE
Status: COMPLETED | OUTPATIENT
Start: 2023-04-22 | End: 2023-04-22

## 2023-04-22 RX ORDER — SODIUM CHLORIDE 0.9 % (FLUSH) 0.9 %
10 SYRINGE (ML) INJECTION AS NEEDED
Status: DISCONTINUED | OUTPATIENT
Start: 2023-04-22 | End: 2023-04-22 | Stop reason: HOSPADM

## 2023-04-22 RX ORDER — METHYLPREDNISOLONE SODIUM SUCCINATE 125 MG/2ML
125 INJECTION, POWDER, LYOPHILIZED, FOR SOLUTION INTRAMUSCULAR; INTRAVENOUS ONCE
Status: COMPLETED | OUTPATIENT
Start: 2023-04-22 | End: 2023-04-22

## 2023-04-22 RX ORDER — GUAIFENESIN 600 MG/1
1200 TABLET, EXTENDED RELEASE ORAL EVERY 12 HOURS SCHEDULED
Status: DISCONTINUED | OUTPATIENT
Start: 2023-04-22 | End: 2023-04-22 | Stop reason: HOSPADM

## 2023-04-22 RX ORDER — PREDNISONE 20 MG/1
TABLET ORAL
Qty: 10 TABLET | Refills: 0 | Status: SHIPPED | OUTPATIENT
Start: 2023-04-22 | End: 2023-04-30 | Stop reason: HOSPADM

## 2023-04-22 RX ORDER — MAGNESIUM SULFATE HEPTAHYDRATE 40 MG/ML
2 INJECTION, SOLUTION INTRAVENOUS ONCE
Status: COMPLETED | OUTPATIENT
Start: 2023-04-22 | End: 2023-04-22

## 2023-04-22 RX ADMIN — MAGNESIUM SULFATE HEPTAHYDRATE 2 G: 40 INJECTION, SOLUTION INTRAVENOUS at 05:17

## 2023-04-22 RX ADMIN — IPRATROPIUM BROMIDE AND ALBUTEROL SULFATE 3 ML: 2.5; .5 SOLUTION RESPIRATORY (INHALATION) at 05:12

## 2023-04-22 RX ADMIN — IPRATROPIUM BROMIDE AND ALBUTEROL SULFATE 3 ML: 2.5; .5 SOLUTION RESPIRATORY (INHALATION) at 06:20

## 2023-04-22 RX ADMIN — GUAIFENESIN 1200 MG: 600 TABLET ORAL at 06:41

## 2023-04-22 RX ADMIN — METHYLPREDNISOLONE SODIUM SUCCINATE 125 MG: 125 INJECTION, POWDER, FOR SOLUTION INTRAMUSCULAR; INTRAVENOUS at 05:17

## 2023-04-24 RX ORDER — LOVASTATIN 40 MG/1
TABLET ORAL
Qty: 90 TABLET | Refills: 3 | Status: SHIPPED | OUTPATIENT
Start: 2023-04-24

## 2023-04-24 NOTE — TELEPHONE ENCOUNTER
Rx Refill Note  Requested Prescriptions     Pending Prescriptions Disp Refills   • lovastatin (MEVACOR) 40 MG tablet [Pharmacy Med Name: LOVASTATIN 40MG TABLETS] 90 tablet 3     Sig: TAKE 1 TABLET BY MOUTH EVERY DAY FOR CHOLESTEROL      Last office visit with prescribing clinician: 10/21/2022   Last telemedicine visit with prescribing clinician: Visit date not found   Next office visit with prescribing clinician: Visit date not found   Last ordered: 6 years ago by Janak Robertson,   Called pt and [facility] had been refilling this rx until now.     Last lab- don't see a lipid panel

## 2023-04-27 ENCOUNTER — APPOINTMENT (OUTPATIENT)
Dept: GENERAL RADIOLOGY | Facility: HOSPITAL | Age: 48
DRG: 190 | End: 2023-04-27
Payer: MEDICARE

## 2023-04-27 ENCOUNTER — HOSPITAL ENCOUNTER (INPATIENT)
Facility: HOSPITAL | Age: 48
LOS: 3 days | Discharge: HOME | DRG: 190 | End: 2023-04-30
Attending: EMERGENCY MEDICINE | Admitting: FAMILY MEDICINE
Payer: MEDICARE

## 2023-04-27 DIAGNOSIS — J96.21 ACUTE ON CHRONIC RESPIRATORY FAILURE WITH HYPOXIA: ICD-10-CM

## 2023-04-27 DIAGNOSIS — J44.1 COPD EXACERBATION: ICD-10-CM

## 2023-04-27 DIAGNOSIS — J44.1 COPD WITH ACUTE EXACERBATION: Primary | ICD-10-CM

## 2023-04-27 LAB
A-A DO2: 71.3 MMHG
ALBUMIN SERPL-MCNC: 4.3 G/DL (ref 3.5–5.2)
ALBUMIN/GLOB SERPL: 1.4 G/DL
ALP SERPL-CCNC: 122 U/L (ref 39–117)
ALT SERPL W P-5'-P-CCNC: 20 U/L (ref 1–41)
ANION GAP SERPL CALCULATED.3IONS-SCNC: 11.5 MMOL/L (ref 5–15)
ARTERIAL PATENCY WRIST A: POSITIVE
AST SERPL-CCNC: 19 U/L (ref 1–40)
ATMOSPHERIC PRESS: 729 MMHG
BASE EXCESS BLDA CALC-SCNC: 3.2 MMOL/L (ref 0–2)
BASOPHILS # BLD AUTO: 0.04 10*3/MM3 (ref 0–0.2)
BASOPHILS NFR BLD AUTO: 0.3 % (ref 0–1.5)
BDY SITE: ABNORMAL
BILIRUB SERPL-MCNC: 0.3 MG/DL (ref 0–1.2)
BUN SERPL-MCNC: 13 MG/DL (ref 6–20)
BUN/CREAT SERPL: 14.1 (ref 7–25)
CALCIUM SPEC-SCNC: 9 MG/DL (ref 8.6–10.5)
CHLORIDE SERPL-SCNC: 97 MMOL/L (ref 98–107)
CO2 SERPL-SCNC: 29.5 MMOL/L (ref 22–29)
COHGB MFR BLD: <0 % (ref 0–2)
CREAT SERPL-MCNC: 0.92 MG/DL (ref 0.76–1.27)
DEPRECATED RDW RBC AUTO: 38.3 FL (ref 37–54)
EGFRCR SERPLBLD CKD-EPI 2021: 102.6 ML/MIN/1.73
EOSINOPHIL # BLD AUTO: 0.06 10*3/MM3 (ref 0–0.4)
EOSINOPHIL NFR BLD AUTO: 0.5 % (ref 0.3–6.2)
ERYTHROCYTE [DISTWIDTH] IN BLOOD BY AUTOMATED COUNT: 13.4 % (ref 12.3–15.4)
GAS FLOW AIRWAY: 2 LPM
GLOBULIN UR ELPH-MCNC: 3 GM/DL
GLUCOSE SERPL-MCNC: 98 MG/DL (ref 65–99)
HCO3 BLDA-SCNC: 29.7 MMOL/L (ref 22–28)
HCT VFR BLD AUTO: 48.2 % (ref 37.5–51)
HCT VFR BLD CALC: 48.8 %
HGB BLD-MCNC: 15.6 G/DL (ref 13–17.7)
HOLD SPECIMEN: NORMAL
HOLD SPECIMEN: NORMAL
IMM GRANULOCYTES # BLD AUTO: 0.03 10*3/MM3 (ref 0–0.05)
IMM GRANULOCYTES NFR BLD AUTO: 0.2 % (ref 0–0.5)
INHALED O2 CONCENTRATION: 28 %
LYMPHOCYTES # BLD AUTO: 1.33 10*3/MM3 (ref 0.7–3.1)
LYMPHOCYTES NFR BLD AUTO: 11 % (ref 19.6–45.3)
MCH RBC QN AUTO: 25.5 PG (ref 26.6–33)
MCHC RBC AUTO-ENTMCNC: 32.4 G/DL (ref 31.5–35.7)
MCV RBC AUTO: 78.9 FL (ref 79–97)
METHGB BLD QL: 0.6 % (ref 0–1.5)
MODALITY: ABNORMAL
MONOCYTES # BLD AUTO: 0.7 10*3/MM3 (ref 0.1–0.9)
MONOCYTES NFR BLD AUTO: 5.8 % (ref 5–12)
NEUTROPHILS NFR BLD AUTO: 82.2 % (ref 42.7–76)
NEUTROPHILS NFR BLD AUTO: 9.96 10*3/MM3 (ref 1.7–7)
NOTE: ABNORMAL
NRBC BLD AUTO-RTO: 0 /100 WBC (ref 0–0.2)
NT-PROBNP SERPL-MCNC: 79.8 PG/ML (ref 0–450)
OXYHGB MFR BLDV: 89.7 % (ref 94–99)
PCO2 BLDA: 51 MM HG (ref 35–45)
PCO2 TEMP ADJ BLD: ABNORMAL MM[HG]
PH BLDA: 7.37 PH UNITS (ref 7.35–7.45)
PH, TEMP CORRECTED: ABNORMAL
PLATELET # BLD AUTO: 240 10*3/MM3 (ref 140–450)
PMV BLD AUTO: 9 FL (ref 6–12)
PO2 BLDA: 62.8 MM HG (ref 75–100)
PO2 TEMP ADJ BLD: ABNORMAL MM[HG]
POTASSIUM SERPL-SCNC: 4.1 MMOL/L (ref 3.5–5.2)
PROT SERPL-MCNC: 7.3 G/DL (ref 6–8.5)
RBC # BLD AUTO: 6.11 10*6/MM3 (ref 4.14–5.8)
SAO2 % BLDCOA: 90.1 % (ref 94–100)
SODIUM SERPL-SCNC: 138 MMOL/L (ref 136–145)
TROPONIN T SERPL HS-MCNC: <6 NG/L
VENTILATOR MODE: ABNORMAL
WBC NRBC COR # BLD: 12.12 10*3/MM3 (ref 3.4–10.8)
WHOLE BLOOD HOLD COAG: NORMAL
WHOLE BLOOD HOLD SPECIMEN: NORMAL

## 2023-04-27 PROCEDURE — 99285 EMERGENCY DEPT VISIT HI MDM: CPT

## 2023-04-27 PROCEDURE — 94799 UNLISTED PULMONARY SVC/PX: CPT

## 2023-04-27 PROCEDURE — 25010000002 LORAZEPAM PER 2 MG: Performed by: EMERGENCY MEDICINE

## 2023-04-27 PROCEDURE — 93005 ELECTROCARDIOGRAM TRACING: CPT | Performed by: EMERGENCY MEDICINE

## 2023-04-27 PROCEDURE — 71045 X-RAY EXAM CHEST 1 VIEW: CPT

## 2023-04-27 PROCEDURE — 84484 ASSAY OF TROPONIN QUANT: CPT | Performed by: EMERGENCY MEDICINE

## 2023-04-27 PROCEDURE — 85025 COMPLETE CBC W/AUTO DIFF WBC: CPT | Performed by: EMERGENCY MEDICINE

## 2023-04-27 PROCEDURE — 82375 ASSAY CARBOXYHB QUANT: CPT

## 2023-04-27 PROCEDURE — 83050 HGB METHEMOGLOBIN QUAN: CPT

## 2023-04-27 PROCEDURE — 99223 1ST HOSP IP/OBS HIGH 75: CPT | Performed by: FAMILY MEDICINE

## 2023-04-27 PROCEDURE — 94640 AIRWAY INHALATION TREATMENT: CPT

## 2023-04-27 PROCEDURE — 80053 COMPREHEN METABOLIC PANEL: CPT | Performed by: EMERGENCY MEDICINE

## 2023-04-27 PROCEDURE — 83880 ASSAY OF NATRIURETIC PEPTIDE: CPT | Performed by: EMERGENCY MEDICINE

## 2023-04-27 PROCEDURE — 25010000002 MAGNESIUM SULFATE 2 GM/50ML SOLUTION: Performed by: PHYSICIAN ASSISTANT

## 2023-04-27 PROCEDURE — 25010000002 METHYLPREDNISOLONE PER 125 MG: Performed by: PHYSICIAN ASSISTANT

## 2023-04-27 PROCEDURE — 36600 WITHDRAWAL OF ARTERIAL BLOOD: CPT

## 2023-04-27 PROCEDURE — 82805 BLOOD GASES W/O2 SATURATION: CPT

## 2023-04-27 RX ORDER — BISACODYL 5 MG/1
5 TABLET, DELAYED RELEASE ORAL DAILY PRN
Status: DISCONTINUED | OUTPATIENT
Start: 2023-04-27 | End: 2023-04-30 | Stop reason: HOSPADM

## 2023-04-27 RX ORDER — LORAZEPAM 2 MG/ML
0.5 INJECTION INTRAMUSCULAR EVERY 6 HOURS PRN
Status: DISCONTINUED | OUTPATIENT
Start: 2023-04-27 | End: 2023-04-30 | Stop reason: HOSPADM

## 2023-04-27 RX ORDER — TAMSULOSIN HYDROCHLORIDE 0.4 MG/1
0.4 CAPSULE ORAL NIGHTLY
Status: DISCONTINUED | OUTPATIENT
Start: 2023-04-28 | End: 2023-04-30 | Stop reason: HOSPADM

## 2023-04-27 RX ORDER — PANTOPRAZOLE SODIUM 40 MG/1
40 TABLET, DELAYED RELEASE ORAL
Status: DISCONTINUED | OUTPATIENT
Start: 2023-04-28 | End: 2023-04-30 | Stop reason: HOSPADM

## 2023-04-27 RX ORDER — ALBUTEROL SULFATE 2.5 MG/3ML
2.5 SOLUTION RESPIRATORY (INHALATION) ONCE
Status: COMPLETED | OUTPATIENT
Start: 2023-04-27 | End: 2023-04-27

## 2023-04-27 RX ORDER — SODIUM CHLORIDE 0.9 % (FLUSH) 0.9 %
10 SYRINGE (ML) INJECTION AS NEEDED
Status: DISCONTINUED | OUTPATIENT
Start: 2023-04-27 | End: 2023-04-30 | Stop reason: HOSPADM

## 2023-04-27 RX ORDER — CHOLECALCIFEROL (VITAMIN D3) 125 MCG
5 CAPSULE ORAL NIGHTLY PRN
Status: DISCONTINUED | OUTPATIENT
Start: 2023-04-27 | End: 2023-04-30 | Stop reason: HOSPADM

## 2023-04-27 RX ORDER — METHYLPREDNISOLONE SODIUM SUCCINATE 125 MG/2ML
60 INJECTION, POWDER, LYOPHILIZED, FOR SOLUTION INTRAMUSCULAR; INTRAVENOUS EVERY 8 HOURS
Status: DISCONTINUED | OUTPATIENT
Start: 2023-04-28 | End: 2023-04-28

## 2023-04-27 RX ORDER — ALBUTEROL SULFATE 2.5 MG/3ML
2.5 SOLUTION RESPIRATORY (INHALATION) EVERY 6 HOURS PRN
Status: DISCONTINUED | OUTPATIENT
Start: 2023-04-27 | End: 2023-04-28

## 2023-04-27 RX ORDER — AMOXICILLIN 250 MG
2 CAPSULE ORAL 2 TIMES DAILY
Status: DISCONTINUED | OUTPATIENT
Start: 2023-04-28 | End: 2023-04-30 | Stop reason: HOSPADM

## 2023-04-27 RX ORDER — LORAZEPAM 0.5 MG/1
0.5 TABLET ORAL EVERY 6 HOURS PRN
Status: DISCONTINUED | OUTPATIENT
Start: 2023-04-27 | End: 2023-04-30 | Stop reason: HOSPADM

## 2023-04-27 RX ORDER — IPRATROPIUM BROMIDE AND ALBUTEROL SULFATE 2.5; .5 MG/3ML; MG/3ML
3 SOLUTION RESPIRATORY (INHALATION)
Status: DISCONTINUED | OUTPATIENT
Start: 2023-04-28 | End: 2023-04-28

## 2023-04-27 RX ORDER — LORAZEPAM 2 MG/ML
1 INJECTION INTRAMUSCULAR ONCE
Status: COMPLETED | OUTPATIENT
Start: 2023-04-27 | End: 2023-04-27

## 2023-04-27 RX ORDER — FINASTERIDE 5 MG/1
5 TABLET, FILM COATED ORAL DAILY
Status: DISCONTINUED | OUTPATIENT
Start: 2023-04-28 | End: 2023-04-30 | Stop reason: HOSPADM

## 2023-04-27 RX ORDER — MAGNESIUM SULFATE HEPTAHYDRATE 40 MG/ML
2 INJECTION, SOLUTION INTRAVENOUS ONCE
Status: COMPLETED | OUTPATIENT
Start: 2023-04-27 | End: 2023-04-27

## 2023-04-27 RX ORDER — ACETAMINOPHEN 650 MG/1
650 SUPPOSITORY RECTAL EVERY 4 HOURS PRN
Status: DISCONTINUED | OUTPATIENT
Start: 2023-04-27 | End: 2023-04-30 | Stop reason: HOSPADM

## 2023-04-27 RX ORDER — ACETAMINOPHEN 325 MG/1
650 TABLET ORAL EVERY 4 HOURS PRN
Status: DISCONTINUED | OUTPATIENT
Start: 2023-04-27 | End: 2023-04-30 | Stop reason: HOSPADM

## 2023-04-27 RX ORDER — FLUTICASONE PROPIONATE 50 MCG
1 SPRAY, SUSPENSION (ML) NASAL DAILY
Status: DISCONTINUED | OUTPATIENT
Start: 2023-04-28 | End: 2023-04-30 | Stop reason: HOSPADM

## 2023-04-27 RX ORDER — IPRATROPIUM BROMIDE AND ALBUTEROL SULFATE 2.5; .5 MG/3ML; MG/3ML
3 SOLUTION RESPIRATORY (INHALATION) ONCE
Status: DISCONTINUED | OUTPATIENT
Start: 2023-04-27 | End: 2023-04-27

## 2023-04-27 RX ORDER — METHYLPREDNISOLONE SODIUM SUCCINATE 125 MG/2ML
125 INJECTION, POWDER, LYOPHILIZED, FOR SOLUTION INTRAMUSCULAR; INTRAVENOUS ONCE
Status: COMPLETED | OUTPATIENT
Start: 2023-04-27 | End: 2023-04-27

## 2023-04-27 RX ORDER — GUAIFENESIN/DEXTROMETHORPHAN 100-10MG/5
10 SYRUP ORAL EVERY 6 HOURS PRN
Status: DISCONTINUED | OUTPATIENT
Start: 2023-04-27 | End: 2023-04-30 | Stop reason: HOSPADM

## 2023-04-27 RX ORDER — TRAZODONE HYDROCHLORIDE 50 MG/1
50 TABLET ORAL NIGHTLY
Status: DISCONTINUED | OUTPATIENT
Start: 2023-04-28 | End: 2023-04-30 | Stop reason: HOSPADM

## 2023-04-27 RX ORDER — HYDROXYZINE PAMOATE 50 MG/1
50 CAPSULE ORAL 3 TIMES DAILY PRN
Status: DISCONTINUED | OUTPATIENT
Start: 2023-04-27 | End: 2023-04-30 | Stop reason: HOSPADM

## 2023-04-27 RX ORDER — GUAIFENESIN 600 MG/1
600 TABLET, EXTENDED RELEASE ORAL EVERY 12 HOURS SCHEDULED
Status: DISCONTINUED | OUTPATIENT
Start: 2023-04-28 | End: 2023-04-30 | Stop reason: HOSPADM

## 2023-04-27 RX ORDER — BUDESONIDE AND FORMOTEROL FUMARATE DIHYDRATE 160; 4.5 UG/1; UG/1
2 AEROSOL RESPIRATORY (INHALATION)
Status: DISCONTINUED | OUTPATIENT
Start: 2023-04-28 | End: 2023-04-30 | Stop reason: HOSPADM

## 2023-04-27 RX ORDER — ACETAMINOPHEN 160 MG/5ML
650 SOLUTION ORAL EVERY 4 HOURS PRN
Status: DISCONTINUED | OUTPATIENT
Start: 2023-04-27 | End: 2023-04-30 | Stop reason: HOSPADM

## 2023-04-27 RX ORDER — POLYETHYLENE GLYCOL 3350 17 G/17G
17 POWDER, FOR SOLUTION ORAL DAILY PRN
Status: DISCONTINUED | OUTPATIENT
Start: 2023-04-27 | End: 2023-04-30 | Stop reason: HOSPADM

## 2023-04-27 RX ORDER — ASPIRIN 81 MG/1
81 TABLET ORAL DAILY
Status: DISCONTINUED | OUTPATIENT
Start: 2023-04-28 | End: 2023-04-30 | Stop reason: HOSPADM

## 2023-04-27 RX ORDER — BISACODYL 10 MG
10 SUPPOSITORY, RECTAL RECTAL DAILY PRN
Status: DISCONTINUED | OUTPATIENT
Start: 2023-04-27 | End: 2023-04-30 | Stop reason: HOSPADM

## 2023-04-27 RX ORDER — ATORVASTATIN CALCIUM 10 MG/1
10 TABLET, FILM COATED ORAL DAILY
Status: DISCONTINUED | OUTPATIENT
Start: 2023-04-28 | End: 2023-04-30 | Stop reason: HOSPADM

## 2023-04-27 RX ADMIN — ALBUTEROL SULFATE 2.5 MG: 2.5 SOLUTION RESPIRATORY (INHALATION) at 19:39

## 2023-04-27 RX ADMIN — LORAZEPAM 1 MG: 2 INJECTION INTRAMUSCULAR; INTRAVENOUS at 23:22

## 2023-04-27 RX ADMIN — ALBUTEROL SULFATE 2.5 MG: 2.5 SOLUTION RESPIRATORY (INHALATION) at 18:40

## 2023-04-27 RX ADMIN — METHYLPREDNISOLONE SODIUM SUCCINATE 125 MG: 125 INJECTION, POWDER, FOR SOLUTION INTRAMUSCULAR; INTRAVENOUS at 18:28

## 2023-04-27 RX ADMIN — MAGNESIUM SULFATE HEPTAHYDRATE 2 G: 40 INJECTION, SOLUTION INTRAVENOUS at 19:52

## 2023-04-27 NOTE — ED PROVIDER NOTES
Subjective   History of Present Illness  47-year-old male who presents to the emergency department chief complaint shortness of breath, chest tightness that started proximally 2 days ago.  Patient does state he has significant history of COPD, asthma, anxiety, GERD, hypertension.  Patient does state he quit smoking many years back.  Denies any coronary artery disease.  Complains of generalized chest tightness.  Does state he has had dry nonproductive cough.    History provided by:  Patient   used: No    Shortness of Breath  Severity:  Moderate  Onset quality:  Gradual  Duration:  2 days  Timing:  Intermittent  Progression:  Worsening  Chronicity:  New  Context: not activity, not animal exposure, not emotional upset, not fumes and not occupational exposure    Relieved by:  Nothing  Worsened by:  Nothing  Ineffective treatments:  None tried  Associated symptoms: cough    Associated symptoms: no chest pain, no diaphoresis, no ear pain, no fever, no headaches, no hemoptysis, no neck pain, no PND and no rash    Risk factors: no recent alcohol use, no family hx of DVT, no hx of cancer, no hx of PE/DVT, no obesity, no prolonged immobilization, no recent surgery and no tobacco use        Review of Systems   Constitutional: Negative.  Negative for chills, diaphoresis, fatigue and fever.   HENT: Negative.  Negative for congestion, dental problem, ear discharge, ear pain, facial swelling, hearing loss and mouth sores.    Eyes: Negative.  Negative for photophobia, pain, redness, itching and visual disturbance.   Respiratory: Positive for cough, chest tightness and shortness of breath. Negative for hemoptysis.    Cardiovascular: Negative.  Negative for chest pain, palpitations, leg swelling and PND.   Gastrointestinal: Negative.  Negative for anal bleeding, blood in stool, constipation, diarrhea and nausea.   Endocrine: Negative.  Negative for heat intolerance and polyphagia.   Genitourinary: Negative.   Negative for flank pain, frequency, genital sores, hematuria and penile pain.   Musculoskeletal: Negative.  Negative for gait problem, joint swelling, myalgias and neck pain.   Skin: Negative.  Negative for pallor, rash and wound.   Neurological: Negative.  Negative for seizures, facial asymmetry, speech difficulty and headaches.   Hematological: Negative.    Psychiatric/Behavioral: Negative.  Negative for confusion, decreased concentration, dysphoric mood and self-injury. The patient is not nervous/anxious and is not hyperactive.    All other systems reviewed and are negative.      Past Medical History:   Diagnosis Date   • Abdominal adhesions    • Anxiety    • Arthritis    • Asthma    • Cervical radiculopathy    • Colitis    • COPD (chronic obstructive pulmonary disease)    • GERD (gastroesophageal reflux disease)    • Heart attack    • History of hepatitis C     Treated with Epclusa in 2019   • History of recreational drug use    • HTN (hypertension)    • Kidney cysts    • Left hip pain    • Liver disease    • Low back pain    • Migraines    • Obstructive chronic bronchitis with exacerbation    • Sleep apnea     mild   • Tattoos        Allergies   Allergen Reactions   • Doxycycline Nausea And Vomiting       Past Surgical History:   Procedure Laterality Date   • BACK SURGERY     • COLONOSCOPY  2014   • COLONOSCOPY N/A 1/4/2022    Procedure: COLONOSCOPY with biopsies;  Surgeon: Giancarlo Ruiz MD;  Location: [facility] ENDOSCOPY;  Service: Gastroenterology;  Laterality: N/A;   • HERNIA REPAIR     • HIP SPACER INSERTION WITH ANTIBIOTIC CEMENT Left 1/15/2020    Procedure: TOTAL HIP IMPLANT REMOVAL WITH INSERTION OF ANTIBIOTIC SPACER LEFT;  Surgeon: Ba Ramirez MD;  Location: [facility] OR;  Service: Orthopedics   • SHOULDER LIGAMENT REPAIR      right shoulder   • SMALL INTESTINE SURGERY      Small bowell resection   • TOTAL HIP ARTHROPLASTY Left    • TOTAL HIP ARTHROPLASTY Right    • TOTAL HIP ARTHROPLASTY  REVISION Left 3/5/2020    Procedure: HIP REIMPLANT REVISION LEFT;  Surgeon: Ba Ramirez MD;  Location: [facility];  Service: Orthopedics;  Laterality: Left;   • UPPER GASTROINTESTINAL ENDOSCOPY         Family History   Problem Relation Age of Onset   • Arthritis Other    • Hypertension Other    • Migraines Other    • Heart attack Other    • Stroke Other    • Colon cancer Neg Hx        Social History     Socioeconomic History   • Marital status:    Tobacco Use   • Smoking status: Former     Packs/day: 2.00     Years: 15.00     Pack years: 30.00     Types: Cigarettes     Quit date:      Years since quittin.3     Passive exposure: Current   • Smokeless tobacco: Current     Types: Chew   Vaping Use   • Vaping Use: Never used   Substance and Sexual Activity   • Alcohol use: No     Comment: stopped drinking alcohol   • Drug use: Not Currently     Comment: takes suboxone   • Sexual activity: Defer           Objective   Physical Exam  Vitals and nursing note reviewed.   Constitutional:       General: He is not in acute distress.     Appearance: He is well-developed and normal weight. He is not ill-appearing or toxic-appearing.   HENT:      Head: Normocephalic and atraumatic.      Mouth/Throat:      Mouth: Mucous membranes are moist.      Pharynx: Oropharynx is clear. No pharyngeal swelling or oropharyngeal exudate.   Neck:      Thyroid: No thyromegaly.      Vascular: No hepatojugular reflux.      Trachea: No tracheal deviation.   Cardiovascular:      Rate and Rhythm: Normal rate and regular rhythm.  No extrasystoles are present.     Pulses: Normal pulses. No decreased pulses.           Carotid pulses are on the right side with bruit.     Heart sounds: Normal heart sounds. No murmur heard.    No gallop.   Pulmonary:      Effort: Pulmonary effort is normal. Tachypnea present. No accessory muscle usage or respiratory distress.      Breath sounds: Examination of the right-upper field reveals decreased  breath sounds and wheezing. Examination of the left-upper field reveals decreased breath sounds and wheezing. Examination of the right-middle field reveals decreased breath sounds and wheezing. Examination of the left-middle field reveals decreased breath sounds and wheezing. Examination of the right-lower field reveals decreased breath sounds and wheezing. Examination of the left-lower field reveals decreased breath sounds and wheezing. Decreased breath sounds and wheezing present.   Chest:      Chest wall: No mass, deformity, tenderness, crepitus or edema. There is no dullness to percussion.   Abdominal:      General: Bowel sounds are normal.      Palpations: Abdomen is soft. There is no hepatomegaly, splenomegaly or mass.      Tenderness: There is no abdominal tenderness.   Musculoskeletal:         General: Normal range of motion.      Cervical back: Normal range of motion and neck supple.      Right lower leg: No tenderness. No edema.      Left lower leg: No tenderness.   Lymphadenopathy:      Cervical: No cervical adenopathy.   Skin:     General: Skin is warm and dry.      Capillary Refill: Capillary refill takes less than 2 seconds.      Coloration: Skin is not cyanotic or pale.      Findings: No ecchymosis or rash.   Neurological:      General: No focal deficit present.      Mental Status: He is alert and oriented to person, place, and time.      Deep Tendon Reflexes: Reflexes are normal and symmetric.   Psychiatric:         Mood and Affect: Mood is anxious.         Behavior: Behavior normal.         Thought Content: Thought content normal.         Judgment: Judgment normal.         Procedures           ED Course  ED Course as of 04/27/23 2237   Thu Apr 27, 2023   1822 EKG interpreted by me.  Sinus rhythm.  Rate of 94.  Nonspecific Q wave.  Some artifact present.  No obvious ST or T wave changes.  Abnormal EKG [CG]      ED Course User Index  [CG] Tanner Ramires DO                                            Medical Decision Making  Initial impression of presenting illness: 47-year-old male presents with shortness of breath and wheezing with a history of end-stage COPD     DDX: includes but is not limited to: COPD exacerbation             Pertinent features from physical exam: Diffuse wheezes..     Initial diagnostic plan: Chest x-ray Solumedrol    Results from initial plan were reviewed and interpreted by me revealing feeling better after interventions    Amount and/or Complexity of Data Reviewed  External Data Reviewed: labs and radiology.  Labs: ordered.  Radiology: ordered.  ECG/medicine tests: ordered.      Risk  Prescription drug management.      22:37 EDT  I received care from physicians assistant in regards to follow-up of response to treatment.  47-year-old male presents with shortness of breath chest tightness nonproductive cough.  Ex-smoker, longstanding COPD and asthma.  I saw and evaluated patient few days prior, he had just completed his prednisone dosing.  Returns with worsening symptoms.  Patient received aggressive interventions of multiple nebs, Solu-Medrol, magnesium.  Was monitored in emergency department 4-1/2 hours, on my examination still persistent diffuse wheezing.  Upon ambulation to the bathroom he had oxygen desaturations to 87%.  Did rebound sats to 91 to 93% on baseline oxygen.  Given persistent symptoms and failure of outpatient treatment, discussed with Dr. Dr. Araujo hospitalist service, will admit.    Final diagnoses:   COPD with acute exacerbation       ED Disposition  ED Disposition     ED Disposition   Decision to Admit    Condition   --    Comment   Level of Care: Telemetry [5]   Diagnosis: COPD with acute exacerbation [126431]   Admitting Physician: ANKIT ARAUJO [[id number]]   Isolate for COVID?: No [0]   Certification: I Certify That Inpatient Hospital Services Are Medically Necessary For Greater Than 2 Midnights               No follow-up provider specified.       Medication  List      No changes were made to your prescriptions during this visit.          Isauro Oliveira PA-C  04/27/23 1930       Dominick Riley,   04/27/23 2471

## 2023-04-27 NOTE — ED PROVIDER NOTES
Subjective  History of Present Illness:    Chief Complaint: Shortness of breath  History of Present Illness: 47-year-old male history of end-stage COPD, here with shortness of breath and wheezing.    Nurses Notes reviewed and agree, including vitals, allergies, social history and prior medical history.     REVIEW OF SYSTEMS: All systems reviewed and not pertinent unless noted.  Review of Systems      Positive for: Short of breath and wheezing with history of end-stage COPD    Negative for: Fever cough hemoptysis syncope palpitations    Past Medical History:   Diagnosis Date   • Abdominal adhesions    • Anxiety    • Arthritis    • Asthma    • Cervical radiculopathy    • Colitis    • COPD (chronic obstructive pulmonary disease)    • GERD (gastroesophageal reflux disease)    • Heart attack    • History of hepatitis C     Treated with Epclusa in 2019   • History of recreational drug use    • HTN (hypertension)    • Kidney cysts    • Left hip pain    • Liver disease    • Low back pain    • Migraines    • Obstructive chronic bronchitis with exacerbation    • Sleep apnea     mild   • Tattoos        Allergies:    Doxycycline      Past Surgical History:   Procedure Laterality Date   • BACK SURGERY     • COLONOSCOPY  2014   • COLONOSCOPY N/A 1/4/2022    Procedure: COLONOSCOPY with biopsies;  Surgeon: Giancarlo Ruiz MD;  Location: [facility] ENDOSCOPY;  Service: Gastroenterology;  Laterality: N/A;   • HERNIA REPAIR     • HIP SPACER INSERTION WITH ANTIBIOTIC CEMENT Left 1/15/2020    Procedure: TOTAL HIP IMPLANT REMOVAL WITH INSERTION OF ANTIBIOTIC SPACER LEFT;  Surgeon: Ba Ramirez MD;  Location: [facility] OR;  Service: Orthopedics   • SHOULDER LIGAMENT REPAIR      right shoulder   • SMALL INTESTINE SURGERY      Small bowell resection   • TOTAL HIP ARTHROPLASTY Left    • TOTAL HIP ARTHROPLASTY Right    • TOTAL HIP ARTHROPLASTY REVISION Left 3/5/2020    Procedure: HIP REIMPLANT REVISION LEFT;  Surgeon: Ba Ramirez MD;  " Location: [facility] OR;  Service: Orthopedics;  Laterality: Left;   • UPPER GASTROINTESTINAL ENDOSCOPY           Social History     Socioeconomic History   • Marital status:    Tobacco Use   • Smoking status: Former     Packs/day: 2.00     Years: 15.00     Pack years: 30.00     Types: Cigarettes     Quit date:      Years since quittin.3     Passive exposure: Current   • Smokeless tobacco: Current     Types: Chew   Vaping Use   • Vaping Use: Never used   Substance and Sexual Activity   • Alcohol use: No     Comment: stopped drinking alcohol   • Drug use: Not Currently     Comment: takes suboxone   • Sexual activity: Defer         Family History   Problem Relation Age of Onset   • Arthritis Other    • Hypertension Other    • Migraines Other    • Heart attack Other    • Stroke Other    • Colon cancer Neg Hx        Objective  Physical Exam:  /80   Pulse 73   Temp 97.8 °F (36.6 °C) (Oral)   Resp 20   Ht 185.4 cm (73\")   Wt 86.2 kg (190 lb)   SpO2 94%   BMI 25.07 kg/m²      Physical Exam    CONSTITUTIONAL: Well developed, nontoxic 47-year-old male,  in no acute distress.  VITAL SIGNS: per nursing, reviewed and noted  SKIN: exposed skin with no rashes, ulcerations or petechiae  EYES: Grossly EOMI, no icterus  ENT: Normal voice.  Moist mucous membranes   RESPIRATORY:  No increased work of breathing. No retractions.  Diffuse wheezes  CARDIOVASCULAR:   Extremities pink and warm.  Good cap refill to extremities.   GI: Abdomen without distention   MUSCULOSKELETAL: Age-appropriate bulk and tone, moves all 4 extremities  NEUROLOGIC: Alert, oriented x 3. No gross deficits. GCS 15.   PSYCH: appropriate affect.  : no bladder tenderness or distention, no CVA tenderness    Procedures  No attending physician procedures were performed on this patient.  ED Course:    Lab Results (last 24 hours)     ** No results found for the last 24 hours. **           No radiology results from the last 24 hrs "     [facility]         Medical Decision Making:    Initial impression of presenting illness: 47-year-old male presents with shortness of breath and wheezing with a history of end-stage COPD    DDX: includes but is not limited to: COPD exacerbation         Patient arrives normotensive afebrile no tachycardia oxygen saturations 94% room air with vitals interpreted by myself.     Pertinent features from physical exam: Diffuse wheezes..    Initial diagnostic plan: Chest x-ray duo neb magnesium Solu-Medrol    Results from initial plan were reviewed and interpreted by me revealing feeling better after interventions    Diagnostic information from other sources: Patient      Results/clinical rationale were discussed with patient    Consultations/Discussion of results with other physicians: None    Disposition plan: Patient was discharged in home stable condition.  Counseled on supportive care, outpatient follow-up. Return precaution discussed.  Patient/family was understanding and agreeable with plan    -----  I will add prescription prednisone.    Final diagnoses:   COPD exacerbation        Dominick Riley,   04/27/23 0654

## 2023-04-28 LAB
ANION GAP SERPL CALCULATED.3IONS-SCNC: 10.1 MMOL/L (ref 5–15)
B PARAPERT DNA SPEC QL NAA+PROBE: NOT DETECTED
B PERT DNA SPEC QL NAA+PROBE: NOT DETECTED
BUN SERPL-MCNC: 13 MG/DL (ref 6–20)
BUN/CREAT SERPL: 15.1 (ref 7–25)
C PNEUM DNA NPH QL NAA+NON-PROBE: NOT DETECTED
CALCIUM SPEC-SCNC: 9 MG/DL (ref 8.6–10.5)
CHLORIDE SERPL-SCNC: 99 MMOL/L (ref 98–107)
CO2 SERPL-SCNC: 26.9 MMOL/L (ref 22–29)
CREAT SERPL-MCNC: 0.86 MG/DL (ref 0.76–1.27)
DEPRECATED RDW RBC AUTO: 39.2 FL (ref 37–54)
EGFRCR SERPLBLD CKD-EPI 2021: 106.8 ML/MIN/1.73
ERYTHROCYTE [DISTWIDTH] IN BLOOD BY AUTOMATED COUNT: 13.4 % (ref 12.3–15.4)
FLUAV SUBTYP SPEC NAA+PROBE: NOT DETECTED
FLUBV RNA ISLT QL NAA+PROBE: NOT DETECTED
GLUCOSE SERPL-MCNC: 146 MG/DL (ref 65–99)
HADV DNA SPEC NAA+PROBE: NOT DETECTED
HCOV 229E RNA SPEC QL NAA+PROBE: NOT DETECTED
HCOV HKU1 RNA SPEC QL NAA+PROBE: NOT DETECTED
HCOV NL63 RNA SPEC QL NAA+PROBE: NOT DETECTED
HCOV OC43 RNA SPEC QL NAA+PROBE: NOT DETECTED
HCT VFR BLD AUTO: 43.6 % (ref 37.5–51)
HGB BLD-MCNC: 14.1 G/DL (ref 13–17.7)
HMPV RNA NPH QL NAA+NON-PROBE: NOT DETECTED
HPIV1 RNA ISLT QL NAA+PROBE: NOT DETECTED
HPIV2 RNA SPEC QL NAA+PROBE: NOT DETECTED
HPIV3 RNA NPH QL NAA+PROBE: NOT DETECTED
HPIV4 P GENE NPH QL NAA+PROBE: NOT DETECTED
M PNEUMO IGG SER IA-ACNC: NOT DETECTED
MCH RBC QN AUTO: 25.7 PG (ref 26.6–33)
MCHC RBC AUTO-ENTMCNC: 32.3 G/DL (ref 31.5–35.7)
MCV RBC AUTO: 79.4 FL (ref 79–97)
PLATELET # BLD AUTO: 216 10*3/MM3 (ref 140–450)
PMV BLD AUTO: 9.8 FL (ref 6–12)
POTASSIUM SERPL-SCNC: 4.3 MMOL/L (ref 3.5–5.2)
PROCALCITONIN SERPL-MCNC: 0.03 NG/ML (ref 0–0.25)
RBC # BLD AUTO: 5.49 10*6/MM3 (ref 4.14–5.8)
RHINOVIRUS RNA SPEC NAA+PROBE: NOT DETECTED
RSV RNA NPH QL NAA+NON-PROBE: NOT DETECTED
SARS-COV-2 RNA NPH QL NAA+NON-PROBE: NOT DETECTED
SARS-COV-2 RNA RESP QL NAA+PROBE: NOT DETECTED
SODIUM SERPL-SCNC: 136 MMOL/L (ref 136–145)
WBC NRBC COR # BLD: 8.53 10*3/MM3 (ref 3.4–10.8)

## 2023-04-28 PROCEDURE — 25010000002 ENOXAPARIN PER 10 MG: Performed by: FAMILY MEDICINE

## 2023-04-28 PROCEDURE — 0202U NFCT DS 22 TRGT SARS-COV-2: CPT | Performed by: FAMILY MEDICINE

## 2023-04-28 PROCEDURE — 85027 COMPLETE CBC AUTOMATED: CPT | Performed by: FAMILY MEDICINE

## 2023-04-28 PROCEDURE — 94761 N-INVAS EAR/PLS OXIMETRY MLT: CPT

## 2023-04-28 PROCEDURE — 25010000002 METHYLPREDNISOLONE PER 125 MG: Performed by: FAMILY MEDICINE

## 2023-04-28 PROCEDURE — 94799 UNLISTED PULMONARY SVC/PX: CPT

## 2023-04-28 PROCEDURE — 25010000002 METHYLPREDNISOLONE PER 80 MG: Performed by: INTERNAL MEDICINE

## 2023-04-28 PROCEDURE — 99232 SBSQ HOSP IP/OBS MODERATE 35: CPT | Performed by: NURSE PRACTITIONER

## 2023-04-28 PROCEDURE — 80048 BASIC METABOLIC PNL TOTAL CA: CPT | Performed by: FAMILY MEDICINE

## 2023-04-28 PROCEDURE — 84145 PROCALCITONIN (PCT): CPT | Performed by: FAMILY MEDICINE

## 2023-04-28 PROCEDURE — 97161 PT EVAL LOW COMPLEX 20 MIN: CPT

## 2023-04-28 PROCEDURE — 99222 1ST HOSP IP/OBS MODERATE 55: CPT | Performed by: INTERNAL MEDICINE

## 2023-04-28 PROCEDURE — 94664 DEMO&/EVAL PT USE INHALER: CPT

## 2023-04-28 PROCEDURE — 97165 OT EVAL LOW COMPLEX 30 MIN: CPT

## 2023-04-28 PROCEDURE — 87635 SARS-COV-2 COVID-19 AMP PRB: CPT | Performed by: FAMILY MEDICINE

## 2023-04-28 RX ORDER — ENOXAPARIN SODIUM 100 MG/ML
40 INJECTION SUBCUTANEOUS NIGHTLY
Status: DISCONTINUED | OUTPATIENT
Start: 2023-04-28 | End: 2023-04-30 | Stop reason: HOSPADM

## 2023-04-28 RX ORDER — PREDNISONE 20 MG/1
60 TABLET ORAL
Status: DISCONTINUED | OUTPATIENT
Start: 2023-04-29 | End: 2023-04-29

## 2023-04-28 RX ORDER — METHYLPREDNISOLONE ACETATE 80 MG/ML
80 INJECTION, SUSPENSION INTRA-ARTICULAR; INTRALESIONAL; INTRAMUSCULAR; SOFT TISSUE ONCE
Status: COMPLETED | OUTPATIENT
Start: 2023-04-28 | End: 2023-04-28

## 2023-04-28 RX ORDER — IPRATROPIUM BROMIDE AND ALBUTEROL SULFATE 2.5; .5 MG/3ML; MG/3ML
3 SOLUTION RESPIRATORY (INHALATION) EVERY 6 HOURS PRN
Status: DISCONTINUED | OUTPATIENT
Start: 2023-04-28 | End: 2023-04-30 | Stop reason: HOSPADM

## 2023-04-28 RX ORDER — METHADONE HYDROCHLORIDE 10 MG/1
60 TABLET ORAL DAILY
Status: DISCONTINUED | OUTPATIENT
Start: 2023-04-28 | End: 2023-04-30 | Stop reason: HOSPADM

## 2023-04-28 RX ORDER — AZELASTINE 1 MG/ML
1 SPRAY, METERED NASAL 2 TIMES DAILY
Status: DISCONTINUED | OUTPATIENT
Start: 2023-04-28 | End: 2023-04-30 | Stop reason: HOSPADM

## 2023-04-28 RX ORDER — HYDRALAZINE HYDROCHLORIDE 20 MG/ML
10 INJECTION INTRAMUSCULAR; INTRAVENOUS EVERY 6 HOURS PRN
Status: DISCONTINUED | OUTPATIENT
Start: 2023-04-28 | End: 2023-04-30 | Stop reason: HOSPADM

## 2023-04-28 RX ORDER — PREDNISONE 20 MG/1
60 TABLET ORAL
Status: DISCONTINUED | OUTPATIENT
Start: 2023-04-28 | End: 2023-04-28

## 2023-04-28 RX ADMIN — BUDESONIDE AND FORMOTEROL FUMARATE DIHYDRATE 2 PUFF: 160; 4.5 AEROSOL RESPIRATORY (INHALATION) at 07:00

## 2023-04-28 RX ADMIN — TIOTROPIUM BROMIDE INHALATION SPRAY 2 PUFF: 3.12 SPRAY, METERED RESPIRATORY (INHALATION) at 07:00

## 2023-04-28 RX ADMIN — DOCUSATE SODIUM 50MG AND SENNOSIDES 8.6MG 2 TABLET: 8.6; 5 TABLET, FILM COATED ORAL at 21:00

## 2023-04-28 RX ADMIN — ATORVASTATIN CALCIUM 10 MG: 10 TABLET, FILM COATED ORAL at 08:20

## 2023-04-28 RX ADMIN — METHYLPREDNISOLONE SODIUM SUCCINATE 60 MG: 125 INJECTION, POWDER, FOR SOLUTION INTRAMUSCULAR; INTRAVENOUS at 10:52

## 2023-04-28 RX ADMIN — PANTOPRAZOLE SODIUM 40 MG: 40 TABLET, DELAYED RELEASE ORAL at 06:07

## 2023-04-28 RX ADMIN — METHADONE HYDROCHLORIDE 60 MG: 10 TABLET ORAL at 10:52

## 2023-04-28 RX ADMIN — BUDESONIDE AND FORMOTEROL FUMARATE DIHYDRATE 2 PUFF: 160; 4.5 AEROSOL RESPIRATORY (INHALATION) at 19:39

## 2023-04-28 RX ADMIN — IPRATROPIUM BROMIDE AND ALBUTEROL SULFATE 3 ML: 2.5; .5 SOLUTION RESPIRATORY (INHALATION) at 07:00

## 2023-04-28 RX ADMIN — GUAIFENESIN 600 MG: 600 TABLET, EXTENDED RELEASE ORAL at 08:20

## 2023-04-28 RX ADMIN — Medication 10 ML: at 21:00

## 2023-04-28 RX ADMIN — LORAZEPAM 0.5 MG: 0.5 TABLET ORAL at 19:40

## 2023-04-28 RX ADMIN — Medication 10 ML: at 01:06

## 2023-04-28 RX ADMIN — GUAIFENESIN 600 MG: 600 TABLET, EXTENDED RELEASE ORAL at 21:00

## 2023-04-28 RX ADMIN — Medication 5 MG: at 01:07

## 2023-04-28 RX ADMIN — BUDESONIDE AND FORMOTEROL FUMARATE DIHYDRATE 2 PUFF: 160; 4.5 AEROSOL RESPIRATORY (INHALATION) at 00:55

## 2023-04-28 RX ADMIN — LORAZEPAM 0.5 MG: 0.5 TABLET ORAL at 07:24

## 2023-04-28 RX ADMIN — GUAIFENESIN 600 MG: 600 TABLET, EXTENDED RELEASE ORAL at 01:06

## 2023-04-28 RX ADMIN — AZELASTINE 1 SPRAY: 1 SPRAY, METERED NASAL at 21:00

## 2023-04-28 RX ADMIN — Medication 5 MG: at 21:02

## 2023-04-28 RX ADMIN — TAMSULOSIN HYDROCHLORIDE 0.4 MG: 0.4 CAPSULE ORAL at 21:00

## 2023-04-28 RX ADMIN — IPRATROPIUM BROMIDE AND ALBUTEROL SULFATE 3 ML: 2.5; .5 SOLUTION RESPIRATORY (INHALATION) at 00:55

## 2023-04-28 RX ADMIN — POLYETHYLENE GLYCOL 3350 17 G: 17 POWDER, FOR SOLUTION ORAL at 10:20

## 2023-04-28 RX ADMIN — ASPIRIN 81 MG: 81 TABLET, COATED ORAL at 08:20

## 2023-04-28 RX ADMIN — TAMSULOSIN HYDROCHLORIDE 0.4 MG: 0.4 CAPSULE ORAL at 01:07

## 2023-04-28 RX ADMIN — LORAZEPAM 0.5 MG: 0.5 TABLET ORAL at 13:20

## 2023-04-28 RX ADMIN — GUAIFENESIN AND DEXTROMETHORPHAN 10 ML: 100; 10 SYRUP ORAL at 21:00

## 2023-04-28 RX ADMIN — METHYLPREDNISOLONE ACETATE 80 MG: 80 INJECTION, SUSPENSION INTRA-ARTICULAR; INTRALESIONAL; INTRAMUSCULAR; SOFT TISSUE at 13:46

## 2023-04-28 RX ADMIN — FINASTERIDE 5 MG: 5 TABLET, FILM COATED ORAL at 08:20

## 2023-04-28 RX ADMIN — IPRATROPIUM BROMIDE AND ALBUTEROL SULFATE 3 ML: 2.5; .5 SOLUTION RESPIRATORY (INHALATION) at 13:52

## 2023-04-28 RX ADMIN — FLUTICASONE PROPIONATE 1 SPRAY: 50 SPRAY, METERED NASAL at 08:21

## 2023-04-28 RX ADMIN — DOCUSATE SODIUM 50MG AND SENNOSIDES 8.6MG 2 TABLET: 8.6; 5 TABLET, FILM COATED ORAL at 08:20

## 2023-04-28 RX ADMIN — TRAZODONE HYDROCHLORIDE 50 MG: 50 TABLET ORAL at 21:00

## 2023-04-28 RX ADMIN — TRAZODONE HYDROCHLORIDE 50 MG: 50 TABLET ORAL at 01:06

## 2023-04-28 RX ADMIN — METHYLPREDNISOLONE SODIUM SUCCINATE 60 MG: 125 INJECTION, POWDER, FOR SOLUTION INTRAMUSCULAR; INTRAVENOUS at 01:07

## 2023-04-28 RX ADMIN — LORAZEPAM 0.5 MG: 0.5 TABLET ORAL at 01:07

## 2023-04-28 NOTE — PROGRESS NOTES
"Adult Nutrition  Assessment/PES    Patient Name:  Topher Stoll  YOB: 1975  MRN: [id number]  Admit Date:  4/27/2023    Assessment Date:  4/28/2023    Comments:    Screened for LACE 10+. Pt with PMHx significant for COPD, asthma, anxiety, HTN presented to [facility] 4/27 with c/o SOB, wheezing. Pt with COPD exacerbation, respiratory failure.  lbs, overweight for age per BMI 25.39. No recent weight changes indicated per nutrition screen. Currently receiving a cardiac diet, no PO intakes yet recorded - will monitor for need for ONS. Will F/U and available PRN.      Reason for Assessment     Row Name 04/28/23 0839          Reason for Assessment    Reason For Assessment per organizational policy  LACE 10+                  Labs/Tests/Procedures/Meds     Row Name 04/28/23 0839          Labs/Procedures/Meds    Lab Results Reviewed reviewed, pertinent     Lab Results Comments High: Glu        Medications    Pertinent Medications Reviewed reviewed, pertinent     Pertinent Medications Comments lipitor, lovenox, protonix, docusate                Physical Findings     Row Name 04/28/23 0840          Physical Findings    Overall Physical Appearance overweight, David score 20                Estimated/Assessed Needs - Anthropometrics     Row Name 04/28/23 0840          Anthropometrics    Height for Calculation 1.854 m (6' 1\")     Weight for Calculation 87 kg (191 lb 12.8 oz)  CBW        Estimated/Assessed Needs    Additional Documentation Fluid Requirements (Group);KCAL/KG (Group);Estimated Calorie Needs (Group);Protein Requirements (Group)        Estimated Calorie Needs    Estimated Calorie Requirement (kcal/day) 2,175-2,610     Estimated Calorie Need Method kcal/kg        KCAL/KG    KCAL/KG 25 Kcal/Kg (kcal);30 Kcal/Kg (kcal)     25 Kcal/Kg (kcal) 2175     30 Kcal/Kg (kcal) 2610        Protein Requirements    Weight Used For Protein Calculations 87 kg (191 lb 12.8 oz)  CBW     Est Protein Requirement Amount " (gms/kg) 1.2 gm protein     Estimated Protein Requirements (gms/day) 104.4        Fluid Requirements    Fluid Requirements (mL/day) 2175  1 mL/kcal     RDA Method (mL) 2175                Nutrition Prescription Ordered     Row Name 04/28/23 0842          Nutrition Prescription PO    Current PO Diet Regular     Fluid Consistency Thin     Common Modifiers Cardiac                Evaluation of Received Nutrient/Fluid Intake     Row Name 04/28/23 0842          PO Evaluation    Number of Days PO Intake Evaluated Insufficient Data                   Problem/Interventions:   Problem 1     Row Name 04/28/23 0842          Nutrition Diagnoses Problem 1    Problem 1 Increased Nutrient Needs     Macronutrient Protein     Etiology (related to) Medical Diagnosis     Signs/Symptoms (evidenced by) Other (comment)  COPD dx                      Intervention Goal     Row Name 04/28/23 0843          Intervention Goal    General Maintain nutrition;Meet nutritional needs for age/condition;Disease management/therapy;Improved nutrition related lab(s)     PO Establish PO;Tolerate PO;Meet estimated needs     Weight Maintain weight                Nutrition Intervention     Row Name 04/28/23 0843          Nutrition Intervention    RD/Tech Action Follow Tx progress;Encourage intake;Care plan reviewd                Nutrition Prescription     Row Name 04/28/23 0843          Other Orders    Obtain Weight Daily     Obtain Weight Ordered? No, recommended     Supplement Vitamin mineral supplement     Supplement Ordered? No, recommended     Labs K+;Phos;Mg++;Na+                Education/Evaluation     Row Name 04/28/23 0844          Education    Education Will Instruct as appropriate        Monitor/Evaluation    Monitor Per protocol;PO intake;Weight;Skin status;Symptoms;Pertinent labs                 Electronically signed by:  Tiana Rodriguez RD  04/28/23 08:44 EDT

## 2023-04-28 NOTE — PROGRESS NOTES
[facility]    [facility] NOTE    Name:  Topher Stoll   Age:  47 y.o.  Sex:  male  :  1975  MRN:  [id number]   Visit Number:  [id number]  Admission Date:  2023  Date Of Service:  23  Primary Care Physician:  Joshua Hoffmann MD     LOS: 1 day :    Chief Complaint:      Shortness of breath    Subjective:    Patient seen and examined at bedside this morning after reviewing admission documentation.  Patient states he uses 2L PRN at baseline and has needed continuously the past couple days.  States he has been to the hospital multiple times this month and every time he runs out of steroids his breathing worsens.  States he sees Dr. Olivas and is compliant with nebs and inhalers at home.      Hospital Course:    Patient is a 47 years old male with a past medical history of COPD on 2 L of oxygen as needed, asthma, anxiety, hypertension and sleep apnea who presented to the ER with a chief complaint of increased worsening shortness of breath and wheezing.  Patient reports that his symptoms started 2 weeks ago, has been seen in the ER on 2 visits and has been treated with steroids.  Patient reports whenever he finishes his steroids his symptoms would come back and get worse.  Patient reports dry nonproductive cough and unable to bring any sputum up.  Patient denies any sick contacts.  Patient denies chest pain, fever or chills.  No vomiting or diarrhea.     On ER evaluation, Patient was found to be tachypneic on arrival with respiratory rate    32, blood pressure of 143/110, was initially on room air however became hypoxic and had to be placed on 2 3 L of oxygen through nasal cannula.  His work-up included negative troponin and proBNP.  WBC of 12.1 otherwise within acceptable range on his CBC and CMP.  His ABG with a pH of 7.374/PCO2 51/PO2 of 62/HCO3 29.7/saturation 90% on 2 L nasal cannula.  Chest x-ray with no definite infiltrates or pneumonia and showing chronic emphysema  changes per my read. Patient received lorazepam 1 mg, albuterol, magnesium and Solu-Medrol 25 mg IV while in the ER.  Hospitalist consulted for admission, further evaluation and treatment.       Review of Systems:     All systems were reviewed and negative except as mentioned in subjective, assessment and plan.    Vital Signs:    Temp:  [97 °F (36.1 °C)-98.5 °F (36.9 °C)] 97 °F (36.1 °C)  Heart Rate:  [78-98] 96  Resp:  [18-32] 18  BP: ()/() 150/94    Intake and output:    I/O last 3 completed shifts:  In: 790 [P.O.:740; I.V.:50]  Out: -   I/O this shift:  In: 360 [P.O.:360]  Out: 600 [Urine:600]    Physical Examination:      General Appearance:  Alert and cooperative, pleasant middle aged male, no acute distress on exam   Head:  Atraumatic and normocephalic.   Eyes: Conjunctivae and sclerae normal, no icterus. No pallor.   Throat: No oral lesions, no thrush, oral mucosa moist.   Neck: Supple, trachea midline   Lungs:   Breath sounds heard bilaterally equally but diminished throughout.  Scattered wheezing throughout. Unlabored on 3L with baseline PRN oxygen   Heart:  Normal S1 and S2, no murmur, no gallop, no rub. No JVD.   Abdomen:   Normal bowel sounds, no masses, no organomegaly. Soft, nontender, nondistended, no rebound tenderness.   Extremities: Supple, no edema, no cyanosis, no clubbing.   Skin: No bleeding or rash.   Neurologic: Alert and oriented x 3. No facial asymmetry. Moves all four limbs. No tremors.      Laboratory results:    Results from last 7 days   Lab Units 04/28/23  0540 04/27/23  1826   SODIUM mmol/L 136 138   POTASSIUM mmol/L 4.3 4.1   CHLORIDE mmol/L 99 97*   CO2 mmol/L 26.9 29.5*   BUN mg/dL 13 13   CREATININE mg/dL 0.86 0.92   CALCIUM mg/dL 9.0 9.0   BILIRUBIN mg/dL  --  0.3   ALK PHOS U/L  --  122*   ALT (SGPT) U/L  --  20   AST (SGOT) U/L  --  19   GLUCOSE mg/dL 146* 98     Results from last 7 days   Lab Units 04/28/23  0540 04/27/23  1826   WBC 10*3/mm3 8.53 12.12*    HEMOGLOBIN g/dL 14.1 15.6   HEMATOCRIT % 43.6 48.2   PLATELETS 10*3/mm3 216 240         Results from last 7 days   Lab Units 04/27/23  1826   HSTROP T ng/L <6         Recent Labs     05/15/22  1149 04/14/23  1707 04/27/23  1844   PHART 7.318* 7.334* 7.374   LIS3PDH 53.5* 54.6* 51.0*   PO2ART 77.1 101.0* 62.8*   XZR3HRA 27.4 29.0* 29.7*   BASEEXCESS 0.2 1.7 3.2*      I have reviewed the patient's laboratory results.    Radiology results:    XR Chest 1 View    Result Date: 4/28/2023  PROCEDURE: XR CHEST 1 VW-  HISTORY: SOA Triage Protocol   COMPARISON: April 22, 2023.  FINDINGS:  The heart size is normal. The mediastinum is normal. The lungs are hyperinflated consistent with COPD. No acute pulmonary abnormality is identified. There is no pneumothorax. The bony thorax in intact.      Impression: COPD.  No acute abnormality identified.  This report was signed and finalized on 4/28/2023 7:11 AM by Janak Dodd MD.    I have reviewed the patient's radiology reports.    Medication Review:     I have reviewed the patient's active and prn medications.     Problem List:      COPD with acute exacerbation    Acute on chronic respiratory failure with hypoxia and hypercapnia    COPD exacerbation      Assessment:    Acute COPD exacerbation, POA  Acute on chronic respiratory failure with hypoxia/hypercapnia 2/2 to #1, POA  Atherosclerosis of coronary artery  Primary hypertension  Hyperlipidemia  Pulmonary emphysema   Chronic viral hepatitis B without delta agent and without coma  Hep C w/o coma, chronic  Anxiety    Plan:       Hypoxic/hypercapnic respiratory failure/COPD exacerbation  -Continue supplemental oxygen to keep saturation 90%.  Patient is currently on 3 L nasal cannula, continue to titrate down as able to.  Patient on 2 L as needed at home.  BiPAP as needed and nightly.  -Continue Solu-Medrol 60 mg every 8 hours--transition to Prednisone tomorrow if possible  -Bronchodilators and supportive care.  -No signs of  pneumonia, procal negative.  -Respiratory panel pending.  -Vistaril as needed for anxiety.  -Will consult Pulmonology today given several ER visits over this month for recommendations.    DVT Prophylaxis: Lovenox  Code Status: Full Code  Diet: Cardiac  Discharge Plan: 1-2 days    Destiny Nichole, APRN  04/28/23  12:31 EDT    Dictated utilizing Dragon dictation.

## 2023-04-28 NOTE — CASE MANAGEMENT/SOCIAL WORK
Discharge Planning Assessment  [facility]     Patient Name: Topher Stoll  MRN: [id number]  Today's Date: 4/28/2023    Admit Date: 4/27/2023    Plan: DCP   Discharge Needs Assessment     Row Name 04/28/23 1424       Living Environment    People in Home parent(s)    Current Living Arrangements home    Potentially Unsafe Housing Conditions unable to assess    Primary Care Provided by self    Provides Primary Care For no one    Family Caregiver if Needed parent(s)    Able to Return to Prior Arrangements yes       Resource/Environmental Concerns    Resource/Environmental Concerns none    Transportation Concerns none       Transition Planning    Patient/Family Anticipates Transition to home    Patient/Family Anticipated Services at Transition none    Transportation Anticipated family or friend will provide       Discharge Needs Assessment    Readmission Within the Last 30 Days no previous admission in last 30 days    Equipment Currently Used at Home oxygen    Concerns to be Addressed discharge planning    Anticipated Changes Related to Illness none    Equipment Needed After Discharge other (see comments)  TBD    Discharge Facility/Level of Care Needs other (see comments)  TBD               Discharge Plan     Row Name 04/28/23 1425       Plan    Plan DCP    Patient/Family in Agreement with Plan yes    Plan Comments SW met with pt at bedside for dc planning. Pt was completeing breating treatment at time of visit. Pt lives with his parents in [place]. Pts PCP is Dr. Hoffmann. Pt is independent with ADL's. Pt uses home O2 through [employer]. Goal is home with family to provide transportation. SW/CM will continue to follow for dc needs.    Final Discharge Disposition Code 30 - still a patient              Continued Care and Services - Admitted Since 4/27/2023    Coordination has not been started for this encounter.          Demographic Summary     Row Name 04/28/23 1424       General Information    Admission Type inpatient     Arrived From home    Required Notices Provided Important Message from Medicare    Referral Source admission list    Reason for Consult discharge planning    Preferred Language English               Functional Status     Row Name 04/28/23 1424       Functional Status, IADL    Medications independent    Meal Preparation independent    Housekeeping independent    Laundry independent    Shopping independent       Mental Status Summary    Recent Changes in Mental Status/Cognitive Functioning unable to assess       Employment/    Employment Status disabled               Psychosocial     Row Name 04/28/23 1424       Developmental Stage (Eriksson's)    Developmental Stage Stage 7 (35-65 years/Middle Adulthood) Generativity vs. Stagnation               Abuse/Neglect    No documentation.                Legal    No documentation.                Substance Abuse    No documentation.                Patient Forms    No documentation.                   FRED Abebe

## 2023-04-28 NOTE — PLAN OF CARE
Goal Outcome Evaluation:  Plan of Care Reviewed With: patient, mother        Progress: no change  Outcome Evaluation: Pt seen for OT evaluation today.  Pt able to manage his self care tasks without difficulty.  Pt walked 200' with cga.  Pt noted with minimal decreased in balance, tended to close his eyes while walking.  Pt states he's not sure why he is doing that.  Pt appears at his baseline functional level and has no needs for skilled OT at this time.  OT will sign off.

## 2023-04-28 NOTE — THERAPY DISCHARGE NOTE
Acute Care - Occupational Therapy Discharge  [facility]    Patient Name: Topher Stoll  : 1975    MRN: [id number]                              Today's Date: 2023       Admit Date: 2023    Visit Dx:     ICD-10-CM ICD-9-CM   1. COPD with acute exacerbation  J44.1 491.21     Patient Active Problem List   Diagnosis   • Atherosclerosis of coronary artery   • Primary hypertension   • Hyperlipidemia   • Osteoporosis   • Pulmonary emphysema (HCC)   • Chronic viral hepatitis B without delta agent and without coma (HCC)   • Hep C w/o coma, chronic (HCC)   • total hip explant, antibiotic spacer placement 1/15/2020   • Status post left hip reimplant revision   • History of hepatitis B   • Constipation   • Opioid dependence on agonist therapy   • Acute on chronic respiratory failure with hypoxia and hypercapnia   • COVID-19 virus detected   • COPD without exacerbation   • Opiate use   • Cytokine release syndrome, grade 1   • COPD with acute exacerbation   • COPD exacerbation   • Acute on chronic respiratory failure with hypoxia     Past Medical History:   Diagnosis Date   • Abdominal adhesions    • Anxiety    • Arthritis    • Asthma    • Cervical radiculopathy    • Colitis    • COPD (chronic obstructive pulmonary disease)    • GERD (gastroesophageal reflux disease)    • Heart attack    • History of hepatitis C     Treated with Epclusa in 2019   • History of recreational drug use    • HTN (hypertension)    • Kidney cysts    • Left hip pain    • Liver disease    • Low back pain    • Migraines    • Obstructive chronic bronchitis with exacerbation    • Sleep apnea     mild   • Tattoos      Past Surgical History:   Procedure Laterality Date   • BACK SURGERY     • COLONOSCOPY     • COLONOSCOPY N/A 2022    Procedure: COLONOSCOPY with biopsies;  Surgeon: Giancarlo Ruiz MD;  Location: [facility] ENDOSCOPY;  Service: Gastroenterology;  Laterality: N/A;   • HERNIA REPAIR     • HIP SPACER INSERTION WITH  ANTIBIOTIC CEMENT Left 1/15/2020    Procedure: TOTAL HIP IMPLANT REMOVAL WITH INSERTION OF ANTIBIOTIC SPACER LEFT;  Surgeon: Ba Ramirez MD;  Location: [facility] OR;  Service: Orthopedics   • SHOULDER LIGAMENT REPAIR      right shoulder   • SMALL INTESTINE SURGERY      Small bowell resection   • TOTAL HIP ARTHROPLASTY Left    • TOTAL HIP ARTHROPLASTY Right    • TOTAL HIP ARTHROPLASTY REVISION Left 3/5/2020    Procedure: HIP REIMPLANT REVISION LEFT;  Surgeon: Ba Ramirez MD;  Location:  [facility] OR;  Service: Orthopedics;  Laterality: Left;   • UPPER GASTROINTESTINAL ENDOSCOPY  2014      General Information     [facility] Name 04/28/23 1245          OT Time and Intention    Document Type discharge evaluation/summary  -     Mode of Treatment occupational therapy  -[facility] Name 04/28/23 1245          General Information    Patient Profile Reviewed yes  -     Prior Level of Function independent:;ADL's;community mobility  -     Existing Precautions/Restrictions fall  -[facility] Name 04/28/23 1245          Living Environment    People in Home parent(s)  -[facility] Name 04/28/23 1245          Home Main Entrance    Number of Stairs, Main Entrance twelve  -     Stair Railings, Main Entrance railing on right side (ascending)  -[facility] Name 04/28/23 1245          Stairs Within Home, Primary    Stairs, Within Home, Primary to basement  -     Number of Stairs, Within Home, Primary twelve  -     Stair Railings, Within Home, Primary railing on right side (ascending)  -[facility] Name 04/28/23 1245          Cognition    Orientation Status (Cognition) oriented x 4  -[facility] Name 04/28/23 1245          Safety Issues, Functional Mobility    Safety Issues Affecting Function (Mobility) insight into deficits/self-awareness  -     Impairments Affecting Function (Mobility) pain;endurance/activity tolerance  -           User Key  (r) = Recorded By, (t) = Taken By, (c) = Cosigned By    Initials Name Provider Type     Laurel Garcia Occupational Therapist               Mobility/ADL's     Row Name 04/28/23 1246          Bed Mobility    Bed Mobility bed mobility (all) activities  -     All Activities, [place] (Bed Mobility) independent  -     Assistive Device (Bed Mobility) head of bed elevated  -[facility] Name 04/28/23 1246          Sit-Stand Transfer    Sit-Stand [place] (Transfers) standby assist  -[facility] Name 04/28/23 1246          Functional Mobility    Functional Mobility- Ind. Level contact guard assist  -     Functional Mobility-Distance (Feet) 200  -[facility] Name 04/28/23 1246          Activities of Daily Living    BADL Assessment/Intervention bathing;upper body dressing;lower body dressing;grooming;feeding;toileting  -[facility] Name 04/28/23 1246          Bathing Assessment/Intervention    [place] Level (Bathing) set up  -[facility] Name 04/28/23 1246          Upper Body Dressing Assessment/Training    [place] Level (Upper Body Dressing) independent  -[facility] Name 04/28/23 1246          Lower Body Dressing Assessment/Training    [place] Level (Lower Body Dressing) independent  -[facility] Name 04/28/23 1246          Grooming Assessment/Training    [place] Level (Grooming) independent  -[facility] Name 04/28/23 1246          Self-Feeding Assessment/Training    [place] Level (Feeding) independent  -[facility] Name 04/28/23 1246          Toileting Assessment/Training    [place] Level (Toileting) independent  -           User Key  (r) = Recorded By, (t) = Taken By, (c) = Cosigned By    Initials Name Provider Type    Laurel Garcia Occupational Therapist               Obj/Interventions     Row Name 04/28/23 1247          Sensory Assessment (Somatosensory)    Sensory Assessment (Somatosensory) sensation intact  -[facility] Name 04/28/23 1247          Vision Assessment/Intervention    Visual Impairment/Limitations WFL  -[facility] Name 04/28/23 1247          Range  of Motion Comprehensive    General Range of Motion bilateral upper extremity ROM [facility] Name 04/28/23 1247          Strength Comprehensive (MMT)    Comment, General Manual [facility]) [facility]           User Key  (r) = Recorded By, (t) = Taken By, (c) = Cosigned By    Initials Name Provider Type     Laurel Smallwood Occupational Therapist               Goals/Plan    No documentation.                Clinical Impression     [facility] Name 04/28/23 1248          Pain Assessment    Pretreatment Pain Rating 8/10  -     Posttreatment Pain Rating 7/10  -     Pain Location - abdomen  -     Pain Intervention(s) Repositioned;Ambulation/increased activity  -[facility] Name 04/28/23 1248          Plan of Care Review    Plan of Care Reviewed With patient;mother  -     Progress no change  -     Outcome Evaluation Pt seen for OT evaluation today.  Pt able to manage his self care tasks without difficulty.  Pt walked 200' with cga.  Pt noted with minimal decreased in balance, tended to close his eyes while walking.  Pt states he's not sure why he is doing that.  Pt appears at his baseline functional level and has no needs for skilled OT at this time.  OT will sign off.  -AH     Row Name 04/28/23 1248          Therapy Assessment/Plan (OT)    Patient/Family Therapy Goal Statement (OT) d/c home  -     Criteria for Skilled Therapeutic Interventions Met (OT) no problems identified which require skilled intervention  -     Therapy Frequency (OT) evaluation only  -AH     Row Name 04/28/23 1248          Therapy Plan Review/Discharge Plan (OT)    Anticipated Discharge Disposition (OT) home  -AH     Row Name 04/28/23 1248          Vital Signs    Pre SpO2 (%) 94  -     O2 Delivery Pre Treatment supplemental O2  3L  -AH     Intra SpO2 (%) 90  -AH     Post SpO2 (%) 95  -AH     O2 Delivery Post Treatment supplemental O2  3L  -AH     [facility] Name 04/28/23 1248          Positioning and Restraints     Pre-Treatment Position in bed  -AH     Post Treatment Position chair  -     In Chair sitting;call light within reach;encouraged to call for assist;with family/caregiver  -           User Key  (r) = Recorded By, (t) = Taken By, (c) = Cosigned By    Initials Name Provider Type    Laurel Garcia Occupational Therapist               Outcome Measures     Row Name 04/28/23 1250          How much help from another is currently needed...    Putting on and taking off regular lower body clothing? 4  -AH     Bathing (including washing, rinsing, and drying) 3  -AH     Toileting (which includes using toilet bed pan or urinal) 4  -AH     Putting on and taking off regular upper body clothing 4  -AH     Taking care of personal grooming (such as brushing teeth) 4  -AH     Eating meals 4  -     AM-PAC 6 Clicks Score (OT) 23  -AH     Row Name 04/28/23 1159 04/28/23 0810       How much help from another person do you currently need...    Turning from your back to your side while in flat bed without using bedrails? 4  -MS 4  -HN    Moving from lying on back to sitting on the side of a flat bed without bedrails? 4  -MS 4  -HN    Moving to and from a bed to a chair (including a wheelchair)? 4  -MS 4  -HN    Standing up from a chair using your arms (e.g., wheelchair, bedside chair)? 4  -MS 4  -HN    Climbing 3-5 steps with a railing? 3  -MS 3  -HN    To walk in hospital room? 3  -MS 3  -HN    AM-PAC 6 Clicks Score (PT) 22  -MS 22  -HN    Highest level of mobility 7 --> Walked 25 feet or more  -MS 7 --> Walked 25 feet or more  -HN    Row Name 04/28/23 1250          Functional Assessment    Outcome Measure Options AM-PAC 6 Clicks Daily Activity (OT)  -           User Key  (r) = Recorded By, (t) = Taken By, (c) = Cosigned By    Initials Name Provider Type    Laurel Garcia Occupational Therapist    Brando Ortiz, PT Physical Therapist    Mami Knight, RN Registered Nurse              Occupational Therapy Education      Title: PT OT SLP Therapies (In Progress)     Topic: Occupational Therapy (Done)     Point: ADL training (Done)     Description:   Instruct learner(s) on proper safety adaptation and remediation techniques during self care or transfers.   Instruct in proper use of assistive devices.              Learning Progress Summary           Patient Acceptance, E,TB, VU by  at 4/28/2023 1251    Comment: Role of OT   Mother Acceptance, E,TB, VU by  at 4/28/2023 1251    Comment: Role of OT                               User Key     Initials Effective Dates Name Provider Type Discipline     06/16/21 -  Laurel Smallwood Occupational Therapist OT              OT Recommendation and Plan  Therapy Frequency (OT): evaluation only  Plan of Care Review  Plan of Care Reviewed With: patient, mother  Progress: no change  Outcome Evaluation: Pt seen for OT evaluation today.  Pt able to manage his self care tasks without difficulty.  Pt walked 200' with cga.  Pt noted with minimal decreased in balance, tended to close his eyes while walking.  Pt states he's not sure why he is doing that.  Pt appears at his baseline functional level and has no needs for skilled OT at this time.  OT will sign off.  Plan of Care Reviewed With: patient, mother  Outcome Evaluation: Pt seen for OT evaluation today.  Pt able to manage his self care tasks without difficulty.  Pt walked 200' with cga.  Pt noted with minimal decreased in balance, tended to close his eyes while walking.  Pt states he's not sure why he is doing that.  Pt appears at his baseline functional level and has no needs for skilled OT at this time.  OT will sign off.     Time Calculation:    Time Calculation- OT     Row Name 04/28/23 1251             Time Calculation- OT    OT Start Time 0949  -      OT Received On 04/28/23  -         Untimed Charges    OT Eval/Re-eval Minutes 40  -         Total Minutes    Untimed Charges Total Minutes 40  -       Total Minutes 40  -            User  Key  (r) = Recorded By, (t) = Taken By, (c) = Cosigned By    Initials Name Provider Type    Laurel Garcia Occupational Therapist              Therapy Charges for Today     Code Description Service Date Service Provider Modifiers Qty    [id number] HC OT EVAL LOW COMPLEXITY 3 4/28/2023 Laurel Smallwood GO 1             OT Discharge Summary  Anticipated Discharge Disposition (OT): home    Laurel Smallwood  4/28/2023

## 2023-04-28 NOTE — THERAPY EVALUATION
Patient Name: Topher Stoll  : 1975    MRN: [id number]                              Today's Date: 2023       Admit Date: 2023    Visit Dx:     ICD-10-CM ICD-9-CM   1. COPD with acute exacerbation  J44.1 491.21     Patient Active Problem List   Diagnosis   • Atherosclerosis of coronary artery   • Primary hypertension   • Hyperlipidemia   • Osteoporosis   • Pulmonary emphysema (HCC)   • Chronic viral hepatitis B without delta agent and without coma (HCC)   • Hep C w/o coma, chronic (HCC)   • total hip explant, antibiotic spacer placement 1/15/2020   • Status post left hip reimplant revision   • History of hepatitis B   • Constipation   • Opioid dependence on agonist therapy   • Acute on chronic respiratory failure with hypoxia and hypercapnia   • COVID-19 virus detected   • COPD without exacerbation   • Opiate use   • Cytokine release syndrome, grade 1   • COPD with acute exacerbation   • COPD exacerbation   • Acute on chronic respiratory failure with hypoxia     Past Medical History:   Diagnosis Date   • Abdominal adhesions    • Anxiety    • Arthritis    • Asthma    • Cervical radiculopathy    • Colitis    • COPD (chronic obstructive pulmonary disease)    • GERD (gastroesophageal reflux disease)    • Heart attack    • History of hepatitis C     Treated with Epclusa in 2019   • History of recreational drug use    • HTN (hypertension)    • Kidney cysts    • Left hip pain    • Liver disease    • Low back pain    • Migraines    • Obstructive chronic bronchitis with exacerbation    • Sleep apnea     mild   • Tattoos      Past Surgical History:   Procedure Laterality Date   • BACK SURGERY     • COLONOSCOPY     • COLONOSCOPY N/A 2022    Procedure: COLONOSCOPY with biopsies;  Surgeon: Giancarlo Ruiz MD;  Location: [facility] ENDOSCOPY;  Service: Gastroenterology;  Laterality: N/A;   • HERNIA REPAIR     • HIP SPACER INSERTION WITH ANTIBIOTIC CEMENT Left 1/15/2020    Procedure: TOTAL HIP IMPLANT  REMOVAL WITH INSERTION OF ANTIBIOTIC SPACER LEFT;  Surgeon: Ba Ramirez MD;  Location:  [facility];  Service: Orthopedics   • SHOULDER LIGAMENT REPAIR      right shoulder   • SMALL INTESTINE SURGERY      Small bowell resection   • TOTAL HIP ARTHROPLASTY Left    • TOTAL HIP ARTHROPLASTY Right    • TOTAL HIP ARTHROPLASTY REVISION Left 3/5/2020    Procedure: HIP REIMPLANT REVISION LEFT;  Surgeon: Ba Ramirez MD;  Location:  [facility];  Service: Orthopedics;  Laterality: Left;   • UPPER GASTROINTESTINAL ENDOSCOPY  2014      General Information     Row Name 04/28/23 1146          Physical Therapy Time and Intention    Document Type evaluation  -MS     Mode of Treatment physical therapy  -MS     Row Name 04/28/23 1146          General Information    Patient Profile Reviewed yes  -MS     Prior Level of Function independent:;all household mobility  -MS     Existing Precautions/Restrictions fall  -MS     Barriers to Rehab medically complex;previous functional deficit  -MS     Row Name 04/28/23 1146          Living Environment    People in Home parent(s)  -MS     Row Name 04/28/23 1146          Stairs Within Home, Primary    Number of Stairs, Within Home, Primary twelve  to the upstairs  -MS     Row Name 04/28/23 1146          Cognition    Orientation Status (Cognition) oriented x 4  -MS     Row Name 04/28/23 1146          Safety Issues, Functional Mobility    Safety Issues Affecting Function (Mobility) insight into deficits/self-awareness;awareness of need for assistance;safety precaution awareness;sequencing abilities  -MS     Impairments Affecting Function (Mobility) balance;strength;pain;range of motion (ROM);shortness of breath;endurance/activity tolerance  -MS           User Key  (r) = Recorded By, (t) = Taken By, (c) = Cosigned By    Initials Name Provider Type    MS Brando Whitehead PT Physical Therapist               Mobility     Row Name 04/28/23 1149          Bed Mobility    Bed Mobility bed mobility (all)  activities  -MS     All Activities, [place] (Bed Mobility) independent  -MS     Assistive Device (Bed Mobility) head of bed elevated  -MS     Row Name 04/28/23 1149          Sit-Stand Transfer    Sit-Stand [place] (Transfers) standby assist  -MS     Assistive Device (Sit-Stand Transfers) other (see comments)  gait belt  -MS     Row Name 04/28/23 1149          Gait/Stairs (Locomotion)    [place] Level (Gait) contact guard  -MS     Assistive Device (Gait) other (see comments)  gait belt  -MS     Distance in Feet (Gait) 200 ft  -MS     Deviations/Abnormal Patterns (Gait) ramona decreased;other (see comments)  balance deficits  -MS           User Key  (r) = Recorded By, (t) = Taken By, (c) = Cosigned By    Initials Name Provider Type    Brando Ortiz, KIM Physical Therapist               Obj/Interventions     Row Name 04/28/23 1154          Range of Motion Comprehensive    General Range of Motion bilateral lower extremity ROM WFL  -MS     Row Name 04/28/23 1154          Strength Comprehensive (MMT)    General Manual Muscle Testing (MMT) Assessment lower extremity strength deficits identified  -MS     Row Name 04/28/23 1154          Balance    Balance Assessment standing dynamic balance  -MS     Dynamic Standing Balance contact guard;minimal assist  -MS     Row Name 04/28/23 1154          Sensory Assessment (Somatosensory)    Sensory Assessment (Somatosensory) sensation intact  -MS           User Key  (r) = Recorded By, (t) = Taken By, (c) = Cosigned By    Initials Name Provider Type    Brando Ortiz, KIM Physical Therapist               Goals/Plan     Row Name 04/28/23 1158          Gait Training Goal 1 (PT)    Activity/Assistive Device (Gait Training Goal 1, PT) gait (walking locomotion);assistive device use;improve balance and speed  -MS     [place] Level (Gait Training Goal 1, PT) independent  -MS     Distance (Gait Training Goal 1, PT) 400 ft  -MS     Time Frame (Gait Training Goal 1,  PT) long term goal (LTG);2 weeks  -MS     Row Name 04/28/23 1158          Patient Education Goal (PT)    Activity (Patient Education Goal, PT) safety with mobility  -MS     [place]/Cues/Accuracy (Memory Goal 2, PT) demonstrates adequately  -MS     Time Frame (Patient Education Goal, PT) long term goal (LTG);2 weeks  -MS     Row Name 04/28/23 1158          Therapy Assessment/Plan (PT)    Planned Therapy Interventions (PT) balance training;bed mobility training;gait training;home exercise program;ROM (range of motion);transfer training;stair training;strengthening;patient/family education  -MS           User Key  (r) = Recorded By, (t) = Taken By, (c) = Cosigned By    Initials Name Provider Type    MS Brando Whitehead, PT Physical Therapist               Clinical Impression     Row Name 04/28/23 1156          Pain    Pretreatment Pain Rating 8/10  -MS     Posttreatment Pain Rating 7/10  -MS     Pain Location - abdomen  -MS     Pain Intervention(s) Repositioned;Ambulation/increased activity  -MS     Row Name 04/28/23 1157          Plan of Care Review    Plan of Care Reviewed With patient;mother  -MS     Progress no change  -MS     Outcome Evaluation Pt participated in PT eval this date. Pt t/fd to standing with SBA and was able to sidney shirt. Pt ambulated on room air with no AD for 200 ft, did require 1 standing rest break. O2 sats maitnained >90% throughout ambulation on room air. Pt did drop slightly once back in chair to 88% on the room monitor, returned to 3L o2 sats 95%. Pt did demonstrate mild balance deficits with gait, instances of closing eyes and tremors. Pt reports this may be due to anxiety. Pt is expected to benefit from skilled inpatient PTx to address deficits in balance, endurance and activity tolerance.  -MS     Row Name 04/28/23 1156          Therapy Assessment/Plan (PT)    Rehab Potential (PT) good, to achieve stated therapy goals  -MS     Criteria for Skilled Interventions Met (PT) yes;meets  criteria  -MS     Therapy Frequency (PT) 5 times/wk  -MS     Row Name 04/28/23 1154          Vital Signs    Pre SpO2 (%) 94  -MS     O2 Delivery Pre Treatment supplemental O2  -MS     Intra SpO2 (%) 90  -MS     O2 Delivery Intra Treatment room air  -MS     Post SpO2 (%) 95  -MS     O2 Delivery Post Treatment supplemental O2  -MS     Pre Patient Position Supine  -MS     Intra Patient Position Standing  -MS     Post Patient Position Sitting  -MS     Row Name 04/28/23 1154          Positioning and Restraints    Pre-Treatment Position in bed  -MS     Post Treatment Position chair  -MS     In Chair sitting;call light within reach;encouraged to call for assist;with family/caregiver  -MS           User Key  (r) = Recorded By, (t) = Taken By, (c) = Cosigned By    Initials Name Provider Type    Brando Ortiz PT Physical Therapist               Outcome Measures     Row Name 04/28/23 1159 04/28/23 0810       How much help from another person do you currently need...    Turning from your back to your side while in flat bed without using bedrails? 4  -MS 4  -HN    Moving from lying on back to sitting on the side of a flat bed without bedrails? 4  -MS 4  -HN    Moving to and from a bed to a chair (including a wheelchair)? 4  -MS 4  -HN    Standing up from a chair using your arms (e.g., wheelchair, bedside chair)? 4  -MS 4  -HN    Climbing 3-5 steps with a railing? 3  -MS 3  -HN    To walk in hospital room? 3  -MS 3  -HN    AM-PAC 6 Clicks Score (PT) 22  -MS 22  -HN    Highest level of mobility 7 --> Walked 25 feet or more  -MS 7 --> Walked 25 feet or more  -HN          User Key  (r) = Recorded By, (t) = Taken By, (c) = Cosigned By    Initials Name Provider Type    Brando Ortiz PT Physical Therapist    Mami Knight, RN Registered Nurse                             Physical Therapy Education     Title: PT OT SLP Therapies (In Progress)     Topic: Physical Therapy (In Progress)     Point: Mobility training (Done)      Learning Progress Summary           Patient Acceptance, E, VU by MS at 4/28/2023 1200    Comment: importance of mobility                   Point: Home exercise program (Done)     Learning Progress Summary           Patient Acceptance, E, VU by MS at 4/28/2023 1200    Comment: importance of mobility                   Point: Body mechanics (Not Started)     Learner Progress:  Not documented in this visit.          Point: Precautions (Not Started)     Learner Progress:  Not documented in this visit.                      User Key     Initials Effective Dates Name Provider Type Discipline    MS 07/01/22 -  Brando Whitehead, PT Physical Therapist PT              PT Recommendation and Plan  Planned Therapy Interventions (PT): balance training, bed mobility training, gait training, home exercise program, ROM (range of motion), transfer training, stair training, strengthening, patient/family education  Plan of Care Reviewed With: patient, mother  Progress: no change  Outcome Evaluation: Pt participated in PT eval this date. Pt t/fd to standing with SBA and was able to sidney shirt. Pt ambulated on room air with no AD for 200 ft, did require 1 standing rest break. O2 sats maitnained >90% throughout ambulation on room air. Pt did drop slightly once back in chair to 88% on the room monitor, returned to 3L o2 sats 95%. Pt did demonstrate mild balance deficits with gait, instances of closing eyes and tremors. Pt reports this may be due to anxiety. Pt is expected to benefit from skilled inpatient PTx to address deficits in balance, endurance and activity tolerance.     Time Calculation:    PT Charges     Row Name 04/28/23 1201             Time Calculation    Start Time 0954  -MS      PT Received On 04/28/23  -MS      PT Goal Re-Cert Due Date 05/08/23  -MS         Untimed Charges    PT Eval/Re-eval Minutes 40  -MS         Total Minutes    Untimed Charges Total Minutes 40  -MS       Total Minutes 40  -MS            User Key  (r) =  Recorded By, (t) = Taken By, (c) = Cosigned By    Initials Name Provider Type    MS Brando Whitehead, PT Physical Therapist              Therapy Charges for Today     Code Description Service Date Service Provider Modifiers Qty    14141751504 HC PT EVAL LOW COMPLEXITY 3 4/28/2023 Brando Whitehead, PT GP 1          PT G-Codes  AM-PAC 6 Clicks Score (PT): 22  PT Discharge Summary  Anticipated Discharge Disposition (PT): home with assist    Brando Whitehead PT  4/28/2023

## 2023-04-28 NOTE — PLAN OF CARE
Goal Outcome Evaluation:  Plan of Care Reviewed With: patient, mother        Progress: no change  Outcome Evaluation: Pt participated in PT eval this date. Pt t/fd to standing with SBA and was able to sidney shirt. Pt ambulated on room air with no AD for 200 ft, did require 1 standing rest break. O2 sats maitnained >90% throughout ambulation on room air. Pt did drop slightly once back in chair to 88% on the room monitor, returned to 3L o2 sats 95%. Pt did demonstrate mild balance deficits with gait, instances of closing eyes and tremors. Pt reports this may be due to anxiety. Pt is expected to benefit from skilled inpatient PTx to address deficits in balance, endurance and activity tolerance.

## 2023-04-28 NOTE — PROGRESS NOTES
"Pharmacy Consult - Enoxaparin Dosing    Topher Stoll is a 47 y.o. male who has been consulted to dose enoxaparin for VTE prophylaxis.     Allergies    Doxycycline    Relevant clinical data and objective history reviewed:     [Ht: 185.4 cm (73\"); Wt: 87.3 kg (192 lb 7.4 oz)]  Body mass index is 25.39 kg/m².    Estimated Creatinine Clearance: 121.3 mL/min (by C-G formula based on SCr of 0.92 mg/dL).    Results from last 7 days   Lab Units 04/27/23  1826   HEMOGLOBIN g/dL 15.6   HEMATOCRIT % 48.2   PLATELETS 10*3/mm3 240   CREATININE mg/dL 0.92       Asessment/Plan    Initiate enoxaparin 40 mg  SQ every 24 hours  Pharmacy will monitor Mr. Stoll's renal function and clinical status and adjust the enoxaparin dose and/or frequency as needed.    Thank you,    Imelda Joyner RP,PharmD  4/28/2023  01:36 EDT      "

## 2023-04-28 NOTE — CONSULTS
+Date of admission:  4/27/2023    Date of consultation:   April 28, 2023    Requested by:   Hospitalist Service.     PCP: Joshua Hoffmann MD    Reason:  Asthma exacerbation.    History of Present Illness:  47 y.o. male with past medical history significant for asthma and allergic rhinitis who started complaining of shortness of breath, cough and phlegm production for the past few days. Patient was not complaining of subjective chills.  The patient says that his symptoms started somewhat slowly. he says that the shortness of breath is worse than baseline.  Shortness of breath seems to be worse with exertion     he denies any sick contacts.     he was using his medications regularly at home.  The patient says that he also started using rescue medication more frequently without any significant improvement.    Patient actually was recently treated with a round of antibiotics and steroids, after he came to the ER.    Upon further questioning he says that he has not received Fasenra in more than 3-4 months.    Quit smoking 10-12 years ago.    The patient's symptoms continued to worsen and he was admitted to the hospital and pulmonary Consultation was requested for further recommendations.       Review of System: All other review of systems negative except indicated in HPI     Past Medical History: Pertinent history reviewed, as appropriate. Negative, except noted below or in HPI.  If this history could not be obtained due to a reason, the reason is listed in the HPI.  Past Medical History:   Diagnosis Date   • Abdominal adhesions    • Anxiety    • Arthritis    • Asthma    • Cervical radiculopathy    • Colitis    • COPD (chronic obstructive pulmonary disease)    • GERD (gastroesophageal reflux disease)    • Heart attack    • History of hepatitis C     Treated with Epclusa in 2019   • History of recreational drug use    • HTN (hypertension)    • Kidney cysts    • Left hip pain    • Liver disease    • Low back pain    •  Migraines    • Obstructive chronic bronchitis with exacerbation    • Sleep apnea     mild   • Tattoos          Past Surgical History: Pertinent history reviewed, as appropriate. Negative, except noted below or in HPI. If this history could not be obtained due to a reason, the reason is listed in the HPI.  Past Surgical History:   Procedure Laterality Date   • BACK SURGERY     • COLONOSCOPY  2014   • COLONOSCOPY N/A 1/4/2022    Procedure: COLONOSCOPY with biopsies;  Surgeon: Giancarlo Ruiz MD;  Location:  [facility];  Service: Gastroenterology;  Laterality: N/A;   • HERNIA REPAIR     • HIP SPACER INSERTION WITH ANTIBIOTIC CEMENT Left 1/15/2020    Procedure: TOTAL HIP IMPLANT REMOVAL WITH INSERTION OF ANTIBIOTIC SPACER LEFT;  Surgeon: Ba Ramirez MD;  Location:  [facility];  Service: Orthopedics   • SHOULDER LIGAMENT REPAIR      right shoulder   • SMALL INTESTINE SURGERY      Small bowell resection   • TOTAL HIP ARTHROPLASTY Left    • TOTAL HIP ARTHROPLASTY Right    • TOTAL HIP ARTHROPLASTY REVISION Left 3/5/2020    Procedure: HIP REIMPLANT REVISION LEFT;  Surgeon: Ba Ramirez MD;  Location:  [facility];  Service: Orthopedics;  Laterality: Left;   • UPPER GASTROINTESTINAL ENDOSCOPY  2014         Family History: Pertinent history reviewed, as appropriate. Negative, except noted below or in HPI. If this history could not be obtained due to a reason, the reason is listed in the HPI.  Family History   Problem Relation Age of Onset   • Arthritis Other    • Hypertension Other    • Migraines Other    • Heart attack Other    • Stroke Other    • Colon cancer Neg Hx          Social History: Pertinent history reviewed, as appropriate. Negative, except noted below or in HPI. If this history could not be obtained due to a reason, the reason is listed in the HPI.  Social History     Socioeconomic History   • Marital status:    Tobacco Use   • Smoking status: Former     Packs/day: 2.00     Years: 15.00      "Pack years: 30.00     Types: Cigarettes     Quit date:      Years since quittin.3     Passive exposure: Current   • Smokeless tobacco: Current     Types: Chew   Vaping Use   • Vaping Use: Never used   Substance and Sexual Activity   • Alcohol use: No     Comment: stopped drinking alcohol   • Drug use: Not Currently     Comment: takes suboxone   • Sexual activity: Defer         Physical Exam:  /94 (BP Location: Left arm, Patient Position: Sitting)   Pulse 96   Temp 97 °F (36.1 °C) (Oral)   Resp 18   Ht 185.4 cm (73\")   Wt 87.3 kg (192 lb 7.4 oz)   SpO2 95%   BMI 25.39 kg/m²     Constitutional:            Vital signs reviewed                     General: Mild respiratory distress noted.    Eyes:            Extraocular movement was intact.            Pupils appeared equal            Conjunctiva: Pink    ENT:             Hearing was intact              No nasal erythema noted.              Oropharynx was moist. No lesions noted.     Neck:             Supple. No JVD noted.              Thyroid gland did not seem to be enlarged    Cardiovascular:              S1 + S2.  Mildly tachycardic.    Lungs/Respiratory:            Respiratory effort was somewhat labored.            Normal to percussion.            Decreased Air Entry Bilaterally with mild to moderate wheezing.    Abdomen/GI:            Surgical scar noted.             Soft.  Bowel sounds were positive.  No obvious organomegaly.    Musculoskeletal/Extremities:            No edema noted.            No cyanosis noted            No clubbing noted            Gait could not be assessed at this time.    Neurologic:             Awake, alert and oriented x 3.             Able to follow simple commands.    Psychiatric:             Affect appeared fair.             Awake, alert and oriented x 3.    Skin:             No obvious rash noted.             Warm and dry        Labs: Reviewed. Pertinent labs were noted.   Results from last 7 days   Lab Units " 04/28/23  0540 04/27/23  1826   WBC 10*3/mm3 8.53 12.12*   HEMOGLOBIN g/dL 14.1 15.6   HEMATOCRIT % 43.6 48.2   PLATELETS 10*3/mm3 216 240   NEUTROPHIL % %  --  82.2*   NEUTROS ABS 10*3/mm3  --  9.96*   EOSINOPHIL % %  --  0.5   EOS ABS 10*3/mm3  --  0.06   LYMPHOCYTE % %  --  11.0*   LYMPHS ABS 10*3/mm3  --  1.33       Lab Results   Component Value Date    PROCALCITO 0.03 04/28/2023    PROCALCITO 0.03 05/15/2022    PROCALCITO 0.04 12/21/2021       Lab Results   Component Value Date    CRP 0.86 (H) 06/15/2021    CRP 0.42 04/21/2020    CRP 1.82 (H) 03/24/2020       Lab Results   Component Value Date    SEDRATE 20 (H) 04/21/2020    SEDRATE 37 (H) 03/24/2020    SEDRATE 26 (H) 02/25/2020       Lab Results   Component Value Date    PROBNP 79.8 04/27/2023    PROBNP 88.8 04/14/2023    PROBNP 116.2 01/04/2023       Results from last 7 days   Lab Units 04/28/23  0540 04/27/23  1826   SODIUM mmol/L 136 138   POTASSIUM mmol/L 4.3 4.1   CHLORIDE mmol/L 99 97*   CO2 mmol/L 26.9 29.5*   BUN mg/dL 13 13   CREATININE mg/dL 0.86 0.92   CALCIUM mg/dL 9.0 9.0   ANION GAP mmol/L 10.1 11.5   BILIRUBIN mg/dL  --  0.3   ALK PHOS U/L  --  122*   ALT (SGPT) U/L  --  20   AST (SGOT) U/L  --  19   GLUCOSE mg/dL 146* 98   TOTAL PROTEIN g/dL  --  7.3   ALBUMIN g/dL  --  4.3             Lab Results   Component Value Date    TSH 1.24 11/19/2015    TSH 0.33 (L) 03/10/2015    TSH 0.46 (L) 02/08/2015       Lab Results   Component Value Date    FREET4 0.81 03/11/2015    FREET4 1.70 03/25/2014       Lab Results   Component Value Date    INR 1.16 02/21/2020    INR 1.05 01/02/2020    INR 1.1 01/29/2015       Lab Results   Component Value Date    CKTOTAL 27 (L) 06/29/2016    CKTOTAL 168 11/19/2015    CKTOTAL 172 (H) 11/19/2015       No components found for: HSTROPT    Lab Results   Component Value Date    TROPONINT <6 04/27/2023    TROPONINT <0.010 01/04/2023    TROPONINT <0.010 11/27/2022       No results found for: DDIMER    Brief Urine Lab Results   (Last result in the past 365 days)      Color   Clarity   Blood   Leuk Est   Nitrite   Protein   CREAT   Urine HCG        12/12/22 0845 Yellow   Turbid   Negative   Negative   Negative   Negative                   Micro: As of April 28, 2023   No results found for: RESPCX  No results found for: BCIDPCR  Lab Results   Component Value Date    BLOODCX No growth at 5 days 05/15/2022    BLOODCX No growth at 5 days 05/15/2022    BLOODCX No growth at 5 days 09/01/2021     Lab Results   Component Value Date    URINECX No growth 12/12/2022     Lab Results   Component Value Date    MRSACX  02/21/2020     No Methicillin Resistant Staphylococcus aureus isolated    MRSACX  01/02/2020     No Methicillin Resistant Staphylococcus aureus isolated    MRSACX  06/18/2019     No Methicillin Resistant Staphylococcus aureus isolated     Lab Results   Component Value Date    MRSAPCR No MRSA Detected 06/15/2021     No results found for: URCX  No components found for: LOWRESPCF  No results found for: THROATCX  No results found for: CULTURES  No components found for: STREPBCX  Lab Results   Component Value Date    STREPPNEUAG Negative 06/15/2021     No results found for: LEGIONELLA  No results found for: MYCOPLASCX  No results found for: GCCX  No results found for: WOUNDCX  Lab Results   Component Value Date    BODYFLDCX No growth at 5 days 01/15/2020       Lab Results   Component Value Date    FLU Negative 05/10/2020    FLU Negative 05/10/2020       Lab Results   Component Value Date    ADENOVIRUS Not Detected 04/28/2023     Lab Results   Component Value Date    RZ069I Not Detected 04/28/2023     Lab Results   Component Value Date    CVHKU1 Not Detected 04/28/2023     Lab Results   Component Value Date    CVNL63 Not Detected 04/28/2023     Lab Results   Component Value Date    CVOC43 Not Detected 04/28/2023     Lab Results   Component Value Date    HUMETPNEVS Not Detected 04/28/2023     Lab Results   Component Value Date    HURVEV Not  Detected 04/28/2023     Lab Results   Component Value Date    FLUBPCR Not Detected 04/28/2023     Lab Results   Component Value Date    PARAINFLUE Not Detected 04/28/2023     Lab Results   Component Value Date    PARAFLUV2 Not Detected 04/28/2023     Lab Results   Component Value Date    PARAFLUV3 Not Detected 04/28/2023     Lab Results   Component Value Date    PARAFLUV4 Not Detected 04/28/2023     Lab Results   Component Value Date    BPERTPCR Not Detected 04/28/2023     No results found for: [id number]  Lab Results   Component Value Date    CPNEUPCR Not Detected 04/28/2023     Lab Results   Component Value Date    MPNEUMO Not Detected 04/28/2023     Lab Results   Component Value Date    FLUAPCR Not Detected 04/28/2023     No results found for: FLUAH3  No results found for: FLUAH1  Lab Results   Component Value Date    RSV Not Detected 04/28/2023     Lab Results   Component Value Date    BPARAPCR Not Detected 04/28/2023       COVID 19:  Lab Results   Component Value Date    COVID19 Not Detected 04/28/2023         Lab Results   Component Value Date    THCURSCR Positive (A) 03/17/2018     Lab Results   Component Value Date    PCPUR Negative 03/17/2018     Lab Results   Component Value Date    COCAINEUR Negative 03/17/2018     Lab Results   Component Value Date    METAMPSCNUR Negative 03/17/2018     Lab Results   Component Value Date    LABOPIASCN Negative 03/17/2018     Lab Results   Component Value Date    AMPHETSCREEN Negative 03/17/2018     Lab Results   Component Value Date    LABBENZSCN Negative 03/17/2018     Lab Results   Component Value Date    TRICYCLICSCN Negative 03/17/2018     Lab Results   Component Value Date    LABMETHSCN Negative 03/17/2018     Lab Results   Component Value Date    BARBITSCNUR Negative 03/17/2018     Lab Results   Component Value Date    OXYCODONESCN Positive (A) 03/17/2018     Lab Results   Component Value Date    PROPOXSCN Negative 03/17/2018     Lab Results   Component Value Date     BUPRENORSCNU Negative 03/17/2018     No results found for: ETHANOLMGDL  No results found for: ETOHPCT      ABG: Reviewed  Recent Labs     05/15/22  1149 04/14/23  1707 04/27/23  1844   PHART 7.318* 7.334* 7.374   TAC5GMJ 53.5* 54.6* 51.0*   PO2ART 77.1 101.0* 62.8*   PTZ2OHY 27.4 29.0* 29.7*   BASEEXCESS 0.2 1.7 3.2*         Lab Results   Component Value Date    LACTATE 1.2 06/20/2022    LACTATE 1.4 06/19/2022    LACTATE 1.7 05/15/2022         Imaging Study: Latest imaging studies was reviewed personally.   Imaging Results (Last 72 Hours)     Procedure Component Value Units Date/Time    XR Chest 1 View [[id number]] Collected: 04/28/23 0711     Updated: 04/28/23 0713    Narrative:      PROCEDURE: XR CHEST 1 VW-     HISTORY: [facility] Triage Protocol        COMPARISON: April 22, 2023.     FINDINGS:  The heart size is normal. The mediastinum is normal. The  lungs are hyperinflated consistent with COPD. No acute pulmonary  abnormality is identified. There is no pneumothorax. The bony thorax in  intact.        Impression:      COPD.     No acute abnormality identified.     This report was signed and finalized on 4/28/2023 7:11 AM by Janak Dodd MD.            ECHO:      Pharmacy to Dose enoxaparin (LOVENOX),         Assessment:  1.  Acute respiratory failure  2.  Acute exacerbation of asthma  3.  Chronic hypercarbic respiratory failure  4.  H/O smoking.   5.  Allergic rhinitis    Discussion/Recommendations:   The patient acute respiratory failure is likely due to significant asthma exacerbation.    He is currently requiring up to 4 L/min oxygen supplementation.    I will asked the respite therapist to decrease FiO2 as tolerated.    I have adjusted the nebulized treatments as appropriate.     I have also adjusted the dose of steroids, as appropriate.    ABG will be repeated, as clinically indicated.    Chest x-ray will be repeated, as clinically indicated.    He was doing fairly well after starting Fasenra and since he  has not had it in some time, it seems to be the issues.     I have asked my office staff to review records to see if there is any issues with Fasenra approval/administration etc.    His last PFTs showed mild obstruction.     Will add Azelastine.    The plan was discussed with the patient.  I have also discussed the case with the nursing staff.    Recommendations were also discussed with the referring provider.     I would like to thank you for the opportunity to participate in the care of this patient.  We will communicate changes and recommendations, if and when necessary.    This document was electronically signed by Melina Olivas MD on 04/28/23 at 12:50 EDT      Dictated utilizing Dragon dictation.

## 2023-04-28 NOTE — H&P
[facility]      Name:  Topher Stoll   Age:  47 y.o.  Sex:  male  :  1975  MRN:  [id number]   Visit Number:  [id number]  Admission Date:  2023  Date Of Service:  23  Primary Care Physician:  Joshua Hoffmann MD    Chief Complaint:     Shortness of breath, wheezing    History Of Presenting Illness:      Patient is a 47 years old male with a past medical history of COPD on 2 L of oxygen as needed, asthma, anxiety, hypertension and sleep apnea who presented to the ER with a chief complaint of increased worsening shortness of breath and wheezing.  Patient reports that his symptoms started 2 weeks ago, has been seen in the ER on 2 visits and has been treated with steroids.  Patient reports whenever he finishes his steroids his symptoms would come back and get worse.  Patient reports dry nonproductive cough and unable to bring any sputum up.  Patient denies any sick contacts.  Patient denies chest pain, fever or chills.  No vomiting or diarrhea.    On ER evaluation, Patient was found to be tachypneic on arrival with respiratory rate    32, blood pressure of 143/110, was initially on room air however became hypoxic and had to be placed on 2 3 L of oxygen through nasal cannula.  His work-up included negative troponin and proBNP.  WBC of 12.1 otherwise within acceptable range on his CBC and CMP.  His ABG with a pH of 7.374/PCO2 51/PO2 of 62/HCO3 29.7/saturation 90% on 2 L nasal cannula.  Chest x-ray with no definite infiltrates or pneumonia and showing chronic emphysema changes per my read. Patient received lorazepam 1 mg, albuterol, magnesium and Solu-Medrol 25 mg IV while in the ER.  Hospitalist consulted for admission, further evaluation and treatment.    Review Of Systems:    All systems were reviewed and negative except as mentioned in history of presenting illness, assessment and plan.    Past Medical History: Patient  has a past medical history of  Abdominal adhesions, Anxiety, Arthritis, Asthma, Cervical radiculopathy, Colitis, COPD (chronic obstructive pulmonary disease), GERD (gastroesophageal reflux disease), Heart attack, History of hepatitis C, History of recreational drug use, HTN (hypertension), Kidney cysts, Left hip pain, Liver disease, Low back pain, Migraines, Obstructive chronic bronchitis with exacerbation, Sleep apnea, and Tattoos.    Past Surgical History: Patient  has a past surgical history that includes Back surgery; Hernia repair; Small intestine surgery; Total hip arthroplasty (Left); Shoulder Ligament Repair; Hip Spacer Insertion (Left, 1/15/2020); Revision total hip arthroplasty (Left, 3/5/2020); Colonoscopy (2014); Upper gastrointestinal endoscopy (2014); Colonoscopy (N/A, 1/4/2022); and Total hip arthroplasty (Right).    Social History: Patient  reports that he quit smoking about 18 years ago. His smoking use included cigarettes. He has a 30.00 pack-year smoking history. He has been exposed to tobacco smoke. His smokeless tobacco use includes chew. He reports that he does not currently use drugs. He reports that he does not drink alcohol.    Family History:  Patient's family history has been reviewed and found to be noncontributory.     Allergies:      Doxycycline    Home Medications:    Prior to Admission Medications     Prescriptions Last Dose Informant Patient Reported? Taking?    albuterol sulfate  (90 Base) MCG/ACT inhaler   No No    INAHLE 2 PUFFS EVERY 6 HOURS AS NEEDED FOR WHEEZING OR SHORTNESS OF BREATH    alfuzosin (UROXATRAL) 10 MG 24 hr tablet   Yes No    Take 10 mg by mouth Daily.    aspirin (aspirin) 81 MG EC tablet   No No    Take 1 tablet by mouth Daily.    Benralizumab solution prefilled syringe 30 mg   No No    doxycycline (VIBRAMYCIN) 100 MG capsule   No No    Take 1 capsule by mouth 2 (Two) Times a Day.    dutasteride (AVODART) 0.5 MG capsule   Yes No    Take 0.5 mg by mouth Daily.    Fasenra 30 MG/ML  solution prefilled syringe   No No    INJECT 1 SYRINGE UNDER THE SKIN AT WEEK 4 AND 8, THEN INJECT 1 SYRINGE EVERY 8 WEEKS THEREAFTER.    fluticasone (FLONASE) 50 MCG/ACT nasal spray   No No    INSTILL 1 SPRAY INTO EACH NOSTRIL DAILY    Fluticasone-Umeclidin-Vilant (TRELEGY) 200-62.5-25 MCG/INH inhaler   No No    Inhale 1 puff Daily.    gabapentin (NEURONTIN) 800 MG tablet   Yes No    Take 1 tablet by mouth 3 (Three) Times a Day.    HYDROcodone-acetaminophen (NORCO)  MG per tablet   Yes No    TAKE 1 TABLET BY MOUTH 30 MINUTES BEFORE PROCEDURE THEN 1 TABLET BY MOUTH EVERY 6 HOURS AS NEEDED FOR PAIN    ipratropium-albuterol (DUO-NEB) 0.5-2.5 mg/3 ml nebulizer   No No    Take 3 mL by nebulization Every 4 (Four) Hours As Needed for Wheezing or Shortness of Air.    lovastatin (MEVACOR) 40 MG tablet   No No    TAKE 1 TABLET BY MOUTH EVERY DAY FOR CHOLESTEROL    methadone (DOLOPHINE) 5 MG/5ML solution  Self Yes No    Take 40 mg by mouth Daily. Patient takes 60 mg once per day in mornings, states took a dose 6/15/21 around 0700    omeprazole (priLOSEC) 40 MG capsule   No No    Take 1 capsule by mouth Daily.    ondansetron ODT (ZOFRAN-ODT) 4 MG disintegrating tablet   No No    Place 1 tablet on the tongue Every 8 (Eight) Hours As Needed for Nausea.    predniSONE (DELTASONE) 20 MG tablet   No No    Take 2 tablets daily.    traZODone (DESYREL) 100 MG tablet   Yes No    TAKE 2 TABLETS BY MOUTH AT BEDTIME AS NEEDED        ED Medications:    Medications   sodium chloride 0.9 % flush 10 mL (has no administration in time range)   methylPREDNISolone sodium succinate (SOLU-Medrol) injection 125 mg (125 mg Intravenous Given 4/27/23 1828)   albuterol (PROVENTIL) nebulizer solution 0.083% 2.5 mg/3mL (2.5 mg Nebulization Given 4/27/23 1840)   magnesium sulfate 2g/50 mL (PREMIX) infusion (0 g Intravenous Stopped 4/27/23 2052)   albuterol (PROVENTIL) nebulizer solution 0.083% 2.5 mg/3mL (2.5 mg Nebulization Given 4/27/23 1939)  "  LORazepam (ATIVAN) injection 1 mg (1 mg Intravenous Given 4/27/23 2322)     Vital Signs:  Temp:  [98.4 °F (36.9 °C)] 98.4 °F (36.9 °C)  Heart Rate:  [78-98] 95  Resp:  [18-32] 22  BP: ()/() 96/78        04/27/23  1802   Weight: 86.2 kg (190 lb)     Body mass index is 25.07 kg/m².    Physical Exam:     Most recent vital Signs: BP 96/78   Pulse 95   Temp 98.4 °F (36.9 °C) (Oral)   Resp 22   Ht 185.4 cm (73\")   Wt 86.2 kg (190 lb)   SpO2 91%   BMI 25.07 kg/m²     Physical Exam  Vitals and nursing note reviewed.   Constitutional:       General: He is not in acute distress.     Appearance: He is ill-appearing.   HENT:      Head: Normocephalic and atraumatic.      Right Ear: External ear normal.      Left Ear: External ear normal.      Nose: Nose normal.      Mouth/Throat:      Mouth: Mucous membranes are moist.   Eyes:      Extraocular Movements: Extraocular movements intact.      Conjunctiva/sclera: Conjunctivae normal.      Pupils: Pupils are equal, round, and reactive to light.   Cardiovascular:      Rate and Rhythm: Normal rate and regular rhythm.      Pulses: Normal pulses.      Heart sounds: Normal heart sounds.   Pulmonary:      Effort: Tachypnea, accessory muscle usage, prolonged expiration and respiratory distress present.      Breath sounds: Decreased air movement present. Wheezing present. No rhonchi.   Abdominal:      General: Bowel sounds are normal. There is no distension.      Palpations: Abdomen is soft.      Tenderness: There is no abdominal tenderness.   Musculoskeletal:         General: Normal range of motion.      Cervical back: Normal range of motion and neck supple.      Right lower leg: No edema.      Left lower leg: No edema.   Skin:     General: Skin is warm and dry.      Findings: No rash.   Neurological:      General: No focal deficit present.      Mental Status: He is alert and oriented to person, place, and time. Mental status is at baseline.      Motor: No weakness. "   Psychiatric:         Mood and Affect: Mood normal.         Behavior: Behavior normal.         Laboratory data:    I have reviewed the labs done in the emergency room.    Results from last 7 days   Lab Units 04/27/23  1826   SODIUM mmol/L 138   POTASSIUM mmol/L 4.1   CHLORIDE mmol/L 97*   CO2 mmol/L 29.5*   BUN mg/dL 13   CREATININE mg/dL 0.92   CALCIUM mg/dL 9.0   BILIRUBIN mg/dL 0.3   ALK PHOS U/L 122*   ALT (SGPT) U/L 20   AST (SGOT) U/L 19   GLUCOSE mg/dL 98     Results from last 7 days   Lab Units 04/27/23  1826   WBC 10*3/mm3 12.12*   HEMOGLOBIN g/dL 15.6   HEMATOCRIT % 48.2   PLATELETS 10*3/mm3 240         Results from last 7 days   Lab Units 04/27/23  1826   HSTROP T ng/L <6     Results from last 7 days   Lab Units 04/27/23  1826   PROBNP pg/mL 79.8             Results from last 7 days   Lab Units 04/27/23  1844   PH, ARTERIAL pH units 7.374   PO2 ART mm Hg 62.8*   PCO2, ARTERIAL mm Hg 51.0*   HCO3 ART mmol/L 29.7*           Invalid input(s): USDES,  BLOODU, NITRITITE, BACT, EP    Pain Management Panel         Latest Ref Rng & Units 3/17/2018 7/13/2016   Pain Management Panel   Amphetamine, Urine Qual Negative Negative   Negative     Barbiturates Screen, Urine Negative Negative   Negative     Benzodiazepine Screen, Urine Negative Negative   Negative      Negative     Buprenorphine, Screen, Urine Negative Negative      Cocaine Screen, Urine Negative Negative   Negative     Methadone Screen , Urine Negative Negative   Negative     Methamphetamine, Ur Negative Negative            Multiple values from one day are sorted in reverse-chronological order             EKG:      Sinus rhythm, heart rate 94, Nonspecific ST/T wave changes.    Radiology:    No radiology results for the last 3 days    Assessment:    Acute COPD exacerbation, POA  Acute on chronic respiratory failure with hypoxia/hypercapnia, likely secondary to pneumonia, POA  Atherosclerosis of coronary artery  Primary  hypertension  Hyperlipidemia  Pulmonary emphysema   Chronic viral hepatitis B without delta agent and without coma  Hep C w/o coma, chronic  Anxiety    Plan:    Patient is admitted to telemetry for further evaluation and treatment.    Hypoxic/hypercapnic respiratory failure/COPD exacerbation  -Continue supplemental oxygen to keep saturation 90%.  Patient is currently on 3 L nasal cannula, continue to titrate down as able to.  Patient on 2 L as needed at home.  BiPAP as needed and nightly.  -Continue Solu-Medrol 60 mg every 8 hours, will titrate as able improves.  -Bronchodilators and supportive care.  -No signs of pneumonia, we will monitor with Pro-Siva in a.m.  -Respiratory panel pending.  -Vistaril as needed for anxiety.    Continue home meds as warranted.  Further orders as indicated per clinical course.    Risk Assessment: Moderate to high  DVT Prophylaxis: Lovenox prophylaxis (benefit> risk)  Code Status: Full  Diet: Cardiac    Advance Care Planning   ACP discussion was held with the patient during this visit. Patient does not have an advance directive, information provided.       Rayna Simon MD  04/27/23  23:24 EDT    Dictated utilizing Dragon dictation.

## 2023-04-29 LAB
ANION GAP SERPL CALCULATED.3IONS-SCNC: 6 MMOL/L (ref 5–15)
BUN SERPL-MCNC: 15 MG/DL (ref 6–20)
BUN/CREAT SERPL: 18.5 (ref 7–25)
CALCIUM SPEC-SCNC: 8.8 MG/DL (ref 8.6–10.5)
CHLORIDE SERPL-SCNC: 102 MMOL/L (ref 98–107)
CO2 SERPL-SCNC: 27 MMOL/L (ref 22–29)
CREAT SERPL-MCNC: 0.81 MG/DL (ref 0.76–1.27)
DEPRECATED RDW RBC AUTO: 42.2 FL (ref 37–54)
EGFRCR SERPLBLD CKD-EPI 2021: 108.8 ML/MIN/1.73
ERYTHROCYTE [DISTWIDTH] IN BLOOD BY AUTOMATED COUNT: 14 % (ref 12.3–15.4)
GLUCOSE SERPL-MCNC: 109 MG/DL (ref 65–99)
HCT VFR BLD AUTO: 44 % (ref 37.5–51)
HGB BLD-MCNC: 14.1 G/DL (ref 13–17.7)
MCH RBC QN AUTO: 26.6 PG (ref 26.6–33)
MCHC RBC AUTO-ENTMCNC: 32 G/DL (ref 31.5–35.7)
MCV RBC AUTO: 83 FL (ref 79–97)
PLATELET # BLD AUTO: 194 10*3/MM3 (ref 140–450)
PMV BLD AUTO: 9.7 FL (ref 6–12)
POTASSIUM SERPL-SCNC: 4.4 MMOL/L (ref 3.5–5.2)
RBC # BLD AUTO: 5.3 10*6/MM3 (ref 4.14–5.8)
SODIUM SERPL-SCNC: 135 MMOL/L (ref 136–145)
WBC NRBC COR # BLD: 15.24 10*3/MM3 (ref 3.4–10.8)

## 2023-04-29 PROCEDURE — 63710000001 PREDNISONE PER 1 MG: Performed by: INTERNAL MEDICINE

## 2023-04-29 PROCEDURE — 94760 N-INVAS EAR/PLS OXIMETRY 1: CPT

## 2023-04-29 PROCEDURE — 25010000002 METHYLPREDNISOLONE PER 125 MG: Performed by: NURSE PRACTITIONER

## 2023-04-29 PROCEDURE — 94762 N-INVAS EAR/PLS OXIMTRY CONT: CPT

## 2023-04-29 PROCEDURE — 94761 N-INVAS EAR/PLS OXIMETRY MLT: CPT

## 2023-04-29 PROCEDURE — 80048 BASIC METABOLIC PNL TOTAL CA: CPT | Performed by: NURSE PRACTITIONER

## 2023-04-29 PROCEDURE — 94799 UNLISTED PULMONARY SVC/PX: CPT

## 2023-04-29 PROCEDURE — 99232 SBSQ HOSP IP/OBS MODERATE 35: CPT | Performed by: NURSE PRACTITIONER

## 2023-04-29 PROCEDURE — 85027 COMPLETE CBC AUTOMATED: CPT | Performed by: NURSE PRACTITIONER

## 2023-04-29 RX ORDER — METHYLPREDNISOLONE SODIUM SUCCINATE 125 MG/2ML
60 INJECTION, POWDER, LYOPHILIZED, FOR SOLUTION INTRAMUSCULAR; INTRAVENOUS ONCE
Status: COMPLETED | OUTPATIENT
Start: 2023-04-29 | End: 2023-04-29

## 2023-04-29 RX ADMIN — LORAZEPAM 0.5 MG: 0.5 TABLET ORAL at 14:33

## 2023-04-29 RX ADMIN — AZELASTINE 1 SPRAY: 1 SPRAY, METERED NASAL at 08:23

## 2023-04-29 RX ADMIN — ASPIRIN 81 MG: 81 TABLET, COATED ORAL at 08:20

## 2023-04-29 RX ADMIN — Medication 5 MG: at 20:40

## 2023-04-29 RX ADMIN — METHADONE HYDROCHLORIDE 60 MG: 10 TABLET ORAL at 08:21

## 2023-04-29 RX ADMIN — IPRATROPIUM BROMIDE AND ALBUTEROL SULFATE 3 ML: .5; 3 SOLUTION RESPIRATORY (INHALATION) at 18:34

## 2023-04-29 RX ADMIN — DOCUSATE SODIUM 50MG AND SENNOSIDES 8.6MG 2 TABLET: 8.6; 5 TABLET, FILM COATED ORAL at 08:21

## 2023-04-29 RX ADMIN — PANTOPRAZOLE SODIUM 40 MG: 40 TABLET, DELAYED RELEASE ORAL at 06:30

## 2023-04-29 RX ADMIN — FINASTERIDE 5 MG: 5 TABLET, FILM COATED ORAL at 08:20

## 2023-04-29 RX ADMIN — AZELASTINE 1 SPRAY: 1 SPRAY, METERED NASAL at 20:40

## 2023-04-29 RX ADMIN — Medication 10 ML: at 20:40

## 2023-04-29 RX ADMIN — LORAZEPAM 0.5 MG: 0.5 TABLET ORAL at 08:25

## 2023-04-29 RX ADMIN — BUDESONIDE AND FORMOTEROL FUMARATE DIHYDRATE 2 PUFF: 160; 4.5 AEROSOL RESPIRATORY (INHALATION) at 18:34

## 2023-04-29 RX ADMIN — ATORVASTATIN CALCIUM 10 MG: 10 TABLET, FILM COATED ORAL at 08:20

## 2023-04-29 RX ADMIN — GUAIFENESIN 600 MG: 600 TABLET, EXTENDED RELEASE ORAL at 08:21

## 2023-04-29 RX ADMIN — GUAIFENESIN 600 MG: 600 TABLET, EXTENDED RELEASE ORAL at 20:40

## 2023-04-29 RX ADMIN — PREDNISONE 60 MG: 20 TABLET ORAL at 08:21

## 2023-04-29 RX ADMIN — BUDESONIDE AND FORMOTEROL FUMARATE DIHYDRATE 2 PUFF: 160; 4.5 AEROSOL RESPIRATORY (INHALATION) at 07:06

## 2023-04-29 RX ADMIN — FLUTICASONE PROPIONATE 1 SPRAY: 50 SPRAY, METERED NASAL at 08:23

## 2023-04-29 RX ADMIN — TIOTROPIUM BROMIDE INHALATION SPRAY 2 PUFF: 3.12 SPRAY, METERED RESPIRATORY (INHALATION) at 07:06

## 2023-04-29 RX ADMIN — TRAZODONE HYDROCHLORIDE 50 MG: 50 TABLET ORAL at 20:39

## 2023-04-29 RX ADMIN — LORAZEPAM 0.5 MG: 0.5 TABLET ORAL at 20:40

## 2023-04-29 RX ADMIN — TAMSULOSIN HYDROCHLORIDE 0.4 MG: 0.4 CAPSULE ORAL at 20:40

## 2023-04-29 RX ADMIN — METHYLPREDNISOLONE SODIUM SUCCINATE 60 MG: 125 INJECTION, POWDER, FOR SOLUTION INTRAMUSCULAR; INTRAVENOUS at 09:10

## 2023-04-29 NOTE — PLAN OF CARE
Goal Outcome Evaluation:  Plan of Care Reviewed With: patient        Progress: improving  Outcome Evaluation: anxious patient with complaint of shortness of air with activity-medicatiuons given per hospitalist's and Dr. Olivas's orders-Destiny GONZALEZ saw patient today-PRN meds given for patient's comfort-monitor labs and continue to monitor patient

## 2023-04-29 NOTE — PLAN OF CARE
Goal Outcome Evaluation:  Plan of Care Reviewed With: patient        Progress: improving   Plan of care discussed with patient, vs noted, patient is on 2Lnc which patient wears baseline at home.

## 2023-04-29 NOTE — PROGRESS NOTES
[facility] HOSPITALIST    PROGRESS NOTE    Name:  Topher Stoll   Age:  47 y.o.  Sex:  male  :  1975  MRN:  [id number]   Visit Number:  [id number]  Admission Date:  2023  Date Of Service:  23  Primary Care Physician:  Joshua Hoffmann MD     LOS: 2 days :    Chief Complaint:      Shortness of air    Subjective:    Patient seen and examined with prior provider documentation/labs/vitals reviewed.  States breathing some better.  Normally only wears oxygen at home as needed at 2 L.  Currently on 2 to 3 L nasal cannula with continued significant wheezing.  Otherwise denies other concerns.    Hospital Course:    Patient is a 47 years old male with a past medical history of COPD on 2 L of oxygen as needed, asthma, anxiety, hypertension and sleep apnea who presented to the ER with a chief complaint of increased worsening shortness of breath and wheezing.  Patient reported   that his symptoms started 2 weeks ago, has been seen in the ER on 2 visits and has been treated with steroids.      On ER evaluation, Patient was found to be tachypneic on arrival with respiratory rate    32, blood pressure of 143/110, was initially on room air however became hypoxic and had to be placed on 2 3 L of oxygen through nasal cannula.  His work-up included negative troponin and proBNP.  WBC of 12.1 otherwise within acceptable range on his CBC and CMP.  His ABG with a pH of 7.374/PCO2 51/PO2 of 62/HCO3 29.7/saturation 90% on 2 L nasal cannula.  Chest x-ray with no definite infiltrates or pneumonia and showing chronic emphysema changes per my read. Patient received lorazepam 1 mg, albuterol, magnesium and Solu-Medrol 25 mg IV while in the ER.  Hospitalist consulted for admission, further evaluation and treatment.  Pulmonology consulted.    Review of Systems:     All systems were reviewed and negative except as mentioned in subjective, assessment and plan.    Vital Signs:    Temp:  [97 °F (36.1 °C)-98.2 °F  (36.8 °C)] 98.1 °F (36.7 °C)  Heart Rate:  [] 85  Resp:  [18-19] 18  BP: (134-150)/(87-95) 134/89    Intake and output:    I/O last 3 completed shifts:  In: 1990 [P.O.:1940; I.V.:50]  Out: 1975 [Urine:1975]  I/O this shift:  In: 240 [P.O.:240]  Out: -     Physical Examination:    General Appearance:  Alert and cooperative. Chronically ill appearing. Pleasant.   Head:  Atraumatic and normocephalic.   Eyes: Conjunctivae and sclerae normal, no icterus. No pallor.   Throat: No oral lesions, no thrush, oral mucosa moist.   Neck: Supple, trachea midline, no thyromegaly.   Lungs:   Breath sounds heard bilaterally equally.  Significiant wheezing throughout. Mild dyspnea noted. On 2-3 liters NC.   Heart:  Normal S1 and S2, no murmur, no gallop, no rub. No JVD.   Abdomen:   Normal bowel sounds, no masses, no organomegaly. Soft, nontender, nondistended, no rebound tenderness.   Extremities: Supple, no edema, no cyanosis, no clubbing.   Skin: No bleeding or rash.   Neurologic: Alert and oriented x 3. No facial asymmetry. Moves all four limbs. No tremors.      Laboratory results:    Results from last 7 days   Lab Units 04/29/23 0624 04/28/23  0540 04/27/23  1826   SODIUM mmol/L 135* 136 138   POTASSIUM mmol/L 4.4 4.3 4.1   CHLORIDE mmol/L 102 99 97*   CO2 mmol/L 27.0 26.9 29.5*   BUN mg/dL 15 13 13   CREATININE mg/dL 0.81 0.86 0.92   CALCIUM mg/dL 8.8 9.0 9.0   BILIRUBIN mg/dL  --   --  0.3   ALK PHOS U/L  --   --  122*   ALT (SGPT) U/L  --   --  20   AST (SGOT) U/L  --   --  19   GLUCOSE mg/dL 109* 146* 98     Results from last 7 days   Lab Units 04/29/23  0624 04/28/23  0540 04/27/23  1826   WBC 10*3/mm3 15.24* 8.53 12.12*   HEMOGLOBIN g/dL 14.1 14.1 15.6   HEMATOCRIT % 44.0 43.6 48.2   PLATELETS 10*3/mm3 194 216 240         Results from last 7 days   Lab Units 04/27/23  1826   HSTROP T ng/L <6         Recent Labs     05/15/22  1149 04/14/23  1707 04/27/23  1844   PHART 7.318* 7.334* 7.374   [id number] 53.5* 54.6* 51.0*    PO2ART 77.1 101.0* 62.8*   [id number] 27.4 29.0* 29.7*   BASEEXCESS 0.2 1.7 3.2*      I have reviewed the patient's laboratory results.    Radiology results:    XR Chest 1 View    Result Date: 4/28/2023  PROCEDURE: XR CHEST 1 VW-  HISTORY: [facility] Triage Protocol   COMPARISON: April 22, 2023.  FINDINGS:  The heart size is normal. The mediastinum is normal. The lungs are hyperinflated consistent with COPD. No acute pulmonary abnormality is identified. There is no pneumothorax. The bony thorax in intact.      Impression: COPD.  No acute abnormality identified.  This report was signed and finalized on 4/28/2023 7:11 AM by Janak Dodd MD.    I have reviewed the patient's radiology reports.    Medication Review:     I have reviewed the patient's active and prn medications.     Problem List:      COPD with acute exacerbation    Acute on chronic respiratory failure with hypoxia and hypercapnia    COPD exacerbation      Assessment:    Acute COPD exacerbation, POA  Acute Asthma Exacerbation, POA  Acute on chronic respiratory failure with hypoxia/hypercapnia 2/2 to #1 and # 2, POA  Chronic low back pain on opiods  Atherosclerosis of coronary artery  Primary hypertension  Hyperlipidemia  Pulmonary emphysema   Chronic viral hepatitis B without delta agent and without coma  Hep C w/o coma, chronic  Anxiety     Plan:     Hypoxic/hypercapnic respiratory failure/COPD exacerbation  -Continue supplemental oxygen to keep saturation 90%.  Patient is currently on 2-3 L nasal cannula, continue to titrate down as able to.  Patient on 2 L as needed at home.  BiPAP as needed and nightly.  -Continue Solu-Medrol 60 mg  X 1 today.  - Already received 60 mg prednisone oral this morning.  - Will evaluate for further IV steroid need in AM.  -Bronchodilators and supportive care.  -No signs of pneumonia, procal negative.  -Respiratory panel negative.  -Vistaril as needed for anxiety.  -Pulmonology following.     DVT Prophylaxis: Lovenox  Code  Status: Full Code  Diet: Cardiac  Discharge Plan: Likely home tomorrow    Carmen Costa, APRN  04/29/23  10:11 EDT    Dictated utilizing Dragon dictation.

## 2023-04-29 NOTE — THERAPY TREATMENT NOTE
Patient Name: Topher Stoll  : 1975    MRN: [id number]                              Today's Date: 2023       Admit Date: 2023    Visit Dx:     ICD-10-CM ICD-9-CM   1. COPD with acute exacerbation  J44.1 491.21     Patient Active Problem List   Diagnosis   • Atherosclerosis of coronary artery   • Primary hypertension   • Hyperlipidemia   • Osteoporosis   • Pulmonary emphysema (HCC)   • Chronic viral hepatitis B without delta agent and without coma (HCC)   • Hep C w/o coma, chronic (HCC)   • total hip explant, antibiotic spacer placement 1/15/2020   • Status post left hip reimplant revision   • History of hepatitis B   • Constipation   • Opioid dependence on agonist therapy   • Acute on chronic respiratory failure with hypoxia and hypercapnia   • COVID-19 virus detected   • COPD without exacerbation   • Opiate use   • Cytokine release syndrome, grade 1   • COPD with acute exacerbation   • COPD exacerbation   • Acute on chronic respiratory failure with hypoxia     Past Medical History:   Diagnosis Date   • Abdominal adhesions    • Anxiety    • Arthritis    • Asthma    • Cervical radiculopathy    • Colitis    • COPD (chronic obstructive pulmonary disease)    • GERD (gastroesophageal reflux disease)    • Heart attack    • History of hepatitis C     Treated with Epclusa in 2019   • History of recreational drug use    • HTN (hypertension)    • Kidney cysts    • Left hip pain    • Liver disease    • Low back pain    • Migraines    • Obstructive chronic bronchitis with exacerbation    • Sleep apnea     mild   • Tattoos      Past Surgical History:   Procedure Laterality Date   • BACK SURGERY     • COLONOSCOPY     • COLONOSCOPY N/A 2022    Procedure: COLONOSCOPY with biopsies;  Surgeon: Giancarlo Ruiz MD;  Location: [facility] ENDOSCOPY;  Service: Gastroenterology;  Laterality: N/A;   • HERNIA REPAIR     • HIP SPACER INSERTION WITH ANTIBIOTIC CEMENT Left 1/15/2020    Procedure: TOTAL HIP IMPLANT  REMOVAL WITH INSERTION OF ANTIBIOTIC SPACER LEFT;  Surgeon: Ba Ramirez MD;  Location:  [facility] OR;  Service: Orthopedics   • SHOULDER LIGAMENT REPAIR      right shoulder   • SMALL INTESTINE SURGERY      Small bowell resection   • TOTAL HIP ARTHROPLASTY Left    • TOTAL HIP ARTHROPLASTY Right    • TOTAL HIP ARTHROPLASTY REVISION Left 3/5/2020    Procedure: HIP REIMPLANT REVISION LEFT;  Surgeon: Ba Ramirez MD;  Location:  [facility] OR;  Service: Orthopedics;  Laterality: Left;   • UPPER GASTROINTESTINAL ENDOSCOPY  2014      General Information     Row Name 04/29/23 [facility]9          Physical Therapy Time and Intention    Document Type discharge treatment  -     Mode of Treatment physical therapy  -     Row Name 04/29/23 1439          General Information    Patient Profile Reviewed yes  -CC     Existing Precautions/Restrictions fall  -CC           User Key  (r) = Recorded By, (t) = Taken By, (c) = Cosigned By    Initials Name Provider Type    CC Kendy Saenz PTA Physical Therapist Assistant               Mobility     Row Name 04/29/23 1439          Bed Mobility    Bed Mobility bed mobility (all) activities  -     All Activities, [place] (Bed Mobility) independent  -CC     Row Name 04/29/23 1439          Sit-Stand Transfer    Sit-Stand [place] (Transfers) independent  -     Assistive Device (Sit-Stand Transfers) --  gait belt  -     Row Name 04/29/23 1439          Gait/Stairs (Locomotion)    [place] Level (Gait) independent  -     Assistive Device (Gait) other (see comments)  gait belt  -     Distance in Feet (Gait) 50  -CC     Deviations/Abnormal Patterns (Gait) --  balance deficits  -           User Key  (r) = Recorded By, (t) = Taken By, (c) = Cosigned By    Initials Name Provider Type    Kendy Cespedes PTA Physical Therapist Assistant               Obj/Interventions    No documentation.                Goals/Plan    No documentation.                Clinical Impression      Row Name 04/29/23 1440          Pain    Pretreatment Pain Rating 0/10 - no pain  -CC     Posttreatment Pain Rating 0/10 - no pain  -CC     Row Name 04/29/23 1440          Plan of Care Review    Progress improving  -     Outcome Evaluation Pt agreeable to therapy however reports has been getting up and moving around in room and going to/from bathroom independently and doesn't feel like he needs PT anymore. PTA obseved pt's safety with bed mob, transfers and amb in room. Pt performed  all bed mob and transfers indepenedntly and amb in room to/from bathroom independently without unsteadiness or LOB noted and no AD. PTA discussed with supervising PT and PT will sign off at this time d/t pt's independence level.  -     Row Name 04/29/23 1440          Positioning and Restraints    Pre-Treatment Position in bed  -CC     Post Treatment Position bed  -CC     In Bed side lying left;call light within reach  -CC           User Key  (r) = Recorded By, (t) = Taken By, (c) = Cosigned By    Initials Name Provider Type    CC Kendy Saenz, PTA Physical Therapist Assistant               Outcome Measures     Row Name 04/29/23 1447 04/29/23 0800       How much help from another person do you currently need...    Turning from your back to your side while in flat bed without using bedrails? 4  -CC 4  -EC    Moving from lying on back to sitting on the side of a flat bed without bedrails? 4  -CC 4  -EC    Moving to and from a bed to a chair (including a wheelchair)? 4  -CC 4  -EC    Standing up from a chair using your arms (e.g., wheelchair, bedside chair)? 4  -CC 4  -EC    Climbing 3-5 steps with a railing? 4  -CC 4  -EC    To walk in hospital room? 4  -CC 4  -EC    AM-PAC 6 Clicks Score (PT) 24  -CC 24  -EC    Highest level of mobility 8 --> Walked 250 feet or more  - 8 --> Walked 250 feet or more  -EC    Row Name 04/29/23 1447          Functional Assessment    Outcome Measure Options AM-PAC 6 Clicks Basic Mobility (PT)  -            User Key  (r) = Recorded By, (t) = Taken By, (c) = Cosigned By    Initials Name Provider Type    CC Kendy Saenz PTA Physical Therapist Assistant    Rose Multain RN Registered Nurse                             Physical Therapy Education     Title: PT OT SLP Therapies (In Progress)     Topic: Physical Therapy (In Progress)     Point: Mobility training (Done)     Learning Progress Summary           Patient Acceptance, E,TB, VU by CC at 4/29/2023 1448    Comment: Con't with amb and increase daily    Acceptance, E, VU by MS at 4/28/2023 1200    Comment: importance of mobility                   Point: Home exercise program (Done)     Learning Progress Summary           Patient Acceptance, E, VU by MS at 4/28/2023 1200    Comment: importance of mobility                   Point: Body mechanics (Not Started)     Learner Progress:  Not documented in this visit.          Point: Precautions (Not Started)     Learner Progress:  Not documented in this visit.                      User Key     Initials Effective Dates Name Provider Type Discipline     06/16/21 -  Kendy Saenz PTA Physical Therapist Assistant PT    MS 07/01/22 -  Brando Whitehead PT Physical Therapist PT              PT Recommendation and Plan     Progress: improving  Outcome Evaluation: Pt agreeable to therapy however reports has been getting up and moving around in room and going to/from bathroom independently and doesn't feel like he needs PT anymore. PTA obseved pt's safety with bed mob, transfers and amb in room. Pt performed  all bed mob and transfers indepenedntly and amb in room to/from bathroom independently without unsteadiness or LOB noted and no AD. PTA discussed with supervising PT and PT will sign off at this time d/t pt's independence level.     Time Calculation:    PT Charges     Row Name 04/29/23 1449             Time Calculation    PT Received On 04/29/23  -      PT Goal Re-Cert Due Date 05/08/23  -          Time Calculation- PT    Total Timed Code Minutes- PT 15 minute(s)  -CC         Timed Charges    54726 - PT Therapeutic Activity Minutes 15  -CC         Total Minutes    Timed Charges Total Minutes 15  -CC       Total Minutes 15  -CC            User Key  (r) = Recorded By, (t) = Taken By, (c) = Cosigned By    Initials Name Provider Type    CC Kendy Saenz, SANDRA Physical Therapist Assistant                  PT G-Codes  Outcome Measure Options: AM-PAC 6 Clicks Basic Mobility (PT)  AM-PAC 6 Clicks Score (PT): 24  AM-PAC 6 Clicks Score (OT): 23       Kendy Saenz PTA  4/29/2023

## 2023-04-29 NOTE — PLAN OF CARE
Goal Outcome Evaluation:           Progress: improving  Outcome Evaluation: Pt agreeable to therapy however reports has been getting up and moving around in room and going to/from bathroom independently and doesn't feel like he needs PT anymore. PTA obseved pt's safety with bed mob, transfers and amb in room. Pt performed  all bed mob and transfers indepenedntly and amb in room to/from bathroom independently without unsteadiness or LOB noted and no AD. PTA discussed with supervising PT and PT will sign off at this time d/t pt's independence level.

## 2023-04-30 ENCOUNTER — READMISSION MANAGEMENT (OUTPATIENT)
Dept: CALL CENTER | Facility: HOSPITAL | Age: 48
End: 2023-04-30
Payer: MEDICARE

## 2023-04-30 VITALS
DIASTOLIC BLOOD PRESSURE: 92 MMHG | HEIGHT: 73 IN | WEIGHT: 188.27 LBS | BODY MASS INDEX: 24.95 KG/M2 | OXYGEN SATURATION: 100 % | TEMPERATURE: 98.4 F | HEART RATE: 77 BPM | RESPIRATION RATE: 21 BRPM | SYSTOLIC BLOOD PRESSURE: 126 MMHG

## 2023-04-30 PROCEDURE — 94762 N-INVAS EAR/PLS OXIMTRY CONT: CPT

## 2023-04-30 PROCEDURE — 94799 UNLISTED PULMONARY SVC/PX: CPT

## 2023-04-30 PROCEDURE — 94760 N-INVAS EAR/PLS OXIMETRY 1: CPT

## 2023-04-30 PROCEDURE — 94761 N-INVAS EAR/PLS OXIMETRY MLT: CPT

## 2023-04-30 PROCEDURE — 25010000002 METHYLPREDNISOLONE PER 125 MG: Performed by: NURSE PRACTITIONER

## 2023-04-30 PROCEDURE — 99239 HOSP IP/OBS DSCHRG MGMT >30: CPT | Performed by: NURSE PRACTITIONER

## 2023-04-30 RX ORDER — AZELASTINE 1 MG/ML
1 SPRAY, METERED NASAL 2 TIMES DAILY
Qty: 30 ML | Refills: 0 | Status: SHIPPED | OUTPATIENT
Start: 2023-04-30

## 2023-04-30 RX ORDER — METHYLPREDNISOLONE SODIUM SUCCINATE 125 MG/2ML
60 INJECTION, POWDER, LYOPHILIZED, FOR SOLUTION INTRAMUSCULAR; INTRAVENOUS ONCE
Status: COMPLETED | OUTPATIENT
Start: 2023-04-30 | End: 2023-04-30

## 2023-04-30 RX ORDER — PREDNISONE 10 MG/1
TABLET ORAL
Qty: 42 TABLET | Refills: 0 | Status: SHIPPED | OUTPATIENT
Start: 2023-05-01

## 2023-04-30 RX ADMIN — BUDESONIDE AND FORMOTEROL FUMARATE DIHYDRATE 2 PUFF: 160; 4.5 AEROSOL RESPIRATORY (INHALATION) at 07:00

## 2023-04-30 RX ADMIN — METHYLPREDNISOLONE SODIUM SUCCINATE 60 MG: 125 INJECTION, POWDER, FOR SOLUTION INTRAMUSCULAR; INTRAVENOUS at 08:49

## 2023-04-30 RX ADMIN — LORAZEPAM 0.5 MG: 0.5 TABLET ORAL at 06:11

## 2023-04-30 RX ADMIN — AZELASTINE 1 SPRAY: 1 SPRAY, METERED NASAL at 08:49

## 2023-04-30 RX ADMIN — TIOTROPIUM BROMIDE INHALATION SPRAY 2 PUFF: 3.12 SPRAY, METERED RESPIRATORY (INHALATION) at 07:01

## 2023-04-30 RX ADMIN — METHADONE HYDROCHLORIDE 60 MG: 10 TABLET ORAL at 08:48

## 2023-04-30 RX ADMIN — PANTOPRAZOLE SODIUM 40 MG: 40 TABLET, DELAYED RELEASE ORAL at 06:11

## 2023-04-30 RX ADMIN — FLUTICASONE PROPIONATE 1 SPRAY: 50 SPRAY, METERED NASAL at 08:49

## 2023-04-30 RX ADMIN — FINASTERIDE 5 MG: 5 TABLET, FILM COATED ORAL at 08:48

## 2023-04-30 RX ADMIN — ATORVASTATIN CALCIUM 10 MG: 10 TABLET, FILM COATED ORAL at 08:49

## 2023-04-30 RX ADMIN — DOCUSATE SODIUM 50MG AND SENNOSIDES 8.6MG 2 TABLET: 8.6; 5 TABLET, FILM COATED ORAL at 08:48

## 2023-04-30 RX ADMIN — ASPIRIN 81 MG: 81 TABLET, COATED ORAL at 08:48

## 2023-04-30 RX ADMIN — GUAIFENESIN 600 MG: 600 TABLET, EXTENDED RELEASE ORAL at 08:49

## 2023-04-30 NOTE — OUTREACH NOTE
Prep Survey    Flowsheet Row Responses   [facility], [place] patient discharged from? [place]   Is LACE score < 7 ? No   Eligibility [facility]   Date of Admission 04/27/23   Date of Discharge 04/30/23   Discharge Disposition Home or Self Care   Discharge diagnosis COPD with acute exacerbation   Does the patient have one of the following disease processes/diagnoses(primary or secondary)? COPD   Does the patient have Home health ordered? No   Is there a DME ordered? No   Prep survey completed? Yes          Ankita LAKHANI - Registered Nurse

## 2023-04-30 NOTE — CASE MANAGEMENT/SOCIAL WORK
Case Management Discharge Note    Pt uses [employer] for PRN 02 of 2L.      Transportation Services  Private: Car    Final Discharge Disposition Code: 01 - home or self-care

## 2023-04-30 NOTE — DISCHARGE INSTRUCTIONS
Patient to be discharged home today.  Continue prednisone per med rec.  Continue 2 L nasal cannula as needed.  Follow with pulmonology/PCP as directed.  Return to emergency department for any worsening symptoms.

## 2023-04-30 NOTE — DISCHARGE SUMMARY
[facility]      Name:  Topher Stoll   Age:  47 y.o.  Sex:  male  :  1975  MRN:  [id number]   Visit Number:  [id number]    Admission Date:  2023  Date of Discharge:  2023  Primary Care Physician:  Joshua Hoffmann MD    Important issues to note:    -Patient admitted for COPD/asthma exacerbation requiring up to 4 L nasal cannula.  -Pulmonology consulted.  Patient given Solu-Medrol IV with improvement.  -No secondary bacterial pneumonia noted.  -Upon discharge patient able to be weaned from oxygen with 2 L only as needed which is his baseline.  -Patient will continue taper dose of prednisone and will follow-up with pulmonology/PCP as scheduled.  -Strict return precautions given.    Discharge Diagnoses:     Acute COPD exacerbation, POA  Acute Asthma Exacerbation, POA  Acute on chronic respiratory failure with hypoxia/hypercapnia 2/2 to #1 and # 2, POA  Chronic low back pain on opioids  Atherosclerosis of coronary artery  Primary hypertension  Hyperlipidemia  Pulmonary emphysema   Chronic viral hepatitis B without delta agent and without coma  Hep C w/o coma, chronic  Anxiety    Problem List:     Active Hospital Problems    Diagnosis  POA   • **COPD with acute exacerbation [J44.1]  Yes   • Acute on chronic respiratory failure with hypoxia and hypercapnia [J96.21, J96.22]  Yes      Resolved Hospital Problems   No resolved problems to display.     Presenting Problem:    Chief Complaint   Patient presents with   • Shortness of Breath     [place] since yesterday, getting worse today. Pt on home o2 at 2 lpm nc.       Consults:     Consulting Physician(s)     Provider Relationship Specialty    Melina Olivas MD Consulting Physician Pulmonary Disease        Procedures Performed:        History of presenting illness/Hospital Course:    Patient is a 47 years old male with a past medical history of COPD on 2 L of oxygen as needed, asthma, anxiety, hypertension  and sleep apnea who presented to the ER with a chief complaint of increased worsening shortness of breath and wheezing.  Patient reported   that his symptoms started 2 weeks ago, has been seen in the ER on 2 visits and has been treated with steroids.      On ER evaluation, Patient was found to be tachypneic on arrival with respiratory rate    32, blood pressure of 143/110, was initially on room air however became hypoxic and had to be placed on 2 3 L of oxygen through nasal cannula.  His work-up included negative troponin and proBNP.  WBC of 12.1 otherwise within acceptable range on his CBC and CMP.  His ABG with a pH of 7.374/PCO2 51/PO2 of 62/HCO3 29.7/saturation 90% on 2 L nasal cannula.  Chest x-ray with no definite infiltrates or pneumonia and showing chronic emphysema changes per my read. Patient received lorazepam 1 mg, albuterol, magnesium and Solu-Medrol 125 mg IV while in the ER.  Hospitalist consulted for admission, further evaluation and treatment.  Pulmonology consulted.  Patient continued to have significant wheezing with improvement noted with IV steroids.  Patient able to be weaned from oxygen with only 2 L noted as needed.  Upon discharge patient on room air at 94% nonlabored.  Patient will continue taper of prednisone given continued wheezing.  Patient will follow-up with COPD/asthma outpatient clinic as well last PCP as scheduled.  Strict return precautions given.    Vital Signs:    Temp:  [98.1 °F (36.7 °C)-98.8 °F (37.1 °C)] 98.4 °F (36.9 °C)  Heart Rate:  [77-90] 77  Resp:  [18-22] 21  BP: (117-131)/(87-97) 126/92    Physical Exam:    General Appearance:  Alert and cooperative.  Chronically ill/middle-aged pleasant male.   Head:  Atraumatic and normocephalic.   Eyes: Conjunctivae and sclerae normal, no icterus. No pallor.   Ears:  Ears with no abnormalities noted.   Throat: No oral lesions, no thrush, oral mucosa moist.   Neck: Supple, trachea midline, no thyromegaly.   Back:   No  kyphoscoliosis present. No tenderness to palpation.   Lungs:   Breath sounds heard bilaterally equally.  Scattered wheezing throughout on room air unlabored.   Heart:  Normal S1 and S2, no murmur, no gallop, no rub. No JVD.   Abdomen:   Normal bowel sounds, no masses, no organomegaly. Soft, nontender, nondistended, no rebound tenderness.  Significant vertical scar noted.   Extremities: Supple, no edema, no cyanosis, no clubbing.   Pulses: Pulses palpable bilaterally.   Skin: No bleeding or rash.   Neurologic: Alert and oriented x 3. No facial asymmetry. Moves all four limbs. No tremors.     Pertinent Lab Results:     Results from last 7 days   Lab Units 04/29/23  0624 04/28/23  0540 04/27/23  1826   SODIUM mmol/L 135* 136 138   POTASSIUM mmol/L 4.4 4.3 4.1   CHLORIDE mmol/L 102 99 97*   CO2 mmol/L 27.0 26.9 29.5*   BUN mg/dL 15 13 13   CREATININE mg/dL 0.81 0.86 0.92   CALCIUM mg/dL 8.8 9.0 9.0   BILIRUBIN mg/dL  --   --  0.3   ALK PHOS U/L  --   --  122*   ALT (SGPT) U/L  --   --  20   AST (SGOT) U/L  --   --  19   GLUCOSE mg/dL 109* 146* 98     Results from last 7 days   Lab Units 04/29/23  0624 04/28/23  0540 04/27/23  1826   WBC 10*3/mm3 15.24* 8.53 12.12*   HEMOGLOBIN g/dL 14.1 14.1 15.6   HEMATOCRIT % 44.0 43.6 48.2   PLATELETS 10*3/mm3 194 216 240         Results from last 7 days   Lab Units 04/27/23  1826   HSTROP T ng/L <6     Results from last 7 days   Lab Units 04/27/23  1826   PROBNP pg/mL 79.8             Results from last 7 days   Lab Units 04/27/23  1844   PH, ARTERIAL pH units 7.374   PO2 ART mm Hg 62.8*   PCO2, ARTERIAL mm Hg 51.0*   HCO3 ART mmol/L 29.7*           Pertinent Radiology Results:    Imaging Results (All)     Procedure Component Value Units Date/Time    XR Chest 1 View [[id number]] Collected: 04/28/23 0711     Updated: 04/28/23 0713    Narrative:      PROCEDURE: XR CHEST 1 VW-     HISTORY: [facility] Triage Protocol        COMPARISON: April 22, 2023.     FINDINGS:  The heart size is normal. The  mediastinum is normal. The  lungs are hyperinflated consistent with COPD. No acute pulmonary  abnormality is identified. There is no pneumothorax. The bony thorax in  intact.        Impression:      COPD.     No acute abnormality identified.     This report was signed and finalized on 4/28/2023 7:11 AM by Janak oDdd MD.          Echo:      Condition on Discharge:      Stable.    Code status during the hospital stay:    Code Status and Medical Interventions:   Ordered at: 04/27/23 8148     Code Status (Patient has no pulse and is not breathing):    CPR (Attempt to Resuscitate)     Medical Interventions (Patient has pulse or is breathing):    Full Support     Discharge Disposition:    Home or Self Care    Discharge Medications:       Discharge Medications      New Medications      Instructions Start Date   azelastine 0.1 % nasal spray  Commonly known as: ASTELIN   1 spray, Nasal, 2 Times Daily, Use in each nostril as directed         Changes to Medications      Instructions Start Date   predniSONE 10 MG tablet  Commonly known as: DELTASONE  What changed:   · medication strength  · additional instructions   60 mg po x 2 days then 50 mg x 2 days then 40 mg x 2 days then 30 mg x 2 days then 20 mg x 2 days then 10 mg x 2 days then stop   Start Date: May 1, 2023        Continue These Medications      Instructions Start Date   albuterol sulfate  (90 Base) MCG/ACT inhaler  Commonly known as: PROVENTIL HFA;VENTOLIN HFA;PROAIR HFA   INAHLE 2 PUFFS EVERY 6 HOURS AS NEEDED FOR WHEEZING OR SHORTNESS OF BREATH      alfuzosin 10 MG 24 hr tablet  Commonly known as: UROXATRAL   10 mg, Oral, Daily      aspirin 81 MG EC tablet   81 mg, Oral, Daily      dutasteride 0.5 MG capsule  Commonly known as: AVODART   0.5 mg, Oral, Daily      fluticasone 50 MCG/ACT nasal spray  Commonly known as: FLONASE   INSTILL 1 SPRAY INTO EACH NOSTRIL DAILY      Fluticasone-Umeclidin-Vilant 200-62.5-25 MCG/INH inhaler  Commonly known as:  TRELEGY   1 puff, Inhalation, Daily      ipratropium-albuterol 0.5-2.5 mg/3 ml nebulizer  Commonly known as: DUO-NEB   3 mL, Nebulization, Every 4 Hours PRN      lovastatin 40 MG tablet  Commonly known as: MEVACOR   TAKE 1 TABLET BY MOUTH EVERY DAY FOR CHOLESTEROL      methadone 5 MG/5ML solution  Commonly known as: DOLOPHINE   60 mg, Oral, Daily, Patient takes 60 mg once per day(per [facility])      omeprazole 40 MG capsule  Commonly known as: priLOSEC   40 mg, Oral, Daily      traZODone 100 MG tablet  Commonly known as: DESYREL   TAKE 2 TABLETS BY MOUTH AT BEDTIME AS NEEDED         Stop These Medications    doxycycline 100 MG capsule  Commonly known as: VIBRAMYCIN     Fasenra 30 MG/ML solution prefilled syringe  Generic drug: Benralizumab     gabapentin 800 MG tablet  Commonly known as: NEURONTIN     HYDROcodone-acetaminophen  MG per tablet  Commonly known as: NORCO     ondansetron ODT 4 MG disintegrating tablet  Commonly known as: ZOFRAN-ODT          Discharge Diet:     Diet Instructions     Diet: Cardiac Diets; Healthy Heart (2-3 Na+); Regular Texture (IDDSI 7); Thin (IDDSI 0)      Discharge Diet: Cardiac Diets    Cardiac Diet: Healthy Heart (2-3 Na+)    Texture: Regular Texture (IDDSI 7)    Fluid Consistency: Thin (IDDSI 0)        Activity at Discharge:     Activity Instructions     Activity as Tolerated          Follow-up Appointments:    Additional Instructions for the Follow-ups that You Need to Schedule     Ambulatory Referral to Disease State Management   As directed      To dept: [facility] [[id number]]    What program(s) are you referring for?: COPD ASTHMA    Follow-up needed: Yes            Follow-up Information     Joshua Hoffmann MD Follow up in 1 week(s).    Specialty: Internal Medicine  Contact information:  [address] [place] [address] [phone]                       Future Appointments   Date Time Provider Department Center   5/4/2023   3:30 PM Joshua Hoffmann MD [facility]   7/11/2023 10:30 AM Delmy Rosas APRN [facility]     Test Results Pending at Discharge:           LISA Lora  04/30/23  09:45 EDT    Time: I spent 42 minutes on this discharge activity which included: face-to-face encounter with the patient, reviewing the data in the system, coordination of the care with the nursing staff as well as consultants, documentation, and entering orders.     Dictated utilizing Dragon dictation.

## 2023-04-30 NOTE — PLAN OF CARE
Problem: Adult Inpatient Plan of Care  Goal: Plan of Care Review  Outcome: Ongoing, Progressing  Flowsheets  Taken 4/30/2023 0516 by Ariadne Dsouza RN  Outcome Evaluation: VSS throughout shift. No complaints of pain. Patient requested PRN ativan to help with sleep. Pt with continued wheezing upon lung auscultation, and requires home oxygen rate of 2L. No acute events during shift.  Taken 4/29/2023 1853 by Rose Perez RN  Progress: improving  Plan of Care Reviewed With: patient  Goal: Patient-Specific Goal (Individualized)  Outcome: Ongoing, Progressing  Goal: Absence of Hospital-Acquired Illness or Injury  Outcome: Ongoing, Progressing  Intervention: Identify and Manage Fall Risk  Recent Flowsheet Documentation  Taken 4/30/2023 0200 by Ariadne Dsouza RN  Safety Promotion/Fall Prevention:   assistive device/personal items within reach   clutter free environment maintained   nonskid shoes/slippers when out of bed   room organization consistent   safety round/check completed  Taken 4/30/2023 0000 by Ariadne Dsouza RN  Safety Promotion/Fall Prevention:   assistive device/personal items within reach   clutter free environment maintained   nonskid shoes/slippers when out of bed   safety round/check completed   room organization consistent  Taken 4/29/2023 2200 by Ariadne Dsouza RN  Safety Promotion/Fall Prevention:   assistive device/personal items within reach   clutter free environment maintained   nonskid shoes/slippers when out of bed   room organization consistent  Taken 4/29/2023 2000 by Ariadne Dsouza RN  Safety Promotion/Fall Prevention:   assistive device/personal items within reach   clutter free environment maintained   nonskid shoes/slippers when out of bed   room organization consistent   safety round/check completed  Intervention: Prevent Skin Injury  Recent Flowsheet Documentation  Taken 4/30/2023 0000 by Ariadne Dsouza RN  Body Position: position changed  independently  Taken 4/29/2023 2200 by Ariadne Dsouza RN  Body Position: position changed independently  Taken 4/29/2023 2000 by Ariadne Dsouza RN  Body Position: position changed independently  Skin Protection:   transparent dressing maintained   tubing/devices free from skin contact  Intervention: Prevent and Manage VTE (Venous Thromboembolism) Risk  Recent Flowsheet Documentation  Taken 4/30/2023 0200 by Ariadne Dsouza RN  Activity Management: up ad hosea  Taken 4/30/2023 0000 by Ariadne Dsouza RN  Activity Management: up ad hosea  Taken 4/29/2023 2200 by Ariadne Dsouza RN  Activity Management: up ad hosea  Taken 4/29/2023 2000 by Ariadne Dsouza RN  Activity Management:   up ad [place]   activity encouraged  VTE Prevention/Management: patient refused intervention  Range of Motion: active ROM (range of motion) encouraged  Intervention: Prevent Infection  Recent Flowsheet Documentation  Taken 4/30/2023 0200 by Ariadne Dsouza RN  Infection Prevention:   environmental surveillance performed   hand hygiene promoted  Taken 4/30/2023 0000 by Ariadne Dsouza RN  Infection Prevention:   environmental surveillance performed   hand hygiene promoted   rest/sleep promoted  Taken 4/29/2023 2200 by Ariadne Dsouza RN  Infection Prevention:   environmental surveillance performed   hand hygiene promoted  Taken 4/29/2023 2000 by Ariadne Dsouza RN  Infection Prevention:   environmental surveillance performed   hand hygiene promoted   rest/sleep promoted  Goal: Optimal Comfort and Wellbeing  Outcome: Ongoing, Progressing  Intervention: Provide Person-Centered Care  Recent Flowsheet Documentation  Taken 4/29/2023 2000 by Ariadne Dsouza RN  Trust Relationship/Rapport:   care explained   choices provided   thoughts/feelings acknowledged  Goal: Readiness for Transition of Care  Outcome: Ongoing, Progressing     Problem: Gas Exchange Impaired  Goal: Optimal Gas Exchange  Outcome:  Ongoing, Progressing  Intervention: Optimize Oxygenation and Ventilation  Recent Flowsheet Documentation  Taken 4/30/2023 0200 by Ariadne Dsouza RN  Head of Bed (HOB) Positioning: HOB lowered  Taken 4/30/2023 0000 by Ariadne Dsouza RN  Head of Bed (HOB) Positioning: HOB lowered  Taken 4/29/2023 2200 by Ariadne Dsouza RN  Head of Bed (HOB) Positioning: HOB lowered  Taken 4/29/2023 2000 by Ariadne Dsouza RN  Head of Bed ([facility]) Positioning: HOB lowered  Airway/Ventilation Management: airway patency maintained  Goal: Optimal Gas Exchange  Outcome: Ongoing, Progressing  Intervention: Optimize Oxygenation and Ventilation  Recent Flowsheet Documentation  Taken 4/30/2023 0200 by Ariadne Dsouza RN  Head of Bed (HOB) Positioning: HOB lowered  Taken 4/30/2023 0000 by Ariadne Dsouza RN  Head of Bed (HOB) Positioning: HOB lowered  Taken 4/29/2023 2200 by Ariadne Dsouza RN  Head of Bed (HOB) Positioning: HOB lowered  Taken 4/29/2023 2000 by Ariadne Dsouza RN  Head of Bed ([facility]) Positioning: HOB lowered  Airway/Ventilation Management: airway patency maintained     Problem: Adjustment to Illness COPD (Chronic Obstructive Pulmonary Disease)  Goal: Optimal Chronic Illness Coping  Outcome: Ongoing, Progressing  Intervention: Support and Optimize Psychosocial Response  Recent Flowsheet Documentation  Taken 4/29/2023 2000 by Ariadne Dsouza RN  Supportive Measures:   active listening utilized   verbalization of feelings encouraged     Problem: Functional Ability Impaired COPD (Chronic Obstructive Pulmonary Disease)  Goal: Optimal Level of Functional [place]  Outcome: Ongoing, Progressing  Intervention: Optimize Functional Ability  Recent Flowsheet Documentation  Taken 4/30/2023 0200 by Ariadne Dsouza RN  Activity Management: up ad hosea  Taken 4/30/2023 0000 by Ariadne Dsouza RN  Activity Management: up ad hosea  Taken 4/29/2023 2200 by Ariadne Dsouza RN  Activity  Management: up ad hosea  Taken 4/29/2023 2000 by Ariadne Dsouza RN  Activity Management:   up ad hosea   activity encouraged  Environmental Support:   calm environment promoted   distractions minimized   environmental consistency promoted   rest periods encouraged  Self-Care Promotion:   independence encouraged   BADL personal objects within reach   safe use of adaptive equipment encouraged     Problem: Infection COPD (Chronic Obstructive Pulmonary Disease)  Goal: Absence of Infection Signs and Symptoms  Outcome: Ongoing, Progressing     Problem: Oral Intake Inadequate COPD (Chronic Obstructive Pulmonary Disease)  Goal: Improved Nutrition Intake  Outcome: Ongoing, Progressing     Problem: Respiratory Compromise COPD (Chronic Obstructive Pulmonary Disease)  Goal: Effective Oxygenation and Ventilation  Outcome: Ongoing, Progressing  Intervention: Promote Airway Secretion Clearance  Recent Flowsheet Documentation  Taken 4/30/2023 0200 by Ariadne Dsouza RN  Activity Management: up ad hosea  Taken 4/30/2023 0000 by Ariadne Dsouza RN  Activity Management: up ad hosea  Taken 4/29/2023 2200 by Ariadne Dsouza RN  Activity Management: up ad hosea  Taken 4/29/2023 2000 by Ariadne Dsouza RN  Activity Management:   up ad hosea   activity encouraged  Breathing Techniques/Airway Clearance: deep/controlled cough encouraged  Cough And Deep Breathing: done independently per patient  Intervention: Optimize Oxygenation and Ventilation  Recent Flowsheet Documentation  Taken 4/30/2023 0200 by Ariadne Dsouza RN  Head of Bed (HOB) Positioning: HOB lowered  Taken 4/30/2023 0000 by Ariadne Dsouza RN  Head of Bed (HOB) Positioning: HOB lowered  Taken 4/29/2023 2200 by Ariadne Dsouza RN  Head of Bed (HOB) Positioning: HOB lowered  Taken 4/29/2023 2000 by Ariadne Dsouza RN  Fluid/Electrolyte Management: fluids provided  Head of Bed (HOB) Positioning: HOB lowered  Airway/Ventilation Management: airway  patency maintained   Goal Outcome Evaluation:              Outcome Evaluation: VSS throughout shift. No complaints of pain. Patient requested PRN ativan to help with sleep. Pt with continued wheezing upon lung auscultation, and requires home oxygen rate of 2L. No acute events during shift.

## 2023-05-01 ENCOUNTER — TRANSITIONAL CARE MANAGEMENT TELEPHONE ENCOUNTER (OUTPATIENT)
Dept: CALL CENTER | Facility: HOSPITAL | Age: 48
End: 2023-05-01
Payer: MEDICARE

## 2023-05-01 NOTE — OUTREACH NOTE
Call Center TCM Note    Flowsheet Row Responses   [facility] patient discharged from? [facility]   Does the patient have one of the following disease processes/diagnoses(primary or secondary)? COPD   TCM attempt successful? Yes   Call start time 1545   Call end time 1550   Discharge diagnosis COPD with acute exacerbation   Does the patient have all medications ordered at discharge? Yes   Is the patient taking all medications as directed (includes completed medication regime)? Yes   Comments appt with Dr. Hoffmann on 5/4   Does the patient have an appointment with their PCP within 7 days of discharge? Yes   Has home health visited the patient within 72 hours of discharge? N/A   Psychosocial issues? No   Did the patient receive a copy of their discharge instructions? Yes   Nursing interventions Reviewed instructions with patient   What is the patient's perception of their health status since discharge? Improving   Nursing Interventions Nurse provided patient education   Is the patient/caregiver able to teach back the hierarchy of who to call/visit for symptoms/problems? PCP, Specialist, Home health nurse, Urgent Care, ED, 911 Yes   Patient reports what zone on this call? Green Zone   Green Zone Reports doing well, Breathing without shortness of breath   Green Zone interventions: Take daily medications   TCM call completed? Yes   Wrap up additional comments Doing well, no questions, will be keeping 5/4 appt as his f/u appt.   Call end time 1550   Would this patient benefit from a Referral to Amb Social Work? No   Is the patient interested in additional calls from an ambulatory ?  NOTE:  applies to high risk patients requiring additional follow-up. No          Narda Perez RN    5/1/2023, 15:50 EDT

## 2023-05-04 ENCOUNTER — OFFICE VISIT (OUTPATIENT)
Dept: INTERNAL MEDICINE | Facility: CLINIC | Age: 48
End: 2023-05-04
Payer: MEDICARE

## 2023-05-04 VITALS
HEART RATE: 89 BPM | SYSTOLIC BLOOD PRESSURE: 118 MMHG | HEIGHT: 73 IN | OXYGEN SATURATION: 92 % | TEMPERATURE: 97.5 F | BODY MASS INDEX: 25.58 KG/M2 | WEIGHT: 193 LBS | RESPIRATION RATE: 20 BRPM | DIASTOLIC BLOOD PRESSURE: 80 MMHG

## 2023-05-04 DIAGNOSIS — I10 PRIMARY HYPERTENSION: Primary | ICD-10-CM

## 2023-05-04 DIAGNOSIS — J43.1 PANLOBULAR EMPHYSEMA: ICD-10-CM

## 2023-05-04 PROBLEM — J44.1 COPD WITH ACUTE EXACERBATION: Status: RESOLVED | Noted: 2023-01-04 | Resolved: 2023-05-04

## 2023-05-04 PROBLEM — J96.22 ACUTE ON CHRONIC RESPIRATORY FAILURE WITH HYPOXIA AND HYPERCAPNIA: Status: RESOLVED | Noted: 2022-05-15 | Resolved: 2023-05-04

## 2023-05-04 PROBLEM — J96.21 ACUTE ON CHRONIC RESPIRATORY FAILURE WITH HYPOXIA AND HYPERCAPNIA: Status: RESOLVED | Noted: 2022-05-15 | Resolved: 2023-05-04

## 2023-05-04 PROBLEM — F11.90 OPIATE USE: Status: RESOLVED | Noted: 2022-05-15 | Resolved: 2023-05-04

## 2023-05-04 PROBLEM — J44.1 COPD EXACERBATION: Status: RESOLVED | Noted: 2023-01-04 | Resolved: 2023-05-04

## 2023-05-04 PROBLEM — J96.21 ACUTE ON CHRONIC RESPIRATORY FAILURE WITH HYPOXIA: Status: RESOLVED | Noted: 2023-04-27 | Resolved: 2023-05-04

## 2023-05-04 PROBLEM — D89.831 CYTOKINE RELEASE SYNDROME, GRADE 1: Status: RESOLVED | Noted: 2022-05-17 | Resolved: 2023-05-04

## 2023-05-04 PROBLEM — U07.1 COVID-19 VIRUS DETECTED: Status: RESOLVED | Noted: 2022-05-15 | Resolved: 2023-05-04

## 2023-05-04 PROBLEM — K59.00 CONSTIPATION: Status: RESOLVED | Noted: 2021-09-15 | Resolved: 2023-05-04

## 2023-05-04 RX ORDER — ESOMEPRAZOLE MAGNESIUM 40 MG/1
CAPSULE, DELAYED RELEASE ORAL
COMMUNITY

## 2023-05-04 NOTE — PROGRESS NOTES
Calling restrictions with number provided. First attempt.    Transitional Care Follow Up Visit  Subjective     Topher Stoll is a 47 y.o. male who presents for a transitional care management visit.    Within 48 business hours after discharge our office contacted him via telephone to coordinate his care and needs.      I reviewed and discussed the details of that call along with the discharge summary, hospital problems, inpatient lab results, inpatient diagnostic studies, and consultation reports with Topher.     Current outpatient and discharge medications have been reconciled for the patient.  Reviewed by: Joshua Hoffmann MD          4/30/2023    10:59 AM   Date of TCM Phone Call   [facility]   Date of Admission 4/27/2023   Date of Discharge 4/30/2023   Discharge Disposition Home or Self Care     Risk for Readmission (LACE) Score: 14 (4/30/2023  6:00 AM)      History of Present Illness   Course During Hospital Stay: Recent hospital admission for increased cough with shortness of breath.  Treated for COPD exacerbation with antibiotics IV steroids.  Has had a history of asthma with exacerbation has now started receiving benralizumab injections on a monthly basis.  Long-term treatment on methadone denies alcohol or illicit drug use.  Post discharge doing well he is on low-dose steroids now he is compliant with his inhalers   The following portions of the patient's history were reviewed and updated as appropriate: allergies, current medications, past family history, past medical history, past social history, past surgical history and problem list.    Review of Systems   Constitutional: Negative.  Negative for activity change, appetite change, fatigue and fever.   HENT: Negative for congestion, ear discharge, ear pain and trouble swallowing.    Eyes: Negative for photophobia and visual disturbance.   Respiratory: Positive for cough. Negative for shortness of breath.    Cardiovascular: Negative for chest pain and palpitations.   Gastrointestinal: Negative for  abdominal distention, abdominal pain, constipation, diarrhea, nausea and vomiting.   Endocrine: Negative.    Genitourinary: Negative for dysuria, hematuria and urgency.   Musculoskeletal: Positive for arthralgias. Negative for back pain, joint swelling and myalgias.   Skin: Negative for color change and rash.   Allergic/Immunologic: Negative.    Neurological: Negative for dizziness, weakness, light-headedness and headaches.   Hematological: Negative for adenopathy. Does not bruise/bleed easily.   Psychiatric/Behavioral: Positive for sleep disturbance. Negative for agitation, confusion and dysphoric mood. The patient is not nervous/anxious.        Objective   Physical Exam  Constitutional:       General: He is not in acute distress.     Appearance: He is well-developed.   HENT:      Nose: Nose normal.   Eyes:      General: No scleral icterus.     Conjunctiva/sclera: Conjunctivae normal.   Neck:      Thyroid: No thyromegaly.      Trachea: No tracheal deviation.   Cardiovascular:      Rate and Rhythm: Normal rate and regular rhythm.      Heart sounds: No murmur heard.    No friction rub.   Pulmonary:      Effort: No respiratory distress.      Breath sounds: No wheezing or rales.   Abdominal:      General: There is no distension.      Palpations: Abdomen is soft. There is no mass.      Tenderness: There is no abdominal tenderness. There is no guarding.   Musculoskeletal:         General: Deformity present. Normal range of motion.   Lymphadenopathy:      Cervical: No cervical adenopathy.   Skin:     General: Skin is warm and dry.      Findings: No erythema or rash.   Neurological:      Mental Status: He is alert and oriented to person, place, and time.      Cranial Nerves: No cranial nerve deficit.      Coordination: Coordination normal.      Deep Tendon Reflexes: Reflexes are normal and symmetric.   Psychiatric:         Behavior: Behavior normal.         Thought Content: Thought content normal.         Judgment:  Judgment normal.         Assessment & Plan   Diagnoses and all orders for this visit:    1. Primary hypertension (Primary) stable on low-salt diet follow BP readings at home  2. Panlobular emphysema continue with current meds will be receiving monthly benralizumab injections continue with the inhalers

## 2023-05-09 ENCOUNTER — READMISSION MANAGEMENT (OUTPATIENT)
Dept: CALL CENTER | Facility: HOSPITAL | Age: 48
End: 2023-05-09
Payer: MEDICARE

## 2023-05-09 NOTE — OUTREACH NOTE
"COPD/PN Week 2 Survey    Flowsheet Row Responses   [facility] patient discharged from? [facility]   Does the patient have one of the following disease processes/diagnoses(primary or secondary)? COPD   Week 2 attempt successful? Yes   Call start time 1600   Call end time 1604   Discharge diagnosis COPD with acute exacerbation   Person spoke with today (if not patient) and relationship mother   Meds reviewed with patient/caregiver? Yes   Is the patient taking all medications as directed (includes completed medication regime)? Yes   Medication comments Mother states patient has been \"sick to his stomach\".  Previous provider and prescribed Zofran.  Pt saw new provider last week and forgot to mention med.  Advised to call the office now for further instruction.   Does the patient have a primary care provider?  Yes   Does the patient have an appointment with their PCP or specialist within 7 days of discharge? Yes   Comments regarding PCP PATIENT HAS SEEN HIS PCP   Has the patient kept scheduled appointments due by today? Yes   DME comments oxygen prn.  He hasnt needed his oxygen this week per mother.   Psychosocial issues? No   Did the patient receive a copy of their discharge instructions? Yes   Nursing interventions Reviewed instructions with patient   What is the patient's perception of their health status since discharge? Improving   If the patient is a current smoker, are they able to teach back resources for cessation? Not a smoker   Is the patient/caregiver able to teach back the hierarchy of who to call/visit for symptoms/problems? PCP, Specialist, Home health nurse, Urgent Care, ED, 911 Yes   Week 2 call completed? Yes   Is the patient interested in additional calls from an ambulatory ?  NOTE:  applies to high risk patients requiring additional follow-up. No   Wrap up additional comments Other than nausea, patient is doing well this week per mother.  No other needs at this time.           KAYLA MOULTON - " Registered Nurse

## 2023-05-10 ENCOUNTER — TELEPHONE (OUTPATIENT)
Dept: INTERNAL MEDICINE | Facility: CLINIC | Age: 48
End: 2023-05-10

## 2023-05-10 NOTE — TELEPHONE ENCOUNTER
Spoke with patient,is having no other symptoms other than nausea,is an ongoing issue. Has tried Dramamine but states it makes him too sleepy. Please advise.

## 2023-05-10 NOTE — TELEPHONE ENCOUNTER
Caller: Topher Stoll    Relationship: Self    Best call back number: [phone]    What medication are you requesting: ZOFRAN    What are your current symptoms: NAUSEA    If a prescription is needed, what is your preferred pharmacy and phone number:      [id number] [place], [place] - [address] [facility] [address] - [phone]  - [phone]   [phone]

## 2023-05-11 RX ORDER — PROMETHAZINE HYDROCHLORIDE 12.5 MG/1
12.5 TABLET ORAL EVERY 6 HOURS PRN
Qty: 45 TABLET | Refills: 1 | Status: SHIPPED | OUTPATIENT
Start: 2023-05-11

## 2023-05-18 ENCOUNTER — READMISSION MANAGEMENT (OUTPATIENT)
Dept: CALL CENTER | Facility: HOSPITAL | Age: 48
End: 2023-05-18
Payer: MEDICARE

## 2023-05-18 NOTE — OUTREACH NOTE
COPD/PN Week 3 Survey      Flowsheet Row Responses   [facility] patient discharged from? [facility]   Does the patient have one of the following disease processes/diagnoses(primary or secondary)? COPD   Week 3 attempt successful? Yes   Call start time 1533   Call end time 1539   Meds reviewed with patient/caregiver? Yes   Does the patient have all medications ordered at discharge? Yes   Is the patient taking all medications as directed (includes completed medication regime)? Yes   Medication comments Pt did receive phenergan.   Comments regarding appointments Pt has had f/u with pcp.   Does the patient have a primary care provider?  Yes   Has the patient kept scheduled appointments due by today? Yes   DME comments Pt's mother reports pt is sob on this day, 02 in place @ 2 L.   What is the patient's perception of their health status since discharge? Same   Patient reports what zone on this call? Yellow Zone   Yellow Zone Reports having a bad day or a COPD flare, I have less energy for my daily activities, Using quick relief inhaler/nebulizer more often, More breathless than usual   Yellow interventions Continue taking daily medications, Use quick relief inhaler, Use oxygen as prescribed   Week 3 call completed? Yes   Wrap up additional comments Pt not feeling well on this day. Increased sob. Pt did a nebulizer treatment. 02 in place. Pt was napping.            Breonna RAO - Registered Nurse

## 2023-05-21 ENCOUNTER — HOSPITAL ENCOUNTER (INPATIENT)
Facility: HOSPITAL | Age: 48
LOS: 3 days | Discharge: HOME | End: 2023-05-24
Attending: EMERGENCY MEDICINE
Payer: MEDICARE

## 2023-05-21 ENCOUNTER — APPOINTMENT (OUTPATIENT)
Dept: CT IMAGING | Facility: HOSPITAL | Age: 48
End: 2023-05-21
Payer: MEDICARE

## 2023-05-21 ENCOUNTER — APPOINTMENT (OUTPATIENT)
Dept: GENERAL RADIOLOGY | Facility: HOSPITAL | Age: 48
End: 2023-05-21
Payer: MEDICARE

## 2023-05-21 DIAGNOSIS — J96.21 ACUTE ON CHRONIC RESPIRATORY FAILURE WITH HYPOXIA AND HYPERCAPNIA: ICD-10-CM

## 2023-05-21 DIAGNOSIS — J96.22 ACUTE ON CHRONIC RESPIRATORY FAILURE WITH HYPOXIA AND HYPERCAPNIA: ICD-10-CM

## 2023-05-21 DIAGNOSIS — J44.1 CHRONIC OBSTRUCTIVE PULMONARY DISEASE WITH ACUTE EXACERBATION: Primary | ICD-10-CM

## 2023-05-21 PROBLEM — J96.01 ACUTE RESPIRATORY FAILURE WITH HYPOXIA AND HYPERCAPNIA: Status: ACTIVE | Noted: 2023-05-21

## 2023-05-21 PROBLEM — M19.90 INFLAMMATORY ARTHROPATHY: Status: ACTIVE | Noted: 2023-05-21

## 2023-05-21 PROBLEM — J96.02 ACUTE RESPIRATORY FAILURE WITH HYPOXIA AND HYPERCAPNIA: Status: ACTIVE | Noted: 2023-05-21

## 2023-05-21 LAB
A-A DO2: 56.6 MMHG
A-A DO2: ABNORMAL
ALBUMIN SERPL-MCNC: 4.2 G/DL (ref 3.5–5.2)
ALBUMIN/GLOB SERPL: 1.3 G/DL
ALP SERPL-CCNC: 119 U/L (ref 39–117)
ALT SERPL W P-5'-P-CCNC: 37 U/L (ref 1–41)
ANION GAP SERPL CALCULATED.3IONS-SCNC: 12.5 MMOL/L (ref 5–15)
ARTERIAL PATENCY WRIST A: ABNORMAL
ARTERIAL PATENCY WRIST A: POSITIVE
AST SERPL-CCNC: 30 U/L (ref 1–40)
ATMOSPHERIC PRESS: 737 MMHG
ATMOSPHERIC PRESS: 738 MMHG
BASE EXCESS BLDA CALC-SCNC: 0.1 MMOL/L (ref 0–2)
BASE EXCESS BLDA CALC-SCNC: 1.6 MMOL/L (ref 0–2)
BASOPHILS # BLD AUTO: 0.04 10*3/MM3 (ref 0–0.2)
BASOPHILS NFR BLD AUTO: 0.5 % (ref 0–1.5)
BDY SITE: ABNORMAL
BDY SITE: ABNORMAL
BILIRUB SERPL-MCNC: 0.2 MG/DL (ref 0–1.2)
BUN SERPL-MCNC: 11 MG/DL (ref 6–20)
BUN/CREAT SERPL: 10.8 (ref 7–25)
CALCIUM SPEC-SCNC: 9.3 MG/DL (ref 8.6–10.5)
CHLORIDE SERPL-SCNC: 102 MMOL/L (ref 98–107)
CO2 SERPL-SCNC: 26.5 MMOL/L (ref 22–29)
COHGB MFR BLD: 0.4 % (ref 0–2)
COHGB MFR BLD: <0 % (ref 0–2)
CREAT SERPL-MCNC: 1.02 MG/DL (ref 0.76–1.27)
DEPRECATED RDW RBC AUTO: 44.9 FL (ref 37–54)
EGFRCR SERPLBLD CKD-EPI 2021: 90.7 ML/MIN/1.73
EOSINOPHIL # BLD AUTO: 0.4 10*3/MM3 (ref 0–0.4)
EOSINOPHIL NFR BLD AUTO: 4.9 % (ref 0.3–6.2)
ERYTHROCYTE [DISTWIDTH] IN BLOOD BY AUTOMATED COUNT: 14.9 % (ref 12.3–15.4)
GAS FLOW AIRWAY: 2 LPM
GAS FLOW AIRWAY: 4 LPM
GLOBULIN UR ELPH-MCNC: 3.3 GM/DL
GLUCOSE SERPL-MCNC: 118 MG/DL (ref 65–99)
HCO3 BLDA-SCNC: 27.5 MMOL/L (ref 22–28)
HCO3 BLDA-SCNC: 29 MMOL/L (ref 22–28)
HCT VFR BLD AUTO: 49.9 % (ref 37.5–51)
HCT VFR BLD CALC: 46.7 %
HCT VFR BLD CALC: 50.1 %
HGB BLD-MCNC: 15.8 G/DL (ref 13–17.7)
HOLD SPECIMEN: NORMAL
HOLD SPECIMEN: NORMAL
IMM GRANULOCYTES # BLD AUTO: 0.03 10*3/MM3 (ref 0–0.05)
IMM GRANULOCYTES NFR BLD AUTO: 0.4 % (ref 0–0.5)
INHALED O2 CONCENTRATION: 28 %
LYMPHOCYTES # BLD AUTO: 1.93 10*3/MM3 (ref 0.7–3.1)
LYMPHOCYTES NFR BLD AUTO: 23.7 % (ref 19.6–45.3)
Lab: ABNORMAL
MCH RBC QN AUTO: 26.2 PG (ref 26.6–33)
MCHC RBC AUTO-ENTMCNC: 31.7 G/DL (ref 31.5–35.7)
MCV RBC AUTO: 82.8 FL (ref 79–97)
METHGB BLD QL: 0.3 % (ref 0–1.5)
METHGB BLD QL: 0.6 % (ref 0–1.5)
MODALITY: ABNORMAL
MODALITY: ABNORMAL
MONOCYTES # BLD AUTO: 0.95 10*3/MM3 (ref 0.1–0.9)
MONOCYTES NFR BLD AUTO: 11.7 % (ref 5–12)
NEUTROPHILS NFR BLD AUTO: 4.8 10*3/MM3 (ref 1.7–7)
NEUTROPHILS NFR BLD AUTO: 58.8 % (ref 42.7–76)
NOTE: ABNORMAL
NOTE: ABNORMAL
NRBC BLD AUTO-RTO: 0 /100 WBC (ref 0–0.2)
NT-PROBNP SERPL-MCNC: 239.1 PG/ML (ref 0–450)
OXYHGB MFR BLDV: 93.9 % (ref 94–99)
OXYHGB MFR BLDV: 95.2 % (ref 94–99)
PCO2 BLDA: 53.6 MM HG (ref 35–45)
PCO2 BLDA: 55.7 MM HG (ref 35–45)
PCO2 TEMP ADJ BLD: ABNORMAL MM[HG]
PCO2 TEMP ADJ BLD: ABNORMAL MM[HG]
PH BLDA: 7.32 PH UNITS (ref 7.35–7.45)
PH BLDA: 7.33 PH UNITS (ref 7.35–7.45)
PH, TEMP CORRECTED: ABNORMAL
PH, TEMP CORRECTED: ABNORMAL
PLATELET # BLD AUTO: 245 10*3/MM3 (ref 140–450)
PMV BLD AUTO: 10 FL (ref 6–12)
PO2 BLDA: 74.6 MM HG (ref 75–100)
PO2 BLDA: 87.6 MM HG (ref 75–100)
PO2 TEMP ADJ BLD: ABNORMAL MM[HG]
PO2 TEMP ADJ BLD: ABNORMAL MM[HG]
POTASSIUM SERPL-SCNC: 4.4 MMOL/L (ref 3.5–5.2)
PROCALCITONIN SERPL-MCNC: 0.04 NG/ML (ref 0–0.25)
PROT SERPL-MCNC: 7.5 G/DL (ref 6–8.5)
RBC # BLD AUTO: 6.03 10*6/MM3 (ref 4.14–5.8)
SAO2 % BLDCOA: 94.8 % (ref 94–100)
SAO2 % BLDCOA: 95.2 % (ref 94–100)
SODIUM SERPL-SCNC: 141 MMOL/L (ref 136–145)
TROPONIN T SERPL HS-MCNC: 8 NG/L
VENTILATOR MODE: ABNORMAL
VENTILATOR MODE: ABNORMAL
WBC NRBC COR # BLD: 8.15 10*3/MM3 (ref 3.4–10.8)
WHOLE BLOOD HOLD COAG: NORMAL
WHOLE BLOOD HOLD SPECIMEN: NORMAL

## 2023-05-21 PROCEDURE — 82805 BLOOD GASES W/O2 SATURATION: CPT

## 2023-05-21 PROCEDURE — 94799 UNLISTED PULMONARY SVC/PX: CPT

## 2023-05-21 PROCEDURE — 99223 1ST HOSP IP/OBS HIGH 75: CPT | Performed by: STUDENT IN AN ORGANIZED HEALTH CARE EDUCATION/TRAINING PROGRAM

## 2023-05-21 PROCEDURE — 25010000002 HYDRALAZINE PER 20 MG: Performed by: STUDENT IN AN ORGANIZED HEALTH CARE EDUCATION/TRAINING PROGRAM

## 2023-05-21 PROCEDURE — 85025 COMPLETE CBC W/AUTO DIFF WBC: CPT | Performed by: EMERGENCY MEDICINE

## 2023-05-21 PROCEDURE — 25010000002 KETOROLAC TROMETHAMINE PER 15 MG: Performed by: EMERGENCY MEDICINE

## 2023-05-21 PROCEDURE — 25010000002 LORAZEPAM PER 2 MG: Performed by: STUDENT IN AN ORGANIZED HEALTH CARE EDUCATION/TRAINING PROGRAM

## 2023-05-21 PROCEDURE — 25010000002 METHYLPREDNISOLONE PER 125 MG: Performed by: EMERGENCY MEDICINE

## 2023-05-21 PROCEDURE — 25010000002 MAGNESIUM SULFATE 2 GM/50ML SOLUTION: Performed by: EMERGENCY MEDICINE

## 2023-05-21 PROCEDURE — 83050 HGB METHEMOGLOBIN QUAN: CPT

## 2023-05-21 PROCEDURE — 25010000002 METHYLPREDNISOLONE PER 40 MG: Performed by: STUDENT IN AN ORGANIZED HEALTH CARE EDUCATION/TRAINING PROGRAM

## 2023-05-21 PROCEDURE — 99285 EMERGENCY DEPT VISIT HI MDM: CPT

## 2023-05-21 PROCEDURE — 25010000002 ONDANSETRON PER 1 MG: Performed by: EMERGENCY MEDICINE

## 2023-05-21 PROCEDURE — 94640 AIRWAY INHALATION TREATMENT: CPT

## 2023-05-21 PROCEDURE — 94761 N-INVAS EAR/PLS OXIMETRY MLT: CPT

## 2023-05-21 PROCEDURE — 25510000001 IOPAMIDOL 61 % SOLUTION: Performed by: EMERGENCY MEDICINE

## 2023-05-21 PROCEDURE — 93005 ELECTROCARDIOGRAM TRACING: CPT | Performed by: EMERGENCY MEDICINE

## 2023-05-21 PROCEDURE — 80053 COMPREHEN METABOLIC PANEL: CPT | Performed by: EMERGENCY MEDICINE

## 2023-05-21 PROCEDURE — 25010000002 ENOXAPARIN PER 10 MG: Performed by: STUDENT IN AN ORGANIZED HEALTH CARE EDUCATION/TRAINING PROGRAM

## 2023-05-21 PROCEDURE — 83880 ASSAY OF NATRIURETIC PEPTIDE: CPT | Performed by: EMERGENCY MEDICINE

## 2023-05-21 PROCEDURE — 82375 ASSAY CARBOXYHB QUANT: CPT

## 2023-05-21 PROCEDURE — 84145 PROCALCITONIN (PCT): CPT | Performed by: EMERGENCY MEDICINE

## 2023-05-21 PROCEDURE — 84484 ASSAY OF TROPONIN QUANT: CPT | Performed by: EMERGENCY MEDICINE

## 2023-05-21 PROCEDURE — 71045 X-RAY EXAM CHEST 1 VIEW: CPT

## 2023-05-21 PROCEDURE — 36600 WITHDRAWAL OF ARTERIAL BLOOD: CPT

## 2023-05-21 PROCEDURE — 71275 CT ANGIOGRAPHY CHEST: CPT

## 2023-05-21 RX ORDER — ALBUTEROL SULFATE 2.5 MG/3ML
2.5 SOLUTION RESPIRATORY (INHALATION) EVERY 6 HOURS PRN
Status: DISCONTINUED | OUTPATIENT
Start: 2023-05-21 | End: 2023-05-24 | Stop reason: HOSPADM

## 2023-05-21 RX ORDER — ONDANSETRON 2 MG/ML
4 INJECTION INTRAMUSCULAR; INTRAVENOUS ONCE
Status: COMPLETED | OUTPATIENT
Start: 2023-05-21 | End: 2023-05-21

## 2023-05-21 RX ORDER — PANTOPRAZOLE SODIUM 40 MG/1
40 TABLET, DELAYED RELEASE ORAL
Status: DISCONTINUED | OUTPATIENT
Start: 2023-05-21 | End: 2023-05-24 | Stop reason: HOSPADM

## 2023-05-21 RX ORDER — ACETAMINOPHEN 325 MG/1
650 TABLET ORAL EVERY 6 HOURS PRN
Status: DISCONTINUED | OUTPATIENT
Start: 2023-05-21 | End: 2023-05-24 | Stop reason: HOSPADM

## 2023-05-21 RX ORDER — ACETAMINOPHEN 325 MG/1
650 TABLET ORAL EVERY 4 HOURS PRN
Status: DISCONTINUED | OUTPATIENT
Start: 2023-05-21 | End: 2023-05-24 | Stop reason: HOSPADM

## 2023-05-21 RX ORDER — KETOROLAC TROMETHAMINE 30 MG/ML
10 INJECTION, SOLUTION INTRAMUSCULAR; INTRAVENOUS ONCE
Status: COMPLETED | OUTPATIENT
Start: 2023-05-21 | End: 2023-05-21

## 2023-05-21 RX ORDER — IPRATROPIUM BROMIDE AND ALBUTEROL SULFATE 2.5; .5 MG/3ML; MG/3ML
3 SOLUTION RESPIRATORY (INHALATION)
Status: DISCONTINUED | OUTPATIENT
Start: 2023-05-21 | End: 2023-05-21

## 2023-05-21 RX ORDER — MAGNESIUM SULFATE HEPTAHYDRATE 40 MG/ML
2 INJECTION, SOLUTION INTRAVENOUS ONCE
Status: COMPLETED | OUTPATIENT
Start: 2023-05-21 | End: 2023-05-21

## 2023-05-21 RX ORDER — POLYETHYLENE GLYCOL 3350 17 G/17G
17 POWDER, FOR SOLUTION ORAL DAILY PRN
Status: DISCONTINUED | OUTPATIENT
Start: 2023-05-21 | End: 2023-05-24 | Stop reason: HOSPADM

## 2023-05-21 RX ORDER — SODIUM CHLORIDE 0.9 % (FLUSH) 0.9 %
10 SYRINGE (ML) INJECTION EVERY 12 HOURS SCHEDULED
Status: DISCONTINUED | OUTPATIENT
Start: 2023-05-21 | End: 2023-05-24 | Stop reason: HOSPADM

## 2023-05-21 RX ORDER — SODIUM CHLORIDE 0.9 % (FLUSH) 0.9 %
10 SYRINGE (ML) INJECTION AS NEEDED
Status: DISCONTINUED | OUTPATIENT
Start: 2023-05-21 | End: 2023-05-24 | Stop reason: HOSPADM

## 2023-05-21 RX ORDER — ENOXAPARIN SODIUM 100 MG/ML
40 INJECTION SUBCUTANEOUS DAILY
Status: DISCONTINUED | OUTPATIENT
Start: 2023-05-21 | End: 2023-05-24 | Stop reason: HOSPADM

## 2023-05-21 RX ORDER — IPRATROPIUM BROMIDE AND ALBUTEROL SULFATE 2.5; .5 MG/3ML; MG/3ML
3 SOLUTION RESPIRATORY (INHALATION) EVERY 4 HOURS PRN
Status: DISCONTINUED | OUTPATIENT
Start: 2023-05-21 | End: 2023-05-24 | Stop reason: HOSPADM

## 2023-05-21 RX ORDER — FINASTERIDE 5 MG/1
5 TABLET, FILM COATED ORAL DAILY
Status: DISCONTINUED | OUTPATIENT
Start: 2023-05-21 | End: 2023-05-24 | Stop reason: HOSPADM

## 2023-05-21 RX ORDER — BUDESONIDE AND FORMOTEROL FUMARATE DIHYDRATE 160; 4.5 UG/1; UG/1
2 AEROSOL RESPIRATORY (INHALATION)
Status: DISCONTINUED | OUTPATIENT
Start: 2023-05-21 | End: 2023-05-24 | Stop reason: HOSPADM

## 2023-05-21 RX ORDER — SODIUM CHLORIDE 9 MG/ML
40 INJECTION, SOLUTION INTRAVENOUS AS NEEDED
Status: DISCONTINUED | OUTPATIENT
Start: 2023-05-21 | End: 2023-05-24 | Stop reason: HOSPADM

## 2023-05-21 RX ORDER — METHYLPREDNISOLONE SODIUM SUCCINATE 125 MG/2ML
125 INJECTION, POWDER, LYOPHILIZED, FOR SOLUTION INTRAMUSCULAR; INTRAVENOUS ONCE
Status: COMPLETED | OUTPATIENT
Start: 2023-05-21 | End: 2023-05-21

## 2023-05-21 RX ORDER — LORAZEPAM 2 MG/ML
1 INJECTION INTRAMUSCULAR EVERY 6 HOURS PRN
Status: DISCONTINUED | OUTPATIENT
Start: 2023-05-21 | End: 2023-05-24 | Stop reason: HOSPADM

## 2023-05-21 RX ORDER — BISACODYL 10 MG
10 SUPPOSITORY, RECTAL RECTAL DAILY PRN
Status: DISCONTINUED | OUTPATIENT
Start: 2023-05-21 | End: 2023-05-24 | Stop reason: HOSPADM

## 2023-05-21 RX ORDER — NITROGLYCERIN 0.4 MG/1
0.4 TABLET SUBLINGUAL
Status: DISCONTINUED | OUTPATIENT
Start: 2023-05-21 | End: 2023-05-24 | Stop reason: HOSPADM

## 2023-05-21 RX ORDER — METHYLPREDNISOLONE SODIUM SUCCINATE 40 MG/ML
40 INJECTION, POWDER, LYOPHILIZED, FOR SOLUTION INTRAMUSCULAR; INTRAVENOUS EVERY 12 HOURS
Status: DISCONTINUED | OUTPATIENT
Start: 2023-05-21 | End: 2023-05-23 | Stop reason: RX

## 2023-05-21 RX ORDER — HYDRALAZINE HYDROCHLORIDE 20 MG/ML
10 INJECTION INTRAMUSCULAR; INTRAVENOUS EVERY 6 HOURS PRN
Status: DISCONTINUED | OUTPATIENT
Start: 2023-05-21 | End: 2023-05-24 | Stop reason: HOSPADM

## 2023-05-21 RX ORDER — ACETAMINOPHEN 160 MG/5ML
650 SOLUTION ORAL EVERY 4 HOURS PRN
Status: DISCONTINUED | OUTPATIENT
Start: 2023-05-21 | End: 2023-05-24 | Stop reason: HOSPADM

## 2023-05-21 RX ORDER — PROMETHAZINE HYDROCHLORIDE 12.5 MG/1
12.5 TABLET ORAL EVERY 6 HOURS PRN
Status: DISCONTINUED | OUTPATIENT
Start: 2023-05-21 | End: 2023-05-24 | Stop reason: HOSPADM

## 2023-05-21 RX ORDER — AMOXICILLIN 250 MG
2 CAPSULE ORAL 2 TIMES DAILY
Status: DISCONTINUED | OUTPATIENT
Start: 2023-05-21 | End: 2023-05-24 | Stop reason: HOSPADM

## 2023-05-21 RX ORDER — LABETALOL HYDROCHLORIDE 5 MG/ML
10 INJECTION, SOLUTION INTRAVENOUS ONCE
Status: COMPLETED | OUTPATIENT
Start: 2023-05-21 | End: 2023-05-21

## 2023-05-21 RX ORDER — METHADONE HYDROCHLORIDE 10 MG/1
60 TABLET ORAL DAILY
Status: DISCONTINUED | OUTPATIENT
Start: 2023-05-21 | End: 2023-05-24 | Stop reason: HOSPADM

## 2023-05-21 RX ORDER — ACETAMINOPHEN 650 MG/1
650 SUPPOSITORY RECTAL EVERY 4 HOURS PRN
Status: DISCONTINUED | OUTPATIENT
Start: 2023-05-21 | End: 2023-05-24 | Stop reason: HOSPADM

## 2023-05-21 RX ORDER — BISACODYL 5 MG/1
5 TABLET, DELAYED RELEASE ORAL DAILY PRN
Status: DISCONTINUED | OUTPATIENT
Start: 2023-05-21 | End: 2023-05-24 | Stop reason: HOSPADM

## 2023-05-21 RX ORDER — ONDANSETRON 2 MG/ML
4 INJECTION INTRAMUSCULAR; INTRAVENOUS EVERY 6 HOURS PRN
Status: DISCONTINUED | OUTPATIENT
Start: 2023-05-21 | End: 2023-05-24 | Stop reason: HOSPADM

## 2023-05-21 RX ORDER — FLUTICASONE PROPIONATE 50 MCG
2 SPRAY, SUSPENSION (ML) NASAL DAILY
Status: DISCONTINUED | OUTPATIENT
Start: 2023-05-21 | End: 2023-05-24 | Stop reason: HOSPADM

## 2023-05-21 RX ORDER — TAMSULOSIN HYDROCHLORIDE 0.4 MG/1
0.4 CAPSULE ORAL NIGHTLY
Status: DISCONTINUED | OUTPATIENT
Start: 2023-05-21 | End: 2023-05-24 | Stop reason: HOSPADM

## 2023-05-21 RX ORDER — ATORVASTATIN CALCIUM 10 MG/1
10 TABLET, FILM COATED ORAL DAILY
Status: DISCONTINUED | OUTPATIENT
Start: 2023-05-21 | End: 2023-05-24 | Stop reason: HOSPADM

## 2023-05-21 RX ADMIN — SODIUM CHLORIDE 500 ML: 9 INJECTION, SOLUTION INTRAVENOUS at 02:34

## 2023-05-21 RX ADMIN — Medication 10 ML: at 10:36

## 2023-05-21 RX ADMIN — ATORVASTATIN CALCIUM 10 MG: 10 TABLET, FILM COATED ORAL at 20:37

## 2023-05-21 RX ADMIN — METHYLPREDNISOLONE SODIUM SUCCINATE 125 MG: 125 INJECTION, POWDER, FOR SOLUTION INTRAMUSCULAR; INTRAVENOUS at 02:31

## 2023-05-21 RX ADMIN — TAMSULOSIN HYDROCHLORIDE 0.4 MG: 0.4 CAPSULE ORAL at 20:37

## 2023-05-21 RX ADMIN — METHYLPREDNISOLONE SODIUM SUCCINATE 40 MG: 40 INJECTION, POWDER, LYOPHILIZED, FOR SOLUTION INTRAMUSCULAR; INTRAVENOUS at 23:03

## 2023-05-21 RX ADMIN — METHADONE HYDROCHLORIDE 60 MG: 10 TABLET ORAL at 10:36

## 2023-05-21 RX ADMIN — SENNOSIDES AND DOCUSATE SODIUM 2 TABLET: 50; 8.6 TABLET ORAL at 20:37

## 2023-05-21 RX ADMIN — IOPAMIDOL 100 ML: 612 INJECTION, SOLUTION INTRAVENOUS at 06:43

## 2023-05-21 RX ADMIN — ASPIRIN 81 MG: 81 TABLET, COATED ORAL at 10:36

## 2023-05-21 RX ADMIN — LORAZEPAM 1 MG: 2 INJECTION INTRAMUSCULAR; INTRAVENOUS at 17:15

## 2023-05-21 RX ADMIN — SENNOSIDES AND DOCUSATE SODIUM 2 TABLET: 50; 8.6 TABLET ORAL at 10:36

## 2023-05-21 RX ADMIN — PANTOPRAZOLE SODIUM 40 MG: 40 TABLET, DELAYED RELEASE ORAL at 10:36

## 2023-05-21 RX ADMIN — ACETAMINOPHEN 650 MG: 325 TABLET, FILM COATED ORAL at 03:24

## 2023-05-21 RX ADMIN — HYDRALAZINE HYDROCHLORIDE 10 MG: 20 INJECTION, SOLUTION INTRAMUSCULAR; INTRAVENOUS at 17:14

## 2023-05-21 RX ADMIN — BUDESONIDE AND FORMOTEROL FUMARATE DIHYDRATE 2 PUFF: 160; 4.5 AEROSOL RESPIRATORY (INHALATION) at 21:05

## 2023-05-21 RX ADMIN — MAGNESIUM SULFATE HEPTAHYDRATE 2 G: 40 INJECTION, SOLUTION INTRAVENOUS at 02:33

## 2023-05-21 RX ADMIN — LABETALOL HYDROCHLORIDE 10 MG: 5 INJECTION, SOLUTION INTRAVENOUS at 15:59

## 2023-05-21 RX ADMIN — FLUTICASONE PROPIONATE 2 SPRAY: 50 SPRAY, METERED NASAL at 11:07

## 2023-05-21 RX ADMIN — LORAZEPAM 1 MG: 2 INJECTION INTRAMUSCULAR; INTRAVENOUS at 23:02

## 2023-05-21 RX ADMIN — FINASTERIDE 5 MG: 5 TABLET, FILM COATED ORAL at 10:36

## 2023-05-21 RX ADMIN — KETOROLAC TROMETHAMINE 10 MG: 30 INJECTION, SOLUTION INTRAMUSCULAR; INTRAVENOUS at 06:02

## 2023-05-21 RX ADMIN — LORAZEPAM 1 MG: 2 INJECTION INTRAMUSCULAR; INTRAVENOUS at 11:07

## 2023-05-21 RX ADMIN — Medication 10 ML: at 20:37

## 2023-05-21 RX ADMIN — METHYLPREDNISOLONE SODIUM SUCCINATE 40 MG: 40 INJECTION, POWDER, LYOPHILIZED, FOR SOLUTION INTRAMUSCULAR; INTRAVENOUS at 10:37

## 2023-05-21 RX ADMIN — IPRATROPIUM BROMIDE AND ALBUTEROL SULFATE 3 ML: .5; 3 SOLUTION RESPIRATORY (INHALATION) at 21:05

## 2023-05-21 RX ADMIN — IPRATROPIUM BROMIDE AND ALBUTEROL SULFATE 3 ML: .5; 3 SOLUTION RESPIRATORY (INHALATION) at 05:18

## 2023-05-21 RX ADMIN — IPRATROPIUM BROMIDE AND ALBUTEROL SULFATE 3 ML: .5; 3 SOLUTION RESPIRATORY (INHALATION) at 02:50

## 2023-05-21 RX ADMIN — ENOXAPARIN SODIUM 40 MG: 100 INJECTION SUBCUTANEOUS at 10:36

## 2023-05-21 RX ADMIN — ONDANSETRON 4 MG: 2 INJECTION INTRAMUSCULAR; INTRAVENOUS at 03:31

## 2023-05-21 NOTE — ED PROVIDER NOTES
TRIAGE CHIEF COMPLAINT:     Nursing and triage notes reviewed    Chief Complaint   Patient presents with   • Shortness of Breath      HPI: Topher Stoll is a 47 y.o. male who presents to the emergency department complaining of shortness of breath, wheezing, cough, hypoxia.  Patient has a history of COPD.  Patient wears oxygen as needed at home but states he typically does not need it.  He states he had been short of breath and coughing for the past few days and has been using home treatments to try to prevent a hospital visit.  He states his oxygen dropped to the mid 80s quickly at home even with supplemental oxygen.  He states he had some tightness in his chest but denied sharp chest pain.    REVIEW OF SYSTEMS: All other systems reviewed and are negative     PAST MEDICAL HISTORY:   Past Medical History:   Diagnosis Date   • Abdominal adhesions    • Anxiety    • Arthritis    • Asthma    • Cervical radiculopathy    • Colitis    • COPD (chronic obstructive pulmonary disease)    • GERD (gastroesophageal reflux disease)    • Heart attack    • History of hepatitis C     Treated with Epclusa in 2019   • History of recreational drug use    • HTN (hypertension)    • Kidney cysts    • Left hip pain    • Liver disease    • Low back pain    • Migraines    • Obstructive chronic bronchitis with exacerbation    • Sleep apnea     mild   • Tattoos         FAMILY HISTORY:   Family History   Problem Relation Age of Onset   • Arthritis Other    • Hypertension Other    • Migraines Other    • Heart attack Other    • Stroke Other    • Colon cancer Neg Hx         SOCIAL HISTORY:   Social History     Socioeconomic History   • Marital status:    Tobacco Use   • Smoking status: Former     Packs/day: 2.00     Years: 15.00     Pack years: 30.00     Types: Cigarettes     Quit date:      Years since quittin.3     Passive exposure: Current   • Smokeless tobacco: Current     Types: Chew   Vaping Use   • Vaping Use: Never used    Substance and Sexual Activity   • Alcohol use: No     Comment: stopped drinking alcohol   • Drug use: Not Currently     Comment: takes suboxone   • Sexual activity: Defer        SURGICAL HISTORY:   Past Surgical History:   Procedure Laterality Date   • BACK SURGERY     • COLONOSCOPY  2014   • COLONOSCOPY N/A 1/4/2022    Procedure: COLONOSCOPY with biopsies;  Surgeon: Giancarlo Ruiz MD;  Location:  [facility];  Service: Gastroenterology;  Laterality: N/A;   • HERNIA REPAIR     • HIP SPACER INSERTION WITH ANTIBIOTIC CEMENT Left 1/15/2020    Procedure: TOTAL HIP IMPLANT REMOVAL WITH INSERTION OF ANTIBIOTIC SPACER LEFT;  Surgeon: Ba Ramirez MD;  Location:  [facility];  Service: Orthopedics   • SHOULDER LIGAMENT REPAIR      right shoulder   • SMALL INTESTINE SURGERY      Small bowell resection   • TOTAL HIP ARTHROPLASTY Left    • TOTAL HIP ARTHROPLASTY Right    • TOTAL HIP ARTHROPLASTY REVISION Left 3/5/2020    Procedure: HIP REIMPLANT REVISION LEFT;  Surgeon: Ba Ramirez MD;  Location:  [facility];  Service: Orthopedics;  Laterality: Left;   • UPPER GASTROINTESTINAL ENDOSCOPY  2014        CURRENT MEDICATIONS:      Medication List      ASK your doctor about these medications    albuterol sulfate  (90 Base) MCG/ACT inhaler  Commonly known as: PROVENTIL HFA;VENTOLIN HFA;PROAIR HFA  INAHLE 2 PUFFS EVERY 6 HOURS AS NEEDED FOR WHEEZING OR SHORTNESS OF BREATH     alfuzosin 10 MG 24 hr tablet  Commonly known as: UROXATRAL     aspirin 81 MG EC tablet  Take 1 tablet by mouth Daily.     azelastine 0.1 % nasal spray  Commonly known as: ASTELIN  1 spray into the nostril(s) as directed by provider 2 (Two) Times a Day. Use in each nostril as directed     dutasteride 0.5 MG capsule  Commonly known as: AVODART     esomeprazole 40 MG capsule  Commonly known as: nexIUM     fluticasone 50 MCG/ACT nasal spray  Commonly known as: FLONASE  INSTILL 1 SPRAY INTO EACH NOSTRIL DAILY     Fluticasone-Umeclidin-Vilant  200-62.5-25 MCG/INH inhaler  Commonly known as: TRELEGY  Inhale 1 puff Daily.     ipratropium-albuterol 0.5-2.5 mg/3 ml nebulizer  Commonly known as: DUO-NEB  Take 3 mL by nebulization Every 4 (Four) Hours As Needed for Wheezing or Shortness of Air.     lovastatin 40 MG tablet  Commonly known as: MEVACOR  TAKE 1 TABLET BY MOUTH EVERY DAY FOR CHOLESTEROL     methadone 5 MG/5ML solution  Commonly known as: DOLOPHINE     omeprazole 40 MG capsule  Commonly known as: priLOSEC  Take 1 capsule by mouth Daily.     predniSONE 10 MG tablet  Commonly known as: DELTASONE  60 mg po x 2 days then 50 mg x 2 days then 40 mg x 2 days then 30 mg x 2 days then 20 mg x 2 days then 10 mg x 2 days then stop     promethazine 12.5 MG tablet  Commonly known as: PHENERGAN  Take 1 tablet by mouth Every 6 (Six) Hours As Needed for Nausea or Vomiting.             ALLERGIES: Doxycycline     PHYSICAL EXAM:   VITAL SIGNS:   Vitals:    05/21/23 0314   BP: 132/91   Pulse: 88   Resp:    Temp:    SpO2: 96%      CONSTITUTIONAL: Awake, oriented, appears dyspneic  HENT: Atraumatic, normocephalic, oral mucosa pink and moist, airway patent. Nares patent without drainage. External ears normal.   EYES: Conjunctivae clear   NECK: Trachea midline   CARDIOVASCULAR: Normal heart rate, Normal rhythm, No murmurs, rubs, gallops   PULMONARY/CHEST: Diffuse wheezes in all lung fields, decreased air movement, increased work of breathing.   ABDOMINAL: Nondistended, soft, nontender - no rebound or guarding.  NEUROLOGIC: Nonfocal, moving all four extremities, no gross sensory or motor deficits.   EXTREMITIES: No clubbing, cyanosis, or edema   SKIN: Warm, Dry, No erythema, No rash     ED COURSE / MEDICAL DECISION MAKING:   Topher Stoll is a 47 y.o. male who presents to the emergency department for evaluation of shortness of breath and wheezing.  Patient is very dyspneic appearing on arrival and has increased work of breathing.  On 4 L of oxygen he does have an oxygen  saturation in the mid 90s.  Auscultation reveals diffuse dense wheezes.    Differential diagnosis includes COPD exacerbation, pulmonary embolism, ACS, pneumonia among other etiologies.    Chest x-ray, EKG, CBC, CMP, blood gas, cardiac enzymes was ordered for further evaluation of the patient's presentation.    Diagnostic information from other sources: Family, EMS    Interventions: Solu-Medrol, magnesium, Zofran, Toradol, DuoNeb    EKG interpreted by me reveals sinus rhythm with rate of 94 bpm.  There are no acute ST segment or T wave changes.  This is a normal-appearing EKG.    Narrative: Presents with shortness of breath.  Patient does have diffuse wheezes on auscultation.  Patient is requiring several liters of oxygen.    Patient overall looks improved.  CT scan revealed no acute abnormality, some chronic changes right sternoclavicular joint and bronchitis.  Patient unfortunately had marked desaturation with ambulation even on his oxygen.  Discussed with Dr. Kerley for admission.      DECISION TO DISCHARGE/ADMIT: see ED care timeline     FINAL IMPRESSION:   1 --COPD exacerbation  2 --acute on chronic hypoxic and hypercapnic respiratory failure  3 --     Electronically signed by: Dinorah Garcia MD, 5/21/2023 03:56 EDT       Amandeep Branham MD  05/21/23 0848

## 2023-05-21 NOTE — PLAN OF CARE
Problem: Adult Inpatient Plan of Care  Goal: Plan of Care Review  Outcome: Ongoing, Progressing  Goal: Patient-Specific Goal (Individualized)  Outcome: Ongoing, Progressing  Goal: Absence of Hospital-Acquired Illness or Injury  Outcome: Ongoing, Progressing  Goal: Optimal Comfort and Wellbeing  Outcome: Ongoing, Progressing  Goal: Readiness for Transition of Care  Outcome: Ongoing, Progressing  Intervention: Mutually Develop Transition Plan  Recent Flowsheet Documentation  Taken 5/21/2023 0914 by Va Sanders, RN  Equipment Currently Used at Home:   oxygen   pulse ox   bp cuff   nebulizer   walker, standard  Transportation Anticipated: family or friend will provide  Patient/Family Anticipated Services at Transition: none  Patient/Family Anticipates Transition to: home   Goal Outcome Evaluation:
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
## 2023-05-22 ENCOUNTER — APPOINTMENT (OUTPATIENT)
Dept: CARDIOLOGY | Facility: HOSPITAL | Age: 48
End: 2023-05-22
Payer: MEDICARE

## 2023-05-22 LAB
ANION GAP SERPL CALCULATED.3IONS-SCNC: 10.8 MMOL/L (ref 5–15)
BASOPHILS # BLD AUTO: 0.01 10*3/MM3 (ref 0–0.2)
BASOPHILS NFR BLD AUTO: 0.1 % (ref 0–1.5)
BH CV ECHO MEAS - AO MAX PG: 9.4 MMHG
BH CV ECHO MEAS - AO MEAN PG: 5 MMHG
BH CV ECHO MEAS - AO ROOT DIAM: 3.4 CM
BH CV ECHO MEAS - AO V2 MAX: 153 CM/SEC
BH CV ECHO MEAS - AO V2 VTI: 26.5 CM
BH CV ECHO MEAS - AVA(I,D): 2.45 CM2
BH CV ECHO MEAS - EDV(CUBED): 121.3 ML
BH CV ECHO MEAS - EDV(MOD-SP2): 94 ML
BH CV ECHO MEAS - EDV(MOD-SP4): 122 ML
BH CV ECHO MEAS - EF(MOD-BP): 62.8 %
BH CV ECHO MEAS - EF(MOD-SP2): 64 %
BH CV ECHO MEAS - EF(MOD-SP4): 61.4 %
BH CV ECHO MEAS - ESV(CUBED): 53.2 ML
BH CV ECHO MEAS - ESV(MOD-SP2): 33.8 ML
BH CV ECHO MEAS - ESV(MOD-SP4): 47.1 ML
BH CV ECHO MEAS - FS: 24 %
BH CV ECHO MEAS - IVS/LVPW: 0.96 CM
BH CV ECHO MEAS - IVSD: 0.8 CM
BH CV ECHO MEAS - LA DIMENSION: 3 CM
BH CV ECHO MEAS - LAT PEAK E' VEL: 14 CM/SEC
BH CV ECHO MEAS - LV DIASTOLIC VOL/BSA (35-75): 59 CM2
BH CV ECHO MEAS - LV MASS(C)D: 136.7 GRAMS
BH CV ECHO MEAS - LV MAX PG: 3.9 MMHG
BH CV ECHO MEAS - LV MEAN PG: 2 MMHG
BH CV ECHO MEAS - LV SYSTOLIC VOL/BSA (12-30): 22.8 CM2
BH CV ECHO MEAS - LV V1 MAX: 99.2 CM/SEC
BH CV ECHO MEAS - LV V1 VTI: 20.5 CM
BH CV ECHO MEAS - LVIDD: 5 CM
BH CV ECHO MEAS - LVIDS: 3.8 CM
BH CV ECHO MEAS - LVOT AREA: 3.2 CM2
BH CV ECHO MEAS - LVOT DIAM: 2.01 CM
BH CV ECHO MEAS - LVPWD: 0.83 CM
BH CV ECHO MEAS - MED PEAK E' VEL: 10.1 CM/SEC
BH CV ECHO MEAS - MV A MAX VEL: 72.4 CM/SEC
BH CV ECHO MEAS - MV DEC SLOPE: 737 CM/SEC2
BH CV ECHO MEAS - MV DEC TIME: 0.12 MSEC
BH CV ECHO MEAS - MV E MAX VEL: 86.1 CM/SEC
BH CV ECHO MEAS - MV E/A: 1.19
BH CV ECHO MEAS - MV MAX PG: 2.15 MMHG
BH CV ECHO MEAS - MV MEAN PG: 1 MMHG
BH CV ECHO MEAS - MV V2 VTI: 20.7 CM
BH CV ECHO MEAS - MVA(VTI): 3.1 CM2
BH CV ECHO MEAS - PA ACC TIME: 0.09 SEC
BH CV ECHO MEAS - PA PR(ACCEL): 37.6 MMHG
BH CV ECHO MEAS - PA V2 MAX: 121 CM/SEC
BH CV ECHO MEAS - RAP SYSTOLE: 3 MMHG
BH CV ECHO MEAS - SI(MOD-SP2): 29.1 ML/M2
BH CV ECHO MEAS - SI(MOD-SP4): 36.2 ML/M2
BH CV ECHO MEAS - SV(LVOT): 65 ML
BH CV ECHO MEAS - SV(MOD-SP2): 60.2 ML
BH CV ECHO MEAS - SV(MOD-SP4): 74.9 ML
BH CV ECHO MEAS - TAPSE (>1.6): 2.9 CM
BH CV ECHO MEASUREMENTS AVERAGE E/E' RATIO: 7.15
BH CV XLRA - RV BASE: 2.8 CM
BH CV XLRA - TDI S': 11.1 CM/SEC
BUN SERPL-MCNC: 17 MG/DL (ref 6–20)
BUN/CREAT SERPL: 25.4 (ref 7–25)
CALCIUM SPEC-SCNC: 8.8 MG/DL (ref 8.6–10.5)
CHLORIDE SERPL-SCNC: 98 MMOL/L (ref 98–107)
CO2 SERPL-SCNC: 23.2 MMOL/L (ref 22–29)
CREAT SERPL-MCNC: 0.67 MG/DL (ref 0.76–1.27)
DEPRECATED RDW RBC AUTO: 44.2 FL (ref 37–54)
EGFRCR SERPLBLD CKD-EPI 2021: 115.2 ML/MIN/1.73
EOSINOPHIL # BLD AUTO: 0.01 10*3/MM3 (ref 0–0.4)
EOSINOPHIL NFR BLD AUTO: 0.1 % (ref 0.3–6.2)
ERYTHROCYTE [DISTWIDTH] IN BLOOD BY AUTOMATED COUNT: 14.7 % (ref 12.3–15.4)
GLUCOSE SERPL-MCNC: 131 MG/DL (ref 65–99)
HCT VFR BLD AUTO: 44.2 % (ref 37.5–51)
HGB BLD-MCNC: 14.3 G/DL (ref 13–17.7)
IMM GRANULOCYTES # BLD AUTO: 0.09 10*3/MM3 (ref 0–0.05)
IMM GRANULOCYTES NFR BLD AUTO: 0.8 % (ref 0–0.5)
LEFT ATRIUM VOLUME INDEX: 19.1 ML/M2
LV EF 2D ECHO EST: 65 %
LYMPHOCYTES # BLD AUTO: 1.04 10*3/MM3 (ref 0.7–3.1)
LYMPHOCYTES NFR BLD AUTO: 9.3 % (ref 19.6–45.3)
MAXIMAL PREDICTED HEART RATE: 172 BPM
MCH RBC QN AUTO: 26.7 PG (ref 26.6–33)
MCHC RBC AUTO-ENTMCNC: 32.4 G/DL (ref 31.5–35.7)
MCV RBC AUTO: 82.5 FL (ref 79–97)
MONOCYTES # BLD AUTO: 0.57 10*3/MM3 (ref 0.1–0.9)
MONOCYTES NFR BLD AUTO: 5.1 % (ref 5–12)
NEUTROPHILS NFR BLD AUTO: 84.6 % (ref 42.7–76)
NEUTROPHILS NFR BLD AUTO: 9.42 10*3/MM3 (ref 1.7–7)
NRBC BLD AUTO-RTO: 0 /100 WBC (ref 0–0.2)
PLATELET # BLD AUTO: 229 10*3/MM3 (ref 140–450)
PMV BLD AUTO: 10.2 FL (ref 6–12)
POTASSIUM SERPL-SCNC: 4.8 MMOL/L (ref 3.5–5.2)
RBC # BLD AUTO: 5.36 10*6/MM3 (ref 4.14–5.8)
SODIUM SERPL-SCNC: 132 MMOL/L (ref 136–145)
STRESS TARGET HR: 146 BPM
WBC NRBC COR # BLD: 11.14 10*3/MM3 (ref 3.4–10.8)

## 2023-05-22 PROCEDURE — 93306 TTE W/DOPPLER COMPLETE: CPT | Performed by: INTERNAL MEDICINE

## 2023-05-22 PROCEDURE — 94761 N-INVAS EAR/PLS OXIMETRY MLT: CPT

## 2023-05-22 PROCEDURE — 94664 DEMO&/EVAL PT USE INHALER: CPT

## 2023-05-22 PROCEDURE — 85025 COMPLETE CBC W/AUTO DIFF WBC: CPT | Performed by: STUDENT IN AN ORGANIZED HEALTH CARE EDUCATION/TRAINING PROGRAM

## 2023-05-22 PROCEDURE — 25010000002 LORAZEPAM PER 2 MG: Performed by: STUDENT IN AN ORGANIZED HEALTH CARE EDUCATION/TRAINING PROGRAM

## 2023-05-22 PROCEDURE — 94799 UNLISTED PULMONARY SVC/PX: CPT

## 2023-05-22 PROCEDURE — 25010000002 KETOROLAC TROMETHAMINE PER 15 MG: Performed by: STUDENT IN AN ORGANIZED HEALTH CARE EDUCATION/TRAINING PROGRAM

## 2023-05-22 PROCEDURE — 80048 BASIC METABOLIC PNL TOTAL CA: CPT | Performed by: STUDENT IN AN ORGANIZED HEALTH CARE EDUCATION/TRAINING PROGRAM

## 2023-05-22 PROCEDURE — 25010000002 METHYLPREDNISOLONE PER 40 MG: Performed by: STUDENT IN AN ORGANIZED HEALTH CARE EDUCATION/TRAINING PROGRAM

## 2023-05-22 PROCEDURE — 99232 SBSQ HOSP IP/OBS MODERATE 35: CPT | Performed by: STUDENT IN AN ORGANIZED HEALTH CARE EDUCATION/TRAINING PROGRAM

## 2023-05-22 PROCEDURE — 93306 TTE W/DOPPLER COMPLETE: CPT

## 2023-05-22 RX ORDER — TRAZODONE HYDROCHLORIDE 50 MG/1
50 TABLET ORAL NIGHTLY PRN
Status: DISCONTINUED | OUTPATIENT
Start: 2023-05-22 | End: 2023-05-24 | Stop reason: HOSPADM

## 2023-05-22 RX ORDER — KETOROLAC TROMETHAMINE 30 MG/ML
30 INJECTION, SOLUTION INTRAMUSCULAR; INTRAVENOUS EVERY 6 HOURS PRN
Status: DISCONTINUED | OUTPATIENT
Start: 2023-05-22 | End: 2023-05-24 | Stop reason: HOSPADM

## 2023-05-22 RX ADMIN — Medication 10 ML: at 09:41

## 2023-05-22 RX ADMIN — BUDESONIDE AND FORMOTEROL FUMARATE DIHYDRATE 2 PUFF: 160; 4.5 AEROSOL RESPIRATORY (INHALATION) at 07:14

## 2023-05-22 RX ADMIN — ATORVASTATIN CALCIUM 10 MG: 10 TABLET, FILM COATED ORAL at 20:04

## 2023-05-22 RX ADMIN — SENNOSIDES AND DOCUSATE SODIUM 2 TABLET: 50; 8.6 TABLET ORAL at 09:41

## 2023-05-22 RX ADMIN — TIOTROPIUM BROMIDE INHALATION SPRAY 2 PUFF: 3.12 SPRAY, METERED RESPIRATORY (INHALATION) at 07:14

## 2023-05-22 RX ADMIN — ASPIRIN 81 MG: 81 TABLET, COATED ORAL at 09:41

## 2023-05-22 RX ADMIN — LORAZEPAM 1 MG: 2 INJECTION INTRAMUSCULAR; INTRAVENOUS at 20:12

## 2023-05-22 RX ADMIN — KETOROLAC TROMETHAMINE 30 MG: 30 INJECTION, SOLUTION INTRAMUSCULAR; INTRAVENOUS at 15:16

## 2023-05-22 RX ADMIN — Medication 10 ML: at 20:12

## 2023-05-22 RX ADMIN — FLUTICASONE PROPIONATE 2 SPRAY: 50 SPRAY, METERED NASAL at 09:40

## 2023-05-22 RX ADMIN — METHYLPREDNISOLONE SODIUM SUCCINATE 40 MG: 40 INJECTION, POWDER, LYOPHILIZED, FOR SOLUTION INTRAMUSCULAR; INTRAVENOUS at 22:30

## 2023-05-22 RX ADMIN — LORAZEPAM 1 MG: 2 INJECTION INTRAMUSCULAR; INTRAVENOUS at 05:01

## 2023-05-22 RX ADMIN — TAMSULOSIN HYDROCHLORIDE 0.4 MG: 0.4 CAPSULE ORAL at 20:04

## 2023-05-22 RX ADMIN — PANTOPRAZOLE SODIUM 40 MG: 40 TABLET, DELAYED RELEASE ORAL at 05:01

## 2023-05-22 RX ADMIN — FINASTERIDE 5 MG: 5 TABLET, FILM COATED ORAL at 09:41

## 2023-05-22 RX ADMIN — BUDESONIDE AND FORMOTEROL FUMARATE DIHYDRATE 2 PUFF: 160; 4.5 AEROSOL RESPIRATORY (INHALATION) at 18:12

## 2023-05-22 RX ADMIN — IPRATROPIUM BROMIDE AND ALBUTEROL SULFATE 3 ML: .5; 3 SOLUTION RESPIRATORY (INHALATION) at 18:12

## 2023-05-22 RX ADMIN — METHYLPREDNISOLONE SODIUM SUCCINATE 40 MG: 40 INJECTION, POWDER, LYOPHILIZED, FOR SOLUTION INTRAMUSCULAR; INTRAVENOUS at 12:11

## 2023-05-22 RX ADMIN — LORAZEPAM 1 MG: 2 INJECTION INTRAMUSCULAR; INTRAVENOUS at 11:57

## 2023-05-22 RX ADMIN — METHADONE HYDROCHLORIDE 60 MG: 10 TABLET ORAL at 09:41

## 2023-05-22 RX ADMIN — TRAZODONE HYDROCHLORIDE 50 MG: 50 TABLET ORAL at 20:04

## 2023-05-22 NOTE — PLAN OF CARE
Goal Outcome Evaluation:  Plan of Care Reviewed With: patient        Progress: no change  Outcome Evaluation: VSS, maintaining o2 sats >90% on 3L NC, pt c/o not sleeping well, anxious/restless at times, PRN meds given as ordered.

## 2023-05-22 NOTE — PLAN OF CARE
Goal Outcome Evaluation:  Plan of Care Reviewed With: patient        Progress: no change  Outcome Evaluation: No acute events. VSS. On 4L NC. PRN meds administered for anxiety and pain.

## 2023-05-22 NOTE — PROGRESS NOTES
"Adult Nutrition  Assessment/PES    Patient Name:  Topher Stoll  YOB: 1975  MRN: [id number]  Admit Date:  5/21/2023    Assessment Date:  5/22/2023    Comments:    Pt with PMHx significant for COPD, colitis, GERD, CAD, HTN, sleep apnea, hx hepatitis C presented to [facility] 5/21 with c/o SOA. Pt with COPD exacerbation.  lbs, normal weight for age per BMI 24.09. Currently receiving a cardiac diet with PO intakes averaging 83.3% x 3 meals. Nutrition appropriate for condition at this time, RD to F/U and available PRN.      Reason for Assessment     Row Name 05/22/23 1211          Reason for Assessment    Reason For Assessment per organizational policy  LACE                  Labs/Tests/Procedures/Meds     Row Name 05/22/23 1211          Labs/Procedures/Meds    Lab Results Reviewed reviewed, pertinent     Lab Results Comments Low: Na+, Creatinine; High: Glucose        Medications    Pertinent Medications Reviewed reviewed, pertinent     Pertinent Medications Comments lipitor, lovenox, protonix                Physical Findings     Row Name 05/22/23 1212          Physical Findings    Overall Physical Appearance normal weight                Estimated/Assessed Needs - Anthropometrics     Row Name 05/22/23 1212 05/22/23 0917       Anthropometrics    Height -- 185.4 cm (72.99\")    Weight -- 82.8 kg (182 lb 8.7 oz)    Height for Calculation 1.854 m (6' 0.99\") --    Weight for Calculation 83 kg (182 lb 15.7 oz)  CBW --       Estimated/Assessed Needs    Additional Documentation KCAL/KG (Group);Estimated Calorie Needs (Group);Fluid Requirements (Group);Protein Requirements (Group) --       Estimated Calorie Needs    Estimated Calorie Requirement (kcal/day) 2,075-2,490 --    Estimated Calorie Need Method kcal/kg --       KCAL/KG    KCAL/KG 25 Kcal/Kg (kcal);30 Kcal/Kg (kcal) --    25 Kcal/Kg (kcal) 2075 --    30 Kcal/Kg (kcal) 2490 --       Protein Requirements    Weight Used For Protein Calculations 83 kg (182 lb " 15.7 oz)  CBW --    Est Protein Requirement Amount (gms/kg) 1.0 gm protein --    Estimated Protein Requirements (gms/day) 83 --       Fluid Requirements    Fluid Requirements (mL/day) 2075  1 mL/kcal --    RDA Method (mL) 2075 --               Nutrition Prescription Ordered     Row Name 05/22/23 1215          Nutrition Prescription PO    Current PO Diet Regular     Fluid Consistency Thin     Common Modifiers Cardiac                Evaluation of Received Nutrient/Fluid Intake     Row Name 05/22/23 1216          Intake Assessment    Energy/Calorie Requirement Assessment meeting needs     Protein Requirement Assessment meeting needs        PO Evaluation    Number of Days PO Intake Evaluated 2 days     Number of Meals 3     % PO Intake 83.3                   Problem/Interventions:   Problem 1     Row Name 05/22/23 1216          Nutrition Diagnoses Problem 1    Problem 1 Nutrition Appropriate for Condition at this Time                      Intervention Goal     Row Name 05/22/23 1218          Intervention Goal    General Maintain nutrition;Meet nutritional needs for age/condition     PO Tolerate PO;Meet estimated needs;Maintain intake     Weight Maintain weight                Nutrition Intervention     Row Name 05/22/23 1218          Nutrition Intervention    RD/Tech Action Encourage intake;Follow Tx progress;Care plan reviewd                Nutrition Prescription     Row Name 05/22/23 1218          Other Orders    Obtain Weight Daily     Obtain Weight Ordered? No, recommended     Supplement Vitamin mineral supplement     Supplement Ordered? No, recommended     Labs Mg++;Na+;K+;Phos     Labs Ordered? No, recommended                Education/Evaluation     Row Name 05/22/23 1218          Education    Education No discharge needs identified at this time        Monitor/Evaluation    Monitor Per protocol;PO intake;Pertinent labs;Weight;Skin status;Symptoms                 Electronically signed by:  Tiana Rodriguez RD  05/22/23  12:18 EDT
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
## 2023-05-22 NOTE — CASE MANAGEMENT/SOCIAL WORK
Discharge Planning Assessment  [facility]     Patient Name: Topher Stoll  MRN: [id number]  Today's Date: 2023    Admit Date: 2023    Plan: Pt plans to dc to home. Utilizes [employer] for oxygen   Discharge Needs Assessment     Row Name 23 1548       Living Environment    People in Home parent(s)    Current Living Arrangements home    Potentially Unsafe Housing Conditions unable to assess    Primary Care Provided by self    Provides Primary Care For no one    Family Caregiver if Needed parent(s)    Quality of Family Relationships helpful;involved    Able to Return to Prior Arrangements yes       Food Insecurity    Within the past 12 months, you worried that your food would run out before you got the money to buy more. Never true    Within the past 12 months, the food you bought just didn't last and you didn't have money to get more. Never true       Transition Planning    Patient/Family Anticipates Transition to home    Patient/Family Anticipated Services at Transition none    Transportation Anticipated family or friend will provide       Discharge Needs Assessment    Readmission Within the Last 30 Days previous discharge plan unsuccessful    Equipment Currently Used at Home oxygen;walker, rolling  [employer]    Equipment Needed After Discharge none    Provided Post Acute Provider List? N/A    Provided Post Acute Provider Quality & Resource List? N/A               Discharge Plan     Row Name 23 4412       Plan    Plan Pt plans to dc to home. Utilizes [employer] for oxygen    Patient/Family in Agreement with Plan yes    Plan Comments Sw met with pt at bedside to initiate discharge planning. Pt confirmed name/address//pcp. States he plans to return home at discharge where he lives with his parents.  Uses [employer] for oxygen needs. Family to transport.    Final Discharge Disposition Code 01 - home or self-care              Continued Care and Services - Admitted Since 2023    Coordination has not  been started for this encounter.          Demographic Summary     Row Name 05/22/23 1536       General Information    Admission Type inpatient    Arrived From home    Referral Source admission list    Reason for Consult discharge planning    Preferred Language English       Contact Information    Permission Granted to Share Info With ;family/designee    Contact Information Obtained for                Functional Status     Row Name 05/22/23 1544       Functional Status    Usual Activity Tolerance good    Current Activity Tolerance moderate       Functional Status, IADL    Medications independent    Meal Preparation independent    Housekeeping independent    Laundry independent    Shopping independent       Mental Status    General Appearance WDL WDL       Mental Status Summary    Recent Changes in Mental Status/Cognitive Functioning no changes               Psychosocial     Row Name 05/22/23 1548       Values/Beliefs    Spiritual, Cultural Beliefs, Sikh Practices, Values that Affect Care no       Behavior WDL    Behavior WDL WDL       Emotion Mood WDL    Emotion/Mood/Affect WDL WDL       Speech WDL    Speech WDL WDL       Perceptual State WDL    Perceptual State WDL WDL       Thought Process WDL    Thought Process WDL WDL       Intellectual Performance WDL    Intellectual Performance WDL WDL       Coping/Stress    Major Change/Loss/Stressor none    Patient Personal Strengths positive attitude    Sources of Support parent(s)    Techniques to [place] with Loss/Stress/Change not applicable    Reaction to Health Status realistic    Understanding of Condition and Treatment adequate understanding of medical condition       Developmental Stage (Eriksson's)    Developmental Stage Stage 7 (35-65 years/Middle Adulthood) Generativity vs. Stagnation       C-SSRS (Recent)    Q1 Wished to be Dead (Past Month) no    Q2 Suicidal Thoughts (Past Month) no    Q6 Suicide Behavior (Lifetime) no       Violence  Risk    Feels Like Hurting Others no    Previous Attempt to Harm Others no               Abuse/Neglect    No documentation.                Legal    No documentation.                Substance Abuse    No documentation.                Patient Forms    No documentation.                   Dominique Landers

## 2023-05-23 LAB
ANION GAP SERPL CALCULATED.3IONS-SCNC: 10.8 MMOL/L (ref 5–15)
BASOPHILS # BLD AUTO: 0 10*3/MM3 (ref 0–0.2)
BASOPHILS NFR BLD AUTO: 0 % (ref 0–1.5)
BUN SERPL-MCNC: 19 MG/DL (ref 6–20)
BUN/CREAT SERPL: 26.4 (ref 7–25)
CALCIUM SPEC-SCNC: 8.9 MG/DL (ref 8.6–10.5)
CHLORIDE SERPL-SCNC: 102 MMOL/L (ref 98–107)
CO2 SERPL-SCNC: 25.2 MMOL/L (ref 22–29)
CREAT SERPL-MCNC: 0.72 MG/DL (ref 0.76–1.27)
DEPRECATED RDW RBC AUTO: 44.5 FL (ref 37–54)
EGFRCR SERPLBLD CKD-EPI 2021: 112.7 ML/MIN/1.73
EOSINOPHIL # BLD AUTO: 0.01 10*3/MM3 (ref 0–0.4)
EOSINOPHIL NFR BLD AUTO: 0.1 % (ref 0.3–6.2)
ERYTHROCYTE [DISTWIDTH] IN BLOOD BY AUTOMATED COUNT: 14.8 % (ref 12.3–15.4)
GLUCOSE SERPL-MCNC: 127 MG/DL (ref 65–99)
HCT VFR BLD AUTO: 42.5 % (ref 37.5–51)
HGB BLD-MCNC: 13.9 G/DL (ref 13–17.7)
IMM GRANULOCYTES # BLD AUTO: 0.04 10*3/MM3 (ref 0–0.05)
IMM GRANULOCYTES NFR BLD AUTO: 0.4 % (ref 0–0.5)
LYMPHOCYTES # BLD AUTO: 0.95 10*3/MM3 (ref 0.7–3.1)
LYMPHOCYTES NFR BLD AUTO: 10 % (ref 19.6–45.3)
MCH RBC QN AUTO: 26.9 PG (ref 26.6–33)
MCHC RBC AUTO-ENTMCNC: 32.7 G/DL (ref 31.5–35.7)
MCV RBC AUTO: 82.2 FL (ref 79–97)
MONOCYTES # BLD AUTO: 0.46 10*3/MM3 (ref 0.1–0.9)
MONOCYTES NFR BLD AUTO: 4.8 % (ref 5–12)
NEUTROPHILS NFR BLD AUTO: 8.05 10*3/MM3 (ref 1.7–7)
NEUTROPHILS NFR BLD AUTO: 84.7 % (ref 42.7–76)
NRBC BLD AUTO-RTO: 0 /100 WBC (ref 0–0.2)
PLATELET # BLD AUTO: 238 10*3/MM3 (ref 140–450)
PMV BLD AUTO: 10 FL (ref 6–12)
POTASSIUM SERPL-SCNC: 4.6 MMOL/L (ref 3.5–5.2)
RBC # BLD AUTO: 5.17 10*6/MM3 (ref 4.14–5.8)
SODIUM SERPL-SCNC: 138 MMOL/L (ref 136–145)
WBC NRBC COR # BLD: 9.51 10*3/MM3 (ref 3.4–10.8)

## 2023-05-23 PROCEDURE — 25010000002 METHYLPREDNISOLONE PER 40 MG: Performed by: STUDENT IN AN ORGANIZED HEALTH CARE EDUCATION/TRAINING PROGRAM

## 2023-05-23 PROCEDURE — 99232 SBSQ HOSP IP/OBS MODERATE 35: CPT | Performed by: INTERNAL MEDICINE

## 2023-05-23 PROCEDURE — 94799 UNLISTED PULMONARY SVC/PX: CPT

## 2023-05-23 PROCEDURE — 25010000002 LORAZEPAM PER 2 MG: Performed by: STUDENT IN AN ORGANIZED HEALTH CARE EDUCATION/TRAINING PROGRAM

## 2023-05-23 PROCEDURE — 25010000002 METHYLPREDNISOLONE PER 125 MG: Performed by: STUDENT IN AN ORGANIZED HEALTH CARE EDUCATION/TRAINING PROGRAM

## 2023-05-23 PROCEDURE — 85025 COMPLETE CBC W/AUTO DIFF WBC: CPT | Performed by: STUDENT IN AN ORGANIZED HEALTH CARE EDUCATION/TRAINING PROGRAM

## 2023-05-23 PROCEDURE — 80048 BASIC METABOLIC PNL TOTAL CA: CPT | Performed by: STUDENT IN AN ORGANIZED HEALTH CARE EDUCATION/TRAINING PROGRAM

## 2023-05-23 RX ORDER — METHYLPREDNISOLONE SODIUM SUCCINATE 125 MG/2ML
40 INJECTION, POWDER, LYOPHILIZED, FOR SOLUTION INTRAMUSCULAR; INTRAVENOUS EVERY 12 HOURS
Status: DISCONTINUED | OUTPATIENT
Start: 2023-05-23 | End: 2023-05-24 | Stop reason: HOSPADM

## 2023-05-23 RX ORDER — METHYLPREDNISOLONE SODIUM SUCCINATE 40 MG/ML
40 INJECTION, POWDER, LYOPHILIZED, FOR SOLUTION INTRAMUSCULAR; INTRAVENOUS EVERY 12 HOURS
Status: DISCONTINUED | OUTPATIENT
Start: 2023-05-23 | End: 2023-05-23 | Stop reason: RX

## 2023-05-23 RX ADMIN — BUDESONIDE AND FORMOTEROL FUMARATE DIHYDRATE 2 PUFF: 160; 4.5 AEROSOL RESPIRATORY (INHALATION) at 19:26

## 2023-05-23 RX ADMIN — METHADONE HYDROCHLORIDE 60 MG: 10 TABLET ORAL at 09:53

## 2023-05-23 RX ADMIN — LORAZEPAM 1 MG: 2 INJECTION INTRAMUSCULAR; INTRAVENOUS at 10:06

## 2023-05-23 RX ADMIN — ASPIRIN 81 MG: 81 TABLET, COATED ORAL at 09:53

## 2023-05-23 RX ADMIN — FLUTICASONE PROPIONATE 2 SPRAY: 50 SPRAY, METERED NASAL at 09:53

## 2023-05-23 RX ADMIN — Medication 10 ML: at 09:53

## 2023-05-23 RX ADMIN — TRAZODONE HYDROCHLORIDE 50 MG: 50 TABLET ORAL at 20:46

## 2023-05-23 RX ADMIN — TIOTROPIUM BROMIDE INHALATION SPRAY 2 PUFF: 3.12 SPRAY, METERED RESPIRATORY (INHALATION) at 07:16

## 2023-05-23 RX ADMIN — SENNOSIDES AND DOCUSATE SODIUM 2 TABLET: 50; 8.6 TABLET ORAL at 20:46

## 2023-05-23 RX ADMIN — Medication 10 ML: at 20:54

## 2023-05-23 RX ADMIN — METHYLPREDNISOLONE SODIUM SUCCINATE 40 MG: 125 INJECTION, POWDER, FOR SOLUTION INTRAMUSCULAR; INTRAVENOUS at 23:15

## 2023-05-23 RX ADMIN — ATORVASTATIN CALCIUM 10 MG: 10 TABLET, FILM COATED ORAL at 20:46

## 2023-05-23 RX ADMIN — LORAZEPAM 1 MG: 2 INJECTION INTRAMUSCULAR; INTRAVENOUS at 20:53

## 2023-05-23 RX ADMIN — BUDESONIDE AND FORMOTEROL FUMARATE DIHYDRATE 2 PUFF: 160; 4.5 AEROSOL RESPIRATORY (INHALATION) at 07:16

## 2023-05-23 RX ADMIN — TAMSULOSIN HYDROCHLORIDE 0.4 MG: 0.4 CAPSULE ORAL at 20:45

## 2023-05-23 RX ADMIN — METHYLPREDNISOLONE SODIUM SUCCINATE 40 MG: 40 INJECTION, POWDER, LYOPHILIZED, FOR SOLUTION INTRAMUSCULAR; INTRAVENOUS at 12:00

## 2023-05-23 RX ADMIN — PANTOPRAZOLE SODIUM 40 MG: 40 TABLET, DELAYED RELEASE ORAL at 05:57

## 2023-05-23 RX ADMIN — FINASTERIDE 5 MG: 5 TABLET, FILM COATED ORAL at 09:53

## 2023-05-23 NOTE — PLAN OF CARE
Goal Outcome Evaluation:  Plan of Care Reviewed With: patient        Progress: no change  Outcome Evaluation: No acute events. VSS. On room air. Anticipate DC home tomorrow.

## 2023-05-23 NOTE — PLAN OF CARE
Goal Outcome Evaluation:  Plan of Care Reviewed With: patient        Progress: no change  Outcome Evaluation: VSS, maintaining o2 sats >90% on 4L NC, PRN meds given per pt request.
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
## 2023-05-24 ENCOUNTER — READMISSION MANAGEMENT (OUTPATIENT)
Dept: CALL CENTER | Facility: HOSPITAL | Age: 48
End: 2023-05-24
Payer: MEDICARE

## 2023-05-24 VITALS
HEIGHT: 73 IN | DIASTOLIC BLOOD PRESSURE: 69 MMHG | TEMPERATURE: 98.6 F | SYSTOLIC BLOOD PRESSURE: 96 MMHG | OXYGEN SATURATION: 93 % | WEIGHT: 183.2 LBS | HEART RATE: 71 BPM | BODY MASS INDEX: 24.28 KG/M2 | RESPIRATION RATE: 18 BRPM

## 2023-05-24 LAB
ANION GAP SERPL CALCULATED.3IONS-SCNC: 11.6 MMOL/L (ref 5–15)
BASOPHILS # BLD AUTO: 0.01 10*3/MM3 (ref 0–0.2)
BASOPHILS NFR BLD AUTO: 0.1 % (ref 0–1.5)
BUN SERPL-MCNC: 16 MG/DL (ref 6–20)
BUN/CREAT SERPL: 20.5 (ref 7–25)
CALCIUM SPEC-SCNC: 9 MG/DL (ref 8.6–10.5)
CHLORIDE SERPL-SCNC: 102 MMOL/L (ref 98–107)
CO2 SERPL-SCNC: 25.4 MMOL/L (ref 22–29)
CREAT SERPL-MCNC: 0.78 MG/DL (ref 0.76–1.27)
DEPRECATED RDW RBC AUTO: 43.9 FL (ref 37–54)
EGFRCR SERPLBLD CKD-EPI 2021: 110 ML/MIN/1.73
EOSINOPHIL # BLD AUTO: 0 10*3/MM3 (ref 0–0.4)
EOSINOPHIL NFR BLD AUTO: 0 % (ref 0.3–6.2)
ERYTHROCYTE [DISTWIDTH] IN BLOOD BY AUTOMATED COUNT: 14.8 % (ref 12.3–15.4)
GLUCOSE SERPL-MCNC: 124 MG/DL (ref 65–99)
HCT VFR BLD AUTO: 44.3 % (ref 37.5–51)
HGB BLD-MCNC: 14.4 G/DL (ref 13–17.7)
IMM GRANULOCYTES # BLD AUTO: 0.05 10*3/MM3 (ref 0–0.05)
IMM GRANULOCYTES NFR BLD AUTO: 0.6 % (ref 0–0.5)
LYMPHOCYTES # BLD AUTO: 0.82 10*3/MM3 (ref 0.7–3.1)
LYMPHOCYTES NFR BLD AUTO: 9.2 % (ref 19.6–45.3)
MCH RBC QN AUTO: 26.4 PG (ref 26.6–33)
MCHC RBC AUTO-ENTMCNC: 32.5 G/DL (ref 31.5–35.7)
MCV RBC AUTO: 81.3 FL (ref 79–97)
MONOCYTES # BLD AUTO: 0.32 10*3/MM3 (ref 0.1–0.9)
MONOCYTES NFR BLD AUTO: 3.6 % (ref 5–12)
NEUTROPHILS NFR BLD AUTO: 7.69 10*3/MM3 (ref 1.7–7)
NEUTROPHILS NFR BLD AUTO: 86.5 % (ref 42.7–76)
NRBC BLD AUTO-RTO: 0 /100 WBC (ref 0–0.2)
PLATELET # BLD AUTO: 235 10*3/MM3 (ref 140–450)
PMV BLD AUTO: 9.5 FL (ref 6–12)
POTASSIUM SERPL-SCNC: 4.3 MMOL/L (ref 3.5–5.2)
RBC # BLD AUTO: 5.45 10*6/MM3 (ref 4.14–5.8)
SODIUM SERPL-SCNC: 139 MMOL/L (ref 136–145)
WBC NRBC COR # BLD: 8.89 10*3/MM3 (ref 3.4–10.8)

## 2023-05-24 PROCEDURE — 80048 BASIC METABOLIC PNL TOTAL CA: CPT | Performed by: STUDENT IN AN ORGANIZED HEALTH CARE EDUCATION/TRAINING PROGRAM

## 2023-05-24 PROCEDURE — 94664 DEMO&/EVAL PT USE INHALER: CPT

## 2023-05-24 PROCEDURE — 99239 HOSP IP/OBS DSCHRG MGMT >30: CPT | Performed by: INTERNAL MEDICINE

## 2023-05-24 PROCEDURE — 94761 N-INVAS EAR/PLS OXIMETRY MLT: CPT

## 2023-05-24 PROCEDURE — 85025 COMPLETE CBC W/AUTO DIFF WBC: CPT | Performed by: STUDENT IN AN ORGANIZED HEALTH CARE EDUCATION/TRAINING PROGRAM

## 2023-05-24 PROCEDURE — 94799 UNLISTED PULMONARY SVC/PX: CPT

## 2023-05-24 RX ORDER — PREDNISONE 20 MG/1
40 TABLET ORAL DAILY
Qty: 10 TABLET | Refills: 0 | Status: SHIPPED | OUTPATIENT
Start: 2023-05-24 | End: 2023-05-29

## 2023-05-24 RX ADMIN — PANTOPRAZOLE SODIUM 40 MG: 40 TABLET, DELAYED RELEASE ORAL at 05:46

## 2023-05-24 RX ADMIN — FLUTICASONE PROPIONATE 2 SPRAY: 50 SPRAY, METERED NASAL at 08:29

## 2023-05-24 RX ADMIN — ASPIRIN 81 MG: 81 TABLET, COATED ORAL at 08:23

## 2023-05-24 RX ADMIN — FINASTERIDE 5 MG: 5 TABLET, FILM COATED ORAL at 08:23

## 2023-05-24 RX ADMIN — BUDESONIDE AND FORMOTEROL FUMARATE DIHYDRATE 2 PUFF: 160; 4.5 AEROSOL RESPIRATORY (INHALATION) at 07:14

## 2023-05-24 RX ADMIN — Medication 10 ML: at 08:29

## 2023-05-24 RX ADMIN — SENNOSIDES AND DOCUSATE SODIUM 2 TABLET: 50; 8.6 TABLET ORAL at 08:23

## 2023-05-24 RX ADMIN — METHADONE HYDROCHLORIDE 60 MG: 10 TABLET ORAL at 08:22

## 2023-05-24 RX ADMIN — TIOTROPIUM BROMIDE INHALATION SPRAY 2 PUFF: 3.12 SPRAY, METERED RESPIRATORY (INHALATION) at 07:14

## 2023-05-24 NOTE — OUTREACH NOTE
Prep Survey    Flowsheet Row Responses   [facility] patient discharged from? [facility]   Is LACE score < 7 ? No   Eligibility [facility]   Date of Admission 05/21/23   Date of Discharge 05/24/23   Discharge Disposition Home or Self Care   Discharge diagnosis COPD with acute exacerbation   Does the patient have one of the following disease processes/diagnoses(primary or secondary)? COPD   Does the patient have Home health ordered? No   Is there a DME ordered? No   Prep survey completed? Yes          Xiomara ALONSO - Registered Nurse

## 2023-05-24 NOTE — CASE MANAGEMENT/SOCIAL WORK
Case Management Discharge Note           Provided Post Acute Provider List?: N/A  Provided Post Acute Provider Quality & Resource List?: N/A    Selected Continued Care - Discharged on 5/24/2023 Admission date: 5/21/2023 - Discharge disposition: Home or Self Care    Destination    No services have been selected for the patient.              Durable Medical Equipment    No services have been selected for the patient.              Dialysis/Infusion    No services have been selected for the patient.              Home Medical Care    No services have been selected for the patient.              Therapy    No services have been selected for the patient.              Community Resources    No services have been selected for the patient.              Community & DME    No services have been selected for the patient.                  Transportation Services  Private: Car    Final Discharge Disposition Code: 01 - home or self-care

## 2023-05-24 NOTE — PROGRESS NOTES
Exercise Oximetry    Patient Name:Topher Stoll   MRN: [id number]   Date: 05/24/23             ROOM AIR BASELINE   SpO2% 95   Heart Rate    Blood Pressure      EXERCISE ON ROOM AIR SpO2% EXERCISE ON O2 @  LPM SpO2%   1 MINUTE  1 MINUTE    2 MINUTES  2 MINUTES    3 MINUTES  3 MINUTES    4 MINUTES  4 MINUTES    5 MINUTES  5 MINUTES    6 MINUTES  6 MINUTES               Distance Walked   Distance Walked   Dyspnea (Caitie Scale)   Dyspnea (Caitie Scale)   Fatigue (Caitie Scale)   Fatigue (Caitie Scale)   SpO2% Post Exercise   SpO2% Post Exercise   HR Post Exercise   HR Post Exercise   Time to Recovery   Time to Recovery     Comments: arrived to pt room while on room air and sats were at 95%, stated he wears as needed at home, and he was doing fine.    No walk needed, at baseline    Recommend:  Continue with home order of 2L PRN
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
## 2023-05-24 NOTE — PLAN OF CARE
Goal Outcome Evaluation:  Plan of Care Reviewed With: patient        Progress: improving  Outcome Evaluation: VSS, maintaining o2 sats >90% on 2L NC, appeared to rest well between care

## 2023-05-25 ENCOUNTER — TRANSITIONAL CARE MANAGEMENT TELEPHONE ENCOUNTER (OUTPATIENT)
Dept: CALL CENTER | Facility: HOSPITAL | Age: 48
End: 2023-05-25
Payer: MEDICARE

## 2023-05-25 NOTE — OUTREACH NOTE
Call Center TCM Note    Flowsheet Row Responses   [facility] patient discharged from? [facility]   Does the patient have one of the following disease processes/diagnoses(primary or secondary)? COPD   TCM attempt successful? Yes   Call start time 0912   Call end time 0915   Discharge diagnosis COPD with acute exacerbation   Meds reviewed with patient/caregiver? Yes   Is the patient having any side effects they believe may be caused by any medication additions or changes? No   Does the patient have all medications ordered at discharge? Yes   Is the patient taking all medications as directed (includes completed medication regime)? Yes   Comments TCM APPT WITH PCP DR JOSHUA HOFFMANN IS TOMORROW 05/26/2023   Does the patient have an appointment with their PCP within 7 days of discharge? Yes   Has home health visited the patient within 72 hours of discharge? N/A   Pulse Ox monitoring Intermittent   Psychosocial issues? No   Did the patient receive a copy of their discharge instructions? Yes   Nursing interventions Reviewed instructions with patient   What is the patient's perception of their health status since discharge? Improving   Is the patient/caregiver able to teach back the hierarchy of who to call/visit for symptoms/problems? PCP, Specialist, Home health nurse, Urgent Care, ED, 911 Yes   TCM call completed? Yes   Wrap up additional comments D/C DX:COPD with acute exacerbation - Pt feeling better, breathing is improved. Change to Prednisone in place. No questions. TCM APPT with PCP Dr Joshua Hoffmann is tomorrow 05/26/2023, JESUS ALBERTO MIRAMONTES follow up is 05/30/2023.   Call end time 0915          Erin Estrada MA    5/25/2023, 09:17 EDT

## 2023-06-05 ENCOUNTER — READMISSION MANAGEMENT (OUTPATIENT)
Dept: CALL CENTER | Facility: HOSPITAL | Age: 48
End: 2023-06-05
Payer: MEDICARE

## 2023-06-05 NOTE — OUTREACH NOTE
COPD/PN Week 2 Survey      Flowsheet Row Responses   [facility] facility patient discharged from? [facility]   Does the patient have one of the following disease processes/diagnoses(primary or secondary)? COPD   Week 2 attempt successful? No   Unsuccessful attempts Attempt 1            Breonna ROWAN - Registered Nurse

## 2023-06-09 ENCOUNTER — READMISSION MANAGEMENT (OUTPATIENT)
Dept: CALL CENTER | Facility: HOSPITAL | Age: 48
End: 2023-06-09
Payer: MEDICARE

## 2023-06-09 NOTE — OUTREACH NOTE
COPD/PN Week 2 Survey      Flowsheet Row Responses   [facility] facility patient discharged from? [facility]   Does the patient have one of the following disease processes/diagnoses(primary or secondary)? COPD   Week 2 attempt successful? No   Unsuccessful attempts Attempt 2            Narda Ashby Registered Nurse

## 2023-06-13 ENCOUNTER — DISEASE STATE MANAGEMENT VISIT (OUTPATIENT)
Dept: PHARMACY | Facility: HOSPITAL | Age: 48
End: 2023-06-13
Payer: MEDICARE

## 2023-06-13 VITALS
BODY MASS INDEX: 24.38 KG/M2 | OXYGEN SATURATION: 96 % | HEIGHT: 72 IN | WEIGHT: 180 LBS | DIASTOLIC BLOOD PRESSURE: 85 MMHG | HEART RATE: 66 BPM | SYSTOLIC BLOOD PRESSURE: 123 MMHG

## 2023-06-13 DIAGNOSIS — J44.9 CHRONIC OBSTRUCTIVE PULMONARY DISEASE, UNSPECIFIED COPD TYPE: ICD-10-CM

## 2023-06-13 DIAGNOSIS — J45.50 SEVERE PERSISTENT ASTHMA WITHOUT COMPLICATION: Primary | ICD-10-CM

## 2023-06-13 DIAGNOSIS — J96.11 CHRONIC RESPIRATORY FAILURE WITH HYPOXIA AND HYPERCAPNIA: ICD-10-CM

## 2023-06-13 DIAGNOSIS — J96.12 CHRONIC RESPIRATORY FAILURE WITH HYPOXIA AND HYPERCAPNIA: ICD-10-CM

## 2023-06-13 PROCEDURE — G0463 HOSPITAL OUTPT CLINIC VISIT: HCPCS | Performed by: NURSE PRACTITIONER

## 2023-06-13 RX ORDER — NALOXONE HYDROCHLORIDE 4 MG/.1ML
SPRAY NASAL
COMMUNITY
Start: 2023-05-13

## 2023-06-13 NOTE — PROGRESS NOTES
Follow Up Office Visit      Patient Name: Topher Stoll    Chief Complaint:  Hospital follow up      History of Present Illness: Topher Stoll is a 47 y.o. male who is here today for follow up of hospitalization at [facility] last month for management of acute on chronic hypoxic respiratory failure and COPD exacerbation.  During admission, there was no indication for antibiotics.  He was administered supplemental oxygen of 4 L nasal cannula and had improvement in symptoms with administration of Solu-Medrol and DuoNebs.    Since the time of hospital discharge, the patient notes that his symptoms have returned to baseline.  He has completed his outpatient course of steroids.  Notably, the patient has a longstanding history of asthma and had been started on Fasenra last year.  He had been having the Fasenra injections administered in the pulmonary office. However, he notes that he had not been contacted about repeat injections and has subsequently not had Fasenra and at least 4 or 5 months per his recollection.  The patient reports that while he was on Fasenra, he did have less frequent exacerbations.  Currently, he is tolerating ADLs well.  Occasional cough, no current wheezing.    Patient reports chronic joint pain and damage secondary to frequent use of steroids throughout his life.      Modifying Factors: Worse with exertion and weather changes. Improves with rest, inhalers, steroids  Supplemental Oxygen: 2L NC PRN  Last Hospitalization: May 2023  Number of exacerbations per year: >3    Subjective      Review of Systems:  Review of Systems   Constitutional:  Negative for fever and unexpected weight change.   Respiratory:  Positive for cough and shortness of breath. Negative for wheezing.    Cardiovascular:  Negative for chest pain and leg swelling.   Musculoskeletal:  Positive for arthralgias.   Allergic/Immunologic: Positive for environmental allergies.      Past Medical History:   Past  Medical History:   Diagnosis Date    Abdominal adhesions     Anxiety     Arthritis     Asthma     Cervical radiculopathy     Colitis     COPD (chronic obstructive pulmonary disease)     GERD (gastroesophageal reflux disease)     Heart attack     History of hepatitis C     Treated with Epclusa in 2019    History of recreational drug use     HTN (hypertension)     Kidney cysts     Left hip pain     Liver disease     Low back pain     Migraines     Obstructive chronic bronchitis with exacerbation     Sleep apnea     mild    Tattoos        Past Surgical History:   Past Surgical History:   Procedure Laterality Date    BACK SURGERY      COLONOSCOPY      COLONOSCOPY N/A 2022    Procedure: COLONOSCOPY with biopsies;  Surgeon: Giancarlo Ruiz MD;  Location:  [facility];  Service: Gastroenterology;  Laterality: N/A;    HERNIA REPAIR      HIP SPACER INSERTION WITH ANTIBIOTIC CEMENT Left 1/15/2020    Procedure: TOTAL HIP IMPLANT REMOVAL WITH INSERTION OF ANTIBIOTIC SPACER LEFT;  Surgeon: Ba Ramirez MD;  Location:  [facility];  Service: Orthopedics    SHOULDER LIGAMENT REPAIR      right shoulder    SMALL INTESTINE SURGERY      Small bowell resection    TOTAL HIP ARTHROPLASTY Left     TOTAL HIP ARTHROPLASTY Right     TOTAL HIP ARTHROPLASTY REVISION Left 3/5/2020    Procedure: HIP REIMPLANT REVISION LEFT;  Surgeon: Ba Ramirez MD;  Location:  [facility];  Service: Orthopedics;  Laterality: Left;    UPPER GASTROINTESTINAL ENDOSCOPY         Family History:   Family History   Problem Relation Age of Onset    Arthritis Other     Hypertension Other     Migraines Other     Heart attack Other     Stroke Other     Colon cancer Neg Hx        Social History:   Social History     Socioeconomic History    Marital status:    Tobacco Use    Smoking status: Former     Packs/day: 2.00     Years: 15.00     Pack years: 30.00     Types: Cigarettes     Quit date:      Years since quittin.4     Passive  exposure: Current    Smokeless tobacco: Current     Types: Chew   Vaping Use    Vaping Use: Never used   Substance and Sexual Activity    Alcohol use: No     Comment: stopped drinking alcohol    Drug use: Not Currently     Comment: takes suboxone    Sexual activity: Defer       Current Medications:     Current Outpatient Medications:     albuterol sulfate  (90 Base) MCG/ACT inhaler, INAHLE 2 PUFFS EVERY 6 HOURS AS NEEDED FOR WHEEZING OR SHORTNESS OF BREATH, Disp: 8.5 g, Rfl: 3    alfuzosin (UROXATRAL) 10 MG 24 hr tablet, Take 1 tablet by mouth Daily., Disp: , Rfl:     aspirin (aspirin) 81 MG EC tablet, Take 1 tablet by mouth Daily., Disp: , Rfl:     azelastine (ASTELIN) 0.1 % nasal spray, 1 spray into the nostril(s) as directed by provider 2 (Two) Times a Day. Use in each nostril as directed, Disp: 30 mL, Rfl: 0    dutasteride (AVODART) 0.5 MG capsule, Take 1 capsule by mouth Daily., Disp: , Rfl:     fluticasone (FLONASE) 50 MCG/ACT nasal spray, INSTILL 1 SPRAY INTO EACH NOSTRIL DAILY, Disp: 48 g, Rfl: 2    Fluticasone-Umeclidin-Vilant (TRELEGY) 200-62.5-25 MCG/INH inhaler, Inhale 1 puff Daily., Disp: 28 each, Rfl: 5    ipratropium-albuterol (DUO-NEB) 0.5-2.5 mg/3 ml nebulizer, Take 3 mL by nebulization Every 4 (Four) Hours As Needed for Wheezing or Shortness of Air., Disp: 180 mL, Rfl: 3    lovastatin (MEVACOR) 40 MG tablet, TAKE 1 TABLET BY MOUTH EVERY DAY FOR CHOLESTEROL, Disp: 90 tablet, Rfl: 3    methadone (DOLOPHINE) 5 MG/5ML solution, Take 60 mL by mouth Daily. Patient takes 60 mg once per day([facility]), Disp: , Rfl:     naloxone (NARCAN) 4 MG/0.1ML nasal spray, ADMINISTER A SINGLE SPRAY INTRANASALLY INTO ONE NOSTRIL. CALL 911. MAY REPEAT X1., Disp: , Rfl:     omeprazole (priLOSEC) 40 MG capsule, Take 1 capsule by mouth Daily., Disp: 90 capsule, Rfl: 3    promethazine (PHENERGAN) 12.5 MG tablet, Take 1 tablet by mouth Every 6 (Six) Hours As Needed for Nausea or  "Vomiting., Disp: 45 tablet, Rfl: 1    esomeprazole (nexIUM) 40 MG capsule, ESOMEPRAZOLE MAGNESIUM 40 MG CPDR (Patient not taking: Reported on 6/13/2023), Disp: , Rfl:     Allergies:   Allergies   Allergen Reactions    Doxycycline Nausea And Vomiting       Objective     Physical Exam:  Vital Signs:   Vitals:    06/13/23 0915   BP: 123/85   BP Location: Left arm   Patient Position: Sitting   Cuff Size: Adult   Pulse: 66   SpO2: 96%   Weight: 81.6 kg (180 lb)   Height: 182.9 cm (72\")     Body mass index is 24.41 kg/m².    Physical Exam  Vitals reviewed.   Constitutional:       General: He is not in acute distress.     Appearance: He is not toxic-appearing.   HENT:      Head: Normocephalic and atraumatic.      Nose: Nose normal.      Mouth/Throat:      Mouth: Mucous membranes are moist.   Eyes:      Extraocular Movements: Extraocular movements intact.      Conjunctiva/sclera: Conjunctivae normal.      Pupils: Pupils are equal, round, and reactive to light.   Cardiovascular:      Rate and Rhythm: Normal rate.      Heart sounds: Normal heart sounds.   Pulmonary:      Effort: Pulmonary effort is normal.      Breath sounds: Normal breath sounds.   Abdominal:      General: There is no distension.      Palpations: Abdomen is soft.   Musculoskeletal:         General: No swelling.      Cervical back: Neck supple.   Skin:     General: Skin is warm and dry.      Findings: No rash.   Neurological:      General: No focal deficit present.      Mental Status: He is alert and oriented to person, place, and time.   Psychiatric:         Mood and Affect: Mood normal.         Behavior: Behavior normal.       Results Review:   May 2023 chest CT PE protocol showed no pulmonary embolism.  Mild bronchitis could be acute or chronic.  Chronic abnormal appearing right sternoclavicular joint, worsened from last year.    August 2022 PFT showed mild obstruction without bronchodilator response.  Postbronchodilator FEV1 of 87%.  No restriction, + air " trapping.  Mild impairment in diffusing capacity when adjusted for alveolar volume.    WBC   Date Value Ref Range Status   05/24/2023 8.89 3.40 - 10.80 10*3/mm3 Final   06/22/2022 11.80 (H) 3.70 - 10.30 10*3/uL Final     RBC   Date Value Ref Range Status   05/24/2023 5.45 4.14 - 5.80 10*6/mm3 Final   06/22/2022 6.33 (H) 4.60 - 6.10 10*6/uL Final     Hemoglobin   Date Value Ref Range Status   05/24/2023 14.4 13.0 - 17.7 g/dL Final   06/22/2022 17.1 13.7 - 17.5 g/dL Final     Hematocrit   Date Value Ref Range Status   05/24/2023 44.3 37.5 - 51.0 % Final   06/22/2022 50.0 40.0 - 51.0 % Final     MCV   Date Value Ref Range Status   05/24/2023 81.3 79.0 - 97.0 fL Final   06/22/2022 79 79 - 98 fL Final     MCH   Date Value Ref Range Status   05/24/2023 26.4 (L) 26.6 - 33.0 pg Final   06/22/2022 27.0 26.0 - 32.0 pg Final     MCHC   Date Value Ref Range Status   05/24/2023 32.5 31.5 - 35.7 g/dL Final   06/22/2022 34.2 30.7 - 35.5 g/dL Final     RDW   Date Value Ref Range Status   05/24/2023 14.8 12.3 - 15.4 % Final   06/22/2022 12.8 11.5 - 14.5 % Final     RDW-SD   Date Value Ref Range Status   05/24/2023 43.9 37.0 - 54.0 fl Final     MPV   Date Value Ref Range Status   05/24/2023 9.5 6.0 - 12.0 fL Final   06/22/2022 9.4 8.8 - 12.5 fL Final     Platelets   Date Value Ref Range Status   05/24/2023 235 140 - 450 10*3/mm3 Final   06/22/2022 264 155 - 369 10*3/uL Final     Neutrophil %   Date Value Ref Range Status   05/24/2023 86.5 (H) 42.7 - 76.0 % Final     Lymphocyte %   Date Value Ref Range Status   05/24/2023 9.2 (L) 19.6 - 45.3 % Final     Monocyte %   Date Value Ref Range Status   05/24/2023 3.6 (L) 5.0 - 12.0 % Final     Eosinophil %   Date Value Ref Range Status   05/24/2023 0.0 (L) 0.3 - 6.2 % Final     Basophil %   Date Value Ref Range Status   05/24/2023 0.1 0.0 - 1.5 % Final     Immature Grans %   Date Value Ref Range Status   05/24/2023 0.6 (H) 0.0 - 0.5 % Final     Neutrophils, Absolute   Date Value Ref Range  Status   05/24/2023 7.69 (H) 1.70 - 7.00 10*3/mm3 Final     Lymphocytes, Absolute   Date Value Ref Range Status   05/24/2023 0.82 0.70 - 3.10 10*3/mm3 Final     Monocytes, Absolute   Date Value Ref Range Status   05/24/2023 0.32 0.10 - 0.90 10*3/mm3 Final     Eosinophils, Absolute   Date Value Ref Range Status   05/24/2023 0.00 0.00 - 0.40 10*3/mm3 Final     Basophils, Absolute   Date Value Ref Range Status   05/24/2023 0.01 0.00 - 0.20 10*3/mm3 Final     Immature Grans, Absolute   Date Value Ref Range Status   05/24/2023 0.05 0.00 - 0.05 10*3/mm3 Final     nRBC   Date Value Ref Range Status   05/24/2023 0.0 0.0 - 0.2 /100 WBC Final     Lab Results   Component Value Date    GLUCOSE 124 (H) 05/24/2023    BUN 16 05/24/2023    CREATININE 0.78 05/24/2023    EGFR 110.0 05/24/2023    BCR 20.5 05/24/2023    K 4.3 05/24/2023    CO2 25.4 05/24/2023    CALCIUM 9.0 05/24/2023    ALBUMIN 4.2 05/21/2023    BILITOT 0.2 05/21/2023    AST 30 05/21/2023    ALT 37 05/21/2023     Results for orders placed during the hospital encounter of 05/21/23    Adult Transthoracic Echo Complete W/ Cont if Necessary Per Protocol    Interpretation Summary    Left ventricular systolic function is normal. Estimated left ventricular EF = 65% Left ventricular ejection fraction appears to be 61 - 65%.    Left ventricular diastolic function was normal.    Site   Date Value Ref Range Status   05/21/2023 Left Radial  Final     Michael's Test   Date Value Ref Range Status   05/21/2023 N/A  Final     pH, Arterial   Date Value Ref Range Status   05/21/2023 7.325 (L) 7.350 - 7.450 pH units Final     Comment:     84 Value below reference range     pCO2, Arterial   Date Value Ref Range Status   05/21/2023 55.7 (C) 35.0 - 45.0 mm Hg Final     Comment:     83 Value above reference range     pO2, Arterial   Date Value Ref Range Status   05/21/2023 74.6 (L) 75.0 - 100.0 mm Hg Final     Comment:     84 Value below reference range     HCO3, Arterial   Date Value Ref  Range Status   05/21/2023 29.0 (H) 22.0 - 28.0 mmol/L Final     Comment:     83 Value above reference range     Base Excess, Arterial   Date Value Ref Range Status   05/21/2023 1.6 0.0 - 2.0 mmol/L Final     O2 Saturation, Arterial   Date Value Ref Range Status   05/21/2023 94.8 94.0 - 100.0 % Final     Hemoglobin, Blood Gas   Date Value Ref Range Status   01/15/2020 11.4 (L) 13.5 - 17.5 g/dL Final     Comment:     84 Value below reference range     Hematocrit, Blood Gas   Date Value Ref Range Status   05/21/2023 46.7 % Final     Oxyhemoglobin   Date Value Ref Range Status   05/21/2023 93.9 (L) 94 - 99 % Final     Comment:     84 Value below reference range     Oxyhemoglobin Venous   Date Value Ref Range Status   09/01/2021 49.0 40.0 - 70.0 % Final     Comment:     85 Value below critical limit     Methemoglobin   Date Value Ref Range Status   05/21/2023 0.60 0.00 - 1.50 % Final     Carboxyhemoglobin   Date Value Ref Range Status   05/21/2023 0.4 0 - 2 % Final     CO2 Content   Date Value Ref Range Status   01/15/2020 26.1 22 - 33 mmol/L Final     Barometric Pressure for Blood Gas   Date Value Ref Range Status   05/21/2023 738 mmHg Final   12/19/2016 749 mmHg Final     Modality   Date Value Ref Range Status   05/21/2023 Nasal Cannula  Final     FIO2   Date Value Ref Range Status   05/21/2023 28 % Final     2016 A1AT phenotype MM    Assessment / Plan      Assessment/Plan:   Diagnoses and all orders for this visit:    1. Severe persistent asthma without complication (Primary)  -     Ambulatory Referral to Disease State Management  Will get patient started back on Fasenra injections via the [facility] with plans to get him approved for transition to self-administration at home. Initial Fasenra injection administered in clinic today.    2. Chronic obstructive pulmonary disease, unspecified COPD type  -     Pulmonary Function Test; Future  Continue current inhaled regimen.  We discussed the risk and benefits of inhaled  corticosteroids. Patient instructed to take them on a regular basis as prescribed. Patient instructed to rinse their mouth out after each use. Schedule repeat PFTs prior to next clinic visit.    3. Chronic respiratory failure with hypoxia and hypercapnia  The patient was counseled on the need to use sedating medications cautiously, as this can worsen their respiratory status. Oxygenating well today.       Follow Up:   Return in about 3 months (around 9/13/2023) for Recheck.  The patient was counseled on diagnostic results, risks and benefits of treatment options, risk factor modifications and the importance of treatment compliance. The patient was advised to contact the clinic with concerns or worsening symptoms.     LISA Bell   Pulmonary Medicine Rob

## 2023-06-19 ENCOUNTER — READMISSION MANAGEMENT (OUTPATIENT)
Dept: CALL CENTER | Facility: HOSPITAL | Age: 48
End: 2023-06-19
Payer: MEDICARE

## 2023-06-19 NOTE — OUTREACH NOTE
COPD/PN Week 3 Survey      Flowsheet Row Responses   [facility] patient discharged from? [facility]   Does the patient have one of the following disease processes/diagnoses(primary or secondary)? COPD   Week 3 attempt successful? Yes   Call start time 1433   Call end time 1435   Is patient permission given to speak with other caregiver? Yes   List who call center can speak with Josee Stoll   Person spoke with today (if not patient) and relationship Josee Stoll- mother   Meds reviewed with patient/caregiver? Yes   Is the patient having any side effects they believe may be caused by any medication additions or changes? No   Does the patient have all medications ordered at discharge? Yes   Is the patient taking all medications as directed (includes completed medication regime)? Yes   Does the patient have a primary care provider?  Yes   Comments regarding PCP Patient has been to the COPD clinic, but he has not seen his pcp yet.   Has the patient kept scheduled appointments due by today? Yes   Has home health visited the patient within 72 hours of discharge? N/A   Pulse Ox monitoring Intermittent   Pulse Ox device source Patient   Psychosocial issues? No   Did the patient receive a copy of their discharge instructions? Yes   Nursing interventions Reviewed instructions with patient   What is the patient's perception of their health status since discharge? Improving   Is the patient/caregiver able to teach back the hierarchy of who to call/visit for symptoms/problems? PCP, Specialist, Home health nurse, Urgent Care, ED, 911 Yes   Is the patient able to teach back COPD zones? Yes   Nursing interventions Education provided on various zones   Patient reports what zone on this call? Green Zone   Green Zone Reports doing well, Breathing without shortness of breath, Usual activity and exercise level, Usual amounts of cough and phlegm/mucous, Slept well last night, Appetite is good   Green Zone interventions: Take daily  medications   Week 3 call completed? Yes   Is the patient interested in additional calls from an ambulatory ?  NOTE:  applies to high risk patients requiring additional follow-up. No            Cony LAKHANI - Licensed Nurse

## 2023-06-29 PROBLEM — J45.50 SEVERE PERSISTENT ASTHMA WITHOUT COMPLICATION: Status: ACTIVE | Noted: 2023-06-29

## 2023-07-28 ENCOUNTER — OFFICE VISIT (OUTPATIENT)
Dept: INTERNAL MEDICINE | Facility: CLINIC | Age: 48
End: 2023-07-28
Payer: MEDICARE

## 2023-07-28 VITALS
TEMPERATURE: 97.3 F | BODY MASS INDEX: 23.84 KG/M2 | WEIGHT: 176 LBS | SYSTOLIC BLOOD PRESSURE: 108 MMHG | HEIGHT: 72 IN | HEART RATE: 89 BPM | DIASTOLIC BLOOD PRESSURE: 89 MMHG | OXYGEN SATURATION: 94 %

## 2023-07-28 DIAGNOSIS — I10 PRIMARY HYPERTENSION: ICD-10-CM

## 2023-07-28 DIAGNOSIS — F51.01 PRIMARY INSOMNIA: ICD-10-CM

## 2023-07-28 DIAGNOSIS — I25.10 ATHEROSCLEROSIS OF NATIVE CORONARY ARTERY OF NATIVE HEART WITHOUT ANGINA PECTORIS: ICD-10-CM

## 2023-07-28 DIAGNOSIS — J44.9 COPD WITHOUT EXACERBATION: Primary | ICD-10-CM

## 2023-07-28 PROBLEM — J96.21 ACUTE ON CHRONIC RESPIRATORY FAILURE WITH HYPOXIA AND HYPERCAPNIA: Status: RESOLVED | Noted: 2023-05-21 | Resolved: 2023-07-28

## 2023-07-28 PROBLEM — J96.02 ACUTE RESPIRATORY FAILURE WITH HYPOXIA AND HYPERCAPNIA: Status: RESOLVED | Noted: 2023-05-21 | Resolved: 2023-07-28

## 2023-07-28 PROBLEM — J96.01 ACUTE RESPIRATORY FAILURE WITH HYPOXIA AND HYPERCAPNIA: Status: RESOLVED | Noted: 2023-05-21 | Resolved: 2023-07-28

## 2023-07-28 PROBLEM — J96.22 ACUTE ON CHRONIC RESPIRATORY FAILURE WITH HYPOXIA AND HYPERCAPNIA: Status: RESOLVED | Noted: 2023-05-21 | Resolved: 2023-07-28

## 2023-07-28 RX ORDER — TRAZODONE HYDROCHLORIDE 50 MG/1
50 TABLET ORAL NIGHTLY
Qty: 90 TABLET | Refills: 3 | Status: SHIPPED | OUTPATIENT
Start: 2023-07-28

## 2023-07-28 NOTE — PROGRESS NOTES
The ABCs of the Annual Wellness Visit  Subsequent Medicare Wellness Visit    Subjective    Topher Stoll is a 47 y.o. male who presents for a Subsequent Medicare Wellness Visit.    The following portions of the patient's history were reviewed and   updated as appropriate: allergies, current medications, past family history, past medical history, past social history, past surgical history, and problem list.    Compared to one year ago, the patient feels his physical   health is better.    Compared to one year ago, the patient feels his mental   health is the same.    Recent Hospitalizations:  This patient has had a [facility] admission record on file within the last 365 days.    Current Medical Providers:  Patient Care Team:  Joshua Hoffmann MD as PCP - General (Internal Medicine)  Daisy Chahal MD (Psychiatry)    Outpatient Medications Prior to Visit   Medication Sig Dispense Refill    albuterol sulfate  (90 Base) MCG/ACT inhaler INAHLE 2 PUFFS EVERY 6 HOURS AS NEEDED FOR WHEEZING OR SHORTNESS OF BREATH 8.5 g 3    aspirin (aspirin) 81 MG EC tablet Take 1 tablet by mouth Daily.      azelastine (ASTELIN) 0.1 % nasal spray 1 spray into the nostril(s) as directed by provider 2 (Two) Times a Day. Use in each nostril as directed 30 mL 0    Benralizumab 30 MG/ML solution auto-injector Inject 30 mg under the skin into the appropriate area as directed Every 28 (Twenty-Eight) Days for 2 doses starting on 7/11 1 mL 1    Benralizumab 30 MG/ML solution auto-injector Inject 30 mg under the skin into the appropriate area as directed Every 2 (Two) Months starting 8 weeks after 3rd once-monthly injection 1 mL 5    dutasteride (AVODART) 0.5 MG capsule Take 1 capsule by mouth Daily.      fluticasone (FLONASE) 50 MCG/ACT nasal spray INSTILL 1 SPRAY INTO EACH NOSTRIL DAILY 48 g 2    Fluticasone-Umeclidin-Vilant (TRELEGY) 200-62.5-25 MCG/INH inhaler Inhale 1 puff Daily. 28 each 5    ipratropium-albuterol  (DUO-NEB) 0.5-2.5 mg/3 ml nebulizer Take 3 mL by nebulization Every 4 (Four) Hours As Needed for Wheezing or Shortness of Air. 180 mL 3    lovastatin (MEVACOR) 40 MG tablet TAKE 1 TABLET BY MOUTH EVERY DAY FOR CHOLESTEROL 90 tablet 3    methadone (DOLOPHINE) 5 MG/5ML solution Take 65 mL by mouth Daily. Patient takes 65 mg once per day([facility])      naloxone (NARCAN) 4 MG/0.1ML nasal spray ADMINISTER A SINGLE SPRAY INTRANASALLY INTO ONE NOSTRIL. CALL 911. MAY REPEAT X1.      omeprazole (priLOSEC) 40 MG capsule Take 1 capsule by mouth Daily. 90 capsule 3    promethazine (PHENERGAN) 12.5 MG tablet Take 1 tablet by mouth Every 6 (Six) Hours As Needed for Nausea or Vomiting. 45 tablet 1     Facility-Administered Medications Prior to Visit   Medication Dose Route Frequency Provider Last Rate Last Admin    Benralizumab solution prefilled syringe 30 mg  30 mg Subcutaneous Q28 Days Destiny Mcknight APRN   30 mg at 07/11/23 0920       Opioid medication/s are on active medication list.  and I have evaluated his active treatment plan and pain score trends (see table).  There were no vitals filed for this visit.  I have reviewed the chart for potential of high risk medication and harmful drug interactions in the elderly.          Aspirin is on active medication list. Aspirin use is indicated based on review of current medical condition/s. Pros and cons of this therapy have been discussed today. Benefits of this medication outweigh potential harm.  Patient has been encouraged to continue taking this medication.  .      Patient Active Problem List   Diagnosis    Atherosclerosis of coronary artery    Primary hypertension    Hyperlipidemia    Osteoporosis    Pulmonary emphysema    Chronic viral hepatitis B without delta agent and without coma    Hep C w/o coma, chronic    total hip explant, antibiotic spacer placement 1/15/2020    Status post left hip reimplant revision    History of hepatitis B  "   Opioid dependence on agonist therapy    COPD without exacerbation    Chronic obstructive pulmonary disease with acute exacerbation    Inflammatory arthropathy    Severe persistent asthma without complication     Advance Care Planning   Advance Care Planning     Advance Directive is not on file.  ACP discussion was held with the patient during this visit. Patient does not have an advance directive, declines further assistance.     Objective    Vitals:    23 1358   BP: 108/89   BP Location: Left arm   Patient Position: Sitting   Cuff Size: Adult   Pulse: 89   Temp: 97.3 °F (36.3 °C)   SpO2: 94%   Weight: 79.8 kg (176 lb)   Height: 182.9 cm (72.01\")     Estimated body mass index is 23.86 kg/m² as calculated from the following:    Height as of this encounter: 182.9 cm (72.01\").    Weight as of this encounter: 79.8 kg (176 lb).    BMI is within normal parameters. No other follow-up for BMI required.      Does the patient have evidence of cognitive impairment? No          HEALTH RISK ASSESSMENT    Smoking Status:  Social History     Tobacco Use   Smoking Status Former    Packs/day: 2.00    Years: 15.00    Pack years: 30.00    Types: Cigarettes    Quit date:     Years since quittin.5    Passive exposure: Current   Smokeless Tobacco Current    Types: Chew     Alcohol Consumption:  Social History     Substance and Sexual Activity   Alcohol Use No    Comment: stopped drinking alcohol     Fall Risk Screen:    АНДРЕЙ Fall Risk Assessment was completed, and patient is at LOW risk for falls.Assessment completed on:2023    Depression Screenin/28/2023     2:00 PM   PHQ-2/PHQ-9 Depression Screening   Little Interest or Pleasure in Doing Things 0-->not at all   Feeling Down, Depressed or Hopeless 0-->not at all   PHQ-9: Brief Depression Severity Measure Score 0       Health Habits and Functional and Cognitive Screenin/28/2023     2:06 PM   Functional & Cognitive Status   Do you have difficulty " preparing food and eating? No   Do you have difficulty bathing yourself, getting dressed or grooming yourself? No   Do you have difficulty using the toilet? No   Do you have difficulty moving around from place to place? No   Do you have trouble with steps or getting out of a bed or a chair? No   Current Diet Well Balanced Diet   Dental Exam Up to date   Eye Exam Up to date   Exercise (times per week) 0 times per week   Current Exercises Include No Regular Exercise   Do you need help using the phone?  No   Are you deaf or do you have serious difficulty hearing?  No   Do you need help to go to places out of walking distance? No   Do you need help shopping? No   Do you need help preparing meals?  No   Do you need help with housework?  No   Do you need help with laundry? No   Do you need help taking your medications? No   Do you need help managing money? No   Do you ever drive or ride in a car without wearing a seat belt? No   Have you felt unusual stress, anger or loneliness in the last month? No   Who do you live with? Alone   If you need help, do you have trouble finding someone available to you? No   Have you been bothered in the last four weeks by sexual problems? No   Do you have difficulty concentrating, remembering or making decisions? No       Age-appropriate Screening Schedule:  Refer to the list below for future screening recommendations based on patient's age, sex and/or medical conditions. Orders for these recommended tests are listed in the plan section. The patient has been provided with a written plan.    Health Maintenance   Topic Date Due    DXA SCAN  11/21/2015    Hepatitis B (3 of 4 - Hep B Twinrix 4-dose series) 02/08/2019    LIPID PANEL  12/10/2019    ANNUAL WELLNESS VISIT  04/21/2023    INFLUENZA VACCINE  10/01/2023    TDAP/TD VACCINES (2 - Td or Tdap) 11/09/2028    COLORECTAL CANCER SCREENING  01/04/2032    HEPATITIS C SCREENING  Completed    COVID-19 Vaccine  Completed    Pneumococcal Vaccine  0-64  Completed                  CMS Preventative Services Quick Reference  Risk Factors Identified During Encounter  Polypharmacy: Medication List reviewed and Medications are appropriate for patient  The above risks/problems have been discussed with the patient.  Pertinent information has been shared with the patient in the After Visit Summary.  An After Visit Summary and PPPS were made available to the patient.    Follow Up:   Next Medicare Wellness visit to be scheduled in 1 year.       Additional E&M Note during same encounter follows:  Patient has multiple medical problems which are significant and separately identifiable that require additional work above and beyond the Medicare Wellness Visit.      Chief Complaint  Follow-up and Hypertension    Subjective        HPI  Topher Stoll is also being seen today for insomnia    Review of Systems   Constitutional:  Negative for activity change, appetite change, fatigue and fever.   HENT:  Negative for congestion, ear discharge, ear pain and trouble swallowing.    Eyes:  Negative for photophobia and visual disturbance.   Respiratory:  Positive for cough and shortness of breath.    Cardiovascular:  Negative for chest pain and palpitations.   Gastrointestinal:  Negative for abdominal distention, constipation, diarrhea, nausea and vomiting.   Genitourinary:  Negative for dysuria, hematuria and urgency.   Musculoskeletal:  Positive for arthralgias. Negative for back pain, joint swelling and myalgias.   Skin:  Negative for color change and rash.   Neurological:  Negative for dizziness, weakness, light-headedness and confusion.   Hematological:  Negative for adenopathy. Does not bruise/bleed easily.   Psychiatric/Behavioral:  Positive for sleep disturbance. Negative for agitation and dysphoric mood. The patient is not nervous/anxious.      Objective   Vital Signs:  /89 (BP Location: Left arm, Patient Position: Sitting, Cuff Size: Adult)   Pulse 89   Temp 97.3 °F  "(36.3 °C)   Ht 182.9 cm (72.01\")   Wt 79.8 kg (176 lb)   SpO2 94%   BMI 23.86 kg/m²     Physical Exam  Constitutional:       General: He is not in acute distress.     Appearance: He is well-developed.   HENT:      Nose: Nose normal.   Eyes:      General: No scleral icterus.     Conjunctiva/sclera: Conjunctivae normal.   Neck:      Thyroid: No thyromegaly.      Trachea: No tracheal deviation.   Cardiovascular:      Rate and Rhythm: Normal rate and regular rhythm.      Heart sounds: No murmur heard.    No friction rub.   Pulmonary:      Effort: No respiratory distress.      Breath sounds: No wheezing or rales.   Abdominal:      General: There is no distension.      Palpations: Abdomen is soft. There is no mass.      Tenderness: There is no abdominal tenderness. There is no guarding.   Musculoskeletal:         General: Deformity present. Normal range of motion.   Lymphadenopathy:      Cervical: No cervical adenopathy.   Skin:     General: Skin is warm and dry.      Findings: No erythema or rash.   Neurological:      Mental Status: He is alert and oriented to person, place, and time.      Cranial Nerves: No cranial nerve deficit.      Coordination: Coordination normal.      Deep Tendon Reflexes: Reflexes are normal and symmetric.   Psychiatric:         Behavior: Behavior normal.         Thought Content: Thought content normal.         Judgment: Judgment normal.                       Assessment and Plan   Diagnoses and all orders for this visit:    1. COPD without exacerbation (Primary) without exacerbation.  Continue with Trelegy.  Albuterol inhaler.  Does not use tobacco now    2. Primary hypertension stable with current meds and low-salt diet    3. Atherosclerosis of native coronary artery of native heart without angina pectoris.  Angina free continue with aspirin    Complains of insomnia trial of trazodone 50 mg daily discussed titrating dose up if needed         Follow Up   No follow-ups on file.  Patient was " given instructions and counseling regarding his condition or for health maintenance advice. Please see specific information pulled into the AVS if appropriate.

## 2023-08-01 ENCOUNTER — TELEPHONE (OUTPATIENT)
Dept: PHARMACY | Facility: HOSPITAL | Age: 48
End: 2023-08-01
Payer: MEDICARE

## 2023-08-01 DIAGNOSIS — J45.51 SEVERE PERSISTENT ASTHMA WITH ACUTE EXACERBATION: Primary | ICD-10-CM

## 2023-08-01 RX ORDER — PREDNISONE 20 MG/1
TABLET ORAL
Qty: 18 TABLET | Refills: 0 | Status: SHIPPED | OUTPATIENT
Start: 2023-08-01

## 2023-08-01 NOTE — TELEPHONE ENCOUNTER
Received a call from Topher Stoll requesting a prescription to be sent in for a round of Prednisone. Pt stated that he is having trouble breathing and believes it is due to the weather. Pt was audibly wheezing.     Pt uses [facility] at [facility] in [place].   Please advise.    FLORENTIN Yu

## 2023-08-08 ENCOUNTER — DISEASE STATE MANAGEMENT VISIT (OUTPATIENT)
Dept: PHARMACY | Facility: HOSPITAL | Age: 48
End: 2023-08-08
Payer: MEDICARE

## 2023-08-08 ENCOUNTER — SPECIALTY PHARMACY (OUTPATIENT)
Dept: PHARMACY | Facility: HOSPITAL | Age: 48
End: 2023-08-08
Payer: MEDICARE

## 2023-08-08 DIAGNOSIS — J45.50 SEVERE PERSISTENT ASTHMA WITHOUT COMPLICATION: Primary | ICD-10-CM

## 2023-08-08 PROCEDURE — G0463 HOSPITAL OUTPT CLINIC VISIT: HCPCS

## 2023-08-08 NOTE — PROGRESS NOTES
Patient presented to clinic today to receive Fasenra injection. This is his last once monthly dose.    Patient reports he tolerated his last injection well. No ADRs.    Fasenra 30 mg SQ x1 administered in the back of L arm in clinic today.  - NDC: 3469-4263-28  - LOT: [id number]  - EXP: 10/31/2025    Patient will RTC in 2 months for first maintenance injection. Patient was given option to continue getting injections in clinic vs administering to himself at home. He prefers to come to clinic.    Antwon Bartlett, PharmD  8/8/2023  15:06 EDT

## 2023-08-16 ENCOUNTER — HOSPITAL ENCOUNTER (EMERGENCY)
Facility: HOSPITAL | Age: 48
Discharge: HOME | End: 2023-08-16
Attending: STUDENT IN AN ORGANIZED HEALTH CARE EDUCATION/TRAINING PROGRAM
Payer: MEDICARE

## 2023-08-16 ENCOUNTER — APPOINTMENT (OUTPATIENT)
Dept: GENERAL RADIOLOGY | Facility: HOSPITAL | Age: 48
End: 2023-08-16
Payer: MEDICARE

## 2023-08-16 VITALS
RESPIRATION RATE: 18 BRPM | HEIGHT: 73 IN | SYSTOLIC BLOOD PRESSURE: 127 MMHG | HEART RATE: 102 BPM | OXYGEN SATURATION: 94 % | BODY MASS INDEX: 23.86 KG/M2 | DIASTOLIC BLOOD PRESSURE: 87 MMHG | WEIGHT: 180 LBS | TEMPERATURE: 98 F

## 2023-08-16 DIAGNOSIS — J44.1 COPD EXACERBATION: Primary | ICD-10-CM

## 2023-08-16 LAB
ALBUMIN SERPL-MCNC: 4.5 G/DL (ref 3.5–5.2)
ALBUMIN/GLOB SERPL: 1.4 G/DL
ALP SERPL-CCNC: 134 U/L (ref 39–117)
ALT SERPL W P-5'-P-CCNC: 24 U/L (ref 1–41)
ANION GAP SERPL CALCULATED.3IONS-SCNC: 12.9 MMOL/L (ref 5–15)
AST SERPL-CCNC: 27 U/L (ref 1–40)
ATMOSPHERIC PRESS: 735 MMHG
BASE EXCESS BLDV CALC-SCNC: -2.2 MMOL/L (ref 0–2)
BASOPHILS # BLD AUTO: 0 10*3/MM3 (ref 0–0.2)
BASOPHILS NFR BLD AUTO: 0 % (ref 0–1.5)
BDY SITE: ABNORMAL
BILIRUB SERPL-MCNC: 0.3 MG/DL (ref 0–1.2)
BUN SERPL-MCNC: 10 MG/DL (ref 6–20)
BUN/CREAT SERPL: 10.9 (ref 7–25)
CALCIUM SPEC-SCNC: 9.4 MG/DL (ref 8.6–10.5)
CHLORIDE SERPL-SCNC: 103 MMOL/L (ref 98–107)
CO2 SERPL-SCNC: 24.1 MMOL/L (ref 22–29)
CREAT SERPL-MCNC: 0.92 MG/DL (ref 0.76–1.27)
CRP SERPL-MCNC: 0.78 MG/DL (ref 0–0.5)
DEPRECATED RDW RBC AUTO: 38.9 FL (ref 37–54)
EGFRCR SERPLBLD CKD-EPI 2021: 102.6 ML/MIN/1.73
EOSINOPHIL # BLD AUTO: 0 10*3/MM3 (ref 0–0.4)
EOSINOPHIL NFR BLD AUTO: 0 % (ref 0.3–6.2)
ERYTHROCYTE [DISTWIDTH] IN BLOOD BY AUTOMATED COUNT: 12.9 % (ref 12.3–15.4)
FLUAV SUBTYP SPEC NAA+PROBE: NOT DETECTED
FLUBV RNA ISLT QL NAA+PROBE: NOT DETECTED
GAS FLOW AIRWAY: 3 LPM
GLOBULIN UR ELPH-MCNC: 3.3 GM/DL
GLUCOSE SERPL-MCNC: 90 MG/DL (ref 65–99)
HCO3 BLDV-SCNC: 22.9 MMOL/L (ref 22–28)
HCT VFR BLD AUTO: 50.9 % (ref 37.5–51)
HGB BLD-MCNC: 16.2 G/DL (ref 13–17.7)
IMM GRANULOCYTES # BLD AUTO: 0.02 10*3/MM3 (ref 0–0.05)
IMM GRANULOCYTES NFR BLD AUTO: 0.3 % (ref 0–0.5)
INHALED O2 CONCENTRATION: 32 %
LYMPHOCYTES # BLD AUTO: 2.03 10*3/MM3 (ref 0.7–3.1)
LYMPHOCYTES NFR BLD AUTO: 26.7 % (ref 19.6–45.3)
Lab: ABNORMAL
MCH RBC QN AUTO: 26.6 PG (ref 26.6–33)
MCHC RBC AUTO-ENTMCNC: 31.8 G/DL (ref 31.5–35.7)
MCV RBC AUTO: 83.4 FL (ref 79–97)
MODALITY: ABNORMAL
MONOCYTES # BLD AUTO: 0.57 10*3/MM3 (ref 0.1–0.9)
MONOCYTES NFR BLD AUTO: 7.5 % (ref 5–12)
NEUTROPHILS NFR BLD AUTO: 4.99 10*3/MM3 (ref 1.7–7)
NEUTROPHILS NFR BLD AUTO: 65.5 % (ref 42.7–76)
NRBC BLD AUTO-RTO: 0 /100 WBC (ref 0–0.2)
NT-PROBNP SERPL-MCNC: 74.7 PG/ML (ref 0–450)
PCO2 BLDV: 39.9 MM HG (ref 40–50)
PH BLDV: 7.37 PH UNITS (ref 7.32–7.42)
PLATELET # BLD AUTO: 239 10*3/MM3 (ref 140–450)
PMV BLD AUTO: 9.6 FL (ref 6–12)
PO2 BLDV: 51.3 MM HG (ref 30–50)
POTASSIUM SERPL-SCNC: 4.7 MMOL/L (ref 3.5–5.2)
PROCALCITONIN SERPL-MCNC: 0.04 NG/ML (ref 0–0.25)
PROT SERPL-MCNC: 7.8 G/DL (ref 6–8.5)
RBC # BLD AUTO: 6.1 10*6/MM3 (ref 4.14–5.8)
SAO2 % BLDCOV: 87.6 % (ref 45–75)
SARS-COV-2 RNA RESP QL NAA+PROBE: NOT DETECTED
SODIUM SERPL-SCNC: 140 MMOL/L (ref 136–145)
TROPONIN T SERPL HS-MCNC: 7 NG/L
VENTILATOR MODE: ABNORMAL
WBC NRBC COR # BLD: 7.61 10*3/MM3 (ref 3.4–10.8)

## 2023-08-16 PROCEDURE — 87636 SARSCOV2 & INF A&B AMP PRB: CPT | Performed by: STUDENT IN AN ORGANIZED HEALTH CARE EDUCATION/TRAINING PROGRAM

## 2023-08-16 PROCEDURE — 99284 EMERGENCY DEPT VISIT MOD MDM: CPT

## 2023-08-16 PROCEDURE — 96374 THER/PROPH/DIAG INJ IV PUSH: CPT

## 2023-08-16 PROCEDURE — 25010000002 METHYLPREDNISOLONE PER 125 MG: Performed by: STUDENT IN AN ORGANIZED HEALTH CARE EDUCATION/TRAINING PROGRAM

## 2023-08-16 PROCEDURE — 85025 COMPLETE CBC W/AUTO DIFF WBC: CPT | Performed by: STUDENT IN AN ORGANIZED HEALTH CARE EDUCATION/TRAINING PROGRAM

## 2023-08-16 PROCEDURE — 93005 ELECTROCARDIOGRAM TRACING: CPT | Performed by: STUDENT IN AN ORGANIZED HEALTH CARE EDUCATION/TRAINING PROGRAM

## 2023-08-16 PROCEDURE — 94640 AIRWAY INHALATION TREATMENT: CPT

## 2023-08-16 PROCEDURE — 96375 TX/PRO/DX INJ NEW DRUG ADDON: CPT

## 2023-08-16 PROCEDURE — 86140 C-REACTIVE PROTEIN: CPT | Performed by: STUDENT IN AN ORGANIZED HEALTH CARE EDUCATION/TRAINING PROGRAM

## 2023-08-16 PROCEDURE — 25010000002 MAGNESIUM SULFATE 2 GM/50ML SOLUTION: Performed by: STUDENT IN AN ORGANIZED HEALTH CARE EDUCATION/TRAINING PROGRAM

## 2023-08-16 PROCEDURE — 71045 X-RAY EXAM CHEST 1 VIEW: CPT

## 2023-08-16 PROCEDURE — 80053 COMPREHEN METABOLIC PANEL: CPT | Performed by: STUDENT IN AN ORGANIZED HEALTH CARE EDUCATION/TRAINING PROGRAM

## 2023-08-16 PROCEDURE — 82805 BLOOD GASES W/O2 SATURATION: CPT

## 2023-08-16 PROCEDURE — 84145 PROCALCITONIN (PCT): CPT | Performed by: STUDENT IN AN ORGANIZED HEALTH CARE EDUCATION/TRAINING PROGRAM

## 2023-08-16 PROCEDURE — 84484 ASSAY OF TROPONIN QUANT: CPT | Performed by: STUDENT IN AN ORGANIZED HEALTH CARE EDUCATION/TRAINING PROGRAM

## 2023-08-16 PROCEDURE — 83880 ASSAY OF NATRIURETIC PEPTIDE: CPT | Performed by: STUDENT IN AN ORGANIZED HEALTH CARE EDUCATION/TRAINING PROGRAM

## 2023-08-16 RX ORDER — IPRATROPIUM BROMIDE AND ALBUTEROL SULFATE 2.5; .5 MG/3ML; MG/3ML
3 SOLUTION RESPIRATORY (INHALATION) ONCE
Status: COMPLETED | OUTPATIENT
Start: 2023-08-16 | End: 2023-08-16

## 2023-08-16 RX ORDER — ACETAMINOPHEN 325 MG/1
650 TABLET ORAL ONCE
Status: COMPLETED | OUTPATIENT
Start: 2023-08-16 | End: 2023-08-16

## 2023-08-16 RX ORDER — METHYLPREDNISOLONE SODIUM SUCCINATE 125 MG/2ML
125 INJECTION, POWDER, LYOPHILIZED, FOR SOLUTION INTRAMUSCULAR; INTRAVENOUS ONCE
Status: COMPLETED | OUTPATIENT
Start: 2023-08-16 | End: 2023-08-16

## 2023-08-16 RX ORDER — AZITHROMYCIN 250 MG/1
TABLET, FILM COATED ORAL
Qty: 6 TABLET | Refills: 0 | Status: ON HOLD | OUTPATIENT
Start: 2023-08-16 | End: 2023-08-28

## 2023-08-16 RX ORDER — PREDNISONE 20 MG/1
40 TABLET ORAL DAILY
Qty: 10 TABLET | Refills: 0 | Status: SHIPPED | OUTPATIENT
Start: 2023-08-16 | End: 2023-08-21

## 2023-08-16 RX ORDER — MAGNESIUM SULFATE HEPTAHYDRATE 40 MG/ML
2 INJECTION, SOLUTION INTRAVENOUS ONCE
Status: COMPLETED | OUTPATIENT
Start: 2023-08-16 | End: 2023-08-16

## 2023-08-16 RX ADMIN — IPRATROPIUM BROMIDE AND ALBUTEROL SULFATE 3 ML: .5; 3 SOLUTION RESPIRATORY (INHALATION) at 10:03

## 2023-08-16 RX ADMIN — ACETAMINOPHEN 650 MG: 325 TABLET, FILM COATED ORAL at 10:47

## 2023-08-16 RX ADMIN — METHYLPREDNISOLONE SODIUM SUCCINATE 125 MG: 125 INJECTION, POWDER, LYOPHILIZED, FOR SOLUTION INTRAMUSCULAR; INTRAVENOUS at 09:47

## 2023-08-16 RX ADMIN — MAGNESIUM SULFATE HEPTAHYDRATE 2 G: 40 INJECTION, SOLUTION INTRAVENOUS at 09:48

## 2023-08-16 NOTE — ED PROVIDER NOTES
Subjective:  History of Present Illness:    Patient is a 47-year-old male with history of asthma, COPD, hep C, hypertension who presents today with shortness of breath.  Reports that he has had 3 days of cough, congestion.  Today, became acutely dyspneic and was unable to get up out of his chair.  Does have access to 2 L nasal cannula as needed for dyspnea with activity however, normally does not wear oxygen.  Comes in today with 4 L nasal cannula satting 95%.  Patient with wheezing throughout, increased work of breathing on my exam.  He denies any preceding fevers.  No chest pain at this time.  Denies any abdominal pain, nausea, vomiting, diarrhea.  No unilateral leg swelling or leg pain.  No personal history of PE/DVT.      Nurses Notes reviewed and agree, including vitals, allergies, social history and prior medical history.     REVIEW OF SYSTEMS: All systems reviewed and not pertinent unless noted.  Review of Systems   Constitutional:  Positive for activity change. Negative for appetite change, chills, fatigue and fever.   HENT:  Positive for congestion, rhinorrhea and sinus pressure. Negative for sneezing and trouble swallowing.    Eyes:  Negative for discharge and itching.   Respiratory:  Positive for cough and shortness of breath.    Cardiovascular:  Negative for chest pain and palpitations.   Gastrointestinal:  Negative for abdominal distention, abdominal pain, diarrhea, nausea and vomiting.   Endocrine: Negative for cold intolerance and heat intolerance.   Genitourinary:  Negative for decreased urine volume, dysuria and urgency.   Musculoskeletal:  Negative for gait problem, neck pain and neck stiffness.   Skin:  Negative for color change and rash.   Allergic/Immunologic: Negative for immunocompromised state.   Neurological:  Negative for facial asymmetry and headaches.   Hematological:  Negative for adenopathy.   Psychiatric/Behavioral:  Negative for self-injury and suicidal ideas.      Past Medical  History:   Diagnosis Date    Abdominal adhesions     Anxiety     Arthritis     Asthma     Cervical radiculopathy     Colitis     COPD (chronic obstructive pulmonary disease)     GERD (gastroesophageal reflux disease)     Heart attack     History of hepatitis C     Treated with Epclusa in 2019    History of recreational drug use     HTN (hypertension)     Kidney cysts     Left hip pain     Liver disease     Low back pain     Migraines     Obstructive chronic bronchitis with exacerbation     Sleep apnea     mild    Tattoos        Allergies:    Doxycycline      Past Surgical History:   Procedure Laterality Date    BACK SURGERY      COLONOSCOPY      COLONOSCOPY N/A 2022    Procedure: COLONOSCOPY with biopsies;  Surgeon: Giancarlo Ruiz MD;  Location:  [facility];  Service: Gastroenterology;  Laterality: N/A;    HERNIA REPAIR      HIP SPACER INSERTION WITH ANTIBIOTIC CEMENT Left 1/15/2020    Procedure: TOTAL HIP IMPLANT REMOVAL WITH INSERTION OF ANTIBIOTIC SPACER LEFT;  Surgeon: Ba Ramirez MD;  Location:  [facility];  Service: Orthopedics    SHOULDER LIGAMENT REPAIR      right shoulder    SMALL INTESTINE SURGERY      Small bowell resection    TOTAL HIP ARTHROPLASTY Left     TOTAL HIP ARTHROPLASTY Right     TOTAL HIP ARTHROPLASTY REVISION Left 3/5/2020    Procedure: HIP REIMPLANT REVISION LEFT;  Surgeon: Ba Ramirez MD;  Location:  [facility];  Service: Orthopedics;  Laterality: Left;    UPPER GASTROINTESTINAL ENDOSCOPY           Social History     Socioeconomic History    Marital status:    Tobacco Use    Smoking status: Former     Packs/day: 2.00     Years: 15.00     Pack years: 30.00     Types: Cigarettes     Quit date:      Years since quittin.6     Passive exposure: Current    Smokeless tobacco: Current     Types: Chew   Vaping Use    Vaping Use: Never used   Substance and Sexual Activity    Alcohol use: No     Comment: stopped drinking alcohol    Drug use: Not  "Currently     Comment: takes suboxone    Sexual activity: Defer         Family History   Problem Relation Age of Onset    Arthritis Other     Hypertension Other     Migraines Other     Heart attack Other     Stroke Other     Colon cancer Neg Hx        Objective  Physical Exam:  /87   Pulse 102   Temp 98 øF (36.7 øC)   Resp 18   Ht 185.4 cm (73\")   Wt 81.6 kg (180 lb)   SpO2 94%   BMI 23.75 kg/mý      Physical Exam  Constitutional:       General: He is not in acute distress.     Appearance: Normal appearance. He is normal weight. He is not ill-appearing.   HENT:      Head: Normocephalic and atraumatic.      Nose: Nose normal. No congestion or rhinorrhea.      Mouth/Throat:      Mouth: Mucous membranes are moist.      Pharynx: Oropharynx is clear.   Eyes:      Extraocular Movements: Extraocular movements intact.      Conjunctiva/sclera: Conjunctivae normal.      Pupils: Pupils are equal, round, and reactive to light.   Cardiovascular:      Rate and Rhythm: Normal rate and regular rhythm.      Pulses: Normal pulses.   Pulmonary:      Effort: Pulmonary effort is normal. Tachypnea, accessory muscle usage and respiratory distress present.      Breath sounds: Normal breath sounds. Examination of the right-upper field reveals wheezing. Examination of the left-upper field reveals wheezing. Examination of the right-middle field reveals wheezing. Examination of the left-middle field reveals wheezing. Examination of the right-lower field reveals wheezing. Examination of the left-lower field reveals wheezing.   Abdominal:      General: Abdomen is flat. Bowel sounds are normal. There is no distension.      Palpations: Abdomen is soft.      Tenderness: There is no abdominal tenderness.   Musculoskeletal:         General: No swelling or tenderness. Normal range of motion.      Cervical back: Normal range of motion and neck supple. No rigidity or tenderness.   Skin:     General: Skin is warm and dry.      Capillary " Refill: Capillary refill takes less than 2 seconds.   Neurological:      General: No focal deficit present.      Mental Status: He is alert and oriented to person, place, and time. Mental status is at baseline.      Cranial Nerves: No cranial nerve deficit.      Sensory: No sensory deficit.      Motor: No weakness.   Psychiatric:         Mood and Affect: Mood normal.         Behavior: Behavior normal.         Thought Content: Thought content normal.         Judgment: Judgment normal.       Procedures    ED Course:    ED Course as of 08/16/23 1103   Wed Aug 16, 2023   1001 EKG interpreted by me, sinus tachycardia with no concerning ST changes noted, rate of 103 [JE]   1012 Patient refused ABG [JE]      ED Course User Index  [JE] Crispin Hudson MD       Lab Results (last 24 hours)       Procedure Component Value Units Date/Time    CBC & Differential [[id number]]  (Abnormal) Collected: 08/16/23 0946    Specimen: Blood Updated: 08/16/23 0951    Narrative:      The following orders were created for panel order CBC & Differential.  Procedure                               Abnormality         Status                     ---------                               -----------         ------                     CBC Auto Differential[[id number]]        Abnormal            Final result                 Please view results for these tests on the individual orders.    Comprehensive Metabolic Panel [[id number]]  (Abnormal) Collected: 08/16/23 0946    Specimen: Blood Updated: 08/16/23 1012     Glucose 90 mg/dL      BUN 10 mg/dL      Creatinine 0.92 mg/dL      Sodium 140 mmol/L      Potassium 4.7 mmol/L      Comment: Slight hemolysis detected by analyzer. Results may be affected.        Chloride 103 mmol/L      CO2 24.1 mmol/L      Calcium 9.4 mg/dL      Total Protein 7.8 g/dL      Albumin 4.5 g/dL      ALT (SGPT) 24 U/L      AST (SGOT) 27 U/L      Comment: Slight hemolysis detected by analyzer. Results may be affected.        Alkaline  Phosphatase 134 U/L      Total Bilirubin 0.3 mg/dL      Globulin 3.3 gm/dL      A/G Ratio 1.4 g/dL      BUN/Creatinine Ratio 10.9     Anion Gap 12.9 mmol/L      eGFR 102.6 mL/min/1.73     Narrative:      GFR Normal >60  Chronic Kidney Disease <60  Kidney Failure <15      BNP [[id number]]  (Normal) Collected: 08/16/23 0946    Specimen: Blood Updated: 08/16/23 1013     proBNP 74.7 pg/mL     Narrative:      Among patients with dyspnea, NT-proBNP is highly sensitive for the detection of acute congestive heart failure. In addition NT-proBNP of <300 pg/ml effectively rules out acute congestive heart failure with 99% negative predictive value.      High Sensitivity Troponin T [[id number]]  (Normal) Collected: 08/16/23 0946    Specimen: Blood Updated: 08/16/23 1013     HS Troponin T 7 ng/L     Narrative:      High Sensitive Troponin T Reference Range:  <10.0 ng/L- Negative Female for AMI  <15.0 ng/L- Negative Male for AMI  >=10 - Abnormal Female indicating possible myocardial injury.  >=15 - Abnormal Male indicating possible myocardial injury.   Clinicians would have to utilize clinical acumen, EKG, Troponin, and serial changes to determine if it is an Acute Myocardial Infarction or myocardial injury due to an underlying chronic condition.         C-reactive Protein [[id number]]  (Abnormal) Collected: 08/16/23 0946    Specimen: Blood Updated: 08/16/23 1010     C-Reactive Protein 0.78 mg/dL     Procalcitonin [[id number]]  (Normal) Collected: 08/16/23 0946    Specimen: Blood Updated: 08/16/23 1018     Procalcitonin 0.04 ng/mL     Narrative:      As a Marker for Sepsis (Non-Neonates):    1. <0.5 ng/mL represents a low risk of severe sepsis and/or septic shock.  2. >2 ng/mL represents a high risk of severe sepsis and/or septic shock.    As a Marker for Lower Respiratory Tract Infections that require antibiotic therapy:    PCT on Admission    Antibiotic Therapy       6-12 Hrs later    >0.5                Strongly Recommended  >0.25  "- <0.5        Recommended   0.1 - 0.25          Discouraged              Remeasure/reassess PCT  <0.1                Strongly Discouraged     Remeasure/reassess PCT    As 28 day mortality risk marker: \"Change in Procalcitonin Result\" (>80% or <=80%) if Day 0 (or Day 1) and Day 4 values are available. Refer to http://www.[facility]pct-calculator.com    Change in PCT <=80%  A decrease of PCT levels below or equal to 80% defines a positive change in PCT test result representing a higher risk for 28-day all-cause mortality of patients diagnosed with severe sepsis for septic shock.    Change in PCT >80%  A decrease of PCT levels of more than 80% defines a negative change in PCT result representing a lower risk for 28-day all-cause mortality of patients diagnosed with severe sepsis or septic shock.       CBC Auto Differential [[id number]]  (Abnormal) Collected: 08/16/23 0946    Specimen: Blood Updated: 08/16/23 0951     WBC 7.61 10*3/mm3      RBC 6.10 10*6/mm3      Hemoglobin 16.2 g/dL      Hematocrit 50.9 %      MCV 83.4 fL      MCH 26.6 pg      MCHC 31.8 g/dL      RDW 12.9 %      RDW-SD 38.9 fl      MPV 9.6 fL      Platelets 239 10*3/mm3      Neutrophil % 65.5 %      Lymphocyte % 26.7 %      Monocyte % 7.5 %      Eosinophil % 0.0 %      Basophil % 0.0 %      Immature Grans % 0.3 %      Neutrophils, Absolute 4.99 10*3/mm3      Lymphocytes, Absolute 2.03 10*3/mm3      Monocytes, Absolute 0.57 10*3/mm3      Eosinophils, Absolute 0.00 10*3/mm3      Basophils, Absolute 0.00 10*3/mm3      Immature Grans, Absolute 0.02 10*3/mm3      nRBC 0.0 /100 WBC     COVID-19 and FLU A/B PCR - Swab, Nasopharynx [[id number]]  (Normal) Collected: 08/16/23 0949    Specimen: Swab from Nasopharynx Updated: 08/16/23 1011     COVID19 Not Detected     Influenza A PCR Not Detected     Influenza B PCR Not Detected    Narrative:      Fact sheet for providers: https://www.fda.gov/media/737525/download    Fact sheet for patients: " https://www.fda.gov/media/856950/download    Test performed by PCR.    Blood Gas, Venous -With Co-Ox Panel: Yes [[id number]]  (Abnormal) Collected: 08/16/23 1019    Specimen: Venous Blood Updated: 08/16/23 1020     Site OTHER     pH, Venous 7.368 pH Units      pCO2, Venous 39.9 mm Hg      Comment: 84 Value below reference range        pO2, Venous 51.3 mm Hg      HCO3, Venous 22.9 mmol/L      Base Excess, Venous -2.2 mmol/L      Comment: 84 Value below reference range        O2 Saturation, Venous 87.6 %      Comment: 83 Value above reference range        Barometric Pressure for Blood Gas 735 mmHg      Modality Nasal Cannula     FIO2 32 %      Flow Rate 3.0 lpm      Ventilator Mode NA     Collected by rn     Comment: Meter: [id number]     :  [id number]                XR Chest 1 View    Result Date: 8/16/2023  PROCEDURE: XR CHEST 1 VW-    HISTORY: SOB, eval PNA  COMPARISON: May 2023.  FINDINGS: The heart is normal in size. The mediastinum is unremarkable. There is emphysematous change. There is minimal linear atelectasis or scarring in the lung bases. There is no acute infiltrate. There is no pneumothorax. There are no acute osseous abnormalities.      Impression: No acute cardiopulmonary process.        Images were reviewed, interpreted, and dictated by Dr. Elisabet Nguyễn MD Transcribed by Citlalli Hawk PA-C.  This report was signed and finalized on 8/16/2023 10:50 AM by Elisabet Nguyễn MD.          MDM     Amount and/or Complexity of Data Reviewed  Independent visualization of images, tracings, or specimens: yes        Initial impression of presenting illness: Shortness of breath    DDX: includes but is not limited to: COPD exacerbation, bacterial pneumonia, viral URI, CHF exacerbation, PE    Patient arrives guarded with vitals interpreted by myself.     Pertinent features from physical exam: Increased work of breathing with wheezing throughout, tachypnea, pursed lip breathing.    Initial diagnostic plan:  CBC, CMP, COVID swab, troponin, BNP, ABG, CRP, Pro-Siva, chest x-ray    Results from initial plan were reviewed and interpreted by me revealing no concerns for pneumonia or cardiac process, shortness of breath due to COPD exacerbation    Diagnostic information from other sources: Discussed with family bedside reviewed past medical records    Interventions / Re-evaluation: Given concerns for COPD exacerbation given Solu-Medrol, DuoNeb, magnesium, still wheezing but with a decreased work of breathing, decreased oxygen settings on reassessment    Results/clinical rationale were discussed with patient at bedside    Consultations/Discussion of results with other physicians: Prior to discharge patient was ambulated on pulse oximetry with patient's home O2 settings with no desaturation.  Given this, will discharge.  Given prescription for prednisone and azithromycin and patient voiced understanding of this treatment plan.  Strict turn precaution for any increased work of breathing on patient's as needed oxygen and encourage PCP follow-up to ensure adequate resolution.  Discharged in stable condition.    Disposition plan: Discharge  -----        Final diagnoses:   COPD exacerbation          Crispin Hudson MD  08/16/23 1877

## 2023-08-16 NOTE — DISCHARGE INSTRUCTIONS
You were evaluated for shortness of breath.  We got labs and a chest x-ray and found that you likely have a COPD exacerbation.  We gave you medications in the emergency department your condition improved.  You are now stable for discharge.  We have given you steroids and a breathing treatment in the emergency department and have sent you home with more steroids as well as an antibiotic called azithromycin to help with swelling in your lung.  Please take these both as directed.  We also recommend following up with primary care doctor to ensure that these symptoms improve appropriately.  If you begin to experience severe exacerbation of chest pain or shortness of breath, please come back emergency department for further evaluation.  You are now stable for discharge.

## 2023-08-16 NOTE — Clinical Note
[facility] EMERGENCY DEPARTMENT  [address] [address]  Phone: [phone]    Topher Stoll was seen and treated in our emergency department on 8/16/2023.  He may return to work on 08/18/2023.         Thank you for choosing [facility].    Crispin Hudson MD

## 2023-08-23 ENCOUNTER — HOSPITAL ENCOUNTER (EMERGENCY)
Facility: HOSPITAL | Age: 48
Discharge: HOME | End: 2023-08-23
Attending: STUDENT IN AN ORGANIZED HEALTH CARE EDUCATION/TRAINING PROGRAM
Payer: MEDICARE

## 2023-08-23 ENCOUNTER — APPOINTMENT (OUTPATIENT)
Dept: GENERAL RADIOLOGY | Facility: HOSPITAL | Age: 48
End: 2023-08-23
Payer: MEDICARE

## 2023-08-23 VITALS
BODY MASS INDEX: 23.86 KG/M2 | SYSTOLIC BLOOD PRESSURE: 119 MMHG | RESPIRATION RATE: 24 BRPM | WEIGHT: 180 LBS | TEMPERATURE: 98.9 F | DIASTOLIC BLOOD PRESSURE: 94 MMHG | OXYGEN SATURATION: 93 % | HEIGHT: 73 IN | HEART RATE: 90 BPM

## 2023-08-23 DIAGNOSIS — K52.9 GASTROENTERITIS: Primary | ICD-10-CM

## 2023-08-23 LAB
ALBUMIN SERPL-MCNC: 4.6 G/DL (ref 3.5–5.2)
ALBUMIN/GLOB SERPL: 1.5 G/DL
ALP SERPL-CCNC: 132 U/L (ref 39–117)
ALT SERPL W P-5'-P-CCNC: 21 U/L (ref 1–41)
ANION GAP SERPL CALCULATED.3IONS-SCNC: 15.6 MMOL/L (ref 5–15)
AST SERPL-CCNC: 29 U/L (ref 1–40)
BASOPHILS # BLD AUTO: 0.03 10*3/MM3 (ref 0–0.2)
BASOPHILS NFR BLD AUTO: 0.3 % (ref 0–1.5)
BILIRUB SERPL-MCNC: 0.7 MG/DL (ref 0–1.2)
BILIRUB UR QL STRIP: NEGATIVE
BUN SERPL-MCNC: 35 MG/DL (ref 6–20)
BUN/CREAT SERPL: 24.3 (ref 7–25)
CALCIUM SPEC-SCNC: 9.5 MG/DL (ref 8.6–10.5)
CHLORIDE SERPL-SCNC: 91 MMOL/L (ref 98–107)
CK SERPL-CCNC: 278 U/L (ref 20–200)
CLARITY UR: CLEAR
CO2 SERPL-SCNC: 24.4 MMOL/L (ref 22–29)
COLOR UR: YELLOW
CREAT SERPL-MCNC: 1.44 MG/DL (ref 0.76–1.27)
CRP SERPL-MCNC: 4.71 MG/DL (ref 0–0.5)
D-LACTATE SERPL-SCNC: 1.8 MMOL/L (ref 0.5–2)
DEPRECATED RDW RBC AUTO: 35.9 FL (ref 37–54)
EGFRCR SERPLBLD CKD-EPI 2021: 59.9 ML/MIN/1.73
EOSINOPHIL # BLD AUTO: 0 10*3/MM3 (ref 0–0.4)
EOSINOPHIL NFR BLD AUTO: 0 % (ref 0.3–6.2)
ERYTHROCYTE [DISTWIDTH] IN BLOOD BY AUTOMATED COUNT: 12.8 % (ref 12.3–15.4)
FLUAV RNA RESP QL NAA+PROBE: NOT DETECTED
FLUBV RNA RESP QL NAA+PROBE: NOT DETECTED
GLOBULIN UR ELPH-MCNC: 3.1 GM/DL
GLUCOSE BLDC GLUCOMTR-MCNC: 103 MG/DL (ref 70–130)
GLUCOSE BLDC GLUCOMTR-MCNC: 110 MG/DL (ref 70–130)
GLUCOSE SERPL-MCNC: 106 MG/DL (ref 65–99)
GLUCOSE UR STRIP-MCNC: NEGATIVE MG/DL
HCT VFR BLD AUTO: 48.4 % (ref 37.5–51)
HGB BLD-MCNC: 16.5 G/DL (ref 13–17.7)
HGB UR QL STRIP.AUTO: NEGATIVE
HOLD SPECIMEN: NORMAL
HOLD SPECIMEN: NORMAL
IMM GRANULOCYTES # BLD AUTO: 0.04 10*3/MM3 (ref 0–0.05)
IMM GRANULOCYTES NFR BLD AUTO: 0.4 % (ref 0–0.5)
KETONES UR QL STRIP: ABNORMAL
LEUKOCYTE ESTERASE UR QL STRIP.AUTO: NEGATIVE
LIPASE SERPL-CCNC: 19 U/L (ref 13–60)
LYMPHOCYTES # BLD AUTO: 2.47 10*3/MM3 (ref 0.7–3.1)
LYMPHOCYTES NFR BLD AUTO: 23.1 % (ref 19.6–45.3)
MAGNESIUM SERPL-MCNC: 2.7 MG/DL (ref 1.6–2.6)
MCH RBC QN AUTO: 26.7 PG (ref 26.6–33)
MCHC RBC AUTO-ENTMCNC: 34.1 G/DL (ref 31.5–35.7)
MCV RBC AUTO: 78.3 FL (ref 79–97)
MONOCYTES # BLD AUTO: 1.31 10*3/MM3 (ref 0.1–0.9)
MONOCYTES NFR BLD AUTO: 12.3 % (ref 5–12)
NEUTROPHILS NFR BLD AUTO: 6.82 10*3/MM3 (ref 1.7–7)
NEUTROPHILS NFR BLD AUTO: 63.9 % (ref 42.7–76)
NITRITE UR QL STRIP: NEGATIVE
NRBC BLD AUTO-RTO: 0 /100 WBC (ref 0–0.2)
PH UR STRIP.AUTO: <=5 [PH] (ref 5–8)
PLATELET # BLD AUTO: 292 10*3/MM3 (ref 140–450)
PMV BLD AUTO: 9.5 FL (ref 6–12)
POTASSIUM SERPL-SCNC: 3.8 MMOL/L (ref 3.5–5.2)
PROT SERPL-MCNC: 7.7 G/DL (ref 6–8.5)
PROT UR QL STRIP: ABNORMAL
RBC # BLD AUTO: 6.18 10*6/MM3 (ref 4.14–5.8)
SARS-COV-2 RNA RESP QL NAA+PROBE: NOT DETECTED
SODIUM SERPL-SCNC: 131 MMOL/L (ref 136–145)
SP GR UR STRIP: 1.02 (ref 1–1.03)
TROPONIN T SERPL HS-MCNC: 15 NG/L
TROPONIN T SERPL HS-MCNC: 19 NG/L
UROBILINOGEN UR QL STRIP: ABNORMAL
WBC NRBC COR # BLD: 10.67 10*3/MM3 (ref 3.4–10.8)
WHOLE BLOOD HOLD COAG: NORMAL
WHOLE BLOOD HOLD SPECIMEN: NORMAL

## 2023-08-23 PROCEDURE — 81003 URINALYSIS AUTO W/O SCOPE: CPT

## 2023-08-23 PROCEDURE — 83690 ASSAY OF LIPASE: CPT | Performed by: STUDENT IN AN ORGANIZED HEALTH CARE EDUCATION/TRAINING PROGRAM

## 2023-08-23 PROCEDURE — 87636 SARSCOV2 & INF A&B AMP PRB: CPT | Performed by: STUDENT IN AN ORGANIZED HEALTH CARE EDUCATION/TRAINING PROGRAM

## 2023-08-23 PROCEDURE — 93005 ELECTROCARDIOGRAM TRACING: CPT

## 2023-08-23 PROCEDURE — 36415 COLL VENOUS BLD VENIPUNCTURE: CPT

## 2023-08-23 PROCEDURE — 85025 COMPLETE CBC W/AUTO DIFF WBC: CPT

## 2023-08-23 PROCEDURE — 99284 EMERGENCY DEPT VISIT MOD MDM: CPT

## 2023-08-23 PROCEDURE — 83605 ASSAY OF LACTIC ACID: CPT | Performed by: STUDENT IN AN ORGANIZED HEALTH CARE EDUCATION/TRAINING PROGRAM

## 2023-08-23 PROCEDURE — 86140 C-REACTIVE PROTEIN: CPT | Performed by: STUDENT IN AN ORGANIZED HEALTH CARE EDUCATION/TRAINING PROGRAM

## 2023-08-23 PROCEDURE — 96374 THER/PROPH/DIAG INJ IV PUSH: CPT

## 2023-08-23 PROCEDURE — 82948 REAGENT STRIP/BLOOD GLUCOSE: CPT

## 2023-08-23 PROCEDURE — 83735 ASSAY OF MAGNESIUM: CPT | Performed by: STUDENT IN AN ORGANIZED HEALTH CARE EDUCATION/TRAINING PROGRAM

## 2023-08-23 PROCEDURE — 71045 X-RAY EXAM CHEST 1 VIEW: CPT

## 2023-08-23 PROCEDURE — 25010000002 ONDANSETRON PER 1 MG: Performed by: STUDENT IN AN ORGANIZED HEALTH CARE EDUCATION/TRAINING PROGRAM

## 2023-08-23 PROCEDURE — 82550 ASSAY OF CK (CPK): CPT | Performed by: STUDENT IN AN ORGANIZED HEALTH CARE EDUCATION/TRAINING PROGRAM

## 2023-08-23 PROCEDURE — 80053 COMPREHEN METABOLIC PANEL: CPT

## 2023-08-23 PROCEDURE — 84484 ASSAY OF TROPONIN QUANT: CPT | Performed by: STUDENT IN AN ORGANIZED HEALTH CARE EDUCATION/TRAINING PROGRAM

## 2023-08-23 RX ORDER — ONDANSETRON 4 MG/1
4 TABLET, ORALLY DISINTEGRATING ORAL EVERY 8 HOURS PRN
Qty: 20 TABLET | Refills: 0 | Status: SHIPPED | OUTPATIENT
Start: 2023-08-23

## 2023-08-23 RX ORDER — SODIUM CHLORIDE 0.9 % (FLUSH) 0.9 %
10 SYRINGE (ML) INJECTION AS NEEDED
Status: DISCONTINUED | OUTPATIENT
Start: 2023-08-23 | End: 2023-08-23 | Stop reason: HOSPADM

## 2023-08-23 RX ORDER — ACETAMINOPHEN 500 MG
1000 TABLET ORAL ONCE
Status: COMPLETED | OUTPATIENT
Start: 2023-08-23 | End: 2023-08-23

## 2023-08-23 RX ORDER — ONDANSETRON 2 MG/ML
4 INJECTION INTRAMUSCULAR; INTRAVENOUS ONCE
Status: COMPLETED | OUTPATIENT
Start: 2023-08-23 | End: 2023-08-23

## 2023-08-23 RX ADMIN — SODIUM CHLORIDE 1000 ML: 9 INJECTION, SOLUTION INTRAVENOUS at 12:54

## 2023-08-23 RX ADMIN — ACETAMINOPHEN 1000 MG: 500 TABLET, FILM COATED ORAL at 12:54

## 2023-08-23 RX ADMIN — ONDANSETRON 4 MG: 2 INJECTION INTRAMUSCULAR; INTRAVENOUS at 11:39

## 2023-08-23 RX ADMIN — SODIUM CHLORIDE, POTASSIUM CHLORIDE, SODIUM LACTATE AND CALCIUM CHLORIDE 1000 ML: 600; 310; 30; 20 INJECTION, SOLUTION INTRAVENOUS at 11:39

## 2023-08-23 NOTE — Clinical Note
[facility] EMERGENCY DEPARTMENT  [address] [address]  Phone: [phone]    Topher Stoll was seen and treated in our emergency department on 8/23/2023.  He may return to work on 08/25/2023.         Thank you for choosing [facility].    Crispin Hudson MD

## 2023-08-23 NOTE — DISCHARGE INSTRUCTIONS
You were evaluated for persistent nausea and vomiting.  We gave you IV fluids and Zofran your condition improved.  You are now stable for discharge.  You do appear somewhat dehydrated.  Because of this, we have given you more Zofran to help with nausea and vomiting at home.  Would recommend drinking plenty of fluids.  Would recommend following up your primary care doctor to ensure that this improves appropriately.  You are now stable for discharge.

## 2023-08-23 NOTE — ED PROVIDER NOTES
Subjective:  History of Present Illness:    Patient is a 47-year-old male history of hep C, treated, COPD on 2 L as needed, hypertension, anxiety who presents today with persistent nausea and vomiting over the last 24 hours.  Reports that he was working outside, became very hot and began to have persistent vomiting.  States that he is not been able to take p.o. over the last 24 hours.  Says that he gets very lightheaded and dizzy and feels like he is going to pass out when he stands up.  Has also had diarrhea with this.  States his urine was very dark.  Denies any dysuria or abdominal pain at this time.  No chest pain or shortness of breath.      Nurses Notes reviewed and agree, including vitals, allergies, social history and prior medical history.     REVIEW OF SYSTEMS: All systems reviewed and not pertinent unless noted.  Review of Systems   Constitutional:  Positive for activity change, appetite change, chills and fatigue. Negative for fever.   HENT:  Positive for congestion. Negative for sinus pressure, sneezing and trouble swallowing.    Eyes:  Negative for discharge and itching.   Respiratory:  Positive for cough. Negative for shortness of breath.    Cardiovascular:  Negative for chest pain and palpitations.   Gastrointestinal:  Positive for diarrhea, nausea and vomiting. Negative for abdominal distention, abdominal pain and blood in stool.   Endocrine: Negative for cold intolerance and heat intolerance.   Genitourinary:  Negative for decreased urine volume, dysuria and urgency.   Musculoskeletal:  Negative for gait problem, neck pain and neck stiffness.   Skin:  Negative for color change and rash.   Allergic/Immunologic: Negative for immunocompromised state.   Neurological:  Negative for facial asymmetry and headaches.   Hematological:  Negative for adenopathy.   Psychiatric/Behavioral:  Negative for self-injury and suicidal ideas.      Past Medical History:   Diagnosis Date    Abdominal adhesions     Anxiety      Arthritis     Asthma     Cervical radiculopathy     Colitis     COPD (chronic obstructive pulmonary disease)     GERD (gastroesophageal reflux disease)     Heart attack     History of hepatitis C     Treated with Epclusa in 2019    History of recreational drug use     HTN (hypertension)     Kidney cysts     Left hip pain     Liver disease     Low back pain     Migraines     Obstructive chronic bronchitis with exacerbation     Sleep apnea     mild    Tattoos        Allergies:    Doxycycline      Past Surgical History:   Procedure Laterality Date    BACK SURGERY      COLONOSCOPY      COLONOSCOPY N/A 2022    Procedure: COLONOSCOPY with biopsies;  Surgeon: Giancarlo Ruiz MD;  Location:  [facility];  Service: Gastroenterology;  Laterality: N/A;    HERNIA REPAIR      HIP SPACER INSERTION WITH ANTIBIOTIC CEMENT Left 1/15/2020    Procedure: TOTAL HIP IMPLANT REMOVAL WITH INSERTION OF ANTIBIOTIC SPACER LEFT;  Surgeon: Ba Ramirez MD;  Location:  [facility];  Service: Orthopedics    SHOULDER LIGAMENT REPAIR      right shoulder    SMALL INTESTINE SURGERY      Small bowell resection    TOTAL HIP ARTHROPLASTY Left     TOTAL HIP ARTHROPLASTY Right     TOTAL HIP ARTHROPLASTY REVISION Left 3/5/2020    Procedure: HIP REIMPLANT REVISION LEFT;  Surgeon: Ba Ramirez MD;  Location:  [facility];  Service: Orthopedics;  Laterality: Left;    UPPER GASTROINTESTINAL ENDOSCOPY           Social History     Socioeconomic History    Marital status:    Tobacco Use    Smoking status: Former     Packs/day: 2.00     Years: 15.00     Pack years: 30.00     Types: Cigarettes     Quit date:      Years since quittin.6     Passive exposure: Current    Smokeless tobacco: Current     Types: Chew   Vaping Use    Vaping Use: Never used   Substance and Sexual Activity    Alcohol use: No     Comment: stopped drinking alcohol    Drug use: Not Currently     Comment: takes suboxone    Sexual activity: Defer  "        Family History   Problem Relation Age of Onset    Arthritis Other     Hypertension Other     Migraines Other     Heart attack Other     Stroke Other     Colon cancer Neg Hx        Objective  Physical Exam:  /94   Pulse 90   Temp 98.9 øF (37.2 øC) (Oral)   Resp 24   Ht 185.4 cm (73\")   Wt 81.6 kg (180 lb)   SpO2 93%   BMI 23.75 kg/mý      Physical Exam  Constitutional:       General: He is not in acute distress.     Appearance: Normal appearance. He is normal weight. He is ill-appearing. He is not toxic-appearing.   HENT:      Head: Normocephalic and atraumatic.      Nose: Nose normal. Congestion present. No rhinorrhea.      Mouth/Throat:      Mouth: Mucous membranes are moist.      Pharynx: Oropharynx is clear.   Eyes:      Extraocular Movements: Extraocular movements intact.      Conjunctiva/sclera: Conjunctivae normal.      Pupils: Pupils are equal, round, and reactive to light.   Cardiovascular:      Rate and Rhythm: Regular rhythm. Tachycardia present.      Pulses: Normal pulses.   Pulmonary:      Effort: Pulmonary effort is normal. No respiratory distress.      Breath sounds: Normal breath sounds.   Abdominal:      General: Abdomen is flat. Bowel sounds are normal. There is no distension.      Palpations: Abdomen is soft.      Tenderness: There is no abdominal tenderness. There is no guarding or rebound.      Comments: Nontender to abdominal palpation on my exam   Musculoskeletal:         General: No swelling or tenderness. Normal range of motion.      Cervical back: Normal range of motion and neck supple. No rigidity or tenderness.      Right lower leg: No edema.      Left lower leg: No edema.   Skin:     General: Skin is warm and dry.      Capillary Refill: Capillary refill takes less than 2 seconds.   Neurological:      General: No focal deficit present.      Mental Status: He is alert and oriented to person, place, and time. Mental status is at baseline.      Cranial Nerves: No cranial " nerve deficit.      Sensory: No sensory deficit.      Motor: No weakness.      Coordination: Coordination normal.   Psychiatric:         Mood and Affect: Mood normal.         Behavior: Behavior normal.         Thought Content: Thought content normal.         Judgment: Judgment normal.       Procedures    ED Course:    ED Course as of 08/23/23 1456   Wed Aug 23, 2023   1118 EKG interpreted by me, sinus tachycardia with no concerning ST changes noted, rate of 104, abnormal EKG [JE]   1412 Contacted by nursing staff as patient states he is actually on methadone therapy as an outpatient, has not been able to take in over 24 hours due to his persistent nausea and vomiting.  Believe some element of opiate withdrawal is contributing to the patient's presentation. [JE]      ED Course User Index  [JE] Crispin Husdon MD       Lab Results (last 24 hours)       Procedure Component Value Units Date/Time    CBC & Differential [[id number]]  (Abnormal) Collected: 08/23/23 1105    Specimen: Blood Updated: 08/23/23 1112    Narrative:      The following orders were created for panel order CBC & Differential.  Procedure                               Abnormality         Status                     ---------                               -----------         ------                     CBC Auto Differential[[id number]]        Abnormal            Final result                 Please view results for these tests on the individual orders.    Comprehensive Metabolic Panel [[id number]]  (Abnormal) Collected: 08/23/23 1105    Specimen: Blood Updated: 08/23/23 1133     Glucose 106 mg/dL      BUN 35 mg/dL      Creatinine 1.44 mg/dL      Sodium 131 mmol/L      Potassium 3.8 mmol/L      Comment: Slight hemolysis detected by analyzer. Results may be affected.        Chloride 91 mmol/L      CO2 24.4 mmol/L      Calcium 9.5 mg/dL      Total Protein 7.7 g/dL      Albumin 4.6 g/dL      ALT (SGPT) 21 U/L      AST (SGOT) 29 U/L      Alkaline Phosphatase  132 U/L      Total Bilirubin 0.7 mg/dL      Globulin 3.1 gm/dL      A/G Ratio 1.5 g/dL      BUN/Creatinine Ratio 24.3     Anion Gap 15.6 mmol/L      eGFR 59.9 mL/min/1.73     Narrative:      GFR Normal >60  Chronic Kidney Disease <60  Kidney Failure <15      CBC Auto Differential [[id number]]  (Abnormal) Collected: 08/23/23 1105    Specimen: Blood Updated: 08/23/23 1112     WBC 10.67 10*3/mm3      RBC 6.18 10*6/mm3      Hemoglobin 16.5 g/dL      Hematocrit 48.4 %      MCV 78.3 fL      MCH 26.7 pg      MCHC 34.1 g/dL      RDW 12.8 %      RDW-SD 35.9 fl      MPV 9.5 fL      Platelets 292 10*3/mm3      Neutrophil % 63.9 %      Lymphocyte % 23.1 %      Monocyte % 12.3 %      Eosinophil % 0.0 %      Basophil % 0.3 %      Immature Grans % 0.4 %      Neutrophils, Absolute 6.82 10*3/mm3      Lymphocytes, Absolute 2.47 10*3/mm3      Monocytes, Absolute 1.31 10*3/mm3      Eosinophils, Absolute 0.00 10*3/mm3      Basophils, Absolute 0.03 10*3/mm3      Immature Grans, Absolute 0.04 10*3/mm3      nRBC 0.0 /100 WBC     Lipase [[id number]]  (Normal) Collected: 08/23/23 1105    Specimen: Blood Updated: 08/23/23 1133     Lipase 19 U/L     C-reactive Protein [[id number]]  (Abnormal) Collected: 08/23/23 1105    Specimen: Blood Updated: 08/23/23 1133     C-Reactive Protein 4.71 mg/dL     Single High Sensitivity Troponin T [[id number]]  (Abnormal) Collected: 08/23/23 1105    Specimen: Blood Updated: 08/23/23 1136     HS Troponin T 19 ng/L     Narrative:      High Sensitive Troponin T Reference Range:  <10.0 ng/L- Negative Female for AMI  <15.0 ng/L- Negative Male for AMI  >=10 - Abnormal Female indicating possible myocardial injury.  >=15 - Abnormal Male indicating possible myocardial injury.   Clinicians would have to utilize clinical acumen, EKG, Troponin, and serial changes to determine if it is an Acute Myocardial Infarction or myocardial injury due to an underlying chronic condition.         CK [[id number]]  (Abnormal) Collected:  08/23/23 1105    Specimen: Blood Updated: 08/23/23 1133     Creatine Kinase 278 U/L     Magnesium [[id number]]  (Abnormal) Collected: 08/23/23 1105    Specimen: Blood Updated: 08/23/23 1133     Magnesium 2.7 mg/dL     POC Glucose Once [[id number]]  (Normal) Collected: 08/23/23 1107    Specimen: Blood Updated: 08/23/23 1109     Glucose 110 mg/dL      Comment: Serial Number: [id number]:  [id number]       COVID-19 and FLU A/B PCR - Swab, Nasopharynx [[id number]]  (Normal) Collected: 08/23/23 1113    Specimen: Swab from Nasopharynx Updated: 08/23/23 1154     COVID19 Not Detected     Influenza A PCR Not Detected     Influenza B PCR Not Detected    Narrative:      Fact sheet for providers: https://www.fda.gov/media/517290/download    Fact sheet for patients: https://www.fda.gov/media/355009/download    Test performed by PCR.    POC Glucose Once [[id number]]  (Normal) Collected: 08/23/23 1126    Specimen: Blood Updated: 08/23/23 1131     Glucose 103 mg/dL      Comment: Serial Number: [id number]:  [id number]       Lactic Acid, Plasma [[id number]]  (Normal) Collected: 08/23/23 1136    Specimen: Blood Updated: 08/23/23 1155     Lactate 1.8 mmol/L     Urinalysis With Microscopic If Indicated (No Culture) - Urine, Clean Catch [[id number]]  (Abnormal) Collected: 08/23/23 1224    Specimen: Urine, Clean Catch Updated: 08/23/23 1232     Color, UA Yellow     Appearance, UA Clear     pH, UA <=5.0     Specific Gravity, UA 1.025     Glucose, UA Negative     Ketones, UA Trace     Bilirubin, UA Negative     Blood, UA Negative     Protein, UA Trace     Leuk Esterase, UA Negative     Nitrite, UA Negative     Urobilinogen, UA 0.2 E.U./dL    Narrative:      Urine microscopic not indicated.    Single High Sensitivity Troponin T [[id number]]  (Abnormal) Collected: 08/23/23 1254    Specimen: Blood Updated: 08/23/23 1343     HS Troponin T 15 ng/L     Narrative:      High Sensitive Troponin T Reference Range:  <10.0 ng/L- Negative Female for  AMI  <15.0 ng/L- Negative Male for AMI  >=10 - Abnormal Female indicating possible myocardial injury.  >=15 - Abnormal Male indicating possible myocardial injury.   Clinicians would have to utilize clinical acumen, EKG, Troponin, and serial changes to determine if it is an Acute Myocardial Infarction or myocardial injury due to an underlying chronic condition.                  XR Chest 1 View    Result Date: 8/23/2023  PROCEDURE: XR CHEST 1 VW-    HISTORY: weakness, eval PNA  COMPARISON: August 16, 2023.  FINDINGS: The heart is normal in size. The mediastinum is unremarkable. The lungs are clear. There is no pneumothorax. There are no acute osseous abnormalities.      Impression: No acute cardiopulmonary process.        Images were reviewed, interpreted, and dictated by Dr. Zack Baker MD Transcribed by Citlalli Hawk PA-C.  This report was signed and finalized on 8/23/2023 12:04 PM by Zack Baker MD.          MDM     Amount and/or Complexity of Data Reviewed  Decide to obtain previous medical records or to obtain history from someone other than the patient: yes  Independent visualization of images, tracings, or specimens: yes        Initial impression of presenting illness: Nausea, vomiting, diarrhea    DDX: includes but is not limited to: COVID-19, viral URI, infectious diarrhea    Patient arrives stable with vitals interpreted by myself.     Pertinent features from physical exam: Nontender to abdominal palpation, tachycardic without murmur, light wheezes throughout consistent with patient's past history of COPD with no increased work of breathing noted.    Initial diagnostic plan: CBC, CMP, lipase, UA, CRP, lactic acid, troponin, ECG, chest x-ray, mag, CK, COVID swab    Results from initial plan were reviewed and interpreted by me revealing no concern for acute cardiac process, patient with signs of dehydration, given 2 L IV fluids    Diagnostic information from other sources: Discussed with mother at  bedside and reviewed past medical records    Interventions / Re-evaluation: Given symptoms, given IV fluids and Zofran, on repeat assessment, patient clear to auscultation, improved symptoms and able to tolerate p.o., however, patient reports that he is on outpatient methadone, has not taken his methadone today and believes he may be starting to withdrawal which may be complicating his presentation.    Results/clinical rationale were discussed with patient at bedside    Consultations/Discussion of results with other physicians: Discussed negative work-up in our emergency department and findings of dehydration.  Will give Zofran for symptom control at home and encourage PCP follow-up to ensure resolution.  Strict turn precaution for any shortness of breath, chest pain, inability to tolerate p.o.    Disposition plan: Discharge  -----        Final diagnoses:   Gastroenteritis          Crispin Hudson MD  08/23/23 3149

## 2023-08-27 ENCOUNTER — APPOINTMENT (OUTPATIENT)
Dept: GENERAL RADIOLOGY | Facility: HOSPITAL | Age: 48
DRG: 190 | End: 2023-08-27
Payer: MEDICARE

## 2023-08-27 ENCOUNTER — HOSPITAL ENCOUNTER (INPATIENT)
Facility: HOSPITAL | Age: 48
LOS: 2 days | Discharge: HOME | DRG: 190 | End: 2023-08-29
Attending: STUDENT IN AN ORGANIZED HEALTH CARE EDUCATION/TRAINING PROGRAM
Payer: MEDICARE

## 2023-08-27 DIAGNOSIS — J44.1 COPD EXACERBATION: Primary | ICD-10-CM

## 2023-08-27 PROBLEM — J96.11 CHRONIC RESPIRATORY FAILURE WITH HYPOXIA: Status: ACTIVE | Noted: 2023-08-27

## 2023-08-27 LAB
A-A DO2: 6 MMHG
ALBUMIN SERPL-MCNC: 3.9 G/DL (ref 3.5–5.2)
ALBUMIN/GLOB SERPL: 1.4 G/DL
ALP SERPL-CCNC: 110 U/L (ref 39–117)
ALT SERPL W P-5'-P-CCNC: 17 U/L (ref 1–41)
ANION GAP SERPL CALCULATED.3IONS-SCNC: 9.3 MMOL/L (ref 5–15)
ARTERIAL PATENCY WRIST A: ABNORMAL
AST SERPL-CCNC: 18 U/L (ref 1–40)
ATMOSPHERIC PRESS: 733 MMHG
BASE EXCESS BLDA CALC-SCNC: -0.2 MMOL/L (ref 0–2)
BASOPHILS # BLD AUTO: 0.01 10*3/MM3 (ref 0–0.2)
BASOPHILS NFR BLD AUTO: 0.1 % (ref 0–1.5)
BDY SITE: ABNORMAL
BILIRUB SERPL-MCNC: <0.2 MG/DL (ref 0–1.2)
BUN SERPL-MCNC: 7 MG/DL (ref 6–20)
BUN/CREAT SERPL: 8.4 (ref 7–25)
CALCIUM SPEC-SCNC: 9 MG/DL (ref 8.6–10.5)
CHLORIDE SERPL-SCNC: 104 MMOL/L (ref 98–107)
CO2 SERPL-SCNC: 28.7 MMOL/L (ref 22–29)
COHGB MFR BLD: 0.9 % (ref 0–2)
CREAT SERPL-MCNC: 0.83 MG/DL (ref 0.76–1.27)
CRP SERPL-MCNC: 1.15 MG/DL (ref 0–0.5)
DEPRECATED RDW RBC AUTO: 39.8 FL (ref 37–54)
EGFRCR SERPLBLD CKD-EPI 2021: 108 ML/MIN/1.73
EOSINOPHIL # BLD AUTO: 0 10*3/MM3 (ref 0–0.4)
EOSINOPHIL NFR BLD AUTO: 0 % (ref 0.3–6.2)
ERYTHROCYTE [DISTWIDTH] IN BLOOD BY AUTOMATED COUNT: 12.9 % (ref 12.3–15.4)
FLUAV SUBTYP SPEC NAA+PROBE: NOT DETECTED
FLUBV RNA ISLT QL NAA+PROBE: NOT DETECTED
GAS FLOW AIRWAY: 5 LPM
GEN 5 2HR TROPONIN T REFLEX: 22 NG/L
GLOBULIN UR ELPH-MCNC: 2.8 GM/DL
GLUCOSE SERPL-MCNC: 99 MG/DL (ref 65–99)
HCO3 BLDA-SCNC: 27.7 MMOL/L (ref 22–28)
HCT VFR BLD AUTO: 45.8 % (ref 37.5–51)
HCT VFR BLD CALC: 45.4 %
HGB BLD-MCNC: 14.7 G/DL (ref 13–17.7)
HOLD SPECIMEN: NORMAL
IMM GRANULOCYTES # BLD AUTO: 0.02 10*3/MM3 (ref 0–0.05)
IMM GRANULOCYTES NFR BLD AUTO: 0.3 % (ref 0–0.5)
LYMPHOCYTES # BLD AUTO: 1.61 10*3/MM3 (ref 0.7–3.1)
LYMPHOCYTES NFR BLD AUTO: 22.1 % (ref 19.6–45.3)
Lab: ABNORMAL
MCH RBC QN AUTO: 26.8 PG (ref 26.6–33)
MCHC RBC AUTO-ENTMCNC: 32.1 G/DL (ref 31.5–35.7)
MCV RBC AUTO: 83.6 FL (ref 79–97)
METHGB BLD QL: 0.3 % (ref 0–1.5)
MODALITY: ABNORMAL
MONOCYTES # BLD AUTO: 0.7 10*3/MM3 (ref 0.1–0.9)
MONOCYTES NFR BLD AUTO: 9.6 % (ref 5–12)
NEUTROPHILS NFR BLD AUTO: 4.94 10*3/MM3 (ref 1.7–7)
NEUTROPHILS NFR BLD AUTO: 67.9 % (ref 42.7–76)
NRBC BLD AUTO-RTO: 0 /100 WBC (ref 0–0.2)
NT-PROBNP SERPL-MCNC: 512.4 PG/ML (ref 0–450)
OXYHGB MFR BLDV: 93 % (ref 94–99)
PCO2 BLDA: 58.1 MM HG (ref 35–45)
PCO2 TEMP ADJ BLD: ABNORMAL MM[HG]
PH BLDA: 7.29 PH UNITS (ref 7.35–7.45)
PH, TEMP CORRECTED: ABNORMAL
PLATELET # BLD AUTO: 222 10*3/MM3 (ref 140–450)
PMV BLD AUTO: 9.6 FL (ref 6–12)
PO2 BLDA: 72.4 MM HG (ref 75–100)
PO2 TEMP ADJ BLD: ABNORMAL MM[HG]
POTASSIUM SERPL-SCNC: 4.3 MMOL/L (ref 3.5–5.2)
PROCALCITONIN SERPL-MCNC: 0.04 NG/ML (ref 0–0.25)
PROT SERPL-MCNC: 6.7 G/DL (ref 6–8.5)
RBC # BLD AUTO: 5.48 10*6/MM3 (ref 4.14–5.8)
SAO2 % BLDCOA: 94.2 % (ref 94–100)
SARS-COV-2 RNA RESP QL NAA+PROBE: NOT DETECTED
SODIUM SERPL-SCNC: 142 MMOL/L (ref 136–145)
TROPONIN T DELTA: 12 NG/L
TROPONIN T SERPL HS-MCNC: 10 NG/L
TROPONIN T SERPL HS-MCNC: 10 NG/L
TROPONIN T SERPL HS-MCNC: 12 NG/L
VENTILATOR MODE: ABNORMAL
WBC NRBC COR # BLD: 7.28 10*3/MM3 (ref 3.4–10.8)
WHOLE BLOOD HOLD COAG: NORMAL
WHOLE BLOOD HOLD SPECIMEN: NORMAL

## 2023-08-27 PROCEDURE — 25010000002 ENOXAPARIN PER 10 MG: Performed by: NURSE PRACTITIONER

## 2023-08-27 PROCEDURE — 94799 UNLISTED PULMONARY SVC/PX: CPT

## 2023-08-27 PROCEDURE — 83880 ASSAY OF NATRIURETIC PEPTIDE: CPT

## 2023-08-27 PROCEDURE — 82375 ASSAY CARBOXYHB QUANT: CPT

## 2023-08-27 PROCEDURE — 25010000002 MAGNESIUM SULFATE 2 GM/50ML SOLUTION: Performed by: STUDENT IN AN ORGANIZED HEALTH CARE EDUCATION/TRAINING PROGRAM

## 2023-08-27 PROCEDURE — 94660 CPAP INITIATION&MGMT: CPT

## 2023-08-27 PROCEDURE — 94640 AIRWAY INHALATION TREATMENT: CPT

## 2023-08-27 PROCEDURE — 25010000002 METHYLPREDNISOLONE PER 125 MG: Performed by: STUDENT IN AN ORGANIZED HEALTH CARE EDUCATION/TRAINING PROGRAM

## 2023-08-27 PROCEDURE — 94761 N-INVAS EAR/PLS OXIMETRY MLT: CPT

## 2023-08-27 PROCEDURE — 84484 ASSAY OF TROPONIN QUANT: CPT

## 2023-08-27 PROCEDURE — 71045 X-RAY EXAM CHEST 1 VIEW: CPT

## 2023-08-27 PROCEDURE — 85025 COMPLETE CBC W/AUTO DIFF WBC: CPT

## 2023-08-27 PROCEDURE — 84484 ASSAY OF TROPONIN QUANT: CPT | Performed by: STUDENT IN AN ORGANIZED HEALTH CARE EDUCATION/TRAINING PROGRAM

## 2023-08-27 PROCEDURE — 86140 C-REACTIVE PROTEIN: CPT | Performed by: STUDENT IN AN ORGANIZED HEALTH CARE EDUCATION/TRAINING PROGRAM

## 2023-08-27 PROCEDURE — 84145 PROCALCITONIN (PCT): CPT | Performed by: STUDENT IN AN ORGANIZED HEALTH CARE EDUCATION/TRAINING PROGRAM

## 2023-08-27 PROCEDURE — 36415 COLL VENOUS BLD VENIPUNCTURE: CPT

## 2023-08-27 PROCEDURE — 99285 EMERGENCY DEPT VISIT HI MDM: CPT

## 2023-08-27 PROCEDURE — 87636 SARSCOV2 & INF A&B AMP PRB: CPT

## 2023-08-27 PROCEDURE — 36600 WITHDRAWAL OF ARTERIAL BLOOD: CPT

## 2023-08-27 PROCEDURE — 80053 COMPREHEN METABOLIC PANEL: CPT

## 2023-08-27 PROCEDURE — 82805 BLOOD GASES W/O2 SATURATION: CPT

## 2023-08-27 PROCEDURE — 83050 HGB METHEMOGLOBIN QUAN: CPT

## 2023-08-27 PROCEDURE — 93005 ELECTROCARDIOGRAM TRACING: CPT

## 2023-08-27 RX ORDER — SODIUM CHLORIDE 0.9 % (FLUSH) 0.9 %
10 SYRINGE (ML) INJECTION AS NEEDED
Status: DISCONTINUED | OUTPATIENT
Start: 2023-08-27 | End: 2023-08-29 | Stop reason: HOSPADM

## 2023-08-27 RX ORDER — SODIUM CHLORIDE 0.9 % (FLUSH) 0.9 %
10 SYRINGE (ML) INJECTION EVERY 12 HOURS SCHEDULED
Status: DISCONTINUED | OUTPATIENT
Start: 2023-08-27 | End: 2023-08-29 | Stop reason: HOSPADM

## 2023-08-27 RX ORDER — SODIUM CHLORIDE 9 MG/ML
40 INJECTION, SOLUTION INTRAVENOUS AS NEEDED
Status: DISCONTINUED | OUTPATIENT
Start: 2023-08-27 | End: 2023-08-29 | Stop reason: HOSPADM

## 2023-08-27 RX ORDER — ENOXAPARIN SODIUM 100 MG/ML
30 INJECTION SUBCUTANEOUS DAILY
Status: DISCONTINUED | OUTPATIENT
Start: 2023-08-27 | End: 2023-08-27

## 2023-08-27 RX ORDER — PANTOPRAZOLE SODIUM 40 MG/1
40 TABLET, DELAYED RELEASE ORAL
Status: DISCONTINUED | OUTPATIENT
Start: 2023-08-28 | End: 2023-08-29 | Stop reason: HOSPADM

## 2023-08-27 RX ORDER — AZELASTINE 1 MG/ML
1 SPRAY, METERED NASAL 2 TIMES DAILY
Status: DISCONTINUED | OUTPATIENT
Start: 2023-08-27 | End: 2023-08-29 | Stop reason: HOSPADM

## 2023-08-27 RX ORDER — ACETAMINOPHEN 325 MG/1
650 TABLET ORAL EVERY 4 HOURS PRN
Status: DISCONTINUED | OUTPATIENT
Start: 2023-08-27 | End: 2023-08-29 | Stop reason: HOSPADM

## 2023-08-27 RX ORDER — METHYLPREDNISOLONE SODIUM SUCCINATE 125 MG/2ML
125 INJECTION, POWDER, LYOPHILIZED, FOR SOLUTION INTRAMUSCULAR; INTRAVENOUS ONCE
Status: COMPLETED | OUTPATIENT
Start: 2023-08-27 | End: 2023-08-27

## 2023-08-27 RX ORDER — ATORVASTATIN CALCIUM 10 MG/1
10 TABLET, FILM COATED ORAL NIGHTLY
Status: DISCONTINUED | OUTPATIENT
Start: 2023-08-27 | End: 2023-08-29 | Stop reason: HOSPADM

## 2023-08-27 RX ORDER — IPRATROPIUM BROMIDE AND ALBUTEROL SULFATE 2.5; .5 MG/3ML; MG/3ML
3 SOLUTION RESPIRATORY (INHALATION) EVERY 4 HOURS PRN
Status: DISCONTINUED | OUTPATIENT
Start: 2023-08-27 | End: 2023-08-29 | Stop reason: HOSPADM

## 2023-08-27 RX ORDER — MAGNESIUM SULFATE HEPTAHYDRATE 40 MG/ML
2 INJECTION, SOLUTION INTRAVENOUS ONCE
Status: COMPLETED | OUTPATIENT
Start: 2023-08-27 | End: 2023-08-27

## 2023-08-27 RX ORDER — ONDANSETRON 4 MG/1
4 TABLET, FILM COATED ORAL EVERY 6 HOURS PRN
Status: DISCONTINUED | OUTPATIENT
Start: 2023-08-27 | End: 2023-08-29 | Stop reason: HOSPADM

## 2023-08-27 RX ORDER — METHADONE HYDROCHLORIDE 10 MG/1
65 TABLET ORAL DAILY
Status: DISCONTINUED | OUTPATIENT
Start: 2023-08-27 | End: 2023-08-29 | Stop reason: HOSPADM

## 2023-08-27 RX ORDER — ENOXAPARIN SODIUM 100 MG/ML
40 INJECTION SUBCUTANEOUS DAILY
Status: DISCONTINUED | OUTPATIENT
Start: 2023-08-28 | End: 2023-08-29 | Stop reason: HOSPADM

## 2023-08-27 RX ORDER — ASPIRIN 81 MG/1
81 TABLET ORAL DAILY
Status: DISCONTINUED | OUTPATIENT
Start: 2023-08-27 | End: 2023-08-29 | Stop reason: HOSPADM

## 2023-08-27 RX ORDER — TRAZODONE HYDROCHLORIDE 50 MG/1
50 TABLET ORAL NIGHTLY
Status: DISCONTINUED | OUTPATIENT
Start: 2023-08-27 | End: 2023-08-29 | Stop reason: HOSPADM

## 2023-08-27 RX ORDER — POLYETHYLENE GLYCOL 3350 17 G/17G
17 POWDER, FOR SOLUTION ORAL DAILY PRN
Status: DISCONTINUED | OUTPATIENT
Start: 2023-08-27 | End: 2023-08-29 | Stop reason: HOSPADM

## 2023-08-27 RX ORDER — BISACODYL 5 MG/1
5 TABLET, DELAYED RELEASE ORAL DAILY PRN
Status: DISCONTINUED | OUTPATIENT
Start: 2023-08-27 | End: 2023-08-29 | Stop reason: HOSPADM

## 2023-08-27 RX ORDER — ONDANSETRON 2 MG/ML
4 INJECTION INTRAMUSCULAR; INTRAVENOUS EVERY 6 HOURS PRN
Status: DISCONTINUED | OUTPATIENT
Start: 2023-08-27 | End: 2023-08-29 | Stop reason: HOSPADM

## 2023-08-27 RX ORDER — FINASTERIDE 5 MG/1
5 TABLET, FILM COATED ORAL DAILY
Status: DISCONTINUED | OUTPATIENT
Start: 2023-08-27 | End: 2023-08-29 | Stop reason: HOSPADM

## 2023-08-27 RX ORDER — NITROGLYCERIN 0.4 MG/1
0.4 TABLET SUBLINGUAL
Status: DISCONTINUED | OUTPATIENT
Start: 2023-08-27 | End: 2023-08-29 | Stop reason: HOSPADM

## 2023-08-27 RX ORDER — IPRATROPIUM BROMIDE AND ALBUTEROL SULFATE 2.5; .5 MG/3ML; MG/3ML
3 SOLUTION RESPIRATORY (INHALATION) ONCE
Status: COMPLETED | OUTPATIENT
Start: 2023-08-27 | End: 2023-08-27

## 2023-08-27 RX ORDER — AMOXICILLIN 250 MG
2 CAPSULE ORAL 2 TIMES DAILY
Status: DISCONTINUED | OUTPATIENT
Start: 2023-08-27 | End: 2023-08-29 | Stop reason: HOSPADM

## 2023-08-27 RX ORDER — BUDESONIDE AND FORMOTEROL FUMARATE DIHYDRATE 160; 4.5 UG/1; UG/1
2 AEROSOL RESPIRATORY (INHALATION)
Status: DISCONTINUED | OUTPATIENT
Start: 2023-08-27 | End: 2023-08-29 | Stop reason: HOSPADM

## 2023-08-27 RX ORDER — BISACODYL 10 MG
10 SUPPOSITORY, RECTAL RECTAL DAILY PRN
Status: DISCONTINUED | OUTPATIENT
Start: 2023-08-27 | End: 2023-08-29 | Stop reason: HOSPADM

## 2023-08-27 RX ADMIN — FINASTERIDE 5 MG: 5 TABLET, FILM COATED ORAL at 12:56

## 2023-08-27 RX ADMIN — TRAZODONE HYDROCHLORIDE 50 MG: 50 TABLET ORAL at 20:05

## 2023-08-27 RX ADMIN — ATORVASTATIN CALCIUM 10 MG: 10 TABLET, FILM COATED ORAL at 20:05

## 2023-08-27 RX ADMIN — NITROGLYCERIN 0.4 MG: 0.4 TABLET SUBLINGUAL at 19:44

## 2023-08-27 RX ADMIN — SENNOSIDES AND DOCUSATE SODIUM 2 TABLET: 50; 8.6 TABLET ORAL at 20:06

## 2023-08-27 RX ADMIN — ASPIRIN 81 MG: 81 TABLET, COATED ORAL at 12:56

## 2023-08-27 RX ADMIN — IPRATROPIUM BROMIDE AND ALBUTEROL SULFATE 3 ML: .5; 3 SOLUTION RESPIRATORY (INHALATION) at 10:24

## 2023-08-27 RX ADMIN — METHADONE HYDROCHLORIDE 65 MG: 10 TABLET ORAL at 12:56

## 2023-08-27 RX ADMIN — IPRATROPIUM BROMIDE AND ALBUTEROL SULFATE 3 ML: .5; 3 SOLUTION RESPIRATORY (INHALATION) at 13:20

## 2023-08-27 RX ADMIN — Medication 10 ML: at 13:03

## 2023-08-27 RX ADMIN — METHYLPREDNISOLONE SODIUM SUCCINATE 125 MG: 125 INJECTION, POWDER, FOR SOLUTION INTRAMUSCULAR; INTRAVENOUS at 10:36

## 2023-08-27 RX ADMIN — BUDESONIDE AND FORMOTEROL FUMARATE DIHYDRATE 2 PUFF: 160; 4.5 AEROSOL RESPIRATORY (INHALATION) at 19:36

## 2023-08-27 RX ADMIN — ENOXAPARIN SODIUM 30 MG: 30 INJECTION SUBCUTANEOUS at 12:34

## 2023-08-27 RX ADMIN — AZELASTINE 1 SPRAY: 1 SPRAY, METERED NASAL at 20:10

## 2023-08-27 RX ADMIN — Medication 10 ML: at 20:06

## 2023-08-27 RX ADMIN — ACETAMINOPHEN 650 MG: 325 TABLET, FILM COATED ORAL at 12:35

## 2023-08-27 RX ADMIN — IPRATROPIUM BROMIDE AND ALBUTEROL SULFATE 3 ML: .5; 3 SOLUTION RESPIRATORY (INHALATION) at 19:27

## 2023-08-27 RX ADMIN — NITROGLYCERIN 0.4 MG: 0.4 TABLET SUBLINGUAL at 20:16

## 2023-08-27 RX ADMIN — MAGNESIUM SULFATE HEPTAHYDRATE 2 G: 40 INJECTION, SOLUTION INTRAVENOUS at 10:36

## 2023-08-27 NOTE — ED PROVIDER NOTES
Subjective:  History of Present Illness:    Patient is a 47-year-old male with history of COPD, GERD, CAD, hepatitis C, IV drug abuse, hypertension who presents today with shortness of breath.  Recently evaluated for gastroenteritis 4 days prior to arrival.  Is now returning with increasing shortness of breath.  States that his nausea and vomiting has resolved with home antiemetics this morning, developed a cough and then became acutely dyspneic.  Denies any preceding fevers.  No productive cough.  Denies any unilateral leg swelling or leg pain.  No significant pedal edema.  Denies any abdominal pain.  No dysuria.  No blood in the stool.      Nurses Notes reviewed and agree, including vitals, allergies, social history and prior medical history.     REVIEW OF SYSTEMS: All systems reviewed and not pertinent unless noted.  Review of Systems   Constitutional:  Positive for activity change, chills and fatigue. Negative for appetite change and fever.   HENT:  Positive for congestion and rhinorrhea. Negative for sinus pressure, sneezing and trouble swallowing.    Eyes:  Negative for discharge and itching.   Respiratory:  Positive for cough. Negative for shortness of breath.    Cardiovascular:  Negative for chest pain and palpitations.   Gastrointestinal:  Negative for abdominal distention, abdominal pain, diarrhea, nausea and vomiting.   Endocrine: Negative for cold intolerance and heat intolerance.   Genitourinary:  Negative for decreased urine volume, dysuria and urgency.   Musculoskeletal:  Negative for gait problem, neck pain and neck stiffness.   Skin:  Negative for color change and rash.   Allergic/Immunologic: Negative for immunocompromised state.   Neurological:  Negative for facial asymmetry and headaches.   Hematological:  Negative for adenopathy.   Psychiatric/Behavioral:  Negative for self-injury and suicidal ideas.      Past Medical History:   Diagnosis Date    Abdominal adhesions     Anxiety     Arthritis      Asthma     Cervical radiculopathy     Colitis     COPD (chronic obstructive pulmonary disease)     GERD (gastroesophageal reflux disease)     Heart attack     History of hepatitis C     Treated with Epclusa in 2019    History of recreational drug use     HTN (hypertension)     Kidney cysts     Left hip pain     Liver disease     Low back pain     Migraines     Obstructive chronic bronchitis with exacerbation     Sleep apnea     mild    Tattoos        Allergies:    Doxycycline      Past Surgical History:   Procedure Laterality Date    BACK SURGERY      COLONOSCOPY      COLONOSCOPY N/A 2022    Procedure: COLONOSCOPY with biopsies;  Surgeon: Giancarlo Ruiz MD;  Location:  [facility];  Service: Gastroenterology;  Laterality: N/A;    HERNIA REPAIR      HIP SPACER INSERTION WITH ANTIBIOTIC CEMENT Left 1/15/2020    Procedure: TOTAL HIP IMPLANT REMOVAL WITH INSERTION OF ANTIBIOTIC SPACER LEFT;  Surgeon: Ba Ramirez MD;  Location:  [facility];  Service: Orthopedics    SHOULDER LIGAMENT REPAIR      right shoulder    SMALL INTESTINE SURGERY      Small bowell resection    TOTAL HIP ARTHROPLASTY Left     TOTAL HIP ARTHROPLASTY Right     TOTAL HIP ARTHROPLASTY REVISION Left 3/5/2020    Procedure: HIP REIMPLANT REVISION LEFT;  Surgeon: Ba Ramirez MD;  Location:  [facility];  Service: Orthopedics;  Laterality: Left;    UPPER GASTROINTESTINAL ENDOSCOPY           Social History     Socioeconomic History    Marital status:    Tobacco Use    Smoking status: Former     Packs/day: 2.00     Years: 15.00     Pack years: 30.00     Types: Cigarettes     Quit date:      Years since quittin.6     Passive exposure: Current    Smokeless tobacco: Current     Types: Chew   Vaping Use    Vaping Use: Never used   Substance and Sexual Activity    Alcohol use: No     Comment: stopped drinking alcohol    Drug use: Not Currently     Comment: takes suboxone    Sexual activity: Defer         Family History  "  Problem Relation Age of Onset    Arthritis Other     Hypertension Other     Migraines Other     Heart attack Other     Stroke Other     Colon cancer Neg Hx        Objective  Physical Exam:  /94   Pulse 93   Temp 98.4 °F (36.9 °C) (Axillary)   Resp 22   Ht 185.4 cm (73\")   Wt 81.6 kg (180 lb)   SpO2 95%   BMI 23.75 kg/m²      Physical Exam  Constitutional:       General: He is in acute distress.      Appearance: Normal appearance. He is normal weight. He is ill-appearing. He is not toxic-appearing.   HENT:      Head: Normocephalic and atraumatic.      Nose: Nose normal. No congestion or rhinorrhea.      Mouth/Throat:      Mouth: Mucous membranes are moist.      Pharynx: Oropharynx is clear.   Eyes:      Extraocular Movements: Extraocular movements intact.      Conjunctiva/sclera: Conjunctivae normal.      Pupils: Pupils are equal, round, and reactive to light.   Cardiovascular:      Rate and Rhythm: Normal rate and regular rhythm.      Pulses: Normal pulses.   Pulmonary:      Effort: Pulmonary effort is normal. Tachypnea, accessory muscle usage and respiratory distress present.      Breath sounds: Examination of the right-upper field reveals wheezing. Examination of the left-upper field reveals wheezing. Examination of the right-middle field reveals wheezing. Examination of the left-middle field reveals wheezing. Examination of the right-lower field reveals wheezing. Examination of the left-lower field reveals wheezing. Wheezing present.      Comments: Diffuse wheezing throughout  Abdominal:      General: Abdomen is flat. Bowel sounds are normal. There is no distension.      Palpations: Abdomen is soft.      Tenderness: There is no abdominal tenderness.   Musculoskeletal:         General: No swelling or tenderness. Normal range of motion.      Cervical back: Normal range of motion and neck supple. No rigidity or tenderness.      Right lower leg: No edema.      Left lower leg: No edema.   Skin:     " General: Skin is warm and dry.      Capillary Refill: Capillary refill takes less than 2 seconds.   Neurological:      General: No focal deficit present.      Mental Status: He is alert and oriented to person, place, and time. Mental status is at baseline.      Cranial Nerves: No cranial nerve deficit.      Sensory: No sensory deficit.      Motor: No weakness.   Psychiatric:         Mood and Affect: Mood normal.         Behavior: Behavior normal.         Thought Content: Thought content normal.         Judgment: Judgment normal.       Critical Care  Performed by: Crispin Hudson MD  Authorized by: Crispin Hudson MD     Critical care provider statement:     Critical care time (minutes):  30    Critical care time was exclusive of:  Separately billable procedures and treating other patients    Critical care was necessary to treat or prevent imminent or life-threatening deterioration of the following conditions:  Respiratory failure    Critical care was time spent personally by me on the following activities:  Blood draw for specimens, development of treatment plan with patient or surrogate, discussions with primary provider, evaluation of patient's response to treatment, obtaining history from patient or surrogate, ordering and performing treatments and interventions, ordering and review of laboratory studies, ordering and review of radiographic studies, pulse oximetry, re-evaluation of patient's condition and review of old charts    Care discussed with: admitting provider      ED Course:    ED Course as of 08/27/23 1134   Sun Aug 27, 2023   1028 EKG interpreted by me, normal sinus rhythm no concerning ST changes noted, rate of 94 [JE]      ED Course User Index  [JE] Crispin Hudson MD       Lab Results (last 24 hours)       Procedure Component Value Units Date/Time    CBC & Differential [[id number]]  (Abnormal) Collected: 08/27/23 1007    Specimen: Blood Updated: 08/27/23 1014    Narrative:      The  "following orders were created for panel order CBC & Differential.  Procedure                               Abnormality         Status                     ---------                               -----------         ------                     CBC Auto Differential[[id number]]        Abnormal            Final result                 Please view results for these tests on the individual orders.    CBC Auto Differential [[id number]]  (Abnormal) Collected: 08/27/23 1007    Specimen: Blood Updated: 08/27/23 1014     WBC 7.28 10*3/mm3      RBC 5.48 10*6/mm3      Hemoglobin 14.7 g/dL      Hematocrit 45.8 %      MCV 83.6 fL      MCH 26.8 pg      MCHC 32.1 g/dL      RDW 12.9 %      RDW-SD 39.8 fl      MPV 9.6 fL      Platelets 222 10*3/mm3      Neutrophil % 67.9 %      Lymphocyte % 22.1 %      Monocyte % 9.6 %      Eosinophil % 0.0 %      Basophil % 0.1 %      Immature Grans % 0.3 %      Neutrophils, Absolute 4.94 10*3/mm3      Lymphocytes, Absolute 1.61 10*3/mm3      Monocytes, Absolute 0.70 10*3/mm3      Eosinophils, Absolute 0.00 10*3/mm3      Basophils, Absolute 0.01 10*3/mm3      Immature Grans, Absolute 0.02 10*3/mm3      nRBC 0.0 /100 WBC     Procalcitonin [[id number]]  (Normal) Collected: 08/27/23 1007    Specimen: Blood Updated: 08/27/23 1105     Procalcitonin 0.04 ng/mL     Narrative:      As a Marker for Sepsis (Non-Neonates):    1. <0.5 ng/mL represents a low risk of severe sepsis and/or septic shock.  2. >2 ng/mL represents a high risk of severe sepsis and/or septic shock.    As a Marker for Lower Respiratory Tract Infections that require antibiotic therapy:    PCT on Admission    Antibiotic Therapy       6-12 Hrs later    >0.5                Strongly Recommended  >0.25 - <0.5        Recommended   0.1 - 0.25          Discouraged              Remeasure/reassess PCT  <0.1                Strongly Discouraged     Remeasure/reassess PCT    As 28 day mortality risk marker: \"Change in Procalcitonin Result\" (>80% or <=80%) if " Day 0 (or Day 1) and Day 4 values are available. Refer to http://www.[facility]-pct-calculator.com    Change in PCT <=80%  A decrease of PCT levels below or equal to 80% defines a positive change in PCT test result representing a higher risk for 28-day all-cause mortality of patients diagnosed with severe sepsis for septic shock.    Change in PCT >80%  A decrease of PCT levels of more than 80% defines a negative change in PCT result representing a lower risk for 28-day all-cause mortality of patients diagnosed with severe sepsis or septic shock.       COVID-19 and FLU A/B PCR - Swab, Nasopharynx [[id number]]  (Normal) Collected: 08/27/23 1013    Specimen: Swab from Nasopharynx Updated: 08/27/23 1039     COVID19 Not Detected     Influenza A PCR Not Detected     Influenza B PCR Not Detected    Narrative:      Fact sheet for providers: https://www.fda.gov/media/675413/download    Fact sheet for patients: https://www.fda.gov/media/559514/download    Test performed by PCR.    Blood Gas, Arterial With Co-Ox [[id number]]  (Abnormal) Collected: 08/27/23 1029    Specimen: Arterial Blood Updated: 08/27/23 1030     Site Right Radial     Michael's Test N/A     pH, Arterial 7.288 pH units      Comment: 84 Value below reference range        pCO2, Arterial 58.1 mm Hg      Comment: 83 Value above reference range        pO2, Arterial 72.4 mm Hg      Comment: 84 Value below reference range        HCO3, Arterial 27.7 mmol/L      Comment: 83 Value above reference range        Base Excess, Arterial -0.2 mmol/L      Comment: 84 Value below reference range        O2 Saturation, Arterial 94.2 %      Hematocrit, Blood Gas 45.4 %      Oxyhemoglobin 93.0 %      Comment: 84 Value below reference range        Methemoglobin 0.30 %      Carboxyhemoglobin 0.9 %      A-a DO2 6.0 mmHg      Barometric Pressure for Blood Gas 733 mmHg      Modality Nasal Cannula     Flow Rate 5.0 lpm      Ventilator Mode NA     Collected by cb     Comment: Meter:  [id number]     :  [id number]        pH, Temp Corrected --     pCO2, Temperature Corrected --     pO2, Temperature Corrected --    Comprehensive Metabolic Panel [[id number]] Collected: 08/27/23 1040    Specimen: Blood Updated: 08/27/23 1107     Glucose 99 mg/dL      BUN 7 mg/dL      Creatinine 0.83 mg/dL      Sodium 142 mmol/L      Potassium 4.3 mmol/L      Chloride 104 mmol/L      CO2 28.7 mmol/L      Calcium 9.0 mg/dL      Total Protein 6.7 g/dL      Albumin 3.9 g/dL      ALT (SGPT) 17 U/L      AST (SGOT) 18 U/L      Alkaline Phosphatase 110 U/L      Total Bilirubin <0.2 mg/dL      Globulin 2.8 gm/dL      A/G Ratio 1.4 g/dL      BUN/Creatinine Ratio 8.4     Anion Gap 9.3 mmol/L      eGFR 108.0 mL/min/1.73     Narrative:      GFR Normal >60  Chronic Kidney Disease <60  Kidney Failure <15      BNP [[id number]]  (Abnormal) Collected: 08/27/23 1040    Specimen: Blood Updated: 08/27/23 1108     proBNP 512.4 pg/mL     Narrative:      Among patients with dyspnea, NT-proBNP is highly sensitive for the detection of acute congestive heart failure. In addition NT-proBNP of <300 pg/ml effectively rules out acute congestive heart failure with 99% negative predictive value.      Single High Sensitivity Troponin T [[id number]]  (Normal) Collected: 08/27/23 1040    Specimen: Blood Updated: 08/27/23 1106     HS Troponin T 10 ng/L     Narrative:      High Sensitive Troponin T Reference Range:  <10.0 ng/L- Negative Female for AMI  <15.0 ng/L- Negative Male for AMI  >=10 - Abnormal Female indicating possible myocardial injury.  >=15 - Abnormal Male indicating possible myocardial injury.   Clinicians would have to utilize clinical acumen, EKG, Troponin, and serial changes to determine if it is an Acute Myocardial Infarction or myocardial injury due to an underlying chronic condition.         High Sensitivity Troponin T [[id number]]  (Normal) Collected: 08/27/23 1040    Specimen: Blood Updated: 08/27/23 1106     HS Troponin T 10  ng/L     Narrative:      High Sensitive Troponin T Reference Range:  <10.0 ng/L- Negative Female for AMI  <15.0 ng/L- Negative Male for AMI  >=10 - Abnormal Female indicating possible myocardial injury.  >=15 - Abnormal Male indicating possible myocardial injury.   Clinicians would have to utilize clinical acumen, EKG, Troponin, and serial changes to determine if it is an Acute Myocardial Infarction or myocardial injury due to an underlying chronic condition.         C-reactive Protein [[id number]]  (Abnormal) Collected: 08/27/23 1040    Specimen: Blood Updated: 08/27/23 1107     C-Reactive Protein 1.15 mg/dL              XR Chest 1 View    Result Date: 8/27/2023  PORTABLE CHEST    8/27/2023 11:27 AM  HISTORY: Shortness of air  COMPARISON: August 23, 2023.  FINDINGS: The heart is  normal in size .  The mediastinum is unremarkable .  The lungs are clear .  There is no pneumothorax . The osseous structures  are unremarkable .      Impression: Negative portable chest.  This report was signed and finalized on 8/27/2023 10:45 AM by Dr Ja Taylor DO.          MDM      Initial impression of presenting illness: Shortness of breath    DDX: includes but is not limited to: COPD exacerbation, bacterial pneumonia, viral URI, PE    Patient arrives guarded with vitals interpreted by myself.     Pertinent features from physical exam: Diffuse wheezing throughout, increased work of breathing, hypoxic on increase from patient's home O2.    Initial diagnostic plan: CBC, CMP, troponin, ECG, chest x-ray, ABG, BNP, CRP, Pro-Siva    Results from initial plan were reviewed and interpreted by me revealing patient with decompensated hypercapnic respiratory failure on ABG.  Treated with medications as below    Diagnostic information from other sources: Discussed with mother at bedside, reviewed past medical records    Interventions / Re-evaluation: Given concerns for COPD exacerbation given Solu-Medrol, magnesium, DuoNeb, on my reassessment,  patient still with increased work of breathing, wheezing throughout.  Given this, will place on BiPAP    Results/clinical rationale were discussed with patient at bedside    Consultations/Discussion of results with other physicians: Given patient with hypercapnic respiratory failure discussed with hospitalist admitted to their service for further management    Disposition plan: Admit  -----        Final diagnoses:   COPD exacerbation          Crispin uHdson MD  08/27/23 8600

## 2023-08-27 NOTE — PLAN OF CARE
Problem: Fall Injury Risk  Goal: Absence of Fall and Fall-Related Injury  Intervention: Promote Injury-Free Environment  Recent Flowsheet Documentation  Taken 8/27/2023 1235 by Tiana Tran RN  Safety Promotion/Fall Prevention:   room organization consistent   safety round/check completed   toileting scheduled     Problem: Infection Progression (Sepsis/Septic Shock)  Goal: Absence of Infection Signs and Symptoms  Intervention: Initiate Sepsis Management  Recent Flowsheet Documentation  Taken 8/27/2023 1235 by Tiana Tran RN  Infection Prevention: rest/sleep promoted   Goal Outcome Evaluation:         Patient admitted to room 329, requiring BIPAP at 40% upon arrival with RR labored/shallow. Currently patient calm and titrated to 4 liters nasal cannula with oxygen saturation 90%. Labs being monitored, medications given per MAR
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
## 2023-08-28 LAB
ANION GAP SERPL CALCULATED.3IONS-SCNC: 10.7 MMOL/L (ref 5–15)
BASOPHILS # BLD AUTO: 0.01 10*3/MM3 (ref 0–0.2)
BASOPHILS NFR BLD AUTO: 0.1 % (ref 0–1.5)
BUN SERPL-MCNC: 13 MG/DL (ref 6–20)
BUN/CREAT SERPL: 17.6 (ref 7–25)
CALCIUM SPEC-SCNC: 9.1 MG/DL (ref 8.6–10.5)
CHLORIDE SERPL-SCNC: 101 MMOL/L (ref 98–107)
CO2 SERPL-SCNC: 24.3 MMOL/L (ref 22–29)
CREAT SERPL-MCNC: 0.74 MG/DL (ref 0.76–1.27)
DEPRECATED RDW RBC AUTO: 39.1 FL (ref 37–54)
EGFRCR SERPLBLD CKD-EPI 2021: 111.8 ML/MIN/1.73
EOSINOPHIL # BLD AUTO: 0 10*3/MM3 (ref 0–0.4)
EOSINOPHIL NFR BLD AUTO: 0 % (ref 0.3–6.2)
ERYTHROCYTE [DISTWIDTH] IN BLOOD BY AUTOMATED COUNT: 13.1 % (ref 12.3–15.4)
GLUCOSE SERPL-MCNC: 123 MG/DL (ref 65–99)
HCT VFR BLD AUTO: 41.6 % (ref 37.5–51)
HGB BLD-MCNC: 13.5 G/DL (ref 13–17.7)
IMM GRANULOCYTES # BLD AUTO: 0.09 10*3/MM3 (ref 0–0.05)
IMM GRANULOCYTES NFR BLD AUTO: 0.6 % (ref 0–0.5)
LYMPHOCYTES # BLD AUTO: 0.82 10*3/MM3 (ref 0.7–3.1)
LYMPHOCYTES NFR BLD AUTO: 5.7 % (ref 19.6–45.3)
MCH RBC QN AUTO: 26.7 PG (ref 26.6–33)
MCHC RBC AUTO-ENTMCNC: 32.5 G/DL (ref 31.5–35.7)
MCV RBC AUTO: 82.2 FL (ref 79–97)
MONOCYTES # BLD AUTO: 0.68 10*3/MM3 (ref 0.1–0.9)
MONOCYTES NFR BLD AUTO: 4.7 % (ref 5–12)
NEUTROPHILS NFR BLD AUTO: 12.91 10*3/MM3 (ref 1.7–7)
NEUTROPHILS NFR BLD AUTO: 88.9 % (ref 42.7–76)
NRBC BLD AUTO-RTO: 0 /100 WBC (ref 0–0.2)
PLATELET # BLD AUTO: 257 10*3/MM3 (ref 140–450)
PMV BLD AUTO: 9.9 FL (ref 6–12)
POTASSIUM SERPL-SCNC: 5 MMOL/L (ref 3.5–5.2)
RBC # BLD AUTO: 5.06 10*6/MM3 (ref 4.14–5.8)
SODIUM SERPL-SCNC: 136 MMOL/L (ref 136–145)
WBC NRBC COR # BLD: 14.51 10*3/MM3 (ref 3.4–10.8)

## 2023-08-28 PROCEDURE — 94799 UNLISTED PULMONARY SVC/PX: CPT

## 2023-08-28 PROCEDURE — 97161 PT EVAL LOW COMPLEX 20 MIN: CPT

## 2023-08-28 PROCEDURE — 80048 BASIC METABOLIC PNL TOTAL CA: CPT | Performed by: NURSE PRACTITIONER

## 2023-08-28 PROCEDURE — 94660 CPAP INITIATION&MGMT: CPT

## 2023-08-28 PROCEDURE — 25010000002 METHYLPREDNISOLONE PER 125 MG: Performed by: NURSE PRACTITIONER

## 2023-08-28 PROCEDURE — 94761 N-INVAS EAR/PLS OXIMETRY MLT: CPT

## 2023-08-28 PROCEDURE — 97165 OT EVAL LOW COMPLEX 30 MIN: CPT

## 2023-08-28 PROCEDURE — 25010000002 KETOROLAC TROMETHAMINE PER 15 MG: Performed by: NURSE PRACTITIONER

## 2023-08-28 PROCEDURE — 94664 DEMO&/EVAL PT USE INHALER: CPT

## 2023-08-28 PROCEDURE — 85025 COMPLETE CBC W/AUTO DIFF WBC: CPT | Performed by: NURSE PRACTITIONER

## 2023-08-28 PROCEDURE — 25010000002 ENOXAPARIN PER 10 MG: Performed by: NURSE PRACTITIONER

## 2023-08-28 PROCEDURE — 94760 N-INVAS EAR/PLS OXIMETRY 1: CPT

## 2023-08-28 RX ORDER — METHYLPREDNISOLONE SODIUM SUCCINATE 125 MG/2ML
60 INJECTION, POWDER, LYOPHILIZED, FOR SOLUTION INTRAMUSCULAR; INTRAVENOUS EVERY 12 HOURS
Status: COMPLETED | OUTPATIENT
Start: 2023-08-28 | End: 2023-08-28

## 2023-08-28 RX ORDER — KETOROLAC TROMETHAMINE 30 MG/ML
30 INJECTION, SOLUTION INTRAMUSCULAR; INTRAVENOUS ONCE AS NEEDED
Status: COMPLETED | OUTPATIENT
Start: 2023-08-28 | End: 2023-08-28

## 2023-08-28 RX ORDER — CALCIUM CARBONATE 500 MG/1
1 TABLET, CHEWABLE ORAL 3 TIMES DAILY PRN
Status: DISCONTINUED | OUTPATIENT
Start: 2023-08-28 | End: 2023-08-29 | Stop reason: HOSPADM

## 2023-08-28 RX ADMIN — TIOTROPIUM BROMIDE INHALATION SPRAY 2 PUFF: 3.12 SPRAY, METERED RESPIRATORY (INHALATION) at 07:16

## 2023-08-28 RX ADMIN — BUDESONIDE AND FORMOTEROL FUMARATE DIHYDRATE 2 PUFF: 160; 4.5 AEROSOL RESPIRATORY (INHALATION) at 07:17

## 2023-08-28 RX ADMIN — AZELASTINE 1 SPRAY: 1 SPRAY, METERED NASAL at 20:24

## 2023-08-28 RX ADMIN — SENNOSIDES AND DOCUSATE SODIUM 2 TABLET: 50; 8.6 TABLET ORAL at 20:24

## 2023-08-28 RX ADMIN — FINASTERIDE 5 MG: 5 TABLET, FILM COATED ORAL at 09:10

## 2023-08-28 RX ADMIN — ENOXAPARIN SODIUM 40 MG: 100 INJECTION SUBCUTANEOUS at 09:12

## 2023-08-28 RX ADMIN — AZELASTINE 1 SPRAY: 1 SPRAY, METERED NASAL at 09:11

## 2023-08-28 RX ADMIN — ATORVASTATIN CALCIUM 10 MG: 10 TABLET, FILM COATED ORAL at 20:24

## 2023-08-28 RX ADMIN — ACETAMINOPHEN 650 MG: 325 TABLET, FILM COATED ORAL at 19:06

## 2023-08-28 RX ADMIN — ASPIRIN 81 MG: 81 TABLET, COATED ORAL at 09:11

## 2023-08-28 RX ADMIN — PANTOPRAZOLE SODIUM 40 MG: 40 TABLET, DELAYED RELEASE ORAL at 06:20

## 2023-08-28 RX ADMIN — METHYLPREDNISOLONE SODIUM SUCCINATE 60 MG: 125 INJECTION, POWDER, LYOPHILIZED, FOR SOLUTION INTRAMUSCULAR; INTRAVENOUS at 21:11

## 2023-08-28 RX ADMIN — METHYLPREDNISOLONE SODIUM SUCCINATE 60 MG: 125 INJECTION, POWDER, LYOPHILIZED, FOR SOLUTION INTRAMUSCULAR; INTRAVENOUS at 09:25

## 2023-08-28 RX ADMIN — TRAZODONE HYDROCHLORIDE 50 MG: 50 TABLET ORAL at 20:24

## 2023-08-28 RX ADMIN — Medication 10 ML: at 09:12

## 2023-08-28 RX ADMIN — CALCIUM CARBONATE (ANTACID) CHEW TAB 500 MG 1 TABLET: 500 CHEW TAB at 15:03

## 2023-08-28 RX ADMIN — Medication 10 ML: at 20:25

## 2023-08-28 RX ADMIN — KETOROLAC TROMETHAMINE 30 MG: 30 INJECTION, SOLUTION INTRAMUSCULAR; INTRAVENOUS at 09:56

## 2023-08-28 RX ADMIN — CALCIUM CARBONATE (ANTACID) CHEW TAB 500 MG 1 TABLET: 500 CHEW TAB at 22:05

## 2023-08-28 RX ADMIN — BUDESONIDE AND FORMOTEROL FUMARATE DIHYDRATE 2 PUFF: 160; 4.5 AEROSOL RESPIRATORY (INHALATION) at 19:26

## 2023-08-28 RX ADMIN — METHADONE HYDROCHLORIDE 65 MG: 10 TABLET ORAL at 09:11

## 2023-08-28 RX ADMIN — SENNOSIDES AND DOCUSATE SODIUM 2 TABLET: 50; 8.6 TABLET ORAL at 09:11

## 2023-08-28 NOTE — THERAPY DISCHARGE NOTE
Patient Name: Topher Stoll  : 1975    MRN: [id number]                              Today's Date: 2023       Admit Date: 2023    Visit Dx:     ICD-10-CM ICD-9-CM   1. COPD exacerbation  J44.1 491.21     Patient Active Problem List   Diagnosis    Atherosclerosis of coronary artery    Primary hypertension    Hyperlipidemia    Osteoporosis    Pulmonary emphysema    Chronic viral hepatitis B without delta agent and without coma    Hep C w/o coma, chronic    total hip explant, antibiotic spacer placement 1/15/2020    Status post left hip reimplant revision    History of hepatitis B    Opioid dependence on agonist therapy    COPD without exacerbation    Chronic obstructive pulmonary disease with acute exacerbation    Inflammatory arthropathy    Severe persistent asthma without complication    COPD exacerbation    Chronic respiratory failure with hypoxia     Past Medical History:   Diagnosis Date    Abdominal adhesions     Anxiety     Arthritis     Asthma     Cervical radiculopathy     Colitis     COPD (chronic obstructive pulmonary disease)     GERD (gastroesophageal reflux disease)     Heart attack     History of hepatitis C     Treated with Epclusa in     History of recreational drug use     HTN (hypertension)     Impaired functional mobility, balance, gait, and endurance     Kidney cysts     Left hip pain     Liver disease     Low back pain     Migraines     Obstructive chronic bronchitis with exacerbation     Sleep apnea     mild    Tattoos      Past Surgical History:   Procedure Laterality Date    BACK SURGERY      COLONOSCOPY      COLONOSCOPY N/A 2022    Procedure: COLONOSCOPY with biopsies;  Surgeon: Giancarlo Ruiz MD;  Location: [facility] ENDOSCOPY;  Service: Gastroenterology;  Laterality: N/A;    HERNIA REPAIR      HIP SPACER INSERTION WITH ANTIBIOTIC CEMENT Left 1/15/2020    Procedure: TOTAL HIP IMPLANT REMOVAL WITH INSERTION OF ANTIBIOTIC SPACER LEFT;  Surgeon: Ba Ramirez  MD SHAI;  Location: [facility] OR;  Service: Orthopedics    SHOULDER LIGAMENT REPAIR      right shoulder    SMALL INTESTINE SURGERY      Small bowell resection    TOTAL HIP ARTHROPLASTY Left     TOTAL HIP ARTHROPLASTY Right     TOTAL HIP ARTHROPLASTY REVISION Left 3/5/2020    Procedure: HIP REIMPLANT REVISION LEFT;  Surgeon: Ba Ramirez MD;  Location: [facility] OR;  Service: Orthopedics;  Laterality: Left;    UPPER GASTROINTESTINAL ENDOSCOPY  2014      General Information       Row Name 08/28/23 1110          Physical Therapy Time and Intention    Document Type discharge evaluation/summary  -     Mode of Treatment physical therapy  -       Row Name 08/28/23 1110          General Information    Patient Profile Reviewed yes  -JR     Prior Level of Function independent:;all household mobility;community mobility  -JR     Existing Precautions/Restrictions fall;oxygen therapy device and L/min  -JR     Barriers to Rehab none identified  -JR       Row Name 08/28/23 1110          Living Environment    People in Home parent(s)  he lives in lower level of home and his parents live in the upper level  -       Row Name 08/28/23 1110          Home Main Entrance    Number of Stairs, Main Entrance none  -JR       Row Name 08/28/23 1110          Stairs Within Home, Primary    Number of Stairs, Within Home, Primary twelve  -JR     Stair Railings, Within Home, Primary railings safe and in good condition  -JR       Row Name 08/28/23 1110          Cognition    Orientation Status (Cognition) oriented x 4  -JR       Row Name 08/28/23 1110          Safety Issues, Functional Mobility    Safety Issues Affecting Function (Mobility) safety precautions follow-through/compliance;safety precaution awareness  -JR     Impairments Affecting Function (Mobility) endurance/activity tolerance;shortness of breath  -               User Key  (r) = Recorded By, (t) = Taken By, (c) = Cosigned By      Initials Name Provider Type    JR Avelina Rivera, PT  Physical Therapist                   Mobility       Row Name 08/28/23 1110          Bed Mobility    Bed Mobility supine-sit;sit-supine  -JR     Supine-Sit [place] (Bed Mobility) modified independence  -JR     Sit-Supine [place] (Bed Mobility) modified independence  -JR     Assistive Device (Bed Mobility) head of bed elevated  -JR       Row Name 08/28/23 1110          Sit-Stand Transfer    Sit-Stand [place] (Transfers) independent  -JR       Row Name 08/28/23 1110          Gait/Stairs (Locomotion)    [place] Level (Gait) standby assist  -JR     Assistive Device (Gait) other (see comments)  gait belt  -JR     Distance in Feet (Gait) 100  -JR     Deviations/Abnormal Patterns (Gait) base of support, wide  -JR     Left Sided Gait Deviations knee buckling, left side  -JR               User Key  (r) = Recorded By, (t) = Taken By, (c) = Cosigned By      Initials Name Provider Type    JR Avelina Rivera, PT Physical Therapist                   Obj/Interventions       Row Name 08/28/23 1110          Range of Motion Comprehensive    General Range of Motion bilateral lower extremity ROM WFL  -       Row Name 08/28/23 1110          Strength Comprehensive (MMT)    Comment, General Manual Muscle Testing (MMT) Assessment BLE grossly WFL  -JR       Row Name 08/28/23 1110          Balance    Balance Assessment sitting static balance;sitting dynamic balance;sit to stand dynamic balance;standing static balance;standing dynamic balance  -JR     Static Sitting Balance independent  -JR     Dynamic Sitting Balance independent  -JR     Position, Sitting Balance unsupported;sitting edge of bed  -JR     Sit to Stand Dynamic Balance standby assist  -JR     Static Standing Balance standby assist  -JR     Dynamic Standing Balance standby assist;contact guard  -JR     Position/Device Used, Standing Balance other (see comments)  gait belt  -JR               User Key  (r) = Recorded By, (t) = Taken By, (c) = Cosigned By       Initials Name Provider Type    JR Avelina Rivera, PT Physical Therapist                   Goals/Plan    No documentation.                  Clinical Impression       Row Name 08/28/23 1110          Pain    Pretreatment Pain Rating 0/10 - no pain  -JR     Posttreatment Pain Rating 0/10 - no pain  -JR     Pain Intervention(s) Repositioned;Ambulation/increased activity  -     Additional Documentation Pain Scale: Numbers Pre/Post-Treatment (Group)  -       Row Name 08/28/23 1110          Plan of Care Review    Plan of Care Reviewed With patient  -     Progress no change  -     Outcome Evaluation Patient participated well in PT evaluation and demonstrates safe mobility with supervision and 4 LPM of oxygen.  He walked 100 feet with SBA and has some left knee buckling as he fatigued.  He is encouraged to use a RW and to get up and walk with nursing throughout the day.  He reports that he will try to increase his mobility in the room.  He does not require the skills of PT; therefore, PT will sign off at this time.  -       Row Name 08/28/23 1110          Therapy Assessment/Plan (PT)    Patient/Family Therapy Goals Statement (PT) Patient is eager to go home  -     Criteria for Skilled Interventions Met (PT) no;no problems identified which require skilled intervention  -     Therapy Frequency (PT) evaluation only  -       Row Name 08/28/23 1110          Vital Signs    Pre SpO2 (%) 94  -JR     O2 Delivery Pre Treatment supplemental O2  4 LPM  -JR     Intra SpO2 (%) 90  -JR     O2 Delivery Intra Treatment supplemental O2  4 LPM  -JR     Post SpO2 (%) 93  -JR     O2 Delivery Post Treatment supplemental O2  4 LPM  -JR     Pre Patient Position Sitting  -JR     Intra Patient Position Standing  -JR     Post Patient Position Sitting  -JR       Row Name 08/28/23 1110          Positioning and Restraints    Pre-Treatment Position in bed  -JR     Post Treatment Position bed  -JR     In Bed sitting;call light within  reach;encouraged to call for assist  -               User Key  (r) = Recorded By, (t) = Taken By, (c) = Cosigned By      Initials Name Provider Type    Avelina Urbano, PT Physical Therapist                   Outcome Measures       Row Name 08/28/23 1110 08/28/23 0800       How much help from another person do you currently need...    Turning from your back to your side while in flat bed without using bedrails? 4  - 4  -SF    Moving from lying on back to sitting on the side of a flat bed without bedrails? 4  - 4  -SF    Moving to and from a bed to a chair (including a wheelchair)? 4  - 4  -SF    Standing up from a chair using your arms (e.g., wheelchair, bedside chair)? 4  - 4  -SF    Climbing 3-5 steps with a railing? 4  - 4  -SF    To walk in hospital room? 4  - 4  -SF    AM-PAC 6 Clicks Score (PT) 24  - 24  -    Highest level of mobility 8 --> Walked 250 feet or more  - 8 --> Walked 250 feet or more  -      Row Name 08/28/23 1229 08/28/23 1110       Functional Assessment    Outcome Measure Options AM-PAC 6 Clicks Daily Activity (OT)  -SD AM-PAC 6 Clicks Basic Mobility (PT)  -              User Key  (r) = Recorded By, (t) = Taken By, (c) = Cosigned By      Initials Name Provider Type    Avelina Urbano, PT Physical Therapist    Beth Carlos OT Occupational Therapist    Evelyn Chandra, RN Registered Nurse                  Physical Therapy Education       Title: PT OT SLP Therapies (In Progress)       Topic: Physical Therapy (In Progress)       Point: Mobility training (Done)       Learning Progress Summary             Patient Acceptance, E,TB, VU by  at 8/28/2023 1320    Comment: Importance of mobility to recovery                         Point: Home exercise program (Not Started)       Learner Progress:  Not documented in this visit.              Point: Body mechanics (Not Started)       Learner Progress:  Not documented in this visit.              Point: Precautions (Not  Started)       Learner Progress:  Not documented in this visit.                              User Key       Initials Effective Dates Name Provider Type [facility] 08/22/23 -  Avelina Rivera PT Physical Therapist PT                  PT Recommendation and Plan     Plan of Care Reviewed With: patient  Progress: no change  Outcome Evaluation: Patient participated well in PT evaluation and demonstrates safe mobility with supervision and 4 LPM of oxygen.  He walked 100 feet with SBA and has some left knee buckling as he fatigued.  He is encouraged to use a RW and to get up and walk with nursing throughout the day.  He reports that he will try to increase his mobility in the room.  He does not require the skills of PT; therefore, PT will sign off at this time.     Time Calculation:   PT Evaluation Complexity  History, PT Evaluation Complexity: 1-2 personal factors and/or comorbidities  Examination of Body Systems (PT Eval Complexity): 1-2 elements  Clinical Presentation (PT Evaluation Complexity): evolving  Clinical Decision Making (PT Evaluation Complexity): low complexity  Overall Complexity (PT Evaluation Complexity): low complexity     PT Charges       Row Name 08/28/23 1110             Time Calculation    Start Time 1110  -JR      PT Received On 08/28/23  -JR         Untimed Charges    PT Eval/Re-eval Minutes 50  -JR         Total Minutes    Untimed Charges Total Minutes 50  -JR       Total Minutes 50  -JR                User Key  (r) = Recorded By, (t) = Taken By, (c) = Cosigned By      Initials Name Provider Type     Avelina Rivera, PT Physical Therapist                  Therapy Charges for Today       Code Description Service Date Service Provider Modifiers Qty    [id number] HC PT EVAL LOW COMPLEXITY 3 8/28/2023 Avelina Rivera, PT GP 1            PT G-Codes  Outcome Measure Options: AM-PAC 6 Clicks Daily Activity (OT)  AM-PAC 6 Clicks Score (PT): 24  AM-PAC 6 Clicks Score (OT): 23    PT Discharge  Summary  Anticipated Discharge Disposition (PT): home    Avelina Rivera, PT  8/28/2023

## 2023-08-28 NOTE — PLAN OF CARE
Goal Outcome Evaluation:  Plan of Care Reviewed With: patient        Progress: no change  Outcome Evaluation: OT eval completed. Patient sitting up in bed, on 4L O2 satting 94%. Patient performed bed mobility with modified ind, performed U/LB dressing with S/U. Performed sit to stand ind, walked 100' with no AD with Sup-SBA. Encouraged use of walker as patient did lose balance. Patient satting 90% upon returning to bed, increased to 93%. Patient is able to perform all ADLs with Sup-SBA. Encouraged OOB and ambulation with nursing as tolerated. No further OT needs at this time.      Anticipated Discharge Disposition (OT): home

## 2023-08-28 NOTE — THERAPY DISCHARGE NOTE
Acute Care - Occupational Therapy Discharge  [facility]    Patient Name: Topher Stoll  : 1975    MRN: [id number]                              Today's Date: 2023       Admit Date: 2023    Visit Dx:     ICD-10-CM ICD-9-CM   1. COPD exacerbation  J44.1 491.21     Patient Active Problem List   Diagnosis    Atherosclerosis of coronary artery    Primary hypertension    Hyperlipidemia    Osteoporosis    Pulmonary emphysema    Chronic viral hepatitis B without delta agent and without coma    Hep C w/o coma, chronic    total hip explant, antibiotic spacer placement 1/15/2020    Status post left hip reimplant revision    History of hepatitis B    Opioid dependence on agonist therapy    COPD without exacerbation    Chronic obstructive pulmonary disease with acute exacerbation    Inflammatory arthropathy    Severe persistent asthma without complication    COPD exacerbation    Chronic respiratory failure with hypoxia     Past Medical History:   Diagnosis Date    Abdominal adhesions     Anxiety     Arthritis     Asthma     Cervical radiculopathy     Colitis     COPD (chronic obstructive pulmonary disease)     GERD (gastroesophageal reflux disease)     Heart attack     History of hepatitis C     Treated with Epclusa in 2019    History of recreational drug use     HTN (hypertension)     Kidney cysts     Left hip pain     Liver disease     Low back pain     Migraines     Obstructive chronic bronchitis with exacerbation     Sleep apnea     mild    Tattoos      Past Surgical History:   Procedure Laterality Date    BACK SURGERY      COLONOSCOPY      COLONOSCOPY N/A 2022    Procedure: COLONOSCOPY with biopsies;  Surgeon: Giancarlo Ruiz MD;  Location: [facility] ENDOSCOPY;  Service: Gastroenterology;  Laterality: N/A;    HERNIA REPAIR      HIP SPACER INSERTION WITH ANTIBIOTIC CEMENT Left 1/15/2020    Procedure: TOTAL HIP IMPLANT REMOVAL WITH INSERTION OF ANTIBIOTIC SPACER LEFT;  Surgeon: Ba Ramirez  MD;  Location:  [facility] OR;  Service: Orthopedics    SHOULDER LIGAMENT REPAIR      right shoulder    SMALL INTESTINE SURGERY      Small bowell resection    TOTAL HIP ARTHROPLASTY Left     TOTAL HIP ARTHROPLASTY Right     TOTAL HIP ARTHROPLASTY REVISION Left 3/5/2020    Procedure: HIP REIMPLANT REVISION LEFT;  Surgeon: Ba Ramirez MD;  Location: [facility] OR;  Service: Orthopedics;  Laterality: Left;    UPPER GASTROINTESTINAL ENDOSCOPY  2014      General Information       Row Name 08/28/23 1217          OT Time and Intention    Document Type discharge evaluation/summary  -SD     Mode of Treatment occupational therapy  -SD       Row Name 08/28/23 1217          General Information    Patient Profile Reviewed yes  -SD     Prior Level of Function independent:;all household mobility;community mobility;ADL's  Patient has a cane, walker and shower chair from prior hip replacement, has home O2 as needed  -SD     Existing Precautions/Restrictions fall;oxygen therapy device and L/min  -SD     Barriers to Rehab none identified  -SD       Row Name 08/28/23 1217          Living Environment    People in Home parent(s)  -SD       Row Name 08/28/23 1217          Home Main Entrance    Number of Stairs, Main Entrance none  -SD       Row Name 08/28/23 1217          Stairs Within Home, Primary    Number of Stairs, Within Home, Primary twelve  -SD     Stair Railings, Within Home, Primary railings safe and in good condition  -SD       Row Name 08/28/23 1217          Cognition    Orientation Status (Cognition) oriented x 4  -SD       Row Name 08/28/23 1217          Safety Issues, Functional Mobility    Safety Issues Affecting Function (Mobility) safety precautions follow-through/compliance;safety precaution awareness  -SD     Impairments Affecting Function (Mobility) endurance/activity tolerance;shortness of breath  -SD               User Key  (r) = Recorded By, (t) = Taken By, (c) = Cosigned By      Initials Name Provider Type    SD  Beth Prince OT Occupational Therapist                   Mobility/ADL's       Row Name 08/28/23 1224          Bed Mobility    Bed Mobility supine-sit;sit-supine  -SD     Supine-Sit [place] (Bed Mobility) modified independence  -SD     Sit-Supine [place] (Bed Mobility) modified independence  -SD     Assistive Device (Bed Mobility) head of bed elevated  -SD       Row Name 08/28/23 1224          Transfers    Transfers sit-stand transfer  -SD       Row Name 08/28/23 1224          Sit-Stand Transfer    Sit-Stand [place] (Transfers) independent  -SD       Row Name 08/28/23 1224          Functional Mobility    Functional Mobility- Ind. Level supervision required;standby assist  -SD     Functional Mobility-Distance (Feet) 100  -SD     Functional Mobility- Safety Issues supplemental O2  -SD       Row Name 08/28/23 1224          Activities of Daily Living    BADL Assessment/Intervention bathing;upper body dressing;lower body dressing;grooming;feeding;toileting  -SD       Row Name 08/28/23 1224          Bathing Assessment/Intervention    [place] Level (Bathing) supervision;standby assist  -SD       Row Name 08/28/23 1224          Upper Body Dressing Assessment/Training    [place] Level (Upper Body Dressing) set up  -SD       Row Name 08/28/23 1224          Lower Body Dressing Assessment/Training    [place] Level (Lower Body Dressing) set up  -SD       Row Name 08/28/23 1224          Grooming Assessment/Training    [place] Level (Grooming) set up  -SD       Row Name 08/28/23 1224          Self-Feeding Assessment/Training    [place] Level (Feeding) set up  -SD       Row Name 08/28/23 1224          Toileting Assessment/Training    [place] Level (Toileting) supervision;standby assist  -SD               User Key  (r) = Recorded By, (t) = Taken By, (c) = Cosigned By      Initials Name Provider Type    SD Beth Prince OT Occupational Therapist                   Obj/Interventions        Row Name 08/28/23 1225          Range of Motion Comprehensive    General Range of Motion bilateral upper extremity ROM WFL  -SD       Row Name 08/28/23 1225          Strength Comprehensive (MMT)    Comment, General Manual Muscle Testing (MMT) Assessment UB WFL  -SD               User Key  (r) = Recorded By, (t) = Taken By, (c) = Cosigned By      Initials Name Provider Type    Beth Carlos OT Occupational Therapist                   Goals/Plan    No documentation.                  Clinical Impression       Row Name 08/28/23 1225          Pain Assessment    Pretreatment Pain Rating 0/10 - no pain  -SD     Posttreatment Pain Rating 0/10 - no pain  -SD       Row Name 08/28/23 1225          Plan of Care Review    Plan of Care Reviewed With patient  -SD     Progress no change  -SD     Outcome Evaluation OT eval completed. Patient sitting up in bed, on 4L O2 satting 94%. Patient performed bed mobility with modified ind, performed U/LB dressing with S/U. Performed sit to stand ind, walked 100' with no AD with Sup-SBA. Encouraged use of walker as patient did lose balance. Patient satting 90% upon returning to bed, increased to 93%. Patient is able to perform all ADLs with Sup-SBA. Encouraged OOB and ambulation with nursing as tolerated. No further OT needs at this time.  -SD       Row Name 08/28/23 1225          Therapy Assessment/Plan (OT)    Rehab Potential (OT) good, to achieve stated therapy goals  -SD     Criteria for Skilled Therapeutic Interventions Met (OT) no problems identified which require skilled intervention  -SD     Therapy Frequency (OT) evaluation only  -SD       Row Name 08/28/23 1225          Therapy Plan Review/Discharge Plan (OT)    Anticipated Discharge Disposition (OT) home  -SD       [facility] Name 08/28/23 1225          Vital Signs    Pre SpO2 (%) 94  -SD     O2 Delivery Pre Treatment supplemental O2  -SD     Intra SpO2 (%) 90  -SD     O2 Delivery Intra Treatment supplemental O2  -SD     Post  SpO2 (%) 93  -SD     O2 Delivery Post Treatment supplemental O2  -SD       Row Name 08/28/23 1225          Positioning and Restraints    Pre-Treatment Position in bed  -SD     Post Treatment Position bed  -SD     In Bed sitting;call light within reach;encouraged to call for assist  -SD               User Key  (r) = Recorded By, (t) = Taken By, (c) = Cosigned By      Initials Name Provider Type    Beth Carlos OT Occupational Therapist                   Outcome Measures       Row Name 08/28/23 1229          How much help from another is currently needed...    Putting on and taking off regular lower body clothing? 4  -SD     Bathing (including washing, rinsing, and drying) 3  -SD     Toileting (which includes using toilet bed pan or urinal) 4  -SD     Putting on and taking off regular upper body clothing 4  -SD     Taking care of personal grooming (such as brushing teeth) 4  -SD     Eating meals 4  -SD     AM-PAC 6 Clicks Score (OT) 23  -SD       Row Name 08/28/23 0800          How much help from another person do you currently need...    Turning from your back to your side while in flat bed without using bedrails? 4  -SF     Moving from lying on back to sitting on the side of a flat bed without bedrails? 4  -SF     Moving to and from a bed to a chair (including a wheelchair)? 4  -SF     Standing up from a chair using your arms (e.g., wheelchair, bedside chair)? 4  -SF     Climbing 3-5 steps with a railing? 4  -SF     To walk in hospital room? 4  -SF     AM-PAC 6 Clicks Score (PT) 24  -SF       Row Name 08/28/23 1229          Functional Assessment    Outcome Measure Options AM-PAC 6 Clicks Daily Activity (OT)  -SD               User Key  (r) = Recorded By, (t) = Taken By, (c) = Cosigned By      Initials Name Provider Type    Beth Carlos OT Occupational Therapist    Evelyn Chandra, RN Registered Nurse                  Occupational Therapy Education       Title: PT OT SLP Therapies (In Progress)        Topic: Occupational Therapy (In Progress)       Point: ADL training (Done)       Description:   Instruct learner(s) on proper safety adaptation and remediation techniques during self care or transfers.   Instruct in proper use of assistive devices.                  Learning Progress Summary             Patient Acceptance, E,TB, VU by SD at 8/28/2023 0743    Comment: No further OT needs                         Point: Home exercise program (Not Started)       Description:   Instruct learner(s) on appropriate technique for monitoring, assisting and/or progressing therapeutic exercises/activities.                  Learner Progress:  Not documented in this visit.              Point: Precautions (Not Started)       Description:   Instruct learner(s) on prescribed precautions during self-care and functional transfers.                  Learner Progress:  Not documented in this visit.              Point: Body mechanics (Not Started)       Description:   Instruct learner(s) on proper positioning and spine alignment during self-care, functional mobility activities and/or exercises.                  Learner Progress:  Not documented in this visit.                              User Key       Initials Effective Dates Name Provider Type Discipline    SD 06/16/21 -  Beth Prince OT Occupational Therapist OT                  OT Recommendation and Plan  Therapy Frequency (OT): evaluation only  Plan of Care Review  Plan of Care Reviewed With: patient  Progress: no change  Outcome Evaluation: OT eval completed. Patient sitting up in bed, on 4L O2 satting 94%. Patient performed bed mobility with modified ind, performed U/LB dressing with S/U. Performed sit to stand ind, walked 100' with no AD with Sup-SBA. Encouraged use of walker as patient did lose balance. Patient satting 90% upon returning to bed, increased to 93%. Patient is able to perform all ADLs with Sup-SBA. Encouraged OOB and ambulation with nursing as tolerated. No  further OT needs at this time.  Plan of Care Reviewed With: patient  Outcome Evaluation: OT eval completed. Patient sitting up in bed, on 4L O2 satting 94%. Patient performed bed mobility with modified ind, performed U/LB dressing with S/U. Performed sit to stand ind, walked 100' with no AD with Sup-SBA. Encouraged use of walker as patient did lose balance. Patient satting 90% upon returning to bed, increased to 93%. Patient is able to perform all ADLs with Sup-SBA. Encouraged OOB and ambulation with nursing as tolerated. No further OT needs at this time.     Time Calculation:   Evaluation Complexity (OT)  Review Occupational Profile/Medical/Therapy History Complexity: brief/low complexity  Assessment, Occupational Performance/Identification of Deficit Complexity: 1-3 performance deficits  Clinical Decision Making Complexity (OT): problem focused assessment/low complexity  Overall Complexity of Evaluation (OT): low complexity     Time Calculation- OT       Row Name 08/28/23 1230             Time Calculation- OT    OT Start Time 1112  -SD      OT Received On 08/28/23  -SD         Untimed Charges    OT Eval/Re-eval Minutes 45  -SD         Total Minutes    Untimed Charges Total Minutes 45  -SD       Total Minutes 45  -SD                User Key  (r) = Recorded By, (t) = Taken By, (c) = Cosigned By      Initials Name Provider Type    SD Beth Prince OT Occupational Therapist                  Therapy Charges for Today       Code Description Service Date Service Provider Modifiers Qty    [id number]  OT EVAL LOW COMPLEXITY 3 8/28/2023 Beth Prince OT GO 1               OT Discharge Summary  Anticipated Discharge Disposition (OT): home    Beth Prince OT  8/28/2023

## 2023-08-28 NOTE — PLAN OF CARE
Goal Outcome Evaluation:  Plan of Care Reviewed With: patient        Progress: no change  Outcome Evaluation: Patient participated well in PT evaluation and demonstrates safe mobility with supervision and 4 LPM of oxygen.  He walked 100 feet with SBA and has some left knee buckling as he fatigued.  He is encouraged to use a RW and to get up and walk with nursing throughout the day.  He reports that he will try to increase his mobility in the room.  He does not require the skills of PT; therefore, PT will sign off at this time.      Anticipated Discharge Disposition (PT): home

## 2023-08-28 NOTE — CASE MANAGEMENT/SOCIAL WORK
Discharge Planning Assessment   Rob     Patient Name: Topher Stoll  MRN: [id number]  Today's Date: 8/28/2023    Admit Date: 8/27/2023    Plan: DCP   Discharge Needs Assessment       Row Name 08/28/23 0915       Living Environment    People in Home parent(s)    Current Living Arrangements home    Potentially Unsafe Housing Conditions none    Primary Care Provided by self    Provides Primary Care For no one    Family Caregiver if Needed none    Quality of Family Relationships supportive;involved;helpful    Able to Return to Prior Arrangements yes       Resource/Environmental Concerns    Resource/Environmental Concerns none    Transportation Concerns none       Food Insecurity    Within the past 12 months, you worried that your food would run out before you got the money to buy more. Never true    Within the past 12 months, the food you bought just didn't last and you didn't have money to get more. Never true       Transition Planning    Patient/Family Anticipates Transition to home    Transportation Anticipated family or friend will provide       Discharge Needs Assessment    Readmission Within the Last 30 Days no previous admission in last 30 days    Equipment Currently Used at Home pulse ox;oxygen    Concerns to be Addressed no discharge needs identified    Anticipated Changes Related to Illness none    Equipment Needed After Discharge none    Provided Post Acute Provider List? N/A    Provided Post Acute Provider Quality & Resource List? N/A                   Discharge Plan       Row Name 08/28/23 0924       Plan    Plan DCP    Patient/Family in Agreement with Plan unable to assess    Provided Post Acute Provider List? N/A    Provided Post Acute Provider Quality & Resource List? N/A    Plan Comments SW met with Pt. at bedside to complete DCP. Pt. states that he lives at home with his mother and was indepdent prior without the use of a device. Pt. has home oxyegn through [facility]. Pt. states that he was  still driving prior. Pt. uses [email or web address] as his pharmacy. Pt. does not have a POA or advanced directive. Pt. states that he plans to return home with his mother and she will provide discharge transport. Pt. has not used HH services prior. SW/CM will continue to follow for discharge planning.                  Continued Care and Services - Admitted Since 8/27/2023    Coordination has not been started for this encounter.       Selected Continued Care - Episodes Includes continued care and service providers with selected services from the active episodes listed below      Asthma Episode start date: 6/29/2023   There are no active outsourced providers for this episode.                 Expected Discharge Date and Time       Expected Discharge Date Expected Discharge Time    Aug 29, 2023            Demographic Summary       Row Name 08/28/23 0913       General Information    Admission Type inpatient    Arrived From home    Required Notices Provided Important Message from Medicare    Referral Source admission list    Reason for Consult discharge planning    Preferred Language English       Contact Information    Permission Granted to Share Info With     Contact Information Obtained for                    Functional Status       Row Name 08/28/23 0914       Functional Status    Usual Activity Tolerance good    Current Activity Tolerance moderate       Physical Activity    On average, how many days per week do you engage in moderate to strenuous exercise (like a brisk walk)? 0 days    On average, how many minutes do you engage in exercise at this level? 0 min    Number of minutes of exercise per week 0       Assessment of Health Literacy    How often do you have someone help you read hospital materials? Occasionally    How often do you have a problem understanding what is told to you about your medical condition? Occasionally    How confident are you filling out medical forms by yourself? Somewhat     Health Literacy Good       Functional Status, IADL    Medications independent    Meal Preparation independent    Housekeeping independent    Laundry independent    Shopping independent       Mental Status Summary    Recent Changes in Mental Status/Cognitive Functioning no changes       Employment/    Employment Status disabled    Current or Previous Occupation not applicable                   Psychosocial       Row Name 08/28/23 0914       Values/Beliefs    Spiritual, Cultural Beliefs, Jew Practices, Values that Affect Care no       Mental Health    Little Interest or Pleasure in Doing Things 0-->not at all    Feeling Down, Depressed or Hopeless 0-->not at all       Coping/Stress    Major Change/Loss/Stressor illness    Patient Personal Strengths resilient    Sources of Support parent(s)    Techniques to [place] with Loss/Stress/Change not applicable    Reaction to Health Status realistic    Understanding of Condition and Treatment adequate understanding of treatment       Developmental Stage (Eriksson's)    Developmental Stage Stage 7 (35-65 years/Middle Adulthood) Generativity vs. Stagnation       C-SSRS (Recent)    Q1 Wished to be Dead (Past Month) no    Q2 Suicidal Thoughts (Past Month) no    Q6 Suicide Behavior (Lifetime) no       Violence Risk    Feels Like Hurting Others no    Previous Attempt to Harm Others no                   Abuse/Neglect       Row Name 08/28/23 0915       Personal Safety    Feels Unsafe at Home or Work/School no    Feels Threatened by Someone no    Does Anyone Try to Keep You From Having Contact with Others or Doing Things Outside Your Home? no    Physical Signs of Abuse Present no                   Legal    No documentation.                  Substance Abuse    No documentation.                  Patient Forms    No documentation.                     Darrell Joyner

## 2023-08-28 NOTE — PLAN OF CARE
Problem: Fall Injury Risk  Goal: Absence of Fall and Fall-Related Injury  Outcome: Ongoing, Progressing  Intervention: Promote Injury-Free Environment  Recent Flowsheet Documentation  Taken 8/28/2023 0400 by Shanika Velazquez RN  Safety Promotion/Fall Prevention:   safety round/check completed   activity supervised   assistive device/personal items within reach   clutter free environment maintained  Taken 8/28/2023 0200 by Shanika Velazquez, RN  Safety Promotion/Fall Prevention:   safety round/check completed   activity supervised   assistive device/personal items within reach   clutter free environment maintained   fall prevention program maintained  Taken 8/28/2023 0000 by Shanika Velazquez RN  Safety Promotion/Fall Prevention:   safety round/check completed   activity supervised   assistive device/personal items within reach   clutter free environment maintained   fall prevention program maintained   nonskid shoes/slippers when out of bed  Taken 8/27/2023 2200 by Shanika Velazquez, RN  Safety Promotion/Fall Prevention: safety round/check completed  Taken 8/27/2023 2012 by Shanika Velazquez RN  Safety Promotion/Fall Prevention:   safety round/check completed   activity supervised   assistive device/personal items within reach   clutter free environment maintained   fall prevention program maintained   Goal Outcome Evaluation:         VSS. Patient had complaints of chest pain around 1930. Hospitalist aware. See orders. No other complaints during the night. Care plan reviewed with patient. Will continue current plan of care.

## 2023-08-28 NOTE — PLAN OF CARE
Problem: Adult Inpatient Plan of Care  Goal: Plan of Care Review  Outcome: Ongoing, Progressing  Flowsheets  Taken 8/28/2023 1618 by Evelyn Brink, RN  Progress: no change  Taken 8/28/2023 1225 by Beth Prince OT  Plan of Care Reviewed With: patient   Goal Outcome Evaluation:           Progress: no change          VSS. Pt remains on 4 L NC. Pt complained of acid reflux and it was controlled as needed, see MAR. No acute events to report. Plan of care reviewed with the patient, will continue to monitor.
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
## 2023-08-29 ENCOUNTER — READMISSION MANAGEMENT (OUTPATIENT)
Dept: CALL CENTER | Facility: HOSPITAL | Age: 48
End: 2023-08-29
Payer: MEDICARE

## 2023-08-29 VITALS
BODY MASS INDEX: 24.11 KG/M2 | OXYGEN SATURATION: 92 % | HEIGHT: 73 IN | TEMPERATURE: 98.4 F | SYSTOLIC BLOOD PRESSURE: 135 MMHG | RESPIRATION RATE: 18 BRPM | DIASTOLIC BLOOD PRESSURE: 96 MMHG | HEART RATE: 74 BPM | WEIGHT: 181.88 LBS

## 2023-08-29 PROCEDURE — 25010000002 ENOXAPARIN PER 10 MG: Performed by: NURSE PRACTITIONER

## 2023-08-29 PROCEDURE — 94799 UNLISTED PULMONARY SVC/PX: CPT

## 2023-08-29 PROCEDURE — 94761 N-INVAS EAR/PLS OXIMETRY MLT: CPT

## 2023-08-29 PROCEDURE — 94664 DEMO&/EVAL PT USE INHALER: CPT

## 2023-08-29 RX ORDER — PREDNISONE 10 MG/1
10 TABLET ORAL DAILY
Qty: 63 TABLET | Refills: 0 | Status: SHIPPED | OUTPATIENT
Start: 2023-08-29

## 2023-08-29 RX ADMIN — Medication 10 ML: at 09:14

## 2023-08-29 RX ADMIN — AZELASTINE 1 SPRAY: 1 SPRAY, METERED NASAL at 09:14

## 2023-08-29 RX ADMIN — BUDESONIDE AND FORMOTEROL FUMARATE DIHYDRATE 2 PUFF: 160; 4.5 AEROSOL RESPIRATORY (INHALATION) at 07:09

## 2023-08-29 RX ADMIN — ASPIRIN 81 MG: 81 TABLET, COATED ORAL at 09:13

## 2023-08-29 RX ADMIN — SENNOSIDES AND DOCUSATE SODIUM 2 TABLET: 50; 8.6 TABLET ORAL at 09:13

## 2023-08-29 RX ADMIN — FINASTERIDE 5 MG: 5 TABLET, FILM COATED ORAL at 09:13

## 2023-08-29 RX ADMIN — ENOXAPARIN SODIUM 40 MG: 100 INJECTION SUBCUTANEOUS at 09:13

## 2023-08-29 RX ADMIN — TIOTROPIUM BROMIDE INHALATION SPRAY 2 PUFF: 3.12 SPRAY, METERED RESPIRATORY (INHALATION) at 07:09

## 2023-08-29 RX ADMIN — PANTOPRAZOLE SODIUM 40 MG: 40 TABLET, DELAYED RELEASE ORAL at 05:24

## 2023-08-29 RX ADMIN — METHADONE HYDROCHLORIDE 65 MG: 10 TABLET ORAL at 09:13
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
## 2023-08-29 NOTE — PLAN OF CARE
Goal Outcome Evaluation:  Plan of Care Reviewed With: patient        Progress: improving  Outcome Evaluation: VSS, able to wean nc down to 3L and maintain o2 sats >90%

## 2023-08-29 NOTE — CASE MANAGEMENT/SOCIAL WORK
Case Management Discharge Note      Final Note: Pt. will be discharging home with his mother. Pt. mother will be bringing his portable oxygen from [facility]. Pt. mother will be providing discharge transport.    Provided Post Acute Provider List?: N/A  Provided Post Acute Provider Quality & Resource List?: N/A    Selected Continued Care - Admitted Since 8/27/2023       Destination    No services have been selected for the patient.                Durable Medical Equipment    No services have been selected for the patient.                Dialysis/Infusion    No services have been selected for the patient.                Home Medical Care    No services have been selected for the patient.                Therapy    No services have been selected for the patient.                Community Resources    No services have been selected for the patient.                Community & DME    No services have been selected for the patient.                    Selected Continued Care - Episodes Includes continued care and service providers with selected services from the active episodes listed below      Asthma Episode start date: 6/29/2023   There are no active outsourced providers for this episode.                 Transportation Services  Private: Car    Final Discharge Disposition Code: 01 - home or self-care

## 2023-08-29 NOTE — OUTREACH NOTE
Prep Survey      Flowsheet Row Responses   [facility] patient discharged from? [place]   Is LACE score < 7 ? No   Eligibility [facility]   Date of Admission 08/27/23   Date of Discharge 08/29/23   Discharge Disposition Home or Self Care   Discharge diagnosis Acute on chronic respiratory failure with hypoxia, COPD   Does the patient have one of the following disease processes/diagnoses(primary or secondary)? COPD   Does the patient have Home health ordered? No   Is there a DME ordered? No   Prep survey completed? Yes            ESTUARDO ROWAN - Registered Nurse

## 2023-08-30 ENCOUNTER — TRANSITIONAL CARE MANAGEMENT TELEPHONE ENCOUNTER (OUTPATIENT)
Dept: CALL CENTER | Facility: HOSPITAL | Age: 48
End: 2023-08-30
Payer: MEDICARE

## 2023-08-30 NOTE — OUTREACH NOTE
Call Center TCM Note      Flowsheet Row Responses   [facility] patient discharged from? [place]   Does the patient have one of the following disease processes/diagnoses(primary or secondary)? COPD   TCM attempt successful? Yes   Call start time 1257   Call end time 1318   Discharge diagnosis Acute on chronic respiratory failure with hypoxia, COPD   Person spoke with today (if not patient) and relationship Josee Quiñones reviewed with patient/caregiver? Yes   Is the patient having any side effects they believe may be caused by any medication additions or changes? No   Does the patient have all medications ordered at discharge? Yes   Is the patient taking all medications as directed (includes completed medication regime)? Yes   Comments Patient has a PCP hospital followup on 9/1/2023 with Dr Hoffmann.   Does the patient have an appointment with their PCP within 7-14 days of discharge? Yes   DME comments 02 in place at 2 [facility]   Pulse Ox monitoring Intermittent   Pulse Ox device source Patient   O2 Sat: education provided Sat levels, Monitoring frequency, When to seek care   Psychosocial issues? No   Did the patient receive a copy of their discharge instructions? Yes   Nursing interventions Reviewed instructions with patient   What is the patient's perception of their health status since discharge? Same  [With activity and without 02 sats drop to 84 but he recovers welll with rest and 02.]   Nursing Interventions Nurse provided patient education   Are the patient's immunizations up to date?  Yes   Is the patient/caregiver able to teach back the hierarchy of who to call/visit for symptoms/problems? PCP, Specialist, Home health nurse, Urgent Care, ED, 911 Yes   Is the patient able to teach back COPD zones? Yes   Nursing interventions Education provided on various zones   Patient reports what zone on this call? Yellow Zone   Yellow Zone More breathless than usual, I have less energy for my daily activities   Yellow  interventions Continue taking daily medications, Call provider immediately if symptoms don't improve: they may indicate that an adjustment in medication or oxygen therapy is needed, Start an oral corticosteroid if ordered, Use oxygen as prescribed, Get plenty of rest   TCM call completed? Yes   Wrap up additional comments Patient is wearing O2 all the time. With ambulation he went upstairs without 02 and sats dropped to 84%. He immediately replaced O2 and is resting and saturations are 94 with 02 at 2 L.   Call end time 1318   Would this patient benefit from a Referral to [facility] Social Work? No   Is the patient interested in additional calls from an ambulatory ? No            Alberto Cisse RN    8/30/2023, 13:19 EDT

## 2023-08-31 ENCOUNTER — HOSPITAL ENCOUNTER (EMERGENCY)
Facility: HOSPITAL | Age: 48
Discharge: HOME | End: 2023-08-31
Attending: EMERGENCY MEDICINE
Payer: MEDICARE

## 2023-08-31 ENCOUNTER — APPOINTMENT (OUTPATIENT)
Dept: GENERAL RADIOLOGY | Facility: HOSPITAL | Age: 48
End: 2023-08-31
Payer: MEDICARE

## 2023-08-31 VITALS
HEIGHT: 73 IN | SYSTOLIC BLOOD PRESSURE: 132 MMHG | TEMPERATURE: 97.9 F | OXYGEN SATURATION: 96 % | RESPIRATION RATE: 18 BRPM | WEIGHT: 181 LBS | BODY MASS INDEX: 23.99 KG/M2 | DIASTOLIC BLOOD PRESSURE: 93 MMHG | HEART RATE: 73 BPM

## 2023-08-31 DIAGNOSIS — J44.1 COPD EXACERBATION: Primary | ICD-10-CM

## 2023-08-31 DIAGNOSIS — J06.9 VIRAL UPPER RESPIRATORY INFECTION: ICD-10-CM

## 2023-08-31 LAB
ALBUMIN SERPL-MCNC: 4.3 G/DL (ref 3.5–5.2)
ALBUMIN/GLOB SERPL: 1.7 G/DL
ALP SERPL-CCNC: 100 U/L (ref 39–117)
ALT SERPL W P-5'-P-CCNC: 24 U/L (ref 1–41)
ANION GAP SERPL CALCULATED.3IONS-SCNC: 12.9 MMOL/L (ref 5–15)
AST SERPL-CCNC: 31 U/L (ref 1–40)
ATMOSPHERIC PRESS: 735 MMHG
B PARAPERT DNA SPEC QL NAA+PROBE: NOT DETECTED
B PERT DNA SPEC QL NAA+PROBE: NOT DETECTED
BASE EXCESS BLDV CALC-SCNC: 3.1 MMOL/L (ref 0–2)
BASOPHILS # BLD AUTO: 0.01 10*3/MM3 (ref 0–0.2)
BASOPHILS NFR BLD AUTO: 0.1 % (ref 0–1.5)
BDY SITE: ABNORMAL
BILIRUB SERPL-MCNC: 0.2 MG/DL (ref 0–1.2)
BUN SERPL-MCNC: 18 MG/DL (ref 6–20)
BUN/CREAT SERPL: 19.6 (ref 7–25)
C PNEUM DNA NPH QL NAA+NON-PROBE: NOT DETECTED
CALCIUM SPEC-SCNC: 8.9 MG/DL (ref 8.6–10.5)
CHLORIDE SERPL-SCNC: 99 MMOL/L (ref 98–107)
CO2 SERPL-SCNC: 26.1 MMOL/L (ref 22–29)
CREAT SERPL-MCNC: 0.92 MG/DL (ref 0.76–1.27)
DEPRECATED RDW RBC AUTO: 40.7 FL (ref 37–54)
EGFRCR SERPLBLD CKD-EPI 2021: 102.6 ML/MIN/1.73
EOSINOPHIL # BLD AUTO: 0.01 10*3/MM3 (ref 0–0.4)
EOSINOPHIL NFR BLD AUTO: 0.1 % (ref 0.3–6.2)
ERYTHROCYTE [DISTWIDTH] IN BLOOD BY AUTOMATED COUNT: 13.5 % (ref 12.3–15.4)
FLUAV SUBTYP SPEC NAA+PROBE: NOT DETECTED
FLUBV RNA ISLT QL NAA+PROBE: NOT DETECTED
GAS FLOW AIRWAY: 2 LPM
GLOBULIN UR ELPH-MCNC: 2.5 GM/DL
GLUCOSE SERPL-MCNC: 116 MG/DL (ref 65–99)
HADV DNA SPEC NAA+PROBE: NOT DETECTED
HCO3 BLDV-SCNC: 29.1 MMOL/L (ref 22–28)
HCOV 229E RNA SPEC QL NAA+PROBE: NOT DETECTED
HCOV HKU1 RNA SPEC QL NAA+PROBE: NOT DETECTED
HCOV NL63 RNA SPEC QL NAA+PROBE: NOT DETECTED
HCOV OC43 RNA SPEC QL NAA+PROBE: NOT DETECTED
HCT VFR BLD AUTO: 44.7 % (ref 37.5–51)
HGB BLD-MCNC: 14.6 G/DL (ref 13–17.7)
HMPV RNA NPH QL NAA+NON-PROBE: NOT DETECTED
HOLD SPECIMEN: NORMAL
HOLD SPECIMEN: NORMAL
HPIV1 RNA ISLT QL NAA+PROBE: NOT DETECTED
HPIV2 RNA SPEC QL NAA+PROBE: NOT DETECTED
HPIV3 RNA NPH QL NAA+PROBE: NOT DETECTED
HPIV4 P GENE NPH QL NAA+PROBE: NOT DETECTED
IMM GRANULOCYTES # BLD AUTO: 0.07 10*3/MM3 (ref 0–0.05)
IMM GRANULOCYTES NFR BLD AUTO: 0.7 % (ref 0–0.5)
INHALED O2 CONCENTRATION: 28 %
LYMPHOCYTES # BLD AUTO: 0.55 10*3/MM3 (ref 0.7–3.1)
LYMPHOCYTES NFR BLD AUTO: 5.5 % (ref 19.6–45.3)
Lab: ABNORMAL
M PNEUMO IGG SER IA-ACNC: NOT DETECTED
MCH RBC QN AUTO: 27.1 PG (ref 26.6–33)
MCHC RBC AUTO-ENTMCNC: 32.7 G/DL (ref 31.5–35.7)
MCV RBC AUTO: 83.1 FL (ref 79–97)
MODALITY: ABNORMAL
MONOCYTES # BLD AUTO: 0.14 10*3/MM3 (ref 0.1–0.9)
MONOCYTES NFR BLD AUTO: 1.4 % (ref 5–12)
NEUTROPHILS NFR BLD AUTO: 9.25 10*3/MM3 (ref 1.7–7)
NEUTROPHILS NFR BLD AUTO: 92.2 % (ref 42.7–76)
NRBC BLD AUTO-RTO: 0 /100 WBC (ref 0–0.2)
NT-PROBNP SERPL-MCNC: 279.3 PG/ML (ref 0–450)
PCO2 BLDV: 48.6 MM HG (ref 40–50)
PH BLDV: 7.39 PH UNITS (ref 7.32–7.42)
PLATELET # BLD AUTO: 257 10*3/MM3 (ref 140–450)
PMV BLD AUTO: 10 FL (ref 6–12)
PO2 BLDV: 53.3 MM HG (ref 30–50)
POTASSIUM SERPL-SCNC: 4.7 MMOL/L (ref 3.5–5.2)
PROT SERPL-MCNC: 6.8 G/DL (ref 6–8.5)
RBC # BLD AUTO: 5.38 10*6/MM3 (ref 4.14–5.8)
RHINOVIRUS RNA SPEC NAA+PROBE: DETECTED
RSV RNA NPH QL NAA+NON-PROBE: NOT DETECTED
SAO2 % BLDCOV: 88.7 % (ref 45–75)
SARS-COV-2 RNA NPH QL NAA+NON-PROBE: NOT DETECTED
SODIUM SERPL-SCNC: 138 MMOL/L (ref 136–145)
TROPONIN T SERPL HS-MCNC: 6 NG/L
VENTILATOR MODE: ABNORMAL
WBC NRBC COR # BLD: 10.03 10*3/MM3 (ref 3.4–10.8)
WHOLE BLOOD HOLD COAG: NORMAL
WHOLE BLOOD HOLD SPECIMEN: NORMAL

## 2023-08-31 PROCEDURE — 25010000002 METHYLPREDNISOLONE PER 125 MG

## 2023-08-31 PROCEDURE — 93005 ELECTROCARDIOGRAM TRACING: CPT | Performed by: EMERGENCY MEDICINE

## 2023-08-31 PROCEDURE — 94640 AIRWAY INHALATION TREATMENT: CPT

## 2023-08-31 PROCEDURE — 94799 UNLISTED PULMONARY SVC/PX: CPT

## 2023-08-31 PROCEDURE — 99284 EMERGENCY DEPT VISIT MOD MDM: CPT

## 2023-08-31 PROCEDURE — 96374 THER/PROPH/DIAG INJ IV PUSH: CPT

## 2023-08-31 PROCEDURE — 96375 TX/PRO/DX INJ NEW DRUG ADDON: CPT

## 2023-08-31 PROCEDURE — 93005 ELECTROCARDIOGRAM TRACING: CPT

## 2023-08-31 PROCEDURE — 80053 COMPREHEN METABOLIC PANEL: CPT | Performed by: EMERGENCY MEDICINE

## 2023-08-31 PROCEDURE — 82805 BLOOD GASES W/O2 SATURATION: CPT

## 2023-08-31 PROCEDURE — 84484 ASSAY OF TROPONIN QUANT: CPT | Performed by: EMERGENCY MEDICINE

## 2023-08-31 PROCEDURE — 94761 N-INVAS EAR/PLS OXIMETRY MLT: CPT

## 2023-08-31 PROCEDURE — 0202U NFCT DS 22 TRGT SARS-COV-2: CPT

## 2023-08-31 PROCEDURE — 71046 X-RAY EXAM CHEST 2 VIEWS: CPT

## 2023-08-31 PROCEDURE — 85025 COMPLETE CBC W/AUTO DIFF WBC: CPT | Performed by: EMERGENCY MEDICINE

## 2023-08-31 PROCEDURE — 25010000002 MAGNESIUM SULFATE 2 GM/50ML SOLUTION

## 2023-08-31 PROCEDURE — 83880 ASSAY OF NATRIURETIC PEPTIDE: CPT | Performed by: EMERGENCY MEDICINE

## 2023-08-31 RX ORDER — MAGNESIUM SULFATE HEPTAHYDRATE 40 MG/ML
2 INJECTION, SOLUTION INTRAVENOUS ONCE
Status: COMPLETED | OUTPATIENT
Start: 2023-08-31 | End: 2023-08-31

## 2023-08-31 RX ORDER — SODIUM CHLORIDE 0.9 % (FLUSH) 0.9 %
10 SYRINGE (ML) INJECTION AS NEEDED
Status: DISCONTINUED | OUTPATIENT
Start: 2023-08-31 | End: 2023-08-31 | Stop reason: HOSPADM

## 2023-08-31 RX ORDER — ALBUTEROL SULFATE 90 UG/1
2 AEROSOL, METERED RESPIRATORY (INHALATION) EVERY 6 HOURS PRN
Qty: 8.5 G | Refills: 0 | Status: SHIPPED | OUTPATIENT
Start: 2023-08-31

## 2023-08-31 RX ORDER — METHYLPREDNISOLONE SODIUM SUCCINATE 125 MG/2ML
125 INJECTION, POWDER, LYOPHILIZED, FOR SOLUTION INTRAMUSCULAR; INTRAVENOUS ONCE
Status: COMPLETED | OUTPATIENT
Start: 2023-08-31 | End: 2023-08-31

## 2023-08-31 RX ORDER — IPRATROPIUM BROMIDE AND ALBUTEROL SULFATE 2.5; .5 MG/3ML; MG/3ML
3 SOLUTION RESPIRATORY (INHALATION) ONCE
Status: COMPLETED | OUTPATIENT
Start: 2023-08-31 | End: 2023-08-31

## 2023-08-31 RX ORDER — AZITHROMYCIN 250 MG/1
TABLET, FILM COATED ORAL
Qty: 6 TABLET | Refills: 0 | Status: SHIPPED | OUTPATIENT
Start: 2023-08-31 | End: 2023-09-05

## 2023-08-31 RX ADMIN — IPRATROPIUM BROMIDE AND ALBUTEROL SULFATE 3 ML: .5; 3 SOLUTION RESPIRATORY (INHALATION) at 13:38

## 2023-08-31 RX ADMIN — MAGNESIUM SULFATE HEPTAHYDRATE 2 G: 40 INJECTION, SOLUTION INTRAVENOUS at 13:50

## 2023-08-31 RX ADMIN — METHYLPREDNISOLONE SODIUM SUCCINATE 125 MG: 125 INJECTION, POWDER, FOR SOLUTION INTRAMUSCULAR; INTRAVENOUS at 13:47

## 2023-08-31 NOTE — ED PROVIDER NOTES
Subjective  History of Present Illness:    This is a 47-year-old male, recently discharged from this facility on Tuesday presents emergency room today for reevaluation of shortness of breath.  Patient reports that he thought he was feeling a little better, opted to be discharged home, was discharged home on oxygen.  He reports progressive shortness of air since being home.  He was discharged home on 60 mg of prednisone, has been taking this.  He reports that while ambulating with oxygen on his oxygen saturations are dropping into the low 80s and high 70s.  He denies any chest pain.  He does report some congestion and runny nose as well as a cough.  No known fevers.  Was tested for COVID and flu and was negative while he was in the hospital.  History significant for COPD, myocardial infarction, hepatitis C, liver disease and obstructive chronic bronchiolitis.  He does present dyspneic to the emergency department.  Onset of his shortness of air was approximately 1 week ago.      Nurses Notes reviewed and agree, including vitals, allergies, social history and prior medical history.     REVIEW OF SYSTEMS: All systems reviewed and not pertinent unless noted.  Review of Systems   Constitutional:  Negative for chills and fever.   HENT:  Positive for congestion and rhinorrhea.    Respiratory:  Positive for cough, shortness of breath and wheezing.    Cardiovascular:  Negative for chest pain and leg swelling.   Gastrointestinal:  Negative for abdominal pain, nausea and vomiting.   All other systems reviewed and are negative.    Past Medical History:   Diagnosis Date    Abdominal adhesions     Anxiety     Arthritis     Asthma     Cervical radiculopathy     Colitis     COPD (chronic obstructive pulmonary disease)     GERD (gastroesophageal reflux disease)     Heart attack     History of hepatitis C     Treated with Epclusa in 2019    History of recreational drug use     HTN (hypertension)     Impaired functional mobility,  balance, gait, and endurance     Kidney cysts     Left hip pain     Liver disease     Low back pain     Migraines     Obstructive chronic bronchitis with exacerbation     Sleep apnea     mild    Tattoos        Allergies:    Doxycycline      Past Surgical History:   Procedure Laterality Date    BACK SURGERY      COLONOSCOPY      COLONOSCOPY N/A 2022    Procedure: COLONOSCOPY with biopsies;  Surgeon: Giancarlo Ruiz MD;  Location:  [facility];  Service: Gastroenterology;  Laterality: N/A;    HERNIA REPAIR      HIP SPACER INSERTION WITH ANTIBIOTIC CEMENT Left 1/15/2020    Procedure: TOTAL HIP IMPLANT REMOVAL WITH INSERTION OF ANTIBIOTIC SPACER LEFT;  Surgeon: Ba Ramirez MD;  Location:  [facility];  Service: Orthopedics    SHOULDER LIGAMENT REPAIR      right shoulder    SMALL INTESTINE SURGERY      Small bowell resection    TOTAL HIP ARTHROPLASTY Left     TOTAL HIP ARTHROPLASTY Right     TOTAL HIP ARTHROPLASTY REVISION Left 3/5/2020    Procedure: HIP REIMPLANT REVISION LEFT;  Surgeon: Ba Ramirez MD;  Location:  [facility];  Service: Orthopedics;  Laterality: Left;    UPPER GASTROINTESTINAL ENDOSCOPY           Social History     Socioeconomic History    Marital status:    Tobacco Use    Smoking status: Former     Packs/day: 2.00     Years: 15.00     Pack years: 30.00     Types: Cigarettes     Quit date:      Years since quittin.6     Passive exposure: Current    Smokeless tobacco: Current     Types: Chew   Vaping Use    Vaping Use: Never used   Substance and Sexual Activity    Alcohol use: No     Comment: stopped drinking alcohol    Drug use: Not Currently     Comment: takes suboxone    Sexual activity: Defer         Family History   Problem Relation Age of Onset    Arthritis Other     Hypertension Other     Migraines Other     Heart attack Other     Stroke Other     Colon cancer Neg Hx        Objective  Physical Exam:  BP (!) 130/103   Pulse 87   Temp 97.9 °F (36.6 °C)  "(Oral)   Resp 26   Ht 185.4 cm (73\")   Wt 82.1 kg (181 lb)   SpO2 95%   BMI 23.88 kg/m²      Physical Exam  Vitals and nursing note reviewed.   Constitutional:       General: He is not in acute distress.     Appearance: He is normal weight. He is ill-appearing. He is not toxic-appearing or diaphoretic.      Interventions: He is not intubated.  Cardiovascular:      Rate and Rhythm: Normal rate and regular rhythm.   Pulmonary:      Effort: He is not intubated.      Breath sounds: No stridor. Wheezing present. No rhonchi or rales.      Comments: Increased work of breathing  Chest:      Chest wall: No mass, deformity or tenderness.   Abdominal:      Palpations: Abdomen is soft.      Comments: Surgical scar present   Musculoskeletal:         General: Normal range of motion.      Cervical back: Normal range of motion and neck supple.      Right lower leg: No tenderness.      Left lower leg: No tenderness.   Skin:     General: Skin is warm and dry.      Capillary Refill: Capillary refill takes less than 2 seconds.   Neurological:      General: No focal deficit present.      Mental Status: He is alert and oriented to person, place, and time.   Psychiatric:         Mood and Affect: Mood normal.         Behavior: Behavior normal.             Procedures    ED Course:    ED Course as of 08/31/23 1631   Thu Aug 31, 2023   1255 EKG interpreted by me: Sinus rhythm, normal rate, no acute ST changes, some nonspecific T waves, this is an atypical EKG [MP]      ED Course User Index  [MP] Amandeep Branham MD       Lab Results (last 24 hours)       Procedure Component Value Units Date/Time    Respiratory Panel PCR w/COVID-19(SARS-CoV-2) [facility], NP Swab in UTM/VTM, 3-4 HR TAT - Swab, Nasopharynx [[id number]]  (Abnormal) Collected: 08/31/23 1346    Specimen: Swab from Nasopharynx Updated: 08/31/23 1446     ADENOVIRUS, PCR Not Detected     Coronavirus 229E Not Detected     Coronavirus HKU1 Not " Detected     Coronavirus NL63 Not Detected     Coronavirus OC43 Not Detected     COVID19 Not Detected     Human Metapneumovirus Not Detected     Human Rhinovirus/Enterovirus Detected     Influenza A PCR Not Detected     Influenza B PCR Not Detected     Parainfluenza Virus 1 Not Detected     Parainfluenza Virus 2 Not Detected     Parainfluenza Virus 3 Not Detected     Parainfluenza Virus 4 Not Detected     RSV, PCR Not Detected     Bordetella pertussis pcr Not Detected     Bordetella parapertussis PCR Not Detected     Chlamydophila pneumoniae PCR Not Detected     Mycoplasma pneumo by PCR Not Detected    Narrative:      In the setting of a positive respiratory panel with a viral infection PLUS a negative procalcitonin without other underlying concern for bacterial infection, consider observing off antibiotics or discontinuation of antibiotics and continue supportive care. If the respiratory panel is positive for atypical bacterial infection (Bordetella pertussis, Chlamydophila pneumoniae, or Mycoplasma pneumoniae), consider antibiotic de-escalation to target atypical bacterial infection.    CBC & Differential [[id number]]  (Abnormal) Collected: 08/31/23 1347    Specimen: Blood Updated: 08/31/23 1358    Narrative:      The following orders were created for panel order CBC & Differential.  Procedure                               Abnormality         Status                     ---------                               -----------         ------                     CBC Auto Differential[[id number]]        Abnormal            Final result                 Please view results for these tests on the individual orders.    Comprehensive Metabolic Panel [[id number]]  (Abnormal) Collected: 08/31/23 1347    Specimen: Blood Updated: 08/31/23 1418     Glucose 116 mg/dL      BUN 18 mg/dL      Creatinine 0.92 mg/dL      Sodium 138 mmol/L      Potassium 4.7 mmol/L      Comment: Specimen hemolyzed.  Results may be affected.        Chloride 99  mmol/L      CO2 26.1 mmol/L      Calcium 8.9 mg/dL      Total Protein 6.8 g/dL      Albumin 4.3 g/dL      ALT (SGPT) 24 U/L      Comment: Specimen hemolyzed.  Results may be affected.        AST (SGOT) 31 U/L      Comment: Specimen hemolyzed.  Results may be affected.        Alkaline Phosphatase 100 U/L      Total Bilirubin 0.2 mg/dL      Globulin 2.5 gm/dL      A/G Ratio 1.7 g/dL      BUN/Creatinine Ratio 19.6     Anion Gap 12.9 mmol/L      eGFR 102.6 mL/min/1.73     Narrative:      GFR Normal >60  Chronic Kidney Disease <60  Kidney Failure <15      BNP [[id number]]  (Normal) Collected: 08/31/23 1347    Specimen: Blood Updated: 08/31/23 1415     proBNP 279.3 pg/mL     Narrative:      Among patients with dyspnea, NT-proBNP is highly sensitive for the detection of acute congestive heart failure. In addition NT-proBNP of <300 pg/ml effectively rules out acute congestive heart failure with 99% negative predictive value.      Single High Sensitivity Troponin T [[id number]]  (Normal) Collected: 08/31/23 1347    Specimen: Blood Updated: 08/31/23 1418     HS Troponin T 6 ng/L      Comment: Specimen hemolyzed.  Results may be affected.       Narrative:      High Sensitive Troponin T Reference Range:  <10.0 ng/L- Negative Female for AMI  <15.0 ng/L- Negative Male for AMI  >=10 - Abnormal Female indicating possible myocardial injury.  >=15 - Abnormal Male indicating possible myocardial injury.   Clinicians would have to utilize clinical acumen, EKG, Troponin, and serial changes to determine if it is an Acute Myocardial Infarction or myocardial injury due to an underlying chronic condition.         CBC Auto Differential [[id number]]  (Abnormal) Collected: 08/31/23 1347    Specimen: Blood Updated: 08/31/23 1358     WBC 10.03 10*3/mm3      RBC 5.38 10*6/mm3      Hemoglobin 14.6 g/dL      Hematocrit 44.7 %      MCV 83.1 fL      MCH 27.1 pg      MCHC 32.7 g/dL      RDW 13.5 %      RDW-SD 40.7 fl      MPV 10.0 fL      Platelets 257  10*3/mm3      Neutrophil % 92.2 %      Lymphocyte % 5.5 %      Monocyte % 1.4 %      Eosinophil % 0.1 %      Basophil % 0.1 %      Immature Grans % 0.7 %      Neutrophils, Absolute 9.25 10*3/mm3      Lymphocytes, Absolute 0.55 10*3/mm3      Monocytes, Absolute 0.14 10*3/mm3      Eosinophils, Absolute 0.01 10*3/mm3      Basophils, Absolute 0.01 10*3/mm3      Immature Grans, Absolute 0.07 10*3/mm3      nRBC 0.0 /100 WBC     Blood Gas, Venous -With Co-Ox Panel: Yes [[id number]]  (Abnormal) Collected: 08/31/23 1403    Specimen: Venous Blood Updated: 08/31/23 1403     Site OTHER     pH, Venous 7.386 pH Units      pCO2, Venous 48.6 mm Hg      pO2, Venous 53.3 mm Hg      Comment: 83 Value above reference range        HCO3, Venous 29.1 mmol/L      Comment: 83 Value above reference range        Base Excess, Venous 3.1 mmol/L      Comment: 83 Value above reference range        O2 Saturation, Venous 88.7 %      Comment: 83 Value above reference range        Barometric Pressure for Blood Gas 735 mmHg      Modality Nasal Cannula     FIO2 28 %      Flow Rate 2.0 lpm      Ventilator Mode NA     Collected by rn     Comment: Meter: [id number]     :  [id number]                XR Chest 2 View    Result Date: 8/31/2023  PROCEDURE: XR CHEST 2 VW-   8/31/2023 2:12 PM  HISTORY: [facility] Triage Protocol  COMPARISON: August 27, 2023  FINDINGS: The cardiac silhouette is proper size. The aortic contours are normal. The mediastinal and hilar structures are unremarkable. There is hyperinflation without acute infiltrate. There is mild bronchial wall thickening suggesting bronchitis. There is no pneumothorax.      Impression: Bronchitis and hyperinflation without pneumonia.    This report was signed and finalized on 8/31/2023 1:58 PM by Elisabet Nguyễn MD.          MDM      Initial impression of presenting illness: This is a 39-year-old male presents emergency room today for evaluation of shortness of breath.  Recently discharged from this  facility.    DDX: includes but is not limited to: Upper respiratory tract infection, bronchiolitis, COPD exacerbation, ACS, pneumonia, pneumothorax, CHF, other    Patient arrives hemodynamically stable, nonhypoxic on 2 L of nasal cannula.  Tachypneic at 26 respirations a minute, afebrile with vitals interpreted by myself.     Pertinent features from physical exam: Patient is a chronically ill-appearing 47-year-old male, he is tachypneic with some increased work of breathing.  He has lung findings consistent with diffuse wheezes and coarse breath sounds throughout and some diminished air movement.  Cardiac auscultation revealed regular rate and rhythm.  Abdomen was soft and nontender.  There is a surgical scar present on the abdomen.  Oropharynx is clear.  Neurovascular intact bilaterally..    Initial diagnostic plan: CBC, CMP, troponin, respiratory panel, BNP, venous blood gas, EKG, chest x-ray    Results from initial plan were reviewed and interpreted by me revealing chest x-ray per my interpretation with chronic emphysematous changes with no focal opacity chest x-ray per radiology interpretation with probable bronchitis without pneumonia. EKG revealed sinus rhythm normal rate.  Positive for human rhino/enterovirus.  CBC unremarkable, BMP unremarkable.  Troponin normal.  CMP unremarkable.  ABG without acidosis or retention of CO2.  EKG revealed sinus rhythm normal rate.  All labs and imaging appear reassuring.    Diagnostic information from other sources: chart reviewed.  Discharged on 8-29 for COPD exacerbation chronic respiratory failure with hypoxia    Interventions / Re-evaluation: Solu-Medrol, DuoNeb and magnesium IV.  Continued surgeon therapy via nasal cannula.  Patient had significant improvement after interventions.  He was ambulated throughout the emergency department with oxygen saturations, without his oxygen and only dropped to 91%.    Results/clinical rationale were discussed with patient at bedside.   Discussed this is viral upper respiratory tract infection complicating his COPD.  We will send Zithromax and recommend close follow-up with primary care.  He was given strict return precautions.  Recommended continuing to wear his oxygen at home as prescribed.  Recommended finishing his steroids that he was also prescribed at discharge from this hospital.  He is agreeable to this plan.  Suspect viral illness complicating his COPD.  He is much improved, nontachypneic, maintaining appropriate oxygen saturations on his baseline.  Stable for discharge and outpatient follow-up.    Consultations/Discussion of results with other physicians: N/A    Disposition plan: Discharge.  Follow-up with primary care and pulmonology.  Return precautions given.  Agreeable at bedside.  -----    Final diagnoses:   COPD exacerbation   Viral upper respiratory infection          Dennis Mccarty PA-C  08/31/23 [id number]

## 2023-08-31 NOTE — DISCHARGE INSTRUCTIONS
Return to emergency room for worsening symptoms.  Continue wearing your oxygen as prescribed.  Have sent a Z-Otoniel to your pharmacy, pick this up and take as directed.  Continue all your medications as prescribed including the steroid you were sent home with.

## 2023-09-01 ENCOUNTER — OFFICE VISIT (OUTPATIENT)
Dept: INTERNAL MEDICINE | Facility: CLINIC | Age: 48
End: 2023-09-01
Payer: MEDICARE

## 2023-09-01 VITALS
SYSTOLIC BLOOD PRESSURE: 141 MMHG | DIASTOLIC BLOOD PRESSURE: 93 MMHG | HEIGHT: 73 IN | OXYGEN SATURATION: 98 % | TEMPERATURE: 98.4 F | BODY MASS INDEX: 23.83 KG/M2 | HEART RATE: 101 BPM | WEIGHT: 179.8 LBS

## 2023-09-01 DIAGNOSIS — R73.9 HYPERGLYCEMIA: ICD-10-CM

## 2023-09-01 DIAGNOSIS — I10 PRIMARY HYPERTENSION: ICD-10-CM

## 2023-09-01 DIAGNOSIS — J44.1 COPD EXACERBATION: Primary | ICD-10-CM

## 2023-09-01 LAB
EXPIRATION DATE: NORMAL
HBA1C MFR BLD: 5.7 %
Lab: NORMAL

## 2023-09-01 RX ORDER — TRAZODONE HYDROCHLORIDE 150 MG/1
150 TABLET ORAL NIGHTLY
Qty: 90 TABLET | Refills: 3 | Status: SHIPPED | OUTPATIENT
Start: 2023-09-01

## 2023-09-01 NOTE — PROGRESS NOTES
Transitional Care Follow Up Visit  Subjective     Topher Stoll is a 47 y.o. male who presents for a transitional care management visit.    Within 48 business hours after discharge our office contacted him via telephone to coordinate his care and needs.      I reviewed and discussed the details of that call along with the discharge summary, hospital problems, inpatient lab results, inpatient diagnostic studies, and consultation reports with Topher.     Current outpatient and discharge medications have been reconciled for the patient.  Reviewed by: Joshua Hoffmann MD          8/29/2023     5:14 PM   Date of TCM Phone Call   [facility]   Date of Admission 8/27/2023   Date of Discharge 8/29/2023   Discharge Disposition Home or Self Care     Risk for Readmission (LACE) Score: 15 (8/29/2023  6:00 AM)      History of Present Illness   Course During Hospital Stay: 47-year-old male with a longstanding history of COPD and recurrent bronchitis on 2 to 3 L of oxygen via nasal cannula at home who has quit tobacco use had increased cough and shortness of breath was evaluated in the emergency room for this noted to be significantly hypoxic requiring BiPAP at 40%.  BNP without significant elevation ABG showed a pH of 7.2 with a PCO2 of 58 and a PO2 of 72.4 COVID and flu test were negative chest x-ray without any significant abnormality.  He was given neb treatments and given IV methylprednisolone  Echocardiogram showed normal left ventricular ejection fraction  Gradual improvement noted oxygen was titrated down to 2 to 3 L via nasal cannula and subsequently discharged home.  He is on a slow prednisone taper now feels significantly better. However has complains of anxiety and disturbed sleep is on high doses of steroid denies polyuria polydipsia  The following portions of the patient's history were reviewed and updated as appropriate: allergies, current medications, past family history, past medical history,  past social history, past surgical history, and problem list.    Review of Systems   Constitutional:  Positive for fatigue. Negative for activity change, appetite change and fever.   HENT:  Negative for congestion, ear discharge, ear pain and trouble swallowing.    Eyes:  Negative for photophobia and visual disturbance.   Respiratory:  Positive for cough and shortness of breath.    Cardiovascular:  Negative for chest pain and palpitations.   Gastrointestinal:  Negative for abdominal distention, abdominal pain, constipation, diarrhea, nausea and vomiting.   Endocrine: Negative.    Genitourinary:  Negative for dysuria, hematuria and urgency.   Musculoskeletal:  Positive for arthralgias. Negative for back pain, joint swelling and myalgias.   Skin:  Negative for color change and rash.   Allergic/Immunologic: Negative.    Neurological:  Negative for dizziness, weakness, light-headedness and headaches.   Hematological:  Negative for adenopathy. Does not bruise/bleed easily.   Psychiatric/Behavioral:  Positive for sleep disturbance. Negative for agitation, confusion and dysphoric mood. The patient is not nervous/anxious.      Objective   Physical Exam  Constitutional:       General: He is not in acute distress.     Appearance: He is well-developed.   HENT:      Nose: Nose normal.   Eyes:      General: No scleral icterus.     Conjunctiva/sclera: Conjunctivae normal.   Neck:      Thyroid: No thyromegaly.      Trachea: No tracheal deviation.   Cardiovascular:      Rate and Rhythm: Normal rate and regular rhythm.      Heart sounds: No murmur heard.    No friction rub.   Pulmonary:      Effort: No respiratory distress.      Breath sounds: No wheezing or rales.   Abdominal:      General: There is no distension.      Palpations: Abdomen is soft. There is no mass.      Tenderness: There is no abdominal tenderness. There is no guarding.   Musculoskeletal:         General: No deformity. Normal range of motion.   Lymphadenopathy:       Cervical: No cervical adenopathy.   Skin:     General: Skin is warm and dry.      Findings: No erythema or rash.   Neurological:      Mental Status: He is alert and oriented to person, place, and time.      Cranial Nerves: No cranial nerve deficit.      Coordination: Coordination normal.      Deep Tendon Reflexes: Reflexes are normal and symmetric.   Psychiatric:         Behavior: Behavior normal.         Thought Content: Thought content normal.         Judgment: Judgment normal.       Assessment & Plan   Diagnoses and all orders for this visit:    1. COPD exacerbation (Primary) continue with inhalers and neb treatments at home on O2 supplement and slow steroid taper.  Check A1c due to high steroid use.   increased dose of trazodone to help him with insomnia    2. Primary hypertension discussed low-sodium diet follow BP readings at home    3. Opioid dependence on agonist therapy stable on methadone

## 2023-09-02 NOTE — PROGRESS NOTES
Enter Query Response Below      Query Response:     Acute on chronic hypoxic respiratory failure (no hypercapnic respiratory failure)          If applicable, please update the problem list.         Patient: Topher Stoll        : 1975  Account: [id number]           Admit Date: 2023        How to Respond to this query:       a. Click New Note     b. Answer query within the yellow box.                c. Update the Problem List, if applicable.      If you have any questions about this query contact me at: [email or web address]    Carmen Costa, APRN:        Patient admitted due to COPD exacerbation, acute on chronic hypoxic respiratory failure, elevation of troponin.  ABG resulted 2023: pH 7.288, pCO2 58.1, pO2 72.4 - collected with patient on 5L nasal cannula.  Home oxygen noted normally at 2-3L nasal cannula at all times.     Please clarify if patient treated/monitored for one or more of the following:    Acute on chronic hypoxic/hypercapnic respiratory failure  Acute on chronic hypoxic respiratory failure (no hypercapnic respiratory failure)  Other- specify______  Unable to determine      By submitting this query, we are merely seeking further clarification of documentation to accurately reflect all conditions that you are monitoring, evaluating, treating or that extend the hospitalization or utilize additional resources of care. Please utilize your independent clinical judgment when addressing the question(s) above.     This query and your response, once completed, will be entered into the legal medical record.    Sincerely,  Fallon Smallwood  Clinical Documentation Integrity Program

## 2023-09-05 ENCOUNTER — DISEASE STATE MANAGEMENT VISIT (OUTPATIENT)
Dept: PHARMACY | Facility: HOSPITAL | Age: 48
End: 2023-09-05
Payer: MEDICARE

## 2023-09-05 VITALS
BODY MASS INDEX: 24.39 KG/M2 | WEIGHT: 184 LBS | HEART RATE: 100 BPM | DIASTOLIC BLOOD PRESSURE: 91 MMHG | OXYGEN SATURATION: 92 % | HEIGHT: 73 IN | SYSTOLIC BLOOD PRESSURE: 151 MMHG

## 2023-09-05 DIAGNOSIS — R09.02 EXERCISE HYPOXEMIA: ICD-10-CM

## 2023-09-05 DIAGNOSIS — J44.9 CHRONIC OBSTRUCTIVE PULMONARY DISEASE, UNSPECIFIED COPD TYPE: ICD-10-CM

## 2023-09-05 DIAGNOSIS — J45.50 SEVERE PERSISTENT ASTHMA WITHOUT COMPLICATION: Primary | ICD-10-CM

## 2023-09-05 DIAGNOSIS — J96.12 CHRONIC RESPIRATORY FAILURE WITH HYPERCAPNIA: ICD-10-CM

## 2023-09-05 NOTE — PROGRESS NOTES
Follow Up Office Visit      Patient Name: Topher Stoll    Chief Complaint: Hospital follow-up    History of Present Illness: Topher Stoll is a 47 y.o. male who is here today for follow up of recent hospitalization at [facility] for management of acute on chronic respiratory failure with hypoxia secondary to acute COPD exacerbation.  During admission, patient required use of BiPAP, increased amounts of supplemental oxygen, steroids, breathing treatments.  Patient symptomatically improved during admission and was able to be discharged.  2 days postdischarge, however, he presented to the emergency department with worsening, at which time respiratory viral panel was positive for rhinovirus and ongoing supportive care was advised.    Patient notes that his symptoms are currently improved compared to the time of hospital admission.  Patient notes that he had stopped using Trelegy about 2 weeks prior to his hospitalization, not for any particular reason. He is currently completing his outpatient prednisone course.    Supplemental Oxygen: 2-4L NC  Last Hospitalization: August 2023  Last Steroids: Current use  Number of exacerbations per year: 5+    Subjective      Review of Systems:  Review of Systems   Constitutional:  Negative for fever and unexpected weight change.   HENT:  Positive for rhinorrhea.    Respiratory:  Positive for cough, shortness of breath and wheezing.    Cardiovascular:  Negative for chest pain and leg swelling.      Past Medical History:   Past Medical History:   Diagnosis Date    Abdominal adhesions     Anxiety     Arthritis     Asthma     Cervical radiculopathy     Colitis     COPD (chronic obstructive pulmonary disease)     GERD (gastroesophageal reflux disease)     Heart attack     History of hepatitis C     Treated with Epclusa in 2019    History of recreational drug use     HTN (hypertension)     Impaired functional mobility, balance, gait, and endurance     Kidney cysts      Left hip pain     Liver disease     Low back pain     Migraines     Obstructive chronic bronchitis with exacerbation     Sleep apnea     mild    Tattoos        Past Surgical History:   Past Surgical History:   Procedure Laterality Date    BACK SURGERY      COLONOSCOPY      COLONOSCOPY N/A 2022    Procedure: COLONOSCOPY with biopsies;  Surgeon: Giancarlo Ruiz MD;  Location:  [facility];  Service: Gastroenterology;  Laterality: N/A;    HERNIA REPAIR      HIP SPACER INSERTION WITH ANTIBIOTIC CEMENT Left 1/15/2020    Procedure: TOTAL HIP IMPLANT REMOVAL WITH INSERTION OF ANTIBIOTIC SPACER LEFT;  Surgeon: Ba Ramirez MD;  Location:  [facility];  Service: Orthopedics    SHOULDER LIGAMENT REPAIR      right shoulder    SMALL INTESTINE SURGERY      Small bowell resection    TOTAL HIP ARTHROPLASTY Left     TOTAL HIP ARTHROPLASTY Right     TOTAL HIP ARTHROPLASTY REVISION Left 3/5/2020    Procedure: HIP REIMPLANT REVISION LEFT;  Surgeon: Ba Ramirez MD;  Location:  [facility];  Service: Orthopedics;  Laterality: Left;    UPPER GASTROINTESTINAL ENDOSCOPY         Family History:   Family History   Problem Relation Age of Onset    Arthritis Other     Hypertension Other     Migraines Other     Heart attack Other     Stroke Other     Colon cancer Neg Hx        Social History:   Social History     Socioeconomic History    Marital status:    Tobacco Use    Smoking status: Former     Packs/day: 2.00     Years: 15.00     Pack years: 30.00     Types: Cigarettes     Quit date:      Years since quittin.6     Passive exposure: Current    Smokeless tobacco: Current     Types: Chew   Vaping Use    Vaping Use: Never used   Substance and Sexual Activity    Alcohol use: No     Comment: stopped drinking alcohol    Drug use: Not Currently     Comment: takes suboxone    Sexual activity: Defer       Current Medications:     Current Outpatient Medications:     albuterol sulfate  (90 Base)  MCG/ACT inhaler, Inhale 2 puffs by mouth Every 6 (Six) Hours As Needed for Wheezing or Shortness of Air., Disp: 8.5 g, Rfl: 0    Benralizumab 30 MG/ML solution auto-injector, Inject 30 mg under the skin into the appropriate area as directed Every 2 (Two) Months starting 8 weeks after 3rd once-monthly injection, Disp: 1 mL, Rfl: 5    fluticasone (FLONASE) 50 MCG/ACT nasal spray, INSTILL 1 SPRAY INTO EACH NOSTRIL DAILY, Disp: 48 g, Rfl: 2    Fluticasone-Umeclidin-Vilant (TRELEGY) 200-62.5-25 MCG/INH inhaler, Inhale 1 puff Daily., Disp: 28 each, Rfl: 5    ipratropium-albuterol (DUO-NEB) 0.5-2.5 mg/3 ml nebulizer, Take 3 mL by nebulization Every 4 (Four) Hours As Needed for Wheezing or Shortness of Air., Disp: 180 mL, Rfl: 3    lovastatin (MEVACOR) 40 MG tablet, TAKE 1 TABLET BY MOUTH EVERY DAY FOR CHOLESTEROL, Disp: 90 tablet, Rfl: 3    methadone (DOLOPHINE) 5 MG/5ML solution, Take 65 mL by mouth Daily. Patient takes 65 mg once per day([facility]), Disp: , Rfl:     omeprazole (priLOSEC) 40 MG capsule, Take 1 capsule by mouth Daily., Disp: 90 capsule, Rfl: 3    ondansetron ODT (ZOFRAN-ODT) 4 MG disintegrating tablet, Place 1 tablet on the tongue Every 8 (Eight) Hours As Needed for Nausea or Vomiting., Disp: 20 tablet, Rfl: 0    predniSONE (DELTASONE) 10 MG tablet, Take 6 tablets by mouth daily for 3 Days, then 5 tabs for 3 days, then 4 tabs for 3 days, then 3 tabs for 3 days, then 2 tabs for 3 days, then 1 tab for 3 days, Disp: 63 tablet, Rfl: 0    traZODone (DESYREL) 150 MG tablet, Take 1 tablet by mouth Every Night., Disp: 90 tablet, Rfl: 3    aspirin (aspirin) 81 MG EC tablet, Take 1 tablet by mouth Daily. (Patient not taking: Reported on 9/5/2023), Disp: , Rfl:      Allergies:   Allergies   Allergen Reactions    Doxycycline Nausea And Vomiting       Objective     Physical Exam:  Vital Signs:   Vitals:    09/05/23 0947   BP: 151/91   Pulse: 100   SpO2: 92%  Comment: RA- 95% @2LPM O2  "  Weight: 83.5 kg (184 lb)   Height: 185.4 cm (73\")   ============================  ============================    6 MINUTE WALK TEST    Topher Stoll   1975             BASELINE   SpO2%: 92 % RA    Heart Rate 100   Blood Pressure 151/91     EXERCISE SpO2% HEART RATE RA or O2 @ LPM   1 MINUTE 91 106 RA   2 MINUTES 90 104 RA   3 MINUTES 89 104 RA   4 MINUTES 87 104 @1lPM O2   5 MINUTES 89 108 @2LPM O2   6 MINUTES 93 109 @2LPM O2   (Number of laps: 4 X 36 meters + Final partial lap: 0 meters = 144 meters)            Distance Walked:  144 Meters   SpO2% Post Exercise:  92 %   HR Post Exercise:  111     Reason to stop (if applicable):   ____ Chest Pain   ____ Light Headedness   ____ Dyspnea Unrelieved by Rest   ____ Abnormal Gait Pattern   ____ Severe Fatigue   ____ Other (Specify: __________________________)    Tech Comments (if any):      Test performed by: BB    ============================  ============================   Body mass index is 24.28 kg/m².    Physical Exam  Vitals reviewed.   Constitutional:       General: He is not in acute distress.     Appearance: He is not toxic-appearing.   HENT:      Head: Normocephalic and atraumatic.      Mouth/Throat:      Mouth: Mucous membranes are moist.   Eyes:      Extraocular Movements: Extraocular movements intact.      Conjunctiva/sclera: Conjunctivae normal.      Pupils: Pupils are equal, round, and reactive to light.   Cardiovascular:      Rate and Rhythm: Normal rate.      Heart sounds: Normal heart sounds.   Pulmonary:      Effort: Pulmonary effort is normal.      Breath sounds: Normal breath sounds.   Abdominal:      General: There is no distension.      Palpations: Abdomen is soft.   Musculoskeletal:         General: No swelling.      Cervical back: Neck supple.   Skin:     General: Skin is warm and dry.      Findings: No rash.   Neurological:      General: No focal deficit present.      Mental Status: He is alert and oriented to person, place, and time. "   Psychiatric:         Mood and Affect: Mood normal.         Behavior: Behavior normal.     Results Review:   Site   Date Value Ref Range Status   08/31/2023 OTHER  Final     Michael's Test   Date Value Ref Range Status   08/27/2023 N/A  Final     pH, Arterial   Date Value Ref Range Status   08/27/2023 7.288 (C) 7.350 - 7.450 pH units Final     Comment:     84 Value below reference range     pCO2, Arterial   Date Value Ref Range Status   08/27/2023 58.1 (C) 35.0 - 45.0 mm Hg Final     Comment:     83 Value above reference range     pO2, Arterial   Date Value Ref Range Status   08/27/2023 72.4 (L) 75.0 - 100.0 mm Hg Final     Comment:     84 Value below reference range     HCO3, Arterial   Date Value Ref Range Status   08/27/2023 27.7 22.0 - 28.0 mmol/L Final     Comment:     83 Value above reference range     Base Excess, Arterial   Date Value Ref Range Status   08/27/2023 -0.2 (L) 0.0 - 2.0 mmol/L Final     Comment:     84 Value below reference range     O2 Saturation, Arterial   Date Value Ref Range Status   08/27/2023 94.2 94.0 - 100.0 % Final     Hemoglobin, Blood Gas   Date Value Ref Range Status   01/15/2020 11.4 (L) 13.5 - 17.5 g/dL Final     Comment:     84 Value below reference range     Hematocrit, Blood Gas   Date Value Ref Range Status   08/27/2023 45.4 % Final     Oxyhemoglobin   Date Value Ref Range Status   08/27/2023 93.0 (L) 94 - 99 % Final     Comment:     84 Value below reference range     Oxyhemoglobin Venous   Date Value Ref Range Status   09/01/2021 49.0 40.0 - 70.0 % Final     Comment:     85 Value below critical limit     Methemoglobin   Date Value Ref Range Status   08/27/2023 0.30 0.00 - 1.50 % Final     Carboxyhemoglobin   Date Value Ref Range Status   08/27/2023 0.9 0 - 2 % Final     CO2 Content   Date Value Ref Range Status   01/15/2020 26.1 22 - 33 mmol/L Final     Barometric Pressure for Blood Gas   Date Value Ref Range Status   08/31/2023 735 mmHg Final   12/19/2016 749 mmHg Final      Modality   Date Value Ref Range Status   08/31/2023 Nasal Cannula  Final     FIO2   Date Value Ref Range Status   08/31/2023 28 % Final     Assessment / Plan      Assessment/Plan:   Diagnoses and all orders for this visit:    1. Severe persistent asthma without complication (Primary)  -     6 Minute Walk Test; Future  -     Benralizumab solution prefilled syringe 30 mg  Symptomatically improved since the time of recent hospitalization.  Continue on Fasenra injections.    2. Exercise hypoxemia  -     Oxygen Therapy  Rx written for portable oxygen concentrator for improved mobility.     3. Chronic respiratory failure with hypercapnia  Will need to consider NIV use based on review of ABGs.     4. COPD  Continue current inhaled regimen. We discussed the risk and benefits of inhaled corticosteroids. Patient instructed to take them on a regular basis as prescribed. Patient instructed to rinse their mouth out after each use. Advised against any lapses in maintenance inhaler.    Follow Up:   October 2024  The patient was counseled on diagnostic results, risks and benefits of treatment options, risk factor modifications and the importance of treatment compliance. The patient was advised to contact the clinic with concerns or worsening symptoms.     LISA Bell   Pulmonary Medicine [facility]

## 2023-09-07 ENCOUNTER — READMISSION MANAGEMENT (OUTPATIENT)
Dept: CALL CENTER | Facility: HOSPITAL | Age: 48
End: 2023-09-07
Payer: MEDICARE

## 2023-09-07 ENCOUNTER — TELEPHONE (OUTPATIENT)
Dept: PULMONOLOGY | Facility: CLINIC | Age: 48
End: 2023-09-07

## 2023-09-07 NOTE — OUTREACH NOTE
COPD/PN Week 2 Survey      Flowsheet Row Responses   [facility] patient discharged from? [facility]   Does the patient have one of the following disease processes/diagnoses(primary or secondary)? COPD   Week 2 attempt successful? Yes   Call start time 1535   Call end time 1542   Discharge diagnosis Acute on chronic respiratory failure with hypoxia, COPD   Person spoke with today (if not patient) and relationship patient   Meds reviewed with patient/caregiver? Yes   Is the patient having any side effects they believe may be caused by any medication additions or changes? No   Does the patient have all medications ordered at discharge? Yes   Is the patient taking all medications as directed (includes completed medication regime)? Yes   Medication comments pt was seen in ED for COPD exacerbation again on 8/31/23. pt was given a zpack. he has completed z pack.   Does the patient have a primary care provider?  Yes   Comments regarding PCP saw PCP on 9/1/23   Has the patient kept scheduled appointments due by today? Yes   Has home health visited the patient within 72 hours of discharge? N/A   Pulse Ox monitoring Intermittent   Pulse Ox device source Patient   O2 Sat comments pt reports sats in 91-93% on room air.   O2 Sat: education provided Sat levels, Monitoring frequency, When to seek care   Psychosocial issues? No   Did the patient receive a copy of their discharge instructions? Yes   Nursing interventions Reviewed instructions with patient   What is the patient's perception of their health status since discharge? Improving  [pt states he feels a lot better. no increased shortness of breath.]   Nursing Interventions Nurse provided patient education   Is the patient/caregiver able to teach back the hierarchy of who to call/visit for symptoms/problems? PCP, Specialist, Home health nurse, Urgent Care, ED, 911 Yes   Is the patient able to teach back COPD zones? Yes   Patient reports what zone on this call? Green Zone    Green Zone Reports doing well, Breathing without shortness of breath, Usual amounts of cough and phlegm/mucous, Usual activity and exercise level, Slept well last night, Appetite is good   Green [facility] interventions: Take daily medications, Use oxygen as prescribed, Continue regular exercise/diet plan, At all times avoid cigarette smoking, vaping and inhaled irritants   Week 2 call completed? Yes   Graduated Yes   Is the patient interested in additional calls from an ambulatory ? No   Would this patient benefit from a Referral to [facility]? No   Wrap up additional comments Pt doing well. denies any needs at this time.   Call end time [id number]            Asuncion HERRERA - Registered Nurse

## 2023-09-07 NOTE — TELEPHONE ENCOUNTER
Caller: PATRICIA GLYNN    Relationship: Other    Best call back number: [phone]    What form or medical record are you requesting: CHART NOTES, QUALIFYING TEST(6 MIN WALK) AND ORDERS    Who is requesting this form or medical record from you: [employer]    How would you like to receive the form or medical records (pick-up, mail, fax): FAX  If fax, what is the fax number: [phone]  If mail, what is the address:   If pick-up, provide patient with address and location details    Timeframe paperwork needed:     Additional notes: WILL CALL PT SINCE THEY ARE APPROVED FOR OXYGEN SUPPLIES BUT WE ARE STILL WAITING ON THE ORDERS FROM THE PROVIDERS OFFICE

## 2023-09-13 ENCOUNTER — TELEPHONE (OUTPATIENT)
Dept: INTERNAL MEDICINE | Facility: CLINIC | Age: 48
End: 2023-09-13
Payer: MEDICARE

## 2023-09-13 NOTE — TELEPHONE ENCOUNTER
Caller: Topher Stoll    Relationship: Self    Best call back number:  [phone] OR LEAVE A MESSAGE     Who are you requesting to speak with (clinical staff, provider,  specific staff member):  CLINICAL     What was the call regarding:  THE TRELEGY INHALER IS GIVING HIM A BAD SORE THROAT AND HE CANNOT TAKE IT AND WOULD LIKE TO SEE IF HE CAN TAKE SOMETHING ELSE     [facility] CONFIRMED

## 2023-09-14 ENCOUNTER — TELEPHONE (OUTPATIENT)
Dept: PULMONOLOGY | Facility: CLINIC | Age: 48
End: 2023-09-14
Payer: MEDICARE

## 2023-09-14 ENCOUNTER — TELEPHONE (OUTPATIENT)
Dept: PULMONOLOGY | Facility: CLINIC | Age: 48
End: 2023-09-14

## 2023-09-14 NOTE — TELEPHONE ENCOUNTER
Caller: oTpher Stoll    Relationship: Self    Best call back number: [phone]    When did you start taking these medications:  ABOUT A YEAR AGO    Which medication are you concerned about: Fluticasone-Umeclidin-Vilant (TRELEGY) 200-62.5-25 MCG/INH inhaler     Who prescribed you this medication: DANNY LOPEZ    What are your concerns: THE TRELEGY IS CAUSING HIM TO HAVE A VERY SORE THROAT.  HE WOULD LIKE TO SWITCH TO A DIFFERENT MEDICATION IF POSSIBLE.    How long have you had these concerns:  OFF AND ON SINCE STARTING THE MEDICATION

## 2023-09-14 NOTE — TELEPHONE ENCOUNTER
I TRIED TO CALL PT TO GET ADDITIONAL DETAILS ABOUT HIS SORE THROAT DUE TO THE TRELEGY INHALER. PT DID NOT ANSWER UNABLE TO LEAVE VOICEMAIL

## 2023-09-14 NOTE — TELEPHONE ENCOUNTER
Called patient with no answer left a voicemail informing that Dr. Hoffmann had not prescribed the Trelegy for patient that eDlmy Garcia with Pulmonology had and he should call their office to discuss trying something else since she was the prescriber of the medication. [facility] may deliver if patient calls back.

## 2023-09-14 NOTE — TELEPHONE ENCOUNTER
DELETE AFTER REVIEWING: Telephone encounter to be sent to the clinical pool     Caller: BASIM SOLORZANO     Relationship: SELF    Best call back number: [phone]    What medications are you currently taking:   Current Outpatient Medications on File Prior to Visit   Medication Sig Dispense Refill    albuterol sulfate  (90 Base) MCG/ACT inhaler Inhale 2 puffs by mouth Every 6 (Six) Hours As Needed for Wheezing or Shortness of Air. 8.5 g 0    aspirin (aspirin) 81 MG EC tablet Take 1 tablet by mouth Daily. (Patient not taking: Reported on 9/5/2023)      Benralizumab 30 MG/ML solution auto-injector Inject 30 mg under the skin into the appropriate area as directed Every 2 (Two) Months starting 8 weeks after 3rd once-monthly injection 1 mL 5    fluticasone (FLONASE) 50 MCG/ACT nasal spray INSTILL 1 SPRAY INTO EACH NOSTRIL DAILY 48 g 2    Fluticasone-Umeclidin-Vilant (TRELEGY) 200-62.5-25 MCG/INH inhaler Inhale 1 puff Daily. 28 each 5    ipratropium-albuterol (DUO-NEB) 0.5-2.5 mg/3 ml nebulizer Take 3 mL by nebulization Every 4 (Four) Hours As Needed for Wheezing or Shortness of Air. 180 mL 3    lovastatin (MEVACOR) 40 MG tablet TAKE 1 TABLET BY MOUTH EVERY DAY FOR CHOLESTEROL 90 tablet 3    methadone (DOLOPHINE) 5 MG/5ML solution Take 65 mL by mouth Daily. Patient takes 65 mg once per day(per [facility])      omeprazole (priLOSEC) 40 MG capsule Take 1 capsule by mouth Daily. 90 capsule 3    ondansetron ODT (ZOFRAN-ODT) 4 MG disintegrating tablet Place 1 tablet on the tongue Every 8 (Eight) Hours As Needed for Nausea or Vomiting. 20 tablet 0    predniSONE (DELTASONE) 10 MG tablet Take 6 tablets by mouth daily for 3 Days, then 5 tabs for 3 days, then 4 tabs for 3 days, then 3 tabs for 3 days, then 2 tabs for 3 days, then 1 tab for 3 days 63 tablet 0    traZODone (DESYREL) 150 MG tablet Take 1 tablet by mouth Every Night. 90 tablet 3     Current Facility-Administered Medications on File Prior  to Visit   Medication Dose Route Frequency Provider Last Rate Last Admin    [START ON 10/3/2023] Benralizumab solution prefilled syringe 30 mg  30 mg Subcutaneous Every 8 Weeks Destiny Mcknight APRN              When did you start taking these medications:  A WHILE     Which medication are you concerned about: TRELEGY INHALER     Who prescribed you this medication: DANNY LOPEZ    What are your concerns: PT STATES ITS GIVING HIM A SORE THROAT     How long have you had these concerns: 2 WEEKS

## 2023-09-16 ENCOUNTER — APPOINTMENT (OUTPATIENT)
Dept: ULTRASOUND IMAGING | Facility: HOSPITAL | Age: 48
End: 2023-09-16
Payer: MEDICARE

## 2023-09-16 ENCOUNTER — HOSPITAL ENCOUNTER (EMERGENCY)
Facility: HOSPITAL | Age: 48
Discharge: HOME | End: 2023-09-17
Attending: EMERGENCY MEDICINE
Payer: MEDICARE

## 2023-09-16 VITALS
RESPIRATION RATE: 17 BRPM | HEIGHT: 73 IN | BODY MASS INDEX: 26.51 KG/M2 | TEMPERATURE: 98.1 F | SYSTOLIC BLOOD PRESSURE: 123 MMHG | OXYGEN SATURATION: 95 % | HEART RATE: 81 BPM | WEIGHT: 200 LBS | DIASTOLIC BLOOD PRESSURE: 94 MMHG

## 2023-09-16 DIAGNOSIS — N50.812 PAIN IN BOTH TESTICLES: ICD-10-CM

## 2023-09-16 DIAGNOSIS — N43.3 BILATERAL HYDROCELE: Primary | ICD-10-CM

## 2023-09-16 DIAGNOSIS — N50.811 PAIN IN BOTH TESTICLES: ICD-10-CM

## 2023-09-16 LAB
ALBUMIN SERPL-MCNC: 4 G/DL (ref 3.5–5.2)
ALBUMIN/GLOB SERPL: 1.7 G/DL
ALP SERPL-CCNC: 95 U/L (ref 39–117)
ALT SERPL W P-5'-P-CCNC: 20 U/L (ref 1–41)
ANION GAP SERPL CALCULATED.3IONS-SCNC: 11.1 MMOL/L (ref 5–15)
AST SERPL-CCNC: 19 U/L (ref 1–40)
BASOPHILS # BLD AUTO: 0 10*3/MM3 (ref 0–0.2)
BASOPHILS NFR BLD AUTO: 0 % (ref 0–1.5)
BILIRUB SERPL-MCNC: <0.2 MG/DL (ref 0–1.2)
BILIRUB UR QL STRIP: NEGATIVE
BUN SERPL-MCNC: 13 MG/DL (ref 6–20)
BUN/CREAT SERPL: 16.3 (ref 7–25)
CALCIUM SPEC-SCNC: 8.4 MG/DL (ref 8.6–10.5)
CHLORIDE SERPL-SCNC: 103 MMOL/L (ref 98–107)
CLARITY UR: CLEAR
CO2 SERPL-SCNC: 23.9 MMOL/L (ref 22–29)
COLOR UR: YELLOW
CREAT SERPL-MCNC: 0.8 MG/DL (ref 0.76–1.27)
DEPRECATED RDW RBC AUTO: 42.8 FL (ref 37–54)
EGFRCR SERPLBLD CKD-EPI 2021: 109.2 ML/MIN/1.73
EOSINOPHIL # BLD AUTO: 0 10*3/MM3 (ref 0–0.4)
EOSINOPHIL NFR BLD AUTO: 0 % (ref 0.3–6.2)
ERYTHROCYTE [DISTWIDTH] IN BLOOD BY AUTOMATED COUNT: 14.3 % (ref 12.3–15.4)
GLOBULIN UR ELPH-MCNC: 2.4 GM/DL
GLUCOSE SERPL-MCNC: 104 MG/DL (ref 65–99)
GLUCOSE UR STRIP-MCNC: NEGATIVE MG/DL
HCT VFR BLD AUTO: 39.6 % (ref 37.5–51)
HGB BLD-MCNC: 13 G/DL (ref 13–17.7)
HGB UR QL STRIP.AUTO: NEGATIVE
IMM GRANULOCYTES # BLD AUTO: 0.02 10*3/MM3 (ref 0–0.05)
IMM GRANULOCYTES NFR BLD AUTO: 0.3 % (ref 0–0.5)
KETONES UR QL STRIP: NEGATIVE
LEUKOCYTE ESTERASE UR QL STRIP.AUTO: NEGATIVE
LIPASE SERPL-CCNC: 34 U/L (ref 13–60)
LYMPHOCYTES # BLD AUTO: 1.93 10*3/MM3 (ref 0.7–3.1)
LYMPHOCYTES NFR BLD AUTO: 25.8 % (ref 19.6–45.3)
MCH RBC QN AUTO: 27 PG (ref 26.6–33)
MCHC RBC AUTO-ENTMCNC: 32.8 G/DL (ref 31.5–35.7)
MCV RBC AUTO: 82.3 FL (ref 79–97)
MONOCYTES # BLD AUTO: 0.67 10*3/MM3 (ref 0.1–0.9)
MONOCYTES NFR BLD AUTO: 9 % (ref 5–12)
NEUTROPHILS NFR BLD AUTO: 4.86 10*3/MM3 (ref 1.7–7)
NEUTROPHILS NFR BLD AUTO: 64.9 % (ref 42.7–76)
NITRITE UR QL STRIP: NEGATIVE
NRBC BLD AUTO-RTO: 0 /100 WBC (ref 0–0.2)
PH UR STRIP.AUTO: 7.5 [PH] (ref 5–8)
PLATELET # BLD AUTO: 185 10*3/MM3 (ref 140–450)
PMV BLD AUTO: 9.5 FL (ref 6–12)
POTASSIUM SERPL-SCNC: 4.1 MMOL/L (ref 3.5–5.2)
PROT SERPL-MCNC: 6.4 G/DL (ref 6–8.5)
PROT UR QL STRIP: NEGATIVE
RBC # BLD AUTO: 4.81 10*6/MM3 (ref 4.14–5.8)
SODIUM SERPL-SCNC: 138 MMOL/L (ref 136–145)
SP GR UR STRIP: 1.01 (ref 1–1.03)
UROBILINOGEN UR QL STRIP: NORMAL
WBC NRBC COR # BLD: 7.48 10*3/MM3 (ref 3.4–10.8)

## 2023-09-16 PROCEDURE — 81003 URINALYSIS AUTO W/O SCOPE: CPT | Performed by: PHYSICIAN ASSISTANT

## 2023-09-16 PROCEDURE — 25010000002 KETOROLAC TROMETHAMINE PER 15 MG: Performed by: PHYSICIAN ASSISTANT

## 2023-09-16 PROCEDURE — 99284 EMERGENCY DEPT VISIT MOD MDM: CPT

## 2023-09-16 PROCEDURE — 36415 COLL VENOUS BLD VENIPUNCTURE: CPT

## 2023-09-16 PROCEDURE — 85025 COMPLETE CBC W/AUTO DIFF WBC: CPT | Performed by: PHYSICIAN ASSISTANT

## 2023-09-16 PROCEDURE — 63710000001 ONDANSETRON ODT 4 MG TABLET DISPERSIBLE: Performed by: EMERGENCY MEDICINE

## 2023-09-16 PROCEDURE — 96372 THER/PROPH/DIAG INJ SC/IM: CPT

## 2023-09-16 PROCEDURE — 80053 COMPREHEN METABOLIC PANEL: CPT | Performed by: PHYSICIAN ASSISTANT

## 2023-09-16 PROCEDURE — 83690 ASSAY OF LIPASE: CPT | Performed by: PHYSICIAN ASSISTANT

## 2023-09-16 PROCEDURE — 76870 US EXAM SCROTUM: CPT

## 2023-09-16 RX ORDER — HYDROCODONE BITARTRATE AND ACETAMINOPHEN 5; 325 MG/1; MG/1
1 TABLET ORAL ONCE
Status: COMPLETED | OUTPATIENT
Start: 2023-09-16 | End: 2023-09-16

## 2023-09-16 RX ORDER — KETOROLAC TROMETHAMINE 30 MG/ML
60 INJECTION, SOLUTION INTRAMUSCULAR; INTRAVENOUS ONCE
Status: COMPLETED | OUTPATIENT
Start: 2023-09-16 | End: 2023-09-16

## 2023-09-16 RX ORDER — KETOROLAC TROMETHAMINE 10 MG/1
10 TABLET, FILM COATED ORAL EVERY 6 HOURS PRN
Qty: 15 TABLET | Refills: 0 | Status: SHIPPED | OUTPATIENT
Start: 2023-09-16 | End: 2023-09-16 | Stop reason: SDUPTHER

## 2023-09-16 RX ORDER — ONDANSETRON 4 MG/1
4 TABLET, ORALLY DISINTEGRATING ORAL ONCE
Status: COMPLETED | OUTPATIENT
Start: 2023-09-16 | End: 2023-09-16

## 2023-09-16 RX ADMIN — KETOROLAC TROMETHAMINE 60 MG: 30 INJECTION, SOLUTION INTRAMUSCULAR at 22:43

## 2023-09-16 RX ADMIN — HYDROCODONE BITARTRATE AND ACETAMINOPHEN 1 TABLET: 5; 325 TABLET ORAL at 21:31

## 2023-09-16 RX ADMIN — ONDANSETRON 4 MG: 4 TABLET, ORALLY DISINTEGRATING ORAL at 21:31

## 2023-09-17 RX ORDER — NALOXONE HYDROCHLORIDE 4 MG/.1ML
SPRAY NASAL
Qty: 2 EACH | Refills: 0 | Status: SHIPPED | OUTPATIENT
Start: 2023-09-16

## 2023-09-17 RX ORDER — KETOROLAC TROMETHAMINE 10 MG/1
10 TABLET, FILM COATED ORAL EVERY 6 HOURS PRN
Qty: 15 TABLET | Refills: 0 | Status: SHIPPED | OUTPATIENT
Start: 2023-09-16

## 2023-09-17 RX ORDER — HYDROCODONE BITARTRATE AND ACETAMINOPHEN 5; 325 MG/1; MG/1
1 TABLET ORAL EVERY 6 HOURS PRN
Qty: 8 TABLET | Refills: 0 | Status: SHIPPED | OUTPATIENT
Start: 2023-09-17

## 2023-09-17 NOTE — DISCHARGE INSTRUCTIONS
Take Toradol and Norco as prescribed as needed for pain.  Take Tylenol as needed per directions on the package.  Follow-up with your urologist and PCP for further outpatient evaluation if symptoms persist.  Return to the ER for new or worsening symptoms or acute concerns.

## 2023-09-17 NOTE — ED PROVIDER NOTES
Subjective:  Chief Complaint:  Testicle pain    History of Present Illness:  Patient is a 47-year-old male with history of anxiety, arthritis, asthma, colitis, COPD, GERD, hypertension, among others presenting to the ER with complaints of testicle pain.  Patient states he sees Dr. Arzola who told him he has testicular pain and they are unsure what causes it but this sometimes happens.  He states he prescribed Lortabs.  He states he has not been to see Dr. Arzola as he missed his appointment this month.  He states that the testicular pain has gotten worse today and is radiating to his abdomen and causing him to be nauseous.  Patient wincing in pain during evaluation.  Denies fevers and additional symptoms or complaints at this time.      Nurses Notes reviewed and agree, including vitals, allergies, social history and prior medical history.     REVIEW OF SYSTEMS: All systems reviewed and not pertinent unless noted.  Review of Systems   Gastrointestinal:  Positive for abdominal pain and nausea.   Genitourinary:  Positive for testicular pain.   All other systems reviewed and are negative.    Past Medical History:   Diagnosis Date    Abdominal adhesions     Anxiety     Arthritis     Asthma     Cervical radiculopathy     Colitis     COPD (chronic obstructive pulmonary disease)     GERD (gastroesophageal reflux disease)     Heart attack     History of hepatitis C     Treated with Epclusa in 2019    History of recreational drug use     HTN (hypertension)     Impaired functional mobility, balance, gait, and endurance     Kidney cysts     Left hip pain     Liver disease     Low back pain     Migraines     Obstructive chronic bronchitis with exacerbation     Sleep apnea     mild    Tattoos        Allergies:    Doxycycline      Past Surgical History:   Procedure Laterality Date    BACK SURGERY      COLONOSCOPY  2014    COLONOSCOPY N/A 1/4/2022    Procedure: COLONOSCOPY with biopsies;  Surgeon: Giancarlo Ruiz MD;   "Location:  [facility];  Service: Gastroenterology;  Laterality: N/A;    HERNIA REPAIR      HIP SPACER INSERTION WITH ANTIBIOTIC CEMENT Left 1/15/2020    Procedure: TOTAL HIP IMPLANT REMOVAL WITH INSERTION OF ANTIBIOTIC SPACER LEFT;  Surgeon: Ba Ramirez MD;  Location:  [facility];  Service: Orthopedics    SHOULDER LIGAMENT REPAIR      right shoulder    SMALL INTESTINE SURGERY      Small bowell resection    TOTAL HIP ARTHROPLASTY Left     TOTAL HIP ARTHROPLASTY Right     TOTAL HIP ARTHROPLASTY REVISION Left 3/5/2020    Procedure: HIP REIMPLANT REVISION LEFT;  Surgeon: Ba Ramirez MD;  Location:  [facility];  Service: Orthopedics;  Laterality: Left;    UPPER GASTROINTESTINAL ENDOSCOPY           Social History     Socioeconomic History    Marital status:    Tobacco Use    Smoking status: Former     Packs/day: 2.00     Years: 15.00     Pack years: 30.00     Types: Cigarettes     Quit date:      Years since quittin.7     Passive exposure: Current    Smokeless tobacco: Current     Types: Chew   Vaping Use    Vaping Use: Never used   Substance and Sexual Activity    Alcohol use: No     Comment: stopped drinking alcohol    Drug use: Not Currently     Comment: takes suboxone    Sexual activity: Defer         Family History   Problem Relation Age of Onset    Arthritis Other     Hypertension Other     Migraines Other     Heart attack Other     Stroke Other     Colon cancer Neg Hx        Objective  Physical Exam:  /94   Pulse 81   Temp 98.1 °F (36.7 °C) (Oral)   Resp 17   Ht 185.4 cm (73\")   Wt 90.7 kg (200 lb)   SpO2 95%   BMI 26.39 kg/m²      Physical Exam  Vitals and nursing note reviewed.   Constitutional:       General: He is not in acute distress.     Appearance: He is not toxic-appearing.   HENT:      Head: Normocephalic and atraumatic.      Right Ear: External ear normal.      Left Ear: External ear normal.      Nose: Nose normal.   Eyes:      Extraocular Movements: " Extraocular movements intact.      Conjunctiva/sclera: Conjunctivae normal.   Cardiovascular:      Rate and Rhythm: Normal rate.   Pulmonary:      Effort: Pulmonary effort is normal. No respiratory distress.   Abdominal:      General: There is no distension.      Palpations: Abdomen is soft.      Tenderness: There is abdominal tenderness. There is no guarding or rebound.   Musculoskeletal:         General: Normal range of motion.      Cervical back: Normal range of motion and neck supple.   Skin:     General: Skin is warm and dry.   Neurological:      General: No focal deficit present.      Mental Status: He is alert and oriented to person, place, and time.   Psychiatric:         Mood and Affect: Mood normal.         Behavior: Behavior normal.       Procedures    ED Course:         Lab Results (last 24 hours)       Procedure Component Value Units Date/Time    CBC & Differential [[id number]]  (Abnormal) Collected: 09/16/23 2143    Specimen: Blood Updated: 09/16/23 2149    Narrative:      The following orders were created for panel order CBC & Differential.  Procedure                               Abnormality         Status                     ---------                               -----------         ------                     CBC Auto Differential[[id number]]        Abnormal            Final result                 Please view results for these tests on the individual orders.    Comprehensive Metabolic Panel [[id number]]  (Abnormal) Collected: 09/16/23 2143    Specimen: Blood Updated: 09/16/23 2206     Glucose 104 mg/dL      BUN 13 mg/dL      Creatinine 0.80 mg/dL      Sodium 138 mmol/L      Potassium 4.1 mmol/L      Chloride 103 mmol/L      CO2 23.9 mmol/L      Calcium 8.4 mg/dL      Total Protein 6.4 g/dL      Albumin 4.0 g/dL      ALT (SGPT) 20 U/L      AST (SGOT) 19 U/L      Alkaline Phosphatase 95 U/L      Total Bilirubin <0.2 mg/dL      Globulin 2.4 gm/dL      A/G Ratio 1.7 g/dL      BUN/Creatinine Ratio 16.3      Anion Gap 11.1 mmol/L      eGFR 109.2 mL/min/1.73     Narrative:      GFR Normal >60  Chronic Kidney Disease <60  Kidney Failure <15      Urinalysis With Microscopic If Indicated (No Culture) - Urine, Clean Catch [[id number]]  (Normal) Collected: 09/16/23 2143    Specimen: Urine, Clean Catch Updated: 09/16/23 2150     Color, UA Yellow     Appearance, UA Clear     pH, UA 7.5     Specific Gravity, UA 1.014     Glucose, UA Negative     Ketones, UA Negative     Bilirubin, UA Negative     Blood, UA Negative     Protein, UA Negative     Leuk Esterase, UA Negative     Nitrite, UA Negative     Urobilinogen, UA 0.2 E.U./dL    Narrative:      Urine microscopic not indicated.    Lipase [[id number]]  (Normal) Collected: 09/16/23 2143    Specimen: Blood Updated: 09/16/23 2206     Lipase 34 U/L     CBC Auto Differential [[id number]]  (Abnormal) Collected: 09/16/23 2143    Specimen: Blood Updated: 09/16/23 2149     WBC 7.48 10*3/mm3      RBC 4.81 10*6/mm3      Hemoglobin 13.0 g/dL      Hematocrit 39.6 %      MCV 82.3 fL      MCH 27.0 pg      MCHC 32.8 g/dL      RDW 14.3 %      RDW-SD 42.8 fl      MPV 9.5 fL      Platelets 185 10*3/mm3      Neutrophil % 64.9 %      Lymphocyte % 25.8 %      Monocyte % 9.0 %      Eosinophil % 0.0 %      Basophil % 0.0 %      Immature Grans % 0.3 %      Neutrophils, Absolute 4.86 10*3/mm3      Lymphocytes, Absolute 1.93 10*3/mm3      Monocytes, Absolute 0.67 10*3/mm3      Eosinophils, Absolute 0.00 10*3/mm3      Basophils, Absolute 0.00 10*3/mm3      Immature Grans, Absolute 0.02 10*3/mm3      nRBC 0.0 /100 WBC              No radiology results from the last 24 hrs       MDM  Patient was evaluated in the ER for testicular pain worse on the left that has worsened today.  He states it has been a chronic issue and he has been prescribed Lortabs by his urologist, Dr. Arzola.  He states he missed his monthly appointment.  He is hemodynamically stable, afebrile, nontoxic-appearing on exam.  Differential  diagnosis includes but is not limited to testicular torsion, hydrocele, varicocele, epididymitis, UTI, among others.  Initial plan includes CBC, CMP, urinalysis, lipase, ultrasound of scrotum and testicles, and treatment with Granville and Zofran.    Labs unremarkable.  Urinalysis not indicative of an infectious process. Ultrasound reveals bilateral hydroceles but no other abnormalities per radiology. Patient agreeable with plan for discharge. Prescription sent for Granville and Toradol. Advised follow-up with the Urologist for further outpatient evaluation and definitive care. Precautions were given for new/worsening symptoms or acute concerns.    Final diagnoses:   Bilateral hydrocele   Pain in both testicles          Tamica Sheehan, SOBEIDA  09/17/23 0002

## 2023-10-17 RX ORDER — ALBUTEROL SULFATE 90 UG/1
2 AEROSOL, METERED RESPIRATORY (INHALATION) EVERY 4 HOURS PRN
Qty: 18 G | Refills: 2 | Status: SHIPPED | OUTPATIENT
Start: 2023-10-17

## 2023-10-24 ENCOUNTER — OFFICE VISIT (OUTPATIENT)
Dept: PULMONOLOGY | Facility: CLINIC | Age: 48
End: 2023-10-24
Payer: MEDICARE

## 2023-10-24 ENCOUNTER — DISEASE STATE MANAGEMENT VISIT (OUTPATIENT)
Dept: PHARMACY | Facility: HOSPITAL | Age: 48
End: 2023-10-24
Payer: MEDICARE

## 2023-10-24 VITALS
OXYGEN SATURATION: 95 % | DIASTOLIC BLOOD PRESSURE: 68 MMHG | HEART RATE: 92 BPM | HEIGHT: 73 IN | SYSTOLIC BLOOD PRESSURE: 104 MMHG | RESPIRATION RATE: 18 BRPM | WEIGHT: 200 LBS | BODY MASS INDEX: 26.51 KG/M2

## 2023-10-24 DIAGNOSIS — J30.89 OTHER ALLERGIC RHINITIS: ICD-10-CM

## 2023-10-24 DIAGNOSIS — G47.34 NOCTURNAL HYPOXIA: ICD-10-CM

## 2023-10-24 DIAGNOSIS — J45.50 SEVERE PERSISTENT ASTHMA WITHOUT COMPLICATION: ICD-10-CM

## 2023-10-24 DIAGNOSIS — R09.02 EXERCISE HYPOXEMIA: ICD-10-CM

## 2023-10-24 DIAGNOSIS — J44.9 CHRONIC OBSTRUCTIVE PULMONARY DISEASE, UNSPECIFIED COPD TYPE: ICD-10-CM

## 2023-10-24 DIAGNOSIS — J45.50 SEVERE PERSISTENT ASTHMA WITHOUT COMPLICATION: Primary | ICD-10-CM

## 2023-10-24 PROCEDURE — G0463 HOSPITAL OUTPT CLINIC VISIT: HCPCS

## 2023-10-24 PROCEDURE — 99214 OFFICE O/P EST MOD 30 MIN: CPT | Performed by: NURSE PRACTITIONER

## 2023-10-24 PROCEDURE — 3078F DIAST BP <80 MM HG: CPT | Performed by: NURSE PRACTITIONER

## 2023-10-24 PROCEDURE — 3074F SYST BP LT 130 MM HG: CPT | Performed by: NURSE PRACTITIONER

## 2023-10-24 RX ORDER — DUTASTERIDE 0.5 MG/1
1 CAPSULE, LIQUID FILLED ORAL DAILY
COMMUNITY
Start: 2023-09-19

## 2023-10-24 RX ORDER — ESOMEPRAZOLE MAGNESIUM 40 MG/1
CAPSULE, DELAYED RELEASE ORAL
COMMUNITY

## 2023-10-24 RX ORDER — HYDROCODONE BITARTRATE AND ACETAMINOPHEN 7.5; 325 MG/1; MG/1
1 TABLET ORAL EVERY 6 HOURS PRN
COMMUNITY
Start: 2023-09-28

## 2023-10-24 RX ORDER — OXYCODONE AND ACETAMINOPHEN 10; 325 MG/1; MG/1
TABLET ORAL
COMMUNITY
Start: 2023-09-27 | End: 2023-10-24

## 2023-10-24 RX ORDER — GABAPENTIN 600 MG/1
1 TABLET ORAL 3 TIMES DAILY
COMMUNITY
Start: 2023-09-27 | End: 2023-10-24

## 2023-10-24 RX ORDER — BUDESONIDE, GLYCOPYRROLATE, AND FORMOTEROL FUMARATE 160; 9; 4.8 UG/1; UG/1; UG/1
2 AEROSOL, METERED RESPIRATORY (INHALATION) 2 TIMES DAILY
Qty: 10.7 G | Refills: 5 | Status: SHIPPED | OUTPATIENT
Start: 2023-10-24

## 2023-10-24 RX ORDER — TRAZODONE HYDROCHLORIDE 50 MG/1
50 TABLET ORAL NIGHTLY
COMMUNITY
Start: 2023-10-23 | End: 2023-10-24

## 2023-10-24 RX ORDER — ALFUZOSIN HYDROCHLORIDE 10 MG/1
1 TABLET, EXTENDED RELEASE ORAL DAILY
COMMUNITY
Start: 2023-09-19

## 2023-10-24 NOTE — PROGRESS NOTES
Patient presented to clinic today to receive Fasenra injection. He is receiving these every 8 weeks.    Patient reports he tolerated his last injection well. No ADRs.    Fasenra 30 mg SQ x1 administered in the back of R arm in clinic today.  - NDC: 2887-3297-54  - LOT: [id number]  - EXP: 11/30/2023    Patient will RTC in 8 weeks for repeat injection. Patient was given option to continue getting injections in clinic vs administering to himself at home. He prefers to come to clinic.     Antwon Bartlett, PharmD  10/24/2023  14:41 EDT

## 2023-10-24 NOTE — PROGRESS NOTES
"Chief Complaint   Patient presents with    Breathing Problem    Follow-up         Subjective   Topher Stoll is a 47 y.o. male.   Patient is here today for follow up of asthma and shortness of breath.     His last exacerbation and hospitalization was August 2023. He was seen in [facility] clinic in September 2023.    The patient says that he is compliant with pulmonary medicines, as prescribed. He is getting Fasenra injections at [facility] clinic. He uses Anoro daily. He uses SALEEM a couple of times a month if it is raining especially.     He was on Trelegy but it gave him thrush so he was switched to Anoro.     He seldom uses duonebs in the nebulizer.     He uses flonase regularly.     He has been prescribed oxygen with activity and at night but does not use oxygen at all.  However, he tells me he recently received a portable oxygen concentrator from [employer].    He quit smoking 10 years ago.       The following portions of the patient's history were reviewed and updated as appropriate: allergies, current medications, past family history, past medical history, past social history, and past surgical history.      Review of Systems   HENT:  Negative for sinus pain.    Respiratory:  Positive for shortness of breath.    Psychiatric/Behavioral:  Positive for sleep disturbance.        Objective   Visit Vitals  /68   Pulse 92   Resp 18   Ht 185.4 cm (73\")   Wt 90.7 kg (200 lb)   SpO2 95%   BMI 26.39 kg/m²     ============================  ============================    6 MINUTE WALK TEST    Topher Stoll   1975             BASELINE   SpO2%: 95 % RA    Heart Rate 92   Blood Pressure 104/68     EXERCISE SpO2% HEART RATE RA or O2 @ LPM   1 MINUTE 95 92 ra   2 MINUTES 94 97 ra   3 MINUTES 92 94 ra   4 MINUTES 92 96 ra   5 MINUTES 83 89 ADD 2LPM   6 MINUTES 93 90 2LPM   (Number of laps: 9 X 36 meters + Final partial lap: 0 meters = 324 meters)            Distance Walked:  324 Meters   SpO2% Post Exercise:  96 % RA   HR Post " Exercise:  84     Reason to stop (if applicable):   ____ Chest Pain   ____ Light Headedness   ____ Dyspnea Unrelieved by Rest   ____ Abnormal Gait Pattern   ____ Severe Fatigue   ____ Other (Specify: __________________________)    Tech Comments (if any):      Test performed by:RR    ============================  ============================       Physical Exam  Vitals reviewed.   HENT:      Head: Atraumatic.      Mouth/Throat:      Mouth: Mucous membranes are moist.   Eyes:      Extraocular Movements: Extraocular movements intact.   Cardiovascular:      Rate and Rhythm: Normal rate and regular rhythm.   Pulmonary:      Effort: Pulmonary effort is normal. No respiratory distress.      Breath sounds: No wheezing.   Musculoskeletal:      Cervical back: Neck supple.      Comments: Gait normal.   Skin:     General: Skin is warm.   Neurological:      Mental Status: He is alert and oriented to person, place, and time.         Assessment & Plan   Diagnoses and all orders for this visit:    1. Severe persistent asthma without complication (Primary)    2. Other allergic rhinitis    3. Exercise hypoxemia    4. Nocturnal hypoxia    Other orders  -     Budeson-Glycopyrrol-Formoterol ([facility]) 160-9-4.8 MCG/ACT aerosol inhaler; Inhale 2 puffs 2 (Two) Times a Day.  Dispense: 10.7 g; Refill: 5           Return in about 6 months (around 4/24/2024) for Recheck, For Dr. Olivas.    DISCUSSION (if any):  On 6-minute walk, he required 2 L of oxygen.    Previous PFT 8/2022 consistent with mild obstruction.    PFT today consistent with moderate obstruction.  No restriction with air trapping suggested.  Increase diffusion capacity.    I have discussed the importance of using oxygen as prescribed.  He should use 2 L of oxygen at night and 2 L of oxygen with activity.  He now has a portable oxygen concentrator from [employer] and I have expressed that there is no reason for him not to use the portable oxygen concentrator.  He verbalizes  understanding and states he will use the oxygen as directed.    His symptoms of asthma are under adequate control at this time. I have prescribed Breztri to maximize asthma treatment.  I have asked him to discontinue Anoro.  He should continue the rescue medication as needed for shortness of breath and wheezing.    He should continue Fasenra injections with [facility].  The patient has chosen to continue getting injections in [facility] rather than taking them at home.    Patient's medications for underlying asthma were reviewed with him in great detail.    Any needed adjustments to his pulmonary medications, either for clinical or insurance coverage reasons, have been made and are reflected in the orders.    Side effects of prescribed medications discussed with the patient.    Asthma action plan with discussed with him.    The patient was asked to call this office if the symptoms worsen.       Dictated utilizing Dragon dictation.    This document was electronically signed by LISA Hernandez October 24, 2023  15:56 EDT

## 2023-11-14 ENCOUNTER — TRANSCRIBE ORDERS (OUTPATIENT)
Dept: ADMINISTRATIVE | Facility: HOSPITAL | Age: 48
End: 2023-11-14
Payer: MEDICARE

## 2023-11-14 DIAGNOSIS — Z87.438 HISTORY OF BPH: Primary | ICD-10-CM

## 2023-11-20 ENCOUNTER — TRANSCRIBE ORDERS (OUTPATIENT)
Dept: LAB | Facility: HOSPITAL | Age: 48
End: 2023-11-20
Payer: MEDICARE

## 2023-11-20 DIAGNOSIS — N40.1 BPH WITH URINARY OBSTRUCTION: Primary | ICD-10-CM

## 2023-11-20 DIAGNOSIS — N13.8 BPH WITH URINARY OBSTRUCTION: Primary | ICD-10-CM

## 2023-11-30 ENCOUNTER — HOSPITAL ENCOUNTER (OUTPATIENT)
Dept: CT IMAGING | Facility: HOSPITAL | Age: 48
Discharge: HOME | End: 2023-11-30
Payer: MEDICARE

## 2023-12-02 DIAGNOSIS — J44.9 COPD WITHOUT EXACERBATION: ICD-10-CM

## 2023-12-02 DIAGNOSIS — J45.50 SEVERE PERSISTENT ASTHMA WITHOUT COMPLICATION: Primary | ICD-10-CM

## 2023-12-04 RX ORDER — ALBUTEROL SULFATE 90 UG/1
AEROSOL, METERED RESPIRATORY (INHALATION)
Qty: 8.5 G | Refills: 3 | Status: SHIPPED | OUTPATIENT
Start: 2023-12-04

## 2023-12-04 RX ORDER — FLUTICASONE PROPIONATE 50 MCG
SPRAY, SUSPENSION (ML) NASAL
Qty: 16 G | Refills: 3 | Status: SHIPPED | OUTPATIENT
Start: 2023-12-04

## 2023-12-06 ENCOUNTER — APPOINTMENT (OUTPATIENT)
Dept: GENERAL RADIOLOGY | Facility: HOSPITAL | Age: 48
DRG: 190 | End: 2023-12-06
Payer: MEDICARE

## 2023-12-06 ENCOUNTER — HOSPITAL ENCOUNTER (INPATIENT)
Facility: HOSPITAL | Age: 48
LOS: 1 days | Discharge: HOME | DRG: 190 | End: 2023-12-09
Attending: STUDENT IN AN ORGANIZED HEALTH CARE EDUCATION/TRAINING PROGRAM | Admitting: INTERNAL MEDICINE
Payer: MEDICARE

## 2023-12-06 DIAGNOSIS — J44.9 CHRONIC OBSTRUCTIVE PULMONARY DISEASE, UNSPECIFIED COPD TYPE: ICD-10-CM

## 2023-12-06 DIAGNOSIS — J44.1 COPD WITH ACUTE EXACERBATION: Primary | ICD-10-CM

## 2023-12-06 DIAGNOSIS — R09.02 HYPOXIA: ICD-10-CM

## 2023-12-06 LAB
A-A DO2: 56.7 MMHG
ALBUMIN SERPL-MCNC: 4.1 G/DL (ref 3.5–5.2)
ALBUMIN/GLOB SERPL: 1.4 G/DL
ALP SERPL-CCNC: 122 U/L (ref 39–117)
ALT SERPL W P-5'-P-CCNC: 20 U/L (ref 1–41)
ANION GAP SERPL CALCULATED.3IONS-SCNC: 9 MMOL/L (ref 5–15)
ARTERIAL PATENCY WRIST A: POSITIVE
AST SERPL-CCNC: 19 U/L (ref 1–40)
ATMOSPHERIC PRESS: 739 MMHG
BASE EXCESS BLDA CALC-SCNC: 5.5 MMOL/L (ref 0–2)
BASOPHILS # BLD AUTO: 0.01 10*3/MM3 (ref 0–0.2)
BASOPHILS NFR BLD AUTO: 0.1 % (ref 0–1.5)
BDY SITE: ABNORMAL
BILIRUB SERPL-MCNC: 0.2 MG/DL (ref 0–1.2)
BUN SERPL-MCNC: 13 MG/DL (ref 6–20)
BUN/CREAT SERPL: 10.4 (ref 7–25)
CALCIUM SPEC-SCNC: 8.8 MG/DL (ref 8.6–10.5)
CHLORIDE SERPL-SCNC: 99 MMOL/L (ref 98–107)
CO2 SERPL-SCNC: 31 MMOL/L (ref 22–29)
COHGB MFR BLD: -0.1 % (ref 0–2)
CREAT SERPL-MCNC: 1.25 MG/DL (ref 0.76–1.27)
CRP SERPL-MCNC: 1.43 MG/DL (ref 0–0.5)
D DIMER PPP FEU-MCNC: 0.46 MCGFEU/ML (ref 0–0.5)
DEPRECATED RDW RBC AUTO: 39.4 FL (ref 37–54)
EGFRCR SERPLBLD CKD-EPI 2021: 70.6 ML/MIN/1.73
EOSINOPHIL # BLD AUTO: 0.01 10*3/MM3 (ref 0–0.4)
EOSINOPHIL NFR BLD AUTO: 0.1 % (ref 0.3–6.2)
ERYTHROCYTE [DISTWIDTH] IN BLOOD BY AUTOMATED COUNT: 12.8 % (ref 12.3–15.4)
FLUAV SUBTYP SPEC NAA+PROBE: NOT DETECTED
FLUBV RNA ISLT QL NAA+PROBE: NOT DETECTED
GAS FLOW AIRWAY: 2 LPM
GEN 5 2HR TROPONIN T REFLEX: 10 NG/L
GLOBULIN UR ELPH-MCNC: 3 GM/DL
GLUCOSE SERPL-MCNC: 107 MG/DL (ref 65–99)
HCO3 BLDA-SCNC: 32.2 MMOL/L (ref 22–28)
HCT VFR BLD AUTO: 44.2 % (ref 37.5–51)
HCT VFR BLD CALC: 41.5 %
HGB BLD-MCNC: 14 G/DL (ref 13–17.7)
IMM GRANULOCYTES # BLD AUTO: 0.04 10*3/MM3 (ref 0–0.05)
IMM GRANULOCYTES NFR BLD AUTO: 0.5 % (ref 0–0.5)
INHALED O2 CONCENTRATION: 28 %
LYMPHOCYTES # BLD AUTO: 2.08 10*3/MM3 (ref 0.7–3.1)
LYMPHOCYTES NFR BLD AUTO: 23.5 % (ref 19.6–45.3)
Lab: ABNORMAL
MAGNESIUM SERPL-MCNC: 2.1 MG/DL (ref 1.6–2.6)
MCH RBC QN AUTO: 26.7 PG (ref 26.6–33)
MCHC RBC AUTO-ENTMCNC: 31.7 G/DL (ref 31.5–35.7)
MCV RBC AUTO: 84.4 FL (ref 79–97)
METHGB BLD QL: 0.6 % (ref 0–1.5)
MODALITY: ABNORMAL
MONOCYTES # BLD AUTO: 0.6 10*3/MM3 (ref 0.1–0.9)
MONOCYTES NFR BLD AUTO: 6.8 % (ref 5–12)
NEUTROPHILS NFR BLD AUTO: 6.12 10*3/MM3 (ref 1.7–7)
NEUTROPHILS NFR BLD AUTO: 69 % (ref 42.7–76)
NRBC BLD AUTO-RTO: 0 /100 WBC (ref 0–0.2)
NT-PROBNP SERPL-MCNC: 56.5 PG/ML (ref 0–450)
OXYHGB MFR BLDV: 93.2 % (ref 94–99)
PCO2 BLDA: 54.7 MM HG (ref 35–45)
PCO2 TEMP ADJ BLD: ABNORMAL MM[HG]
PH BLDA: 7.38 PH UNITS (ref 7.35–7.45)
PH, TEMP CORRECTED: ABNORMAL
PLATELET # BLD AUTO: 205 10*3/MM3 (ref 140–450)
PMV BLD AUTO: 9.5 FL (ref 6–12)
PO2 BLDA: 76 MM HG (ref 75–100)
PO2 TEMP ADJ BLD: ABNORMAL MM[HG]
POTASSIUM SERPL-SCNC: 3.9 MMOL/L (ref 3.5–5.2)
PROCALCITONIN SERPL-MCNC: 0.02 NG/ML (ref 0–0.25)
PROT SERPL-MCNC: 7.1 G/DL (ref 6–8.5)
RBC # BLD AUTO: 5.24 10*6/MM3 (ref 4.14–5.8)
SAO2 % BLDCOA: 93.7 % (ref 94–100)
SARS-COV-2 RNA RESP QL NAA+PROBE: NOT DETECTED
SODIUM SERPL-SCNC: 139 MMOL/L (ref 136–145)
TROPONIN T DELTA: 0 NG/L
TROPONIN T SERPL HS-MCNC: 10 NG/L
VENTILATOR MODE: ABNORMAL
WBC NRBC COR # BLD AUTO: 8.86 10*3/MM3 (ref 3.4–10.8)

## 2023-12-06 PROCEDURE — 94799 UNLISTED PULMONARY SVC/PX: CPT

## 2023-12-06 PROCEDURE — 25010000002 METHYLPREDNISOLONE PER 125 MG: Performed by: STUDENT IN AN ORGANIZED HEALTH CARE EDUCATION/TRAINING PROGRAM

## 2023-12-06 PROCEDURE — 36600 WITHDRAWAL OF ARTERIAL BLOOD: CPT

## 2023-12-06 PROCEDURE — 86140 C-REACTIVE PROTEIN: CPT | Performed by: STUDENT IN AN ORGANIZED HEALTH CARE EDUCATION/TRAINING PROGRAM

## 2023-12-06 PROCEDURE — 84484 ASSAY OF TROPONIN QUANT: CPT | Performed by: STUDENT IN AN ORGANIZED HEALTH CARE EDUCATION/TRAINING PROGRAM

## 2023-12-06 PROCEDURE — 94640 AIRWAY INHALATION TREATMENT: CPT

## 2023-12-06 PROCEDURE — 82805 BLOOD GASES W/O2 SATURATION: CPT

## 2023-12-06 PROCEDURE — 99285 EMERGENCY DEPT VISIT HI MDM: CPT

## 2023-12-06 PROCEDURE — 87636 SARSCOV2 & INF A&B AMP PRB: CPT | Performed by: STUDENT IN AN ORGANIZED HEALTH CARE EDUCATION/TRAINING PROGRAM

## 2023-12-06 PROCEDURE — 71045 X-RAY EXAM CHEST 1 VIEW: CPT

## 2023-12-06 PROCEDURE — 85379 FIBRIN DEGRADATION QUANT: CPT | Performed by: STUDENT IN AN ORGANIZED HEALTH CARE EDUCATION/TRAINING PROGRAM

## 2023-12-06 PROCEDURE — 83735 ASSAY OF MAGNESIUM: CPT | Performed by: STUDENT IN AN ORGANIZED HEALTH CARE EDUCATION/TRAINING PROGRAM

## 2023-12-06 PROCEDURE — 85025 COMPLETE CBC W/AUTO DIFF WBC: CPT | Performed by: STUDENT IN AN ORGANIZED HEALTH CARE EDUCATION/TRAINING PROGRAM

## 2023-12-06 PROCEDURE — 84145 PROCALCITONIN (PCT): CPT | Performed by: STUDENT IN AN ORGANIZED HEALTH CARE EDUCATION/TRAINING PROGRAM

## 2023-12-06 PROCEDURE — 93005 ELECTROCARDIOGRAM TRACING: CPT | Performed by: STUDENT IN AN ORGANIZED HEALTH CARE EDUCATION/TRAINING PROGRAM

## 2023-12-06 PROCEDURE — 36415 COLL VENOUS BLD VENIPUNCTURE: CPT

## 2023-12-06 PROCEDURE — 80053 COMPREHEN METABOLIC PANEL: CPT | Performed by: STUDENT IN AN ORGANIZED HEALTH CARE EDUCATION/TRAINING PROGRAM

## 2023-12-06 PROCEDURE — 83050 HGB METHEMOGLOBIN QUAN: CPT

## 2023-12-06 PROCEDURE — 82375 ASSAY CARBOXYHB QUANT: CPT

## 2023-12-06 PROCEDURE — G0378 HOSPITAL OBSERVATION PER HR: HCPCS

## 2023-12-06 PROCEDURE — 83880 ASSAY OF NATRIURETIC PEPTIDE: CPT | Performed by: STUDENT IN AN ORGANIZED HEALTH CARE EDUCATION/TRAINING PROGRAM

## 2023-12-06 PROCEDURE — 25010000002 MAGNESIUM SULFATE 2 GM/50ML SOLUTION: Performed by: STUDENT IN AN ORGANIZED HEALTH CARE EDUCATION/TRAINING PROGRAM

## 2023-12-06 RX ORDER — IPRATROPIUM BROMIDE AND ALBUTEROL SULFATE 2.5; .5 MG/3ML; MG/3ML
3 SOLUTION RESPIRATORY (INHALATION) ONCE
Status: COMPLETED | OUTPATIENT
Start: 2023-12-06 | End: 2023-12-06

## 2023-12-06 RX ORDER — MAGNESIUM SULFATE HEPTAHYDRATE 40 MG/ML
2 INJECTION, SOLUTION INTRAVENOUS ONCE
Status: COMPLETED | OUTPATIENT
Start: 2023-12-06 | End: 2023-12-06

## 2023-12-06 RX ORDER — HYDROCODONE BITARTRATE AND ACETAMINOPHEN 5; 325 MG/1; MG/1
1 TABLET ORAL ONCE
Status: COMPLETED | OUTPATIENT
Start: 2023-12-06 | End: 2023-12-06

## 2023-12-06 RX ORDER — METHYLPREDNISOLONE SODIUM SUCCINATE 125 MG/2ML
125 INJECTION, POWDER, LYOPHILIZED, FOR SOLUTION INTRAMUSCULAR; INTRAVENOUS ONCE
Status: COMPLETED | OUTPATIENT
Start: 2023-12-06 | End: 2023-12-06

## 2023-12-06 RX ADMIN — IPRATROPIUM BROMIDE AND ALBUTEROL SULFATE 3 ML: .5; 3 SOLUTION RESPIRATORY (INHALATION) at 19:12

## 2023-12-06 RX ADMIN — IPRATROPIUM BROMIDE AND ALBUTEROL SULFATE 3 ML: .5; 3 SOLUTION RESPIRATORY (INHALATION) at 20:36

## 2023-12-06 RX ADMIN — METHYLPREDNISOLONE SODIUM SUCCINATE 125 MG: 125 INJECTION, POWDER, FOR SOLUTION INTRAMUSCULAR; INTRAVENOUS at 18:10

## 2023-12-06 RX ADMIN — MAGNESIUM SULFATE HEPTAHYDRATE 2 G: 40 INJECTION, SOLUTION INTRAVENOUS at 18:11

## 2023-12-06 RX ADMIN — HYDROCODONE BITARTRATE AND ACETAMINOPHEN 1 TABLET: 5; 325 TABLET ORAL at 20:21

## 2023-12-06 NOTE — ED PROVIDER NOTES
Subjective:  History of Present Illness:    Patient is a 48-year-old male history of COPD, GERD, MI, hep C, polysubstance use, hypertension, migraines presents today for increased work of breathing.  Reports cough congestion and viral prodrome over the last week.  This evening, became acutely dyspneic.  Wheezing throughout on exam.  Increased work of breathing noted.  He denies any preceding fevers.  No nausea, vomiting, diarrhea.  Denies abdominal pain.  No unilateral leg swelling or leg pain.      Nurses Notes reviewed and agree, including vitals, allergies, social history and prior medical history.     REVIEW OF SYSTEMS: All systems reviewed and not pertinent unless noted.  Review of Systems   Constitutional:  Positive for activity change, chills and fatigue. Negative for appetite change and fever.   HENT:  Positive for congestion and rhinorrhea. Negative for sinus pressure, sneezing and trouble swallowing.    Eyes:  Negative for discharge and itching.   Respiratory:  Positive for cough, shortness of breath and wheezing.    Cardiovascular:  Negative for chest pain and palpitations.   Gastrointestinal:  Negative for abdominal distention, abdominal pain, diarrhea, nausea and vomiting.   Endocrine: Negative for cold intolerance and heat intolerance.   Genitourinary:  Negative for decreased urine volume, dysuria and urgency.   Musculoskeletal:  Negative for gait problem, neck pain and neck stiffness.   Skin:  Negative for color change and rash.   Allergic/Immunologic: Negative for immunocompromised state.   Neurological:  Negative for facial asymmetry and headaches.   Hematological:  Negative for adenopathy.   Psychiatric/Behavioral:  Negative for self-injury and suicidal ideas.        Past Medical History:   Diagnosis Date    Abdominal adhesions     Anxiety     Arthritis     Asthma     Cervical radiculopathy     Colitis     COPD (chronic obstructive pulmonary disease)     GERD (gastroesophageal reflux disease)      Heart attack     History of hepatitis C     Treated with Epclusa in 2019    History of recreational drug use     HTN (hypertension)     Impaired functional mobility, balance, gait, and endurance     Kidney cysts     Left hip pain     Liver disease     Low back pain     Migraines     Obstructive chronic bronchitis with exacerbation     Sleep apnea     mild    Tattoos        Allergies:    Doxycycline      Past Surgical History:   Procedure Laterality Date    BACK SURGERY      COLONOSCOPY  2014    COLONOSCOPY N/A 2022    Procedure: COLONOSCOPY with biopsies;  Surgeon: Giancarlo Ruiz MD;  Location:  [facility];  Service: Gastroenterology;  Laterality: N/A;    HERNIA REPAIR      HIP SPACER INSERTION WITH ANTIBIOTIC CEMENT Left 1/15/2020    Procedure: TOTAL HIP IMPLANT REMOVAL WITH INSERTION OF ANTIBIOTIC SPACER LEFT;  Surgeon: Ba Ramirez MD;  Location:  [facility];  Service: Orthopedics    SHOULDER LIGAMENT REPAIR      right shoulder    SMALL INTESTINE SURGERY      Small bowell resection    TOTAL HIP ARTHROPLASTY Left     TOTAL HIP ARTHROPLASTY Right     TOTAL HIP ARTHROPLASTY REVISION Left 3/5/2020    Procedure: HIP REIMPLANT REVISION LEFT;  Surgeon: Ba Ramirez MD;  Location:  [facility];  Service: Orthopedics;  Laterality: Left;    UPPER GASTROINTESTINAL ENDOSCOPY           Social History     Socioeconomic History    Marital status:    Tobacco Use    Smoking status: Former     Packs/day: 2.00     Years: 15.00     Additional pack years: 0.00     Total pack years: 30.00     Types: Cigarettes     Quit date:      Years since quittin.9     Passive exposure: Current    Smokeless tobacco: Current     Types: Chew   Vaping Use    Vaping Use: Never used   Substance and Sexual Activity    Alcohol use: No     Comment: stopped drinking alcohol    Drug use: Not Currently     Comment: takes suboxone    Sexual activity: Defer         Family History   Problem Relation Age of Onset     "Arthritis Other     Hypertension Other     Migraines Other     Heart attack Other     Stroke Other     Colon cancer Neg Hx        Objective  Physical Exam:  /79 (BP Location: Left arm, Patient Position: Lying)   Pulse 83   Temp 98.1 °F (36.7 °C) (Oral)   Resp 20   Ht 185.4 cm (73\")   Wt 90.7 kg (200 lb)   SpO2 90%   BMI 26.39 kg/m²      Physical Exam  Constitutional:       General: He is in acute distress.      Appearance: Normal appearance. He is normal weight. He is not ill-appearing.   HENT:      Head: Normocephalic and atraumatic.      Nose: Nose normal. No congestion or rhinorrhea.      Mouth/Throat:      Mouth: Mucous membranes are moist.      Pharynx: Oropharynx is clear.   Eyes:      Extraocular Movements: Extraocular movements intact.      Conjunctiva/sclera: Conjunctivae normal.      Pupils: Pupils are equal, round, and reactive to light.   Cardiovascular:      Rate and Rhythm: Normal rate and regular rhythm.      Pulses: Normal pulses.   Pulmonary:      Effort: Pulmonary effort is normal. Tachypnea, accessory muscle usage and respiratory distress present.      Breath sounds: Examination of the right-upper field reveals wheezing. Examination of the left-upper field reveals wheezing. Examination of the right-middle field reveals wheezing. Examination of the left-middle field reveals wheezing. Examination of the right-lower field reveals wheezing. Examination of the left-lower field reveals wheezing. Wheezing present.   Abdominal:      General: Abdomen is flat. Bowel sounds are normal. There is no distension.      Palpations: Abdomen is soft.      Tenderness: There is no abdominal tenderness.   Musculoskeletal:         General: No swelling or tenderness. Normal range of motion.      Cervical back: Normal range of motion and neck supple. No rigidity or tenderness.   Skin:     General: Skin is warm and dry.      Capillary Refill: Capillary refill takes less than 2 seconds.   Neurological:      " General: No focal deficit present.      Mental Status: He is alert and oriented to person, place, and time. Mental status is at baseline.      Cranial Nerves: No cranial nerve deficit.      Sensory: No sensory deficit.      Motor: No weakness.   Psychiatric:         Mood and Affect: Mood normal.         Behavior: Behavior normal.         Thought Content: Thought content normal.         Judgment: Judgment normal.         Procedures    ED Course:    ED Course as of 12/06/23 2129   Wed Dec 06, 2023   1801 EKG interpreted by me, normal sinus rhythm no concerning ST changes noted, rate of 92 [JE]      ED Course User Index  [JE] Crispin Hudson MD       Lab Results (last 24 hours)       Procedure Component Value Units Date/Time    CBC & Differential [[id number]]  (Abnormal) Collected: 12/06/23 1801    Specimen: Blood Updated: 12/06/23 1809    Narrative:      The following orders were created for panel order CBC & Differential.  Procedure                               Abnormality         Status                     ---------                               -----------         ------                     CBC Auto Differential[[id number]]        Abnormal            Final result                 Please view results for these tests on the individual orders.    Comprehensive Metabolic Panel [[id number]]  (Abnormal) Collected: 12/06/23 1801    Specimen: Blood Updated: 12/06/23 1832     Glucose 107 mg/dL      BUN 13 mg/dL      Creatinine 1.25 mg/dL      Sodium 139 mmol/L      Potassium 3.9 mmol/L      Comment: Slight hemolysis detected by analyzer. Result may be falsely elevated.        Chloride 99 mmol/L      CO2 31.0 mmol/L      Calcium 8.8 mg/dL      Total Protein 7.1 g/dL      Albumin 4.1 g/dL      ALT (SGPT) 20 U/L      AST (SGOT) 19 U/L      Alkaline Phosphatase 122 U/L      Total Bilirubin 0.2 mg/dL      Globulin 3.0 gm/dL      A/G Ratio 1.4 g/dL      BUN/Creatinine Ratio 10.4     Anion Gap 9.0 mmol/L      eGFR 70.6  mL/min/1.73     Narrative:      GFR Normal >60  Chronic Kidney Disease <60  Kidney Failure <15      BNP [[id number]]  (Normal) Collected: 12/06/23 1801    Specimen: Blood Updated: 12/06/23 1836     proBNP 56.5 pg/mL     Narrative:      This assay is used as an aid in the diagnosis of individuals suspected of having heart failure. It can be used as an aid in the diagnosis of acute decompensated heart failure (ADHF) in patients presenting with signs and symptoms of ADHF to the emergency department (ED). In addition, NT-proBNP of <300 pg/mL indicates ADHF is not likely.    Age Range Result Interpretation  NT-proBNP Concentration (pg/mL:      <50             Positive            >450                   Gray                 300-450                    Negative             <300    50-75           Positive            >900                  Gray                300-900                  Negative            <300      >75             Positive            >1800                  Gray                300-1800                  Negative            <300    High Sensitivity Troponin T [[id number]]  (Normal) Collected: 12/06/23 1801    Specimen: Blood Updated: 12/06/23 1836     HS Troponin T 10 ng/L     Narrative:      High Sensitive Troponin T Reference Range:  <14.0 ng/L- Negative Female for AMI  <22.0 ng/L- Negative Male for AMI  >=14 - Abnormal Female indicating possible myocardial injury.  >=22 - Abnormal Male indicating possible myocardial injury.   Clinicians would have to utilize clinical acumen, EKG, Troponin, and serial changes to determine if it is an Acute Myocardial Infarction or myocardial injury due to an underlying chronic condition.         D-dimer, Quantitative [[id number]]  (Normal) Collected: 12/06/23 1801    Specimen: Blood Updated: 12/06/23 1840     D-Dimer, Quantitative 0.46 MCGFEU/mL     Narrative:      According to the assay 's published package insert, a normal (<0.50 MCGFEU/mL) D-dimer result in  "conjunction with a non-high clinical probability assessment, excludes deep vein thrombosis (DVT) and pulmonary embolism (PE) with high sensitivity.    D-dimer values increase with age and this can make VTE exclusion of an older population difficult. To address this, the American College [employer], based on best available evidence and recent guidelines, recommends that clinicians use age-adjusted D-dimer thresholds in patients greater than 50 years of age with: a) a low probability of PE who do not meet all Pulmonary Embolism Rule Out Criteria, or b) in those with intermediate probability of PE.   The formula for an age-adjusted D-dimer cut-off is \"age/100\".  For example, a 60 year old patient would have an age-adjusted cut-off of 0.60 MCGFEU/mL and an 80 year old 0.80 MCGFEU/mL.    C-reactive Protein [[id number]]  (Abnormal) Collected: 12/06/23 1801    Specimen: Blood Updated: 12/06/23 1832     C-Reactive Protein 1.43 mg/dL     Procalcitonin [[id number]]  (Normal) Collected: 12/06/23 1801    Specimen: Blood Updated: 12/06/23 1841     Procalcitonin 0.02 ng/mL     Narrative:      As a Marker for Sepsis (Non-Neonates):    1. <0.5 ng/mL represents a low risk of severe sepsis and/or septic shock.  2. >2 ng/mL represents a high risk of severe sepsis and/or septic shock.    As a Marker for Lower Respiratory Tract Infections that require antibiotic therapy:    PCT on Admission    Antibiotic Therapy       6-12 Hrs later    >0.5                Strongly Recommended  >0.25 - <0.5        Recommended   0.1 - 0.25          Discouraged              Remeasure/reassess PCT  <0.1                Strongly Discouraged     Remeasure/reassess PCT    As 28 day mortality risk marker: \"Change in Procalcitonin Result\" (>80% or <=80%) if Day 0 (or Day 1) and Day 4 values are available. Refer to http://www.[employer].coms-pct-calculator.com    Change in PCT <=80%  A decrease of PCT levels below or equal to 80% defines a positive change in PCT test " result representing a higher risk for 28-day all-cause mortality of patients diagnosed with severe sepsis for septic shock.    Change in PCT >80%  A decrease of PCT levels of more than 80% defines a negative change in PCT result representing a lower risk for 28-day all-cause mortality of patients diagnosed with severe sepsis or septic shock.       Magnesium [[id number]]  (Normal) Collected: 12/06/23 1801    Specimen: Blood Updated: 12/06/23 1832     Magnesium 2.1 mg/dL     CBC Auto Differential [[id number]]  (Abnormal) Collected: 12/06/23 1801    Specimen: Blood Updated: 12/06/23 1809     WBC 8.86 10*3/mm3      RBC 5.24 10*6/mm3      Hemoglobin 14.0 g/dL      Hematocrit 44.2 %      MCV 84.4 fL      MCH 26.7 pg      MCHC 31.7 g/dL      RDW 12.8 %      RDW-SD 39.4 fl      MPV 9.5 fL      Platelets 205 10*3/mm3      Neutrophil % 69.0 %      Lymphocyte % 23.5 %      Monocyte % 6.8 %      Eosinophil % 0.1 %      Basophil % 0.1 %      Immature Grans % 0.5 %      Neutrophils, Absolute 6.12 10*3/mm3      Lymphocytes, Absolute 2.08 10*3/mm3      Monocytes, Absolute 0.60 10*3/mm3      Eosinophils, Absolute 0.01 10*3/mm3      Basophils, Absolute 0.01 10*3/mm3      Immature Grans, Absolute 0.04 10*3/mm3      nRBC 0.0 /100 WBC     COVID-19 and FLU A/B PCR, 1 HR TAT - Swab, Nasopharynx [[id number]]  (Normal) Collected: 12/06/23 1807    Specimen: Swab from Nasopharynx Updated: 12/06/23 1832     COVID19 Not Detected     Influenza A PCR Not Detected     Influenza B PCR Not Detected    Narrative:      Fact sheet for providers: https://www.fda.gov/media/956584/download    Fact sheet for patients: https://www.fda.gov/media/900915/download    Test performed by PCR.    Blood Gas, Arterial With Co-Ox [[id number]]  (Abnormal) Collected: 12/06/23 1926    Specimen: Arterial Blood Updated: 12/06/23 1926     Site Right Radial     Michael's Test Positive     pH, Arterial 7.378 pH units      pCO2, Arterial 54.7 mm Hg      Comment: 83 Value above  reference range        pO2, Arterial 76.0 mm Hg      Comment: 84 Value below reference range        HCO3, Arterial 32.2 mmol/L      Comment: 83 Value above reference range        Base Excess, Arterial 5.5 mmol/L      Comment: 83 Value above reference range        O2 Saturation, Arterial 93.7 %      Comment: 84 Value below reference range        Hematocrit, Blood Gas 41.5 %      Oxyhemoglobin 93.2 %      Comment: 84 Value below reference range        Methemoglobin 0.60 %      Carboxyhemoglobin -0.1 %      Comment: 84 Value below reference range        A-a DO2 56.7 mmHg      Barometric Pressure for Blood Gas 739 mmHg      Modality Nasal Cannula     FIO2 28 %      Flow Rate 2.0 lpm      Ventilator Mode NA     Collected by [facility]     Comment: Meter: [id number]     :  [id number]        pH, Temp Corrected --     pCO2, Temperature Corrected --     pO2, Temperature Corrected --    High Sensitivity Troponin T 2Hr [[id number]]  (Normal) Collected: 12/06/23 2001    Specimen: Blood Updated: 12/06/23 2029     HS Troponin T 10 ng/L      Troponin T Delta 0 ng/L     Narrative:      High Sensitive Troponin T Reference Range:  <14.0 ng/L- Negative Female for AMI  <22.0 ng/L- Negative Male for AMI  >=14 - Abnormal Female indicating possible myocardial injury.  >=22 - Abnormal Male indicating possible myocardial injury.   Clinicians would have to utilize clinical acumen, EKG, Troponin, and serial changes to determine if it is an Acute Myocardial Infarction or myocardial injury due to an underlying chronic condition.                  No radiology results from the last 24 hrs       MDM     Amount and/or Complexity of Data Reviewed  Clinical lab tests: reviewed  Tests in the medicine section of CPT®: reviewed        Initial impression of presenting illness: Shortness of breath    DDX: includes but is not limited to: COPD exacerbation, bacterial pneumonia, viral URI, COVID-19, PE    Patient arrives guarded with vitals interpreted by  myself.     Pertinent features from physical exam: Increased work of breathing, wheezing throughout.    Initial diagnostic plan: CBC, CMP, troponin, BNP, D-dimer, ECG, chest x-ray, CRP, Pro-Siva    Results from initial plan were reviewed and interpreted by me revealing 21:29 EST  I received care from Dr. Hudson in regards to response to care as well as results from labs and imaging.  Chest x-ray without acute findings per my interpretation.  Nonactionable troponin flu and COVID-negative, nonactionable CBC CMP.  Diagnostic information from other sources: Old record review    Interventions / Re-evaluation: Concern for COPD exacerbation given Solu-Medrol, DuoNeb, magnesium  Per my evaluation patient had persistent wheezing, oxygen saturations at 89% on 2 L, increased to 4 L to maintain oxygen saturations greater than 92%.  Repeated nebs.  Patient also reported he had recently completed outpatient 2-week steroid taper, antibiotics for strep throat and bronchitis.  Given persistent symptoms will admit patient.  Discussed with Dr. Kirkland hospitalist will admit.  Results/clinical rationale were discussed with patient    Consultations/Discussion of results with other physicians: Dr. Kirkland    -----  MARGARET reviewed by Crispin Hudson MD, Dominick Riley,      Final diagnoses:   COPD with acute exacerbation   Hypoxia          Dominick Riley,   12/06/23 7142

## 2023-12-07 PROBLEM — J45.41 MODERATE PERSISTENT ASTHMA WITH EXACERBATION: Status: ACTIVE | Noted: 2023-12-07

## 2023-12-07 LAB
ALBUMIN SERPL-MCNC: 4.2 G/DL (ref 3.5–5.2)
ALBUMIN/GLOB SERPL: 1.4 G/DL
ALP SERPL-CCNC: 118 U/L (ref 39–117)
ALT SERPL W P-5'-P-CCNC: 17 U/L (ref 1–41)
ANION GAP SERPL CALCULATED.3IONS-SCNC: 8.3 MMOL/L (ref 5–15)
AST SERPL-CCNC: 17 U/L (ref 1–40)
BILIRUB SERPL-MCNC: 0.3 MG/DL (ref 0–1.2)
BUN SERPL-MCNC: 15 MG/DL (ref 6–20)
BUN/CREAT SERPL: 14.7 (ref 7–25)
CALCIUM SPEC-SCNC: 9.5 MG/DL (ref 8.6–10.5)
CHLORIDE SERPL-SCNC: 101 MMOL/L (ref 98–107)
CO2 SERPL-SCNC: 27.7 MMOL/L (ref 22–29)
CREAT SERPL-MCNC: 1.02 MG/DL (ref 0.76–1.27)
DEPRECATED RDW RBC AUTO: 37.3 FL (ref 37–54)
EGFRCR SERPLBLD CKD-EPI 2021: 90.1 ML/MIN/1.73
ERYTHROCYTE [DISTWIDTH] IN BLOOD BY AUTOMATED COUNT: 12.4 % (ref 12.3–15.4)
GLOBULIN UR ELPH-MCNC: 2.9 GM/DL
GLUCOSE SERPL-MCNC: 141 MG/DL (ref 65–99)
HCT VFR BLD AUTO: 44.6 % (ref 37.5–51)
HGB BLD-MCNC: 14.1 G/DL (ref 13–17.7)
MCH RBC QN AUTO: 26.5 PG (ref 26.6–33)
MCHC RBC AUTO-ENTMCNC: 31.6 G/DL (ref 31.5–35.7)
MCV RBC AUTO: 83.7 FL (ref 79–97)
PLATELET # BLD AUTO: 192 10*3/MM3 (ref 140–450)
PMV BLD AUTO: 9.6 FL (ref 6–12)
POTASSIUM SERPL-SCNC: 4.9 MMOL/L (ref 3.5–5.2)
PROT SERPL-MCNC: 7.1 G/DL (ref 6–8.5)
RBC # BLD AUTO: 5.33 10*6/MM3 (ref 4.14–5.8)
SODIUM SERPL-SCNC: 137 MMOL/L (ref 136–145)
WBC NRBC COR # BLD AUTO: 7.55 10*3/MM3 (ref 3.4–10.8)

## 2023-12-07 PROCEDURE — G0378 HOSPITAL OBSERVATION PER HR: HCPCS

## 2023-12-07 PROCEDURE — 99222 1ST HOSP IP/OBS MODERATE 55: CPT | Performed by: INTERNAL MEDICINE

## 2023-12-07 PROCEDURE — 25010000002 AZITHROMYCIN PER 500 MG: Performed by: INTERNAL MEDICINE

## 2023-12-07 PROCEDURE — 94799 UNLISTED PULMONARY SVC/PX: CPT

## 2023-12-07 PROCEDURE — 25810000003 SODIUM CHLORIDE 0.9 % SOLUTION: Performed by: INTERNAL MEDICINE

## 2023-12-07 PROCEDURE — 94664 DEMO&/EVAL PT USE INHALER: CPT

## 2023-12-07 PROCEDURE — 80053 COMPREHEN METABOLIC PANEL: CPT | Performed by: INTERNAL MEDICINE

## 2023-12-07 PROCEDURE — 63710000001 PREDNISONE PER 1 MG: Performed by: INTERNAL MEDICINE

## 2023-12-07 PROCEDURE — 85027 COMPLETE CBC AUTOMATED: CPT | Performed by: INTERNAL MEDICINE

## 2023-12-07 RX ORDER — TAMSULOSIN HYDROCHLORIDE 0.4 MG/1
0.4 CAPSULE ORAL NIGHTLY
Status: DISCONTINUED | OUTPATIENT
Start: 2023-12-07 | End: 2023-12-09 | Stop reason: HOSPADM

## 2023-12-07 RX ORDER — PANTOPRAZOLE SODIUM 40 MG/1
40 TABLET, DELAYED RELEASE ORAL
Status: DISCONTINUED | OUTPATIENT
Start: 2023-12-07 | End: 2023-12-09 | Stop reason: HOSPADM

## 2023-12-07 RX ORDER — ALBUTEROL SULFATE 2.5 MG/3ML
2.5 SOLUTION RESPIRATORY (INHALATION) EVERY 6 HOURS PRN
Status: DISCONTINUED | OUTPATIENT
Start: 2023-12-07 | End: 2023-12-09 | Stop reason: HOSPADM

## 2023-12-07 RX ORDER — TRAZODONE HYDROCHLORIDE 50 MG/1
150 TABLET ORAL NIGHTLY
Status: DISCONTINUED | OUTPATIENT
Start: 2023-12-07 | End: 2023-12-09 | Stop reason: HOSPADM

## 2023-12-07 RX ORDER — BUDESONIDE AND FORMOTEROL FUMARATE DIHYDRATE 160; 4.5 UG/1; UG/1
2 AEROSOL RESPIRATORY (INHALATION)
Status: DISCONTINUED | OUTPATIENT
Start: 2023-12-07 | End: 2023-12-09 | Stop reason: HOSPADM

## 2023-12-07 RX ORDER — ENOXAPARIN SODIUM 100 MG/ML
40 INJECTION SUBCUTANEOUS DAILY
Status: DISCONTINUED | OUTPATIENT
Start: 2023-12-07 | End: 2023-12-09 | Stop reason: HOSPADM

## 2023-12-07 RX ORDER — SODIUM CHLORIDE 9 MG/ML
40 INJECTION, SOLUTION INTRAVENOUS AS NEEDED
Status: DISCONTINUED | OUTPATIENT
Start: 2023-12-07 | End: 2023-12-09 | Stop reason: HOSPADM

## 2023-12-07 RX ORDER — POLYETHYLENE GLYCOL 3350 17 G/17G
17 POWDER, FOR SOLUTION ORAL DAILY PRN
Status: DISCONTINUED | OUTPATIENT
Start: 2023-12-07 | End: 2023-12-09 | Stop reason: HOSPADM

## 2023-12-07 RX ORDER — HYDROCODONE BITARTRATE AND ACETAMINOPHEN 7.5; 325 MG/1; MG/1
1 TABLET ORAL EVERY 6 HOURS PRN
Status: DISCONTINUED | OUTPATIENT
Start: 2023-12-07 | End: 2023-12-09 | Stop reason: HOSPADM

## 2023-12-07 RX ORDER — ONDANSETRON 2 MG/ML
4 INJECTION INTRAMUSCULAR; INTRAVENOUS EVERY 6 HOURS PRN
Status: DISCONTINUED | OUTPATIENT
Start: 2023-12-07 | End: 2023-12-09 | Stop reason: HOSPADM

## 2023-12-07 RX ORDER — AMOXICILLIN 250 MG
2 CAPSULE ORAL 2 TIMES DAILY
Status: DISCONTINUED | OUTPATIENT
Start: 2023-12-07 | End: 2023-12-09 | Stop reason: HOSPADM

## 2023-12-07 RX ORDER — GUAIFENESIN/DEXTROMETHORPHAN 100-10MG/5
5 SYRUP ORAL EVERY 4 HOURS PRN
Status: DISCONTINUED | OUTPATIENT
Start: 2023-12-07 | End: 2023-12-09 | Stop reason: HOSPADM

## 2023-12-07 RX ORDER — BISACODYL 10 MG
10 SUPPOSITORY, RECTAL RECTAL DAILY PRN
Status: DISCONTINUED | OUTPATIENT
Start: 2023-12-07 | End: 2023-12-09 | Stop reason: HOSPADM

## 2023-12-07 RX ORDER — CALCIUM CARBONATE 500 MG/1
2 TABLET, CHEWABLE ORAL 2 TIMES DAILY PRN
Status: DISCONTINUED | OUTPATIENT
Start: 2023-12-07 | End: 2023-12-09 | Stop reason: HOSPADM

## 2023-12-07 RX ORDER — METHADONE HYDROCHLORIDE 10 MG/1
65 TABLET ORAL DAILY
Status: DISCONTINUED | OUTPATIENT
Start: 2023-12-07 | End: 2023-12-09 | Stop reason: HOSPADM

## 2023-12-07 RX ORDER — ONDANSETRON 4 MG/1
4 TABLET, FILM COATED ORAL EVERY 6 HOURS PRN
Status: DISCONTINUED | OUTPATIENT
Start: 2023-12-07 | End: 2023-12-09 | Stop reason: HOSPADM

## 2023-12-07 RX ORDER — ATORVASTATIN CALCIUM 10 MG/1
10 TABLET, FILM COATED ORAL DAILY
Status: DISCONTINUED | OUTPATIENT
Start: 2023-12-07 | End: 2023-12-09 | Stop reason: HOSPADM

## 2023-12-07 RX ORDER — CHOLECALCIFEROL (VITAMIN D3) 125 MCG
5 CAPSULE ORAL NIGHTLY
Status: DISCONTINUED | OUTPATIENT
Start: 2023-12-07 | End: 2023-12-09 | Stop reason: HOSPADM

## 2023-12-07 RX ORDER — SODIUM CHLORIDE 0.9 % (FLUSH) 0.9 %
10 SYRINGE (ML) INJECTION EVERY 12 HOURS SCHEDULED
Status: DISCONTINUED | OUTPATIENT
Start: 2023-12-07 | End: 2023-12-09 | Stop reason: HOSPADM

## 2023-12-07 RX ORDER — BISACODYL 5 MG/1
5 TABLET, DELAYED RELEASE ORAL DAILY PRN
Status: DISCONTINUED | OUTPATIENT
Start: 2023-12-07 | End: 2023-12-09 | Stop reason: HOSPADM

## 2023-12-07 RX ORDER — PREDNISONE 20 MG/1
40 TABLET ORAL
Status: DISCONTINUED | OUTPATIENT
Start: 2023-12-07 | End: 2023-12-09 | Stop reason: HOSPADM

## 2023-12-07 RX ORDER — FINASTERIDE 5 MG/1
5 TABLET, FILM COATED ORAL DAILY
Status: DISCONTINUED | OUTPATIENT
Start: 2023-12-07 | End: 2023-12-09 | Stop reason: HOSPADM

## 2023-12-07 RX ORDER — FLUTICASONE PROPIONATE 50 MCG
2 SPRAY, SUSPENSION (ML) NASAL DAILY
Status: DISCONTINUED | OUTPATIENT
Start: 2023-12-07 | End: 2023-12-09 | Stop reason: HOSPADM

## 2023-12-07 RX ORDER — IPRATROPIUM BROMIDE AND ALBUTEROL SULFATE 2.5; .5 MG/3ML; MG/3ML
3 SOLUTION RESPIRATORY (INHALATION) EVERY 4 HOURS PRN
Status: DISCONTINUED | OUTPATIENT
Start: 2023-12-07 | End: 2023-12-09 | Stop reason: HOSPADM

## 2023-12-07 RX ORDER — HYDROXYZINE HYDROCHLORIDE 25 MG/1
50 TABLET, FILM COATED ORAL 3 TIMES DAILY PRN
Status: DISCONTINUED | OUTPATIENT
Start: 2023-12-07 | End: 2023-12-09 | Stop reason: HOSPADM

## 2023-12-07 RX ORDER — SODIUM CHLORIDE 0.9 % (FLUSH) 0.9 %
10 SYRINGE (ML) INJECTION AS NEEDED
Status: DISCONTINUED | OUTPATIENT
Start: 2023-12-07 | End: 2023-12-09 | Stop reason: HOSPADM

## 2023-12-07 RX ORDER — ACETAMINOPHEN 325 MG/1
650 TABLET ORAL EVERY 4 HOURS PRN
Status: DISCONTINUED | OUTPATIENT
Start: 2023-12-07 | End: 2023-12-09 | Stop reason: HOSPADM

## 2023-12-07 RX ADMIN — FLUTICASONE PROPIONATE 2 SPRAY: 50 SPRAY, METERED NASAL at 08:25

## 2023-12-07 RX ADMIN — TRAZODONE HYDROCHLORIDE 150 MG: 50 TABLET ORAL at 21:23

## 2023-12-07 RX ADMIN — HYDROCODONE BITARTRATE AND ACETAMINOPHEN 1 TABLET: 7.5; 325 TABLET ORAL at 12:08

## 2023-12-07 RX ADMIN — HYDROCODONE BITARTRATE AND ACETAMINOPHEN 1 TABLET: 7.5; 325 TABLET ORAL at 06:02

## 2023-12-07 RX ADMIN — PREDNISONE 40 MG: 20 TABLET ORAL at 08:24

## 2023-12-07 RX ADMIN — Medication 10 ML: at 21:23

## 2023-12-07 RX ADMIN — TAMSULOSIN HYDROCHLORIDE 0.4 MG: 0.4 CAPSULE ORAL at 21:23

## 2023-12-07 RX ADMIN — TRAZODONE HYDROCHLORIDE 150 MG: 50 TABLET ORAL at 02:35

## 2023-12-07 RX ADMIN — ATORVASTATIN CALCIUM 10 MG: 10 TABLET, FILM COATED ORAL at 08:24

## 2023-12-07 RX ADMIN — METHADONE HYDROCHLORIDE 65 MG: 10 TABLET ORAL at 08:24

## 2023-12-07 RX ADMIN — TAMSULOSIN HYDROCHLORIDE 0.4 MG: 0.4 CAPSULE ORAL at 02:36

## 2023-12-07 RX ADMIN — BUDESONIDE AND FORMOTEROL FUMARATE DIHYDRATE 2 PUFF: 160; 4.5 AEROSOL RESPIRATORY (INHALATION) at 07:23

## 2023-12-07 RX ADMIN — FINASTERIDE 5 MG: 5 TABLET, FILM COATED ORAL at 08:24

## 2023-12-07 RX ADMIN — Medication 10 ML: at 02:35

## 2023-12-07 RX ADMIN — Medication 5 MG: at 21:23

## 2023-12-07 RX ADMIN — HYDROXYZINE HYDROCHLORIDE 50 MG: 25 TABLET, FILM COATED ORAL at 22:16

## 2023-12-07 RX ADMIN — DOCUSATE SODIUM 50MG AND SENNOSIDES 8.6MG 2 TABLET: 8.6; 5 TABLET, FILM COATED ORAL at 08:24

## 2023-12-07 RX ADMIN — HYDROCODONE BITARTRATE AND ACETAMINOPHEN 1 TABLET: 7.5; 325 TABLET ORAL at 18:10

## 2023-12-07 RX ADMIN — PANTOPRAZOLE SODIUM 40 MG: 40 TABLET, DELAYED RELEASE ORAL at 05:55

## 2023-12-07 RX ADMIN — TIOTROPIUM BROMIDE INHALATION SPRAY 2 PUFF: 3.12 SPRAY, METERED RESPIRATORY (INHALATION) at 07:23

## 2023-12-07 RX ADMIN — Medication 10 ML: at 08:26

## 2023-12-07 RX ADMIN — DOCUSATE SODIUM 50MG AND SENNOSIDES 8.6MG 2 TABLET: 8.6; 5 TABLET, FILM COATED ORAL at 21:23

## 2023-12-07 RX ADMIN — SODIUM CHLORIDE 500 MG: 900 INJECTION, SOLUTION INTRAVENOUS at 02:34

## 2023-12-07 NOTE — PLAN OF CARE
Problem: Adult Inpatient Plan of Care  Goal: Plan of Care Review  Outcome: Ongoing, Progressing  Flowsheets (Taken 12/7/2023 1616)  Progress: no change  Plan of Care Reviewed With: patient   Goal Outcome Evaluation:  Plan of Care Reviewed With: patient        Progress: no change          VSS. Patient rested well throughout the day. Had complaints of pain that was controlled as needed with PRN meds, see MAR. Plan of care reviewed with the patient, will continue to monitor.

## 2023-12-07 NOTE — CASE MANAGEMENT/SOCIAL WORK
Discharge Planning Assessment  [facility]     Patient Name: Topher Stoll  MRN: [id number]  Today's Date: 12/7/2023    Admit Date: 12/6/2023        Discharge Needs Assessment       Row Name 12/07/23 1104       Living Environment    People in Home parent(s)    Name(s) of People in Home Reports lives in split level home.  His parents live in upper level and he lives in lower level.  There is an intercom if either need assist.    Current Living Arrangements home    Potentially Unsafe Housing Conditions none    In the past 12 months has the electric, gas, oil, or water company threatened to shut off services in your home? No    Primary Care Provided by self    Provides Primary Care For no one    Family Caregiver if Needed parent(s);sibling(s)    Family Caregiver Names Mother/Josee Stoll  or Brother/Ernesto Stoll    Quality of Family Relationships supportive    Able to Return to Prior Arrangements yes       Food Insecurity    Within the past 12 months, you worried that your food would run out before you got the money to buy more. Never true    Within the past 12 months, the food you bought just didn't last and you didn't have money to get more. Never true       Transition Planning    Patient/Family Anticipates Transition to home with family    Patient/Family Anticipated Services at Transition none    Transportation Anticipated family or friend will provide       Discharge Needs Assessment    Readmission Within the Last 30 Days no previous admission in last 30 days    Concerns to be Addressed denies needs/concerns at this time    Anticipated Changes Related to Illness none  Does not work--on disability    Equipment Needed After Discharge none  Pt has available to him GURPREET Ellis, BSC.  Uses O2 at 2L PRN but using more often now.  Has nebulizer    Patient's Choice of Community Agency(s) Pt has home O2 which he reports just changed DME Co and cannot remember the name.  He reported his mother would know.  Requested he asked  her when she visits.    Current Discharge Risk chronically ill                   Discharge Plan       Row Name 12/07/23 1121       Plan    Plan Comments Pt awake and alert at time of visit.  O2 per NC in place.  Pt confirmed address, telephone numbers, contact persons, PCP.  Pt does not have a POA or Living Will.  Informed pt we are able to assist with a Living Will if he would like.  Pt does want to complete a Living Will while in hospital.   Informed Erin Cerna ( and Notary) of pt wishes.   Pt reports lives in a split level house with his parents.  They live on upper level and he lives on lower level.  He reports they have an intercom to communicate if needed.  Pt reports uses O2 at 2L.  He reports was as needed but had been using it more lately.  He reports recently  changing DME companies and doesn't remember the name of DME.  He related his mother would know.  Requested he ask her when she visits.  pt reports also has a nebulizer.  He reported has the following equipment which he obtained from family if needed:  WC, BSC, Walker.   Pt denies having HH following.  He reports is independent of ADLs, he does drive.  Pt plans to return home at DC.  He denies any DC needs at this time.  Informed if DC needs arise, CM will attempt to assist.                  Continued Care and Services - Admitted Since 12/6/2023    Coordination has not been started for this encounter.       Selected Continued Care - Episodes Includes continued care and service providers with selected services from the active episodes listed below      Asthma Episode start date: 6/29/2023   There are no active outsourced providers for this episode.                    Demographic Summary       Row Name 12/07/23 1101       General Information    Admission Type observation    Arrived From home    Required Notices Provided Observation Status Notice    Expected Length of Stay (LOS) 48 hr    Referral Source admission list    Reason for Consult  "discharge planning    Preferred Language English       Contact Information    Permission Granted to Share Info With family/designee;    Contact Information Obtained for     Contact Information Comments Josee Stoll/mother/[phone], Ernesto Stoll/Brother/[phone]                   Functional Status       Row Name 12/07/23 1103       Functional Status    Usual Activity Tolerance good    Current Activity Tolerance good    Functional Status Comments Pt reported functional status \"pretty good\"       Physical Activity    On average, how many days per week do you engage in moderate to strenuous exercise (like a brisk walk)? 0 days    On average, how many minutes do you engage in exercise at this level? 0 min    Number of minutes of exercise per week 0       Functional Status, IADL    Medications independent    Meal Preparation independent    Housekeeping independent    Laundry independent    Shopping independent    IADL Comments Reports Independent of ADLs       Mental Status Summary    Recent Changes in Mental Status/Cognitive Functioning no changes                   Psychosocial    No documentation.                  Abuse/Neglect    No documentation.                  Legal    No documentation.                  Substance Abuse    No documentation.                  Patient Forms    No documentation.                     Julissa Estrada RN    "
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
## 2023-12-07 NOTE — H&P
[facility]      Name:  Topher Stoll   Age:  48 y.o.  Sex:  male  :  1975  MRN:  [id number]   Visit Number:  [id number]  Admission Date:  2023  Date Of Service:  23  Primary Care Physician:  Joshua Hoffmann MD    Chief Complaint:     Dyspnea    History Of Presenting Illness:      48-year-old gentleman with a history of COPD and asthma.  Remote history of tobacco use having quit more than 10 years ago.  He reports that he has had dyspnea for the last 2 weeks.  He reports that during this time he had completed a steroid taper and also had taken antibiotics for strep bronchitis but despite this his dyspnea has persisted.  He also reports a cough productive of a purulent sputum and some left-sided pleurisy.  Also endorses associated fatigue.  He reports that when he ambulates 10 to 15 feet his oxygen sats are dropping into the 70s.  Denies fevers and chills endorses sore throat.  In the ER he was giving steroids nebulizer treatments and magnesium despite this his oxygen sats were staying in the high 80s on 2 L his home oxygen.  He elects to be full code.    Pertinent findings: pH 7.378, PCO2 54.7, highly sensitive troponin 10, creatinine 1.25, CRP 1.43, procalcitonin 0.02, WBC 8.86, COVID and flu negative, chest x-ray shows no acute consolidation shows a subtle increase in interstitial markings no effusions      Edited by: Sergio Kirkland DO at 2023 0114     Review Of Systems:    All systems were reviewed and negative except as mentioned in history of presenting illness, assessment and plan.    Past Medical History: Patient  has a past medical history of Abdominal adhesions, Anxiety, Arthritis, Asthma, Cervical radiculopathy, Colitis, COPD (chronic obstructive pulmonary disease), GERD (gastroesophageal reflux disease), Heart attack, History of hepatitis C, History of recreational drug use, HTN (hypertension), Impaired functional mobility,  balance, gait, and endurance, Kidney cysts, Left hip pain, Liver disease, Low back pain, Migraines, Obstructive chronic bronchitis with exacerbation, Sleep apnea, and Tattoos.    Past Surgical History: Patient  has a past surgical history that includes Back surgery; Hernia repair; Small intestine surgery; Total hip arthroplasty (Left); Shoulder Ligament Repair; Hip Spacer Insertion (Left, 1/15/2020); Revision total hip arthroplasty (Left, 3/5/2020); Colonoscopy (2014); Upper gastrointestinal endoscopy (2014); Colonoscopy (N/A, 1/4/2022); and Total hip arthroplasty (Right).    Social History: Patient  reports that he quit smoking about 18 years ago. His smoking use included cigarettes. He has a 30.00 pack-year smoking history. He has been exposed to tobacco smoke. His smokeless tobacco use includes chew. He reports that he does not currently use drugs. He reports that he does not drink alcohol.    Family History:  Patient's family history has been reviewed and found to be noncontributory.     Allergies:      Doxycycline    Home Medications:    Prior to Admission Medications       Prescriptions Last Dose Informant Patient Reported? Taking?    albuterol sulfate  (90 Base) MCG/ACT inhaler 12/6/2023  No Yes    INHALE 2 PUFFS EVERY 4 HOURS AS NEEDED FOR WHEEZING OR SHORTNESS OF BREATH    dutasteride (AVODART) 0.5 MG capsule 12/6/2023  Yes Yes    Take 1 capsule by mouth Daily.    esomeprazole (nexIUM) 40 MG capsule 12/6/2023  Yes Yes    ESOMEPRAZOLE MAGNESIUM 40 MG CPDR    fluticasone (FLONASE) 50 MCG/ACT nasal spray 12/6/2023  No Yes    INSTILL 2 SPRAYS INOT THE NOSTRILS AS DIRECTED BY PROVIDER DAILY    HYDROcodone-acetaminophen (NORCO) 7.5-325 MG per tablet 12/5/2023  Yes Yes    Take 1 tablet by mouth Every 6 (Six) Hours As Needed.    ipratropium-albuterol (DUO-NEB) 0.5-2.5 mg/3 ml nebulizer 12/6/2023  No Yes    Take 3 mL by nebulization Every 4 (Four) Hours As Needed for Wheezing or Shortness of Air.     "lovastatin (MEVACOR) 40 MG tablet 12/6/2023  No Yes    TAKE 1 TABLET BY MOUTH EVERY DAY FOR CHOLESTEROL    methadone (DOLOPHINE) 5 MG/5ML solution 12/6/2023 Self Yes Yes    Take 65 mL by mouth Daily. Patient takes 65 mg once per day([facility])    predniSONE (DELTASONE) 20 MG tablet Past Week  No Yes    Day 1-5 take 1 tab po three times a day every 8 hours, day 6-10 take 1 tab po two times a day, day 11-15 take one tab by mouth daily.    traZODone (DESYREL) 150 MG tablet 12/5/2023  No Yes    Take 1 tablet by mouth Every Night.    alfuzosin (UROXATRAL) 10 MG 24 hr tablet Unknown  Yes No    Take 1 tablet by mouth Daily.    Benralizumab 30 MG/ML solution auto-injector More than a month  No No    Inject 30 mg under the skin into the appropriate area as directed Every 2 (Two) Months starting 8 weeks after 3rd once-monthly injection    Benralizumab solution auto-injector 30 mg   No No    Budeson-Glycopyrrol-Formoterol (Breztri Aerosphere) 160-9-4.8 MCG/ACT aerosol inhaler   No No    Inhale 2 puffs 2 (Two) Times a Day.    naloxone (NARCAN) 4 MG/0.1ML nasal spray   No No    Call 911. Don't prime. [place] in 1 nostril for overdose. Repeat in 2-3 minutes in other nostril if no or minimal breathing/responsiveness.              Vital Signs:  Temp:  [98.1 °F (36.7 °C)] 98.1 °F (36.7 °C)  Heart Rate:  [80-96] 82  Resp:  [20-26] 20  BP: (127-169)/() 152/91        12/06/23  1748 12/06/23  2205   Weight: 90.7 kg (200 lb) 91.1 kg (200 lb 13.4 oz)     Body mass index is 26.5 kg/m².    Physical Exam:     Most recent vital Signs: /91 (BP Location: Left arm, Patient Position: Lying)   Pulse 82   Temp 98.1 °F (36.7 °C) (Oral)   Resp 20   Ht 185.4 cm (73\")   Wt 91.1 kg (200 lb 13.4 oz)   SpO2 90%   BMI 26.50 kg/m²     Constitutional: Awake, alert  Eyes: PERRLA, sclerae anicteric, no conjunctival injection  HENT: NCAT, mucous membranes moist  Neck: Supple, no thyromegaly, no lymphadenopathy, " "trachea midline  Respiratory: Expiratory wheeze noted bilaterally, nonlabored respirations   Cardiovascular: RRR, no murmurs, rubs, or gallops, palpable pedal pulses bilaterally  Gastrointestinal: Positive bowel sounds, soft, nontender, nondistended  Musculoskeletal: No bilateral ankle edema, no clubbing or cyanosis to extremities  Psychiatric: Appropriate affect, cooperative  Neurologic: Oriented x 3, speech clear  Skin: No rashes  Edited by: Sergio Kirkland DO at 12/7/2023 0103      Laboratory data:    I have reviewed the labs done in the emergency room.    Results from last 7 days   Lab Units 12/06/23  1801   SODIUM mmol/L 139   POTASSIUM mmol/L 3.9   CHLORIDE mmol/L 99   CO2 mmol/L 31.0*   BUN mg/dL 13   CREATININE mg/dL 1.25   CALCIUM mg/dL 8.8   BILIRUBIN mg/dL 0.2   ALK PHOS U/L 122*   ALT (SGPT) U/L 20   AST (SGOT) U/L 19   GLUCOSE mg/dL 107*     Results from last 7 days   Lab Units 12/06/23  1801   WBC 10*3/mm3 8.86   HEMOGLOBIN g/dL 14.0   HEMATOCRIT % 44.2   PLATELETS 10*3/mm3 205         Results from last 7 days   Lab Units 12/06/23 2001 12/06/23  1801   HSTROP T ng/L 10 10     Results from last 7 days   Lab Units 12/06/23  1801   PROBNP pg/mL 56.5             Results from last 7 days   Lab Units 12/06/23  1926   PH, ARTERIAL pH units 7.378   PO2 ART mm Hg 76.0   PCO2, ARTERIAL mm Hg 54.7*   HCO3 ART mmol/L 32.2*           Invalid input(s): \"USDES\", \"NITRITITE\", \"BACT\", \"EP\"    Pain Management Panel  More data exists         Latest Ref Rng & Units 3/17/2018 7/13/2016   Pain Management Panel   Amphetamine, Urine Qual Negative Negative  Negative    Barbiturates Screen, Urine Negative Negative  Negative    Benzodiazepine Screen, Urine Negative Negative  Negative  Negative    Buprenorphine, Screen, Urine Negative Negative  -   Cocaine Screen, Urine Negative Negative  Negative    Methadone Screen , Urine Negative Negative  Negative    Methamphetamine, Ur Negative Negative  -       EKG:      Sinus rhythm " no acute ST or T wave changes noted    Radiology:    No radiology results for the last 3 days    Assessment/Plan:      COPD with acute exacerbation    Moderate persistent asthma with exacerbation    -Observation MedSurg.  Continue steroids, nebulizer treatments, antibiotics, incentive spirometer, cough syrup, anticipate home in 1 to 2 days. Lovenox prophylaxis  Edited by: Sergio Kirkland DO at 12/7/2023 0110           Risk Assessment: Moderate  DVT Prophylaxis: Lovenox  Code Status:   Code Status and Medical Interventions:   Ordered at: 12/07/23 0112     Code Status (Patient has no pulse and is not breathing):    CPR (Attempt to Resuscitate)     Medical Interventions (Patient has pulse or is breathing):    Full Support      Diet:   Dietary Orders (From admission, onward)       Start     Ordered    12/07/23 0112  Diet: Regular/House Diet; Texture: Regular Texture (IDDSI 7); Fluid Consistency: Thin (IDDSI 0)  Diet Effective Now        References:    Diet Order [place]   Question Answer Comment   Diets: Regular/House Diet    Texture: Regular Texture (IDDSI 7)    Fluid Consistency: Thin (IDDSI 0)        12/07/23 0112                     Advance Care Planning   ACP discussion was held with the patient during this visit. Patient does not have an advance directive, information provided.           Sergio Kirkland DO  12/07/23  01:15 EST    Dictated utilizing Dragon dictation.

## 2023-12-07 NOTE — PLAN OF CARE
Problem: Adult Inpatient Plan of Care  Goal: Plan of Care Review  Outcome: Ongoing, Progressing  Goal: Patient-Specific Goal (Individualized)  Outcome: Ongoing, Progressing  Goal: Absence of Hospital-Acquired Illness or Injury  Outcome: Ongoing, Progressing  Intervention: Identify and Manage Fall Risk  Recent Flowsheet Documentation  Taken 12/7/2023 0600 by Alexa Dent RN  Safety Promotion/Fall Prevention:   activity supervised   assistive device/personal items within reach   clutter free environment maintained   safety round/check completed  Taken 12/7/2023 0400 by Alexa Dent RN  Safety Promotion/Fall Prevention:   activity supervised   assistive device/personal items within reach   clutter free environment maintained   safety round/check completed  Taken 12/7/2023 0200 by Alexa Dent RN  Safety Promotion/Fall Prevention:   activity supervised   assistive device/personal items within reach   clutter free environment maintained   safety round/check completed  Taken 12/7/2023 0000 by Alexa Dent RN  Safety Promotion/Fall Prevention:   activity supervised   assistive device/personal items within reach   clutter free environment maintained   safety round/check completed  Taken 12/6/2023 2230 by Alexa Dent RN  Safety Promotion/Fall Prevention:   activity supervised   assistive device/personal items within reach   clutter free environment maintained   safety round/check completed  Intervention: Prevent Skin Injury  Recent Flowsheet Documentation  Taken 12/7/2023 0600 by Alexa Dent RN  Body Position:   position changed independently   side-lying   right  Taken 12/7/2023 0400 by Alexa Dent RN  Body Position:   position changed independently   side-lying   left  Taken 12/7/2023 0200 by Alexa Dent RN  Body Position:   position changed independently   side-lying   left  Taken 12/7/2023 0000 by Alexa Dent RN  Body Position:   position changed  independently   supine, legs elevated  Intervention: Prevent and Manage VTE (Venous Thromboembolism) Risk  Recent Flowsheet Documentation  Taken 12/7/2023 0600 by Alexa Dent RN  Activity Management: up ad hosea  Taken 12/7/2023 0400 by Alexa Dent RN  Activity Management: up ad hosea  Taken 12/7/2023 0200 by Alexa Dent RN  Activity Management: up ad hosea  Taken 12/7/2023 0000 by Alexa Dent RN  Activity Management: up ad hosea  Taken 12/6/2023 2230 by Alexa Dent RN  Activity Management: activity encouraged  Intervention: Prevent Infection  Recent Flowsheet Documentation  Taken 12/7/2023 0600 by Alexa Dent RN  Infection Prevention: environmental surveillance performed  Taken 12/7/2023 0400 by Alexa Dent RN  Infection Prevention: environmental surveillance performed  Taken 12/7/2023 0200 by Alexa Dent RN  Infection Prevention: environmental surveillance performed  Taken 12/7/2023 0000 by Alexa Dent RN  Infection Prevention: environmental surveillance performed  Taken 12/6/2023 2230 by Alexa Dent RN  Infection Prevention: environmental surveillance performed  Goal: Optimal Comfort and Wellbeing  Outcome: Ongoing, Progressing  Intervention: Provide Person-Centered Care  Recent Flowsheet Documentation  Taken 12/6/2023 2230 by Alexa Dent RN  Trust Relationship/Rapport:   care explained   choices provided   emotional support provided  Goal: Readiness for Transition of Care  Outcome: Ongoing, Progressing  Intervention: Mutually Develop Transition Plan  Recent Flowsheet Documentation  Taken 12/6/2023 2208 by Alexa Dent RN  Transportation Anticipated:   family or friend will provide   car, drives self  Patient/Family Anticipated Services at Transition: none  Patient/Family Anticipates Transition to: home  Taken 12/6/2023 2206 by Alexa Dent RN  Equipment Currently Used at Home:   walker, rolling   oxygen   nebulizer     Problem: COPD  (Chronic Obstructive Pulmonary Disease) Comorbidity  Goal: Maintenance of COPD Symptom Control  Outcome: Ongoing, Progressing  Intervention: Maintain COPD-Symptom Control  Recent Flowsheet Documentation  Taken 12/7/2023 0600 by Alexa Dent RN  Medication Review/Management: medications reviewed  Taken 12/7/2023 0400 by Alexa Dent RN  Medication Review/Management: medications reviewed  Taken 12/7/2023 0200 by Alexa Dent RN  Medication Review/Management: medications reviewed     Problem: Pain Chronic (Persistent) (Comorbidity Management)  Goal: Acceptable Pain Control and Functional Ability  Outcome: Ongoing, Progressing  Intervention: Manage Persistent Pain  Recent Flowsheet Documentation  Taken 12/7/2023 0600 by Alexa Dent RN  Medication Review/Management: medications reviewed  Taken 12/7/2023 0400 by Alexa Dent RN  Medication Review/Management: medications reviewed  Taken 12/7/2023 0200 by Alexa Dent RN  Medication Review/Management: medications reviewed     Problem: Gas Exchange Impaired  Goal: Optimal Gas Exchange  Outcome: Ongoing, Progressing  Intervention: Optimize Oxygenation and Ventilation  Recent Flowsheet Documentation  Taken 12/7/2023 0600 by Alexa Dent RN  Head of Bed (HOB) Positioning: HOB at 20-30 degrees  Taken 12/7/2023 0400 by Alexa Dent RN  Head of Bed (HOB) Positioning: HOB at 15 degrees  Taken 12/7/2023 0200 by Alexa Dent RN  Head of Bed (HOB) Positioning: HOB at 20-30 degrees  Taken 12/7/2023 0000 by Alexa Dent RN  Head of Bed (HOB) Positioning: HOB at 20-30 degrees   Goal Outcome Evaluation:      Plan of care reviewed with patient. Patient admitted for COPD exacerbation. Patient typically wears 2L NC at home and is requiring 4L NC tonight. No significant events this shift.

## 2023-12-07 NOTE — ACP (ADVANCE CARE PLANNING)
Spoke with the pt at his bedside.  I offered to assist with completing an Advance Care Plan, but the pt was not interested at this time.

## 2023-12-08 PROBLEM — J44.9 COPD (CHRONIC OBSTRUCTIVE PULMONARY DISEASE): Status: ACTIVE | Noted: 2023-12-08

## 2023-12-08 LAB
ANION GAP SERPL CALCULATED.3IONS-SCNC: 7.8 MMOL/L (ref 5–15)
BASOPHILS # BLD AUTO: 0.01 10*3/MM3 (ref 0–0.2)
BASOPHILS NFR BLD AUTO: 0.1 % (ref 0–1.5)
BUN SERPL-MCNC: 18 MG/DL (ref 6–20)
BUN/CREAT SERPL: 18 (ref 7–25)
CALCIUM SPEC-SCNC: 9.2 MG/DL (ref 8.6–10.5)
CHLORIDE SERPL-SCNC: 104 MMOL/L (ref 98–107)
CO2 SERPL-SCNC: 28.2 MMOL/L (ref 22–29)
CREAT SERPL-MCNC: 1 MG/DL (ref 0.76–1.27)
DEPRECATED RDW RBC AUTO: 39.2 FL (ref 37–54)
EGFRCR SERPLBLD CKD-EPI 2021: 92.3 ML/MIN/1.73
EOSINOPHIL # BLD AUTO: 0 10*3/MM3 (ref 0–0.4)
EOSINOPHIL NFR BLD AUTO: 0 % (ref 0.3–6.2)
ERYTHROCYTE [DISTWIDTH] IN BLOOD BY AUTOMATED COUNT: 13 % (ref 12.3–15.4)
GLUCOSE SERPL-MCNC: 107 MG/DL (ref 65–99)
HCT VFR BLD AUTO: 43.3 % (ref 37.5–51)
HGB BLD-MCNC: 13.8 G/DL (ref 13–17.7)
IMM GRANULOCYTES # BLD AUTO: 0.03 10*3/MM3 (ref 0–0.05)
IMM GRANULOCYTES NFR BLD AUTO: 0.2 % (ref 0–0.5)
LYMPHOCYTES # BLD AUTO: 1.75 10*3/MM3 (ref 0.7–3.1)
LYMPHOCYTES NFR BLD AUTO: 13.5 % (ref 19.6–45.3)
MCH RBC QN AUTO: 26.5 PG (ref 26.6–33)
MCHC RBC AUTO-ENTMCNC: 31.9 G/DL (ref 31.5–35.7)
MCV RBC AUTO: 83.1 FL (ref 79–97)
MONOCYTES # BLD AUTO: 0.73 10*3/MM3 (ref 0.1–0.9)
MONOCYTES NFR BLD AUTO: 5.6 % (ref 5–12)
NEUTROPHILS NFR BLD AUTO: 10.48 10*3/MM3 (ref 1.7–7)
NEUTROPHILS NFR BLD AUTO: 80.6 % (ref 42.7–76)
NRBC BLD AUTO-RTO: 0 /100 WBC (ref 0–0.2)
PLATELET # BLD AUTO: 217 10*3/MM3 (ref 140–450)
PMV BLD AUTO: 9.9 FL (ref 6–12)
POTASSIUM SERPL-SCNC: 4.5 MMOL/L (ref 3.5–5.2)
RBC # BLD AUTO: 5.21 10*6/MM3 (ref 4.14–5.8)
SODIUM SERPL-SCNC: 140 MMOL/L (ref 136–145)
WBC NRBC COR # BLD AUTO: 13 10*3/MM3 (ref 3.4–10.8)

## 2023-12-08 PROCEDURE — 94761 N-INVAS EAR/PLS OXIMETRY MLT: CPT

## 2023-12-08 PROCEDURE — 94799 UNLISTED PULMONARY SVC/PX: CPT

## 2023-12-08 PROCEDURE — 94664 DEMO&/EVAL PT USE INHALER: CPT

## 2023-12-08 PROCEDURE — 25010000002 AZITHROMYCIN PER 500 MG: Performed by: INTERNAL MEDICINE

## 2023-12-08 PROCEDURE — 63710000001 PREDNISONE PER 1 MG: Performed by: INTERNAL MEDICINE

## 2023-12-08 PROCEDURE — 85025 COMPLETE CBC W/AUTO DIFF WBC: CPT | Performed by: NURSE PRACTITIONER

## 2023-12-08 PROCEDURE — 99232 SBSQ HOSP IP/OBS MODERATE 35: CPT | Performed by: NURSE PRACTITIONER

## 2023-12-08 PROCEDURE — 25810000003 SODIUM CHLORIDE 0.9 % SOLUTION: Performed by: INTERNAL MEDICINE

## 2023-12-08 PROCEDURE — 80048 BASIC METABOLIC PNL TOTAL CA: CPT | Performed by: NURSE PRACTITIONER

## 2023-12-08 RX ADMIN — ATORVASTATIN CALCIUM 10 MG: 10 TABLET, FILM COATED ORAL at 08:17

## 2023-12-08 RX ADMIN — DOCUSATE SODIUM 50MG AND SENNOSIDES 8.6MG 2 TABLET: 8.6; 5 TABLET, FILM COATED ORAL at 20:59

## 2023-12-08 RX ADMIN — PREDNISONE 40 MG: 20 TABLET ORAL at 08:17

## 2023-12-08 RX ADMIN — SODIUM CHLORIDE 500 MG: 900 INJECTION, SOLUTION INTRAVENOUS at 02:56

## 2023-12-08 RX ADMIN — Medication 5 MG: at 21:00

## 2023-12-08 RX ADMIN — Medication 10 ML: at 21:00

## 2023-12-08 RX ADMIN — BUDESONIDE AND FORMOTEROL FUMARATE DIHYDRATE 2 PUFF: 160; 4.5 AEROSOL RESPIRATORY (INHALATION) at 19:06

## 2023-12-08 RX ADMIN — METHADONE HYDROCHLORIDE 65 MG: 10 TABLET ORAL at 08:17

## 2023-12-08 RX ADMIN — HYDROCODONE BITARTRATE AND ACETAMINOPHEN 1 TABLET: 7.5; 325 TABLET ORAL at 02:56

## 2023-12-08 RX ADMIN — FINASTERIDE 5 MG: 5 TABLET, FILM COATED ORAL at 08:17

## 2023-12-08 RX ADMIN — HYDROXYZINE HYDROCHLORIDE 50 MG: 25 TABLET, FILM COATED ORAL at 08:41

## 2023-12-08 RX ADMIN — PANTOPRAZOLE SODIUM 40 MG: 40 TABLET, DELAYED RELEASE ORAL at 06:01

## 2023-12-08 RX ADMIN — HYDROXYZINE HYDROCHLORIDE 50 MG: 25 TABLET, FILM COATED ORAL at 18:15

## 2023-12-08 RX ADMIN — FLUTICASONE PROPIONATE 2 SPRAY: 50 SPRAY, METERED NASAL at 08:18

## 2023-12-08 RX ADMIN — HYDROCODONE BITARTRATE AND ACETAMINOPHEN 1 TABLET: 7.5; 325 TABLET ORAL at 21:00

## 2023-12-08 RX ADMIN — TRAZODONE HYDROCHLORIDE 150 MG: 50 TABLET ORAL at 21:00

## 2023-12-08 RX ADMIN — Medication 10 ML: at 08:17

## 2023-12-08 RX ADMIN — HYDROCODONE BITARTRATE AND ACETAMINOPHEN 1 TABLET: 7.5; 325 TABLET ORAL at 14:19

## 2023-12-08 RX ADMIN — TAMSULOSIN HYDROCHLORIDE 0.4 MG: 0.4 CAPSULE ORAL at 21:00

## 2023-12-08 RX ADMIN — TIOTROPIUM BROMIDE INHALATION SPRAY 2 PUFF: 3.12 SPRAY, METERED RESPIRATORY (INHALATION) at 07:00

## 2023-12-08 RX ADMIN — DOCUSATE SODIUM 50MG AND SENNOSIDES 8.6MG 2 TABLET: 8.6; 5 TABLET, FILM COATED ORAL at 08:17

## 2023-12-08 RX ADMIN — BUDESONIDE AND FORMOTEROL FUMARATE DIHYDRATE 2 PUFF: 160; 4.5 AEROSOL RESPIRATORY (INHALATION) at 07:00

## 2023-12-08 NOTE — PLAN OF CARE
Goal Outcome Evaluation:  Plan of Care Reviewed With: patient        Progress: no change  Outcome Evaluation: PT LYING IN BED RESTING COMFORTABLY AT THIS TIME. NO ACUTE EVENTS NOTED OVERNIGHT. PRN PAIN MEDICATION GIVEN THIS SHIFT. VSS. WILL CONTINUE TO MONITOR CLOSELY.

## 2023-12-08 NOTE — CASE MANAGEMENT/SOCIAL WORK
Case Management/Social Work    Patient Name:  Topher Stoll  YOB: 1975  MRN: [id number]  Admit Date:  12/6/2023      Pt plans to dc to home when medically stable.  No anticipated needs at discharge. Denies HH/DME needs.  Case management office continuing to follow pt for discharge planning.       Electronically signed by:  Dominique Landers  12/08/23 15:30 EST

## 2023-12-08 NOTE — PLAN OF CARE
Problem: Adult Inpatient Plan of Care  Goal: Plan of Care Review  Outcome: Ongoing, Progressing  Flowsheets  Taken 12/8/2023 1637 by Evelyn Brink, RN  Progress: improving  Taken 12/8/2023 0351 by Abiola Malave RN  Plan of Care Reviewed With: patient   Goal Outcome Evaluation:  Plan of Care Reviewed With: patient        Progress: improving          VSS, on 2 L O2. Patient rested well throughout the day. Patient did trial periods of room air and would use oxygen with exertion. Some complaints of pain were controlled as needed with PRN med, see MAR. Possible discharge tomorrow. Will continue to monitor.    Self

## 2023-12-08 NOTE — PAYOR COMM NOTE
"TO:MK  FROM:ANNA PINA, RN PHONE [phone]  INPT CLINICALS REF# [id number]  OBSERVATION CHANGED TO INPT 12/8/23    Basim Stoll (48 y.o. Male)       Date of Birth   1975    Social Security Number       Address   [address] [place] [id number]    Home Phone   [phone]    MRN   [id number]       [employer]   [facility]    Marital Status                               Admission Date   12/6/23    Admission Type   Emergency    Admitting Provider   Sergio Kirkland DO    Attending Provider   Kerley, Brian Joseph, DO    Department, Room/Bed   [facility] TELEMETRY 4, 426/1       Discharge Date       Discharge Disposition       Discharge Destination                                 Attending Provider: Kerley, Brian Joseph, DO    Allergies: Doxycycline    Isolation: None   Infection: MRSA/History Only (06/20/19), Hepatitis C (04/04/17), Hepatitis B (04/04/17)   Code Status: CPR    Ht: 185.4 cm (73\")   Wt: 90.4 kg (199 lb 4.7 oz)    Admission Cmt: None   Principal Problem: COPD with acute exacerbation [J44.1]                   Active Insurance as of 12/6/2023       Primary Coverage       Payor Plan Insurance Group Employer/Plan Group    MEDICARE MEDICARE A & B        Payor Plan Address Payor Plan Phone Number Payor Plan Fax Number Effective Dates    [address] [phone]  3/1/2011 - None Entered    [facility] [id number]         Subscriber Name Subscriber Birth Date Member ID       BASIM STOLL 1975 [id number]               Secondary Coverage       Payor Plan Insurance Group Employer/Plan Group    [place]        Payor Plan Address Payor Plan Phone Number Payor Plan Fax Number Effective Dates    [address] [phone]  2/1/2014 - None Entered    [place] [address]         Subscriber Name Subscriber Birth Date Member ID       BASIM STOLL 1975 [id number]                     Emergency Contacts       Contact " Person (Rel.) Home Phone Work Phone Mobile Phone    Josee Stoll (Mother) [phone] -- [phone]    Ernesto Stoll (Brother) [phone] -- [phone]                 History & Physical        Sergio Kirkland DO at 23 0115            [facility]   HISTORY AND PHYSICAL      Name:  Topher Stoll   Age:  48 y.o.  Sex:  male  :  1975  MRN:  [id number]   Visit Number:  [id number]  Admission Date:  2023  Date Of Service:  23  Primary Care Physician:  Joshua Hoffmann MD    Chief Complaint:     Dyspnea    History Of Presenting Illness:      48-year-old gentleman with a history of COPD and asthma.  Remote history of tobacco use having quit more than 10 years ago.  He reports that he has had dyspnea for the last 2 weeks.  He reports that during this time he had completed a steroid taper and also had taken antibiotics for strep bronchitis but despite this his dyspnea has persisted.  He also reports a cough productive of a purulent sputum and some left-sided pleurisy.  Also endorses associated fatigue.  He reports that when he ambulates 10 to 15 feet his oxygen sats are dropping into the 70s.  Denies fevers and chills endorses sore throat.  In the ER he was giving steroids nebulizer treatments and magnesium despite this his oxygen sats were staying in the high 80s on 2 L his home oxygen.  He elects to be full code.    Pertinent findings: pH 7.378, PCO2 54.7, highly sensitive troponin 10, creatinine 1.25, CRP 1.43, procalcitonin 0.02, WBC 8.86, COVID and flu negative, chest x-ray shows no acute consolidation shows a subtle increase in interstitial markings no effusions      Edited by: Sergio Kirkland DO at 2023 0114     Review Of Systems:    All systems were reviewed and negative except as mentioned in history of presenting illness, assessment and plan.    Past Medical History: Patient  has a past medical history of Abdominal adhesions, Anxiety, Arthritis,  Asthma, Cervical radiculopathy, Colitis, COPD (chronic obstructive pulmonary disease), GERD (gastroesophageal reflux disease), Heart attack, History of hepatitis C, History of recreational drug use, HTN (hypertension), Impaired functional mobility, balance, gait, and endurance, Kidney cysts, Left hip pain, Liver disease, Low back pain, Migraines, Obstructive chronic bronchitis with exacerbation, Sleep apnea, and Tattoos.    Past Surgical History: Patient  has a past surgical history that includes Back surgery; Hernia repair; Small intestine surgery; Total hip arthroplasty (Left); Shoulder Ligament Repair; Hip Spacer Insertion (Left, 1/15/2020); Revision total hip arthroplasty (Left, 3/5/2020); Colonoscopy (2014); Upper gastrointestinal endoscopy (2014); Colonoscopy (N/A, 1/4/2022); and Total hip arthroplasty (Right).    Social History: Patient  reports that he quit smoking about 18 years ago. His smoking use included cigarettes. He has a 30.00 pack-year smoking history. He has been exposed to tobacco smoke. His smokeless tobacco use includes chew. He reports that he does not currently use drugs. He reports that he does not drink alcohol.    Family History:  Patient's family history has been reviewed and found to be noncontributory.     Allergies:      Doxycycline    Home Medications:    Prior to Admission Medications       Prescriptions Last Dose Informant Patient Reported? Taking?    albuterol sulfate  (90 Base) MCG/ACT inhaler 12/6/2023  No Yes    INHALE 2 PUFFS EVERY 4 HOURS AS NEEDED FOR WHEEZING OR SHORTNESS OF BREATH    dutasteride (AVODART) 0.5 MG capsule 12/6/2023  Yes Yes    Take 1 capsule by mouth Daily.    esomeprazole (nexIUM) 40 MG capsule 12/6/2023  Yes Yes    ESOMEPRAZOLE MAGNESIUM 40 MG CPDR    fluticasone (FLONASE) 50 MCG/ACT nasal spray 12/6/2023  No Yes    INSTILL 2 SPRAYS INOT THE NOSTRILS AS DIRECTED BY PROVIDER DAILY    HYDROcodone-acetaminophen (NORCO) 7.5-325 MG per tablet 12/5/2023   "Yes Yes    Take 1 tablet by mouth Every 6 (Six) Hours As Needed.    ipratropium-albuterol (DUO-NEB) 0.5-2.5 mg/3 ml nebulizer 12/6/2023  No Yes    Take 3 mL by nebulization Every 4 (Four) Hours As Needed for Wheezing or Shortness of Air.    lovastatin (MEVACOR) 40 MG tablet 12/6/2023  No Yes    TAKE 1 TABLET BY MOUTH EVERY DAY FOR CHOLESTEROL    methadone (DOLOPHINE) 5 MG/5ML solution 12/6/2023 Self Yes Yes    Take 65 mL by mouth Daily. Patient takes 65 mg once per day([facility])    predniSONE (DELTASONE) 20 MG tablet Past Week  No Yes    Day 1-5 take 1 tab po three times a day every 8 hours, day 6-10 take 1 tab po two times a day, day 11-15 take one tab by mouth daily.    traZODone (DESYREL) 150 MG tablet 12/5/2023  No Yes    Take 1 tablet by mouth Every Night.    alfuzosin (UROXATRAL) 10 MG 24 hr tablet Unknown  Yes No    Take 1 tablet by mouth Daily.    Benralizumab 30 MG/ML solution auto-injector More than a month  No No    Inject 30 mg under the skin into the appropriate area as directed Every 2 (Two) Months starting 8 weeks after 3rd once-monthly injection    Benralizumab solution auto-injector 30 mg   No No    Budeson-Glycopyrrol-Formoterol (Breztri Aerosphere) 160-9-4.8 MCG/ACT aerosol inhaler   No No    Inhale 2 puffs 2 (Two) Times a Day.    naloxone (NARCAN) 4 MG/0.1ML nasal spray   No No    Call 911. Don't prime. Lyons in 1 nostril for overdose. Repeat in 2-3 minutes in other nostril if no or minimal breathing/responsiveness.              Vital Signs:  Temp:  [98.1 °F (36.7 °C)] 98.1 °F (36.7 °C)  Heart Rate:  [80-96] 82  Resp:  [20-26] 20  BP: (127-169)/() 152/91        12/06/23  1748 12/06/23  2205   Weight: 90.7 kg (200 lb) 91.1 kg (200 lb 13.4 oz)     Body mass index is 26.5 kg/m².    Physical Exam:     Most recent vital Signs: /91 (BP Location: Left arm, Patient Position: Lying)   Pulse 82   Temp 98.1 °F (36.7 °C) (Oral)   Resp 20   Ht 185.4 cm (73\")   " "Wt 91.1 kg (200 lb 13.4 oz)   SpO2 90%   BMI 26.50 kg/m²     Constitutional: Awake, alert  Eyes: PERRLA, sclerae anicteric, no conjunctival injection  HENT: NCAT, mucous membranes moist  Neck: Supple, no thyromegaly, no lymphadenopathy, trachea midline  Respiratory: Expiratory wheeze noted bilaterally, nonlabored respirations   Cardiovascular: RRR, no murmurs, rubs, or gallops, palpable pedal pulses bilaterally  Gastrointestinal: Positive bowel sounds, soft, nontender, nondistended  Musculoskeletal: No bilateral ankle edema, no clubbing or cyanosis to extremities  Psychiatric: Appropriate affect, cooperative  Neurologic: Oriented x 3, speech clear  Skin: No rashes  Edited by: Sergio Kirkland DO at 12/7/2023 0103      Laboratory data:    I have reviewed the labs done in the emergency room.    Results from last 7 days   Lab Units 12/06/23  1801   SODIUM mmol/L 139   POTASSIUM mmol/L 3.9   CHLORIDE mmol/L 99   CO2 mmol/L 31.0*   BUN mg/dL 13   CREATININE mg/dL 1.25   CALCIUM mg/dL 8.8   BILIRUBIN mg/dL 0.2   ALK PHOS U/L 122*   ALT (SGPT) U/L 20   AST (SGOT) U/L 19   GLUCOSE mg/dL 107*     Results from last 7 days   Lab Units 12/06/23  1801   WBC 10*3/mm3 8.86   HEMOGLOBIN g/dL 14.0   HEMATOCRIT % 44.2   PLATELETS 10*3/mm3 205         Results from last 7 days   Lab Units 12/06/23 2001 12/06/23  1801   HSTROP T ng/L 10 10     Results from last 7 days   Lab Units 12/06/23  1801   PROBNP pg/mL 56.5             Results from last 7 days   Lab Units 12/06/23  1926   PH, ARTERIAL pH units 7.378   PO2 ART mm Hg 76.0   PCO2, ARTERIAL mm Hg 54.7*   HCO3 ART mmol/L 32.2*           Invalid input(s): \"USDES\", \"NITRITITE\", \"BACT\", \"EP\"    Pain Management Panel  More data exists         Latest Ref Rng & Units 3/17/2018 7/13/2016   Pain Management Panel   Amphetamine, Urine Qual Negative Negative  Negative    Barbiturates Screen, Urine Negative Negative  Negative    Benzodiazepine Screen, Urine Negative Negative  Negative  " Negative    Buprenorphine, Screen, Urine Negative Negative  -   Cocaine Screen, Urine Negative Negative  Negative    Methadone Screen , Urine Negative Negative  Negative    Methamphetamine, Ur Negative Negative  -       EKG:      Sinus rhythm no acute ST or T wave changes noted    Radiology:    No radiology results for the last 3 days    Assessment/Plan:      COPD with acute exacerbation    Moderate persistent asthma with exacerbation    -Observation MedSurg.  Continue steroids, nebulizer treatments, antibiotics, incentive spirometer, cough syrup, anticipate home in 1 to 2 days. Lovenox prophylaxis  Edited by: Sergio Kirkland DO at 12/7/2023 0110           Risk Assessment: Moderate  DVT Prophylaxis: Lovenox  Code Status:   Code Status and Medical Interventions:   Ordered at: 12/07/23 0112     Code Status (Patient has no pulse and is not breathing):    CPR (Attempt to Resuscitate)     Medical Interventions (Patient has pulse or is breathing):    Full Support      Diet:   Dietary Orders (From admission, onward)       Start     Ordered    12/07/23 0112  Diet: Regular/House Diet; Texture: Regular Texture (IDDSI 7); Fluid Consistency: Thin (IDDSI 0)  Diet Effective Now        References:    Diet Order [place]   Question Answer Comment   Diets: Regular/House Diet    Texture: Regular Texture (IDDSI 7)    Fluid Consistency: Thin (IDDSI 0)        12/07/23 0112                     Advance Care Planning  ACP discussion was held with the patient during this visit. Patient does not have an advance directive, information provided.           Sergio Kirkland DO  12/07/23  01:15 EST    Dictated utilizing Dragon dictation.      Electronically signed by Sergio Kirkland DO at 12/07/23 0117          Emergency Department Notes        Dominick Riley DO at 12/06/23 3797          Subjective:  History of Present Illness:    Patient is a 48-year-old male history of COPD, GERD, MI, hep C, polysubstance use, hypertension, migraines  presents today for increased work of breathing.  Reports cough congestion and viral prodrome over the last week.  This evening, became acutely dyspneic.  Wheezing throughout on exam.  Increased work of breathing noted.  He denies any preceding fevers.  No nausea, vomiting, diarrhea.  Denies abdominal pain.  No unilateral leg swelling or leg pain.      Nurses Notes reviewed and agree, including vitals, allergies, social history and prior medical history.     REVIEW OF SYSTEMS: All systems reviewed and not pertinent unless noted.  Review of Systems   Constitutional:  Positive for activity change, chills and fatigue. Negative for appetite change and fever.   HENT:  Positive for congestion and rhinorrhea. Negative for sinus pressure, sneezing and trouble swallowing.    Eyes:  Negative for discharge and itching.   Respiratory:  Positive for cough, shortness of breath and wheezing.    Cardiovascular:  Negative for chest pain and palpitations.   Gastrointestinal:  Negative for abdominal distention, abdominal pain, diarrhea, nausea and vomiting.   Endocrine: Negative for cold intolerance and heat intolerance.   Genitourinary:  Negative for decreased urine volume, dysuria and urgency.   Musculoskeletal:  Negative for gait problem, neck pain and neck stiffness.   Skin:  Negative for color change and rash.   Allergic/Immunologic: Negative for immunocompromised state.   Neurological:  Negative for facial asymmetry and headaches.   Hematological:  Negative for adenopathy.   Psychiatric/Behavioral:  Negative for self-injury and suicidal ideas.        Past Medical History:   Diagnosis Date    Abdominal adhesions     Anxiety     Arthritis     Asthma     Cervical radiculopathy     Colitis     COPD (chronic obstructive pulmonary disease)     GERD (gastroesophageal reflux disease)     Heart attack     History of hepatitis C     Treated with Epclusa in 2019    History of recreational drug use     HTN (hypertension)     Impaired  functional mobility, balance, gait, and endurance     Kidney cysts     Left hip pain     Liver disease     Low back pain     Migraines     Obstructive chronic bronchitis with exacerbation     Sleep apnea     mild    Tattoos        Allergies:    Doxycycline      Past Surgical History:   Procedure Laterality Date    BACK SURGERY      COLONOSCOPY      COLONOSCOPY N/A 2022    Procedure: COLONOSCOPY with biopsies;  Surgeon: Giancarlo Ruiz MD;  Location:  [facility];  Service: Gastroenterology;  Laterality: N/A;    HERNIA REPAIR      HIP SPACER INSERTION WITH ANTIBIOTIC CEMENT Left 1/15/2020    Procedure: TOTAL HIP IMPLANT REMOVAL WITH INSERTION OF ANTIBIOTIC SPACER LEFT;  Surgeon: Ba Ramirez MD;  Location:  [facility];  Service: Orthopedics    SHOULDER LIGAMENT REPAIR      right shoulder    SMALL INTESTINE SURGERY      Small bowell resection    TOTAL HIP ARTHROPLASTY Left     TOTAL HIP ARTHROPLASTY Right     TOTAL HIP ARTHROPLASTY REVISION Left 3/5/2020    Procedure: HIP REIMPLANT REVISION LEFT;  Surgeon: Ba Ramirez MD;  Location:  [facility];  Service: Orthopedics;  Laterality: Left;    UPPER GASTROINTESTINAL ENDOSCOPY           Social History     Socioeconomic History    Marital status:    Tobacco Use    Smoking status: Former     Packs/day: 2.00     Years: 15.00     Additional pack years: 0.00     Total pack years: 30.00     Types: Cigarettes     Quit date:      Years since quittin.9     Passive exposure: Current    Smokeless tobacco: Current     Types: Chew   Vaping Use    Vaping Use: Never used   Substance and Sexual Activity    Alcohol use: No     Comment: stopped drinking alcohol    Drug use: Not Currently     Comment: takes suboxone    Sexual activity: Defer         Family History   Problem Relation Age of Onset    Arthritis Other     Hypertension Other     Migraines Other     Heart attack Other     Stroke Other     Colon cancer Neg Hx        Objective  Physical  "Exam:  /79 (BP Location: Left arm, Patient Position: Lying)   Pulse 83   Temp 98.1 °F (36.7 °C) (Oral)   Resp 20   Ht 185.4 cm (73\")   Wt 90.7 kg (200 lb)   SpO2 90%   BMI 26.39 kg/m²      Physical Exam  Constitutional:       General: He is in acute distress.      Appearance: Normal appearance. He is normal weight. He is not ill-appearing.   HENT:      Head: Normocephalic and atraumatic.      Nose: Nose normal. No congestion or rhinorrhea.      Mouth/Throat:      Mouth: Mucous membranes are moist.      Pharynx: Oropharynx is clear.   Eyes:      Extraocular Movements: Extraocular movements intact.      Conjunctiva/sclera: Conjunctivae normal.      Pupils: Pupils are equal, round, and reactive to light.   Cardiovascular:      Rate and Rhythm: Normal rate and regular rhythm.      Pulses: Normal pulses.   Pulmonary:      Effort: Pulmonary effort is normal. Tachypnea, accessory muscle usage and respiratory distress present.      Breath sounds: Examination of the right-upper field reveals wheezing. Examination of the left-upper field reveals wheezing. Examination of the right-middle field reveals wheezing. Examination of the left-middle field reveals wheezing. Examination of the right-lower field reveals wheezing. Examination of the left-lower field reveals wheezing. Wheezing present.   Abdominal:      General: Abdomen is flat. Bowel sounds are normal. There is no distension.      Palpations: Abdomen is soft.      Tenderness: There is no abdominal tenderness.   Musculoskeletal:         General: No swelling or tenderness. Normal range of motion.      Cervical back: Normal range of motion and neck supple. No rigidity or tenderness.   Skin:     General: Skin is warm and dry.      Capillary Refill: Capillary refill takes less than 2 seconds.   Neurological:      General: No focal deficit present.      Mental Status: He is alert and oriented to person, place, and time. Mental status is at baseline.      Cranial " Nerves: No cranial nerve deficit.      Sensory: No sensory deficit.      Motor: No weakness.   Psychiatric:         Mood and Affect: Mood normal.         Behavior: Behavior normal.         Thought Content: Thought content normal.         Judgment: Judgment normal.         Procedures    ED Course:    ED Course as of 12/06/23 2129   Wed Dec 06, 2023   1801 EKG interpreted by me, normal sinus rhythm no concerning ST changes noted, rate of 92 [JE]      ED Course User Index  [JE] Crispin Hudson MD       Lab Results (last 24 hours)       Procedure Component Value Units Date/Time    CBC & Differential [[id number]]  (Abnormal) Collected: 12/06/23 1801    Specimen: Blood Updated: 12/06/23 1809    Narrative:      The following orders were created for panel order CBC & Differential.  Procedure                               Abnormality         Status                     ---------                               -----------         ------                     CBC Auto Differential[[id number]]        Abnormal            Final result                 Please view results for these tests on the individual orders.    Comprehensive Metabolic Panel [[id number]]  (Abnormal) Collected: 12/06/23 1801    Specimen: Blood Updated: 12/06/23 1832     Glucose 107 mg/dL      BUN 13 mg/dL      Creatinine 1.25 mg/dL      Sodium 139 mmol/L      Potassium 3.9 mmol/L      Comment: Slight hemolysis detected by analyzer. Result may be falsely elevated.        Chloride 99 mmol/L      CO2 31.0 mmol/L      Calcium 8.8 mg/dL      Total Protein 7.1 g/dL      Albumin 4.1 g/dL      ALT (SGPT) 20 U/L      AST (SGOT) 19 U/L      Alkaline Phosphatase 122 U/L      Total Bilirubin 0.2 mg/dL      Globulin 3.0 gm/dL      A/G Ratio 1.4 g/dL      BUN/Creatinine Ratio 10.4     Anion Gap 9.0 mmol/L      eGFR 70.6 mL/min/1.73     Narrative:      GFR Normal >60  Chronic Kidney Disease <60  Kidney Failure <15      BNP [[id number]]  (Normal) Collected: 12/06/23 1801     Specimen: Blood Updated: 12/06/23 1836     proBNP 56.5 pg/mL     Narrative:      This assay is used as an aid in the diagnosis of individuals suspected of having heart failure. It can be used as an aid in the diagnosis of acute decompensated heart failure (ADHF) in patients presenting with signs and symptoms of ADHF to the emergency department (ED). In addition, NT-proBNP of <300 pg/mL indicates ADHF is not likely.    Age Range Result Interpretation  NT-proBNP Concentration (pg/mL:      <50             Positive            >450                   Gray                 300-450                    Negative             <300    50-75           Positive            >900                  Gray                300-900                  Negative            <300      >75             Positive            >1800                  Gray                300-1800                  Negative            <300    High Sensitivity Troponin T [[id number]]  (Normal) Collected: 12/06/23 1801    Specimen: Blood Updated: 12/06/23 1836     HS Troponin T 10 ng/L     Narrative:      High Sensitive Troponin T Reference Range:  <14.0 ng/L- Negative Female for AMI  <22.0 ng/L- Negative Male for AMI  >=14 - Abnormal Female indicating possible myocardial injury.  >=22 - Abnormal Male indicating possible myocardial injury.   Clinicians would have to utilize clinical acumen, EKG, Troponin, and serial changes to determine if it is an Acute Myocardial Infarction or myocardial injury due to an underlying chronic condition.         D-dimer, Quantitative [[id number]]  (Normal) Collected: 12/06/23 1801    Specimen: Blood Updated: 12/06/23 1840     D-Dimer, Quantitative 0.46 MCGFEU/mL     Narrative:      According to the assay 's published package insert, a normal (<0.50 MCGFEU/mL) D-dimer result in conjunction with a non-high clinical probability assessment, excludes deep vein thrombosis (DVT) and pulmonary embolism (PE) with high sensitivity.    D-dimer  "values increase with age and this can make VTE exclusion of an older population difficult. To address this, the American College of Phys[employer], based on best available evidence and recent guidelines, recommends that clinicians use age-adjusted D-dimer thresholds in patients greater than 50 years of age with: a) a low probability of PE who do not meet all Pulmonary Embolism Rule Out Criteria, or b) in those with intermediate probability of PE.   The formula for an age-adjusted D-dimer cut-off is \"age/100\".  For example, a 60 year old patient would have an age-adjusted cut-off of 0.60 MCGFEU/mL and an 80 year old 0.80 MCGFEU/mL.    C-reactive Protein [[id number]]  (Abnormal) Collected: 12/06/23 1801    Specimen: Blood Updated: 12/06/23 1832     C-Reactive Protein 1.43 mg/dL     Procalcitonin [[id number]]  (Normal) Collected: 12/06/23 1801    Specimen: Blood Updated: 12/06/23 1841     Procalcitonin 0.02 ng/mL     Narrative:      As a Marker for Sepsis (Non-Neonates):    1. <0.5 ng/mL represents a low risk of severe sepsis and/or septic shock.  2. >2 ng/mL represents a high risk of severe sepsis and/or septic shock.    As a Marker for Lower Respiratory Tract Infections that require antibiotic therapy:    PCT on Admission    Antibiotic Therapy       6-12 Hrs later    >0.5                Strongly Recommended  >0.25 - <0.5        Recommended   0.1 - 0.25          Discouraged              Remeasure/reassess PCT  <0.1                Strongly Discouraged     Remeasure/reassess PCT    As 28 day mortality risk marker: \"Change in Procalcitonin Result\" (>80% or <=80%) if Day 0 (or Day 1) and Day 4 values are available. Refer to http://www.[employer]-pct-calculator.com    Change in PCT <=80%  A decrease of PCT levels below or equal to 80% defines a positive change in PCT test result representing a higher risk for 28-day all-cause mortality of patients diagnosed with severe sepsis for septic shock.    Change in PCT >80%  A decrease of " PCT levels of more than 80% defines a negative change in PCT result representing a lower risk for 28-day all-cause mortality of patients diagnosed with severe sepsis or septic shock.       Magnesium [[id number]]  (Normal) Collected: 12/06/23 1801    Specimen: Blood Updated: 12/06/23 1832     Magnesium 2.1 mg/dL     CBC Auto Differential [[id number]]  (Abnormal) Collected: 12/06/23 1801    Specimen: Blood Updated: 12/06/23 1809     WBC 8.86 10*3/mm3      RBC 5.24 10*6/mm3      Hemoglobin 14.0 g/dL      Hematocrit 44.2 %      MCV 84.4 fL      MCH 26.7 pg      MCHC 31.7 g/dL      RDW 12.8 %      RDW-SD 39.4 fl      MPV 9.5 fL      Platelets 205 10*3/mm3      Neutrophil % 69.0 %      Lymphocyte % 23.5 %      Monocyte % 6.8 %      Eosinophil % 0.1 %      Basophil % 0.1 %      Immature Grans % 0.5 %      Neutrophils, Absolute 6.12 10*3/mm3      Lymphocytes, Absolute 2.08 10*3/mm3

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
## 2023-12-09 ENCOUNTER — READMISSION MANAGEMENT (OUTPATIENT)
Dept: CALL CENTER | Facility: HOSPITAL | Age: 48
End: 2023-12-09
Payer: MEDICARE

## 2023-12-09 VITALS
TEMPERATURE: 98.3 F | RESPIRATION RATE: 18 BRPM | SYSTOLIC BLOOD PRESSURE: 125 MMHG | WEIGHT: 199.96 LBS | DIASTOLIC BLOOD PRESSURE: 88 MMHG | HEART RATE: 83 BPM | BODY MASS INDEX: 26.5 KG/M2 | OXYGEN SATURATION: 98 % | HEIGHT: 73 IN

## 2023-12-09 LAB
ANION GAP SERPL CALCULATED.3IONS-SCNC: 10.1 MMOL/L (ref 5–15)
BASOPHILS # BLD AUTO: 0.01 10*3/MM3 (ref 0–0.2)
BASOPHILS NFR BLD AUTO: 0.1 % (ref 0–1.5)
BUN SERPL-MCNC: 17 MG/DL (ref 6–20)
BUN/CREAT SERPL: 17.5 (ref 7–25)
CALCIUM SPEC-SCNC: 9 MG/DL (ref 8.6–10.5)
CHLORIDE SERPL-SCNC: 104 MMOL/L (ref 98–107)
CO2 SERPL-SCNC: 25.9 MMOL/L (ref 22–29)
CREAT SERPL-MCNC: 0.97 MG/DL (ref 0.76–1.27)
DEPRECATED RDW RBC AUTO: 40.2 FL (ref 37–54)
EGFRCR SERPLBLD CKD-EPI 2021: 95.7 ML/MIN/1.73
EOSINOPHIL # BLD AUTO: 0 10*3/MM3 (ref 0–0.4)
EOSINOPHIL NFR BLD AUTO: 0 % (ref 0.3–6.2)
ERYTHROCYTE [DISTWIDTH] IN BLOOD BY AUTOMATED COUNT: 13.1 % (ref 12.3–15.4)
GLUCOSE SERPL-MCNC: 92 MG/DL (ref 65–99)
HCT VFR BLD AUTO: 43.6 % (ref 37.5–51)
HGB BLD-MCNC: 13.8 G/DL (ref 13–17.7)
IMM GRANULOCYTES # BLD AUTO: 0.06 10*3/MM3 (ref 0–0.05)
IMM GRANULOCYTES NFR BLD AUTO: 0.4 % (ref 0–0.5)
LYMPHOCYTES # BLD AUTO: 2.36 10*3/MM3 (ref 0.7–3.1)
LYMPHOCYTES NFR BLD AUTO: 17.3 % (ref 19.6–45.3)
MCH RBC QN AUTO: 26.5 PG (ref 26.6–33)
MCHC RBC AUTO-ENTMCNC: 31.7 G/DL (ref 31.5–35.7)
MCV RBC AUTO: 83.8 FL (ref 79–97)
MONOCYTES # BLD AUTO: 1.01 10*3/MM3 (ref 0.1–0.9)
MONOCYTES NFR BLD AUTO: 7.4 % (ref 5–12)
NEUTROPHILS NFR BLD AUTO: 10.23 10*3/MM3 (ref 1.7–7)
NEUTROPHILS NFR BLD AUTO: 74.8 % (ref 42.7–76)
NRBC BLD AUTO-RTO: 0 /100 WBC (ref 0–0.2)
PLATELET # BLD AUTO: 219 10*3/MM3 (ref 140–450)
PMV BLD AUTO: 9.8 FL (ref 6–12)
POTASSIUM SERPL-SCNC: 4.2 MMOL/L (ref 3.5–5.2)
RBC # BLD AUTO: 5.2 10*6/MM3 (ref 4.14–5.8)
SODIUM SERPL-SCNC: 140 MMOL/L (ref 136–145)
WBC NRBC COR # BLD AUTO: 13.67 10*3/MM3 (ref 3.4–10.8)

## 2023-12-09 PROCEDURE — 25010000002 ENOXAPARIN PER 10 MG: Performed by: INTERNAL MEDICINE

## 2023-12-09 PROCEDURE — 99239 HOSP IP/OBS DSCHRG MGMT >30: CPT | Performed by: NURSE PRACTITIONER

## 2023-12-09 PROCEDURE — 63710000001 PREDNISONE PER 1 MG: Performed by: INTERNAL MEDICINE

## 2023-12-09 PROCEDURE — 94799 UNLISTED PULMONARY SVC/PX: CPT

## 2023-12-09 PROCEDURE — 80048 BASIC METABOLIC PNL TOTAL CA: CPT | Performed by: NURSE PRACTITIONER

## 2023-12-09 PROCEDURE — 25010000002 AZITHROMYCIN PER 500 MG: Performed by: INTERNAL MEDICINE

## 2023-12-09 PROCEDURE — 85025 COMPLETE CBC W/AUTO DIFF WBC: CPT | Performed by: NURSE PRACTITIONER

## 2023-12-09 PROCEDURE — 25810000003 SODIUM CHLORIDE 0.9 % SOLUTION: Performed by: INTERNAL MEDICINE

## 2023-12-09 RX ORDER — PREDNISONE 10 MG/1
TABLET ORAL
Qty: 20 TABLET | Refills: 0 | Status: SHIPPED | OUTPATIENT
Start: 2023-12-09

## 2023-12-09 RX ADMIN — ATORVASTATIN CALCIUM 10 MG: 10 TABLET, FILM COATED ORAL at 09:03

## 2023-12-09 RX ADMIN — FINASTERIDE 5 MG: 5 TABLET, FILM COATED ORAL at 09:02

## 2023-12-09 RX ADMIN — PANTOPRAZOLE SODIUM 40 MG: 40 TABLET, DELAYED RELEASE ORAL at 06:10

## 2023-12-09 RX ADMIN — DOCUSATE SODIUM 50MG AND SENNOSIDES 8.6MG 2 TABLET: 8.6; 5 TABLET, FILM COATED ORAL at 09:02

## 2023-12-09 RX ADMIN — METHADONE HYDROCHLORIDE 65 MG: 10 TABLET ORAL at 09:45

## 2023-12-09 RX ADMIN — HYDROCODONE BITARTRATE AND ACETAMINOPHEN 1 TABLET: 7.5; 325 TABLET ORAL at 09:03

## 2023-12-09 RX ADMIN — Medication 10 ML: at 09:03

## 2023-12-09 RX ADMIN — PREDNISONE 40 MG: 20 TABLET ORAL at 09:02

## 2023-12-09 RX ADMIN — SODIUM CHLORIDE 500 MG: 900 INJECTION, SOLUTION INTRAVENOUS at 03:20

## 2023-12-09 RX ADMIN — HYDROCODONE BITARTRATE AND ACETAMINOPHEN 1 TABLET: 7.5; 325 TABLET ORAL at 03:20

## 2023-12-09 RX ADMIN — BUDESONIDE AND FORMOTEROL FUMARATE DIHYDRATE 2 PUFF: 160; 4.5 AEROSOL RESPIRATORY (INHALATION) at 07:28

## 2023-12-09 RX ADMIN — FLUTICASONE PROPIONATE 2 SPRAY: 50 SPRAY, METERED NASAL at 09:03

## 2023-12-09 RX ADMIN — TIOTROPIUM BROMIDE INHALATION SPRAY 2 PUFF: 3.12 SPRAY, METERED RESPIRATORY (INHALATION) at 07:28

## 2023-12-09 NOTE — DISCHARGE INSTRUCTIONS
Patient to be discharged home today.  Continue prednisone as needed.  Return to ED for worsening symptoms.

## 2023-12-09 NOTE — CASE MANAGEMENT/SOCIAL WORK
Case Management Discharge Note                Selected Continued Care - Discharged on 12/9/2023 Admission date: 12/6/2023 - Discharge disposition: Home or Self Care      Destination    No services have been selected for the patient.                Durable Medical Equipment    No services have been selected for the patient.                Dialysis/Infusion    No services have been selected for the patient.                Home Medical Care    No services have been selected for the patient.                Therapy    No services have been selected for the patient.                Community Resources    No services have been selected for the patient.                Community & DME    No services have been selected for the patient.                    Selected Continued Care - Episodes Includes continued care and service providers with selected services from the active episodes listed below      Asthma Episode start date: 6/29/2023   There are no active outsourced providers for this episode.                 Transportation Services  Private: Car    Final Discharge Disposition Code: 01 - home or self-care

## 2023-12-09 NOTE — OUTREACH NOTE
Prep Survey      Flowsheet Row Responses   [facility] patient discharged from? [place]   Is LACE score < 7 ? No   Eligibility [facility]   Date of Admission 12/06/23   Date of Discharge 12/09/23   Discharge Disposition Home or Self Care   Discharge diagnosis COPD with acute exacerbation   Does the patient have one of the following disease processes/diagnoses(primary or secondary)? COPD   Does the patient have Home health ordered? No   Is there a DME ordered? No   Medication alerts for this patient see AVS   Prep survey completed? Yes            Asuncion HERRERA - Registered Nurse
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
## 2023-12-09 NOTE — PLAN OF CARE
Goal Outcome Evaluation:  Plan of Care Reviewed With: patient        Progress: no change  Outcome Evaluation: PT LYING IN BED RESTING COMFORTABLY AT THIS TIME. NO ACUTE EVENTS NOTED OVERNIGHT. PRN PAIN MEDICATION GIVEN TWICE THIS SHIFT. VSS. WILL CONTINUE TO MONITOR CLOSELY.

## 2023-12-11 ENCOUNTER — TRANSITIONAL CARE MANAGEMENT TELEPHONE ENCOUNTER (OUTPATIENT)
Dept: CALL CENTER | Facility: HOSPITAL | Age: 48
End: 2023-12-11
Payer: MEDICARE

## 2023-12-11 NOTE — OUTREACH NOTE
Call Center TCM Note      Flowsheet Row Responses   [facility] [place] patient discharged from? [facility]   Does the patient have one of the following disease processes/diagnoses(primary or secondary)? COPD   TCM attempt successful? Yes   Call start time 0924   Call end time 0927   Discharge diagnosis COPD with acute exacerbation   Person spoke with today (if not patient) and relationship Patient   Meds reviewed with patient/caregiver? Yes  [Patient on steroid taper]   Does the patient have all medications ordered at discharge? Yes   Is the patient taking all medications as directed (includes completed medication regime)? Yes   Comments PCP Dr Hoffmann. Patient declined need to schedule  PCP f/u appt with call today. Patient reports pulmonary appt next week.   Does the patient have an appointment with their PCP within 7-14 days of discharge? No   Nursing Interventions Patient declined scheduling/rescheduling appointment at this time, Routed TCM call to PCP office, Patient desires to follow up with specialty only   Has home health visited the patient within 72 hours of discharge? N/A   DME comments Has home O2 for prn use. States not needing to wear right now. Has home nebulizer machine.   Psychosocial issues? No   Did the patient receive a copy of their discharge instructions? Yes   Nursing interventions Reviewed instructions with patient   What is the patient's perception of their health status since discharge? Improving   Is the patient/caregiver able to teach back the hierarchy of who to call/visit for symptoms/problems? PCP, Specialist, Home health nurse, Urgent Care, ED, 911 Yes   Is the patient able to teach back COPD zones? --  [Unable to review Zones with patient today]   Patient reports what zone on this call? Green Zone  [From patient report of his status.]   Green Zone Reports doing well, Usual activity and exercise level   Green Zone interventions: Take daily medications, Use oxygen as prescribed   TCM call  completed? Yes   Call end time 0927   Would this patient benefit from a Referral to [facility] Social Work? No   Is the patient interested in additional calls from an ambulatory ? No            Erin Macias RN    12/11/2023, 09:30 EST

## 2023-12-12 ENCOUNTER — DISEASE STATE MANAGEMENT VISIT (OUTPATIENT)
Dept: PHARMACY | Facility: HOSPITAL | Age: 48
End: 2023-12-12
Payer: MEDICARE

## 2023-12-12 VITALS
HEART RATE: 84 BPM | DIASTOLIC BLOOD PRESSURE: 84 MMHG | BODY MASS INDEX: 26.64 KG/M2 | WEIGHT: 201 LBS | HEIGHT: 73 IN | OXYGEN SATURATION: 94 % | SYSTOLIC BLOOD PRESSURE: 133 MMHG

## 2023-12-12 DIAGNOSIS — J44.9 CHRONIC OBSTRUCTIVE PULMONARY DISEASE, UNSPECIFIED COPD TYPE: ICD-10-CM

## 2023-12-12 DIAGNOSIS — J96.12 CHRONIC RESPIRATORY FAILURE WITH HYPERCAPNIA: ICD-10-CM

## 2023-12-12 DIAGNOSIS — J45.50 SEVERE PERSISTENT ASTHMA WITHOUT COMPLICATION: Primary | ICD-10-CM

## 2023-12-12 DIAGNOSIS — G47.33 OSA (OBSTRUCTIVE SLEEP APNEA): ICD-10-CM

## 2023-12-12 PROCEDURE — G0463 HOSPITAL OUTPT CLINIC VISIT: HCPCS | Performed by: NURSE PRACTITIONER

## 2023-12-12 RX ORDER — GABAPENTIN 600 MG/1
1 TABLET ORAL 3 TIMES DAILY
COMMUNITY
Start: 2023-11-13

## 2023-12-12 RX ORDER — PREDNISONE 10 MG/1
TABLET ORAL
COMMUNITY
Start: 2023-12-09

## 2023-12-12 RX ORDER — IBUPROFEN 800 MG/1
TABLET ORAL
COMMUNITY
Start: 2023-11-13
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
## 2023-12-12 NOTE — PROGRESS NOTES
Follow Up Office Visit      Patient Name: Topher Stoll    Chief Complaint: Hospital follow-up      History of Present Illness: Topher Stoll is a 48 y.o. male who is here today for follow up of recent hospitalization at [facility] for management of acute on chronic respiratory failure secondary to COPD/asthma exacerbation.  During admission, he was administered nebulizer treatments, steroids and supplemental oxygen.  Patient improved and was able to be weaned back to room air.    Since the time of hospital discharge, the patient notes that his symptoms are now improved.  He has completed his outpatient course of prednisone.  He continues on Breztri and Fasenra injections.    Patient does have a known history of sleep apnea and was placed on an AutoPap in the past, though stopped use previously secondary to the [place] recall and remained off of treatment as he did not feel better with CPAP use at that time.    Supplemental Oxygen: 2L NC PRN  Last Hospitalization: December 2023  Number of exacerbations per year: 5+    Subjective      Review of Systems:  Review of Systems   Constitutional:  Positive for fatigue. Negative for fever and unexpected weight change.   Respiratory:  Positive for shortness of breath. Negative for cough and wheezing.    Cardiovascular:  Negative for chest pain and leg swelling.        Past Medical History:   Past Medical History:   Diagnosis Date    Abdominal adhesions     Anxiety     Arthritis     Asthma     Cervical radiculopathy     Colitis     COPD (chronic obstructive pulmonary disease)     GERD (gastroesophageal reflux disease)     Heart attack     History of hepatitis C     Treated with Epclusa in 2019    History of recreational drug use     HTN (hypertension)     Impaired functional mobility, balance, gait, and endurance     Kidney cysts     Left hip pain     Liver disease     Low back pain     Migraines     Obstructive chronic bronchitis with exacerbation      Sleep apnea     mild    Tattoos        Past Surgical History:   Past Surgical History:   Procedure Laterality Date    BACK SURGERY      COLONOSCOPY      COLONOSCOPY N/A 2022    Procedure: COLONOSCOPY with biopsies;  Surgeon: Giancarlo Ruiz MD;  Location:  [facility];  Service: Gastroenterology;  Laterality: N/A;    HERNIA REPAIR      HIP SPACER INSERTION WITH ANTIBIOTIC CEMENT Left 1/15/2020    Procedure: TOTAL HIP IMPLANT REMOVAL WITH INSERTION OF ANTIBIOTIC SPACER LEFT;  Surgeon: Ba Ramirez MD;  Location:  [facility];  Service: Orthopedics    SHOULDER LIGAMENT REPAIR      right shoulder    SMALL INTESTINE SURGERY      Small bowell resection    TOTAL HIP ARTHROPLASTY Left     TOTAL HIP ARTHROPLASTY Right     TOTAL HIP ARTHROPLASTY REVISION Left 3/5/2020    Procedure: HIP REIMPLANT REVISION LEFT;  Surgeon: Ba Ramirez MD;  Location:  [facility];  Service: Orthopedics;  Laterality: Left;    UPPER GASTROINTESTINAL ENDOSCOPY         Family History:   Family History   Problem Relation Age of Onset    Arthritis Other     Hypertension Other     Migraines Other     Heart attack Other     Stroke Other     Colon cancer Neg Hx        Social History:   Social History     Socioeconomic History    Marital status:    Tobacco Use    Smoking status: Former     Packs/day: 2.00     Years: 15.00     Additional pack years: 0.00     Total pack years: 30.00     Types: Cigarettes     Quit date:      Years since quittin.9     Passive exposure: Current    Smokeless tobacco: Current     Types: Chew   Vaping Use    Vaping Use: Never used   Substance and Sexual Activity    Alcohol use: No     Comment: stopped drinking alcohol    Drug use: Not Currently     Comment: takes suboxone    Sexual activity: Defer       Current Medications:     Current Outpatient Medications:     albuterol sulfate  (90 Base) MCG/ACT inhaler, INHALE 2 PUFFS EVERY 4 HOURS AS NEEDED FOR WHEEZING OR SHORTNESS OF  BREATH, Disp: 8.5 g, Rfl: 3    Benralizumab 30 MG/ML solution auto-injector, Inject 30 mg under the skin into the appropriate area as directed Every 2 (Two) Months starting 8 weeks after 3rd once-monthly injection, Disp: 1 mL, Rfl: 5    Budeson-Glycopyrrol-Formoterol (Breztri Aerosphere) 160-9-4.8 MCG/ACT aerosol inhaler, Inhale 2 puffs 2 (Two) Times a Day., Disp: 10.7 g, Rfl: 5    esomeprazole (nexIUM) 40 MG capsule, Take 1 capsule by mouth Every Morning Before Breakfast., Disp: , Rfl:     fluticasone (FLONASE) 50 MCG/ACT nasal spray, INSTILL 2 SPRAYS INOT THE NOSTRILS AS DIRECTED BY PROVIDER DAILY, Disp: 16 g, Rfl: 3    HYDROcodone-acetaminophen (NORCO) 7.5-325 MG per tablet, Take 1 tablet by mouth Every 6 (Six) Hours As Needed., Disp: , Rfl:     ipratropium-albuterol (DUO-NEB) 0.5-2.5 mg/3 ml nebulizer, Take 3 mL by nebulization Every 4 (Four) Hours As Needed for Wheezing or Shortness of Air., Disp: 180 mL, Rfl: 3    lovastatin (MEVACOR) 40 MG tablet, TAKE 1 TABLET BY MOUTH EVERY DAY FOR CHOLESTEROL, Disp: 90 tablet, Rfl: 3    methadone (DOLOPHINE) 5 MG/5ML solution, Take 70 mL by mouth Daily. Patient takes 70 mg once per day(per [facility], [facility]), Disp: , Rfl:     traZODone (DESYREL) 150 MG tablet, Take 1 tablet by mouth Every Night., Disp: 90 tablet, Rfl: 3    alfuzosin (UROXATRAL) 10 MG 24 hr tablet, Take 1 tablet by mouth Daily., Disp: , Rfl:     dutasteride (AVODART) 0.5 MG capsule, Take 1 capsule by mouth Daily., Disp: , Rfl:     gabapentin (NEURONTIN) 600 MG tablet, Take 1 tablet by mouth 3 times a day. (Patient not taking: Reported on 12/12/2023), Disp: , Rfl:     ibuprofen (ADVIL,MOTRIN) 800 MG tablet, TAKE 1 TABLET BY MOUTH EVERY 6 HOURS WITH FOOD (Patient not taking: Reported on 12/12/2023), Disp: , Rfl:     naloxone (NARCAN) 4 MG/0.1ML nasal spray, Call 911. Don't prime. Ponce in 1 nostril for overdose. Repeat in 2-3 minutes in other nostril if no or minimal breathing/responsiveness.  "(Patient not taking: Reported on 12/12/2023), Disp: 2 each, Rfl: 0    predniSONE (DELTASONE) 10 MG (48) dose pack, 40 mg x 2 days, 30 mg x 2 days,20 mg x 2 days,10 mg x 2 days then stop (Patient not taking: Reported on 12/12/2023), Disp: 20 tablet, Rfl: 0    predniSONE (DELTASONE) 10 MG tablet, , Disp: , Rfl:     Current Facility-Administered Medications:     [START ON 12/19/2023] Benralizumab solution auto-injector 30 mg, 30 mg, Subcutaneous, Every 8 Weeks, Destiny Mcknight APRN     Allergies:   Allergies   Allergen Reactions    Doxycycline Nausea And Vomiting       Objective     Physical Exam:  Vital Signs:   Vitals:    12/12/23 1032   BP: 133/84   Pulse: 84   SpO2: 94%   Weight: 91.2 kg (201 lb)   Height: 185.4 cm (73\")     Body mass index is 26.52 kg/m².    Physical Exam  Vitals reviewed.   Constitutional:       General: He is not in acute distress.     Appearance: He is not toxic-appearing.   HENT:      Head: Normocephalic and atraumatic.      Mouth/Throat:      Mouth: Mucous membranes are moist.   Eyes:      Conjunctiva/sclera: Conjunctivae normal.   Cardiovascular:      Rate and Rhythm: Normal rate.      Heart sounds: Normal heart sounds.   Pulmonary:      Effort: Pulmonary effort is normal.      Breath sounds: Normal breath sounds.   Abdominal:      General: There is no distension.      Palpations: Abdomen is soft.   Musculoskeletal:         General: No swelling.      Cervical back: Neck supple.   Skin:     General: Skin is warm and dry.      Findings: No rash.   Neurological:      General: No focal deficit present.      Mental Status: He is alert and oriented to person, place, and time.   Psychiatric:         Mood and Affect: Mood normal.         Behavior: Behavior normal.       Results Review:   Site   Date Value Ref Range Status   12/06/2023 Right Radial  Final     Michael's Test   Date Value Ref Range Status   12/06/2023 Positive  Final     pH, Arterial   Date Value Ref Range Status   12/06/2023 7.378 " 7.350 - 7.450 pH units Final     pCO2, Arterial   Date Value Ref Range Status   12/06/2023 54.7 (H) 35.0 - 45.0 mm Hg Final     Comment:     83 Value above reference range     pO2, Arterial   Date Value Ref Range Status   12/06/2023 76.0 75.0 - 100.0 mm Hg Final     Comment:     84 Value below reference range     HCO3, Arterial   Date Value Ref Range Status   12/06/2023 32.2 (H) 22.0 - 28.0 mmol/L Final     Comment:     83 Value above reference range     Base Excess, Arterial   Date Value Ref Range Status   12/06/2023 5.5 (H) 0.0 - 2.0 mmol/L Final     Comment:     83 Value above reference range     O2 Saturation, Arterial   Date Value Ref Range Status   12/06/2023 93.7 (L) 94.0 - 100.0 % Final     Comment:     84 Value below reference range     Hemoglobin, Blood Gas   Date Value Ref Range Status   01/15/2020 11.4 (L) 13.5 - 17.5 g/dL Final     Comment:     84 Value below reference range     Hematocrit, Blood Gas   Date Value Ref Range Status   12/06/2023 41.5 % Final     Oxyhemoglobin   Date Value Ref Range Status   12/06/2023 93.2 (L) 94 - 99 % Final     Comment:     84 Value below reference range     Oxyhemoglobin Venous   Date Value Ref Range Status   09/01/2021 49.0 40.0 - 70.0 % Final     Comment:     85 Value below critical limit     Methemoglobin   Date Value Ref Range Status   12/06/2023 0.60 0.00 - 1.50 % Final     Carboxyhemoglobin   Date Value Ref Range Status   12/06/2023 -0.1 (L) 0 - 2 % Final     Comment:     84 Value below reference range     CO2 Content   Date Value Ref Range Status   01/15/2020 26.1 22 - 33 mmol/L Final     Barometric Pressure for Blood Gas   Date Value Ref Range Status   12/06/2023 739 mmHg Final   12/19/2016 749 mmHg Final     Modality   Date Value Ref Range Status   12/06/2023 Nasal Cannula  Final     FIO2   Date Value Ref Range Status   12/06/2023 28 % Final     WBC   Date Value Ref Range Status   12/09/2023 13.67 (H) 3.40 - 10.80 10*3/mm3 Final   06/22/2022 11.80 (H) 3.70 -  10.30 10*3/uL Final     RBC   Date Value Ref Range Status   12/09/2023 5.20 4.14 - 5.80 10*6/mm3 Final   06/22/2022 6.33 (H) 4.60 - 6.10 10*6/uL Final     Hemoglobin   Date Value Ref Range Status   12/09/2023 13.8 13.0 - 17.7 g/dL Final   06/22/2022 17.1 13.7 - 17.5 g/dL Final     Hematocrit   Date Value Ref Range Status   12/09/2023 43.6 37.5 - 51.0 % Final   06/22/2022 50.0 40.0 - 51.0 % Final     MCV   Date Value Ref Range Status   12/09/2023 83.8 79.0 - 97.0 fL Final   06/22/2022 79 79 - 98 fL Final     MCH   Date Value Ref Range Status   12/09/2023 26.5 (L) 26.6 - 33.0 pg Final   06/22/2022 27.0 26.0 - 32.0 pg Final     MCHC   Date Value Ref Range Status   12/09/2023 31.7 31.5 - 35.7 g/dL Final   06/22/2022 34.2 30.7 - 35.5 g/dL Final     RDW   Date Value Ref Range Status   12/09/2023 13.1 12.3 - 15.4 % Final   06/22/2022 12.8 11.5 - 14.5 % Final     RDW-SD   Date Value Ref Range Status   12/09/2023 40.2 37.0 - 54.0 fl Final     MPV   Date Value Ref Range Status   12/09/2023 9.8 6.0 - 12.0 fL Final   06/22/2022 9.4 8.8 - 12.5 fL Final     Platelets   Date Value Ref Range Status   12/09/2023 219 140 - 450 10*3/mm3 Final   06/22/2022 264 155 - 369 10*3/uL Final     Neutrophil %   Date Value Ref Range Status   12/09/2023 74.8 42.7 - 76.0 % Final     Lymphocyte %   Date Value Ref Range Status   12/09/2023 17.3 (L) 19.6 - 45.3 % Final     Monocyte %   Date Value Ref Range Status   12/09/2023 7.4 5.0 - 12.0 % Final     Eosinophil %   Date Value Ref Range Status   12/09/2023 0.0 (L) 0.3 - 6.2 % Final     Basophil %   Date Value Ref Range Status   12/09/2023 0.1 0.0 - 1.5 % Final     Immature Grans %   Date Value Ref Range Status   12/09/2023 0.4 0.0 - 0.5 % Final     Neutrophils, Absolute   Date Value Ref Range Status   12/09/2023 10.23 (H) 1.70 - 7.00 10*3/mm3 Final     Lymphocytes, Absolute   Date Value Ref Range Status   12/09/2023 2.36 0.70 - 3.10 10*3/mm3 Final     Monocytes, Absolute   Date Value Ref Range  Status   12/09/2023 1.01 (H) 0.10 - 0.90 10*3/mm3 Final     Eosinophils, Absolute   Date Value Ref Range Status   12/09/2023 0.00 0.00 - 0.40 10*3/mm3 Final     Basophils, Absolute   Date Value Ref Range Status   12/09/2023 0.01 0.00 - 0.20 10*3/mm3 Final     Immature Grans, Absolute   Date Value Ref Range Status   12/09/2023 0.06 (H) 0.00 - 0.05 10*3/mm3 Final     nRBC   Date Value Ref Range Status   12/09/2023 0.0 0.0 - 0.2 /100 WBC Final     Lab Results   Component Value Date    GLUCOSE 92 12/09/2023    BUN 17 12/09/2023    CREATININE 0.97 12/09/2023    EGFR 95.7 12/09/2023    BCR 17.5 12/09/2023    K 4.2 12/09/2023    CO2 25.9 12/09/2023    CALCIUM 9.0 12/09/2023    ALBUMIN 4.2 12/07/2023    BILITOT 0.3 12/07/2023    AST 17 12/07/2023    ALT 17 12/07/2023     Results for orders placed during the hospital encounter of 05/21/23    Adult Transthoracic Echo Complete W/ Cont if Necessary Per Protocol    Interpretation Summary    Left ventricular systolic function is normal. Estimated left ventricular EF = 65% Left ventricular ejection fraction appears to be 61 - 65%.    Left ventricular diastolic function was normal.    October 2022 PFT showed moderate obstruction without bronchodilator response.  FEV1 57%.  No restriction, + air trapping.  Normal DLCO/VA.    December 2023 chest x-ray showed no acute cardiopulmonary process.    February 2017 home sleep study showed mild sleep apnea with AHI of 8/h.    Assessment / Plan      Assessment/Plan:   Diagnoses and all orders for this visit:    1. Severe persistent asthma without complication (Primary)  Now clinically improved since time of recent hospitalization.  Patient will continue on Fasenra injections as well as his current inhaled regimen.    2. Chronic respiratory failure with hypercapnia  -     PAP Therapy  Start BiPAP for management.  Nightly compliance advised.    3. GUILLE (obstructive sleep apnea)  -     PAP Therapy  Diagnosed in the past, has not been on PAP  therapy past couple of years.  Based on hypercapnia as noted above, recommend treatment with BiPAP.    4. Chronic obstructive pulmonary disease, unspecified COPD type  Stable symptoms.  Continue current inhaled regimen.  We discussed the risk and benefits of inhaled corticosteroids. Patient instructed to take them on a regular basis as prescribed. Patient instructed to rinse their mouth out after each use.        Follow Up:   April 2024 as previously scheduled  The patient was counseled on diagnostic results, risks and benefits of treatment options, risk factor modifications and the importance of treatment compliance. The patient was advised to contact the clinic with concerns or worsening symptoms.     LISA Bell   Pulmonary Medicine [facility]

## 2023-12-18 ENCOUNTER — OFFICE VISIT (OUTPATIENT)
Dept: INTERNAL MEDICINE | Facility: CLINIC | Age: 48
End: 2023-12-18
Payer: MEDICARE

## 2023-12-18 VITALS
DIASTOLIC BLOOD PRESSURE: 72 MMHG | TEMPERATURE: 98 F | HEART RATE: 101 BPM | OXYGEN SATURATION: 95 % | HEIGHT: 73 IN | BODY MASS INDEX: 26.9 KG/M2 | WEIGHT: 203 LBS | SYSTOLIC BLOOD PRESSURE: 130 MMHG

## 2023-12-18 DIAGNOSIS — F41.9 ANXIETY AND DEPRESSION: ICD-10-CM

## 2023-12-18 DIAGNOSIS — J96.11 CHRONIC RESPIRATORY FAILURE WITH HYPOXIA: ICD-10-CM

## 2023-12-18 DIAGNOSIS — Z09 HOSPITAL DISCHARGE FOLLOW-UP: Primary | ICD-10-CM

## 2023-12-18 DIAGNOSIS — J44.9 CHRONIC OBSTRUCTIVE PULMONARY DISEASE, UNSPECIFIED COPD TYPE: ICD-10-CM

## 2023-12-18 DIAGNOSIS — F32.A ANXIETY AND DEPRESSION: ICD-10-CM

## 2023-12-18 DIAGNOSIS — J45.50 SEVERE PERSISTENT ASTHMA WITHOUT COMPLICATION: ICD-10-CM

## 2023-12-18 NOTE — PROGRESS NOTES
"Transitional Care Follow Up Visit  Subjective     Topher Stoll is a 48 y.o. male who presents for a transitional care management visit, in addition, discuss anxiety and depression.     Within 48 business hours after discharge our office contacted him via telephone to coordinate his care and needs.      I reviewed and discussed the details of that call along with the discharge summary, hospital problems, inpatient lab results, inpatient diagnostic studies, and consultation reports with Topher.     Current outpatient and discharge medications have been reconciled for the patient.  Reviewed by: LISA Lakhani          12/9/2023     4:36 PM   Date of TCM Phone Call   [facility]   Date of Admission 12/6/2023   Date of Discharge 12/9/2023   Discharge Disposition Home or Self Care     Risk for Readmission (LACE) Score: 16 (12/9/2023  6:00 AM)      History of Present Illness   Course During Hospital Stay:      He was admitted to [facility] on 12/06/2023 for acute on chronic respiratory failure with COPD and asthma exacerbation. Treated with steroids, bronchodilators, and supplemental oxygen and was weaned back to room air before discharge. He was prescribed a prednisone taper on d/c which has completed and has already followed up with pulmonology on 12/12/2023. He continues to use supplemental 2 L nasal cannula and oxygen at night. They discussed switching him to BiPAP. Using inhalers as prescribed. Declines fever, myalgia, chills, cough, wheezing, soa, chest pain.     The patient inquires about referral to psychiatrist. He has dealt with anxiety/depression his entire life, and it is getting hard to deal with. Feels unhappy and is typically a \"happy go jennie\" type person. He has no energy whatsoever. Doesn't want to do anything. He has never admitted that he had depression before until recently. He used to have bad anger issues. He denies any suicidal or homicidal ideation.       The following portions of the " patient's history were reviewed and updated as appropriate: allergies, current medications, past family history, past medical history, past social history, past surgical history, and problem list.    ROS: See HPI    Objective   Physical Exam  Constitutional:       General: He is not in acute distress.     Appearance: Normal appearance.   HENT:      Head: Normocephalic and atraumatic.   Eyes:      General: No scleral icterus.     Extraocular Movements: Extraocular movements intact.      Pupils: Pupils are equal, round, and reactive to light.   Cardiovascular:      Rate and Rhythm: Normal rate.   Pulmonary:      Effort: Pulmonary effort is normal. No respiratory distress.      Breath sounds: No stridor. Wheezing present. No rhonchi or rales.   Chest:      Chest wall: No tenderness.   Musculoskeletal:         General: Normal range of motion.      Cervical back: Normal range of motion.   Neurological:      General: No focal deficit present.      Mental Status: He is alert and oriented to person, place, and time.   Psychiatric:         Attention and Perception: Attention normal.         Mood and Affect: Mood is depressed.         Speech: Speech normal.         Assessment & Plan   Diagnoses and all orders for this visit:    1. Hospital discharge follow-up (Primary)    2. Chronic obstructive pulmonary disease, unspecified COPD type    3. Severe persistent asthma without complication    4. Chronic respiratory failure with hypoxia    5. Anxiety and depression  -     Ambulatory Referral to Psychiatry      Hospital f/u, COPD, Asthma, Chronic respiratory failure with hypoxia   - Exacerbation resolved. Continue inhalers as prescribed and follow up with pulmonology. Continue oxygen 2L nightly, pulmonology looking at switching to Bipap.    Anxiety and depression  - Referral to psychiatry for further evaluation and management. Denies suicidal or homicidal ideation.       LISA Lakhani    Transcribed from ambient dictation  for Breonna Baig, APRN by Vicky Driscoll.  12/18/23   12:26 EST    Patient or patient representative verbalized consent to the visit recording.  I have personally performed the services described in this document as transcribed by the above individual, and it is both accurate and complete.

## 2023-12-19 ENCOUNTER — READMISSION MANAGEMENT (OUTPATIENT)
Dept: CALL CENTER | Facility: HOSPITAL | Age: 48
End: 2023-12-19
Payer: MEDICARE

## 2023-12-19 ENCOUNTER — DISEASE STATE MANAGEMENT VISIT (OUTPATIENT)
Dept: PHARMACY | Facility: HOSPITAL | Age: 48
End: 2023-12-19
Payer: MEDICARE

## 2023-12-19 ENCOUNTER — SPECIALTY PHARMACY (OUTPATIENT)
Dept: PHARMACY | Facility: HOSPITAL | Age: 48
End: 2023-12-19
Payer: MEDICARE

## 2023-12-19 PROCEDURE — G0463 HOSPITAL OUTPT CLINIC VISIT: HCPCS

## 2023-12-19 NOTE — OUTREACH NOTE
COPD/PN Week 2 Survey      Flowsheet Row Responses   [facility] facility patient discharged from? [facility]   Does the patient have one of the following disease processes/diagnoses(primary or secondary)? COPD   Week 2 attempt successful? No   Unsuccessful attempts Attempt 1            Catalina Ashby Registered Nurse

## 2023-12-19 NOTE — PROGRESS NOTES
Ambulatory Care Clinic  Clinical Reassessment     Topher Stoll is a 48 y.o. male diagnosed with severe asthma and enrolled in the Ambulatory Care Clinic. A follow-up outreach was conducted, including assessment of continued therapy appropriateness, medication adherence, and side effect incidence and management for Fasenra.     Changes to Insurance Coverage or Financial Support  none    Relevant Past Medical History and Comorbidities  Relevant medical history and concomitant health conditions were discussed with the patient. The patient's chart has been reviewed for relevant past medical history and comorbid health conditions and updated as necessary.   Past Medical History:   Diagnosis Date    Abdominal adhesions     Anxiety     Arthritis     Asthma     Cervical radiculopathy     Colitis     COPD (chronic obstructive pulmonary disease)     GERD (gastroesophageal reflux disease)     Heart attack     History of hepatitis C     Treated with Epclusa in 2019    History of recreational drug use     HTN (hypertension)     Impaired functional mobility, balance, gait, and endurance     Kidney cysts     Left hip pain     Liver disease     Low back pain     Migraines     Obstructive chronic bronchitis with exacerbation     Sleep apnea     mild    Tattoos      Social History     Socioeconomic History    Marital status:    Tobacco Use    Smoking status: Former     Packs/day: 2.00     Years: 15.00     Additional pack years: 0.00     Total pack years: 30.00     Types: Cigarettes     Quit date:      Years since quittin.9     Passive exposure: Current    Smokeless tobacco: Current     Types: Chew   Vaping Use    Vaping Use: Never used   Substance and Sexual Activity    Alcohol use: No     Comment: stopped drinking alcohol    Drug use: Not Currently     Comment: takes suboxone    Sexual activity: Defer       Allergies  Known allergies and reactions were discussed with the patient. The patient's chart has been  "reviewed for allergy information and updated as necessary.   Doxycycline    Relevant Laboratory Values  No results for input(s): \"CMP\", \"BMP\", \"CBC\" in the last 72 hours.    Current Medication List  This medication list has been reviewed with the patient and evaluated for any interactions or necessary modifications/recommendations, and updated to include all prescription medications, OTC medications, and supplements the patient is currently taking.  This list reflects what is contained in the patient's profile, which has also been marked as reviewed to communicate to other providers it is the most up to date version of the patient's current medication therapy.     Current Outpatient Medications:     albuterol sulfate  (90 Base) MCG/ACT inhaler, INHALE 2 PUFFS EVERY 4 HOURS AS NEEDED FOR WHEEZING OR SHORTNESS OF BREATH, Disp: 8.5 g, Rfl: 3    alfuzosin (UROXATRAL) 10 MG 24 hr tablet, Take 1 tablet by mouth Daily., Disp: , Rfl:     Benralizumab 30 MG/ML solution auto-injector, Inject 30 mg under the skin into the appropriate area as directed Every 2 (Two) Months starting 8 weeks after 3rd once-monthly injection, Disp: 1 mL, Rfl: 5    Budeson-Glycopyrrol-Formoterol (Breztri Aerosphere) 160-9-4.8 MCG/ACT aerosol inhaler, Inhale 2 puffs 2 (Two) Times a Day., Disp: 10.7 g, Rfl: 5    dutasteride (AVODART) 0.5 MG capsule, Take 1 capsule by mouth Daily., Disp: , Rfl:     esomeprazole (nexIUM) 40 MG capsule, Take 1 capsule by mouth Every Morning Before Breakfast., Disp: , Rfl:     fluticasone (FLONASE) 50 MCG/ACT nasal spray, INSTILL 2 SPRAYS INOT THE NOSTRILS AS DIRECTED BY PROVIDER DAILY, Disp: 16 g, Rfl: 3    gabapentin (NEURONTIN) 600 MG tablet, Take 1 tablet by mouth 3 times a day., Disp: , Rfl:     HYDROcodone-acetaminophen (NORCO) 7.5-325 MG per tablet, Take 1 tablet by mouth Every 4 (Four) to 6 (Six) Hours As Needed., Disp: , Rfl:     ipratropium-albuterol (DUO-NEB) 0.5-2.5 mg/3 ml nebulizer, Take 3 mL by " nebulization Every 4 (Four) Hours As Needed for Wheezing or Shortness of Air., Disp: 180 mL, Rfl: 3    lovastatin (MEVACOR) 40 MG tablet, TAKE 1 TABLET BY MOUTH EVERY DAY FOR CHOLESTEROL, Disp: 90 tablet, Rfl: 3    traZODone (DESYREL) 150 MG tablet, Take 1 tablet by mouth Every Night., Disp: 90 tablet, Rfl: 3    methadone (DOLOPHINE) 5 MG/5ML solution, Take 70 mL by mouth Daily. Patient takes 70 mg once per day(per [facility], Kayla WANG), Disp: , Rfl:     naloxone (NARCAN) 4 MG/0.1ML nasal spray, Call 911. Don't prime. [place] in 1 nostril for overdose. Repeat in 2-3 minutes in other nostril if no or minimal breathing/responsiveness. (Patient not taking: Reported on 12/12/2023), Disp: 2 each, Rfl: 0    Current Facility-Administered Medications:     Benralizumab solution auto-injector 30 mg, 30 mg, Subcutaneous, Every 8 Weeks, Destiny Mcknight APRN, 30 mg at 12/19/23 1246    Drug Interactions  No interactions noted with Fasenra    Adverse Drug Reactions  Adverse Reactions Experienced: none  Plan for ADR Management: N/A (patient education provided, discontinue drug, pharmacist to consult provider, etc.)    Hospitalizations and Urgent Care Since Last Assessment  Hospitalizations or Admissions:  multiple  ED Visits:  multiple  Urgent Office Visits:  multiple      Adherence and Self-Administration  Approximate Number of Doses Missed Since Last Assessment: none; was given one sample  Ongoing or New Barriers to Patient Adherence and/or Self-Administration: none  Methods for Supporting Patient Adherence and/or Self-Administration: N/A     Quality of Life Assessment   Quality of Life related to the patient's specialty therapy was discussed with the patient. The QOL segment of this outreach has been reviewed and updated.     Quality of Life Assessment  Quality of Life Improvement Scale: 8-Moderately better    Reassessment Plan & Follow-Up  Medication Therapy Changes: none  Additional Plans, Therapy  Recommendations, or Therapy Problems to Be Addressed: none   Pharmacist to perform regular reassessments no more than (6) months from the previous assessment.  Care Coordinator to set up future refill outreaches, coordinate prescription delivery, and escalate clinical questions to pharmacist.     Attestation  I attest that the specialty medication(s) addressed above are appropriate for the patient based on my reassessment.  If the prescribed therapy is at any point deemed not appropriate based on the current or future assessments, a consultation will be initiated with the patient's specialty care provider to determine the best course of action. The revised plan of therapy will be documented along with any additional patient education provided.     Electronically signed by Antwon Bartlett RPH, 12/19/23, 2:58 PM EST.

## 2023-12-19 NOTE — PROGRESS NOTES
Patient presented to clinic today to receive Fasenra injection. He is receiving these every 8 weeks.    Patient reports he tolerated his last injection well. No ADRs.    Fasenra 30 mg SQ x1 administered in the back of L arm in clinic today.  - NDC: 5853-2828-43  - LOT: [id number]  - EXP: 02/2026    Patient will RTC in 8 weeks for repeat injection. Patient was given option to continue getting injections in clinic vs administering to himself at home. He prefers to come to clinic.     Antwon Bartlett, PharmD  12/19/2023  13:02 EST

## 2023-12-21 ENCOUNTER — READMISSION MANAGEMENT (OUTPATIENT)
Dept: CALL CENTER | Facility: HOSPITAL | Age: 48
End: 2023-12-21
Payer: MEDICARE

## 2023-12-21 NOTE — OUTREACH NOTE
COPD/PN Week 2 Survey      Flowsheet Row Responses   [facility] patient discharged from? [facility]   Does the patient have one of the following disease processes/diagnoses(primary or secondary)? COPD   Week 2 attempt successful? Yes   Call start time 0921   Call end time 0924   Medication alerts for this patient steroids complete.   Meds reviewed with patient/caregiver? Yes   Is the patient taking all medications as directed (includes completed medication regime)? Yes   Comments regarding appointments Pt has had pcp f/u.   Does the patient have a primary care provider?  Yes   Has the patient kept scheduled appointments due by today? Yes   What is the patient's perception of their health status since discharge? Returned to baseline/stable   Week 2 call completed? Yes   Graduated Yes   Wrap up additional comments Pt reports feeling well. Pt has completed all steroids. Pt has had pcp f/u.   Call end time 0924            Breonna RAO - Registered Nurse

## 2024-01-02 ENCOUNTER — TELEPHONE (OUTPATIENT)
Dept: INTERNAL MEDICINE | Facility: CLINIC | Age: 49
End: 2024-01-02
Payer: MEDICARE

## 2024-01-02 RX ORDER — ESOMEPRAZOLE MAGNESIUM 40 MG/1
40 CAPSULE, DELAYED RELEASE ORAL
Qty: 90 CAPSULE | Refills: 3 | Status: SHIPPED | OUTPATIENT
Start: 2024-01-02 | End: 2024-01-05 | Stop reason: ALTCHOICE

## 2024-01-02 RX ORDER — OMEPRAZOLE 40 MG/1
40 CAPSULE, DELAYED RELEASE ORAL DAILY
Qty: 90 CAPSULE | Refills: 3 | OUTPATIENT
Start: 2024-01-02

## 2024-01-02 NOTE — TELEPHONE ENCOUNTER
Caller: Topher Stoll    Relationship: Self    Best call back number: [phone]     What is the best time to reach you: ASAP    Who are you requesting to speak with (clinical staff, provider,  specific staff member): CLINICAL    Do you know the name of the person who called: NA    What was the call regarding: PATIENT WOULD LIKE TO KNOW WHY HIS OMEPRAZOLE WAS DENIED.    PATIENT STATED HE IS NEEDING A REFILL.    Is it okay if the provider responds through MyChart: NA

## 2024-01-04 NOTE — TELEPHONE ENCOUNTER
Caller: Josee Stoll    Relationship: Mother    Best call back number: [phone]     Requested Prescriptions:   Requested Prescriptions     Pending Prescriptions Disp Refills    omeprazole (priLOSEC) 40 MG capsule 90 capsule 3     Sig: Take 1 capsule by mouth Daily.        Pharmacy where request should be sent: [id number] [address] [facility] [address] - [phone]  - [phone] FX     Last office visit with prescribing clinician: 9/1/2023   Last telemedicine visit with prescribing clinician: Visit date not found   Next office visit with prescribing clinician: 1/29/2024     Additional details provided by patient: PATIENT HAS 8 DAYS REMAINING OF THIS MEDICATION    PATIENT IS NEEDING THIS MEDICATION SENT IN, AS IT IS THE ONLY ONE COVERED BY THEIR INSURANCE    ANOTHER PROVIDER HAS ORIGINALLY PRESCRIBED THIS MEDICATION, SO PATIENT WILL NEED A NEW PRESCRIPTION FROM DR CRESPO    PLEASE ADVISE IF THERE ARE ANY FURTHER QUESTIONS        Does the patient have less than a 3 day supply:  [] Yes  [x] No    Would you like a call back once the refill request has been completed: [] Yes [x] No    If the office needs to give you a call back, can they leave a voicemail: [] Yes [x] No    Edelmira Washington Rep   01/04/24 12:01 EST

## 2024-01-05 RX ORDER — OMEPRAZOLE 40 MG/1
40 CAPSULE, DELAYED RELEASE ORAL DAILY
Qty: 90 CAPSULE | Refills: 3 | Status: SHIPPED | OUTPATIENT
Start: 2024-01-05

## 2024-01-05 NOTE — TELEPHONE ENCOUNTER
PATIENT CALLED BACK TO CHECK ON THE REQUEST OF HIS MEDICATION REFILL    HE STATED THAT esomeprazole (nexIUM) 40 MG capsule WAS CALLED IN HOWEVER HIS INSURANCE WILL NOT COVER THAT MEDICATION. PLEASE CANCEL THAT REQUEST     PATIENT NEEDS omeprazole (priLOSEC) 40 MG capsule CALLED IN AS THIS ONE IS COVERED BY HIS INSURANCE.    PLEASE ADVISE AS PATIENT HAS 5 PILLS REMAINING. [phone]    [id number] [place], [place] - [address] [facility] [address] - [phone]  - [phone] FX

## 2024-01-10 ENCOUNTER — TELEPHONE (OUTPATIENT)
Dept: INTERNAL MEDICINE | Facility: CLINIC | Age: 49
End: 2024-01-10
Payer: MEDICARE

## 2024-01-10 NOTE — TELEPHONE ENCOUNTER
Caller: Topher Stoll    Relationship: Self    Best call back number: [phone]     What medication are you requesting: ZOFRAN    What are your current symptoms: MEDICATION MAKING HIM SICK    How long have you been experiencing symptoms: A YEAR    Have you had these symptoms before:    [x] Yes  [] No    Have you been treated for these symptoms before:   [x] Yes  [] No    If a prescription is needed, what is your preferred pharmacy and phone number: [id number] [address] [facility] [address] - [phone] [facility] [phone]      Additional notes:

## 2024-01-26 ENCOUNTER — TELEPHONE (OUTPATIENT)
Dept: PULMONOLOGY | Facility: CLINIC | Age: 49
End: 2024-01-26

## 2024-01-26 DIAGNOSIS — G47.33 OSA (OBSTRUCTIVE SLEEP APNEA): Primary | ICD-10-CM

## 2024-01-26 NOTE — TELEPHONE ENCOUNTER
Caller: MOOKIE BURT    Relationship to patient: Other    Best call back number: [phone]    Patient is needing: PT IS SET TO HAVE HIS BIPAP MACHINE DELIVERED BUT RIA IS NEEDING TO HAVE THE SETTINGS SET BEFORE SHE CAN SEND MACHINE PLEASE CALL RIA AT [facility] TO ADVISE

## 2024-01-29 ENCOUNTER — OFFICE VISIT (OUTPATIENT)
Dept: INTERNAL MEDICINE | Facility: CLINIC | Age: 49
End: 2024-01-29
Payer: MEDICARE

## 2024-01-29 VITALS
BODY MASS INDEX: 26.88 KG/M2 | DIASTOLIC BLOOD PRESSURE: 85 MMHG | SYSTOLIC BLOOD PRESSURE: 119 MMHG | HEART RATE: 100 BPM | WEIGHT: 202.8 LBS | TEMPERATURE: 98 F | OXYGEN SATURATION: 93 % | HEIGHT: 73 IN

## 2024-01-29 DIAGNOSIS — I25.10 ATHEROSCLEROSIS OF NATIVE CORONARY ARTERY OF NATIVE HEART WITHOUT ANGINA PECTORIS: ICD-10-CM

## 2024-01-29 DIAGNOSIS — R73.9 HYPERGLYCEMIA: Primary | ICD-10-CM

## 2024-01-29 DIAGNOSIS — J44.1 COPD EXACERBATION: ICD-10-CM

## 2024-01-29 DIAGNOSIS — I10 PRIMARY HYPERTENSION: ICD-10-CM

## 2024-01-29 PROBLEM — M19.90 INFLAMMATORY ARTHROPATHY: Status: RESOLVED | Noted: 2023-05-21 | Resolved: 2024-01-29

## 2024-01-29 PROBLEM — Z96.642 STATUS POST TOTAL REPLACEMENT OF LEFT HIP: Status: RESOLVED | Noted: 2020-03-05 | Resolved: 2024-01-29

## 2024-01-29 PROBLEM — J96.11 CHRONIC RESPIRATORY FAILURE WITH HYPOXIA: Status: RESOLVED | Noted: 2023-08-27 | Resolved: 2024-01-29

## 2024-01-29 PROBLEM — J44.9 COPD WITHOUT EXACERBATION: Status: RESOLVED | Noted: 2022-05-15 | Resolved: 2024-01-29

## 2024-01-29 LAB
EXPIRATION DATE: ABNORMAL
HBA1C MFR BLD: 5.8 % (ref 4.5–5.7)
Lab: ABNORMAL

## 2024-01-29 PROCEDURE — 99214 OFFICE O/P EST MOD 30 MIN: CPT | Performed by: INTERNAL MEDICINE

## 2024-01-29 PROCEDURE — 3044F HG A1C LEVEL LT 7.0%: CPT | Performed by: INTERNAL MEDICINE

## 2024-01-29 PROCEDURE — 1159F MED LIST DOCD IN RCRD: CPT | Performed by: INTERNAL MEDICINE

## 2024-01-29 PROCEDURE — 3079F DIAST BP 80-89 MM HG: CPT | Performed by: INTERNAL MEDICINE

## 2024-01-29 PROCEDURE — 3074F SYST BP LT 130 MM HG: CPT | Performed by: INTERNAL MEDICINE

## 2024-01-29 PROCEDURE — 1160F RVW MEDS BY RX/DR IN RCRD: CPT | Performed by: INTERNAL MEDICINE

## 2024-01-29 PROCEDURE — 83036 HEMOGLOBIN GLYCOSYLATED A1C: CPT | Performed by: INTERNAL MEDICINE

## 2024-01-29 RX ORDER — TRAZODONE HYDROCHLORIDE 50 MG/1
50 TABLET ORAL NIGHTLY
COMMUNITY
Start: 2024-01-19 | End: 2024-01-29

## 2024-01-29 RX ORDER — TRAZODONE HYDROCHLORIDE 300 MG/1
150 TABLET ORAL NIGHTLY
Qty: 90 TABLET | Refills: 3 | Status: SHIPPED | OUTPATIENT
Start: 2024-01-29

## 2024-01-29 RX ORDER — PREDNISONE 20 MG/1
20 TABLET ORAL DAILY
Qty: 10 TABLET | Refills: 0 | Status: SHIPPED | OUTPATIENT
Start: 2024-01-29

## 2024-01-29 NOTE — PROGRESS NOTES
"Subjective  Topher Stoll is a 48 y.o. male    HPI coming in for follow-up patient with a history of chronic bronchitis has had a 1 week history of increasing cough and shortness of breath does not smoke his symptoms do get worse when it is humid outside has hypertension and atherosclerotic vascular disease history of hyperglycemia    The following portions of the patient's history were reviewed and updated as appropriate: allergies, current medications, past family history, past medical history, past social history, past surgical history, and problem list.     Review of Systems   Constitutional: Negative.  Positive for fatigue. Negative for activity change, appetite change and fever.   HENT:  Negative for congestion, ear discharge, ear pain and trouble swallowing.    Eyes:  Negative for photophobia and visual disturbance.   Respiratory:  Positive for cough and shortness of breath.    Cardiovascular:  Negative for chest pain and palpitations.   Gastrointestinal:  Negative for abdominal distention, abdominal pain, constipation, diarrhea, nausea and vomiting.   Endocrine: Negative.    Genitourinary:  Negative for dysuria, hematuria and urgency.   Musculoskeletal:  Positive for arthralgias. Negative for back pain, joint swelling and myalgias.   Skin:  Negative for color change and rash.   Allergic/Immunologic: Negative.    Neurological:  Negative for dizziness, weakness, light-headedness and headaches.   Hematological:  Negative for adenopathy. Does not bruise/bleed easily.   Psychiatric/Behavioral:  Positive for sleep disturbance. Negative for agitation, confusion and dysphoric mood. The patient is not nervous/anxious.        Visit Vitals  /85   Pulse 100   Temp 98 °F (36.7 °C)   Ht 185.4 cm (72.99\")   Wt 92 kg (202 lb 12.8 oz)   SpO2 93%   BMI 26.76 kg/m²       Objective  Physical Exam  Constitutional:       General: He is not in acute distress.     Appearance: He is well-developed.   HENT:      Nose: Nose " normal.   Eyes:      General: No scleral icterus.     Conjunctiva/sclera: Conjunctivae normal.   Neck:      Thyroid: No thyromegaly.      Trachea: No tracheal deviation.   Cardiovascular:      Rate and Rhythm: Normal rate and regular rhythm.      Heart sounds: No murmur heard.     No friction rub.   Pulmonary:      Effort: No respiratory distress.      Breath sounds: Wheezing and rhonchi present. No rales.   Abdominal:      General: There is no distension.      Palpations: Abdomen is soft. There is no mass.      Tenderness: There is no abdominal tenderness. There is no guarding.   Musculoskeletal:         General: No deformity. Normal range of motion.   Lymphadenopathy:      Cervical: No cervical adenopathy.   Skin:     General: Skin is warm and dry.      Findings: No erythema or rash.   Neurological:      Mental Status: He is alert and oriented to person, place, and time.      Cranial Nerves: No cranial nerve deficit.      Coordination: Coordination normal.      Deep Tendon Reflexes: Reflexes are normal and symmetric.   Psychiatric:         Behavior: Behavior normal.         Thought Content: Thought content normal.         Judgment: Judgment normal.       Diagnoses and all orders for this visit:    Hyperglycemia continue with the dietary restrictions counseled patient again he is to stop all his sugar soda consumption he may have a bump in his blood sugar readings now since we are putting him on pulsed steroid dosing  -     POC Glycosylated Hemoglobin (Hb A1C)    COPD exacerbation prednisone 20 mg daily for 5 days continue with aggressive neb treatments and pulmonary toilet    Primary hypertension stable with current meds and low-salt diet    Atherosclerosis of native coronary artery of native heart without angina pectoris continue with the statin angina free    Other orders  -     Discontinue: traZODone (DESYREL) 50 MG tablet; Take 1 tablet by mouth Every Night.  -     traZODone (DESYREL) 300 MG tablet; Take 0.5  tablets by mouth Every Night.  -     predniSONE (DELTASONE) 20 MG tablet; Take 1 tablet by mouth Daily.

## 2024-02-07 ENCOUNTER — TELEPHONE (OUTPATIENT)
Dept: PHARMACY | Facility: HOSPITAL | Age: 49
End: 2024-02-07
Payer: MEDICARE

## 2024-02-07 RX ORDER — PREDNISONE 20 MG/1
20 TABLET ORAL DAILY
Qty: 10 TABLET | Refills: 0 | Status: SHIPPED | OUTPATIENT
Start: 2024-02-07

## 2024-02-07 NOTE — TELEPHONE ENCOUNTER
Patient called this morning requesting a refill on Prednisone. He reported going chest deep in a pond while fishing and is now experiencing shortness of breath and wheezing.    Please advise.    FLORENTIN Yu

## 2024-03-13 ENCOUNTER — APPOINTMENT (OUTPATIENT)
Dept: GENERAL RADIOLOGY | Facility: HOSPITAL | Age: 49
End: 2024-03-13
Payer: MEDICARE

## 2024-03-13 ENCOUNTER — HOSPITAL ENCOUNTER (EMERGENCY)
Facility: HOSPITAL | Age: 49
Discharge: HOME | End: 2024-03-13
Attending: STUDENT IN AN ORGANIZED HEALTH CARE EDUCATION/TRAINING PROGRAM | Admitting: STUDENT IN AN ORGANIZED HEALTH CARE EDUCATION/TRAINING PROGRAM
Payer: MEDICARE

## 2024-03-13 VITALS
TEMPERATURE: 98.1 F | HEIGHT: 73 IN | OXYGEN SATURATION: 94 % | DIASTOLIC BLOOD PRESSURE: 85 MMHG | RESPIRATION RATE: 22 BRPM | SYSTOLIC BLOOD PRESSURE: 127 MMHG | HEART RATE: 93 BPM | BODY MASS INDEX: 26.51 KG/M2 | WEIGHT: 200 LBS

## 2024-03-13 DIAGNOSIS — J44.1 COPD EXACERBATION: Primary | ICD-10-CM

## 2024-03-13 LAB
ALBUMIN SERPL-MCNC: 4.5 G/DL (ref 3.5–5.2)
ALBUMIN/GLOB SERPL: 1.4 G/DL
ALP SERPL-CCNC: 136 U/L (ref 39–117)
ALT SERPL W P-5'-P-CCNC: 16 U/L (ref 1–41)
ANION GAP SERPL CALCULATED.3IONS-SCNC: 9.3 MMOL/L (ref 5–15)
AST SERPL-CCNC: 19 U/L (ref 1–40)
BASOPHILS # BLD AUTO: 0 10*3/MM3 (ref 0–0.2)
BASOPHILS NFR BLD AUTO: 0 % (ref 0–1.5)
BILIRUB SERPL-MCNC: 0.2 MG/DL (ref 0–1.2)
BUN SERPL-MCNC: 12 MG/DL (ref 6–20)
BUN/CREAT SERPL: 13 (ref 7–25)
CALCIUM SPEC-SCNC: 9.3 MG/DL (ref 8.6–10.5)
CHLORIDE SERPL-SCNC: 98 MMOL/L (ref 98–107)
CO2 SERPL-SCNC: 26.7 MMOL/L (ref 22–29)
CREAT SERPL-MCNC: 0.92 MG/DL (ref 0.76–1.27)
D DIMER PPP FEU-MCNC: 0.45 MCGFEU/ML (ref 0–0.5)
DEPRECATED RDW RBC AUTO: 39.8 FL (ref 37–54)
EGFRCR SERPLBLD CKD-EPI 2021: 102 ML/MIN/1.73
EOSINOPHIL # BLD AUTO: 0 10*3/MM3 (ref 0–0.4)
EOSINOPHIL NFR BLD AUTO: 0 % (ref 0.3–6.2)
ERYTHROCYTE [DISTWIDTH] IN BLOOD BY AUTOMATED COUNT: 13.6 % (ref 12.3–15.4)
FLUAV RNA RESP QL NAA+PROBE: NOT DETECTED
FLUBV RNA RESP QL NAA+PROBE: NOT DETECTED
GLOBULIN UR ELPH-MCNC: 3.3 GM/DL
GLUCOSE SERPL-MCNC: 149 MG/DL (ref 65–99)
HCT VFR BLD AUTO: 45.9 % (ref 37.5–51)
HGB BLD-MCNC: 15.1 G/DL (ref 13–17.7)
HOLD SPECIMEN: NORMAL
HOLD SPECIMEN: NORMAL
IMM GRANULOCYTES # BLD AUTO: 0.02 10*3/MM3 (ref 0–0.05)
IMM GRANULOCYTES NFR BLD AUTO: 0.3 % (ref 0–0.5)
LYMPHOCYTES # BLD AUTO: 0.29 10*3/MM3 (ref 0.7–3.1)
LYMPHOCYTES NFR BLD AUTO: 4.1 % (ref 19.6–45.3)
MCH RBC QN AUTO: 26.6 PG (ref 26.6–33)
MCHC RBC AUTO-ENTMCNC: 32.9 G/DL (ref 31.5–35.7)
MCV RBC AUTO: 81 FL (ref 79–97)
MONOCYTES # BLD AUTO: 0.12 10*3/MM3 (ref 0.1–0.9)
MONOCYTES NFR BLD AUTO: 1.7 % (ref 5–12)
NEUTROPHILS NFR BLD AUTO: 6.57 10*3/MM3 (ref 1.7–7)
NEUTROPHILS NFR BLD AUTO: 93.9 % (ref 42.7–76)
NRBC BLD AUTO-RTO: 0 /100 WBC (ref 0–0.2)
NT-PROBNP SERPL-MCNC: 94.8 PG/ML (ref 0–450)
PLATELET # BLD AUTO: 199 10*3/MM3 (ref 140–450)
PMV BLD AUTO: 10 FL (ref 6–12)
POTASSIUM SERPL-SCNC: 4.2 MMOL/L (ref 3.5–5.2)
PROT SERPL-MCNC: 7.8 G/DL (ref 6–8.5)
RBC # BLD AUTO: 5.67 10*6/MM3 (ref 4.14–5.8)
SARS-COV-2 RNA RESP QL NAA+PROBE: NOT DETECTED
SODIUM SERPL-SCNC: 134 MMOL/L (ref 136–145)
TROPONIN T SERPL HS-MCNC: <6 NG/L
WBC NRBC COR # BLD AUTO: 7 10*3/MM3 (ref 3.4–10.8)
WHOLE BLOOD HOLD COAG: NORMAL
WHOLE BLOOD HOLD SPECIMEN: NORMAL

## 2024-03-13 PROCEDURE — 99284 EMERGENCY DEPT VISIT MOD MDM: CPT

## 2024-03-13 PROCEDURE — 25010000002 MAGNESIUM SULFATE 2 GM/50ML SOLUTION: Performed by: PHYSICIAN ASSISTANT

## 2024-03-13 PROCEDURE — 85025 COMPLETE CBC W/AUTO DIFF WBC: CPT

## 2024-03-13 PROCEDURE — 96365 THER/PROPH/DIAG IV INF INIT: CPT

## 2024-03-13 PROCEDURE — 83880 ASSAY OF NATRIURETIC PEPTIDE: CPT

## 2024-03-13 PROCEDURE — 85379 FIBRIN DEGRADATION QUANT: CPT | Performed by: PHYSICIAN ASSISTANT

## 2024-03-13 PROCEDURE — 71045 X-RAY EXAM CHEST 1 VIEW: CPT

## 2024-03-13 PROCEDURE — 93005 ELECTROCARDIOGRAM TRACING: CPT

## 2024-03-13 PROCEDURE — 80053 COMPREHEN METABOLIC PANEL: CPT

## 2024-03-13 PROCEDURE — 84484 ASSAY OF TROPONIN QUANT: CPT

## 2024-03-13 PROCEDURE — 94799 UNLISTED PULMONARY SVC/PX: CPT

## 2024-03-13 PROCEDURE — 25010000002 METHYLPREDNISOLONE PER 125 MG: Performed by: PHYSICIAN ASSISTANT

## 2024-03-13 PROCEDURE — 94640 AIRWAY INHALATION TREATMENT: CPT

## 2024-03-13 PROCEDURE — 94761 N-INVAS EAR/PLS OXIMETRY MLT: CPT

## 2024-03-13 PROCEDURE — 96375 TX/PRO/DX INJ NEW DRUG ADDON: CPT

## 2024-03-13 PROCEDURE — 87636 SARSCOV2 & INF A&B AMP PRB: CPT | Performed by: PHYSICIAN ASSISTANT

## 2024-03-13 RX ORDER — METHYLPREDNISOLONE SODIUM SUCCINATE 125 MG/2ML
125 INJECTION, POWDER, LYOPHILIZED, FOR SOLUTION INTRAMUSCULAR; INTRAVENOUS ONCE
Status: COMPLETED | OUTPATIENT
Start: 2024-03-13 | End: 2024-03-13

## 2024-03-13 RX ORDER — BENZONATATE 200 MG/1
200 CAPSULE ORAL 3 TIMES DAILY PRN
Qty: 30 CAPSULE | Refills: 0 | OUTPATIENT
Start: 2024-03-13 | End: 2024-03-19

## 2024-03-13 RX ORDER — PREDNISONE 20 MG/1
40 TABLET ORAL DAILY
Qty: 10 TABLET | Refills: 0 | Status: SHIPPED | OUTPATIENT
Start: 2024-03-13 | End: 2024-03-18

## 2024-03-13 RX ORDER — SODIUM CHLORIDE 0.9 % (FLUSH) 0.9 %
10 SYRINGE (ML) INJECTION AS NEEDED
Status: DISCONTINUED | OUTPATIENT
Start: 2024-03-13 | End: 2024-03-13 | Stop reason: HOSPADM

## 2024-03-13 RX ORDER — MAGNESIUM SULFATE HEPTAHYDRATE 40 MG/ML
2 INJECTION, SOLUTION INTRAVENOUS ONCE
Status: COMPLETED | OUTPATIENT
Start: 2024-03-13 | End: 2024-03-13

## 2024-03-13 RX ORDER — ALBUTEROL SULFATE 2.5 MG/3ML
7.5 SOLUTION RESPIRATORY (INHALATION) ONCE
Status: COMPLETED | OUTPATIENT
Start: 2024-03-13 | End: 2024-03-13

## 2024-03-13 RX ADMIN — METHYLPREDNISOLONE SODIUM SUCCINATE 125 MG: 125 INJECTION, POWDER, FOR SOLUTION INTRAMUSCULAR; INTRAVENOUS at 18:02

## 2024-03-13 RX ADMIN — ALBUTEROL SULFATE 7.5 MG: 2.5 SOLUTION RESPIRATORY (INHALATION) at 19:25

## 2024-03-13 RX ADMIN — ALBUTEROL SULFATE 7.5 MG: 2.5 SOLUTION RESPIRATORY (INHALATION) at 17:44

## 2024-03-13 RX ADMIN — MAGNESIUM SULFATE HEPTAHYDRATE 2 G: 40 INJECTION, SOLUTION INTRAVENOUS at 18:01

## 2024-03-13 NOTE — ED PROVIDER NOTES
Subjective  History of Present Illness:    Chief Complaint:   Chief Complaint   Patient presents with    Shortness of Breath    Cough      History of Present Illness: Topher Stoll is a 48 y.o. male who presents to the emergency department complaining of cough and shortness of breath.  Patient states symptoms started couple days ago.  No recent antibiotics or admission to the hospital.  History of smoking but has since stopped about a year and a half ago.  Did smoke 1 to 2 packs a day for decades.  Has oxygen 2 L to use as needed at home but more recently has had to be using continuously since this illness began.  Has no hemoptysis.  Denies chest pain.  Onset: Sunday or Monday  Duration: Ongoing  Exacerbating / Alleviating factors: Worse with activity  Associated symptoms: None      Nurses Notes reviewed and agree, including vitals, allergies, social history and prior medical history.     Review of Systems    Past Medical History:   Diagnosis Date    Abdominal adhesions     Anxiety     Arthritis     Asthma     Cervical radiculopathy     Colitis     COPD (chronic obstructive pulmonary disease)     GERD (gastroesophageal reflux disease)     Heart attack     History of hepatitis C     Treated with Epclusa in 2019    History of recreational drug use     HTN (hypertension)     Impaired functional mobility, balance, gait, and endurance     Kidney cysts     Left hip pain     Liver disease     Low back pain     Migraines     Obstructive chronic bronchitis with exacerbation     Sleep apnea     mild    Tattoos        Allergies:    Doxycycline      Past Surgical History:   Procedure Laterality Date    BACK SURGERY      COLONOSCOPY  2014    COLONOSCOPY N/A 1/4/2022    Procedure: COLONOSCOPY with biopsies;  Surgeon: Giancarlo Ruiz MD;  Location: [facility] ENDOSCOPY;  Service: Gastroenterology;  Laterality: N/A;    HERNIA REPAIR      HIP SPACER INSERTION WITH ANTIBIOTIC CEMENT Left 1/15/2020    Procedure: TOTAL HIP IMPLANT  "REMOVAL WITH INSERTION OF ANTIBIOTIC SPACER LEFT;  Surgeon: Ba Ramriez MD;  Location:  [facility] OR;  Service: Orthopedics    SHOULDER LIGAMENT REPAIR      right shoulder    SMALL INTESTINE SURGERY      Small bowell resection    TOTAL HIP ARTHROPLASTY Left     TOTAL HIP ARTHROPLASTY Right     TOTAL HIP ARTHROPLASTY REVISION Left 3/5/2020    Procedure: HIP REIMPLANT REVISION LEFT;  Surgeon: Ba Ramirez MD;  Location:  [facility];  Service: Orthopedics;  Laterality: Left;    UPPER GASTROINTESTINAL ENDOSCOPY           Social History     Socioeconomic History    Marital status:    Tobacco Use    Smoking status: Former     Current packs/day: 0.00     Average packs/day: 2.0 packs/day for 15.0 years (30.0 ttl pk-yrs)     Types: Cigarettes     Start date:      Quit date:      Years since quittin.2     Passive exposure: Current    Smokeless tobacco: Current     Types: Chew   Vaping Use    Vaping status: Never Used   Substance and Sexual Activity    Alcohol use: No     Comment: stopped drinking alcohol    Drug use: Not Currently     Comment: takes suboxone    Sexual activity: Defer         Family History   Problem Relation Age of Onset    Arthritis Other     Hypertension Other     Migraines Other     Heart attack Other     Stroke Other     Colon cancer Neg Hx        Objective  Physical Exam:  /85   Pulse 86   Temp 98.1 °F (36.7 °C)   Resp 22   Ht 185.4 cm (73\")   Wt 90.7 kg (200 lb)   SpO2 98%   BMI 26.39 kg/m²      Physical Exam      Procedures    ED Course:    ED Course as of 03/13/24 2026   Wed Mar 13, 2024   1842 D-Dimer, Quant: 0.45 [TM]   1859 ATTENDING ATTESTATION  HPI: 48-year-old male presents with shortness of breath.  Ran out of oxygen on the way to the ER.  Report associated wheezing and cough.    EXAM: General: Alert.  Appears chronically ill, but nontoxic e.  No acute distress.  Head: Normocephalic.  Atraumatic.  Eyes: No scleral icterus.  Cardiovascular: Regular rate " and rhythm.  No murmurs.  No rubs.  2+ distal pulses bilaterally.  Respiratory: Decreased air entry bilaterally with inspiratory and expiratory wheezing.  No rales or rhonchi.  GI: Abdomen is soft.  Nondistended.  Nontender to palpation.  No rebound.  No guarding.  No CVA tenderness.  Neurologic: Oriented x 3.  No focal deficits.  Skin: No erythema.  No edema.  No pallor.  No cyanosis.         [JS]      ED Course User Index  [JS] Hebert Padilla DO  [TM] Gomez Delgadillo PA-C       Lab Results (last 24 hours)       Procedure Component Value Units Date/Time    CBC & Differential [[id number]]  (Abnormal) Collected: 03/13/24 1722    Specimen: Blood Updated: 03/13/24 1731    Narrative:      The following orders were created for panel order CBC & Differential.  Procedure                               Abnormality         Status                     ---------                               -----------         ------                     CBC Auto Differential[[id number]]        Abnormal            Final result                 Please view results for these tests on the individual orders.    Comprehensive Metabolic Panel [[id number]]  (Abnormal) Collected: 03/13/24 1722    Specimen: Blood Updated: 03/13/24 1757     Glucose 149 mg/dL      BUN 12 mg/dL      Creatinine 0.92 mg/dL      Sodium 134 mmol/L      Potassium 4.2 mmol/L      Chloride 98 mmol/L      CO2 26.7 mmol/L      Calcium 9.3 mg/dL      Total Protein 7.8 g/dL      Albumin 4.5 g/dL      ALT (SGPT) 16 U/L      AST (SGOT) 19 U/L      Alkaline Phosphatase 136 U/L      Total Bilirubin 0.2 mg/dL      Globulin 3.3 gm/dL      A/G Ratio 1.4 g/dL      BUN/Creatinine Ratio 13.0     Anion Gap 9.3 mmol/L      eGFR 102.0 mL/min/1.73     Narrative:      GFR Normal >60  Chronic Kidney Disease <60  Kidney Failure <15      BNP [[id number]]  (Normal) Collected: 03/13/24 1722    Specimen: Blood Updated: 03/13/24 1800     proBNP 94.8 pg/mL     Narrative:      This assay is used as  an aid in the diagnosis of individuals suspected of having heart failure. It can be used as an aid in the diagnosis of acute decompensated heart failure (ADHF) in patients presenting with signs and symptoms of ADHF to the emergency department (ED). In addition, NT-proBNP of <300 pg/mL indicates ADHF is not likely.    Age Range Result Interpretation  NT-proBNP Concentration (pg/mL:      <50             Positive            >450                   Gray                 300-450                    Negative             <300    50-75           Positive            >900                  Gray                300-900                  Negative            <300      >75             Positive            >1800                  Gray                300-1800                  Negative            <300    Single High Sensitivity Troponin T [[id number]]  (Normal) Collected: 03/13/24 1722    Specimen: Blood Updated: 03/13/24 1800     HS Troponin T <6 ng/L     Narrative:      High Sensitive Troponin T Reference Range:  <14.0 ng/L- Negative Female for AMI  <22.0 ng/L- Negative Male for AMI  >=14 - Abnormal Female indicating possible myocardial injury.  >=22 - Abnormal Male indicating possible myocardial injury.   Clinicians would have to utilize clinical acumen, EKG, Troponin, and serial changes to determine if it is an Acute Myocardial Infarction or myocardial injury due to an underlying chronic condition.         CBC Auto Differential [[id number]]  (Abnormal) Collected: 03/13/24 1722    Specimen: Blood Updated: 03/13/24 1731     WBC 7.00 10*3/mm3      RBC 5.67 10*6/mm3      Hemoglobin 15.1 g/dL      Hematocrit 45.9 %      MCV 81.0 fL      MCH 26.6 pg      MCHC 32.9 g/dL      RDW 13.6 %      RDW-SD 39.8 fl      MPV 10.0 fL      Platelets 199 10*3/mm3      Neutrophil % 93.9 %      Lymphocyte % 4.1 %      Monocyte % 1.7 %      Eosinophil % 0.0 %      Basophil % 0.0 %      Immature Grans % 0.3 %      Neutrophils, Absolute 6.57 10*3/mm3       "Lymphocytes, Absolute 0.29 10*3/mm3      Monocytes, Absolute 0.12 10*3/mm3      Eosinophils, Absolute 0.00 10*3/mm3      Basophils, Absolute 0.00 10*3/mm3      Immature Grans, Absolute 0.02 10*3/mm3      nRBC 0.0 /100 WBC     D-dimer, Quantitative [[id number]]  (Normal) Collected: 03/13/24 1722    Specimen: Blood Updated: 03/13/24 1841     D-Dimer, Quantitative 0.45 MCGFEU/mL     Narrative:      According to the assay 's published package insert, a normal (<0.50 MCGFEU/mL) D-dimer result in conjunction with a non-high clinical probability assessment, excludes deep vein thrombosis (DVT) and pulmonary embolism (PE) with high sensitivity.    D-dimer values increase with age and this can make VTE exclusion of an older population difficult. To address this, the American College of [employer], based on best available evidence and recent guidelines, recommends that clinicians use age-adjusted D-dimer thresholds in patients greater than 50 years of age with: a) a low probability of PE who do not meet all Pulmonary Embolism Rule Out Criteria, or b) in those with intermediate probability of PE.   The formula for an age-adjusted D-dimer cut-off is \"age/100\".  For example, a 60 year old patient would have an age-adjusted cut-off of 0.60 MCGFEU/mL and an 80 year old 0.80 MCGFEU/mL.    COVID-19 and FLU A/B PCR, 1 HR TAT - Swab, Nasopharynx [[id number]]  (Normal) Collected: 03/13/24 1817    Specimen: Swab from Nasopharynx Updated: 03/13/24 1909     COVID19 Not Detected     Influenza A PCR Not Detected     Influenza B PCR Not Detected    Narrative:      Fact sheet for providers: https://www.fda.gov/media/048416/download    Fact sheet for patients: https://www.fda.gov/media/581981/download    Test performed by PCR.             No radiology results from the last 24 hrs                          Medical Decision Making  Amount and/or Complexity of Data Reviewed  Labs:  Decision-making details documented in ED " Course.  Radiology: ordered.    Risk  Prescription drug management.              Topher Stoll is a 48 y.o. male who presents to the emergency department for evaluation of cough and shortness of breath    Differential diagnosis includes COVID, flu, pneumonia, COPD exacerbation among other etiologies.    CBC, CMP, BNP, D-dimer, troponin, COVID and flu swab ordered for further evaluation of the patient's presentation.    Chart review if available included outside testing, previous visits, prior labs, prior imaging, available notes from prior evaluations or visits with specialists, medication list, allergies, past medical history, past surgical history when applicable.    Patient was treated with   Medications   sodium chloride 0.9 % flush 10 mL (has no administration in time range)   methylPREDNISolone sodium succinate (SOLU-Medrol) injection 125 mg (125 mg Intravenous Given 3/13/24 1802)   magnesium sulfate 2g/50 mL (PREMIX) infusion (0 g Intravenous Stopped 3/13/24 1916)   albuterol (PROVENTIL) nebulizer solution 0.083% 2.5 mg/3mL (7.5 mg Nebulization Given 3/13/24 1744)   albuterol (PROVENTIL) nebulizer solution 0.083% 2.5 mg/3mL (7.5 mg Nebulization Given 3/13/24 1925)       Patient feeling better this time wishing for discharge home.  Has oxygen at home.  Will treat with steroids and antitussives and have him use his nebs and return for worse.  Did offer admission but he declined    Plan for disposition is discharged home.  Patient/family comfortable with and understanding of the plan.      Final diagnoses:   COPD exacerbation          Gomez Delgadillo PA-C  03/13/24 2026

## 2024-04-04 ENCOUNTER — PATIENT OUTREACH (OUTPATIENT)
Dept: CASE MANAGEMENT | Facility: OTHER | Age: 49
End: 2024-04-04
Payer: MEDICARE

## 2024-04-04 DIAGNOSIS — E78.5 HYPERLIPIDEMIA, UNSPECIFIED HYPERLIPIDEMIA TYPE: ICD-10-CM

## 2024-04-04 DIAGNOSIS — J44.9 CHRONIC OBSTRUCTIVE PULMONARY DISEASE, UNSPECIFIED COPD TYPE: Primary | ICD-10-CM

## 2024-04-04 NOTE — OUTREACH NOTE
AMBULATORY CASE MANAGEMENT NOTE    Name and Relationship of Patient/Support Person: Topher Stoll - Self    Patient Outreach    AMB  outreach. Patient was identified for proactive outreach related to COPD. Purpose and potential benefits of Chronic Care Management program explained. Patient expressed appreciation for outreach and declined participation at this time.    Sienna NARVAEZ  Ambulatory Case Management    4/4/2024, 10:16 EDT

## 2024-04-15 DIAGNOSIS — J45.50 SEVERE PERSISTENT ASTHMA WITHOUT COMPLICATION: ICD-10-CM

## 2024-04-15 RX ORDER — ALBUTEROL SULFATE 90 UG/1
2 AEROSOL, METERED RESPIRATORY (INHALATION) EVERY 4 HOURS PRN
Qty: 18 G | Refills: 0 | Status: SHIPPED | OUTPATIENT
Start: 2024-04-15

## 2024-04-26 ENCOUNTER — TELEPHONE (OUTPATIENT)
Dept: PULMONOLOGY | Facility: CLINIC | Age: 49
End: 2024-04-26

## 2024-04-26 DIAGNOSIS — J45.50 SEVERE PERSISTENT ASTHMA WITHOUT COMPLICATION: ICD-10-CM

## 2024-04-26 NOTE — TELEPHONE ENCOUNTER
Caller: Topher Stoll    Relationship to patient: Self    Best call back number: [phone]    Patient is needing: PT WAS WORKING ON HIS CAR, AND NEEDS PREDNISONE SENT TO [id number] [address] [facility] [address] - [phone]  - [phone]  [phone]

## 2024-04-29 ENCOUNTER — OFFICE VISIT (OUTPATIENT)
Dept: PULMONOLOGY | Facility: CLINIC | Age: 49
End: 2024-04-29
Payer: MEDICARE

## 2024-04-29 VITALS
WEIGHT: 200 LBS | DIASTOLIC BLOOD PRESSURE: 74 MMHG | OXYGEN SATURATION: 79 % | HEART RATE: 75 BPM | HEIGHT: 73 IN | SYSTOLIC BLOOD PRESSURE: 118 MMHG | BODY MASS INDEX: 26.51 KG/M2 | RESPIRATION RATE: 16 BRPM

## 2024-04-29 DIAGNOSIS — J30.89 OTHER ALLERGIC RHINITIS: ICD-10-CM

## 2024-04-29 DIAGNOSIS — J45.50 SEVERE PERSISTENT ASTHMA WITHOUT COMPLICATION: ICD-10-CM

## 2024-04-29 DIAGNOSIS — J44.1 ACUTE EXACERBATION OF CHRONIC OBSTRUCTIVE PULMONARY DISEASE (COPD): Primary | ICD-10-CM

## 2024-04-29 DIAGNOSIS — R09.02 HYPOXIA: ICD-10-CM

## 2024-04-29 PROCEDURE — 3074F SYST BP LT 130 MM HG: CPT | Performed by: INTERNAL MEDICINE

## 2024-04-29 PROCEDURE — 99214 OFFICE O/P EST MOD 30 MIN: CPT | Performed by: INTERNAL MEDICINE

## 2024-04-29 PROCEDURE — 3078F DIAST BP <80 MM HG: CPT | Performed by: INTERNAL MEDICINE

## 2024-04-29 RX ORDER — ALBUTEROL SULFATE 90 UG/1
2 AEROSOL, METERED RESPIRATORY (INHALATION) EVERY 4 HOURS PRN
Qty: 18 G | Refills: 0 | OUTPATIENT
Start: 2024-04-29

## 2024-04-29 RX ORDER — AZITHROMYCIN 250 MG/1
TABLET, FILM COATED ORAL
Qty: 6 TABLET | Refills: 0 | Status: SHIPPED | OUTPATIENT
Start: 2024-04-29

## 2024-04-29 RX ORDER — PREDNISONE 20 MG/1
TABLET ORAL
Qty: 10 TABLET | Refills: 0 | Status: SHIPPED | OUTPATIENT
Start: 2024-04-29

## 2024-04-29 NOTE — PROGRESS NOTES
"  Chief Complaint   Patient presents with    Breathing Problem    Follow-up       Subjective   Topher Stoll is a 48 y.o. male.   Patient was evaluated today for follow up of shortness of breath, hypoxia and COPD.     Patient says that his symptoms have been stable since the last clinic visit. he reports 1 recent exacerbation, after he worked \"all day on my car\".     Patient is using Breztri, as prescribed. Exercise tolerance has also remained stable.     Quit smoking 18 years ago.    The patient says that he is using oxygen as prescribed and feels that it appears to be helping some of his symptoms.    Patient says that he has been using his nasal sprays on a seasonal basis and describes no significant ongoing issues other than occasional congestion.       The following portions of the patient's history were reviewed and updated as appropriate: allergies, current medications, past family history, past medical history, past social history, and past surgical history.    Review of Systems   HENT:  Negative for sinus pressure, sneezing and sore throat.    Respiratory:  Positive for cough, chest tightness, shortness of breath and wheezing.        Objective   Visit Vitals  /74   Pulse 75   Resp 16   Ht 185.4 cm (73\") Comment: pt reported   Wt 90.7 kg (200 lb)   SpO2 (!) 79% Comment: on room air (\REST)   BMI 26.39 kg/m²   O2:95% on 2LPM    BMI Readings from Last 3 Encounters:   04/29/24 26.39 kg/m²   04/27/24 27.71 kg/m²   03/19/24 27.71 kg/m²       Physical Exam  Vitals reviewed.   Constitutional:       Appearance: He is well-developed.   Neck:      Thyroid: No thyromegaly.      Vascular: No JVD.   Cardiovascular:      Rate and Rhythm: Normal rate.   Pulmonary:      Effort: Respiratory distress present.      Breath sounds: Wheezing present.   Musculoskeletal:         General: Normal range of motion.      Cervical back: Normal range of motion.      Comments: Gait was normal.   Skin:     General: Skin is warm and " What Is The Reason For Today's Visit?: History of Melanoma dry.   Neurological:      Mental Status: He is alert and oriented to person, place, and time.   Psychiatric:         Behavior: Behavior normal.         Assessment & Plan   Diagnoses and all orders for this visit:    1. Acute exacerbation of chronic obstructive pulmonary disease (COPD) (Primary)    2. Severe persistent asthma without complication    3. Hypoxia    4. Other allergic rhinitis    Other orders  -     ipratropium-albuterol (COMBIVENT RESPIMAT)  MCG/ACT inhaler; Inhale 1 puff 4 (Four) Times a Day As Needed for Wheezing or Shortness of Air.  Dispense: 1 g; Refill: 5  -     azithromycin (ZITHROMAX) 250 MG tablet; Take 2 by mouth today then 1 daily for 4 days  Dispense: 6 tablet; Refill: 0  -     predniSONE (DELTASONE) 20 MG tablet; Take 2 tablets daily for 5 days.  Dispense: 10 tablet; Refill: 0           Return in about 4 months (around 8/29/2024) for Recheck, For Destiny Vazquez).    DISCUSSION (if any):  For the symptoms of acute exacerbation of COPD / asthma, he was prescribed oral steroids.    Patient will be prescribed antibiotics, in the form of Z Pack, due to purulent sputum and continued wheezing.    The patient was asked to start using his rescue medications on a more regular basis for the next few days.    Side effects have been discussed in detail.    Patient was asked to report to the emergency room if symptoms do not improve.    All other medications will remain the same. We will however change his Ventolin to Combivent.     Fasenra is helping him a lot and he will need to continue it.     Patient was advised to continue his nasal spray, especially given improvement in symptoms overall.    The patient was advised to continue using oxygen 24/7.      Dictated utilizing Dragon dictation.    This document was electronically signed by Melina Olivas MD on 04/29/24 at 11:44 EDT

## 2024-05-23 RX ORDER — ALBUTEROL SULFATE 90 UG/1
2 AEROSOL, METERED RESPIRATORY (INHALATION) EVERY 4 HOURS PRN
Qty: 18 G | Refills: 2 | OUTPATIENT
Start: 2024-05-23

## 2024-05-31 DIAGNOSIS — R05.9 COUGH: ICD-10-CM

## 2024-05-31 DIAGNOSIS — J44.1 COPD WITH ACUTE EXACERBATION: ICD-10-CM

## 2024-05-31 DIAGNOSIS — R06.02 SHORTNESS OF BREATH: ICD-10-CM

## 2024-05-31 NOTE — TELEPHONE ENCOUNTER
Caller: Topher Stoll    Relationship: Self    Best call back number: [phone] (home)       Requested Prescriptions:   Requested Prescriptions     Pending Prescriptions Disp Refills    ipratropium-albuterol (DUO-NEB) 0.5-2.5 mg/3 ml nebulizer 180 mL 3     Sig: Take 3 mL by nebulization Every 4 (Four) Hours As Needed for Wheezing or Shortness of Air.        Pharmacy where request should be sent:  [facility]    Last office visit with prescribing clinician: 4/29/2024   Last telemedicine visit with prescribing clinician: Visit date not found   Next office visit with prescribing clinician: Visit date not found     Additional details provided by patient: PT PREFERS THIS INHALER AND [facility] NEEDS A NEW RX    Does the patient have less than a 3 day supply:  [] Yes  [] No    Would you like a call back once the refill request has been completed: [] Yes [] No    If the office needs to give you a call back, can they leave a voicemail: [] Yes [] No    Edelmira Diaz Rep   05/31/24 15:24 EDT

## 2024-06-03 RX ORDER — IPRATROPIUM BROMIDE AND ALBUTEROL SULFATE 2.5; .5 MG/3ML; MG/3ML
3 SOLUTION RESPIRATORY (INHALATION) EVERY 4 HOURS PRN
Qty: 180 ML | Refills: 3 | Status: SHIPPED | OUTPATIENT
Start: 2024-06-03 | End: 2024-06-05 | Stop reason: SDUPTHER

## 2024-06-05 ENCOUNTER — TELEPHONE (OUTPATIENT)
Dept: PULMONOLOGY | Facility: CLINIC | Age: 49
End: 2024-06-05
Payer: MEDICARE

## 2024-06-05 DIAGNOSIS — J44.1 COPD WITH ACUTE EXACERBATION: ICD-10-CM

## 2024-06-05 DIAGNOSIS — R06.02 SHORTNESS OF BREATH: ICD-10-CM

## 2024-06-05 DIAGNOSIS — R05.9 COUGH: ICD-10-CM

## 2024-06-05 RX ORDER — IPRATROPIUM BROMIDE AND ALBUTEROL SULFATE 2.5; .5 MG/3ML; MG/3ML
3 SOLUTION RESPIRATORY (INHALATION) EVERY 4 HOURS PRN
Qty: 180 ML | Refills: 3 | Status: SHIPPED | OUTPATIENT
Start: 2024-06-05

## 2024-06-05 NOTE — TELEPHONE ENCOUNTER
Caller: Topher Stoll    Relationship to patient: Self    Best call back number: [phone]     Patient is needing: PHARMACY TOLD PT THEY DID NOT RECEIVE THE SCRIPT FOR HIS INHALER CAN WE PLEASE SENT IT OVER AGAIN       ipratropium-albuterol (DUO-NEB) 0.5-2.5 mg/3 ml nebulizer [[id number]] (Order [id number])         [id number] [address] [place] [address] [address] AT [facility] [address] - [phone]  - [phone] [address] [address]  Phone: [phone]  Fax: [phone]

## 2024-06-06 ENCOUNTER — HOSPITAL ENCOUNTER (EMERGENCY)
Facility: HOSPITAL | Age: 49
Discharge: HOME | End: 2024-06-06
Attending: STUDENT IN AN ORGANIZED HEALTH CARE EDUCATION/TRAINING PROGRAM
Payer: MEDICARE

## 2024-06-06 ENCOUNTER — APPOINTMENT (OUTPATIENT)
Dept: GENERAL RADIOLOGY | Facility: HOSPITAL | Age: 49
End: 2024-06-06
Payer: MEDICARE

## 2024-06-06 VITALS
WEIGHT: 195 LBS | HEIGHT: 73 IN | RESPIRATION RATE: 20 BRPM | SYSTOLIC BLOOD PRESSURE: 138 MMHG | BODY MASS INDEX: 25.84 KG/M2 | HEART RATE: 84 BPM | OXYGEN SATURATION: 94 % | DIASTOLIC BLOOD PRESSURE: 104 MMHG | TEMPERATURE: 98.4 F

## 2024-06-06 DIAGNOSIS — J44.1 COPD EXACERBATION: Primary | ICD-10-CM

## 2024-06-06 LAB
A-A DO2: 79.8 MMHG
ALBUMIN SERPL-MCNC: 4.1 G/DL (ref 3.5–5.2)
ALBUMIN/GLOB SERPL: 1.2 G/DL
ALP SERPL-CCNC: 146 U/L (ref 39–117)
ALT SERPL W P-5'-P-CCNC: 14 U/L (ref 1–41)
ANION GAP SERPL CALCULATED.3IONS-SCNC: 10.5 MMOL/L (ref 5–15)
ARTERIAL PATENCY WRIST A: POSITIVE
AST SERPL-CCNC: 17 U/L (ref 1–40)
ATMOSPHERIC PRESS: 728 MMHG
BASE EXCESS BLDA CALC-SCNC: 2.4 MMOL/L (ref 0–2)
BASOPHILS # BLD AUTO: 0.01 10*3/MM3 (ref 0–0.2)
BASOPHILS NFR BLD AUTO: 0.1 % (ref 0–1.5)
BDY SITE: ABNORMAL
BILIRUB SERPL-MCNC: 0.3 MG/DL (ref 0–1.2)
BUN SERPL-MCNC: 8 MG/DL (ref 6–20)
BUN/CREAT SERPL: 7 (ref 7–25)
CALCIUM SPEC-SCNC: 9.3 MG/DL (ref 8.6–10.5)
CHLORIDE SERPL-SCNC: 100 MMOL/L (ref 98–107)
CO2 SERPL-SCNC: 28.5 MMOL/L (ref 22–29)
COHGB MFR BLD: 0.4 % (ref 0–2)
CREAT SERPL-MCNC: 1.15 MG/DL (ref 0.76–1.27)
DEPRECATED RDW RBC AUTO: 39 FL (ref 37–54)
EGFRCR SERPLBLD CKD-EPI 2021: 78 ML/MIN/1.73
EOSINOPHIL # BLD AUTO: 0.01 10*3/MM3 (ref 0–0.4)
EOSINOPHIL NFR BLD AUTO: 0.1 % (ref 0.3–6.2)
ERYTHROCYTE [DISTWIDTH] IN BLOOD BY AUTOMATED COUNT: 12.9 % (ref 12.3–15.4)
GAS FLOW AIRWAY: 2 LPM
GLOBULIN UR ELPH-MCNC: 3.3 GM/DL
GLUCOSE SERPL-MCNC: 101 MG/DL (ref 65–99)
HCO3 BLDA-SCNC: 28.4 MMOL/L (ref 22–28)
HCT VFR BLD AUTO: 48.7 % (ref 37.5–51)
HCT VFR BLD CALC: 48.7 %
HGB BLD-MCNC: 15.9 G/DL (ref 13–17.7)
HOLD SPECIMEN: NORMAL
HOLD SPECIMEN: NORMAL
IMM GRANULOCYTES # BLD AUTO: 0.02 10*3/MM3 (ref 0–0.05)
IMM GRANULOCYTES NFR BLD AUTO: 0.2 % (ref 0–0.5)
INHALED O2 CONCENTRATION: 28 %
LYMPHOCYTES # BLD AUTO: 1.77 10*3/MM3 (ref 0.7–3.1)
LYMPHOCYTES NFR BLD AUTO: 20.7 % (ref 19.6–45.3)
Lab: ABNORMAL
MCH RBC QN AUTO: 27.3 PG (ref 26.6–33)
MCHC RBC AUTO-ENTMCNC: 32.6 G/DL (ref 31.5–35.7)
MCV RBC AUTO: 83.5 FL (ref 79–97)
METHGB BLD QL: 0.3 % (ref 0–1.5)
MODALITY: ABNORMAL
MONOCYTES # BLD AUTO: 0.57 10*3/MM3 (ref 0.1–0.9)
MONOCYTES NFR BLD AUTO: 6.7 % (ref 5–12)
NEUTROPHILS NFR BLD AUTO: 6.17 10*3/MM3 (ref 1.7–7)
NEUTROPHILS NFR BLD AUTO: 72.2 % (ref 42.7–76)
NRBC BLD AUTO-RTO: 0 /100 WBC (ref 0–0.2)
OXYHGB MFR BLDV: 89.6 % (ref 94–99)
PCO2 BLDA: 47.4 MM HG (ref 35–45)
PCO2 TEMP ADJ BLD: ABNORMAL MM[HG]
PH BLDA: 7.39 PH UNITS (ref 7.3–7.5)
PH, TEMP CORRECTED: ABNORMAL
PLATELET # BLD AUTO: 220 10*3/MM3 (ref 140–450)
PMV BLD AUTO: 10.1 FL (ref 6–12)
PO2 BLDA: 57.9 MM HG (ref 75–100)
PO2 TEMP ADJ BLD: ABNORMAL MM[HG]
POTASSIUM SERPL-SCNC: 4 MMOL/L (ref 3.5–5.2)
PROT SERPL-MCNC: 7.4 G/DL (ref 6–8.5)
RBC # BLD AUTO: 5.83 10*6/MM3 (ref 4.14–5.8)
SAO2 % BLDCOA: 90.3 % (ref 94–100)
SODIUM SERPL-SCNC: 139 MMOL/L (ref 136–145)
TROPONIN T SERPL HS-MCNC: 7 NG/L
VENTILATOR MODE: ABNORMAL
WBC NRBC COR # BLD AUTO: 8.55 10*3/MM3 (ref 3.4–10.8)
WHOLE BLOOD HOLD COAG: NORMAL
WHOLE BLOOD HOLD SPECIMEN: NORMAL

## 2024-06-06 PROCEDURE — 94799 UNLISTED PULMONARY SVC/PX: CPT

## 2024-06-06 PROCEDURE — 96374 THER/PROPH/DIAG INJ IV PUSH: CPT

## 2024-06-06 PROCEDURE — 82805 BLOOD GASES W/O2 SATURATION: CPT

## 2024-06-06 PROCEDURE — 99284 EMERGENCY DEPT VISIT MOD MDM: CPT

## 2024-06-06 PROCEDURE — 85025 COMPLETE CBC W/AUTO DIFF WBC: CPT | Performed by: STUDENT IN AN ORGANIZED HEALTH CARE EDUCATION/TRAINING PROGRAM

## 2024-06-06 PROCEDURE — 36600 WITHDRAWAL OF ARTERIAL BLOOD: CPT

## 2024-06-06 PROCEDURE — 80053 COMPREHEN METABOLIC PANEL: CPT | Performed by: STUDENT IN AN ORGANIZED HEALTH CARE EDUCATION/TRAINING PROGRAM

## 2024-06-06 PROCEDURE — 82375 ASSAY CARBOXYHB QUANT: CPT

## 2024-06-06 PROCEDURE — 94640 AIRWAY INHALATION TREATMENT: CPT

## 2024-06-06 PROCEDURE — 93005 ELECTROCARDIOGRAM TRACING: CPT | Performed by: STUDENT IN AN ORGANIZED HEALTH CARE EDUCATION/TRAINING PROGRAM

## 2024-06-06 PROCEDURE — 94761 N-INVAS EAR/PLS OXIMETRY MLT: CPT

## 2024-06-06 PROCEDURE — 25010000002 METHYLPREDNISOLONE PER 125 MG: Performed by: STUDENT IN AN ORGANIZED HEALTH CARE EDUCATION/TRAINING PROGRAM

## 2024-06-06 PROCEDURE — 84484 ASSAY OF TROPONIN QUANT: CPT | Performed by: STUDENT IN AN ORGANIZED HEALTH CARE EDUCATION/TRAINING PROGRAM

## 2024-06-06 PROCEDURE — 83050 HGB METHEMOGLOBIN QUAN: CPT

## 2024-06-06 PROCEDURE — 71045 X-RAY EXAM CHEST 1 VIEW: CPT

## 2024-06-06 RX ORDER — AZITHROMYCIN 250 MG/1
TABLET, FILM COATED ORAL
Qty: 6 TABLET | Refills: 0 | Status: SHIPPED | OUTPATIENT
Start: 2024-06-06

## 2024-06-06 RX ORDER — ALBUTEROL SULFATE 2.5 MG/3ML
10 SOLUTION RESPIRATORY (INHALATION) ONCE
Status: COMPLETED | OUTPATIENT
Start: 2024-06-06 | End: 2024-06-06

## 2024-06-06 RX ORDER — METHYLPREDNISOLONE SODIUM SUCCINATE 125 MG/2ML
125 INJECTION, POWDER, LYOPHILIZED, FOR SOLUTION INTRAMUSCULAR; INTRAVENOUS ONCE
Status: COMPLETED | OUTPATIENT
Start: 2024-06-06 | End: 2024-06-06

## 2024-06-06 RX ORDER — PREDNISONE 50 MG/1
50 TABLET ORAL DAILY
Qty: 5 TABLET | Refills: 0 | Status: SHIPPED | OUTPATIENT
Start: 2024-06-06 | End: 2024-06-11

## 2024-06-06 RX ORDER — SODIUM CHLORIDE 0.9 % (FLUSH) 0.9 %
10 SYRINGE (ML) INJECTION AS NEEDED
Status: DISCONTINUED | OUTPATIENT
Start: 2024-06-06 | End: 2024-06-06 | Stop reason: HOSPADM

## 2024-06-06 RX ORDER — ALBUTEROL SULFATE 90 UG/1
2 AEROSOL, METERED RESPIRATORY (INHALATION) EVERY 4 HOURS PRN
Qty: 18 G | Refills: 0 | Status: SHIPPED | OUTPATIENT
Start: 2024-06-06

## 2024-06-06 RX ADMIN — IPRATROPIUM BROMIDE 1 MG: 0.5 SOLUTION RESPIRATORY (INHALATION) at 12:12

## 2024-06-06 RX ADMIN — METHYLPREDNISOLONE SODIUM SUCCINATE 125 MG: 125 INJECTION, POWDER, FOR SOLUTION INTRAMUSCULAR; INTRAVENOUS at 12:08

## 2024-06-06 RX ADMIN — ALBUTEROL SULFATE 10 MG: 2.5 SOLUTION RESPIRATORY (INHALATION) at 12:12

## 2024-06-06 NOTE — ED PROVIDER NOTES
EMERGENCY DEPARTMENT ENCOUNTER    Pt Name: Topher Stoll  MRN: [id number]  Pt :   1975  Room Number:    Date of encounter:  2024  PCP: Joshua Hoffmann MD  ED Physician: Hebert Padilla DO      HPI:  Chief Complaint: Shortness of breath    Context: Topher Stoll is a 48 y.o. male who presents to the ED with chief complaint shortness of breath.  Gradual onset of symptoms in the past 2 weeks.  Described as wheezing and difficulty catching his breath.  Reports associated nonproductive cough.  Duration constant.  No modifying factors.  Has been treating with nebulizer with no improvement.  No fever, chills, chest pain or back pain, abdominal pain, problems urination or vitamins, leg pain or swelling.    Past medical history of COPD, chronic respiratory failure on baseline 2 L nasal cannula, sleep apnea.    PAST MEDICAL HISTORY  Past Medical History:   Diagnosis Date    Abdominal adhesions     Anxiety     Arthritis     Asthma     Cervical radiculopathy     Colitis     COPD (chronic obstructive pulmonary disease)     GERD (gastroesophageal reflux disease)     Heart attack     History of hepatitis C     Treated with Epclusa in 2019    History of recreational drug use     HTN (hypertension)     Impaired functional mobility, balance, gait, and endurance     Kidney cysts     Left hip pain     Liver disease     Low back pain     Migraines     Obstructive chronic bronchitis with exacerbation     Sleep apnea     mild    Tattoos      Current Outpatient Medications   Medication Instructions    albuterol sulfate  (90 Base) MCG/ACT inhaler 2 puffs, Inhalation, Every 4 Hours PRN    azithromycin (Zithromax Z-Otoniel) 250 MG tablet Take 2 tablets by mouth on day 1, then 1 tablet daily on days 2-5    Budeson-Glycopyrrol-Formoterol (Breztri Aerosphere) 160-9-4.8 MCG/ACT aerosol inhaler 2 puffs, Inhalation, 2 Times Daily    fluticasone (FLONASE) 50 MCG/ACT nasal spray INSTILL 2 SPRAYS INOT THE NOSTRILS AS DIRECTED  BY PROVIDER DAILY    ipratropium-albuterol (COMBIVENT RESPIMAT)  MCG/ACT inhaler 1 puff, Inhalation, 4 Times Daily PRN    ipratropium-albuterol (DUO-NEB) 0.5-2.5 mg/3 ml nebulizer 3 mL, Nebulization, Every 4 Hours PRN    lovastatin (MEVACOR) 40 MG tablet TAKE 1 TABLET BY MOUTH EVERY DAY FOR CHOLESTEROL    methadone (DOLOPHINE) 70 mg, Oral, Daily, Patient takes 70 mg once per day(per [facility])    omeprazole (PRILOSEC) 40 mg, Oral, Daily    predniSONE (DELTASONE) 50 mg, Oral, Daily    promethazine (PHENERGAN) 12.5 MG tablet Take 1-2 tablets by mouth every 6-8 hours as needed for nausea and vomiting. Caution advised due to drowsiness.    traZODone (DESYREL) 150 mg, Oral, Nightly        PAST SURGICAL HISTORY  Past Surgical History:   Procedure Laterality Date    BACK SURGERY      COLONOSCOPY  2014    COLONOSCOPY N/A 1/4/2022    Procedure: COLONOSCOPY with biopsies;  Surgeon: Giancarlo Ruiz MD;  Location:  [facility];  Service: Gastroenterology;  Laterality: N/A;    HERNIA REPAIR      HIP SPACER INSERTION WITH ANTIBIOTIC CEMENT Left 1/15/2020    Procedure: TOTAL HIP IMPLANT REMOVAL WITH INSERTION OF ANTIBIOTIC SPACER LEFT;  Surgeon: Ba Ramirez MD;  Location:  [facility];  Service: Orthopedics    SHOULDER LIGAMENT REPAIR      right shoulder    SMALL INTESTINE SURGERY      Small bowell resection    TOTAL HIP ARTHROPLASTY Left     TOTAL HIP ARTHROPLASTY Right     TOTAL HIP ARTHROPLASTY REVISION Left 3/5/2020    Procedure: HIP REIMPLANT REVISION LEFT;  Surgeon: Ba Ramirez MD;  Location:  [facility];  Service: Orthopedics;  Laterality: Left;    UPPER GASTROINTESTINAL ENDOSCOPY  2014       FAMILY HISTORY  Family History   Problem Relation Age of Onset    Arthritis Other     Hypertension Other     Migraines Other     Heart attack Other     Stroke Other     Colon cancer Neg Hx        SOCIAL HISTORY  Social History     Socioeconomic History    Marital status:    Tobacco  Use    Smoking status: Former     Current packs/day: 0.00     Average packs/day: 2.0 packs/day for 15.0 years (30.0 ttl pk-yrs)     Types: Cigarettes     Start date:      Quit date:      Years since quittin.4     Passive exposure: Current    Smokeless tobacco: Current     Types: Chew   Vaping Use    Vaping status: Never Used   Substance and Sexual Activity    Alcohol use: No     Comment: stopped drinking alcohol    Drug use: Not Currently     Comment: takes suboxone    Sexual activity: Defer       ALLERGIES  Doxycycline    REVIEW OF SYSTEMS  All systems reviewed and negative except for those discussed in HPI.     PHYSICAL EXAM  ED Triage Vitals [24 1152]   Temp Heart Rate Resp BP SpO2   98.4 °F (36.9 °C) 86 22 (!) 152/105 (!) 86 %      Temp src Heart Rate Source Patient Position BP Location FiO2 (%)   Oral Monitor Sitting Left arm --     I have reviewed the triage vital signs and nursing notes.    General: Alert.  Appears chronically ill, but nontoxic.  Appears uncomfortable secondary to symptoms, but in no acute distress.  Head: Normocephalic.  Atraumatic.  Eyes: No scleral icterus.  ENT: Moist mucous membranes.  Cardiovascular: Regular rate and rhythm.  No murmurs.  No rubs.  2+ distal pulses bilaterally.  Respiratory: Tight breath sounds bilaterally.  Inspiratory and expiratory wheezing present.  Mild conversational dyspnea.  No rales or rhonchi.  GI: Abdomen is soft.  Nondistended.  Nontender to palpation.  No rebound.  No guarding.  No CVA tenderness.  MSK: Moves all 4 extremities.  Neurologic: Oriented x 3.  No focal deficits.  Skin: No erythema.  No edema. No pallor. No cyanosis.  Psych: Normal mood and affect.    LAB RESULTS  Recent Results (from the past 24 hour(s))   Comprehensive Metabolic Panel    Collection Time: 24 12:02 PM    Specimen: Blood   Result Value Ref Range    Glucose 101 (H) 65 - 99 mg/dL    BUN 8 6 - 20 mg/dL    Creatinine 1.15 0.76 - 1.27 mg/dL    Sodium 139 136 -  145 mmol/L    Potassium 4.0 3.5 - 5.2 mmol/L    Chloride 100 98 - 107 mmol/L    CO2 28.5 22.0 - 29.0 mmol/L    Calcium 9.3 8.6 - 10.5 mg/dL    Total Protein 7.4 6.0 - 8.5 g/dL    Albumin 4.1 3.5 - 5.2 g/dL    ALT (SGPT) 14 1 - 41 U/L    AST (SGOT) 17 1 - 40 U/L    Alkaline Phosphatase 146 (H) 39 - 117 U/L    Total Bilirubin 0.3 0.0 - 1.2 mg/dL    Globulin 3.3 gm/dL    A/G Ratio 1.2 g/dL    BUN/Creatinine Ratio 7.0 7.0 - 25.0    Anion Gap 10.5 5.0 - 15.0 mmol/L    eGFR 78.0 >60.0 mL/min/1.73   Single High Sensitivity Troponin T    Collection Time: 06/06/24 12:02 PM    Specimen: Blood   Result Value Ref Range    HS Troponin T 7 <22 ng/L   Green Top (Gel)    Collection Time: 06/06/24 12:02 PM   Result Value Ref Range    Extra Tube Hold for add-ons.    Lavender Top    Collection Time: 06/06/24 12:02 PM   Result Value Ref Range    Extra Tube hold for add-on    Gold Top - SST    Collection Time: 06/06/24 12:02 PM   Result Value Ref Range    Extra Tube Hold for add-ons.    Light Blue Top    Collection Time: 06/06/24 12:02 PM   Result Value Ref Range    Extra Tube Hold for add-ons.    CBC Auto Differential    Collection Time: 06/06/24 12:02 PM    Specimen: Blood   Result Value Ref Range    WBC 8.55 3.40 - 10.80 10*3/mm3    RBC 5.83 (H) 4.14 - 5.80 10*6/mm3    Hemoglobin 15.9 13.0 - 17.7 g/dL    Hematocrit 48.7 37.5 - 51.0 %    MCV 83.5 79.0 - 97.0 fL    MCH 27.3 26.6 - 33.0 pg    MCHC 32.6 31.5 - 35.7 g/dL    RDW 12.9 12.3 - 15.4 %    RDW-SD 39.0 37.0 - 54.0 fl    MPV 10.1 6.0 - 12.0 fL    Platelets 220 140 - 450 10*3/mm3    Neutrophil % 72.2 42.7 - 76.0 %    Lymphocyte % 20.7 19.6 - 45.3 %    Monocyte % 6.7 5.0 - 12.0 %    Eosinophil % 0.1 (L) 0.3 - 6.2 %    Basophil % 0.1 0.0 - 1.5 %    Immature Grans % 0.2 0.0 - 0.5 %    Neutrophils, Absolute 6.17 1.70 - 7.00 10*3/mm3    Lymphocytes, Absolute 1.77 0.70 - 3.10 10*3/mm3    Monocytes, Absolute 0.57 0.10 - 0.90 10*3/mm3    Eosinophils, Absolute 0.01 0.00 - 0.40 10*3/mm3     Basophils, Absolute 0.01 0.00 - 0.20 10*3/mm3    Immature Grans, Absolute 0.02 0.00 - 0.05 10*3/mm3    nRBC 0.0 0.0 - 0.2 /100 WBC   Blood Gas, Arterial With Co-Ox    Collection Time: 06/06/24 12:14 PM    Specimen: Arterial Blood   Result Value Ref Range    Site Right Radial     Michael's Test Positive     pH, Arterial 7.386 7.300 - 7.500 pH units    pCO2, Arterial 47.4 (H) 35.0 - 45.0 mm Hg    pO2, Arterial 57.9 (L) 75.0 - 100.0 mm Hg    HCO3, Arterial 28.4 (H) 22.0 - 28.0 mmol/L    Base Excess, Arterial 2.4 (H) 0.0 - 2.0 mmol/L    O2 Saturation, Arterial 90.3 (L) 94.0 - 100.0 %    Hematocrit, Blood Gas 48.7 %    Oxyhemoglobin 89.6 (L) 94 - 99 %    Methemoglobin 0.30 0.00 - 1.50 %    Carboxyhemoglobin 0.4 0 - 2 %    A-a DO2 79.8 mmHg    Barometric Pressure for Blood Gas 728 mmHg    Modality Nasal Cannula     FIO2 28 %    Flow Rate 2.0 lpm    Ventilator Mode NA     Collected by CB     pH, Temp Corrected      pCO2, Temperature Corrected      pO2, Temperature Corrected         RADIOLOGY  XR Chest 1 View    Result Date: 6/6/2024  PROCEDURE: XR CHEST 1 VW-  HISTORY: Hypoxia, shortness of breath  COMPARISON: 3/13/2024  FINDINGS:  Portable view of the chest demonstrates the lungs to be grossly clear. There is no evidence of effusion, pneumothorax or other significant pleural disease. The mediastinum is unremarkable.  The heart size is normal.      Unremarkable portable chest.    This report was signed and finalized on 6/6/2024 12:28 PM by Zack Baker MD.       PROCEDURES  Procedures    MEDICATIONS GIVEN IN ER  Medications   sodium chloride 0.9 % flush 10 mL (has no administration in time range)   methylPREDNISolone sodium succinate (SOLU-Medrol) injection 125 mg (125 mg Intravenous Given 6/6/24 1208)   ipratropium (ATROVENT) nebulizer solution 1 mg (1 mg Nebulization Given 6/6/24 1212)   albuterol (PROVENTIL) nebulizer solution 0.083% 2.5 mg/3mL (10 mg Nebulization Given 6/6/24 1212)       MEDICAL DECISION MAKING  48 y.o.  male with past medical history listed above who presents with see shortness of breath and wheezing.    Vital signs remarkable for hypoxia, pulse ox 86% on 2 L nasal cannula in triage.    Due to patient's emergent condition, they were taken to the medical resuscitation bay #8.  Patient was placed on cardiac telemetry and pulse oximetry. Vital signs remarkable for hypertension, repeat pulse ox 90% on 2 L nasal cannula.  Peripheral IV access was established by nursing staff.    Based on clinical presentation and physical exam, differential diagnosis includes, but is not limited to, COPD exacerbation, pneumonia, viral URI, hypercapnic respiratory failure.    At least 3 different tests have been ordered on this patient.    Please see ED course below for my interpretation of the ED workup.  ED Course as of 06/06/24 1436   Thu Jun 06, 2024   1203 ECG 12 Lead ED Triage Standing Order; SOA  EKG per my interpretation sinus rhythm, rate 89, normal axis, no ST segment elevation or depression, normal QRS QTc interval.   [JS]   1303 I reviewed the labs listed above. Clinically unremarkable.    Notable abnormalities are highlighted below.    Old laboratory data was reviewed from the medical records and compared to today's results.   [JS]   1304 I have independently reviewed and interpreted the chest x-ray.  My interpretation is negative for infiltrate.    [JS]      ED Course User Index  [JS] Hebert Padilla DO     On re-evaluation, patient resting comfortably.  States symptoms have improved following therapy.  Repeat auscultation shows improved wheezing. Vital signs remained stable on baseline oxygen.  Patient was ambulatory in the ED with steady gait.  Able to tolerate oral intake appropriately.    I discussed the findings of the ED workup with the patient at bedside. Shared medical decision discussed with the patient in regards to disposition.  Offered to admit the patient for medical observation and continued treatment for  COPD exacerbation. Patient was informed of the indication, benefits, alternative diagnostic options, and risks including, but not limited to missed and/or delayed diagnosis, therefore leading to failure to treat. After discussion patient politely declined admission. The patient is clinically not intoxicated, free from distracting pain, appears to have intact insight, judgment and reason and in my medical opinion has the capacity to make medical decisions.    I recommended outpatient follow-up with PCP/pulmonology.  Patient was deemed medically stable for discharge with close outpatient follow-up and strict ED return precautions. Patient agreeable with plan and disposition.    Home medications were reviewed.  Prescriptions for discharge: Albuterol, prednisone, Z-Otoniel    Chronic conditions affecting care: COPD, chronic respiratory failure    Social determinants of health impacting treatment or disposition: None    REPEAT VITAL SIGNS  AS OF 14:36 EDT VITALS:  BP - (!) 138/104  HR - 84  TEMP - 98.4 °F (36.9 °C) (Oral)  O2 SATS - 94%    DIAGNOSIS  Final diagnoses:   COPD exacerbation       DISPOSITION  ED Disposition       ED Disposition   Discharge    Condition   Stable    Comment   --                     Please note that portions of this document were completed with voice recognition software.        Hebert Padilla DO  06/06/24 [id number]

## 2024-06-16 ENCOUNTER — HOSPITAL ENCOUNTER (INPATIENT)
Facility: HOSPITAL | Age: 49
LOS: 3 days | Discharge: HOME | DRG: 191 | End: 2024-06-20
Attending: EMERGENCY MEDICINE | Admitting: STUDENT IN AN ORGANIZED HEALTH CARE EDUCATION/TRAINING PROGRAM
Payer: MEDICARE

## 2024-06-16 ENCOUNTER — APPOINTMENT (OUTPATIENT)
Dept: GENERAL RADIOLOGY | Facility: HOSPITAL | Age: 49
DRG: 191 | End: 2024-06-16
Payer: MEDICARE

## 2024-06-16 DIAGNOSIS — R06.89 HYPERCAPNIA: ICD-10-CM

## 2024-06-16 DIAGNOSIS — J44.1 COPD EXACERBATION: ICD-10-CM

## 2024-06-16 DIAGNOSIS — J44.1 COPD WITH ACUTE EXACERBATION: ICD-10-CM

## 2024-06-16 DIAGNOSIS — J96.01 ACUTE RESPIRATORY FAILURE WITH HYPOXIA: Primary | ICD-10-CM

## 2024-06-16 LAB
A-A DO2: 58 MMHG
ALBUMIN SERPL-MCNC: 4 G/DL (ref 3.5–5.2)
ALBUMIN/GLOB SERPL: 1.5 G/DL
ALP SERPL-CCNC: 110 U/L (ref 39–117)
ALT SERPL W P-5'-P-CCNC: 17 U/L (ref 1–41)
ANION GAP SERPL CALCULATED.3IONS-SCNC: 8.4 MMOL/L (ref 5–15)
ARTERIAL PATENCY WRIST A: POSITIVE
AST SERPL-CCNC: 19 U/L (ref 1–40)
ATMOSPHERIC PRESS: 734 MMHG
BASE EXCESS BLDA CALC-SCNC: 5.7 MMOL/L (ref 0–2)
BASOPHILS # BLD AUTO: 0.05 10*3/MM3 (ref 0–0.2)
BASOPHILS NFR BLD AUTO: 0.5 % (ref 0–1.5)
BDY SITE: ABNORMAL
BILIRUB SERPL-MCNC: <0.2 MG/DL (ref 0–1.2)
BUN SERPL-MCNC: 9 MG/DL (ref 6–20)
BUN/CREAT SERPL: 10.1 (ref 7–25)
CALCIUM SPEC-SCNC: 8.4 MG/DL (ref 8.6–10.5)
CHLORIDE SERPL-SCNC: 98 MMOL/L (ref 98–107)
CO2 SERPL-SCNC: 31.6 MMOL/L (ref 22–29)
COHGB MFR BLD: 1.1 % (ref 0–2)
CREAT SERPL-MCNC: 0.89 MG/DL (ref 0.76–1.27)
D-LACTATE SERPL-SCNC: 1.2 MMOL/L (ref 0.5–2)
DEPRECATED RDW RBC AUTO: 41.1 FL (ref 37–54)
EGFRCR SERPLBLD CKD-EPI 2021: 105.1 ML/MIN/1.73
EOSINOPHIL # BLD AUTO: 0.01 10*3/MM3 (ref 0–0.4)
EOSINOPHIL NFR BLD AUTO: 0.1 % (ref 0.3–6.2)
ERYTHROCYTE [DISTWIDTH] IN BLOOD BY AUTOMATED COUNT: 13.2 % (ref 12.3–15.4)
FLUAV RNA RESP QL NAA+PROBE: NOT DETECTED
FLUBV RNA RESP QL NAA+PROBE: NOT DETECTED
GAS FLOW AIRWAY: 2 LPM
GLOBULIN UR ELPH-MCNC: 2.7 GM/DL
GLUCOSE SERPL-MCNC: 124 MG/DL (ref 65–99)
HCO3 BLDA-SCNC: 33.8 MMOL/L (ref 22–28)
HCT VFR BLD AUTO: 45.2 % (ref 37.5–51)
HCT VFR BLD CALC: 45.7 %
HGB BLD-MCNC: 14.5 G/DL (ref 13–17.7)
HOLD SPECIMEN: NORMAL
HOLD SPECIMEN: NORMAL
IMM GRANULOCYTES # BLD AUTO: 0.03 10*3/MM3 (ref 0–0.05)
IMM GRANULOCYTES NFR BLD AUTO: 0.3 % (ref 0–0.5)
INHALED O2 CONCENTRATION: 28 %
LYMPHOCYTES # BLD AUTO: 3.1 10*3/MM3 (ref 0.7–3.1)
LYMPHOCYTES NFR BLD AUTO: 28.1 % (ref 19.6–45.3)
Lab: ABNORMAL
MCH RBC QN AUTO: 27.1 PG (ref 26.6–33)
MCHC RBC AUTO-ENTMCNC: 32.1 G/DL (ref 31.5–35.7)
MCV RBC AUTO: 84.3 FL (ref 79–97)
METHGB BLD QL: 0.6 % (ref 0–1.5)
MODALITY: ABNORMAL
MONOCYTES # BLD AUTO: 0.89 10*3/MM3 (ref 0.1–0.9)
MONOCYTES NFR BLD AUTO: 8.1 % (ref 5–12)
NEUTROPHILS NFR BLD AUTO: 6.97 10*3/MM3 (ref 1.7–7)
NEUTROPHILS NFR BLD AUTO: 62.9 % (ref 42.7–76)
NRBC BLD AUTO-RTO: 0 /100 WBC (ref 0–0.2)
NT-PROBNP SERPL-MCNC: 247.9 PG/ML (ref 0–450)
OXYHGB MFR BLDV: 91.1 % (ref 94–99)
PCO2 BLDA: 62.5 MM HG (ref 35–45)
PCO2 TEMP ADJ BLD: ABNORMAL MM[HG]
PH BLDA: 7.34 PH UNITS (ref 7.3–7.5)
PH, TEMP CORRECTED: ABNORMAL
PLATELET # BLD AUTO: 218 10*3/MM3 (ref 140–450)
PMV BLD AUTO: 9.8 FL (ref 6–12)
PO2 BLDA: 64.5 MM HG (ref 75–100)
PO2 TEMP ADJ BLD: ABNORMAL MM[HG]
POTASSIUM SERPL-SCNC: 3.7 MMOL/L (ref 3.5–5.2)
PROCALCITONIN SERPL-MCNC: 0.04 NG/ML (ref 0–0.25)
PROT SERPL-MCNC: 6.7 G/DL (ref 6–8.5)
RBC # BLD AUTO: 5.36 10*6/MM3 (ref 4.14–5.8)
SAO2 % BLDCOA: 92.7 % (ref 94–100)
SARS-COV-2 RNA RESP QL NAA+PROBE: NOT DETECTED
SODIUM SERPL-SCNC: 138 MMOL/L (ref 136–145)
TROPONIN T SERPL HS-MCNC: 8 NG/L
VENTILATOR MODE: ABNORMAL
WBC NRBC COR # BLD AUTO: 11.05 10*3/MM3 (ref 3.4–10.8)
WHOLE BLOOD HOLD COAG: NORMAL
WHOLE BLOOD HOLD SPECIMEN: NORMAL

## 2024-06-16 PROCEDURE — 83605 ASSAY OF LACTIC ACID: CPT

## 2024-06-16 PROCEDURE — 71045 X-RAY EXAM CHEST 1 VIEW: CPT

## 2024-06-16 PROCEDURE — 83880 ASSAY OF NATRIURETIC PEPTIDE: CPT | Performed by: EMERGENCY MEDICINE

## 2024-06-16 PROCEDURE — 84484 ASSAY OF TROPONIN QUANT: CPT | Performed by: EMERGENCY MEDICINE

## 2024-06-16 PROCEDURE — 25010000002 METHYLPREDNISOLONE PER 125 MG

## 2024-06-16 PROCEDURE — 25010000002 LORAZEPAM PER 2 MG: Performed by: EMERGENCY MEDICINE

## 2024-06-16 PROCEDURE — 99291 CRITICAL CARE FIRST HOUR: CPT

## 2024-06-16 PROCEDURE — 87636 SARSCOV2 & INF A&B AMP PRB: CPT

## 2024-06-16 PROCEDURE — G0378 HOSPITAL OBSERVATION PER HR: HCPCS

## 2024-06-16 PROCEDURE — 94799 UNLISTED PULMONARY SVC/PX: CPT

## 2024-06-16 PROCEDURE — 83050 HGB METHEMOGLOBIN QUAN: CPT

## 2024-06-16 PROCEDURE — 99223 1ST HOSP IP/OBS HIGH 75: CPT | Performed by: STUDENT IN AN ORGANIZED HEALTH CARE EDUCATION/TRAINING PROGRAM

## 2024-06-16 PROCEDURE — 94640 AIRWAY INHALATION TREATMENT: CPT

## 2024-06-16 PROCEDURE — 94660 CPAP INITIATION&MGMT: CPT

## 2024-06-16 PROCEDURE — 93005 ELECTROCARDIOGRAM TRACING: CPT | Performed by: EMERGENCY MEDICINE

## 2024-06-16 PROCEDURE — 85025 COMPLETE CBC W/AUTO DIFF WBC: CPT | Performed by: EMERGENCY MEDICINE

## 2024-06-16 PROCEDURE — 80053 COMPREHEN METABOLIC PANEL: CPT | Performed by: EMERGENCY MEDICINE

## 2024-06-16 PROCEDURE — 36600 WITHDRAWAL OF ARTERIAL BLOOD: CPT

## 2024-06-16 PROCEDURE — 82375 ASSAY CARBOXYHB QUANT: CPT

## 2024-06-16 PROCEDURE — 84145 PROCALCITONIN (PCT): CPT

## 2024-06-16 PROCEDURE — 82805 BLOOD GASES W/O2 SATURATION: CPT

## 2024-06-16 PROCEDURE — 25010000002 MAGNESIUM SULFATE 2 GM/50ML SOLUTION

## 2024-06-16 RX ORDER — ACETAMINOPHEN 325 MG/1
650 TABLET ORAL EVERY 4 HOURS PRN
Status: DISCONTINUED | OUTPATIENT
Start: 2024-06-16 | End: 2024-06-20 | Stop reason: HOSPADM

## 2024-06-16 RX ORDER — MAGNESIUM SULFATE HEPTAHYDRATE 40 MG/ML
2 INJECTION, SOLUTION INTRAVENOUS ONCE
Status: COMPLETED | OUTPATIENT
Start: 2024-06-16 | End: 2024-06-16

## 2024-06-16 RX ORDER — ACETAMINOPHEN 160 MG/5ML
650 SOLUTION ORAL EVERY 4 HOURS PRN
Status: DISCONTINUED | OUTPATIENT
Start: 2024-06-16 | End: 2024-06-20 | Stop reason: HOSPADM

## 2024-06-16 RX ORDER — NITROGLYCERIN 0.4 MG/1
0.4 TABLET SUBLINGUAL
Status: DISCONTINUED | OUTPATIENT
Start: 2024-06-16 | End: 2024-06-18

## 2024-06-16 RX ORDER — SODIUM CHLORIDE 0.9 % (FLUSH) 0.9 %
10 SYRINGE (ML) INJECTION AS NEEDED
Status: DISCONTINUED | OUTPATIENT
Start: 2024-06-16 | End: 2024-06-20 | Stop reason: HOSPADM

## 2024-06-16 RX ORDER — PANTOPRAZOLE SODIUM 40 MG/1
40 TABLET, DELAYED RELEASE ORAL
Status: DISCONTINUED | OUTPATIENT
Start: 2024-06-17 | End: 2024-06-20 | Stop reason: HOSPADM

## 2024-06-16 RX ORDER — IPRATROPIUM BROMIDE AND ALBUTEROL SULFATE 2.5; .5 MG/3ML; MG/3ML
3 SOLUTION RESPIRATORY (INHALATION) EVERY 4 HOURS PRN
Status: DISCONTINUED | OUTPATIENT
Start: 2024-06-16 | End: 2024-06-20 | Stop reason: HOSPADM

## 2024-06-16 RX ORDER — ENOXAPARIN SODIUM 100 MG/ML
40 INJECTION SUBCUTANEOUS DAILY
Status: DISCONTINUED | OUTPATIENT
Start: 2024-06-17 | End: 2024-06-20 | Stop reason: HOSPADM

## 2024-06-16 RX ORDER — METHYLPREDNISOLONE SODIUM SUCCINATE 125 MG/2ML
125 INJECTION, POWDER, LYOPHILIZED, FOR SOLUTION INTRAMUSCULAR; INTRAVENOUS ONCE
Status: COMPLETED | OUTPATIENT
Start: 2024-06-16 | End: 2024-06-16

## 2024-06-16 RX ORDER — ONDANSETRON 2 MG/ML
4 INJECTION INTRAMUSCULAR; INTRAVENOUS EVERY 6 HOURS PRN
Status: DISCONTINUED | OUTPATIENT
Start: 2024-06-16 | End: 2024-06-20 | Stop reason: HOSPADM

## 2024-06-16 RX ORDER — TRAZODONE HYDROCHLORIDE 50 MG/1
150 TABLET ORAL NIGHTLY
Status: DISCONTINUED | OUTPATIENT
Start: 2024-06-17 | End: 2024-06-20 | Stop reason: HOSPADM

## 2024-06-16 RX ORDER — ALBUTEROL SULFATE 2.5 MG/3ML
10 SOLUTION RESPIRATORY (INHALATION) ONCE
Status: COMPLETED | OUTPATIENT
Start: 2024-06-16 | End: 2024-06-16

## 2024-06-16 RX ORDER — LORAZEPAM 2 MG/ML
1 INJECTION INTRAMUSCULAR ONCE
Status: COMPLETED | OUTPATIENT
Start: 2024-06-16 | End: 2024-06-16

## 2024-06-16 RX ORDER — ACETAMINOPHEN 650 MG/1
650 SUPPOSITORY RECTAL EVERY 4 HOURS PRN
Status: DISCONTINUED | OUTPATIENT
Start: 2024-06-16 | End: 2024-06-20 | Stop reason: HOSPADM

## 2024-06-16 RX ORDER — SODIUM CHLORIDE 0.9 % (FLUSH) 0.9 %
10 SYRINGE (ML) INJECTION EVERY 12 HOURS SCHEDULED
Status: DISCONTINUED | OUTPATIENT
Start: 2024-06-17 | End: 2024-06-20 | Stop reason: HOSPADM

## 2024-06-16 RX ORDER — ATORVASTATIN CALCIUM 10 MG/1
10 TABLET, FILM COATED ORAL DAILY
Status: DISCONTINUED | OUTPATIENT
Start: 2024-06-17 | End: 2024-06-20 | Stop reason: HOSPADM

## 2024-06-16 RX ORDER — SODIUM CHLORIDE 9 MG/ML
40 INJECTION, SOLUTION INTRAVENOUS AS NEEDED
Status: DISCONTINUED | OUTPATIENT
Start: 2024-06-16 | End: 2024-06-20 | Stop reason: HOSPADM

## 2024-06-16 RX ORDER — METHYLPREDNISOLONE SODIUM SUCCINATE 40 MG/ML
40 INJECTION, POWDER, LYOPHILIZED, FOR SOLUTION INTRAMUSCULAR; INTRAVENOUS EVERY 12 HOURS
Status: DISCONTINUED | OUTPATIENT
Start: 2024-06-17 | End: 2024-06-20 | Stop reason: HOSPADM

## 2024-06-16 RX ORDER — METHADONE HYDROCHLORIDE 10 MG/1
70 TABLET ORAL DAILY
Status: DISCONTINUED | OUTPATIENT
Start: 2024-06-17 | End: 2024-06-17

## 2024-06-16 RX ORDER — ALBUTEROL SULFATE 2.5 MG/3ML
2.5 SOLUTION RESPIRATORY (INHALATION) EVERY 6 HOURS PRN
Status: DISCONTINUED | OUTPATIENT
Start: 2024-06-16 | End: 2024-06-20 | Stop reason: HOSPADM

## 2024-06-16 RX ADMIN — ALBUTEROL SULFATE 10 MG: 2.5 SOLUTION RESPIRATORY (INHALATION) at 21:08

## 2024-06-16 RX ADMIN — MAGNESIUM SULFATE HEPTAHYDRATE 2 G: 40 INJECTION, SOLUTION INTRAVENOUS at 21:13

## 2024-06-16 RX ADMIN — METHYLPREDNISOLONE SODIUM SUCCINATE 125 MG: 125 INJECTION, POWDER, FOR SOLUTION INTRAMUSCULAR; INTRAVENOUS at 21:12

## 2024-06-16 RX ADMIN — IPRATROPIUM BROMIDE 1 MG: 0.5 SOLUTION RESPIRATORY (INHALATION) at 21:08

## 2024-06-16 RX ADMIN — LORAZEPAM 1 MG: 2 INJECTION INTRAMUSCULAR; INTRAVENOUS at 21:12

## 2024-06-17 PROBLEM — J96.10 CHRONIC RESPIRATORY FAILURE: Status: ACTIVE | Noted: 2024-06-17

## 2024-06-17 LAB
ANION GAP SERPL CALCULATED.3IONS-SCNC: 7.9 MMOL/L (ref 5–15)
B PARAPERT DNA SPEC QL NAA+PROBE: NOT DETECTED
B PERT DNA SPEC QL NAA+PROBE: NOT DETECTED
BASOPHILS # BLD AUTO: 0.01 10*3/MM3 (ref 0–0.2)
BASOPHILS NFR BLD AUTO: 0.1 % (ref 0–1.5)
BUN SERPL-MCNC: 8 MG/DL (ref 6–20)
BUN/CREAT SERPL: 9.9 (ref 7–25)
C PNEUM DNA NPH QL NAA+NON-PROBE: NOT DETECTED
CALCIUM SPEC-SCNC: 8.7 MG/DL (ref 8.6–10.5)
CHLORIDE SERPL-SCNC: 102 MMOL/L (ref 98–107)
CO2 SERPL-SCNC: 30.1 MMOL/L (ref 22–29)
CREAT SERPL-MCNC: 0.81 MG/DL (ref 0.76–1.27)
DEPRECATED RDW RBC AUTO: 39.8 FL (ref 37–54)
EGFRCR SERPLBLD CKD-EPI 2021: 108.1 ML/MIN/1.73
EOSINOPHIL # BLD AUTO: 0 10*3/MM3 (ref 0–0.4)
EOSINOPHIL NFR BLD AUTO: 0 % (ref 0.3–6.2)
ERYTHROCYTE [DISTWIDTH] IN BLOOD BY AUTOMATED COUNT: 13.1 % (ref 12.3–15.4)
FLUAV SUBTYP SPEC NAA+PROBE: NOT DETECTED
FLUBV RNA ISLT QL NAA+PROBE: NOT DETECTED
GLUCOSE SERPL-MCNC: 145 MG/DL (ref 65–99)
HADV DNA SPEC NAA+PROBE: NOT DETECTED
HCOV 229E RNA SPEC QL NAA+PROBE: NOT DETECTED
HCOV HKU1 RNA SPEC QL NAA+PROBE: NOT DETECTED
HCOV NL63 RNA SPEC QL NAA+PROBE: NOT DETECTED
HCOV OC43 RNA SPEC QL NAA+PROBE: NOT DETECTED
HCT VFR BLD AUTO: 44.3 % (ref 37.5–51)
HGB BLD-MCNC: 14.3 G/DL (ref 13–17.7)
HMPV RNA NPH QL NAA+NON-PROBE: NOT DETECTED
HPIV1 RNA ISLT QL NAA+PROBE: NOT DETECTED
HPIV2 RNA SPEC QL NAA+PROBE: NOT DETECTED
HPIV3 RNA NPH QL NAA+PROBE: NOT DETECTED
HPIV4 P GENE NPH QL NAA+PROBE: NOT DETECTED
IMM GRANULOCYTES # BLD AUTO: 0.04 10*3/MM3 (ref 0–0.05)
IMM GRANULOCYTES NFR BLD AUTO: 0.5 % (ref 0–0.5)
LYMPHOCYTES # BLD AUTO: 0.52 10*3/MM3 (ref 0.7–3.1)
LYMPHOCYTES NFR BLD AUTO: 6.1 % (ref 19.6–45.3)
M PNEUMO IGG SER IA-ACNC: NOT DETECTED
MCH RBC QN AUTO: 27 PG (ref 26.6–33)
MCHC RBC AUTO-ENTMCNC: 32.3 G/DL (ref 31.5–35.7)
MCV RBC AUTO: 83.7 FL (ref 79–97)
MONOCYTES # BLD AUTO: 0.08 10*3/MM3 (ref 0.1–0.9)
MONOCYTES NFR BLD AUTO: 0.9 % (ref 5–12)
NEUTROPHILS NFR BLD AUTO: 7.83 10*3/MM3 (ref 1.7–7)
NEUTROPHILS NFR BLD AUTO: 92.4 % (ref 42.7–76)
NRBC BLD AUTO-RTO: 0 /100 WBC (ref 0–0.2)
PLATELET # BLD AUTO: 183 10*3/MM3 (ref 140–450)
PMV BLD AUTO: 10.2 FL (ref 6–12)
POTASSIUM SERPL-SCNC: 4.7 MMOL/L (ref 3.5–5.2)
RBC # BLD AUTO: 5.29 10*6/MM3 (ref 4.14–5.8)
RHINOVIRUS RNA SPEC NAA+PROBE: NOT DETECTED
RSV RNA NPH QL NAA+NON-PROBE: NOT DETECTED
SARS-COV-2 RNA NPH QL NAA+NON-PROBE: NOT DETECTED
SODIUM SERPL-SCNC: 140 MMOL/L (ref 136–145)
WBC NRBC COR # BLD AUTO: 8.48 10*3/MM3 (ref 3.4–10.8)

## 2024-06-17 PROCEDURE — 94799 UNLISTED PULMONARY SVC/PX: CPT

## 2024-06-17 PROCEDURE — 25010000002 METHYLPREDNISOLONE PER 40 MG: Performed by: STUDENT IN AN ORGANIZED HEALTH CARE EDUCATION/TRAINING PROGRAM

## 2024-06-17 PROCEDURE — 0202U NFCT DS 22 TRGT SARS-COV-2: CPT | Performed by: STUDENT IN AN ORGANIZED HEALTH CARE EDUCATION/TRAINING PROGRAM

## 2024-06-17 PROCEDURE — 25010000002 ENOXAPARIN PER 10 MG: Performed by: STUDENT IN AN ORGANIZED HEALTH CARE EDUCATION/TRAINING PROGRAM

## 2024-06-17 PROCEDURE — 99232 SBSQ HOSP IP/OBS MODERATE 35: CPT | Performed by: INTERNAL MEDICINE

## 2024-06-17 PROCEDURE — 85025 COMPLETE CBC W/AUTO DIFF WBC: CPT | Performed by: STUDENT IN AN ORGANIZED HEALTH CARE EDUCATION/TRAINING PROGRAM

## 2024-06-17 PROCEDURE — 80048 BASIC METABOLIC PNL TOTAL CA: CPT | Performed by: STUDENT IN AN ORGANIZED HEALTH CARE EDUCATION/TRAINING PROGRAM

## 2024-06-17 RX ORDER — NICOTINE 21 MG/24HR
1 PATCH, TRANSDERMAL 24 HOURS TRANSDERMAL
Status: DISCONTINUED | OUTPATIENT
Start: 2024-06-17 | End: 2024-06-20 | Stop reason: HOSPADM

## 2024-06-17 RX ORDER — NICOTINE 21 MG/24HR
1 PATCH, TRANSDERMAL 24 HOURS TRANSDERMAL
Status: DISCONTINUED | OUTPATIENT
Start: 2024-06-17 | End: 2024-06-17

## 2024-06-17 RX ORDER — CLONAZEPAM 0.5 MG/1
0.5 TABLET ORAL 3 TIMES DAILY PRN
Status: DISCONTINUED | OUTPATIENT
Start: 2024-06-17 | End: 2024-06-20 | Stop reason: HOSPADM

## 2024-06-17 RX ORDER — GUAIFENESIN/DEXTROMETHORPHAN 100-10MG/5
5 SYRUP ORAL EVERY 4 HOURS PRN
Status: DISCONTINUED | OUTPATIENT
Start: 2024-06-17 | End: 2024-06-20 | Stop reason: HOSPADM

## 2024-06-17 RX ORDER — CLONAZEPAM 0.5 MG/1
0.5 TABLET ORAL 2 TIMES DAILY PRN
Status: DISCONTINUED | OUTPATIENT
Start: 2024-06-17 | End: 2024-06-17

## 2024-06-17 RX ORDER — METHADONE HYDROCHLORIDE 10 MG/1
65 TABLET ORAL DAILY
Status: DISCONTINUED | OUTPATIENT
Start: 2024-06-17 | End: 2024-06-20 | Stop reason: HOSPADM

## 2024-06-17 RX ADMIN — TRAZODONE HYDROCHLORIDE 150 MG: 50 TABLET ORAL at 20:32

## 2024-06-17 RX ADMIN — ATORVASTATIN CALCIUM 10 MG: 10 TABLET, FILM COATED ORAL at 09:16

## 2024-06-17 RX ADMIN — GUAIFENESIN AND DEXTROMETHORPHAN 5 ML: 100; 10 SYRUP ORAL at 12:52

## 2024-06-17 RX ADMIN — Medication 1 PATCH: at 00:56

## 2024-06-17 RX ADMIN — Medication 10 ML: at 00:57

## 2024-06-17 RX ADMIN — CLONAZEPAM 0.5 MG: 0.5 TABLET ORAL at 10:46

## 2024-06-17 RX ADMIN — Medication 10 ML: at 17:08

## 2024-06-17 RX ADMIN — IPRATROPIUM BROMIDE AND ALBUTEROL SULFATE 3 ML: .5; 3 SOLUTION RESPIRATORY (INHALATION) at 19:15

## 2024-06-17 RX ADMIN — TRAZODONE HYDROCHLORIDE 150 MG: 50 TABLET ORAL at 00:56

## 2024-06-17 RX ADMIN — Medication 10 ML: at 09:17

## 2024-06-17 RX ADMIN — PANTOPRAZOLE SODIUM 40 MG: 40 TABLET, DELAYED RELEASE ORAL at 05:32

## 2024-06-17 RX ADMIN — METHYLPREDNISOLONE SODIUM SUCCINATE 40 MG: 40 INJECTION, POWDER, FOR SOLUTION INTRAMUSCULAR; INTRAVENOUS at 05:32

## 2024-06-17 RX ADMIN — CLONAZEPAM 0.5 MG: 0.5 TABLET ORAL at 20:32

## 2024-06-17 RX ADMIN — METHYLPREDNISOLONE SODIUM SUCCINATE 40 MG: 40 INJECTION, POWDER, FOR SOLUTION INTRAMUSCULAR; INTRAVENOUS at 17:08

## 2024-06-17 RX ADMIN — METHADONE HYDROCHLORIDE 65 MG: 10 TABLET ORAL at 09:16

## 2024-06-17 NOTE — PAYOR COMM NOTE
"TO:MK  FROM:ANNA PINA, RN PHONE [phone]  CLINICALS REF # [id number]    Basim Stoll (48 y.o. Male)       Date of Birth   1975    Social Security Number       Address   [address] [place] [id number]    Home Phone   [phone]    MRN   [id number]       Oriental orthodox   Buddhist    Marital Status                               Admission Date   6/16/24    Admission Type   Emergency    Admitting Provider   Kerley, Brian Joseph, DO    Attending Provider   Kerley, Brian Joseph, DO    Department, Room/Bed   [facility] TELEMETRY 3, 330/1       Discharge Date       Discharge Disposition       Discharge Destination                                 Attending Provider: Kerley, Brian Joseph, DO    Allergies: Doxycycline    Isolation: None   Infection: Hepatitis C (04/04/17), Hepatitis B (04/04/17)   Code Status: CPR    Ht: 185.4 cm (73\")   Wt: 80.7 kg (177 lb 14.6 oz)    Admission Cmt: None   Principal Problem: None                  Active Insurance as of 6/16/2024       Primary Coverage       Payor Plan Insurance Group Employer/Plan Group    MEDICARE MEDICARE A & B        Payor Plan Address Payor Plan Phone Number Payor Plan Fax Number Effective Dates    [address] [phone]  3/1/2011 - None Entered    [facility] [id number]         Subscriber Name Subscriber Birth Date Member ID       BASIM STOLL 1975 [id number]               Secondary Coverage       Payor Plan Insurance Group Employer/Plan Group    [place]        Payor Plan Address Payor Plan Phone Number Payor Plan Fax Number Effective Dates    [address] [phone]  2/1/2014 - None Entered    [place] [address]         Subscriber Name Subscriber Birth Date Member ID       BASIM STOLL 1975 [id number]                     Emergency Contacts        (Rel.) Home Phone Work Phone Mobile Phone    Josee Stoll (Mother) [phone] -- [phone]    " Ernesto Stoll (Brother) [phone] -- [phone]                 History & Physical        KerleyNed, DO at 24 2351            [facility]   HISTORY AND PHYSICAL      Name:  Topher Stoll   Age:  48 y.o.  Sex:  male  :  1975  MRN:  [id number]   Visit Number:  [id number]  Admission Date:  2024  Date Of Service:  24  Primary Care Physician:  Joshua Hoffmann MD    Chief Complaint:     Shortness of air, cough    History Of Presenting Illness:      Topher Stoll is a 48-year-old man with past medical history of COPD on 2 L baseline, asthma, hypertension, hyperlipidemia, coronary artery disease, history of hep C with treatment 2019, hep B, anxiety, cervical radiculopathy, GERD, GUILLE, migraines, on methadone.  Presented to [facility] ED on 2024 with concern for shortness of air with nonproductive cough progressively worse over the last week.  Recently had ED evaluation was given antibiotics and prednisone but he continued to worsen.  Denied fevers or chills, chest pain, abdominal pain, N/V/D, unilateral leg swelling or pain.    ED summary: Afebrile, hypertensive which improved, other vital signs stable on his baseline 2 L.  Did have significant increased work of breathing, BiPAP was trialed but he did not tolerate it.  ABG pH 7.34, pCO2 62, pO2 64, bicarb 33.  EKG sinus rhythm, significant artifact present.  Troponin not elevated.  proBNP not elevated.  Blood glucose 124.  Lactate 1.2.  Procalcitonin not elevated.  White count 11.  COVID/flu negative.  CXR large lung volumes, appears clear, radiology interpretation pending.  He was provided albuterol, Atrovent, Ativan, magnesium, Solu-Medrol.    Review Of Systems:    All systems were reviewed and negative except as mentioned in history of presenting illness, assessment and plan.    Past Medical History: Patient  has a past medical history of Abdominal adhesions, Anxiety, Arthritis, Asthma, Cervical radiculopathy,  Colitis, COPD (chronic obstructive pulmonary disease), GERD (gastroesophageal reflux disease), Heart attack, History of hepatitis C, History of recreational drug use, HTN (hypertension), Impaired functional mobility, balance, gait, and endurance, Kidney cysts, Left hip pain, Liver disease, Low back pain, Migraines, Obstructive chronic bronchitis with exacerbation, Sleep apnea, and Tattoos.    Past Surgical History: Patient  has a past surgical history that includes Back surgery; Hernia repair; Small intestine surgery; Total hip arthroplasty (Left); Shoulder Ligament Repair; Hip Spacer Insertion (Left, 1/15/2020); Revision total hip arthroplasty (Left, 3/5/2020); Colonoscopy (2014); Upper gastrointestinal endoscopy (2014); Colonoscopy (N/A, 1/4/2022); and Total hip arthroplasty (Right).    Social History: Patient  reports that he quit smoking about 19 years ago. His smoking use included cigarettes. He started smoking about 34 years ago. He has a 30 pack-year smoking history. He has been exposed to tobacco smoke. His smokeless tobacco use includes chew. He reports that he does not currently use drugs. He reports that he does not drink alcohol.    Family History:  Patient's family history has been reviewed and found to be noncontributory.     Allergies:      Doxycycline    Home Medications:    Prior to Admission Medications       Prescriptions Last Dose Informant Patient Reported? Taking?    albuterol sulfate  (90 Base) MCG/ACT inhaler 6/16/2024  No Yes    Inhale 2 puffs Every 4 (Four) Hours As Needed for Wheezing.    fluticasone (FLONASE) 50 MCG/ACT nasal spray 6/16/2024  No Yes    INSTILL 2 SPRAYS INOT THE NOSTRILS AS DIRECTED BY PROVIDER DAILY    ipratropium-albuterol (DUO-NEB) 0.5-2.5 mg/3 ml nebulizer 6/16/2024  No Yes    Take 3 mL by nebulization Every 4 (Four) Hours As Needed for Wheezing or Shortness of Air.    lovastatin (MEVACOR) 40 MG tablet 6/15/2024  No Yes    TAKE 1 TABLET BY MOUTH EVERY DAY FOR  "CHOLESTEROL    methadone (DOLOPHINE) 5 MG/5ML solution 6/16/2024 Self Yes Yes    Take 70 mL by mouth Daily. Patient takes 70 mg once per day(per [facility])    omeprazole (priLOSEC) 40 MG capsule 6/15/2024  No Yes    Take 1 capsule by mouth Daily.    promethazine (PHENERGAN) 12.5 MG tablet Past Week  No Yes    Take 1-2 tablets by mouth every 6-8 hours as needed for nausea and vomiting. Caution advised due to drowsiness.    traZODone (DESYREL) 300 MG tablet 6/15/2024  No Yes    Take 0.5 tablets by mouth Every Night.          ED Medications:    Medications   sodium chloride 0.9 % flush 10 mL (has no administration in time range)   methylPREDNISolone sodium succinate (SOLU-Medrol) injection 125 mg (125 mg Intravenous Given 6/16/24 2112)   albuterol (PROVENTIL) nebulizer solution 0.083% 2.5 mg/3mL (10 mg Nebulization Given 6/16/24 2108)   ipratropium (ATROVENT) nebulizer solution 1 mg (1 mg Nebulization Given 6/16/24 2108)   magnesium sulfate 2g/50 mL (PREMIX) infusion (0 g Intravenous Stopped 6/16/24 2136)   LORazepam (ATIVAN) injection 1 mg (1 mg Intravenous Given 6/16/24 2112)     Vital Signs:  Temp:  [98.1 °F (36.7 °C)-98.2 °F (36.8 °C)] 98.1 °F (36.7 °C)  Heart Rate:  [80-91] 82  Resp:  [16-20] 20  BP: (121-161)/() 136/90        06/16/24 2029 06/16/24 2333   Weight: 90.1 kg (198 lb 9.6 oz) 80.7 kg (177 lb 14.6 oz)     Body mass index is 23.47 kg/m².    Physical Exam:     Most recent vital Signs: /90   Pulse 82   Temp 98.1 °F (36.7 °C) (Oral)   Resp 20   Ht 185.4 cm (73\")   Wt 80.7 kg (177 lb 14.6 oz)   SpO2 93%   BMI 23.47 kg/m²     Physical Exam  Constitutional:       General: He is not in acute distress.     Appearance: He is ill-appearing. He is not toxic-appearing.   HENT:      Mouth/Throat:      Mouth: Mucous membranes are moist.   Eyes:      Extraocular Movements: Extraocular movements intact.   Cardiovascular:      Rate and Rhythm: Normal rate and regular rhythm. " "     Pulses: Normal pulses.      Heart sounds: Normal heart sounds.   Pulmonary:      Effort: Pulmonary effort is normal.      Breath sounds: Wheezing present.   Abdominal:      Palpations: Abdomen is soft.      Tenderness: There is no abdominal tenderness.   Musculoskeletal:      Right lower leg: No edema.      Left lower leg: No edema.   Skin:     General: Skin is warm.      Capillary Refill: Capillary refill takes less than 2 seconds.   Neurological:      General: No focal deficit present.      Mental Status: He is alert and oriented to person, place, and time.   Psychiatric:         Mood and Affect: Mood normal.         Thought Content: Thought content normal.         Laboratory data:    I have reviewed the labs done in the emergency room.    Results from last 7 days   Lab Units 06/16/24 2055   SODIUM mmol/L 138   POTASSIUM mmol/L 3.7   CHLORIDE mmol/L 98   CO2 mmol/L 31.6*   BUN mg/dL 9   CREATININE mg/dL 0.89   CALCIUM mg/dL 8.4*   BILIRUBIN mg/dL <0.2   ALK PHOS U/L 110   ALT (SGPT) U/L 17   AST (SGOT) U/L 19   GLUCOSE mg/dL 124*     Results from last 7 days   Lab Units 06/16/24 2055   WBC 10*3/mm3 11.05*   HEMOGLOBIN g/dL 14.5   HEMATOCRIT % 45.2   PLATELETS 10*3/mm3 218         Results from last 7 days   Lab Units 06/16/24 2055   HSTROP T ng/L 8     Results from last 7 days   Lab Units 06/16/24 2055   PROBNP pg/mL 247.9             Results from last 7 days   Lab Units 06/16/24  2104   PH, ARTERIAL pH units 7.341   PO2 ART mm Hg 64.5*   PCO2, ARTERIAL mm Hg 62.5*   HCO3 ART mmol/L 33.8*           Invalid input(s): \"USDES\", \"NITRITITE\", \"BACT\", \"EP\"    Pain Management Panel  More data exists         Latest Ref Rng & Units 3/17/2018 7/13/2016   Pain Management Panel   Amphetamine, Urine Qual Negative Negative  Negative    Barbiturates Screen, Urine Negative Negative  Negative    Benzodiazepine Screen, Urine Negative Negative  Negative  Negative    Buprenorphine, Screen, Urine Negative Negative  -   Cocaine " Screen, Urine Negative Negative  Negative    Methadone Screen , Urine Negative Negative  Negative    Methamphetamine, Ur Negative Negative  -       EKG:      EKG sinus rhythm, significant artifact present.     Radiology:    No radiology results for the last 3 days    Assessment/Plan:    Observation general floor admission 6/16/2024 with COPD exacerbation.    At time of admission comfortable in bed, pleasantly conversational.  He says that he feels like his lungs basically locked up as soon as he went outside into the humidity on Sunday.    COPD exacerbation  Supplemental oxygen as needed keep saturation above 90%.  Solu-Medrol.  Bronchodilators.  PCR respiratory panel negative.    Chronic:  COPD on 2 L baseline, asthma, hypertension, hyperlipidemia, coronary artery disease, history of hep C with treatment 2019, hep B, anxiety, cervical radiculopathy, GERD, GUILLE, migraines, on methadone.     Continue home medications.    Risk Assessment: High  DVT Prophylaxis: Lovenox  Code Status: Full code  Diet: Cardiac    Advance Care Planning  ACP discussion was held with the patient during this visit. Patient does not have an advance directive, information provided.           Brian Joseph Kerley, DO  06/16/24  23:51 EDT    Dictated utilizing Dragon dictation.    Electronically signed by Kerley, Brian Joseph, DO at 06/17/24 0255          Emergency Department Notes        Dennis Mccarty PA-C at 06/16/24 9517        Procedure Orders    1. Critical Care [[id number]] ordered by Dennis Mccarty PA-C                 Subjective  History of Present Illness:    This is a 48-year-old male, history of COPD, asthma, presenting today for evaluation of shortness of breath.  He denies any chest pain.  He reports that he was recently seen here within the last week, given antibiotics and prednisone, however he reports he is only worsened.  He reports he supposed be on 2 L at all times now and that is new within the last month, he reports has  had some difficulty adjusting to that.  He reports cough, nonproductive.  No fevers.  He is in acute respiratory distress on arrival with retractions.  He was hypoxic on arrival at 86%.  With several liters of oxygen, he did correct to around 90%.  No hemoptysis.  No unilateral leg pain or swelling.  He thinks he was given a Z-Otoniel.      Nurses Notes reviewed and agree, including vitals, allergies, social history and prior medical history.     REVIEW OF SYSTEMS: All systems reviewed and not pertinent unless noted.  Review of Systems   Constitutional:  Negative for fever.   HENT:  Negative for congestion and rhinorrhea.    Respiratory:  Positive for cough and shortness of breath.    Cardiovascular:  Negative for chest pain and leg swelling.   Gastrointestinal:  Negative for abdominal pain, nausea and vomiting.   All other systems reviewed and are negative.      Past Medical History:   Diagnosis Date    Abdominal adhesions     Anxiety     Arthritis     Asthma     Cervical radiculopathy     Colitis     COPD (chronic obstructive pulmonary disease)     GERD (gastroesophageal reflux disease)     Heart attack     History of hepatitis C     Treated with Epclusa in 2019    History of recreational drug use     HTN (hypertension)     Impaired functional mobility, balance, gait, and endurance     Kidney cysts     Left hip pain     Liver disease     Low back pain     Migraines     Obstructive chronic bronchitis with exacerbation     Sleep apnea     mild    Tattoos        Allergies:    Doxycycline      Past Surgical History:   Procedure Laterality Date    BACK SURGERY      COLONOSCOPY  2014    COLONOSCOPY N/A 1/4/2022    Procedure: COLONOSCOPY with biopsies;  Surgeon: Giancarlo Ruiz MD;  Location: [facility] ENDOSCOPY;  Service: Gastroenterology;  Laterality: N/A;    HERNIA REPAIR      HIP SPACER INSERTION WITH ANTIBIOTIC CEMENT Left 1/15/2020    Procedure: TOTAL HIP IMPLANT REMOVAL WITH INSERTION OF ANTIBIOTIC SPACER LEFT;   "Surgeon: Ba Ramirez MD;  Location:  [facility] OR;  Service: Orthopedics    SHOULDER LIGAMENT REPAIR      right shoulder    SMALL INTESTINE SURGERY      Small bowell resection    TOTAL HIP ARTHROPLASTY Left     TOTAL HIP ARTHROPLASTY Right     TOTAL HIP ARTHROPLASTY REVISION Left 3/5/2020    Procedure: HIP REIMPLANT REVISION LEFT;  Surgeon: Ba Ramirez MD;  Location:  [facility] OR;  Service: Orthopedics;  Laterality: Left;    UPPER GASTROINTESTINAL ENDOSCOPY           Social History     Socioeconomic History    Marital status:    Tobacco Use    Smoking status: Former     Current packs/day: 0.00     Average packs/day: 2.0 packs/day for 15.0 years (30.0 ttl pk-yrs)     Types: Cigarettes     Start date:      Quit date:      Years since quittin.4     Passive exposure: Current    Smokeless tobacco: Current     Types: Chew   Vaping Use    Vaping status: Never Used   Substance and Sexual Activity    Alcohol use: No     Comment: stopped drinking alcohol    Drug use: Not Currently     Comment: takes suboxone    Sexual activity: Defer         Family History   Problem Relation Age of Onset    Arthritis Other     Hypertension Other     Migraines Other     Heart attack Other     Stroke Other     Colon cancer Neg Hx        Objective  Physical Exam:  /78   Pulse 82   Temp 98.2 °F (36.8 °C) (Oral)   Resp 16   Ht 185.4 cm (73\")   Wt 90.1 kg (198 lb 9.6 oz)   SpO2 94%   BMI 26.20 kg/m²      Physical Exam  Vitals and nursing note reviewed.   Constitutional:       General: He is in acute distress.      Appearance: He is normal weight. He is ill-appearing.      Comments: Chronically ill-appearing   HENT:      Head: Normocephalic and atraumatic.      Mouth/Throat:      Mouth: Mucous membranes are moist.      Pharynx: Oropharynx is clear.   Cardiovascular:      Rate and Rhythm: Normal rate and regular rhythm.      Pulses: Normal pulses.   Pulmonary:      Effort: Tachypnea, accessory muscle usage and " respiratory distress present.      Breath sounds: Decreased breath sounds and wheezing present. No rhonchi or rales.   Chest:      Chest wall: No mass, deformity or tenderness.   Abdominal:      Palpations: Abdomen is soft.      Comments: Midline surgical scar   Musculoskeletal:         General: Normal range of motion.      Cervical back: Normal range of motion.      Right lower leg: No tenderness. No edema.      Left lower leg: No tenderness. No edema.   Skin:     General: Skin is warm and dry.      Capillary Refill: Capillary refill takes less than 2 seconds.   Neurological:      General: No focal deficit present.      Mental Status: He is alert and oriented to person, place, and time.   Psychiatric:         Mood and Affect: Mood normal.         Behavior: Behavior normal.             Critical Care    Performed by: Dennis Mccarty PA-C  Authorized by: Bang Camejo MD    Critical care provider statement:     Critical care time (minutes):  35    Critical care was necessary to treat or prevent imminent or life-threatening deterioration of the following conditions:  Respiratory failure    Critical care was time spent personally by me on the following activities:  Blood draw for specimens, development of treatment plan with patient or surrogate, discussions with primary provider, evaluation of patient's response to treatment, examination of patient, interpretation of cardiac output measurements, obtaining history from patient or surrogate, ordering and performing treatments and interventions, ordering and review of laboratory studies, ordering and review of radiographic studies, pulse oximetry and re-evaluation of patient's condition    Care discussed with: admitting provider        ED Course:    ED Course as of 06/16/24 5602   Sun Jun 16, 2024 2043 EKG: I reviewed and independently interpreted the EKG as:  Sinus rhythm 84 bpm, normal axis, normal intervals, no ST elevation, no T wave version [CS]       ED Course User Index  [CS] Bang Camejo MD       Lab Results (last 24 hours)       Procedure Component Value Units Date/Time    CBC & Differential [[id number]]  (Abnormal) Collected: 06/16/24 2055    Specimen: Blood Updated: 06/16/24 2102    Narrative:      The following orders were created for panel order CBC & Differential.  Procedure                               Abnormality         Status                     ---------                               -----------         ------                     CBC Auto Differential[[id number]]        Abnormal            Final result                 Please view results for these tests on the individual orders.    Comprehensive Metabolic Panel [[id number]]  (Abnormal) Collected: 06/16/24 2055    Specimen: Blood Updated: 06/16/24 2122     Glucose 124 mg/dL      BUN 9 mg/dL      Creatinine 0.89 mg/dL      Sodium 138 mmol/L      Potassium 3.7 mmol/L      Chloride 98 mmol/L      CO2 31.6 mmol/L      Calcium 8.4 mg/dL      Total Protein 6.7 g/dL      Albumin 4.0 g/dL      ALT (SGPT) 17 U/L      AST (SGOT) 19 U/L      Alkaline Phosphatase 110 U/L      Total Bilirubin <0.2 mg/dL      Globulin 2.7 gm/dL      A/G Ratio 1.5 g/dL      BUN/Creatinine Ratio 10.1     Anion Gap 8.4 mmol/L      eGFR 105.1 mL/min/1.73     Narrative:      GFR Normal >60  Chronic Kidney Disease <60  Kidney Failure <15      BNP [[id number]]  (Normal) Collected: 06/16/24 2055    Specimen: Blood Updated: 06/16/24 2120     proBNP 247.9 pg/mL     Narrative:      This assay is used as an aid in the diagnosis of individuals suspected of having heart failure. It can be used as an aid in the diagnosis of acute decompensated heart failure (ADHF) in patients presenting with signs and symptoms of ADHF to the emergency department (ED). In addition, NT-proBNP of <300 pg/mL indicates ADHF is not likely.    Age Range Result Interpretation  NT-proBNP Concentration (pg/mL:      <50             Positive            >450                    Gray                 300-450                    Negative             <300    50-75           Positive            >900                  Gray                300-900                  Negative            <300      >75             Positive            >1800                  Gray                300-1800                  Negative            <300    Single High Sensitivity Troponin T [[id number]]  (Normal) Collected: 06/16/24 2055    Specimen: Blood Updated: 06/16/24 2122     HS Troponin T 8 ng/L     Narrative:      High Sensitive Troponin T Reference Range:  <14.0 ng/L- Negative Female for AMI  <22.0 ng/L- Negative Male for AMI  >=14 - Abnormal Female indicating possible myocardial injury.  >=22 - Abnormal Male indicating possible myocardial injury.   Clinicians would have to utilize clinical acumen, EKG, Troponin, and serial changes to determine if it is an Acute Myocardial Infarction or myocardial injury due to an underlying chronic condition.         CBC Auto Differential [[id number]]  (Abnormal) Collected: 06/16/24 2055    Specimen: Blood Updated: 06/16/24 2102     WBC 11.05 10*3/mm3      RBC 5.36 10*6/mm3      Hemoglobin 14.5 g/dL      Hematocrit 45.2 %      MCV 84.3 fL      MCH 27.1 pg      MCHC 32.1 g/dL      RDW 13.2 %      RDW-SD 41.1 fl      MPV 9.8 fL      Platelets 218 10*3/mm3      Neutrophil % 62.9 %      Lymphocyte % 28.1 %      Monocyte % 8.1 %      Eosinophil % 0.1 %      Basophil % 0.5 %      Immature Grans % 0.3 %      Neutrophils, Absolute 6.97 10*3/mm3      Lymphocytes, Absolute 3.10 10*3/mm3      Monocytes, Absolute 0.89 10*3/mm3      Eosinophils, Absolute 0.01 10*3/mm3      Basophils, Absolute 0.05 10*3/mm3      Immature Grans, Absolute 0.03 10*3/mm3      nRBC 0.0 /100 WBC     Procalcitonin [[id number]]  (Normal) Collected: 06/16/24 2055    Specimen: Blood Updated: 06/16/24 2130     Procalcitonin 0.04 ng/mL     Narrative:      As a Marker for Sepsis (Non-Neonates):    1. <0.5 ng/mL  "represents a low risk of severe sepsis and/or septic shock.  2. >2 ng/mL represents a high risk of severe sepsis and/or septic shock.    As a Marker for Lower Respiratory Tract Infections that require antibiotic therapy:    PCT on Admission    Antibiotic Therapy       6-12 Hrs later    >0.5                Strongly Recommended  >0.25 - <0.5        Recommended   0.1 - 0.25          Discouraged              Remeasure/reassess PCT  <0.1                Strongly Discouraged     Remeasure/reassess PCT    As 28 day mortality risk marker: \"Change in Procalcitonin Result\" (>80% or <=80%) if Day 0 (or Day 1) and Day 4 values are available. Refer to http://www.[email or web address] [email or web address]-[email or web address]-[email or web address]    Change in PCT <=80%  A decrease of PCT levels below or equal to 80% defines a positive change in PCT test result representing a higher risk for 28-day all-cause mortality of patients diagnosed with severe sepsis for septic shock.    Change in PCT >80%  A decrease of PCT levels of more than 80% defines a negative change in PCT result representing a lower risk for 28-day all-cause mortality of patients diagnosed with severe sepsis or septic shock.       Lactic Acid, Plasma [[id number]]  (Normal) Collected: 06/16/24 2055    Specimen: Blood Updated: 06/16/24 2115     Lactate 1.2 mmol/L     Blood Gas, Arterial With Co-Ox [[id number]]  (Abnormal) Collected: 06/16/24 2104    Specimen: Arterial Blood Updated: 06/16/24 2104     Site Right Radial     Michael's Test Positive     pH, Arterial 7.341 pH units      Comment: 84 Value below reference range        pCO2, Arterial 62.5 mm Hg      Comment: 83 Value above reference range        pO2, Arterial 64.5 mm Hg      Comment: 84 Value below reference range        HCO3, Arterial 33.8 mmol/L      Comment: 83 Value above reference range        Base Excess, Arterial 5.7 mmol/L      Comment: 83 Value above reference range        O2 Saturation, Arterial 92.7 %      Comment: 84 Value below reference range        " Hematocrit, Blood Gas 45.7 %      Oxyhemoglobin 91.1 %      Comment: 84 Value below reference range        Methemoglobin 0.60 %      Carboxyhemoglobin 1.1 %      A-a DO2 58.0 mmHg      Barometric Pressure for Blood Gas 734 mmHg      Modality Nasal Cannula     FIO2 28 %      Flow Rate 2.0 lpm      Ventilator Mode NA     Collected by SJ     Comment: Meter: [id number]     :  [id number]        pH, Temp Corrected --     pCO2, Temperature Corrected --     pO2, Temperature Corrected --    COVID-19 and FLU A/B PCR, 1 HR TAT - Swab, Nasopharynx [[id number]]  (Normal) Collected: 06/16/24 2116    Specimen: Swab from Nasopharynx Updated: 06/16/24 2139     COVID19 Not Detected     Influenza A PCR Not Detected     Influenza B PCR Not Detected    Narrative:      Fact sheet for providers: https://www.fda.gov/media/383809/download    Fact sheet for patients: https://www.fda.gov/media/699294/download    Test performed by PCR.             No radiology results from the last 24 hrs       MDM      Initial impression of presenting illness: This is a 48-year-old male history of COPD presenting today for evaluation of respiratory distress and shortness of air    DDX: includes but is not limited to: COPD exacerbation, pneumonia, pneumothorax, electrolyte abnormality, ACS, NSTEMI, bronchitis, others    Patient arrives hemodynamically stable, afebrile, hypoxic, nontachycardic, tachypneic with vitals interpreted by myself.     Pertinent features from physical exam: Patient is in respiratory distress, retractions noted, accessory muscle use noted, significant wheezing with decreased lung sounds throughout, 2+ radial pulses.  Cardiac auscultation regular rate and rhythm..    Initial diagnostic plan: CBC CMP ABG proBNP lactic acid Pro-Siva troponin COVID flu testing EKG chest x-ray    Results from initial plan were reviewed and interpreted by me revealing EKG revealed sinus rhythm rate of 84 no ST elevations no T wave inversions, proBNP  normal, Actiq acid normal, ABG with retention of CO2 at 62.5 suggesting hypercarbia.  pO2 of 64.5, COVID flu negative, Pro-Siva normal, troponin normal, do not suspect ACS.  CMP with retention of CO2 at 31.6, chest x-ray per my independent interpretation reveals emphysematous changes without acute focal infiltrate.  No pneumothorax.  Mild leukocytosis likely secondary to prednisone use.    Diagnostic information from other sources: Record reviewed    Interventions / Re-evaluation: Given patient's work of breathing, recommended BiPAP, gave Ativan, however patient unable to tolerate, given Solu-Medrol, 10 of albuterol, 1 of ipratropium, magnesium sulfate.  Patient continued on supplemental oxygen, on reassessment, patient now breathing more comfortably on 2 L of oxygen after the aforementioned interventions.  He is saturating appropriately in the low 90s on 2 L    Results/clinical rationale were discussed with patient at bedside.  Agreeable to admission.  Suspect acute COPD exacerbation as cause of patient's hypoxic respiratory failure and hypercapnia.    Consultations/Discussion of results with other physicians: Discussed with hospitalist for admission, Dr. Kerley, agreeable to accept patient for admission.    Disposition plan: Admit to the hospital service    35 minutes of critical care provided. This time excludes other billable procedures. Time does include preparation of documents, medical consultations, review of old records, and direct bedside care. Patient is at high risk for life-threatening deterioration due to acute respiratory failure with hypoxia requiring trial of BiPAP, multiple albuterol nebulizer treatments, supplemental oxygen.    -----    Final diagnoses:   Acute respiratory failure with hypoxia   COPD exacerbation   Hypercapnia          Dennis Mccarty PA-C  06/16/24 4354      Electronically signed by Dennis Mccarty PA-C at 06/16/24 2302       Vital Signs (last day)       Date/Time Temp Temp  src Pulse Resp BP Patient Position SpO2    06/17/24 0734 98.8 (37.1) Oral -- 16 116/60 Lying --    06/17/24 0307 98.5 (36.9) Oral 80 18 106/61 Lying 91    06/16/24 2344 -- -- 82 -- 136/90 -- 93    06/16/24 2333 98.1 (36.7) Oral 82 20 143/103 Lying 93    06/16/24 2300 -- -- 82 -- 121/78 -- 94    06/16/24 2233 -- -- 80 -- -- -- 94    06/16/24 2232 -- -- 84 -- -- -- 95    06/16/24 2231 -- -- 81 -- -- -- 92    06/16/24 2230 -- -- 81 -- 136/90 -- 95    06/16/24 2130 -- -- 90 -- 148/111 -- 92    06/16/24 2100 -- -- 86 -- 145/114 -- 91    06/16/24 2029 98.2 (36.8) Oral 91 16 161/113 Sitting 86          Current Facility-Administered Medications   Medication Dose Route Frequency Provider Last Rate Last Admin    acetaminophen (TYLENOL) tablet 650 mg  650 mg Oral Q4H PRN Kerley, Brian Joseph, DO        Or    acetaminophen (TYLENOL) 160 MG/5ML oral solution 650 mg  650 mg Oral Q4H PRN Kerley, Brian Joseph, DO        Or    acetaminophen (TYLENOL) suppository 650 mg  650 mg Rectal Q4H PRN Kerley, Brian Joseph, DO        albuterol (PROVENTIL) nebulizer solution 0.083% 2.5 mg/3mL  2.5 mg Nebulization Q6H PRN Kerley, Brian Joseph, DO        atorvastatin (LIPITOR) tablet 10 mg  10 mg Oral Daily Kerley, Brian Joseph, DO   10 mg at 06/17/24 0916    Calcium Replacement - Follow Nurse / BPA Driven Protocol   Does not apply PRN Kerley, Brian Joseph, DO        Enoxaparin Sodium (LOVENOX) syringe 40 mg  40 mg Subcutaneous Daily Kerley, Brian Joseph, DO        ipratropium-albuterol (DUO-NEB) nebulizer solution 3 mL  3 mL Nebulization Q4H PRN Kerley, Brian Joseph, DO        Magnesium Standard Dose Replacement - Follow Nurse / BPA Driven Protocol   Does not apply PRN Kerley, Brian Joseph, DO        methadone (DOLOPHINE) tablet 65 mg  65 mg Oral Daily Antwon Espinoza DO   65 mg at 06/17/24 0916    methylPREDNISolone sodium succinate (SOLU-Medrol) injection 40 mg  40 mg Intravenous Q12H Kerley, Brian Joseph, DO   40 mg at 06/17/24 0532     nicotine (NICODERM CQ) 21 MG/24HR patch 1 patch  1 patch Transdermal Q24H Kerley, Brian Joseph, DO   1 patch at 06/17/24 0056    nitroglycerin (NITROSTAT) SL tablet 0.4 mg  0.4 mg Sublingual Q5 Min PRN Kerley, Brian Joseph,         ondansetron (ZOFRAN) injection 4 mg  4 mg Intravenous Q6H PRN Kerley, Brian Joseph,         pantoprazole (PROTONIX) EC tablet 40 mg  40 mg Oral Q AM Kerley, Brian Joseph, DO   40 mg at 06/17/24 0532    Phosphorus Replacement - Follow Nurse / BPA Driven Protocol   Does not apply PRN Kerley, Brian Joseph,         Potassium Replacement - Follow Nurse / BPA Driven Protocol   Does not apply PRN Kerley, Brian Joseph,         sodium chloride 0.9 % flush 10 mL  10 mL Intravenous PRN Kerley, Brian Joseph,         sodium chloride 0.9 % flush 10 mL  10 mL Intravenous Q12H Kerley, Brian Joseph,    10 mL at 06/17/24 0917    sodium chloride 0.9 % flush 10 mL  10 mL Intravenous PRN Kerley, Brian Joseph,         sodium chloride 0.9 % infusion 40 mL  40 mL Intravenous PRN Kerley, Brian Joseph,         traZODone (DESYREL) tablet 150 mg  150 mg Oral Nightly Kerley, Brian Joseph, DO   150 mg at 06/17/24 0056     Lab Results (last 24 hours)       Procedure Component Value Units Date/Time    Basic Metabolic Panel [[id number]]  (Abnormal) Collected: 06/17/24 0544    Specimen: Blood Updated: 06/17/24 0629     Glucose 145 mg/dL      BUN 8 mg/dL      Creatinine 0.81 mg/dL      Sodium 140 mmol/L      Potassium 4.7 mmol/L      Chloride 102 mmol/L      CO2 30.1 mmol/L      Calcium 8.7 mg/dL      BUN/Creatinine Ratio 9.9     Anion Gap 7.9 mmol/L      eGFR 108.1 mL/min/1.73     Narrative:      GFR Normal >60  Chronic Kidney Disease <60  Kidney Failure <15      CBC Auto Differential [[id number]]  (Abnormal) Collected: 06/17/24 0544    Specimen: Blood Updated: 06/17/24 0604     WBC 8.48 10*3/mm3      RBC 5.29 10*6/mm3      Hemoglobin 14.3 g/dL      Hematocrit 44.3 %      MCV 83.7 fL      MCH 27.0 pg       MCHC 32.3 g/dL      RDW 13.1 %      RDW-SD 39.8 fl      MPV 10.2 fL      Platelets 183 10*3/mm3      Neutrophil % 92.4 %      Lymphocyte % 6.1 %      Monocyte % 0.9 %      Eosinophil % 0.0 %      Basophil % 0.1 %      Immature Grans % 0.5 %      Neutrophils, Absolute 7.83 10*3/mm3      Lymphocytes, Absolute 0.52 10*3/mm3      Monocytes, Absolute 0.08 10*3/mm3      Eosinophils, Absolute 0.00 10*3/mm3      Basophils, Absolute 0.01 10*3/mm3      Immature Grans, Absolute 0.04 10*3/mm3      nRBC 0.0 /100 WBC     Respiratory Panel PCR w/COVID-19(SARS-CoV-2) [facility]/ELLA/LIDA/PAD/[facility]HOGANY In-House, NP Swab in UTM/VTM, 2 HR TAT - Swab, Nasopharynx [[id number]]  (Normal) Collected: 06/17/24 0105    Specimen: Swab from Nasopharynx Updated: 06/17/24 0159     ADENOVIRUS, PCR Not Detected     Coronavirus 229E Not Detected     Coronavirus HKU1 Not Detected     Coronavirus NL63 Not Detected     Coronavirus OC43 Not Detected     COVID19 Not Detected     Human Metapneumovirus Not Detected     Human Rhinovirus/Enterovirus Not Detected     Influenza A PCR Not Detected     Influenza B PCR Not Detected     Parainfluenza Virus 1 Not Detected     Parainfluenza Virus 2 Not Detected     Parainfluenza Virus 3 Not Detected     Parainfluenza Virus 4 Not Detected     RSV, PCR Not Detected     Bordetella pertussis pcr Not Detected     Bordetella parapertussis PCR Not Detected     Chlamydophila pneumoniae PCR Not Detected     Mycoplasma pneumo by PCR Not Detected    Narrative:      In the setting of a positive respiratory panel with a viral infection PLUS a negative procalcitonin without other underlying concern for bacterial infection, consider observing off antibiotics or discontinuation of antibiotics and continue supportive care. If the respiratory panel is positive for atypical bacterial infection (Bordetella pertussis, Chlamydophila pneumoniae, or Mycoplasma pneumoniae), consider antibiotic de-escalation to target atypical bacterial infection.     "COVID-19 and FLU A/B PCR, 1 HR TAT - Swab, Nasopharynx [[id number]]  (Normal) Collected: 06/16/24 2116    Specimen: Swab from Nasopharynx Updated: 06/16/24 2139     COVID19 Not Detected     Influenza A PCR Not Detected     Influenza B PCR Not Detected    Narrative:      Fact sheet for providers: https://www.fda.gov/media/892824/download    Fact sheet for patients: https://www.fda.gov/media/113430/download    Test performed by PCR.    Procalcitonin [[id number]]  (Normal) Collected: 06/16/24 2055    Specimen: Blood Updated: 06/16/24 2130     Procalcitonin 0.04 ng/mL     Narrative:      As a Marker for Sepsis (Non-Neonates):    1. <0.5 ng/mL represents a low risk of severe sepsis and/or septic shock.  2. >2 ng/mL represents a high risk of severe sepsis and/or septic shock.    As a Marker for Lower Respiratory Tract Infections that require antibiotic therapy:    PCT on Admission    Antibiotic Therapy       6-12 Hrs later    >0.5                Strongly Recommended  >0.25 - <0.5        Recommended   0.1 - 0.25          Discouraged              Remeasure/reassess PCT  <0.1                Strongly Discouraged     Remeasure/reassess PCT    As 28 day mortality risk marker: \"Change in Procalcitonin Result\" (>80% or <=80%) if Day 0 (or Day 1) and Day 4 values are available. Refer to [email or web address]    Change in PCT <=80%  A decrease of PCT levels below or equal to 80% defines a positive change in PCT test result representing a higher risk for 28-day all-cause mortality of patients diagnosed with severe sepsis for septic shock.    Change in PCT >80%  A decrease of PCT levels of more than 80% defines a negative change in PCT result representing a lower risk for 28-day all-cause mortality of patients diagnosed with severe sepsis or septic shock.       Comprehensive Metabolic Panel [[id number]]  (Abnormal) Collected: 06/16/24 2055    Specimen: Blood Updated: 06/16/24 2122     Glucose 124 mg/dL      BUN 9 mg/dL  "     Creatinine 0.89 mg/dL      Sodium 138 mmol/L      Potassium 3.7 mmol/L      Chloride 98 mmol/L      CO2 31.6 mmol/L      Calcium 8.4 mg/dL      Total Protein 6.7 g/dL      Albumin 4.0 g/dL      ALT (SGPT) 17 U/L      AST (SGOT) 19 U/L      Alkaline Phosphatase 110 U/L      Total Bilirubin <0.2 mg/dL      Globulin 2.7 gm/dL      A/G Ratio 1.5 g/dL      BUN/Creatinine Ratio 10.1     Anion Gap 8.4 mmol/L      eGFR 105.1 mL/min/1.73     Narrative:      GFR Normal >60  Chronic Kidney Disease <60  Kidney Failure <15      Single High Sensitivity Troponin T [[id number]]  (Normal) Collected: 06/16/24 2055    Specimen: Blood Updated: 06/16/24 2122     HS Troponin T 8 ng/L     Narrative:      High Sensitive Troponin T Reference Range:  <14.0 ng/L- Negative Female for AMI  <22.0 ng/L- Negative Male for AMI  >=14 - Abnormal Female indicating possible myocardial injury.  >=22 - Abnormal Male indicating possible myocardial injury.   Clinicians would have to utilize clinical acumen, EKG, Troponin, and serial changes to determine if it is an Acute Myocardial Infarction or myocardial injury due to an underlying chronic condition.         BNP [[id number]]  (Normal) Collected: 06/16/24 2055    Specimen: Blood Updated: 06/16/24 2120     proBNP 247.9 pg/mL     Narrative:      This assay is used as an aid in the diagnosis of individuals suspected of having heart failure. It can be used as an aid in the diagnosis of acute decompensated heart failure (ADHF) in patients presenting with signs and symptoms of ADHF to the emergency department (ED). In addition, NT-proBNP of <300 pg/mL indicates ADHF is not likely.    Age Range Result Interpretation  NT-proBNP Concentration (pg/mL:      <50             Positive            >450                   Gray                 300-450                    Negative             <300    50-75           Positive            >900                  Gray                300-900                  Negative             <300      >75             Positive            >1800                  Gray                300-1800                  Negative            <300    Lactic Acid, Plasma [[id number]]  (Normal) Collected: 06/16/24 2055    Specimen: Blood Updated: 06/16/24 2115     Lactate 1.2 mmol/L     Blood Gas, Arterial With Co-Ox [[id number]]  (Abnormal) Collected: 06/16/24 2104    Specimen: Arterial Blood Updated: 06/16/24 2104     Site Right Radial     Michael's Test Positive     pH, Arterial 7.341 pH units      Comment: 84 Value below reference range        pCO2, Arterial 62.5 mm Hg      Comment: 83 Value above reference range        pO2, Arterial 64.5 mm Hg      Comment: 84 Value below reference range        HCO3, Arterial 33.8 mmol/L      Comment: 83 Value above reference range        Base Excess, Arterial 5.7 mmol/L      Comment: 83 Value above reference range        O2 Saturation, Arterial 92.7 %      Comment: 84 Value below reference range        Hematocrit, Blood Gas 45.7 %      Oxyhemoglobin 91.1 %      Comment: 84 Value below reference range        Methemoglobin 0.60 %      Carboxyhemoglobin 1.1 %      A-a DO2 58.0 mmHg      Barometric Pressure for Blood Gas 734 mmHg      Modality Nasal Cannula     FIO2 28 %      Flow Rate 2.0 lpm      Ventilator Mode NA     Collected by      Comment: Meter: [id number]     :  [id number]        pH, Temp Corrected --     pCO2, Temperature Corrected --     pO2, Temperature Corrected --    CBC & Differential [[id number]]  (Abnormal) Collected: 06/16/24 2055    Specimen: Blood Updated: 06/16/24 2102    Narrative:      The following orders were created for panel order CBC & Differential.  Procedure                               Abnormality         Status                     ---------                               -----------         ------                     CBC Auto Differential[[id number]]        Abnormal            Final result                 Please view results for these tests on the  individual orders.    CBC Auto Differential [[id number]]  (Abnormal) Collected: 06/16/24 2055    Specimen: Blood Updated: 06/16/24 2102     WBC 11.05 10*3/mm3      RBC 5.36 10*6/mm3      Hemoglobin 14.5 g/dL      Hematocrit 45.2 %      MCV 84.3 fL      MCH 27.1 pg      MCHC 32.1 g/dL      RDW 13.2 %      RDW-SD 41.1 fl      MPV 9.8 fL      Platelets 218 10*3/mm3      Neutrophil % 62.9 %      Lymphocyte % 28.1 %      Monocyte % 8.1 %      Eosinophil % 0.1 %      Basophil % 0.5 %      Immature Grans % 0.3 %      Neutrophils, Absolute 6.97 10*3/mm3      Lymphocytes, Absolute 3.10 10*3/mm3      Monocytes, Absolute 0.89 10*3/mm3      Eosinophils, Absolute 0.01 10*3/mm3      Basophils, Absolute 0.05 10*3/mm3      Immature Grans, Absolute 0.03 10*3/mm3      nRBC 0.0 /100 WBC     Pe Ell Draw [[id number]] Collected: 06/16/24 2055    Specimen: Blood Updated: 06/16/24 2100    Narrative:      The following orders were created for panel order Pe Ell Draw.  Procedure                               Abnormality         Status                     ---------                               -----------         ------                     Green Top (Gel)[[id number]]                                  Final result               Lavender Top[[id number]]                                     Final result               Gold Top - SST[[id number]]                                   Final result               Light Blue Top[[id number]]                                   Final result                 Please view results for these tests on the individual orders.    Green Top (Gel) [[id number]] Collected: 06/16/24 2055    Specimen: Blood Updated: 06/16/24 2100     Extra Tube Hold for add-ons.     Comment: Auto resulted.       Lavender Top [[id number]] Collected: 06/16/24 2055    Specimen: Blood Updated: 06/16/24 2100     Extra Tube hold for add-on     Comment: Auto resulted       Gold Top - SST [[id number]] Collected: 06/16/24 2055    Specimen: Blood Updated:  06/16/24 2100     Extra Tube Hold for add-ons.     Comment: Auto resulted.       Light Blue Top [[id number]] Collected: 06/16/24 2055    Specimen: Blood Updated: 06/16/24 2100     Extra Tube Hold for add-ons.     Comment: Auto resulted             Imaging Results (Last 24 Hours)       Procedure Component Value Units Date/Time    XR Chest 1 View [[id number]] Collected: 06/17/24 0822     Updated: 06/17/24 0825    Narrative:      PORTABLE CHEST  6/16/2024 9:00 PM     HISTORY: Shortness of breath     COMPARISON:   None     FINDINGS: The cardiac silhouette is normal in size. The mediastinal and  hilar contours are unremarkable.  The lungs are clear. There is no  pneumothorax. The visualized osseous structures demonstrate no acute  abnormalities.       Impression:      No acute cardiopulmonary process.                       This report was signed and finalized on 6/17/2024 8:23 AM by Primitivo Dempsey MD.             Physician Progress Notes (last 24 hours)  Notes from 06/16/24 1026 through 06/17/24 1026   No notes of this type exist for this encounter.       Consult Notes (last 24 hours)  Notes from 06/16/24 1026 through 06/17/24 1026   No notes of this type exist for this encounter.

## 2024-06-17 NOTE — ED PROVIDER NOTES
Subjective  History of Present Illness:    This is a 48-year-old male, history of COPD, asthma, presenting today for evaluation of shortness of breath.  He denies any chest pain.  He reports that he was recently seen here within the last week, given antibiotics and prednisone, however he reports he is only worsened.  He reports he supposed be on 2 L at all times now and that is new within the last month, he reports has had some difficulty adjusting to that.  He reports cough, nonproductive.  No fevers.  He is in acute respiratory distress on arrival with retractions.  He was hypoxic on arrival at 86%.  With several liters of oxygen, he did correct to around 90%.  No hemoptysis.  No unilateral leg pain or swelling.  He thinks he was given a Z-Otoniel.      Nurses Notes reviewed and agree, including vitals, allergies, social history and prior medical history.     REVIEW OF SYSTEMS: All systems reviewed and not pertinent unless noted.  Review of Systems   Constitutional:  Negative for fever.   HENT:  Negative for congestion and rhinorrhea.    Respiratory:  Positive for cough and shortness of breath.    Cardiovascular:  Negative for chest pain and leg swelling.   Gastrointestinal:  Negative for abdominal pain, nausea and vomiting.   All other systems reviewed and are negative.      Past Medical History:   Diagnosis Date    Abdominal adhesions     Anxiety     Arthritis     Asthma     Cervical radiculopathy     Colitis     COPD (chronic obstructive pulmonary disease)     GERD (gastroesophageal reflux disease)     Heart attack     History of hepatitis C     Treated with Epclusa in 2019    History of recreational drug use     HTN (hypertension)     Impaired functional mobility, balance, gait, and endurance     Kidney cysts     Left hip pain     Liver disease     Low back pain     Migraines     Obstructive chronic bronchitis with exacerbation     Sleep apnea     mild    Tattoos        Allergies:    Doxycycline      Past Surgical  "History:   Procedure Laterality Date    BACK SURGERY      COLONOSCOPY      COLONOSCOPY N/A 2022    Procedure: COLONOSCOPY with biopsies;  Surgeon: Giancarlo Ruiz MD;  Location:  [facility];  Service: Gastroenterology;  Laterality: N/A;    HERNIA REPAIR      HIP SPACER INSERTION WITH ANTIBIOTIC CEMENT Left 1/15/2020    Procedure: TOTAL HIP IMPLANT REMOVAL WITH INSERTION OF ANTIBIOTIC SPACER LEFT;  Surgeon: Ba Ramirez MD;  Location:  [facility];  Service: Orthopedics    SHOULDER LIGAMENT REPAIR      right shoulder    SMALL INTESTINE SURGERY      Small bowell resection    TOTAL HIP ARTHROPLASTY Left     TOTAL HIP ARTHROPLASTY Right     TOTAL HIP ARTHROPLASTY REVISION Left 3/5/2020    Procedure: HIP REIMPLANT REVISION LEFT;  Surgeon: Ba Ramirez MD;  Location:  [facility];  Service: Orthopedics;  Laterality: Left;    UPPER GASTROINTESTINAL ENDOSCOPY           Social History     Socioeconomic History    Marital status:    Tobacco Use    Smoking status: Former     Current packs/day: 0.00     Average packs/day: 2.0 packs/day for 15.0 years (30.0 ttl pk-yrs)     Types: Cigarettes     Start date:      Quit date:      Years since quittin.4     Passive exposure: Current    Smokeless tobacco: Current     Types: Chew   Vaping Use    Vaping status: Never Used   Substance and Sexual Activity    Alcohol use: No     Comment: stopped drinking alcohol    Drug use: Not Currently     Comment: takes suboxone    Sexual activity: Defer         Family History   Problem Relation Age of Onset    Arthritis Other     Hypertension Other     Migraines Other     Heart attack Other     Stroke Other     Colon cancer Neg Hx        Objective  Physical Exam:  /78   Pulse 82   Temp 98.2 °F (36.8 °C) (Oral)   Resp 16   Ht 185.4 cm (73\")   Wt 90.1 kg (198 lb 9.6 oz)   SpO2 94%   BMI 26.20 kg/m²      Physical Exam  Vitals and nursing note reviewed.   Constitutional:       General: He is in " acute distress.      Appearance: He is normal weight. He is ill-appearing.      Comments: Chronically ill-appearing   HENT:      Head: Normocephalic and atraumatic.      Mouth/Throat:      Mouth: Mucous membranes are moist.      Pharynx: Oropharynx is clear.   Cardiovascular:      Rate and Rhythm: Normal rate and regular rhythm.      Pulses: Normal pulses.   Pulmonary:      Effort: Tachypnea, accessory muscle usage and respiratory distress present.      Breath sounds: Decreased breath sounds and wheezing present. No rhonchi or rales.   Chest:      Chest wall: No mass, deformity or tenderness.   Abdominal:      Palpations: Abdomen is soft.      Comments: Midline surgical scar   Musculoskeletal:         General: Normal range of motion.      Cervical back: Normal range of motion.      Right lower leg: No tenderness. No edema.      Left lower leg: No tenderness. No edema.   Skin:     General: Skin is warm and dry.      Capillary Refill: Capillary refill takes less than 2 seconds.   Neurological:      General: No focal deficit present.      Mental Status: He is alert and oriented to person, place, and time.   Psychiatric:         Mood and Affect: Mood normal.         Behavior: Behavior normal.             Critical Care    Performed by: Dennis Mccarty PA-C  Authorized by: Bang Camejo MD    Critical care provider statement:     Critical care time (minutes):  35    Critical care was necessary to treat or prevent imminent or life-threatening deterioration of the following conditions:  Respiratory failure    Critical care was time spent personally by me on the following activities:  Blood draw for specimens, development of treatment plan with patient or surrogate, discussions with primary provider, evaluation of patient's response to treatment, examination of patient, interpretation of cardiac output measurements, obtaining history from patient or surrogate, ordering and performing treatments and  interventions, ordering and review of laboratory studies, ordering and review of radiographic studies, pulse oximetry and re-evaluation of patient's condition    Care discussed with: admitting provider        ED Course:    ED Course as of 06/16/24 2307   Sun Jun 16, 2024   2043 EKG: I reviewed and independently interpreted the EKG as:  Sinus rhythm 84 bpm, normal axis, normal intervals, no ST elevation, no T wave version [CS]      ED Course User Index  [CS] Bang Camejo MD       Lab Results (last 24 hours)       Procedure Component Value Units Date/Time    CBC & Differential [[id number]]  (Abnormal) Collected: 06/16/24 2055    Specimen: Blood Updated: 06/16/24 2102    Narrative:      The following orders were created for panel order CBC & Differential.  Procedure                               Abnormality         Status                     ---------                               -----------         ------                     CBC Auto Differential[[id number]]        Abnormal            Final result                 Please view results for these tests on the individual orders.    Comprehensive Metabolic Panel [[id number]]  (Abnormal) Collected: 06/16/24 2055    Specimen: Blood Updated: 06/16/24 2122     Glucose 124 mg/dL      BUN 9 mg/dL      Creatinine 0.89 mg/dL      Sodium 138 mmol/L      Potassium 3.7 mmol/L      Chloride 98 mmol/L      CO2 31.6 mmol/L      Calcium 8.4 mg/dL      Total Protein 6.7 g/dL      Albumin 4.0 g/dL      ALT (SGPT) 17 U/L      AST (SGOT) 19 U/L      Alkaline Phosphatase 110 U/L      Total Bilirubin <0.2 mg/dL      Globulin 2.7 gm/dL      A/G Ratio 1.5 g/dL      BUN/Creatinine Ratio 10.1     Anion Gap 8.4 mmol/L      eGFR 105.1 mL/min/1.73     Narrative:      GFR Normal >60  Chronic Kidney Disease <60  Kidney Failure <15      BNP [[id number]]  (Normal) Collected: 06/16/24 2055    Specimen: Blood Updated: 06/16/24 2120     proBNP 247.9 pg/mL     Narrative:      This assay is used as  an aid in the diagnosis of individuals suspected of having heart failure. It can be used as an aid in the diagnosis of acute decompensated heart failure (ADHF) in patients presenting with signs and symptoms of ADHF to the emergency department (ED). In addition, NT-proBNP of <300 pg/mL indicates ADHF is not likely.    Age Range Result Interpretation  NT-proBNP Concentration (pg/mL:      <50             Positive            >450                   Gray                 300-450                    Negative             <300    50-75           Positive            >900                  Gray                300-900                  Negative            <300      >75             Positive            >1800                  Gray                300-1800                  Negative            <300    Single High Sensitivity Troponin T [[id number]]  (Normal) Collected: 06/16/24 2055    Specimen: Blood Updated: 06/16/24 2122     HS Troponin T 8 ng/L     Narrative:      High Sensitive Troponin T Reference Range:  <14.0 ng/L- Negative Female for AMI  <22.0 ng/L- Negative Male for AMI  >=14 - Abnormal Female indicating possible myocardial injury.  >=22 - Abnormal Male indicating possible myocardial injury.   Clinicians would have to utilize clinical acumen, EKG, Troponin, and serial changes to determine if it is an Acute Myocardial Infarction or myocardial injury due to an underlying chronic condition.         CBC Auto Differential [[id number]]  (Abnormal) Collected: 06/16/24 2055    Specimen: Blood Updated: 06/16/24 2102     WBC 11.05 10*3/mm3      RBC 5.36 10*6/mm3      Hemoglobin 14.5 g/dL      Hematocrit 45.2 %      MCV 84.3 fL      MCH 27.1 pg      MCHC 32.1 g/dL      RDW 13.2 %      RDW-SD 41.1 fl      MPV 9.8 fL      Platelets 218 10*3/mm3      Neutrophil % 62.9 %      Lymphocyte % 28.1 %      Monocyte % 8.1 %      Eosinophil % 0.1 %      Basophil % 0.5 %      Immature Grans % 0.3 %      Neutrophils, Absolute 6.97 10*3/mm3       "Lymphocytes, Absolute 3.10 10*3/mm3      Monocytes, Absolute 0.89 10*3/mm3      Eosinophils, Absolute 0.01 10*3/mm3      Basophils, Absolute 0.05 10*3/mm3      Immature Grans, Absolute 0.03 10*3/mm3      nRBC 0.0 /100 WBC     Procalcitonin [[id number]]  (Normal) Collected: 06/16/24 2055    Specimen: Blood Updated: 06/16/24 2130     Procalcitonin 0.04 ng/mL     Narrative:      As a Marker for Sepsis (Non-Neonates):    1. <0.5 ng/mL represents a low risk of severe sepsis and/or septic shock.  2. >2 ng/mL represents a high risk of severe sepsis and/or septic shock.    As a Marker for Lower Respiratory Tract Infections that require antibiotic therapy:    PCT on Admission    Antibiotic Therapy       6-12 Hrs later    >0.5                Strongly Recommended  >0.25 - <0.5        Recommended   0.1 - 0.25          Discouraged              Remeasure/reassess PCT  <0.1                Strongly Discouraged     Remeasure/reassess PCT    As 28 day mortality risk marker: \"Change in Procalcitonin Result\" (>80% or <=80%) if Day 0 (or Day 1) and Day 4 values are available. Refer to http://www.[employer]-pct-calculator.com    Change in PCT <=80%  A decrease of PCT levels below or equal to 80% defines a positive change in PCT test result representing a higher risk for 28-day all-cause mortality of patients diagnosed with severe sepsis for septic shock.    Change in PCT >80%  A decrease of PCT levels of more than 80% defines a negative change in PCT result representing a lower risk for 28-day all-cause mortality of patients diagnosed with severe sepsis or septic shock.       Lactic Acid, Plasma [[id number]]  (Normal) Collected: 06/16/24 2055    Specimen: Blood Updated: 06/16/24 2115     Lactate 1.2 mmol/L     Blood Gas, Arterial With Co-Ox [[id number]]  (Abnormal) Collected: 06/16/24 2104    Specimen: Arterial Blood Updated: 06/16/24 2104     Site Right Radial     Michael's Test Positive     pH, Arterial 7.341 pH units      Comment: 84 Value " below reference range        pCO2, Arterial 62.5 mm Hg      Comment: 83 Value above reference range        pO2, Arterial 64.5 mm Hg      Comment: 84 Value below reference range        HCO3, Arterial 33.8 mmol/L      Comment: 83 Value above reference range        Base Excess, Arterial 5.7 mmol/L      Comment: 83 Value above reference range        O2 Saturation, Arterial 92.7 %      Comment: 84 Value below reference range        Hematocrit, Blood Gas 45.7 %      Oxyhemoglobin 91.1 %      Comment: 84 Value below reference range        Methemoglobin 0.60 %      Carboxyhemoglobin 1.1 %      A-a DO2 58.0 mmHg      Barometric Pressure for Blood Gas 734 mmHg      Modality Nasal Cannula     FIO2 28 %      Flow Rate 2.0 lpm      Ventilator Mode NA     Collected by [facility]     Comment: Meter: [id number]     :  [id number]        pH, Temp Corrected --     pCO2, Temperature Corrected --     pO2, Temperature Corrected --    COVID-19 and FLU A/B PCR, 1 HR TAT - Swab, Nasopharynx [[id number]]  (Normal) Collected: 06/16/24 2116    Specimen: Swab from Nasopharynx Updated: 06/16/24 2139     COVID19 Not Detected     Influenza A PCR Not Detected     Influenza B PCR Not Detected    Narrative:      Fact sheet for providers: https://www.fda.gov/media/860145/download    Fact sheet for patients: https://www.fda.gov/media/747346/download    Test performed by PCR.             No radiology results from the last 24 hrs       MDM      Initial impression of presenting illness: This is a 48-year-old male history of COPD presenting today for evaluation of respiratory distress and shortness of air    DDX: includes but is not limited to: COPD exacerbation, pneumonia, pneumothorax, electrolyte abnormality, ACS, NSTEMI, bronchitis, others    Patient arrives hemodynamically stable, afebrile, hypoxic, nontachycardic, tachypneic with vitals interpreted by myself.     Pertinent features from physical exam: Patient is in respiratory distress,  retractions noted, accessory muscle use noted, significant wheezing with decreased lung sounds throughout, 2+ radial pulses.  Cardiac auscultation regular rate and rhythm..    Initial diagnostic plan: CBC CMP ABG proBNP lactic acid Pro-Siva troponin COVID flu testing EKG chest x-ray    Results from initial plan were reviewed and interpreted by me revealing EKG revealed sinus rhythm rate of 84 no ST elevations no T wave inversions, proBNP normal, Actiq acid normal, ABG with retention of CO2 at 62.5 suggesting hypercarbia.  pO2 of 64.5, COVID flu negative, Pro-Siva normal, troponin normal, do not suspect ACS.  CMP with retention of CO2 at 31.6, chest x-ray per my independent interpretation reveals emphysematous changes without acute focal infiltrate.  No pneumothorax.  Mild leukocytosis likely secondary to prednisone use.    Diagnostic information from other sources: Record reviewed    Interventions / Re-evaluation: Given patient's work of breathing, recommended BiPAP, gave Ativan, however patient unable to tolerate, given Solu-Medrol, 10 of albuterol, 1 of ipratropium, magnesium sulfate.  Patient continued on supplemental oxygen, on reassessment, patient now breathing more comfortably on 2 L of oxygen after the aforementioned interventions.  He is saturating appropriately in the low 90s on 2 L    Results/clinical rationale were discussed with patient at bedside.  Agreeable to admission.  Suspect acute COPD exacerbation as cause of patient's hypoxic respiratory failure and hypercapnia.    Consultations/Discussion of results with other physicians: Discussed with hospitalist for admission, Dr. Kerley, agreeable to accept patient for admission.    Disposition plan: Admit to the hospital service    35 minutes of critical care provided. This time excludes other billable procedures. Time does include preparation of documents, medical consultations, review of old records, and direct bedside care. Patient is at high risk for  life-threatening deterioration due to acute respiratory failure with hypoxia requiring trial of BiPAP, multiple albuterol nebulizer treatments, supplemental oxygen.    -----    Final diagnoses:   Acute respiratory failure with hypoxia   COPD exacerbation   Hypercapnia          Dennis Mccarty PA-C  06/16/24 8499

## 2024-06-17 NOTE — PLAN OF CARE
Problem: Adult Inpatient Plan of Care  Goal: Plan of Care Review  Outcome: Ongoing, Progressing  Flowsheets (Taken 6/17/2024 1603)  Progress: improving (Pended)  Plan of Care Reviewed With: patient (Pended)  Outcome Evaluation: Patient received cough medicine and anxiety medicine during shift. Patient stable during shift. (Pended)     Problem: Adult Inpatient Plan of Care  Goal: Absence of Hospital-Acquired Illness or Injury  Intervention: Identify and Manage Fall Risk  Recent Flowsheet Documentation  Taken 6/17/2024 1400 by Carole Soto, Nursing Student  Safety Promotion/Fall Prevention:   safety round/check completed   room organization consistent   nonskid shoes/slippers when out of bed   lighting adjusted   clutter free environment maintained   assistive device/personal items within reach   activity supervised  Taken 6/17/2024 1200 by Carole Soto, Nursing Student  Safety Promotion/Fall Prevention:   safety round/check completed   room organization consistent   nonskid shoes/slippers when out of bed   lighting adjusted   clutter free environment maintained   assistive device/personal items within reach   activity supervised  Taken 6/17/2024 1000 by Carole Soto, Nursing Student  Safety Promotion/Fall Prevention:   safety round/check completed   room organization consistent   nonskid shoes/slippers when out of bed   lighting adjusted   clutter free environment maintained   assistive device/personal items within reach   activity supervised  Taken 6/17/2024 0800 by Carole Soto, Nursing Eduardo  Safety Promotion/Fall Prevention:   safety round/check completed   room organization consistent   nonskid shoes/slippers when out of bed   lighting adjusted   clutter free environment maintained   assistive device/personal items within reach   activity supervised  Intervention: Prevent Skin Injury  Recent Flowsheet Documentation  Taken 6/17/2024 1400 by Carole Soto, Nursing Student  Body Position:   position changed  independently   sitting up in bed   neutral head position   neutral body alignment  Taken 6/17/2024 1200 by Carole Soto Nursing Student  Body Position:   sitting up in bed   neutral head position   neutral body alignment  Taken 6/17/2024 1000 by Carole Soto Nursing Student  Body Position:   sitting up in bed   neutral head position   neutral body alignment  Taken 6/17/2024 0800 by Carole Soto Nursing Student  Body Position:   side-lying   turned   right  Intervention: Prevent and Manage VTE (Venous Thromboembolism) Risk  Recent Flowsheet Documentation  Taken 6/17/2024 1400 by Carole Soto Nursing Student  Activity Management: activity encouraged  Taken 6/17/2024 1200 by Carole Soto Nursing Student  Activity Management: activity encouraged  Taken 6/17/2024 1000 by Carole Soto Nursing Student  Activity Management: activity encouraged  Taken 6/17/2024 0800 by Carole Soto Nursing Student  Activity Management: activity encouraged  Goal: Optimal Comfort and Wellbeing  Intervention: Provide Person-Centered Care  Recent Flowsheet Documentation  Taken 6/17/2024 0800 by Carole Soto Nursing Student  Trust Relationship/Rapport:   care explained   choices provided   questions answered   questions encouraged   thoughts/feelings acknowledged   Goal Outcome Evaluation:  Plan of Care Reviewed With: (P) patient        Progress: (P) improving  Outcome Evaluation: (P) Patient received cough medicine and anxiety medicine during shift. Patient stable during shift.

## 2024-06-17 NOTE — PLAN OF CARE
Goal Outcome Evaluation:              Outcome Evaluation: Patient was a new admission to the floor from the emergency room. VSS on 2L NC. No complaints of pain, discomfort, or shortness of breath.

## 2024-06-17 NOTE — CASE MANAGEMENT/SOCIAL WORK
Discharge Planning Assessment  [facility]     Patient Name: Topher Stoll  MRN: [id number]  Today's Date: 6/17/2024    Admit Date: 6/16/2024    Plan: DCP is to return home with family. Spoke with pt at bedside. Permission given to discuss DCP with mother Josee and brother Ernesto. Pt confirmed demographics. States no to Living Will/POA. PCP;  Dr. Hoffmann, pharmacy is [facility] in [place]. Agreed to meds to bed. Currently has home O2 and a nebulizer supplied by [employer]. No new DME needs anticipated at this time. Denies financial difficulties or food insecurity at this time. Pt states is independent with ADL's and mobility but has poor stamina due to COPD progression. Lives with mother and father. Family to transport home when medically stable for discharge.   Discharge Needs Assessment       Row Name 06/17/24 1331       Living Environment    People in Home parent(s)    Name(s) of People in Home Mother and father    Unique Family Situation Lives in the basement of a split level to help parents    Current Living Arrangements home    Duration at Residence 2 years    Potentially Unsafe Housing Conditions none    In the past 12 months has the electric, gas, oil, or water company threatened to shut off services in your home? 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 no    In the past 12 months, has lack of transportation  kept you from meetings, work, or from getting things needed for daily living? No       Food Insecurity    Within the past 12 months, you worried that your food would run out before you got the money to buy more. Never true    Within the past 12 months, the food you bought just didn't last and you didn't have money to get more. Never true       Transition Planning    Patient/Family Anticipates Transition to home with family    Transportation Anticipated family or friend will provide       Discharge Needs Assessment    Readmission Within the Last 30 Days previous discharge plan unsuccessful    Equipment Currently Used at Home oxygen;nebulizer    Concerns to be Addressed adjustment to diagnosis/illness    Anticipated Changes Related to Illness none    Equipment Needed After Discharge none    Outpatient/Agency/Support Group Needs other (see comments)  Pulmonary Clinic                   Discharge Plan       Row Name 06/17/24 4683       Plan    Plan DCP is to return home with family. Spoke with pt at bedside. Permission given to discuss DCP with mother Josee and brother Ernesto. Pt confirmed demographics. States no to Living Will/POA. PCP;  Dr. Hoffmann, pharmacy is [facility] in [place]. Agreed to meds to bed. Currently has home O2 and a nebulizer supplied by [employer]. No new DME needs anticipated at this time. Denies financial difficulties or food insecurity at this time. Pt states is independent with ADL's and mobility but has poor stamina due to COPD progression. Lives with mother and father. Family to transport home when medically stable for discharge.    Patient/Family in Agreement with Plan yes                  Continued Care and Services - Admitted Since 6/16/2024    No active coordination exists for this encounter.       Selected Continued Care - Episodes Includes continued care and service providers with selected services from the active episodes listed below      Asthma Episode start date: 6/29/2023   There are no  active outsourced providers for this episode.                    Demographic Summary       Row Name 06/17/24 1323       General Information    Admission Type inpatient    Arrived From emergency department    Required Notices Provided Important Message from Medicare    Referral Source admission list    Reason for Consult discharge planning    Preferred Language English       Contact Information    Permission Granted to Share Info With ;family/designee                   Functional Status       Row Name 06/17/24 1324       Functional Status    Usual Activity Tolerance moderate    Current Activity Tolerance moderate       Physical Activity    On average, how many days per week do you engage in moderate to strenuous exercise (like a brisk walk)? 1 day    On average, how many minutes do you engage in exercise at this level? 60 min    Number of minutes of exercise per week 60       Assessment of Health Literacy    How often do you have someone help you read hospital materials? Never    How often do you have problems learning about your medical condition because of difficulty understanding written information? Never    How often do you have a problem understanding what is told to you about your medical condition? Never    How confident are you filling out medical forms by yourself? Quite a bit    Health Literacy Good       Functional Status, IADL    Medications independent    Meal Preparation independent    Housekeeping independent    Laundry independent    Shopping independent       Mental Status    General Appearance WDL WDL       Mental Status Summary    Recent Changes in Mental Status/Cognitive Functioning no changes       Employment/    Employment Status disabled                   Psychosocial    No documentation.                  Abuse/Neglect    No documentation.                  Legal    No documentation.                  Substance Abuse    No documentation.                  Patient Forms    No  documentation.                     Dylon Avalos RN

## 2024-06-17 NOTE — PROGRESS NOTES
[facility]    Name:  Topher Stoll   Age:  48 y.o.  Sex:  male  :  1975  MRN:  [id number]   Visit Number:  [id number]  Admission Date:  2024  Date Of Service:  24  Primary Care Physician:  Joshua Hoffmann MD     LOS: 0 days :    Chief Complaint:      Shortness of air, cough    Subjective:    Patient examined today.  Resting comfortably in bed.  Still with diffuse wheezing.  Stable on 2 L.  No acute complaints or events overnight.    Hospital Course:    Topher Stoll is a 48-year-old man with past medical history of COPD on 2 L baseline, asthma, hypertension, hyperlipidemia, coronary artery disease, history of hep C with treatment 2019, hep B, anxiety, cervical radiculopathy, GERD, GUILLE, migraines, on methadone.  Presented to [facility] ED on 2024 with concern for shortness of air with nonproductive cough progressively worse over the last week.  Recently had ED evaluation was given antibiotics and prednisone but he continued to worsen.  Denied fevers or chills, chest pain, abdominal pain, N/V/D, unilateral leg swelling or pain.     ED summary: Afebrile, hypertensive which improved, other vital signs stable on his baseline 2 L.  Did have significant increased work of breathing, BiPAP was trialed but he did not tolerate it.  ABG pH 7.34, pCO2 62, pO2 64, bicarb 33.  EKG sinus rhythm, significant artifact present.  Troponin not elevated.  proBNP not elevated.  Blood glucose 124.  Lactate 1.2.  Procalcitonin not elevated.  White count 11.  COVID/flu negative.  CXR large lung volumes, appears clear, radiology interpretation pending.  He was provided albuterol, Atrovent, Ativan, magnesium, Solu-Medrol.    Review of Systems:     All systems were reviewed and negative except as mentioned in subjective, assessment and plan.    Vital Signs:    Temp:  [98.1 °F (36.7 °C)-98.9 °F (37.2 °C)] 98.3 °F (36.8 °C)  Heart Rate:  [80-91] 86  Resp:  [16-20] 18  BP:  (106-161)/() 134/97    Intake and output:    I/O last 3 completed shifts:  In: 50 [I.V.:50]  Out: 900 [Urine:900]  I/O this shift:  In: 360 [P.O.:360]  Out: 750 [Urine:750]    Physical Examination:    General Appearance:  Alert and cooperative.    Head:  Atraumatic and normocephalic.   Eyes: Conjunctivae and sclerae normal, no icterus. No pallor.   Throat: No oral lesions, no thrush, oral mucosa moist.   Neck: Supple, trachea midline, no thyromegaly.   Lungs:   Diffuse wheezing bilaterally   Heart:  Normal S1 and S2, no murmur, No JVD.   Abdomen:   Normal bowel sounds, no masses, no organomegaly. Soft, nontender, nondistended, no rebound tenderness.   Extremities: Supple, no edema, no cyanosis, no clubbing.   Skin: No bleeding or rash.   Neurologic: Alert and oriented x 3. No facial asymmetry. Moves all four limbs. No tremors.      Laboratory results:    Results from last 7 days   Lab Units 06/17/24 0544 06/16/24 2055   SODIUM mmol/L 140 138   POTASSIUM mmol/L 4.7 3.7   CHLORIDE mmol/L 102 98   CO2 mmol/L 30.1* 31.6*   BUN mg/dL 8 9   CREATININE mg/dL 0.81 0.89   CALCIUM mg/dL 8.7 8.4*   BILIRUBIN mg/dL  --  <0.2   ALK PHOS U/L  --  110   ALT (SGPT) U/L  --  17   AST (SGOT) U/L  --  19   GLUCOSE mg/dL 145* 124*     Results from last 7 days   Lab Units 06/17/24 0544 06/16/24 2055   WBC 10*3/mm3 8.48 11.05*   HEMOGLOBIN g/dL 14.3 14.5   HEMATOCRIT % 44.3 45.2   PLATELETS 10*3/mm3 183 218         Results from last 7 days   Lab Units 06/16/24 2055   HSTROP T ng/L 8         Recent Labs     12/06/23  1926 06/06/24  1214 06/16/24  2104   PHART 7.378 7.386 7.341   HCC9NQI 54.7* 47.4* 62.5*   PO2ART 76.0 57.9* 64.5*   WQI4LPW 32.2* 28.4* 33.8*   BASEEXCESS 5.5* 2.4* 5.7*      I have reviewed the patient's laboratory results.    Radiology results:    XR Chest 1 View    Result Date: 6/17/2024  PORTABLE CHEST  6/16/2024 9:00 PM  HISTORY: Shortness of breath  COMPARISON:   None  FINDINGS: The cardiac silhouette is  normal in size. The mediastinal and hilar contours are unremarkable.  The lungs are clear. There is no pneumothorax. The visualized osseous structures demonstrate no acute abnormalities.      Impression: No acute cardiopulmonary process.        This report was signed and finalized on 6/17/2024 8:23 AM by Primitivo Dempsey MD.     I have reviewed the patient's radiology reports.    Medication Review:     I have reviewed the patient's active and prn medications.     Problem List:      COPD (chronic obstructive pulmonary disease)    COPD with acute exacerbation    Chronic respiratory failure      Assessment:    Acute COPD exacerbation  Chronic hypoxic respiratory failure on 2 L continuously  Hypertension  Hyperlipidemia  CAD  History of hepatitis C status posttreatment  Hepatitis B  Anxiety  GERD  Chronic opiate abuse on methadone  GUILLE    Plan:    Continue to monitor patient in the hospital    Acute COPD exacerbation  -Continue supplemental oxygen as necessary maintain saturation greater than 88%.  Patient on 2 L chronically.  -Solu-Medrol, bronchodilators, incentive spirometer  -PCR panel negative  -No indication for antibiotics at this time    Further orders as clinical course dictates    DVT Prophylaxis: Lovenox  Code Status: Full  Diet: As tolerated  Discharge Plan: 2-3 days    Antwon Espinoza DO  06/17/24  16:19 EDT    Dictated utilizing Dragon dictation.

## 2024-06-17 NOTE — CONSULTS
Dietitian Assessment    Patient Name: Topher Stoll  YOB: 1975  MRN: [id number]  Admission date: 6/16/2024    Comment:      Clinical Nutrition Assessment      Reason for Assessment NSG/MST   H&P  Past Medical History:   Diagnosis Date    Abdominal adhesions     Anxiety     Arthritis     Asthma     Cervical radiculopathy     Colitis     COPD (chronic obstructive pulmonary disease)     GERD (gastroesophageal reflux disease)     Heart attack     History of hepatitis C     Treated with Epclusa in 2019    History of recreational drug use     HTN (hypertension)     Impaired functional mobility, balance, gait, and endurance     Kidney cysts     Left hip pain     Liver disease     Low back pain     Migraines     Obstructive chronic bronchitis with exacerbation     Sleep apnea     mild    Tattoos        Past Surgical History:   Procedure Laterality Date    BACK SURGERY      COLONOSCOPY  2014    COLONOSCOPY N/A 1/4/2022    Procedure: COLONOSCOPY with biopsies;  Surgeon: Giancarlo Ruiz MD;  Location:  [facility];  Service: Gastroenterology;  Laterality: N/A;    HERNIA REPAIR      HIP SPACER INSERTION WITH ANTIBIOTIC CEMENT Left 1/15/2020    Procedure: TOTAL HIP IMPLANT REMOVAL WITH INSERTION OF ANTIBIOTIC SPACER LEFT;  Surgeon: Ba Ramirez MD;  Location:  [facility];  Service: Orthopedics    SHOULDER LIGAMENT REPAIR      right shoulder    SMALL INTESTINE SURGERY      Small bowell resection    TOTAL HIP ARTHROPLASTY Left     TOTAL HIP ARTHROPLASTY Right     TOTAL HIP ARTHROPLASTY REVISION Left 3/5/2020    Procedure: HIP REIMPLANT REVISION LEFT;  Surgeon: Ba Ramirez MD;  Location: [facility];  Service: Orthopedics;  Laterality: Left;    UPPER GASTROINTESTINAL ENDOSCOPY  2014            Current Problems   COPD     Encounter Information        Trending Narrative     6/17: Pt w/ MST score of 4 - reviewed wt history and no significant wt loss noted. PO intake averaging 25% x 1 meal. Will order  "supplementation to provide additional calories and protein.      Anthropometrics        Current Height, Weight Height: 185.4 cm (73\")  Weight: 80.7 kg (177 lb 14.6 oz) (06/16/24 2333)   Trending Weight Hx     This admission:              PTA:     Wt Readings from Last 30 Encounters:   06/16/24 2333 80.7 kg (177 lb 14.6 oz)   06/16/24 2029 90.1 kg (198 lb 9.6 oz)   06/06/24 1152 88.5 kg (195 lb)   04/29/24 1106 90.7 kg (200 lb)   04/27/24 1019 95.3 kg (210 lb)   03/19/24 1159 95.3 kg (210 lb)   03/13/24 1658 90.7 kg (200 lb)   02/17/24 1259 90.7 kg (200 lb)   01/29/24 1019 92 kg (202 lb 12.8 oz)   12/18/23 1015 92.1 kg (203 lb)   12/12/23 1032 91.2 kg (201 lb)   12/09/23 0500 90.7 kg (199 lb 15.3 oz)   12/08/23 0400 90.4 kg (199 lb 4.7 oz)   12/07/23 0244 91.1 kg (200 lb 13.4 oz)   12/07/23 0112 91.1 kg (200 lb 13.4 oz)   12/06/23 2205 91.1 kg (200 lb 13.4 oz)   12/06/23 1748 90.7 kg (200 lb)   11/22/23 0939 90.7 kg (200 lb)   10/24/23 1456 90.7 kg (200 lb)   09/16/23 2051 90.7 kg (200 lb)   09/05/23 0947 83.5 kg (184 lb)   09/01/23 0838 81.6 kg (179 lb 12.8 oz)   08/31/23 1241 82.1 kg (181 lb)   08/29/23 0500 82.5 kg (181 lb 14.1 oz)   08/28/23 0406 81.9 kg (180 lb 8.9 oz)   08/27/23 1222 83.9 kg (184 lb 15.5 oz)   08/27/23 1001 81.6 kg (180 lb)   08/23/23 1052 81.6 kg (180 lb)   08/16/23 0934 81.6 kg (180 lb)   07/28/23 1358 79.8 kg (176 lb)   07/11/23 0900 80.6 kg (177 lb 9.6 oz)   06/13/23 0915 81.6 kg (180 lb)   05/24/23 0341 83.1 kg (183 lb 3.2 oz)   05/23/23 0336 83.1 kg (183 lb 3.2 oz)   05/22/23 0917 82.8 kg (182 lb 8.7 oz)   05/22/23 0349 82.8 kg (182 lb 8.7 oz)   05/21/23 0912 82.7 kg (182 lb 5.1 oz)   05/21/23 0212 86.2 kg (190 lb)   05/04/23 1559 87.5 kg (193 lb)   04/30/23 0700 85.4 kg (188 lb 4.4 oz)   04/27/23 2342 87.3 kg (192 lb 7.4 oz)   04/27/23 1802 86.2 kg (190 lb)   04/22/23 0439 86.2 kg (190 lb)   04/14/23 1559 86.2 kg (190 lb)   01/11/23 1008 90.9 kg (200 lb 6.4 oz)   01/06/23 0447 90.5 kg " (199 lb 8.3 oz)   01/05/23 0509 90.5 kg (199 lb 8.3 oz)   01/04/23 1820 92.7 kg (204 lb 5.9 oz)   01/04/23 0001 95.3 kg (210 lb)      BMI kg/m2 Body mass index is 23.47 kg/m².     Labs        Pertinent Labs     Results from last 7 days   Lab Units 06/17/24  0544 06/16/24 2055   SODIUM mmol/L 140 138   POTASSIUM mmol/L 4.7 3.7   CHLORIDE mmol/L 102 98   CO2 mmol/L 30.1* 31.6*   BUN mg/dL 8 9   CREATININE mg/dL 0.81 0.89   CALCIUM mg/dL 8.7 8.4*   BILIRUBIN mg/dL  --  <0.2   ALK PHOS U/L  --  110   ALT (SGPT) U/L  --  17   AST (SGOT) U/L  --  19   GLUCOSE mg/dL 145* 124*       Results from last 7 days   Lab Units 06/17/24  0544   HEMOGLOBIN g/dL 14.3   HEMATOCRIT % 44.3       Lab Results   Component Value Date    HGBA1C 5.8 (A) 01/29/2024            Medications       Scheduled Medications atorvastatin, 10 mg, Oral, Daily  enoxaparin, 40 mg, Subcutaneous, Daily  methadone, 70 mg, Oral, Daily  methylPREDNISolone sodium succinate, 40 mg, Intravenous, Q12H  nicotine, 1 patch, Transdermal, Q24H  pantoprazole, 40 mg, Oral, Q AM  sodium chloride, 10 mL, Intravenous, Q12H  traZODone, 150 mg, Oral, Nightly        Infusions       PRN Medications   acetaminophen **OR** acetaminophen **OR** acetaminophen    albuterol    Calcium Replacement - Follow Nurse / BPA Driven Protocol    ipratropium-albuterol    Magnesium Standard Dose Replacement - Follow Nurse / BPA Driven Protocol    nitroglycerin    ondansetron    Phosphorus Replacement - Follow Nurse / BPA Driven Protocol    Potassium Replacement - Follow Nurse / BPA Driven Protocol    sodium chloride    sodium chloride    sodium chloride     Physical Findings        Trending Physical   Appearance, NFPE    --  Edema  None reported    Bowel Function None reported   Tubes Peripheral IV    Chewing/Swallowing WNL    Skin WNL      Estimated/Assessed Needs       Energy Requirements    EST Needs, Method, Wt used 2017-2421kcals/day using 25-30kcals/kg       Protein Requirements    EST  Needs, Method, Wt used 80g-96g protein per day using 1-1.2g/kg       Fluid Requirements     Estimated Needs (mL/day) 2017mL per day        Current Nutrition Orders & Evaluation of Intake       Oral Nutrition     Food Allergies    Current PO Diet Diet: Cardiac; Healthy Heart (2-3 Na+); Fluid Consistency: Thin (IDDSI 0)   Supplement    PO Evaluation     Trending % PO Intake 6/17: 25% x 1 meal      Enteral Nutrition    Enteral Route    Order, Modulars, Flushes    Residual/Tolerance    TF Observation         Parenteral Nutrition     TPN Route    Total # Days on TPN    TPN Order, Lipid Details    MVI & Trace Element Freq    TPN Observation       Nutrition Diagnosis         Nutrition Dx Problem 1 Increased estimated needs r/t COPD/chronic respiratory failure as evidenced by need for oral nutrition supplement       Nutrition Dx Problem 2        Intervention Goal         Intervention Goal(s) PO intake to meet >50% of estimated needs  Adhere to supplement ordered  Maintain current body weight      Nutrition Intervention        RD Action Will order Boost Very High Calorie daily      Nutrition Prescription          Diet Prescription HH   Supplement Prescription Boost Very High Calorie daily      Enteral Prescription        TPN Prescription      Monitor/Evaluation        Monitor Per protocol, PO intake, Supplement intake, Pertinent labs, Weight, Skin status, GI status, Symptoms, POC/GOC, Swallow function, Hemodynamic stability     RD to follow-up.     Electronically signed by:  Alberto Encinas RD  06/17/24 08:15 EDT

## 2024-06-17 NOTE — H&P
[facility]      Name:  Topher Stoll   Age:  48 y.o.  Sex:  male  :  1975  MRN:  [id number]   Visit Number:  [id number]  Admission Date:  2024  Date Of Service:  24  Primary Care Physician:  Joshua Hoffmann MD    Chief Complaint:     Shortness of air, cough    History Of Presenting Illness:      Topher Stoll is a 48-year-old man with past medical history of COPD on 2 L baseline, asthma, hypertension, hyperlipidemia, coronary artery disease, history of hep C with treatment 2019, hep B, anxiety, cervical radiculopathy, GERD, GUILLE, migraines, on methadone.  Presented to [facility] ED on 2024 with concern for shortness of air with nonproductive cough progressively worse over the last week.  Recently had ED evaluation was given antibiotics and prednisone but he continued to worsen.  Denied fevers or chills, chest pain, abdominal pain, N/V/D, unilateral leg swelling or pain.    ED summary: Afebrile, hypertensive which improved, other vital signs stable on his baseline 2 L.  Did have significant increased work of breathing, BiPAP was trialed but he did not tolerate it.  ABG pH 7.34, pCO2 62, pO2 64, bicarb 33.  EKG sinus rhythm, significant artifact present.  Troponin not elevated.  proBNP not elevated.  Blood glucose 124.  Lactate 1.2.  Procalcitonin not elevated.  White count 11.  COVID/flu negative.  CXR large lung volumes, appears clear, radiology interpretation pending.  He was provided albuterol, Atrovent, Ativan, magnesium, Solu-Medrol.    Review Of Systems:    All systems were reviewed and negative except as mentioned in history of presenting illness, assessment and plan.    Past Medical History: Patient  has a past medical history of Abdominal adhesions, Anxiety, Arthritis, Asthma, Cervical radiculopathy, Colitis, COPD (chronic obstructive pulmonary disease), GERD (gastroesophageal reflux disease), Heart attack, History of hepatitis C, History of  recreational drug use, HTN (hypertension), Impaired functional mobility, balance, gait, and endurance, Kidney cysts, Left hip pain, Liver disease, Low back pain, Migraines, Obstructive chronic bronchitis with exacerbation, Sleep apnea, and Tattoos.    Past Surgical History: Patient  has a past surgical history that includes Back surgery; Hernia repair; Small intestine surgery; Total hip arthroplasty (Left); Shoulder Ligament Repair; Hip Spacer Insertion (Left, 1/15/2020); Revision total hip arthroplasty (Left, 3/5/2020); Colonoscopy (2014); Upper gastrointestinal endoscopy (2014); Colonoscopy (N/A, 1/4/2022); and Total hip arthroplasty (Right).    Social History: Patient  reports that he quit smoking about 19 years ago. His smoking use included cigarettes. He started smoking about 34 years ago. He has a 30 pack-year smoking history. He has been exposed to tobacco smoke. His smokeless tobacco use includes chew. He reports that he does not currently use drugs. He reports that he does not drink alcohol.    Family History:  Patient's family history has been reviewed and found to be noncontributory.     Allergies:      Doxycycline    Home Medications:    Prior to Admission Medications       Prescriptions Last Dose Informant Patient Reported? Taking?    albuterol sulfate  (90 Base) MCG/ACT inhaler 6/16/2024  No Yes    Inhale 2 puffs Every 4 (Four) Hours As Needed for Wheezing.    fluticasone (FLONASE) 50 MCG/ACT nasal spray 6/16/2024  No Yes    INSTILL 2 SPRAYS INOT THE NOSTRILS AS DIRECTED BY PROVIDER DAILY    ipratropium-albuterol (DUO-NEB) 0.5-2.5 mg/3 ml nebulizer 6/16/2024  No Yes    Take 3 mL by nebulization Every 4 (Four) Hours As Needed for Wheezing or Shortness of Air.    lovastatin (MEVACOR) 40 MG tablet 6/15/2024  No Yes    TAKE 1 TABLET BY MOUTH EVERY DAY FOR CHOLESTEROL    methadone (DOLOPHINE) 5 MG/5ML solution 6/16/2024 Self Yes Yes    Take 70 mL by mouth Daily. Patient takes 70 mg once per day(per  "[facility])    omeprazole (priLOSEC) 40 MG capsule 6/15/2024  No Yes    Take 1 capsule by mouth Daily.    promethazine (PHENERGAN) 12.5 MG tablet Past Week  No Yes    Take 1-2 tablets by mouth every 6-8 hours as needed for nausea and vomiting. Caution advised due to drowsiness.    traZODone (DESYREL) 300 MG tablet 6/15/2024  No Yes    Take 0.5 tablets by mouth Every Night.          ED Medications:    Medications   sodium chloride 0.9 % flush 10 mL (has no administration in time range)   methylPREDNISolone sodium succinate (SOLU-Medrol) injection 125 mg (125 mg Intravenous Given 6/16/24 2112)   albuterol (PROVENTIL) nebulizer solution 0.083% 2.5 mg/3mL (10 mg Nebulization Given 6/16/24 2108)   ipratropium (ATROVENT) nebulizer solution 1 mg (1 mg Nebulization Given 6/16/24 2108)   magnesium sulfate 2g/50 mL (PREMIX) infusion (0 g Intravenous Stopped 6/16/24 2136)   LORazepam (ATIVAN) injection 1 mg (1 mg Intravenous Given 6/16/24 2112)     Vital Signs:  Temp:  [98.1 °F (36.7 °C)-98.2 °F (36.8 °C)] 98.1 °F (36.7 °C)  Heart Rate:  [80-91] 82  Resp:  [16-20] 20  BP: (121-161)/() 136/90        06/16/24 2029 06/16/24 2333   Weight: 90.1 kg (198 lb 9.6 oz) 80.7 kg (177 lb 14.6 oz)     Body mass index is 23.47 kg/m².    Physical Exam:     Most recent vital Signs: /90   Pulse 82   Temp 98.1 °F (36.7 °C) (Oral)   Resp 20   Ht 185.4 cm (73\")   Wt 80.7 kg (177 lb 14.6 oz)   SpO2 93%   BMI 23.47 kg/m²     Physical Exam  Constitutional:       General: He is not in acute distress.     Appearance: He is ill-appearing. He is not toxic-appearing.   HENT:      Mouth/Throat:      Mouth: Mucous membranes are moist.   Eyes:      Extraocular Movements: Extraocular movements intact.   Cardiovascular:      Rate and Rhythm: Normal rate and regular rhythm.      Pulses: Normal pulses.      Heart sounds: Normal heart sounds.   Pulmonary:      Effort: Pulmonary effort is normal.      Breath sounds: " "Wheezing present.   Abdominal:      Palpations: Abdomen is soft.      Tenderness: There is no abdominal tenderness.   Musculoskeletal:      Right lower leg: No edema.      Left lower leg: No edema.   Skin:     General: Skin is warm.      Capillary Refill: Capillary refill takes less than 2 seconds.   Neurological:      General: No focal deficit present.      Mental Status: He is alert and oriented to person, place, and time.   Psychiatric:         Mood and Affect: Mood normal.         Thought Content: Thought content normal.         Laboratory data:    I have reviewed the labs done in the emergency room.    Results from last 7 days   Lab Units 06/16/24 2055   SODIUM mmol/L 138   POTASSIUM mmol/L 3.7   CHLORIDE mmol/L 98   CO2 mmol/L 31.6*   BUN mg/dL 9   CREATININE mg/dL 0.89   CALCIUM mg/dL 8.4*   BILIRUBIN mg/dL <0.2   ALK PHOS U/L 110   ALT (SGPT) U/L 17   AST (SGOT) U/L 19   GLUCOSE mg/dL 124*     Results from last 7 days   Lab Units 06/16/24 2055   WBC 10*3/mm3 11.05*   HEMOGLOBIN g/dL 14.5   HEMATOCRIT % 45.2   PLATELETS 10*3/mm3 218         Results from last 7 days   Lab Units 06/16/24 2055   HSTROP T ng/L 8     Results from last 7 days   Lab Units 06/16/24 2055   PROBNP pg/mL 247.9             Results from last 7 days   Lab Units 06/16/24  2104   PH, ARTERIAL pH units 7.341   PO2 ART mm Hg 64.5*   PCO2, ARTERIAL mm Hg 62.5*   HCO3 ART mmol/L 33.8*           Invalid input(s): \"USDES\", \"NITRITITE\", \"BACT\", \"EP\"    Pain Management Panel  More data exists         Latest Ref Rng & Units 3/17/2018 7/13/2016   Pain Management Panel   Amphetamine, Urine Qual Negative Negative  Negative    Barbiturates Screen, Urine Negative Negative  Negative    Benzodiazepine Screen, Urine Negative Negative  Negative  Negative    Buprenorphine, Screen, Urine Negative Negative  -   Cocaine Screen, Urine Negative Negative  Negative    Methadone Screen , Urine Negative Negative  Negative    Methamphetamine, Ur Negative Negative  - "       EKG:      EKG sinus rhythm, significant artifact present.     Radiology:    No radiology results for the last 3 days    Assessment/Plan:    Observation general floor admission 6/16/2024 with COPD exacerbation.    At time of admission comfortable in bed, pleasantly conversational.  He says that he feels like his lungs basically locked up as soon as he went outside into the humidity on Sunday.    COPD exacerbation  Supplemental oxygen as needed keep saturation above 90%.  Solu-Medrol.  Bronchodilators.  PCR respiratory panel negative.    Chronic:  COPD on 2 L baseline, asthma, hypertension, hyperlipidemia, coronary artery disease, history of hep C with treatment 2019, hep B, anxiety, cervical radiculopathy, GERD, GUILLE, migraines, on methadone.     Continue home medications.    Risk Assessment: High  DVT Prophylaxis: Lovenox  Code Status: Full code  Diet: Cardiac    Advance Care Planning   ACP discussion was held with the patient during this visit. Patient does not have an advance directive, information provided.           Brian Joseph Kerley,   06/16/24  23:51 EDT    Dictated utilizing Dragon dictation.

## 2024-06-17 NOTE — PLAN OF CARE
Problem: Noninvasive Ventilation Acute  Goal: Effective Unassisted Ventilation and Oxygenation  Outcome: Ongoing, Progressing     Problem: Noninvasive Ventilation Acute  Goal: Effective Unassisted Ventilation and Oxygenation  Intervention: Monitor and Manage Noninvasive Ventilation  Flowsheets (Taken 6/17/2024 1347)  Airway/Ventilation Management: (Patient refuses to wear bipap) other (see comments)   Goal Outcome Evaluation:

## 2024-06-18 LAB
ANION GAP SERPL CALCULATED.3IONS-SCNC: 7.7 MMOL/L (ref 5–15)
BASOPHILS # BLD AUTO: 0.02 10*3/MM3 (ref 0–0.2)
BASOPHILS NFR BLD AUTO: 0.1 % (ref 0–1.5)
BUN SERPL-MCNC: 14 MG/DL (ref 6–20)
BUN/CREAT SERPL: 16.5 (ref 7–25)
CALCIUM SPEC-SCNC: 9.2 MG/DL (ref 8.6–10.5)
CHLORIDE SERPL-SCNC: 98 MMOL/L (ref 98–107)
CO2 SERPL-SCNC: 31.3 MMOL/L (ref 22–29)
CREAT SERPL-MCNC: 0.85 MG/DL (ref 0.76–1.27)
DEPRECATED RDW RBC AUTO: 39.8 FL (ref 37–54)
EGFRCR SERPLBLD CKD-EPI 2021: 106.5 ML/MIN/1.73
EOSINOPHIL # BLD AUTO: 0 10*3/MM3 (ref 0–0.4)
EOSINOPHIL NFR BLD AUTO: 0 % (ref 0.3–6.2)
ERYTHROCYTE [DISTWIDTH] IN BLOOD BY AUTOMATED COUNT: 13.1 % (ref 12.3–15.4)
GLUCOSE SERPL-MCNC: 123 MG/DL (ref 65–99)
HCT VFR BLD AUTO: 45.9 % (ref 37.5–51)
HGB BLD-MCNC: 14.7 G/DL (ref 13–17.7)
IMM GRANULOCYTES # BLD AUTO: 0.07 10*3/MM3 (ref 0–0.05)
IMM GRANULOCYTES NFR BLD AUTO: 0.4 % (ref 0–0.5)
LYMPHOCYTES # BLD AUTO: 0.99 10*3/MM3 (ref 0.7–3.1)
LYMPHOCYTES NFR BLD AUTO: 6 % (ref 19.6–45.3)
MCH RBC QN AUTO: 26.8 PG (ref 26.6–33)
MCHC RBC AUTO-ENTMCNC: 32 G/DL (ref 31.5–35.7)
MCV RBC AUTO: 83.8 FL (ref 79–97)
MONOCYTES # BLD AUTO: 0.5 10*3/MM3 (ref 0.1–0.9)
MONOCYTES NFR BLD AUTO: 3 % (ref 5–12)
NEUTROPHILS NFR BLD AUTO: 14.92 10*3/MM3 (ref 1.7–7)
NEUTROPHILS NFR BLD AUTO: 90.5 % (ref 42.7–76)
NRBC BLD AUTO-RTO: 0 /100 WBC (ref 0–0.2)
PLATELET # BLD AUTO: 217 10*3/MM3 (ref 140–450)
PMV BLD AUTO: 9.8 FL (ref 6–12)
POTASSIUM SERPL-SCNC: 4.6 MMOL/L (ref 3.5–5.2)
RBC # BLD AUTO: 5.48 10*6/MM3 (ref 4.14–5.8)
SODIUM SERPL-SCNC: 137 MMOL/L (ref 136–145)
WBC NRBC COR # BLD AUTO: 16.5 10*3/MM3 (ref 3.4–10.8)

## 2024-06-18 PROCEDURE — 85025 COMPLETE CBC W/AUTO DIFF WBC: CPT | Performed by: STUDENT IN AN ORGANIZED HEALTH CARE EDUCATION/TRAINING PROGRAM

## 2024-06-18 PROCEDURE — 94799 UNLISTED PULMONARY SVC/PX: CPT

## 2024-06-18 PROCEDURE — 99232 SBSQ HOSP IP/OBS MODERATE 35: CPT | Performed by: NURSE PRACTITIONER

## 2024-06-18 PROCEDURE — 94660 CPAP INITIATION&MGMT: CPT

## 2024-06-18 PROCEDURE — 25010000002 METHYLPREDNISOLONE PER 40 MG: Performed by: STUDENT IN AN ORGANIZED HEALTH CARE EDUCATION/TRAINING PROGRAM

## 2024-06-18 PROCEDURE — 80048 BASIC METABOLIC PNL TOTAL CA: CPT | Performed by: STUDENT IN AN ORGANIZED HEALTH CARE EDUCATION/TRAINING PROGRAM

## 2024-06-18 RX ORDER — BUDESONIDE 0.5 MG/2ML
0.5 INHALANT ORAL
Status: DISCONTINUED | OUTPATIENT
Start: 2024-06-18 | End: 2024-06-20 | Stop reason: HOSPADM

## 2024-06-18 RX ADMIN — Medication 10 ML: at 09:43

## 2024-06-18 RX ADMIN — GUAIFENESIN AND DEXTROMETHORPHAN 5 ML: 100; 10 SYRUP ORAL at 09:47

## 2024-06-18 RX ADMIN — CLONAZEPAM 0.5 MG: 0.5 TABLET ORAL at 09:48

## 2024-06-18 RX ADMIN — ATORVASTATIN CALCIUM 10 MG: 10 TABLET, FILM COATED ORAL at 09:42

## 2024-06-18 RX ADMIN — Medication 10 ML: at 20:25

## 2024-06-18 RX ADMIN — METHYLPREDNISOLONE SODIUM SUCCINATE 40 MG: 40 INJECTION, POWDER, FOR SOLUTION INTRAMUSCULAR; INTRAVENOUS at 06:02

## 2024-06-18 RX ADMIN — ALBUTEROL SULFATE 2.5 MG: 2.5 SOLUTION RESPIRATORY (INHALATION) at 20:00

## 2024-06-18 RX ADMIN — BUDESONIDE 0.5 MG: 0.5 INHALANT RESPIRATORY (INHALATION) at 20:00

## 2024-06-18 RX ADMIN — METHYLPREDNISOLONE SODIUM SUCCINATE 40 MG: 40 INJECTION, POWDER, FOR SOLUTION INTRAMUSCULAR; INTRAVENOUS at 17:24

## 2024-06-18 RX ADMIN — CLONAZEPAM 0.5 MG: 0.5 TABLET ORAL at 17:24

## 2024-06-18 RX ADMIN — METHADONE HYDROCHLORIDE 65 MG: 10 TABLET ORAL at 09:42

## 2024-06-18 RX ADMIN — TRAZODONE HYDROCHLORIDE 150 MG: 50 TABLET ORAL at 20:24

## 2024-06-18 NOTE — CASE MANAGEMENT/SOCIAL WORK
DCP: Return home when medically stable. Information on how to access My Chart left with pt as well as information on next appointment scheduled for COPD clinic. No other needs at this time. CM continues to follow.

## 2024-06-18 NOTE — PROGRESS NOTES
[facility]    Name:  Topher Stoll   Age:  48 y.o.  Sex:  male  :  1975  MRN:  [id number]   Visit Number:  [id number]  Admission Date:  2024  Date Of Service:  24  Primary Care Physician:  Joshua Hoffmann MD     LOS: 1 day :    Chief Complaint:      Shortness of air/cough    Subjective:    Patient seen and examined.  On 2 L nasal cannula.  States significant wheezing noted still.  Otherwise denies any further concerns.    Hospital Course:    Topher Stoll is a 48-year-old man with past medical history of COPD on 2 L baseline, asthma, hypertension, hyperlipidemia, coronary artery disease, history of hep C with treatment 2019, hep B, anxiety, cervical radiculopathy, GERD, GUILLE, migraines, on methadone.  Presented to [facility] ED on 2024 with concern for shortness of air with nonproductive cough progressively worse over the last week.  Recently had ED evaluation was given antibiotics and prednisone but he continued to worsen.  Denied fevers or chills, chest pain, abdominal pain, N/V/D, unilateral leg swelling or pain.     ED summary: Afebrile, hypertensive which improved, other vital signs stable on his baseline 2 L.  Did have significant increased work of breathing, BiPAP was trialed but he did not tolerate it.  ABG pH 7.34, pCO2 62, pO2 64, bicarb 33.  EKG sinus rhythm, significant artifact present.  Troponin not elevated.  proBNP not elevated.  Blood glucose 124.  Lactate 1.2.  Procalcitonin not elevated.  White count 11.  COVID/flu negative.  CXR large lung volumes, appears clear, radiology interpretation pending.  He was provided albuterol, Atrovent, Ativan, magnesium, Solu-Medrol.  Patient continued on supportive care with supplemental O2/Solu-Medrol/bronchodilators/flutter device.    Review of Systems:     All systems were reviewed and negative except as mentioned in subjective, assessment and plan.    Vital Signs:    Temp:  [97.9 °F (36.6 °C)-98.9  °F (37.2 °C)] 98.5 °F (36.9 °C)  Heart Rate:  [73-89] 89  Resp:  [16-18] 16  BP: (109-142)/(74-98) 109/74    Intake and output:    I/O last 3 completed shifts:  In: 890 [P.O.:840; I.V.:50]  Out: 4075 [Urine:4075]  I/O this shift:  In: 480 [P.O.:480]  Out: 800 [Urine:800]    Physical Examination:    General Appearance:  Alert and cooperative.  Chronically ill middle-age male.  No acute distress noted.   Head:  Atraumatic and normocephalic.   Eyes: Conjunctivae and sclerae normal, no icterus. No pallor.   Throat: No oral lesions, no thrush, oral mucosa moist.   Neck: Supple, trachea midline, no thyromegaly.   Lungs:   Breath sounds heard bilaterally equally.  On 2 L nasal cannula with wheezing noted throughout.   Heart:  Normal S1 and S2, no murmur, no gallop, no rub. No JVD.   Abdomen:   Normal bowel sounds, no masses, no organomegaly. Soft, nontender, nondistended, no rebound tenderness.  Large vertical abdominal scar noted.   Extremities: Supple, no edema, no cyanosis, no clubbing.   Skin: No bleeding or rash.   Neurologic: Alert and oriented x 3. No facial asymmetry. Moves all four limbs. No tremors.      Laboratory results:    Results from last 7 days   Lab Units 06/18/24 0747 06/17/24 0544 06/16/24 2055   SODIUM mmol/L 137 140 138   POTASSIUM mmol/L 4.6 4.7 3.7   CHLORIDE mmol/L 98 102 98   CO2 mmol/L 31.3* 30.1* 31.6*   BUN mg/dL 14 8 9   CREATININE mg/dL 0.85 0.81 0.89   CALCIUM mg/dL 9.2 8.7 8.4*   BILIRUBIN mg/dL  --   --  <0.2   ALK PHOS U/L  --   --  110   ALT (SGPT) U/L  --   --  17   AST (SGOT) U/L  --   --  19   GLUCOSE mg/dL 123* 145* 124*     Results from last 7 days   Lab Units 06/18/24  0747 06/17/24  0544 06/16/24 2055   WBC 10*3/mm3 16.50* 8.48 11.05*   HEMOGLOBIN g/dL 14.7 14.3 14.5   HEMATOCRIT % 45.9 44.3 45.2   PLATELETS 10*3/mm3 217 183 218         Results from last 7 days   Lab Units 06/16/24 2055   HSTROP T ng/L 8         Recent Labs     12/06/23  1926 06/06/24  1214 06/16/24 210    PHART 7.378 7.386 7.341   VVZ1MSU 54.7* 47.4* 62.5*   PO2ART 76.0 57.9* 64.5*   YEF2OPO 32.2* 28.4* 33.8*   BASEEXCESS 5.5* 2.4* 5.7*      I have reviewed the patient's laboratory results.    Radiology results:    XR Chest 1 View    Result Date: 6/17/2024  PORTABLE CHEST  6/16/2024 9:00 PM  HISTORY: Shortness of breath  COMPARISON:   None  FINDINGS: The cardiac silhouette is normal in size. The mediastinal and hilar contours are unremarkable.  The lungs are clear. There is no pneumothorax. The visualized osseous structures demonstrate no acute abnormalities.      Impression: No acute cardiopulmonary process.        This report was signed and finalized on 6/17/2024 8:23 AM by Primitivo Dempsey MD.     I have reviewed the patient's radiology reports.    Medication Review:     I have reviewed the patient's active and prn medications.     Problem List:      COPD (chronic obstructive pulmonary disease)    COPD with acute exacerbation    Chronic respiratory failure      Assessment:    Acute COPD exacerbation, POA  Chronic hypoxic respiratory failure on 2 L continuously  Hypertension  Hyperlipidemia  CAD  History of hepatitis C status posttreatment  Hepatitis B  Anxiety  GERD  Chronic opiate abuse on methadone  GUILLE    Plan:    Acute COPD exacerbation  -Continue supplemental oxygen as necessary maintain saturation greater than 88%.  -Patient on 2 L chronically.  -Solu-Medrol 40 mg every 12, bronchodilators, flutter device  -PCR panel negative  -No indication for antibiotics at this time  -Otherwise reassuring labs and vitals noted.    I have reviewed the copied text and it is accurate as of 6/18/2024         DVT Prophylaxis: Lovenox  Code Status: Full  Diet: As tolerated  Discharge Plan: Home in 1 to 2 days    Carmen Costa, APRN  06/18/24  11:04 EDT    Dictated utilizing Dragon dictation.

## 2024-06-18 NOTE — PLAN OF CARE
Problem: Adult Inpatient Plan of Care  Goal: Plan of Care Review  Outcome: Ongoing, Progressing  Flowsheets (Taken 6/18/2024 1605)  Progress: improving (Pended)  Plan of Care Reviewed With: patient (Pended)  Outcome Evaluation: Patient received Klonopin and Robitussin during shift he complained of anixety and cough. (Pended)     Problem: Adult Inpatient Plan of Care  Goal: Absence of Hospital-Acquired Illness or Injury  Intervention: Identify and Manage Fall Risk  Recent Flowsheet Documentation  Taken 6/18/2024 1400 by Carole Soto, Nursing Student  Safety Promotion/Fall Prevention:   safety round/check completed   room organization consistent   nonskid shoes/slippers when out of bed   lighting adjusted   clutter free environment maintained   assistive device/personal items within reach   activity supervised  Taken 6/18/2024 1200 by Carole Soto, Nursing Student  Safety Promotion/Fall Prevention:   safety round/check completed   room organization consistent   nonskid shoes/slippers when out of bed   lighting adjusted   clutter free environment maintained   assistive device/personal items within reach   activity supervised  Taken 6/18/2024 1000 by Carole Soto, Nursing Student  Safety Promotion/Fall Prevention:   safety round/check completed   room organization consistent   nonskid shoes/slippers when out of bed   lighting adjusted   clutter free environment maintained   assistive device/personal items within reach   activity supervised  Taken 6/18/2024 0800 by Carole Soto, Nursing Student  Safety Promotion/Fall Prevention:   safety round/check completed   room organization consistent   nonskid shoes/slippers when out of bed   lighting adjusted   clutter free environment maintained   assistive device/personal items within reach   activity supervised  Intervention: Prevent Skin Injury  Recent Flowsheet Documentation  Taken 6/18/2024 1400 by Carole Soto, Nursing Student  Body Position: sitting up in bed  Taken  6/18/2024 1200 by Carole Soto Nursing Student  Body Position:   sitting up in bed   neutral head position   neutral body alignment  Taken 6/18/2024 1000 by Carole Soto Nursing Student  Body Position: sitting up in bed  Taken 6/18/2024 0800 by Carole Soto Nursing Student  Body Position: sitting up in bed  Skin Protection: adhesive use limited  Intervention: Prevent and Manage VTE (Venous Thromboembolism) Risk  Recent Flowsheet Documentation  Taken 6/18/2024 1400 by Carole Soto Nursing Student  Activity Management: up ad hosea  Taken 6/18/2024 1200 by Carole Soto Nursing Student  Activity Management: up ad hosea  Taken 6/18/2024 1000 by Carole Soto Nursing Student  Activity Management: up ad hosea  Taken 6/18/2024 0800 by Carole Soto Nursing Student  Activity Management: up ad hosea  Range of Motion: active ROM (range of motion) encouraged     Problem: COPD (Chronic Obstructive Pulmonary Disease) Comorbidity  Goal: Maintenance of COPD Symptom Control  Intervention: Maintain COPD-Symptom Control  Recent Flowsheet Documentation  Taken 6/18/2024 1400 by Carole Soto Nursing Student  Medication Review/Management: medications reviewed  Taken 6/18/2024 1200 by Carole Soto Nursing Student  Medication Review/Management: medications reviewed  Taken 6/18/2024 1000 by Carole Soto Nursing Student  Medication Review/Management: medications reviewed  Taken 6/18/2024 0800 by Carole Soto Nursing Student  Medication Review/Management: medications reviewed     Problem: Hypertension Comorbidity  Goal: Blood Pressure in Desired Range  Intervention: Maintain Blood Pressure Management  Recent Flowsheet Documentation  Taken 6/18/2024 1400 by Carole Soto Nursing Student  Medication Review/Management: medications reviewed  Taken 6/18/2024 1200 by Carole Soto Nursing Student  Medication Review/Management: medications reviewed  Taken 6/18/2024 1000 by Carole Soto Nursing Student  Medication Review/Management:  medications reviewed  Taken 6/18/2024 0800 by Carole Soto, Nursing Student  Medication Review/Management: medications reviewed   Goal Outcome Evaluation:  Plan of Care Reviewed With: (P) patient        Progress: (P) improving  Outcome Evaluation: (P) Patient received Klonopin and Robitussin during shift he complained of anixety and cough.

## 2024-06-18 NOTE — PLAN OF CARE
Goal Outcome Evaluation:  Plan of Care Reviewed With: patient        Progress: improving  Outcome Evaluation: VSS. no acute overnight events. continue plan of care

## 2024-06-19 LAB
ANION GAP SERPL CALCULATED.3IONS-SCNC: 8.7 MMOL/L (ref 5–15)
BASOPHILS # BLD AUTO: 0.02 10*3/MM3 (ref 0–0.2)
BASOPHILS NFR BLD AUTO: 0.1 % (ref 0–1.5)
BUN SERPL-MCNC: 23 MG/DL (ref 6–20)
BUN/CREAT SERPL: 23.7 (ref 7–25)
CALCIUM SPEC-SCNC: 9.1 MG/DL (ref 8.6–10.5)
CHLORIDE SERPL-SCNC: 98 MMOL/L (ref 98–107)
CO2 SERPL-SCNC: 29.3 MMOL/L (ref 22–29)
CREAT SERPL-MCNC: 0.97 MG/DL (ref 0.76–1.27)
DEPRECATED RDW RBC AUTO: 40.5 FL (ref 37–54)
EGFRCR SERPLBLD CKD-EPI 2021: 95.7 ML/MIN/1.73
EOSINOPHIL # BLD AUTO: 0.06 10*3/MM3 (ref 0–0.4)
EOSINOPHIL NFR BLD AUTO: 0.3 % (ref 0.3–6.2)
ERYTHROCYTE [DISTWIDTH] IN BLOOD BY AUTOMATED COUNT: 13.2 % (ref 12.3–15.4)
GLUCOSE SERPL-MCNC: 118 MG/DL (ref 65–99)
HCT VFR BLD AUTO: 48.3 % (ref 37.5–51)
HGB BLD-MCNC: 15.5 G/DL (ref 13–17.7)
IMM GRANULOCYTES # BLD AUTO: 0.09 10*3/MM3 (ref 0–0.05)
IMM GRANULOCYTES NFR BLD AUTO: 0.5 % (ref 0–0.5)
LYMPHOCYTES # BLD AUTO: 1.31 10*3/MM3 (ref 0.7–3.1)
LYMPHOCYTES NFR BLD AUTO: 7.3 % (ref 19.6–45.3)
MCH RBC QN AUTO: 26.9 PG (ref 26.6–33)
MCHC RBC AUTO-ENTMCNC: 32.1 G/DL (ref 31.5–35.7)
MCV RBC AUTO: 83.7 FL (ref 79–97)
MONOCYTES # BLD AUTO: 0.45 10*3/MM3 (ref 0.1–0.9)
MONOCYTES NFR BLD AUTO: 2.5 % (ref 5–12)
NEUTROPHILS NFR BLD AUTO: 16.13 10*3/MM3 (ref 1.7–7)
NEUTROPHILS NFR BLD AUTO: 89.3 % (ref 42.7–76)
NRBC BLD AUTO-RTO: 0 /100 WBC (ref 0–0.2)
PLATELET # BLD AUTO: 220 10*3/MM3 (ref 140–450)
PMV BLD AUTO: 10.1 FL (ref 6–12)
POTASSIUM SERPL-SCNC: 4.9 MMOL/L (ref 3.5–5.2)
RBC # BLD AUTO: 5.77 10*6/MM3 (ref 4.14–5.8)
SODIUM SERPL-SCNC: 136 MMOL/L (ref 136–145)
WBC NRBC COR # BLD AUTO: 18.06 10*3/MM3 (ref 3.4–10.8)

## 2024-06-19 PROCEDURE — 94799 UNLISTED PULMONARY SVC/PX: CPT

## 2024-06-19 PROCEDURE — 80048 BASIC METABOLIC PNL TOTAL CA: CPT | Performed by: STUDENT IN AN ORGANIZED HEALTH CARE EDUCATION/TRAINING PROGRAM

## 2024-06-19 PROCEDURE — 94761 N-INVAS EAR/PLS OXIMETRY MLT: CPT

## 2024-06-19 PROCEDURE — 94660 CPAP INITIATION&MGMT: CPT

## 2024-06-19 PROCEDURE — 94664 DEMO&/EVAL PT USE INHALER: CPT

## 2024-06-19 PROCEDURE — 85025 COMPLETE CBC W/AUTO DIFF WBC: CPT | Performed by: STUDENT IN AN ORGANIZED HEALTH CARE EDUCATION/TRAINING PROGRAM

## 2024-06-19 PROCEDURE — 99232 SBSQ HOSP IP/OBS MODERATE 35: CPT | Performed by: NURSE PRACTITIONER

## 2024-06-19 PROCEDURE — 25010000002 METHYLPREDNISOLONE PER 40 MG: Performed by: STUDENT IN AN ORGANIZED HEALTH CARE EDUCATION/TRAINING PROGRAM

## 2024-06-19 RX ORDER — CALCIUM CARBONATE 500 MG/1
1 TABLET, CHEWABLE ORAL 3 TIMES DAILY PRN
Status: DISCONTINUED | OUTPATIENT
Start: 2024-06-19 | End: 2024-06-20 | Stop reason: HOSPADM

## 2024-06-19 RX ADMIN — CALCIUM CARBONATE (ANTACID) CHEW TAB 500 MG 1 TABLET: 500 CHEW TAB at 17:16

## 2024-06-19 RX ADMIN — Medication 1 PATCH: at 09:46

## 2024-06-19 RX ADMIN — BUDESONIDE 0.5 MG: 0.5 INHALANT RESPIRATORY (INHALATION) at 09:05

## 2024-06-19 RX ADMIN — ALBUTEROL SULFATE 2.5 MG: 2.5 SOLUTION RESPIRATORY (INHALATION) at 19:24

## 2024-06-19 RX ADMIN — METHADONE HYDROCHLORIDE 65 MG: 10 TABLET ORAL at 09:45

## 2024-06-19 RX ADMIN — Medication 10 ML: at 20:33

## 2024-06-19 RX ADMIN — BUDESONIDE 0.5 MG: 0.5 INHALANT RESPIRATORY (INHALATION) at 19:24

## 2024-06-19 RX ADMIN — TRAZODONE HYDROCHLORIDE 150 MG: 50 TABLET ORAL at 20:32

## 2024-06-19 RX ADMIN — METHYLPREDNISOLONE SODIUM SUCCINATE 40 MG: 40 INJECTION, POWDER, FOR SOLUTION INTRAMUSCULAR; INTRAVENOUS at 05:35

## 2024-06-19 RX ADMIN — CLONAZEPAM 0.5 MG: 0.5 TABLET ORAL at 10:27

## 2024-06-19 RX ADMIN — METHYLPREDNISOLONE SODIUM SUCCINATE 40 MG: 40 INJECTION, POWDER, FOR SOLUTION INTRAMUSCULAR; INTRAVENOUS at 17:16

## 2024-06-19 RX ADMIN — ATORVASTATIN CALCIUM 10 MG: 10 TABLET, FILM COATED ORAL at 09:45

## 2024-06-19 RX ADMIN — Medication 10 ML: at 09:49

## 2024-06-19 NOTE — CASE MANAGEMENT/SOCIAL WORK
DCP return home tomorrow. Met with pt at bedside. States he feels better. Denies any needs. CM continues to follow.

## 2024-06-19 NOTE — TELEPHONE ENCOUNTER
----- Message from BEATRIZ Williamson sent at 6/19/2024  8:12 AM CDT -----  TSH is normal and not contributing to weight gain. Testosterone level is still pending. Triglycerides are up quit a bit. Good cholesterol is still low/stable. Avoid foods with high glycemic index. Get regular exercise and eat more dietary fibers.   The 10-year ASCVD risk score (Brook HOLLOWAY, et al., 2019) is: 0.6%    Values used to calculate the score:      Age: 41 years      Sex: Female      Is Non- : No      Diabetic: No      Tobacco smoker: No      Systolic Blood Pressure: 116 mmHg      Is BP treated: No      HDL Cholesterol: 45 mg/dL      Total Cholesterol: 176 mg/dL     PATIENT INFORMED

## 2024-06-19 NOTE — PLAN OF CARE
Problem: Adult Inpatient Plan of Care  Goal: Plan of Care Review  Outcome: Ongoing, Progressing  Flowsheets  Taken 6/18/2024 2207 by Tamica Sawant RN  Progress: improving  Taken 6/18/2024 1605 by Carole Soto, Nursing Student  Plan of Care Reviewed With: patient  Goal: Patient-Specific Goal (Individualized)  Outcome: Ongoing, Progressing  Goal: Absence of Hospital-Acquired Illness or Injury  Outcome: Ongoing, Progressing  Intervention: Identify and Manage Fall Risk  Recent Flowsheet Documentation  Taken 6/18/2024 2200 by Tamica Sawant RN  Safety Promotion/Fall Prevention: safety round/check completed  Taken 6/18/2024 2000 by Tamica Sawant RN  Safety Promotion/Fall Prevention: safety round/check completed  Intervention: Prevent Skin Injury  Recent Flowsheet Documentation  Taken 6/18/2024 2200 by Tamica Sawant RN  Body Position: position changed independently  Taken 6/18/2024 2000 by Tamica Sawant RN  Body Position: position changed independently  Intervention: Prevent and Manage VTE (Venous Thromboembolism) Risk  Recent Flowsheet Documentation  Taken 6/18/2024 2200 by Tamica Sawant RN  Activity Management: activity minimized  Taken 6/18/2024 2000 by Tamica Sawant RN  Activity Management: activity minimized  Intervention: Prevent Infection  Recent Flowsheet Documentation  Taken 6/18/2024 2200 by Tamica Sawant RN  Infection Prevention: environmental surveillance performed  Taken 6/18/2024 2000 by Tamica Sawant RN  Infection Prevention: environmental surveillance performed  Goal: Optimal Comfort and Wellbeing  Outcome: Ongoing, Progressing  Goal: Readiness for Transition of Care  Outcome: Ongoing, Progressing     Problem: COPD (Chronic Obstructive Pulmonary Disease) Comorbidity  Goal: Maintenance of COPD Symptom Control  Outcome: Ongoing, Progressing     Problem: Hypertension Comorbidity  Goal: Blood Pressure in Desired Range  Outcome: Ongoing, Progressing     Problem: Noninvasive Ventilation Acute  Goal:  Effective Unassisted Ventilation and Oxygenation  Outcome: Ongoing, Progressing   Goal Outcome Evaluation:           Progress: improving

## 2024-06-19 NOTE — PLAN OF CARE
Problem: Adult Inpatient Plan of Care  Goal: Plan of Care Review  Outcome: Ongoing, Progressing  Flowsheets (Taken 6/19/2024 1603)  Progress: improving (Pended)  Plan of Care Reviewed With: patient (Pended)  Outcome Evaluation: Patient stable during shift (Pended)     Problem: Adult Inpatient Plan of Care  Goal: Absence of Hospital-Acquired Illness or Injury  Intervention: Identify and Manage Fall Risk  Recent Flowsheet Documentation  Taken 6/19/2024 1400 by Carole Soto, Nursing Student  Safety Promotion/Fall Prevention:   safety round/check completed   room organization consistent   nonskid shoes/slippers when out of bed   lighting adjusted   clutter free environment maintained   assistive device/personal items within reach   activity supervised  Taken 6/19/2024 1200 by Carole Soto, Nursing Student  Safety Promotion/Fall Prevention:   safety round/check completed   room organization consistent   nonskid shoes/slippers when out of bed   lighting adjusted   clutter free environment maintained   assistive device/personal items within reach   activity supervised  Taken 6/19/2024 1000 by Carole Soto, Nursing Student  Safety Promotion/Fall Prevention:   safety round/check completed   room organization consistent   nonskid shoes/slippers when out of bed   lighting adjusted   clutter free environment maintained   assistive device/personal items within reach   activity supervised  Taken 6/19/2024 0800 by Carole Soto, Nursing Student  Safety Promotion/Fall Prevention:   safety round/check completed   room organization consistent   nonskid shoes/slippers when out of bed   lighting adjusted   clutter free environment maintained   assistive device/personal items within reach   activity supervised  Intervention: Prevent Skin Injury  Recent Flowsheet Documentation  Taken 6/19/2024 1400 by Carole Soto, Nursing Student  Body Position: position changed independently  Taken 6/19/2024 1200 by Carole Soto, Nursing  Student  Body Position: position changed independently  Taken 6/19/2024 1000 by Carole Soto Nursing Student  Body Position: position changed independently  Taken 6/19/2024 0800 by Carole Soto Nursing Student  Body Position:   supine, legs elevated   turned   right  Skin Protection: adhesive use limited  Intervention: Prevent and Manage VTE (Venous Thromboembolism) Risk  Recent Flowsheet Documentation  Taken 6/19/2024 1400 by Carole Soto Nursing Student  Activity Management: activity minimized  Taken 6/19/2024 1200 by Carole Soto Nursing Student  Activity Management: activity minimized  Taken 6/19/2024 1000 by Carole Soto Nursing Student  Activity Management: activity minimized  Taken 6/19/2024 0800 by Carole Soto Nursing Student  Activity Management: activity minimized  Range of Motion: active ROM (range of motion) encouraged  Intervention: Prevent Infection  Recent Flowsheet Documentation  Taken 6/19/2024 1400 by Carole Soto Nursing Student  Infection Prevention:   environmental surveillance performed   single patient room provided  Taken 6/19/2024 1200 by Carole Soto Nursing Student  Infection Prevention:   environmental surveillance performed   single patient room provided  Taken 6/19/2024 1000 by Carole Soto Nursing Student  Infection Prevention:   environmental surveillance performed   single patient room provided  Taken 6/19/2024 0800 by Carole Soto Nursing Student  Infection Prevention:   environmental surveillance performed   single patient room provided  Goal: Optimal Comfort and Wellbeing  Intervention: Provide Person-Centered Care  Recent Flowsheet Documentation  Taken 6/19/2024 0800 by Carole Soto Nursing Student  Trust Relationship/Rapport:   care explained   choices provided   questions encouraged   questions answered   thoughts/feelings acknowledged     Problem: COPD (Chronic Obstructive Pulmonary Disease) Comorbidity  Goal: Maintenance of COPD Symptom  Control  Intervention: Maintain COPD-Symptom Control  Recent Flowsheet Documentation  Taken 6/19/2024 1400 by Carole Soto Nursing Student  Medication Review/Management: medications reviewed  Taken 6/19/2024 1200 by Carole Soto Nursing Student  Medication Review/Management: medications reviewed  Taken 6/19/2024 1000 by Carole Soto Nursing Student  Medication Review/Management: medications reviewed  Taken 6/19/2024 0800 by Carole Soto Nursing Student  Medication Review/Management: medications reviewed     Problem: Hypertension Comorbidity  Goal: Blood Pressure in Desired Range  Intervention: Maintain Blood Pressure Management  Recent Flowsheet Documentation  Taken 6/19/2024 1400 by Carole Soto Nursing Student  Medication Review/Management: medications reviewed  Taken 6/19/2024 1200 by Carole Soto Nursing Student  Medication Review/Management: medications reviewed  Taken 6/19/2024 1000 by Carole Soto Nursing Student  Medication Review/Management: medications reviewed  Taken 6/19/2024 0800 by Carole Soto Nursing Student  Medication Review/Management: medications reviewed   Goal Outcome Evaluation:  Plan of Care Reviewed With: (P) patient        Progress: (P) improving  Outcome Evaluation: (P) Patient stable during shift

## 2024-06-19 NOTE — PROGRESS NOTES
RT EQUIPMENT DEVICE RELATED - SKIN ASSESSMENT    David Score:  David Score: 21     RT Medical Equipment/Device:         Skin Assessment:          Device Skin Pressure Protection:      Nurse Notification:  No    Maikel Sanderson CRT  Patient refuses to wear, no assessment needed

## 2024-06-19 NOTE — PLAN OF CARE
Problem: Noninvasive Ventilation Acute  Goal: Effective Unassisted Ventilation and Oxygenation  Outcome: Ongoing, Progressing   Goal Outcome Evaluation:refuses to wear

## 2024-06-19 NOTE — PROGRESS NOTES
[facility]    Name:  Topher Stoll   Age:  48 y.o.  Sex:  male  :  1975  MRN:  [id number]   Visit Number:  [id number]  Admission Date:  2024  Date Of Service:  24  Primary Care Physician:  Joshua Hoffmann MD     LOS: 2 days :    Chief Complaint:      Shortness of air/cough    Subjective:    Patient seen and examined.  On 2 L nasal cannula.  States significant wheezing noted still.  However, is feeling some improved.  He does take Breztri at home for COPD.  Otherwise denies any further concerns.    Hospital Course:    Topher Stoll is a 48-year-old man with past medical history of COPD on 2 L baseline, asthma, hypertension, hyperlipidemia, coronary artery disease, history of hep C with treatment 2019, hep B, anxiety, cervical radiculopathy, GERD, GUILLE, migraines, on methadone.  Presented to [facility] ED on 2024 with concern for shortness of air with nonproductive cough progressively worse over the last week.  Recently had ED evaluation was given antibiotics and prednisone but he continued to worsen.  Denied fevers or chills, chest pain, abdominal pain, N/V/D, unilateral leg swelling or pain.     ED summary: Afebrile, hypertensive which improved, other vital signs stable on his baseline 2 L.  Did have significant increased work of breathing, BiPAP was trialed but he did not tolerate it.  ABG pH 7.34, pCO2 62, pO2 64, bicarb 33.  EKG sinus rhythm, significant artifact present.  Troponin not elevated.  proBNP not elevated.  Blood glucose 124.  Lactate 1.2.  Procalcitonin not elevated.  White count 11.  COVID/flu negative.  CXR large lung volumes, appears clear, radiology interpretation pending.  He was provided albuterol, Atrovent, Ativan, magnesium, Solu-Medrol.  Patient continued on supportive care with supplemental O2/Solu-Medrol/bronchodilators/flutter device.  Otherwise reassuring labs and vitals noted.    Review of Systems:     All systems were  reviewed and negative except as mentioned in subjective, assessment and plan.    Vital Signs:    Temp:  [97.9 °F (36.6 °C)-98.3 °F (36.8 °C)] 97.9 °F (36.6 °C)  Heart Rate:  [73-95] 92  Resp:  [16-18] 16  BP: (103-144)/(75-99) 137/99    Intake and output:    I/O last 3 completed shifts:  In: 1440 [P.O.:1440]  Out: 4100 [Urine:4100]  I/O this shift:  In: 240 [P.O.:240]  Out: -     Physical Examination:    General Appearance:  Alert and cooperative.  Chronically ill middle-age male.  No acute distress noted.   Head:  Atraumatic and normocephalic.   Eyes: Conjunctivae and sclerae normal, no icterus. No pallor.   Throat: No oral lesions, no thrush, oral mucosa moist.   Neck: Supple, trachea midline, no thyromegaly.   Lungs:   Breath sounds heard bilaterally equally.  On 2 L nasal cannula with wheezing noted throughout.  Mildly improved.   Heart:  Normal S1 and S2, no murmur, no gallop, no rub. No JVD.   Abdomen:   Normal bowel sounds, no masses, no organomegaly. Soft, nontender, nondistended, no rebound tenderness.  Large vertical abdominal scar noted.   Extremities: Supple, no edema, no cyanosis, no clubbing.   Skin: No bleeding or rash.   Neurologic: Alert and oriented x 3. No facial asymmetry. Moves all four limbs. No tremors.      Laboratory results:    Results from last 7 days   Lab Units 06/19/24  0653 06/18/24  0747 06/17/24  0544 06/16/24  2055   SODIUM mmol/L 136 137 140 138   POTASSIUM mmol/L 4.9 4.6 4.7 3.7   CHLORIDE mmol/L 98 98 102 98   CO2 mmol/L 29.3* 31.3* 30.1* 31.6*   BUN mg/dL 23* 14 8 9   CREATININE mg/dL 0.97 0.85 0.81 0.89   CALCIUM mg/dL 9.1 9.2 8.7 8.4*   BILIRUBIN mg/dL  --   --   --  <0.2   ALK PHOS U/L  --   --   --  110   ALT (SGPT) U/L  --   --   --  17   AST (SGOT) U/L  --   --   --  19   GLUCOSE mg/dL 118* 123* 145* 124*     Results from last 7 days   Lab Units 06/19/24  0653 06/18/24  0747 06/17/24  0544   WBC 10*3/mm3 18.06* 16.50* 8.48   HEMOGLOBIN g/dL 15.5 14.7 14.3   HEMATOCRIT %  48.3 45.9 44.3   PLATELETS 10*3/mm3 220 217 183         Results from last 7 days   Lab Units 06/16/24  2055   HSTROP T ng/L 8         Recent Labs     12/06/23  1926 06/06/24  1214 06/16/24  2104   PHART 7.378 7.386 7.341   SKJ8LTH 54.7* 47.4* 62.5*   PO2ART 76.0 57.9* 64.5*   JAP8LRA 32.2* 28.4* 33.8*   BASEEXCESS 5.5* 2.4* 5.7*      I have reviewed the patient's laboratory results.    Radiology results:    No radiology results from the last 24 hrs  I have reviewed the patient's radiology reports.    Medication Review:     I have reviewed the patient's active and prn medications.     Problem List:      COPD with acute exacerbation    Chronic respiratory failure      Assessment:    Acute COPD exacerbation, POA  Chronic hypoxic respiratory failure on 2 L continuously  Hypertension  Hyperlipidemia  CAD  History of hepatitis C status posttreatment  Hepatitis B  Anxiety  GERD  Chronic opiate abuse on methadone  GUILLE    Plan:    Acute COPD exacerbation  -Continue supplemental oxygen as necessary maintain saturation greater than 88%.  -Patient on 2 L chronically.  -Solu-Medrol 40 mg every 12, bronchodilators, flutter device-continue for another day.  -PCR panel negative  -No indication for antibiotics at this time  -Otherwise reassuring labs and vitals noted.    I have reviewed the copied text and it is accurate as of 6/19/2024         DVT Prophylaxis: Lovenox  Code Status: Full  Diet: As tolerated  Discharge Plan: Likely home tomorrow.    Carmen Costa, APRN  06/19/24  10:54 EDT    Dictated utilizing Dragon dictation.

## 2024-06-19 NOTE — PROGRESS NOTES
"Dietitian Follow-up    Patient Name: Topher Stoll  YOB: 1975  MRN: [id number]  Admission date: 6/16/2024    Comment:      Clinical Nutrition Follow-up   Encounter Information        Trending Narrative     6/19: Pt with good PO intake. ONS being provided to help meet estimated needs.     6/17: Pt w/ MST score of 4 - reviewed wt history and no significant wt loss noted. PO intake averaging 25% x 1 meal. Will order supplementation to provide additional calories and protein.      Anthropometrics        Current Height, Weight Height: 185.4 cm (73\")  Weight: 88.5 kg (195 lb 1.7 oz) (06/19/24 0358)       Trending Weight Hx     This admission:              PTA:     Wt Readings from Last 30 Encounters:   06/19/24 0358 88.5 kg (195 lb 1.7 oz)   06/16/24 2333 80.7 kg (177 lb 14.6 oz)   06/16/24 2029 90.1 kg (198 lb 9.6 oz)   06/06/24 1152 88.5 kg (195 lb)   04/29/24 1106 90.7 kg (200 lb)   04/27/24 1019 95.3 kg (210 lb)   03/19/24 1159 95.3 kg (210 lb)   03/13/24 1658 90.7 kg (200 lb)   02/17/24 1259 90.7 kg (200 lb)   01/29/24 1019 92 kg (202 lb 12.8 oz)   12/18/23 1015 92.1 kg (203 lb)   12/12/23 1032 91.2 kg (201 lb)   12/09/23 0500 90.7 kg (199 lb 15.3 oz)   12/08/23 0400 90.4 kg (199 lb 4.7 oz)   12/07/23 0244 91.1 kg (200 lb 13.4 oz)   12/07/23 0112 91.1 kg (200 lb 13.4 oz)   12/06/23 2205 91.1 kg (200 lb 13.4 oz)   12/06/23 1748 90.7 kg (200 lb)   11/22/23 0939 90.7 kg (200 lb)   10/24/23 1456 90.7 kg (200 lb)   09/16/23 2051 90.7 kg (200 lb)   09/05/23 0947 83.5 kg (184 lb)   09/01/23 0838 81.6 kg (179 lb 12.8 oz)   08/31/23 1241 82.1 kg (181 lb)   08/29/23 0500 82.5 kg (181 lb 14.1 oz)   08/28/23 0406 81.9 kg (180 lb 8.9 oz)   08/27/23 1222 83.9 kg (184 lb 15.5 oz)   08/27/23 1001 81.6 kg (180 lb)   08/23/23 1052 81.6 kg (180 lb)   08/16/23 0934 81.6 kg (180 lb)   07/28/23 1358 79.8 kg (176 lb)   07/11/23 0900 80.6 kg (177 lb 9.6 oz)   06/13/23 0915 81.6 kg (180 lb)   05/24/23 0341 83.1 kg (183 lb " 3.2 oz)   05/23/23 0336 83.1 kg (183 lb 3.2 oz)   05/22/23 0917 82.8 kg (182 lb 8.7 oz)   05/22/23 0349 82.8 kg (182 lb 8.7 oz)   05/21/23 0912 82.7 kg (182 lb 5.1 oz)   05/21/23 0212 86.2 kg (190 lb)   05/04/23 1559 87.5 kg (193 lb)   04/30/23 0700 85.4 kg (188 lb 4.4 oz)   04/27/23 2342 87.3 kg (192 lb 7.4 oz)   04/27/23 1802 86.2 kg (190 lb)   04/22/23 0439 86.2 kg (190 lb)   04/14/23 1559 86.2 kg (190 lb)   01/11/23 1008 90.9 kg (200 lb 6.4 oz)   01/06/23 0447 90.5 kg (199 lb 8.3 oz)   01/05/23 0509 90.5 kg (199 lb 8.3 oz)   01/04/23 1820 92.7 kg (204 lb 5.9 oz)   01/04/23 0001 95.3 kg (210 lb)      BMI kg/m2 Body mass index is 25.74 kg/m².     Labs        Pertinent Labs Results from last 7 days   Lab Units 06/19/24  0653 06/18/24  0747 06/17/24  0544 06/16/24  2055   SODIUM mmol/L 136 137 140 138   POTASSIUM mmol/L 4.9 4.6 4.7 3.7   CHLORIDE mmol/L 98 98 102 98   CO2 mmol/L 29.3* 31.3* 30.1* 31.6*   BUN mg/dL 23* 14 8 9   CREATININE mg/dL 0.97 0.85 0.81 0.89   CALCIUM mg/dL 9.1 9.2 8.7 8.4*   BILIRUBIN mg/dL  --   --   --  <0.2   ALK PHOS U/L  --   --   --  110   ALT (SGPT) U/L  --   --   --  17   AST (SGOT) U/L  --   --   --  19   GLUCOSE mg/dL 118* 123* 145* 124*     Results from last 7 days   Lab Units 06/19/24  0653   HEMOGLOBIN g/dL 15.5   HEMATOCRIT % 48.3         Medications    Scheduled Medications atorvastatin, 10 mg, Oral, Daily  budesonide, 0.5 mg, Nebulization, BID - RT  enoxaparin, 40 mg, Subcutaneous, Daily  methadone, 65 mg, Oral, Daily  methylPREDNISolone sodium succinate, 40 mg, Intravenous, Q12H  nicotine, 1 patch, Transdermal, Q24H  pantoprazole, 40 mg, Oral, Q AM  sodium chloride, 10 mL, Intravenous, Q12H  traZODone, 150 mg, Oral, Nightly        Infusions      PRN Medications   acetaminophen **OR** acetaminophen **OR** acetaminophen    albuterol    Calcium Replacement - Follow Nurse / BPA Driven Protocol    clonazePAM    guaiFENesin-dextromethorphan    ipratropium-albuterol    Magnesium  Standard Dose Replacement - Follow Nurse / BPA Driven Protocol    ondansetron    Phosphorus Replacement - Follow Nurse / BPA Driven Protocol    Potassium Replacement - Follow Nurse / BPA Driven Protocol    sodium chloride    sodium chloride    sodium chloride     Physical Findings        Trending Physical   Appearance, NFPE    --  Edema  None noted     Bowel Function LBM: PTA     Tubes Peripheral IV     Chewing/Swallowing WNL     Skin WNL     --  Current Nutrition Orders & Evaluation of Intake       Oral Nutrition     Food Allergies NKFA   Current PO Diet Diet: Cardiac; Healthy Heart (2-3 Na+); Fluid Consistency: Thin (IDDSI 0)   Supplement    PO Evaluation     Trending % PO Intake 87.5% x 4 meals     Nutrition Diagnosis         Nutrition Dx Problem 1 Increased estimated needs r/t COPD/chronic respiratory failure as evidenced by need for oral nutrition supplement       Nutrition Dx Problem 2        Intervention Goal         Intervention Goal(s) Maintain CBW  PO intake meet >50% of estimated needs  Adhere to ONS     Nutrition Intervention        RD Action No action at this time     Nutrition Prescription          Diet Prescription HH   Supplement Prescription [facility]   Enteral Nutrition Prescription     TPN Prescription       Monitor/Evaluation        Monitor Per protocol, I&O, PO intake, Supplement intake, Pertinent labs, Weight, Skin status, GI status, Symptoms, POC/GOC, Swallow function, Hemodynamic stability     RD to f/up    Electronically signed by:  Estelle Griffith RD  06/19/24 10:14 EDT

## 2024-06-20 ENCOUNTER — READMISSION MANAGEMENT (OUTPATIENT)
Dept: CALL CENTER | Facility: HOSPITAL | Age: 49
End: 2024-06-20
Payer: MEDICARE

## 2024-06-20 VITALS
DIASTOLIC BLOOD PRESSURE: 110 MMHG | BODY MASS INDEX: 25.27 KG/M2 | RESPIRATION RATE: 17 BRPM | HEART RATE: 83 BPM | WEIGHT: 190.7 LBS | TEMPERATURE: 98.4 F | OXYGEN SATURATION: 90 % | SYSTOLIC BLOOD PRESSURE: 153 MMHG | HEIGHT: 73 IN

## 2024-06-20 LAB
ANION GAP SERPL CALCULATED.3IONS-SCNC: 10.6 MMOL/L (ref 5–15)
BASOPHILS # BLD AUTO: 0.03 10*3/MM3 (ref 0–0.2)
BASOPHILS NFR BLD AUTO: 0.2 % (ref 0–1.5)
BUN SERPL-MCNC: 18 MG/DL (ref 6–20)
BUN/CREAT SERPL: 20.7 (ref 7–25)
CALCIUM SPEC-SCNC: 9.2 MG/DL (ref 8.6–10.5)
CHLORIDE SERPL-SCNC: 99 MMOL/L (ref 98–107)
CO2 SERPL-SCNC: 28.4 MMOL/L (ref 22–29)
CREAT SERPL-MCNC: 0.87 MG/DL (ref 0.76–1.27)
DEPRECATED RDW RBC AUTO: 42.4 FL (ref 37–54)
EGFRCR SERPLBLD CKD-EPI 2021: 105.8 ML/MIN/1.73
EOSINOPHIL # BLD AUTO: 0.01 10*3/MM3 (ref 0–0.4)
EOSINOPHIL NFR BLD AUTO: 0.1 % (ref 0.3–6.2)
ERYTHROCYTE [DISTWIDTH] IN BLOOD BY AUTOMATED COUNT: 13.2 % (ref 12.3–15.4)
GLUCOSE SERPL-MCNC: 120 MG/DL (ref 65–99)
HCT VFR BLD AUTO: 48.9 % (ref 37.5–51)
HGB BLD-MCNC: 15.4 G/DL (ref 13–17.7)
IMM GRANULOCYTES # BLD AUTO: 0.11 10*3/MM3 (ref 0–0.05)
IMM GRANULOCYTES NFR BLD AUTO: 0.6 % (ref 0–0.5)
LYMPHOCYTES # BLD AUTO: 1.25 10*3/MM3 (ref 0.7–3.1)
LYMPHOCYTES NFR BLD AUTO: 7.3 % (ref 19.6–45.3)
MCH RBC QN AUTO: 27.4 PG (ref 26.6–33)
MCHC RBC AUTO-ENTMCNC: 31.5 G/DL (ref 31.5–35.7)
MCV RBC AUTO: 87 FL (ref 79–97)
MONOCYTES # BLD AUTO: 0.57 10*3/MM3 (ref 0.1–0.9)
MONOCYTES NFR BLD AUTO: 3.3 % (ref 5–12)
NEUTROPHILS NFR BLD AUTO: 15.05 10*3/MM3 (ref 1.7–7)
NEUTROPHILS NFR BLD AUTO: 88.5 % (ref 42.7–76)
NRBC BLD AUTO-RTO: 0 /100 WBC (ref 0–0.2)
PLATELET # BLD AUTO: 208 10*3/MM3 (ref 140–450)
PMV BLD AUTO: 9.9 FL (ref 6–12)
POTASSIUM SERPL-SCNC: 4.8 MMOL/L (ref 3.5–5.2)
RBC # BLD AUTO: 5.62 10*6/MM3 (ref 4.14–5.8)
SODIUM SERPL-SCNC: 138 MMOL/L (ref 136–145)
WBC NRBC COR # BLD AUTO: 17.02 10*3/MM3 (ref 3.4–10.8)

## 2024-06-20 PROCEDURE — 94664 DEMO&/EVAL PT USE INHALER: CPT

## 2024-06-20 PROCEDURE — 94799 UNLISTED PULMONARY SVC/PX: CPT

## 2024-06-20 PROCEDURE — 25010000002 METHYLPREDNISOLONE PER 40 MG: Performed by: STUDENT IN AN ORGANIZED HEALTH CARE EDUCATION/TRAINING PROGRAM

## 2024-06-20 PROCEDURE — 85025 COMPLETE CBC W/AUTO DIFF WBC: CPT | Performed by: STUDENT IN AN ORGANIZED HEALTH CARE EDUCATION/TRAINING PROGRAM

## 2024-06-20 PROCEDURE — 99239 HOSP IP/OBS DSCHRG MGMT >30: CPT | Performed by: NURSE PRACTITIONER

## 2024-06-20 PROCEDURE — 94761 N-INVAS EAR/PLS OXIMETRY MLT: CPT

## 2024-06-20 PROCEDURE — 94660 CPAP INITIATION&MGMT: CPT

## 2024-06-20 PROCEDURE — 80048 BASIC METABOLIC PNL TOTAL CA: CPT | Performed by: STUDENT IN AN ORGANIZED HEALTH CARE EDUCATION/TRAINING PROGRAM

## 2024-06-20 RX ORDER — PREDNISONE 10 MG/1
TABLET ORAL
Qty: 42 TABLET | Refills: 0 | Status: SHIPPED | OUTPATIENT
Start: 2024-06-20 | End: 2024-07-02

## 2024-06-20 RX ORDER — TRAZODONE HYDROCHLORIDE 300 MG/1
300 TABLET ORAL NIGHTLY
Start: 2024-06-20

## 2024-06-20 RX ADMIN — METHADONE HYDROCHLORIDE 65 MG: 10 TABLET ORAL at 08:46

## 2024-06-20 RX ADMIN — BUDESONIDE 0.5 MG: 0.5 INHALANT RESPIRATORY (INHALATION) at 07:37

## 2024-06-20 RX ADMIN — Medication 1 PATCH: at 08:46

## 2024-06-20 RX ADMIN — METHYLPREDNISOLONE SODIUM SUCCINATE 40 MG: 40 INJECTION, POWDER, FOR SOLUTION INTRAMUSCULAR; INTRAVENOUS at 06:01

## 2024-06-20 RX ADMIN — Medication 10 ML: at 08:47

## 2024-06-20 NOTE — PLAN OF CARE
Problem: Adult Inpatient Plan of Care  Goal: Plan of Care Review  Outcome: Ongoing, Progressing  Flowsheets  Taken 6/19/2024 2048 by Tamica Sawant RN  Progress: improving  Taken 6/19/2024 1603 by Carole Soto, Nursing Student  Plan of Care Reviewed With: patient  Goal: Patient-Specific Goal (Individualized)  Outcome: Ongoing, Progressing  Goal: Absence of Hospital-Acquired Illness or Injury  Outcome: Ongoing, Progressing  Intervention: Identify and Manage Fall Risk  Recent Flowsheet Documentation  Taken 6/19/2024 2000 by Tamica Sawant RN  Safety Promotion/Fall Prevention: safety round/check completed  Intervention: Prevent Skin Injury  Recent Flowsheet Documentation  Taken 6/19/2024 2000 by Tamica Sawant RN  Body Position: position changed independently  Intervention: Prevent and Manage VTE (Venous Thromboembolism) Risk  Recent Flowsheet Documentation  Taken 6/19/2024 2000 by Tamica Sawant RN  Activity Management: activity minimized  Goal: Optimal Comfort and Wellbeing  Outcome: Ongoing, Progressing  Goal: Readiness for Transition of Care  Outcome: Ongoing, Progressing     Problem: COPD (Chronic Obstructive Pulmonary Disease) Comorbidity  Goal: Maintenance of COPD Symptom Control  Outcome: Ongoing, Progressing  Intervention: Maintain COPD-Symptom Control  Recent Flowsheet Documentation  Taken 6/19/2024 2000 by Tamica Sawant RN  Medication Review/Management: medications reviewed     Problem: Hypertension Comorbidity  Goal: Blood Pressure in Desired Range  Outcome: Ongoing, Progressing  Intervention: Maintain Blood Pressure Management  Recent Flowsheet Documentation  Taken 6/19/2024 2000 by Tamica Sawant RN  Medication Review/Management: medications reviewed     Problem: Noninvasive Ventilation Acute  Goal: Effective Unassisted Ventilation and Oxygenation  Outcome: Ongoing, Progressing   Goal Outcome Evaluation:           Progress: improving

## 2024-06-20 NOTE — OUTREACH NOTE
Prep Survey      Flowsheet Row Responses   [facility] facility patient discharged from? [place]   Is LACE score < 7 ? No   Eligibility [facility]   Date of Admission 06/16/24   Date of Discharge 06/20/24   Discharge Disposition Home or Self Care   Discharge diagnosis COPD with acute exacerbation   Does the patient have one of the following disease processes/diagnoses(primary or secondary)? COPD   Does the patient have Home health ordered? No   Is there a DME ordered? Yes   What DME was ordered? [facility] - for oxygen   Prep survey completed? Yes            KAYLA MOULTON - Registered Nurse

## 2024-06-20 NOTE — PROGRESS NOTES
Enter Query Response Below      Query Response:  Opioid use disorder on methadone           If applicable, please update the problem list.   Patient: Topher Stoll        : 1975  Account: [id number]           Admit Date: 2024        How to Respond to this query:       a. Click New Note     b. Answer query within the yellow box.                c. Update the Problem List, if applicable.      If you have any questions about this query contact me at: [email or web address]      Ms. Costa,    Patient with a history of recreational drug use noted to have chronic opiate abuse on methadone.  Patient's home dose of methadone continued this hospitalization.    Please clarify the following:    Methadone/opioid Abuse  Methadone/opioid Dependence  Other- specify______  Unable to determine      By submitting this query, we are merely seeking further clarification of documentation to accurately reflect all conditions that you are monitoring, evaluating, treating or that extend the hospitalization or utilize additional resources of care. Please utilize your independent clinical judgment when addressing the question(s) above.     This query and your response, once completed, will be entered into the legal medical record.    Sincerely,  Jeri Martinez, BS, BSN, RN, CCDS   Clinical Documentation Integrity Program

## 2024-06-20 NOTE — DISCHARGE SUMMARY
[facility]   DISCHARGE SUMMARY      Name:  Topher Stoll   Age:  48 y.o.  Sex:  male  :  1975  MRN:  [id number]   Visit Number:  [id number]    Admission Date:  2024  Date of Discharge:  2024  Primary Care Physician:  Joshua Hoffmann MD    Important issues to note:    -Patient admitted with acute on chronic COPD.  Continued on 2 L throughout hospitalization.  -Otherwise reassuring labs and vitals noted.  -Patient to follow-up with COPD clinic outpatient.  -Patient states he does take inhaled corticosteroid at home.  Does not recall name.  Takes twice daily.  Advised to confirm with COPD clinic.  -Patient continued on 12-day taper course of prednisone.  -Strict return precautions given.    Discharge Diagnoses:     Acute COPD exacerbation, POA  Chronic hypoxic respiratory failure on 2 L continuously  Hypertension  Hyperlipidemia  CAD  History of hepatitis C status posttreatment  Hepatitis B  Anxiety  GERD  Chronic opiate abuse on methadone  GUILLE    Problem List:     Active Hospital Problems    Diagnosis  POA    **COPD with acute exacerbation [J44.1]  Yes    Chronic respiratory failure [J96.10]  Yes      Resolved Hospital Problems   No resolved problems to display.     Presenting Problem:    Chief Complaint   Patient presents with    Shortness of Breath      Consults:     Consulting Physician(s)                     None              Procedures Performed:        History of presenting illness/Hospital Course:      Topher Stoll is a 48-year-old man with past medical history of COPD on 2 L baseline, asthma, hypertension, hyperlipidemia, coronary artery disease, history of hep C with treatment 2019, hep B, anxiety, cervical radiculopathy, GERD, GUILLE, migraines, on methadone.  Presented to [facility] ED on 2024 with concern for shortness of air with nonproductive cough progressively worse over the last week.  Recently had ED evaluation was given antibiotics and prednisone but he  continued to worsen.  Denied fevers or chills, chest pain, abdominal pain, N/V/D, unilateral leg swelling or pain.     ED summary: Afebrile, hypertensive which improved, other vital signs stable on his baseline 2 L.  Did have significant increased work of breathing, BiPAP was trialed but he did not tolerate it.  ABG pH 7.34, pCO2 62, pO2 64, bicarb 33.  EKG sinus rhythm, significant artifact present.  Troponin not elevated.  proBNP not elevated.  Blood glucose 124.  Lactate 1.2.  Procalcitonin not elevated.  White count 11.  COVID/flu negative.  CXR large lung volumes, appears clear, radiology interpretation pending.  He was provided albuterol, Atrovent, Ativan, magnesium, Solu-Medrol.  Patient continued on supportive care with supplemental O2/Solu-Medrol/bronchodilators/flutter device.  Otherwise reassuring labs and vitals noted.  Patient to follow-up with COPD clinic outpatient.  Patient discharged home on 12-day taper of prednisone.  Strict return precautions given.    Vital Signs:    Temp:  [98 °F (36.7 °C)-98.6 °F (37 °C)] 98.4 °F (36.9 °C)  Heart Rate:  [58-90] 83  Resp:  [16-18] 17  BP: (115-153)/() 153/110    Physical Exam:    General Appearance:  Alert and cooperative.  Chronically ill middle-age male.   Head:  Atraumatic and normocephalic.   Eyes: Conjunctivae and sclerae normal, no icterus. No pallor.   Ears:  Ears with no abnormalities noted.   Throat: No oral lesions, no thrush, oral mucosa moist.   Neck: Supple, trachea midline, no thyromegaly.   Back:   No kyphoscoliosis present. No tenderness to palpation.   Lungs:   Breath sounds heard bilaterally equally.  Mild coarseness and wheezing throughout.  Unlabored on 2 L nasal cannula which is baseline.   Heart:  Normal S1 and S2, no murmur, no gallop, no rub. No JVD.   Abdomen:   Normal bowel sounds, no masses, no organomegaly. Soft, nontender, nondistended, no rebound tenderness.   Extremities: Supple, no edema, no cyanosis, no clubbing.   Pulses:  Pulses palpable bilaterally.   Skin: No bleeding or rash.  Large vertical abdominal scar noted.   Neurologic: Alert and oriented x 3. No facial asymmetry. Moves all four limbs. No tremors.     Pertinent Lab Results:     Results from last 7 days   Lab Units 06/20/24  0710 06/19/24  0653 06/18/24  0747 06/17/24  0544 06/16/24 2055   SODIUM mmol/L 138 136 137   < > 138   POTASSIUM mmol/L 4.8 4.9 4.6   < > 3.7   CHLORIDE mmol/L 99 98 98   < > 98   CO2 mmol/L 28.4 29.3* 31.3*   < > 31.6*   BUN mg/dL 18 23* 14   < > 9   CREATININE mg/dL 0.87 0.97 0.85   < > 0.89   CALCIUM mg/dL 9.2 9.1 9.2   < > 8.4*   BILIRUBIN mg/dL  --   --   --   --  <0.2   ALK PHOS U/L  --   --   --   --  110   ALT (SGPT) U/L  --   --   --   --  17   AST (SGOT) U/L  --   --   --   --  19   GLUCOSE mg/dL 120* 118* 123*   < > 124*    < > = values in this interval not displayed.     Results from last 7 days   Lab Units 06/20/24  0710 06/19/24  0653 06/18/24  0747   WBC 10*3/mm3 17.02* 18.06* 16.50*   HEMOGLOBIN g/dL 15.4 15.5 14.7   HEMATOCRIT % 48.9 48.3 45.9   PLATELETS 10*3/mm3 208 220 217         Results from last 7 days   Lab Units 06/16/24  2055   HSTROP T ng/L 8     Results from last 7 days   Lab Units 06/16/24  2055   PROBNP pg/mL 247.9             Results from last 7 days   Lab Units 06/16/24  2104   PH, ARTERIAL pH units 7.341   PO2 ART mm Hg 64.5*   PCO2, ARTERIAL mm Hg 62.5*   HCO3 ART mmol/L 33.8*           Pertinent Radiology Results:    Imaging Results (All)       Procedure Component Value Units Date/Time    XR Chest 1 View [[id number]] Collected: 06/17/24 0822     Updated: 06/17/24 0825    Narrative:      PORTABLE CHEST  6/16/2024 9:00 PM     HISTORY: Shortness of breath     COMPARISON:   None     FINDINGS: The cardiac silhouette is normal in size. The mediastinal and  hilar contours are unremarkable.  The lungs are clear. There is no  pneumothorax. The visualized osseous structures demonstrate no acute  abnormalities.       Impression:       No acute cardiopulmonary process.                       This report was signed and finalized on 6/17/2024 8:23 AM by Primitivo Dempsey MD.               Echo:    Results for orders placed during the hospital encounter of 05/21/23    Adult Transthoracic Echo Complete W/ Cont if Necessary Per Protocol    Interpretation Summary    Left ventricular systolic function is normal. Estimated left ventricular EF = 65% Left ventricular ejection fraction appears to be 61 - 65%.    Left ventricular diastolic function was normal.    Condition on Discharge:      Stable.    Code status during the hospital stay:    Code Status and Medical Interventions:   Ordered at: 06/16/24 9999     Code Status (Patient has no pulse and is not breathing):    CPR (Attempt to Resuscitate)     Medical Interventions (Patient has pulse or is breathing):    Full Support     Discharge Disposition:    Home or Self Care    Discharge Medications:       Discharge Medications        New Medications        Instructions Start Date   Budeson-Glycopyrrol-Formoterol 160-9-4.8 MCG/ACT aerosol inhaler  Commonly known as: BREZTRI   2 puffs, Inhalation, 2 Times Daily      predniSONE 10 MG tablet  Commonly known as: DELTASONE   Take 6 tablets by mouth Daily for 2 days, THEN 5 tablets Daily for 2 days, THEN 4 tablets Daily for 2 days, THEN 3 tablets Daily for 2 days, THEN 2 tablets Daily for 2 days, THEN 1 tablet Daily for 2 days.   Start Date: June 20, 2024            Continue These Medications        Instructions Start Date   albuterol sulfate  (90 Base) MCG/ACT inhaler  Commonly known as: PROVENTIL HFA;VENTOLIN HFA;PROAIR HFA   2 puffs, Inhalation, Every 4 Hours PRN      fluticasone 50 MCG/ACT nasal spray  Commonly known as: FLONASE   INSTILL 2 SPRAYS INOT THE NOSTRILS AS DIRECTED BY PROVIDER DAILY      ipratropium-albuterol 0.5-2.5 mg/3 ml nebulizer  Commonly known as: DUO-NEB   3 mL, Nebulization, Every 4 Hours PRN      lovastatin 40 MG  tablet  Commonly known as: MEVACOR   TAKE 1 TABLET BY MOUTH EVERY DAY FOR CHOLESTEROL      methadone 5 MG/5ML solution  Commonly known as: DOLOPHINE   70 mg, Oral, Daily      omeprazole 40 MG capsule  Commonly known as: priLOSEC   40 mg, Oral, Daily      promethazine 12.5 MG tablet  Commonly known as: PHENERGAN   Take 1-2 tablets by mouth every 6-8 hours as needed for nausea and vomiting. Caution advised due to drowsiness.      traZODone 300 MG tablet  Commonly known as: DESYREL   300 mg, Oral, Nightly             Discharge Diet:     Diet Instructions       Diet: Cardiac Diets; Healthy Heart (2-3 Na+); Thin (IDDSI 0)      Discharge Diet: Cardiac Diets    Cardiac Diet: Healthy Heart (2-3 Na+)    Fluid Consistency: Thin (IDDSI 0)          Activity at Discharge:     Activity Instructions       Activity as Tolerated            Follow-up Appointments:    Additional Instructions for the Follow-ups that You Need to Schedule       Ambulatory Referral to Disease State Management   As directed      To dept: [facility] [[id number]]   What program(s) are you referring for?: COPD   Follow-up needed: Yes               Contact information for follow-up providers       Joshua Hoffmann MD Follow up in 1 week(s).    Specialty: Internal Medicine  Why: Hospital Follow up on Thursday June 27, 2024 at 10:30AM.  Contact information:  [address]  [facility] [address] [phone]               [facility] Follow up.    Specialty: Pharmacy  Why: Hospital Follow up on July 2, 2024 at 9:30AM.  Contact information:  [address]  [facility] [address] [phone]                     Contact information for after-discharge care       Durable Medical Equipment       [facility] .    Service: Oxygen Equipment and Accessories  Contact information:  [address]  [facility] [address] [phone]                                 Future Appointments    Date Time Provider Department [place]   6/27/2024 10:30 AM Joshua Hoffmann MD [facility]   7/2/2024  9:30 AM [facility]   8/30/2024  9:00 AM Destiny Mcknight APRN [facility]     Test Results Pending at Discharge:           LISA Lora  06/20/24  11:06 EDT    Time: I spent 45 minutes on this discharge activity which included: face-to-face encounter with the patient, reviewing the data in the system, coordination of the care with the nursing staff as well as consultants, documentation, and entering orders.     Dictated utilizing Dragon dictation.

## 2024-06-20 NOTE — DISCHARGE INSTRUCTIONS
Patient to be discharged home.  Continue prednisone taper as ordered.  Follow with COPD clinic.  Follow with PCP as scheduled.  Return for worsening symptoms.  
no swelling/fingers/toes warm to touch/no cyanosis of extremity/capillary refill time < 2 seconds/no paresthesia

## 2024-06-20 NOTE — PROGRESS NOTES
RT EQUIPMENT DEVICE RELATED - SKIN ASSESSMENT    David Score:  David Score: 23     RT Medical Equipment/Device:         Skin Assessment:          Device Skin Pressure Protection:      Nurse Notification:      Maikel Sanderson, CRT  No assessment needed, refuses to wear

## 2024-06-20 NOTE — PLAN OF CARE
Goal Outcome Evaluation: Patient refused Bipap last night.

## 2024-06-20 NOTE — PLAN OF CARE
Problem: Noninvasive Ventilation Acute  Goal: Effective Unassisted Ventilation and Oxygenation  Outcome: Ongoing, Progressing   Goal Outcome Evaluation:         Refuses to wear

## 2024-06-20 NOTE — CASE MANAGEMENT/SOCIAL WORK
Case Management Discharge Note      Final Note: Pt discharged home with mother via private car. Mr. Stoll is a current pt with the COPD clinic and will continue with them. Pt receives O2 through [facility], mother brought in his portable inogin for the drive home. No other needs at this time.         Selected Continued Care - Discharged on 6/20/2024 Admission date: 6/16/2024 - Discharge disposition: Home or Self Care      Destination    No services have been selected for the patient.                Durable Medical Equipment Coordination complete.      Service Provider Selected Services Address Phone Fax Patient Preferred    [facility] Oxygen Equipment and Accessories [address] [id number] [phone] --       Internal Comment last updated by Dylon Avalos RN 6/20/2024 1108    Receives home O2 and supplies through [facility]                         Dialysis/Infusion    No services have been selected for the patient.                Home Medical Care    No services have been selected for the patient.                Therapy    No services have been selected for the patient.                Community Resources    No services have been selected for the patient.                Community & DME    No services have been selected for the patient.                    Selected Continued Care - Episodes Includes continued care and service providers with selected services from the active episodes listed below      Asthma Episode start date: 6/29/2023   There are no active outsourced providers for this episode.                 Transportation Services  Private: Car    Final Discharge Disposition Code: 01 - home or self-care

## 2024-06-21 ENCOUNTER — TRANSITIONAL CARE MANAGEMENT TELEPHONE ENCOUNTER (OUTPATIENT)
Dept: CALL CENTER | Facility: HOSPITAL | Age: 49
End: 2024-06-21
Payer: MEDICARE

## 2024-06-21 NOTE — OUTREACH NOTE
Call Center TCM Note      Flowsheet Row Responses   [facility] facility patient discharged from? [facility]   Does the patient have one of the following disease processes/diagnoses(primary or secondary)? COPD   TCM attempt successful? No  [VR- Mother]   Unsuccessful attempts Attempt 1   Discharge diagnosis COPD with acute exacerbation            Fay Tripp RN    6/21/2024, 09:04 EDT

## 2024-06-21 NOTE — OUTREACH NOTE
Call Center TCM Note      Flowsheet Row Responses   [facility] patient discharged from? [facility]   Does the patient have one of the following disease processes/diagnoses(primary or secondary)? COPD   TCM attempt successful? Yes   Call start time 1259   Call end time 1303   Discharge diagnosis COPD with acute exacerbation   Meds reviewed with patient/caregiver? Yes   Is the patient having any side effects they believe may be caused by any medication additions or changes? No   Does the patient have all medications ordered at discharge? Yes   Is the patient taking all medications as directed (includes completed medication regime)? Yes   Comments HOSP DC FU appt 6/27/24 1030 am   Does the patient have an appointment with their PCP within 7-14 days of discharge? Yes   Has home health visited the patient within 72 hours of discharge? N/A   Psychosocial issues? No   Did the patient receive a copy of their discharge instructions? Yes   Nursing interventions Reviewed instructions with patient   What is the patient's perception of their health status since discharge? Improving   Nursing Interventions Nurse provided patient education   If the patient is a current smoker, are they able to teach back resources for cessation? --  [quit smoking years ago 12-13 years]   Is the patient/caregiver able to teach back the hierarchy of who to call/visit for symptoms/problems? PCP, Specialist, Home health nurse, Urgent Care, ED, 911 Yes   TCM call completed? Yes   Wrap up additional comments Mother reports Pt is doing better , still not at base line yet. Staes they are in the car going to town. Educated about s.s to monitor for and when to call  Also discussed the hot weather and caution to talk. Mother VU.   Call end time 1303            Fay Tripp RN    6/21/2024, 13:03 EDT

## 2024-07-02 ENCOUNTER — TELEPHONE (OUTPATIENT)
Dept: GASTROENTEROLOGY | Facility: CLINIC | Age: 49
End: 2024-07-02
Payer: MEDICARE

## 2024-07-02 ENCOUNTER — SPECIALTY PHARMACY (OUTPATIENT)
Dept: PHARMACY | Facility: HOSPITAL | Age: 49
End: 2024-07-02
Payer: MEDICARE

## 2024-07-02 ENCOUNTER — DISEASE STATE MANAGEMENT VISIT (OUTPATIENT)
Dept: PHARMACY | Facility: HOSPITAL | Age: 49
End: 2024-07-02
Payer: MEDICARE

## 2024-07-02 ENCOUNTER — READMISSION MANAGEMENT (OUTPATIENT)
Dept: CALL CENTER | Facility: HOSPITAL | Age: 49
End: 2024-07-02
Payer: MEDICARE

## 2024-07-02 VITALS
HEART RATE: 91 BPM | BODY MASS INDEX: 25.84 KG/M2 | HEIGHT: 73 IN | OXYGEN SATURATION: 95 % | WEIGHT: 195 LBS | SYSTOLIC BLOOD PRESSURE: 128 MMHG | DIASTOLIC BLOOD PRESSURE: 91 MMHG

## 2024-07-02 DIAGNOSIS — J96.12 CHRONIC RESPIRATORY FAILURE WITH HYPERCAPNIA: ICD-10-CM

## 2024-07-02 DIAGNOSIS — J45.50 SEVERE PERSISTENT ASTHMA WITHOUT COMPLICATION: ICD-10-CM

## 2024-07-02 DIAGNOSIS — J44.9 MODERATE COPD (CHRONIC OBSTRUCTIVE PULMONARY DISEASE): Primary | ICD-10-CM

## 2024-07-02 DIAGNOSIS — J30.9 ALLERGIC RHINITIS, UNSPECIFIED SEASONALITY, UNSPECIFIED TRIGGER: ICD-10-CM

## 2024-07-02 PROCEDURE — 94664 DEMO&/EVAL PT USE INHALER: CPT | Performed by: NURSE PRACTITIONER

## 2024-07-02 PROCEDURE — G0463 HOSPITAL OUTPT CLINIC VISIT: HCPCS

## 2024-07-02 PROCEDURE — 99214 OFFICE O/P EST MOD 30 MIN: CPT | Performed by: NURSE PRACTITIONER

## 2024-07-02 RX ORDER — FLUTICASONE PROPIONATE 50 MCG
1 SPRAY, SUSPENSION (ML) NASAL DAILY
Qty: 16 ML | Refills: 5 | Status: SHIPPED | OUTPATIENT
Start: 2024-07-02

## 2024-07-02 NOTE — PROGRESS NOTES
[facility] COPD Clinic Initial Visit       Patient Name: Topher Stoll  : 1975   MRN: [id number]   Sex: male  Care Team: Patient Care Team:  Joshua Hoffmann MD as PCP - General (Internal Medicine)  Daisy Chahal MD (Psychiatry)     Primary Care Provider: Joshua Hoffmann MD  Referring Provider: Carmen Costa APRN  Encounter Provider: LISA Bell      COPD Management Visit       History of Present Illness: Topher Stoll is a 48 y.o. male who is here today for initial evaluation of COPD Management following hospitalization at [facility] -. Patient was treated for COPD exacerbation and discharged on steroid taper. Continues Breztri maintenance inhaler.    New patient history form is scanned into patient chart under media tab.    Subjective      Past Medical History:   Past Medical History:   Diagnosis Date    Abdominal adhesions     Anxiety     Arthritis     Asthma     Cervical radiculopathy     Colitis     COPD (chronic obstructive pulmonary disease)     GERD (gastroesophageal reflux disease)     Heart attack     History of hepatitis C     Treated with Epclusa in 2019    History of recreational drug use     HTN (hypertension)     Impaired functional mobility, balance, gait, and endurance     Kidney cysts     Left hip pain     Liver disease     Low back pain     Migraines     Obstructive chronic bronchitis with exacerbation     Sleep apnea     mild    Tattoos        Past Surgical History:   Past Surgical History:   Procedure Laterality Date    BACK SURGERY      COLONOSCOPY      COLONOSCOPY N/A 2022    Procedure: COLONOSCOPY with biopsies;  Surgeon: Giancarlo Ruiz MD;  Location: [facility] ENDOSCOPY;  Service: Gastroenterology;  Laterality: N/A;    HERNIA REPAIR      HIP SPACER INSERTION WITH ANTIBIOTIC CEMENT Left 1/15/2020    Procedure: TOTAL HIP IMPLANT REMOVAL WITH INSERTION OF ANTIBIOTIC SPACER LEFT;  Surgeon: Ba Ramirez MD;   Location:  [facility] OR;  Service: Orthopedics    SHOULDER LIGAMENT REPAIR      right shoulder    SMALL INTESTINE SURGERY      Small bowell resection    TOTAL HIP ARTHROPLASTY Left     TOTAL HIP ARTHROPLASTY Right     TOTAL HIP ARTHROPLASTY REVISION Left 3/5/2020    Procedure: HIP REIMPLANT REVISION LEFT;  Surgeon: Ba Ramirez MD;  Location:  [facility] OR;  Service: Orthopedics;  Laterality: Left;    UPPER GASTROINTESTINAL ENDOSCOPY         Family History:   Family History   Problem Relation Age of Onset    Arthritis Other     Hypertension Other     Migraines Other     Heart attack Other     Stroke Other     Colon cancer Neg Hx        Social History:   Social History     Socioeconomic History    Marital status:    Tobacco Use    Smoking status: Former     Current packs/day: 0.00     Average packs/day: 2.0 packs/day for 15.0 years (30.0 ttl pk-yrs)     Types: Cigarettes     Start date:      Quit date:      Years since quittin.5     Passive exposure: Current    Smokeless tobacco: Current     Types: Chew   Vaping Use    Vaping status: Never Used   Substance and Sexual Activity    Alcohol use: No     Comment: stopped drinking alcohol    Drug use: Not Currently     Comment: takes suboxone    Sexual activity: Defer       Tobacco History:   Social History     Tobacco Use   Smoking Status Former    Current packs/day: 0.00    Average packs/day: 2.0 packs/day for 15.0 years (30.0 ttl pk-yrs)    Types: Cigarettes    Start date:     Quit date:     Years since quittin.5    Passive exposure: Current   Smokeless Tobacco Current    Types: Chew       Medications:     Current Outpatient Medications:     albuterol sulfate  (90 Base) MCG/ACT inhaler, Inhale 2 puffs Every 4 (Four) Hours As Needed for Wheezing., Disp: 18 g, Rfl: 0    Budeson-Glycopyrrol-Formoterol (BREZTRI) 160-9-4.8 MCG/ACT aerosol inhaler, Inhale 2 puffs by mouth 2 (Two) Times a Day., Disp: 10.7 g, Rfl: 5    fluticasone  (FLONASE) 50 MCG/ACT nasal spray, Instill 1 spray into the nostril(s) as directed by provider Daily., Disp: 16 mL, Rfl: 5    IPRATROPIUM BROMIDE IN, Inhale. 18 mcg, Disp: , Rfl:     ipratropium-albuterol (DUO-NEB) 0.5-2.5 mg/3 ml nebulizer, Take 3 mL by nebulization Every 4 (Four) Hours As Needed for Wheezing or Shortness of Air., Disp: 180 mL, Rfl: 3    lovastatin (MEVACOR) 40 MG tablet, TAKE 1 TABLET BY MOUTH EVERY DAY FOR CHOLESTEROL, Disp: 90 tablet, Rfl: 3    methadone (DOLOPHINE) 5 MG/5ML solution, Take 70 mL by mouth Daily., Disp: , Rfl:     omeprazole (priLOSEC) 40 MG capsule, Take 1 capsule by mouth Daily., Disp: 90 capsule, Rfl: 3    promethazine (PHENERGAN) 12.5 MG tablet, Take 1-2 tablets by mouth every 6-8 hours as needed for nausea and vomiting. Caution advised due to drowsiness., Disp: 15 tablet, Rfl: 0    traZODone (DESYREL) 300 MG tablet, Take 1 tablet by mouth Every Night., Disp: , Rfl:     Benralizumab 30 MG/ML solution auto-injector, Inject 1 mL under the skin into the appropriate area as directed Every 8 (Eight) Weeks., Disp: 1 mL, Rfl: 5    Current Facility-Administered Medications:     Benralizumab solution auto-injector 30 mg, 1 mL, Subcutaneous, Every 8 Weeks, Destiny Mcknight APRN, 30 mg at 07/02/24 1030    Allergies:   Allergies   Allergen Reactions    Doxycycline Nausea And Vomiting         Spirometry     FEV1 Result: 63% predicted post-BD    COPD Classification: GOLD 2, Moderate; FEV1 50%-79% predicted    Eosinophil Count:   Eosinophils, Absolute   Date Value Ref Range Status   06/20/2024 0.01 0.00 - 0.40 10*3/mm3 Final       Vaccination Status: Influenza current? yes Pneumococcal current? yes    COPD Symptom Assessment          CAT Score today: 15  mMRC today: not completed    Exacerbation Assessment     How many times have you been hospitalized this year due to your COPD? 1    ED/UC visits this year due to COPD? 4      Education     Smoking Cessation: former smoker (quit 2005);  "continues to use dip but states he \"needs to quit\" since it has been causing significant nausea    Visit 1: The following information was reviewed today  Patient history, questionnaires, scores, past and current COPD regimen  Assessed adherence, inhaler technique  Inhaler affordability was discussed  Medication education was printed and given to the patient  COPD Zones booklet was discussed and provided to the patient  Discussed COPD rescue kit  RPM Device option was discussed    Inhaler Education:  Technique for use of a pressurized metered dose inhaler (MDI) without a spacer    -Remove the cover of the mouthpiece.  -Prime your inhaler if this is the first time you are using it, if you have not used it for several days, or if you have dropped it. Priming an MDI usually involves shaking it and spraying it into the air (away from your face) a total of up to 4 times.   -Shake MDI canister vigorously for 5 seconds.  -Hold the MDI upright with your index finger on the top of the medication canister and your thumb supporting the bottom of the inhaler.  -Breathe out normally.  -Put the mouthpiece between your teeth and close your lips around mouthpiece or position mouthpiece approximately 4 cm (approximately width of 2 fingers) from your mouth.  -Keep your tongue away from the opening of the mouthpiece.  -Press down the top of the canister with the index finger to release the medication.  -At the same time as the canister is pressed, breathe in deeply and slowly through your mouth until your lungs are completely filled; this should take 4 to 6 seconds.  -Hold the medication in your lungs for as long as comfortable (approximately 5 to 10 seconds) before breathing out.  -If you need a second puff, wait approximately 15 to 30 seconds between puffs. Shake canister again before the next puff.  -When finished, recap mouthpiece.  -If your inhaler contains a steroid medicine (sometimes called glucocorticoid or corticosteroid), " rinse your mouth and gargle with water after you use it. Then spit out the water. Do not swallow it.    Patient was provided education on Breztri inhaler, indication, possible side effects, how to store, and administration.  She was provided verbal and written education. Patient voiced understanding and has no further questions at this time.     Pharmacological Treatment Plan     TREATMENT: Patient will continue Breztri as COPD maintenance inhaler. Patient previously non-adherent with regimen; stressed importance of compliance with maintenance regimen in prevention of worsening COPD and recurrent exacerbations.  Breztri 160-9-4.8 mcg (2 puff BID) -- Rx obtained from [employer] [facility] and given to patient during appt today. Education about medication and inhaler technique provided as above.  Patient voiced understanding of how to use each inhaler and will call with any questions or concerns.   Patient also diagnosed with asthma, has not been compliant with Fasenra injections, last given 12/2023. Resumed these today, will continue to receive in clinic every 8 weeks.  Patient voiced understanding of s/sx of exacerbation and will call the clinic within business hours or seek immediate care in ED      Follow-up Treatment Plan: phone follow-up in 1 week with PharmD to re-assess symptoms      Antwon Bartlett RPH  07/02/2024  11:06 EDT

## 2024-07-02 NOTE — OUTREACH NOTE
COPD/PN Week 2 Survey      Flowsheet Row Responses   [facility] patient discharged from? [facility]   Does the patient have one of the following disease processes/diagnoses(primary or secondary)? COPD   Week 2 attempt successful? Yes   Call start time 1122   Call end time 1125   Discharge diagnosis COPD with acute exacerbation   Meds reviewed with patient/caregiver? Yes   Is the patient having any side effects they believe may be caused by any medication additions or changes? No   Does the patient have all medications ordered at discharge? Yes   Is the patient taking all medications as directed (includes completed medication regime)? Yes   Does the patient have a primary care provider?  Yes   Has the patient kept scheduled appointments due by today? Yes   Has home health visited the patient within 72 hours of discharge? N/A   Pulse Ox monitoring Intermittent   O2 Sat: education provided Sat levels   Psychosocial issues? No   Did the patient receive a copy of their discharge instructions? Yes   Nursing interventions Reviewed instructions with patient   What is the patient's perception of their health status since discharge? Improving   Nursing Interventions Nurse provided patient education   If the patient is a current smoker, are they able to teach back resources for cessation? [id number]   Is the patient/caregiver able to teach back the hierarchy of who to call/visit for symptoms/problems? PCP, Specialist, Home health nurse, Urgent Care, ED, 911 Yes   Is the patient able to teach back COPD zones? Yes   Patient reports what zone on this call? Green Zone   Green Zone Reports doing well, Breathing without shortness of breath, Usual activity and exercise level, Slept well last night, Usual amounts of cough and phlegm/mucous, Appetite is good   Green Zone interventions: Take daily medications, Use oxygen as prescribed, Continue regular exercise/diet plan   Week 2 call completed? Yes   Graduated Yes   Is the  patient interested in additional calls from an ambulatory ? No   Would this patient benefit from a Referral to [facility] Social Work? No   Wrap up additional comments Patient states he is doing very well.   Call end time 1125            Cony ALKHANI - Licensed Nurse

## 2024-07-02 NOTE — PROGRESS NOTES
Follow Up Office Visit      Patient Name: Topher Stoll    Chief Complaint: Hospital follow-up      History of Present Illness: Topher Stoll is a 48 y.o. male who is here today for follow up of recent hospitalization and [facility] for management of acute COPD exacerbation.  He was initiated on bronchodilators, supplemental O2, flutter valve, and Solu-Medrol.  Hypercapnia was noted during admission and he was trialed on BiPAP but did not tolerate use.  After clinical improvement he was discharged home to complete an outpatient course of prednisone.  Since the time of hospital discharge, the patient notes that he is feeling significantly better and has been able to go fishing.  He is currently only using Combivent 4 times a day.  Was previously prescribed Breztri, though has not filled that prescription since November 2023 according to pharmacy records.  He has been on initiated on Fasenra, though has been noncompliant with his injections since the beginning of this year.    Last Hospitalization: June 2024  Number of exacerbations per year: 5, though less than it was in the past and has been less severe    Subjective      Review of Systems:  Review of Systems   Constitutional:  Negative for fever and unexpected weight change.   Respiratory:  Positive for cough (Dry) and shortness of breath. Negative for wheezing.    Cardiovascular:  Negative for chest pain and leg swelling.   Allergic/Immunologic: Positive for environmental allergies.        Past Medical History:   Past Medical History:   Diagnosis Date    Abdominal adhesions     Anxiety     Arthritis     Asthma     Cervical radiculopathy     Colitis     COPD (chronic obstructive pulmonary disease)     GERD (gastroesophageal reflux disease)     Heart attack     History of hepatitis C     Treated with Epclusa in 2019    History of recreational drug use     HTN (hypertension)     Impaired functional mobility, balance, gait, and endurance      Kidney cysts     Left hip pain     Liver disease     Low back pain     Migraines     Obstructive chronic bronchitis with exacerbation     Sleep apnea     mild    Tattoos        Past Surgical History:   Past Surgical History:   Procedure Laterality Date    BACK SURGERY      COLONOSCOPY      COLONOSCOPY N/A 2022    Procedure: COLONOSCOPY with biopsies;  Surgeon: Giancarlo Ruiz MD;  Location:  [facility];  Service: Gastroenterology;  Laterality: N/A;    HERNIA REPAIR      HIP SPACER INSERTION WITH ANTIBIOTIC CEMENT Left 1/15/2020    Procedure: TOTAL HIP IMPLANT REMOVAL WITH INSERTION OF ANTIBIOTIC SPACER LEFT;  Surgeon: Ba Ramirez MD;  Location:  [facility];  Service: Orthopedics    SHOULDER LIGAMENT REPAIR      right shoulder    SMALL INTESTINE SURGERY      Small bowell resection    TOTAL HIP ARTHROPLASTY Left     TOTAL HIP ARTHROPLASTY Right     TOTAL HIP ARTHROPLASTY REVISION Left 3/5/2020    Procedure: HIP REIMPLANT REVISION LEFT;  Surgeon: Ba Ramirez MD;  Location:  [facility];  Service: Orthopedics;  Laterality: Left;    UPPER GASTROINTESTINAL ENDOSCOPY         Family History:   Family History   Problem Relation Age of Onset    Arthritis Other     Hypertension Other     Migraines Other     Heart attack Other     Stroke Other     Colon cancer Neg Hx        Social History:   Social History     Socioeconomic History    Marital status:    Tobacco Use    Smoking status: Former     Current packs/day: 0.00     Average packs/day: 2.0 packs/day for 15.0 years (30.0 ttl pk-yrs)     Types: Cigarettes     Start date:      Quit date:      Years since quittin.5     Passive exposure: Current    Smokeless tobacco: Current     Types: Chew   Vaping Use    Vaping status: Never Used   Substance and Sexual Activity    Alcohol use: No     Comment: stopped drinking alcohol    Drug use: Not Currently     Comment: takes suboxone    Sexual activity: Defer       Current Medications:  "    Current Outpatient Medications:     albuterol sulfate  (90 Base) MCG/ACT inhaler, Inhale 2 puffs Every 4 (Four) Hours As Needed for Wheezing., Disp: 18 g, Rfl: 0    fluticasone (FLONASE) 50 MCG/ACT nasal spray, INSTILL 2 SPRAYS INOT THE NOSTRILS AS DIRECTED BY PROVIDER DAILY, Disp: 16 g, Rfl: 3    IPRATROPIUM BROMIDE IN, Inhale. 18 mcg, Disp: , Rfl:     ipratropium-albuterol (DUO-NEB) 0.5-2.5 mg/3 ml nebulizer, Take 3 mL by nebulization Every 4 (Four) Hours As Needed for Wheezing or Shortness of Air., Disp: 180 mL, Rfl: 3    lovastatin (MEVACOR) 40 MG tablet, TAKE 1 TABLET BY MOUTH EVERY DAY FOR CHOLESTEROL, Disp: 90 tablet, Rfl: 3    methadone (DOLOPHINE) 5 MG/5ML solution, Take 70 mL by mouth Daily., Disp: , Rfl:     omeprazole (priLOSEC) 40 MG capsule, Take 1 capsule by mouth Daily., Disp: 90 capsule, Rfl: 3    promethazine (PHENERGAN) 12.5 MG tablet, Take 1-2 tablets by mouth every 6-8 hours as needed for nausea and vomiting. Caution advised due to drowsiness., Disp: 15 tablet, Rfl: 0    traZODone (DESYREL) 300 MG tablet, Take 1 tablet by mouth Every Night., Disp: , Rfl:     predniSONE (DELTASONE) 10 MG tablet, Take 6 tablets by mouth Daily for 2 days, THEN 5 tablets Daily for 2 days, THEN 4 tablets Daily for 2 days, THEN 3 tablets Daily for 2 days, THEN 2 tablets Daily for 2 days, THEN 1 tablet Daily for 2 days., Disp: 42 tablet, Rfl: 0   Combivent q6h PRN    Allergies:   Allergies   Allergen Reactions    Doxycycline Nausea And Vomiting       Objective     Physical Exam:  Vital Signs:   Vitals:    07/02/24 0929   BP: 128/91   Pulse: 91   SpO2: 95%   Weight: 88.5 kg (195 lb)   Height: 185.4 cm (73\")     Body mass index is 25.73 kg/m².    Physical Exam  Vitals reviewed.   Constitutional:       General: He is not in acute distress.     Appearance: He is not toxic-appearing.   HENT:      Head: Normocephalic and atraumatic.      Mouth/Throat:      Mouth: Mucous membranes are moist.   Eyes:      " Conjunctiva/sclera: Conjunctivae normal.   Cardiovascular:      Rate and Rhythm: Normal rate.      Heart sounds: Normal heart sounds.   Pulmonary:      Effort: Pulmonary effort is normal.      Breath sounds: Normal breath sounds.   Abdominal:      General: There is no distension.      Palpations: Abdomen is soft.   Musculoskeletal:         General: No swelling.      Cervical back: Neck supple.   Skin:     General: Skin is warm and dry.      Findings: No rash.   Neurological:      General: No focal deficit present.      Mental Status: He is alert and oriented to person, place, and time.   Psychiatric:         Mood and Affect: Mood normal.         Behavior: Behavior normal.       Results Review:   Site   Date Value Ref Range Status   06/16/2024 Right Radial  Final     Michael's Test   Date Value Ref Range Status   06/16/2024 Positive  Final     pH, Arterial   Date Value Ref Range Status   06/16/2024 7.341 7.300 - 7.500 pH units Final     Comment:     84 Value below reference range     pCO2, Arterial   Date Value Ref Range Status   06/16/2024 62.5 (C) 35.0 - 45.0 mm Hg Final     Comment:     83 Value above reference range     pO2, Arterial   Date Value Ref Range Status   06/16/2024 64.5 (L) 75.0 - 100.0 mm Hg Final     Comment:     84 Value below reference range     HCO3, Arterial   Date Value Ref Range Status   06/16/2024 33.8 (H) 22.0 - 28.0 mmol/L Final     Comment:     83 Value above reference range     Base Excess, Arterial   Date Value Ref Range Status   06/16/2024 5.7 (H) 0.0 - 2.0 mmol/L Final     Comment:     83 Value above reference range     O2 Saturation, Arterial   Date Value Ref Range Status   06/16/2024 92.7 (L) 94.0 - 100.0 % Final     Comment:     84 Value below reference range     Hemoglobin, Blood Gas   Date Value Ref Range Status   01/15/2020 11.4 (L) 13.5 - 17.5 g/dL Final     Comment:     84 Value below reference range     Hematocrit, Blood Gas   Date Value Ref Range Status   06/16/2024 45.7 % Final      Oxyhemoglobin   Date Value Ref Range Status   06/16/2024 91.1 (L) 94 - 99 % Final     Comment:     84 Value below reference range     Oxyhemoglobin Venous   Date Value Ref Range Status   09/01/2021 49.0 40.0 - 70.0 % Final     Comment:     85 Value below critical limit     Methemoglobin   Date Value Ref Range Status   06/16/2024 0.60 0.00 - 1.50 % Final     Carboxyhemoglobin   Date Value Ref Range Status   06/16/2024 1.1 0 - 2 % Final     CO2 Content   Date Value Ref Range Status   01/15/2020 26.1 22 - 33 mmol/L Final     Barometric Pressure for Blood Gas   Date Value Ref Range Status   06/16/2024 734 mmHg Final   12/19/2016 749 mmHg Final     Modality   Date Value Ref Range Status   06/16/2024 Nasal Cannula  Final     FIO2   Date Value Ref Range Status   06/16/2024 28 % Final     WBC   Date Value Ref Range Status   06/20/2024 17.02 (H) 3.40 - 10.80 10*3/mm3 Final   06/22/2022 11.80 (H) 3.70 - 10.30 10*3/uL Final     RBC   Date Value Ref Range Status   06/20/2024 5.62 4.14 - 5.80 10*6/mm3 Final   06/22/2022 6.33 (H) 4.60 - 6.10 10*6/uL Final     Hemoglobin   Date Value Ref Range Status   06/20/2024 15.4 13.0 - 17.7 g/dL Final   06/22/2022 17.1 13.7 - 17.5 g/dL Final     Hematocrit   Date Value Ref Range Status   06/20/2024 48.9 37.5 - 51.0 % Final   06/22/2022 50.0 40.0 - 51.0 % Final     MCV   Date Value Ref Range Status   06/20/2024 87.0 79.0 - 97.0 fL Final   06/22/2022 79 79 - 98 fL Final     MCH   Date Value Ref Range Status   06/20/2024 27.4 26.6 - 33.0 pg Final   06/22/2022 27.0 26.0 - 32.0 pg Final     MCHC   Date Value Ref Range Status   06/20/2024 31.5 31.5 - 35.7 g/dL Final   06/22/2022 34.2 30.7 - 35.5 g/dL Final     RDW   Date Value Ref Range Status   06/20/2024 13.2 12.3 - 15.4 % Final   06/22/2022 12.8 11.5 - 14.5 % Final     RDW-SD   Date Value Ref Range Status   06/20/2024 42.4 37.0 - 54.0 fl Final     MPV   Date Value Ref Range Status   06/20/2024 9.9 6.0 - 12.0 fL Final   06/22/2022 9.4 8.8 -  12.5 fL Final     Platelets   Date Value Ref Range Status   06/20/2024 208 140 - 450 10*3/mm3 Final   06/22/2022 264 155 - 369 10*3/uL Final     Neutrophil %   Date Value Ref Range Status   06/20/2024 88.5 (H) 42.7 - 76.0 % Final     Lymphocyte %   Date Value Ref Range Status   06/20/2024 7.3 (L) 19.6 - 45.3 % Final     Monocyte %   Date Value Ref Range Status   06/20/2024 3.3 (L) 5.0 - 12.0 % Final     Eosinophil %   Date Value Ref Range Status   06/20/2024 0.1 (L) 0.3 - 6.2 % Final     Basophil %   Date Value Ref Range Status   06/20/2024 0.2 0.0 - 1.5 % Final     Immature Grans %   Date Value Ref Range Status   06/20/2024 0.6 (H) 0.0 - 0.5 % Final     Neutrophils, Absolute   Date Value Ref Range Status   06/20/2024 15.05 (H) 1.70 - 7.00 10*3/mm3 Final     Lymphocytes, Absolute   Date Value Ref Range Status   06/20/2024 1.25 0.70 - 3.10 10*3/mm3 Final     Monocytes, Absolute   Date Value Ref Range Status   06/20/2024 0.57 0.10 - 0.90 10*3/mm3 Final     Eosinophils, Absolute   Date Value Ref Range Status   06/20/2024 0.01 0.00 - 0.40 10*3/mm3 Final     Basophils, Absolute   Date Value Ref Range Status   06/20/2024 0.03 0.00 - 0.20 10*3/mm3 Final     Immature Grans, Absolute   Date Value Ref Range Status   06/20/2024 0.11 (H) 0.00 - 0.05 10*3/mm3 Final     nRBC   Date Value Ref Range Status   06/20/2024 0.0 0.0 - 0.2 /100 WBC Final     Lab Results   Component Value Date    GLUCOSE 120 (H) 06/20/2024    BUN 18 06/20/2024    CREATININE 0.87 06/20/2024    EGFR 105.8 06/20/2024    BCR 20.7 06/20/2024    K 4.8 06/20/2024    CO2 28.4 06/20/2024    CALCIUM 9.2 06/20/2024    ALBUMIN 4.0 06/16/2024    BILITOT <0.2 06/16/2024    AST 19 06/16/2024    ALT 17 06/16/2024     Results for orders placed during the hospital encounter of 05/21/23    Adult Transthoracic Echo Complete W/ Cont if Necessary Per Protocol    Interpretation Summary    Left ventricular systolic function is normal. Estimated left ventricular EF = 65% Left  ventricular ejection fraction appears to be 61 - 65%.    Left ventricular diastolic function was normal.    June 2024 chest x-ray showed no acute cardiopulmonary process.    Assessment / Plan      Assessment/Plan:   Diagnoses and all orders for this visit:    1. Moderate COPD (chronic obstructive pulmonary disease) (Primary)  Resume use of Breztri. We discussed the risk and benefits of inhaled corticosteroids. Patient instructed to take them on a regular basis as prescribed. Patient instructed to rinse their mouth out after each use. Appropriate inhaler technique demonstrated today in clinic. Can continue Combivent divya PRN basis.    2. Severe persistent asthma without complication  Resume use of Fasenra today.    3. Allergic rhinitis, unspecified seasonality, unspecified trigger  -     fluticasone (FLONASE) 50 MCG/ACT nasal spray; 1 spray into the nostril(s) as directed by provider Daily.  Dispense: 11.1 mL; Refill: 5    4. Chronic respiratory failure with hypercapnia  Intolerant to BiPAP use. Compensated.    Other orders  -     Budeson-Glycopyrrol-Formoterol (BREZTRI) 160-9-4.8 MCG/ACT aerosol inhaler; Inhale 2 puffs 2 (Two) Times a Day.  Dispense: 10.7 g; Refill: 5  -     Benralizumab 30 MG/ML solution auto-injector; Inject 1 mL under the skin into the appropriate area as directed Every 8 (Eight) Weeks.  Dispense: 1 mL; Refill: 5       Follow Up:   Return in about 4 weeks (around 7/30/2024) for Recheck.  The patient was counseled on diagnostic results, risks and benefits of treatment options, risk factor modifications and the importance of treatment compliance. The patient was advised to contact the clinic with concerns or worsening symptoms.     LISA Bell   [facility] [place]     This document has been electronically signed by LISA Bell  July 2, 2024

## 2024-07-08 ENCOUNTER — OFFICE VISIT (OUTPATIENT)
Dept: GASTROENTEROLOGY | Facility: CLINIC | Age: 49
End: 2024-07-08
Payer: MEDICARE

## 2024-07-08 VITALS
OXYGEN SATURATION: 97 % | BODY MASS INDEX: 26.39 KG/M2 | WEIGHT: 200 LBS | HEART RATE: 84 BPM | SYSTOLIC BLOOD PRESSURE: 104 MMHG | DIASTOLIC BLOOD PRESSURE: 70 MMHG

## 2024-07-08 DIAGNOSIS — Z53.21 PATIENT LEFT WITHOUT BEING SEEN: Primary | ICD-10-CM
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
## 2024-07-09 ENCOUNTER — SPECIALTY PHARMACY (OUTPATIENT)
Dept: PHARMACY | Facility: HOSPITAL | Age: 49
End: 2024-07-09
Payer: MEDICARE

## 2024-07-09 NOTE — PROGRESS NOTES
[facility] COPD [facility] Initial Visit       Patient Name: Topher Stoll  : 1975   MRN: [id number]   Sex: male  Care Team: Patient Care Team:  Joshua Hoffmann MD as PCP - General (Internal Medicine)  Daisy Chahal MD (Psychiatry)  Cleveland Waterman APRN as Nurse Practitioner (Gastroenterology)     Primary Care Provider: Joshua Hoffmann MD  Referring Provider: No ref. provider found  Encounter Provider: Zoie Teresa, PharmD      COPD Management Visit       History of Present Illness: Spoke with Topher Stoll 48 y.o. male today regarding one week follow up from initial COPD visit. He has seen a large improvement with increase in compliance with Breztri. Patient voices understanding of how to use the inhaler appropriately and reviewed the COPD booklet.     Subjective      Past Medical History:   Past Medical History:   Diagnosis Date    Abdominal adhesions     Anxiety     Arthritis     Asthma     Cervical radiculopathy     Colitis     COPD (chronic obstructive pulmonary disease)     GERD (gastroesophageal reflux disease)     Heart attack     History of hepatitis C     Treated with Epclusa in 2019    History of recreational drug use     HTN (hypertension)     Impaired functional mobility, balance, gait, and endurance     Kidney cysts     Left hip pain     Liver disease     Low back pain     Migraines     Obstructive chronic bronchitis with exacerbation     Sleep apnea     mild    Tattoos        Past Surgical History:   Past Surgical History:   Procedure Laterality Date    BACK SURGERY      COLONOSCOPY      COLONOSCOPY N/A 2022    Procedure: COLONOSCOPY with biopsies;  Surgeon: Giancarlo Ruiz MD;  Location: [facility] ENDOSCOPY;  Service: Gastroenterology;  Laterality: N/A;    HERNIA REPAIR      HIP SPACER INSERTION WITH ANTIBIOTIC CEMENT Left 1/15/2020    Procedure: TOTAL HIP IMPLANT REMOVAL WITH INSERTION OF ANTIBIOTIC SPACER LEFT;  Surgeon: James  Ba GIBBONS MD;  Location:  [facility];  Service: Orthopedics    SHOULDER LIGAMENT REPAIR      right shoulder    SMALL INTESTINE SURGERY      Small bowell resection    TOTAL HIP ARTHROPLASTY Left     TOTAL HIP ARTHROPLASTY Right     TOTAL HIP ARTHROPLASTY REVISION Left 3/5/2020    Procedure: HIP REIMPLANT REVISION LEFT;  Surgeon: Ba Ramirez MD;  Location:  [facility];  Service: Orthopedics;  Laterality: Left;    UPPER GASTROINTESTINAL ENDOSCOPY         Family History:   Family History   Problem Relation Age of Onset    Arthritis Other     Hypertension Other     Migraines Other     Heart attack Other     Stroke Other     Colon cancer Neg Hx        Social History:   Social History     Socioeconomic History    Marital status:    Tobacco Use    Smoking status: Former     Current packs/day: 0.00     Average packs/day: 2.0 packs/day for 15.0 years (30.0 ttl pk-yrs)     Types: Cigarettes     Start date:      Quit date:      Years since quittin.5     Passive exposure: Current    Smokeless tobacco: Current     Types: Chew   Vaping Use    Vaping status: Never Used   Substance and Sexual Activity    Alcohol use: No     Comment: stopped drinking alcohol    Drug use: Not Currently     Comment: takes suboxone    Sexual activity: Defer       Tobacco History:   Social History     Tobacco Use   Smoking Status Former    Current packs/day: 0.00    Average packs/day: 2.0 packs/day for 15.0 years (30.0 ttl pk-yrs)    Types: Cigarettes    Start date:     Quit date:     Years since quittin.5    Passive exposure: Current   Smokeless Tobacco Current    Types: Chew       Medications:     Current Outpatient Medications:     albuterol sulfate  (90 Base) MCG/ACT inhaler, Inhale 2 puffs Every 4 (Four) Hours As Needed for Wheezing., Disp: 18 g, Rfl: 0    Benralizumab 30 MG/ML solution auto-injector, Inject 1 mL under the skin into the appropriate area as directed Every 8 (Eight) Weeks., Disp: 1 mL, Rfl:  5    Budeson-Glycopyrrol-Formoterol (BREZTRI) 160-9-4.8 MCG/ACT aerosol inhaler, Inhale 2 puffs by mouth 2 (Two) Times a Day., Disp: 10.7 g, Rfl: 5    fluticasone (FLONASE) 50 MCG/ACT nasal spray, Instill 1 spray into the nostril(s) as directed by provider Daily., Disp: 16 mL, Rfl: 5    IPRATROPIUM BROMIDE IN, Inhale. 18 mcg, Disp: , Rfl:     ipratropium-albuterol (DUO-NEB) 0.5-2.5 mg/3 ml nebulizer, Take 3 mL by nebulization Every 4 (Four) Hours As Needed for Wheezing or Shortness of Air., Disp: 180 mL, Rfl: 3    lovastatin (MEVACOR) 40 MG tablet, TAKE 1 TABLET BY MOUTH EVERY DAY FOR CHOLESTEROL, Disp: 90 tablet, Rfl: 3    methadone (DOLOPHINE) 5 MG/5ML solution, Take 70 mL by mouth Daily., Disp: , Rfl:     omeprazole (priLOSEC) 40 MG capsule, Take 1 capsule by mouth Daily., Disp: 90 capsule, Rfl: 3    promethazine (PHENERGAN) 12.5 MG tablet, Take 1-2 tablets by mouth every 6-8 hours as needed for nausea and vomiting. Caution advised due to drowsiness., Disp: 15 tablet, Rfl: 0    traZODone (DESYREL) 300 MG tablet, Take 1 tablet by mouth Every Night., Disp: , Rfl:     Current Facility-Administered Medications:     Benralizumab solution auto-injector 30 mg, 1 mL, Subcutaneous, Every 8 Weeks, Destiny Mcknight APRN, 30 mg at 07/02/24 1030    Allergies:   Allergies   Allergen Reactions    Doxycycline Nausea And Vomiting         Spirometry     FEV1 Result: 63% predicted post-BD    COPD Classification: GOLD 2, Moderate; FEV1 50%-79% predicted    Eosinophil Count:   Eosinophils, Absolute   Date Value Ref Range Status   06/20/2024 0.01 0.00 - 0.40 10*3/mm3 Final       Vaccination Status: Influenza current? yes Pneumococcal current? yes    COPD Symptom Assessment          CAT Score today: 15  mMRC today: not completed    Exacerbation Assessment     How many times have you been hospitalized this year due to your COPD? 1    ED/UC visits this year due to COPD? 4      Education     Smoking Cessation: former smoker (quit 2005);  "continues to use dip but states he \"needs to quit\" since it has been causing significant nausea    Visit 2: The following information was reviewed today over telephone  COPD medications including adherence, inhaler technique, ADR and other issues concerns  Rescue inhaler use  Rescue Kit  COPD Zones  Patient Education modules (Reviewed Goals, pursed lip breathing, O2 Safety, Smoking Cessation and Specific inhaler education modules from COPD booklet)  Discussed next education modules including vaccinations, infection prevention and nebulizer use/care that will be reviewed at his next appointment.       Pharmacological Treatment Plan     TREATMENT: Patient will continue Breztri as COPD maintenance inhaler. Patient previously non-adherent with regimen; stressed importance of compliance with maintenance regimen in prevention of worsening COPD and recurrent exacerbations.  Continue Breztri 160-9-4.8 mcg (2 puff BID) -- Rx obtained from [employer] and given to patient during appt today. Education about medication and inhaler technique provided as above.  COPD booklet reviewed and will review further education peices at next appointment.  Patient voiced understanding of how to use each inhaler and will call with any questions or concerns.   Patient also diagnosed with asthma, has not been compliant with Fasenra injections, last given 12/2023. Resumed these today, will continue to receive in clinic every 8 weeks.  Patient voiced understanding of s/sx of exacerbation and will call the clinic within business hours or seek immediate care in ED.    Follow-up Treatment Plan: Follow up in clinic appointment on 7/30/24.    Zoie Teresa, PharmD  07/02/2024  11:06 EDT    "

## 2024-07-11 NOTE — PROGRESS NOTES
Ambulatory Care Clinic  Clinical Reassessment     Topher Stoll is a 48 y.o. male diagnosed with asthma and enrolled in the Ambulatory Care Clinic. A follow-up outreach was conducted, including assessment of continued therapy appropriateness, medication adherence, and side effect incidence and management for Fasenra.    Patient has missed last few Fasenra doses, not secondary to lack of benefit but forgets appointments. Does not want to switch to home administration, would like to resume Fasenra and continue administration in clinic. Discussed that for best results patient needs to remain adherent to medication and he is agreeable. Reports that when getting Fasenra consistently, his asthma symptoms were well controlled. Continues to have COPD exacerbations which he was also seen for today.    Changes to Insurance Coverage or Financial Support  none    Relevant Past Medical History and Comorbidities  Relevant medical history and concomitant health conditions were discussed with the patient. The patient's chart has been reviewed for relevant past medical history and comorbid health conditions and updated as necessary.   Past Medical History:   Diagnosis Date    Abdominal adhesions     Anxiety     Arthritis     Asthma     Cervical radiculopathy     Colitis     COPD (chronic obstructive pulmonary disease)     GERD (gastroesophageal reflux disease)     Heart attack     History of hepatitis C     Treated with Epclusa in 2019    History of recreational drug use     HTN (hypertension)     Impaired functional mobility, balance, gait, and endurance     Kidney cysts     Left hip pain     Liver disease     Low back pain     Migraines     Obstructive chronic bronchitis with exacerbation     Sleep apnea     mild    Tattoos      Social History     Socioeconomic History    Marital status:    Tobacco Use    Smoking status: Former     Current packs/day: 0.00     Average packs/day: 2.0 packs/day for 15.0 years (30.0 ttl  "pk-yrs)     Types: Cigarettes     Start date:      Quit date: 2005     Years since quittin.5     Passive exposure: Current    Smokeless tobacco: Current     Types: Chew   Vaping Use    Vaping status: Never Used   Substance and Sexual Activity    Alcohol use: No     Comment: stopped drinking alcohol    Drug use: Not Currently     Comment: takes suboxone    Sexual activity: Defer       Allergies  Known allergies and reactions were discussed with the patient. The patient's chart has been reviewed for allergy information and updated as necessary.   Doxycycline    Relevant Laboratory Values  No results for input(s): \"CMP\", \"BMP\", \"CBC\" in the last 72 hours.    Current Medication List  This medication list has been reviewed with the patient and evaluated for any interactions or necessary modifications/recommendations, and updated to include all prescription medications, OTC medications, and supplements the patient is currently taking.  This list reflects what is contained in the patient's profile, which has also been marked as reviewed to communicate to other providers it is the most up to date version of the patient's current medication therapy.     Current Outpatient Medications:     albuterol sulfate  (90 Base) MCG/ACT inhaler, Inhale 2 puffs Every 4 (Four) Hours As Needed for Wheezing., Disp: 18 g, Rfl: 0    Benralizumab 30 MG/ML solution auto-injector, Inject 1 mL under the skin into the appropriate area as directed Every 8 (Eight) Weeks., Disp: 1 mL, Rfl: 5    Budeson-Glycopyrrol-Formoterol (BREZTRI) 160-9-4.8 MCG/ACT aerosol inhaler, Inhale 2 puffs by mouth 2 (Two) Times a Day., Disp: 10.7 g, Rfl: 5    fluticasone (FLONASE) 50 MCG/ACT nasal spray, Instill 1 spray into the nostril(s) as directed by provider Daily., Disp: 16 mL, Rfl: 5    IPRATROPIUM BROMIDE IN, Inhale. 18 mcg, Disp: , Rfl:     ipratropium-albuterol (DUO-NEB) 0.5-2.5 mg/3 ml nebulizer, Take 3 mL by nebulization Every 4 (Four) Hours As " Needed for Wheezing or Shortness of Air., Disp: 180 mL, Rfl: 3    lovastatin (MEVACOR) 40 MG tablet, TAKE 1 TABLET BY MOUTH EVERY DAY FOR CHOLESTEROL, Disp: 90 tablet, Rfl: 3    methadone (DOLOPHINE) 5 MG/5ML solution, Take 70 mL by mouth Daily., Disp: , Rfl:     omeprazole (priLOSEC) 40 MG capsule, Take 1 capsule by mouth Daily., Disp: 90 capsule, Rfl: 3    promethazine (PHENERGAN) 12.5 MG tablet, Take 1-2 tablets by mouth every 6-8 hours as needed for nausea and vomiting. Caution advised due to drowsiness., Disp: 15 tablet, Rfl: 0    traZODone (DESYREL) 300 MG tablet, Take 1 tablet by mouth Every Night., Disp: , Rfl:     Current Facility-Administered Medications:     Benralizumab solution auto-injector 30 mg, 1 mL, Subcutaneous, Every 8 Weeks, Destiny Mcknight APRN, 30 mg at 07/02/24 1030    Drug Interactions  none noted with Fasenra    Adverse Drug Reactions  Adverse Reactions Experienced: none  Plan for ADR Management: N/A    Hospitalizations and Urgent Care Since Last Assessment  Hospitalizations or Admissions:  1  ED Visits:  2  Urgent Office Visits:  3      Adherence and Self-Administration  Approximate Number of Doses Missed Since Last Assessment:  multiple  Ongoing or New Barriers to Patient Adherence and/or Self-Administration:  discussed importance of adherence; patient does not wish to administer injections at home, wants to continue getting in clinic.    Methods for Supporting Patient Adherence and/or Self-Administration:  Patient will get appointment reminders for injection dates sent to his phone      Goals of Therapy  Progress Toward Meeting Patient-Identified Goals of Therapy: Off Track  New Patient-Identified Goals, If Applicable:     Progress Toward Meeting Clinical Goals or Therapeutic Targets: Off Track  New Clinical Goals or Therapeutic Targets, If Applicable:     Quality of Life Assessment   Quality of Life related to the patient's specialty therapy was discussed with the patient. The QOL  segment of this outreach has been reviewed and updated.     Quality of Life Assessment  Quality of Life Improvement Scale: 5-No change  Comments on Quality of Life: Patient has missed multiple injections; reports feeling well when getting fasenra consistently.    Reassessment Plan & Follow-Up  Medication Therapy Changes:  resume Fasenra Q8W  Additional Plans, Therapy Recommendations, or Therapy Problems to Be Addressed:  see DSM note for COPD management    PharmD to perform clinical re-assessments every ~6 months to review efficacy, tolerance, and adherence to medication.    Attestation  I attest that the specialty medication addressed above is appropriate for the patient based on my reassessment.  If the prescribed therapy is at any point deemed not appropriate based on the current or future assessments, a consultation will be initiated with the patient's specialty care provider to determine the best course of action. The revised plan of therapy will be documented along with any additional patient education provided.     Electronically signed by Antwon Bartlett RPH, 07/11/24, 3:38 PM EDT.

## 2024-07-19 ENCOUNTER — OFFICE VISIT (OUTPATIENT)
Dept: INTERNAL MEDICINE | Facility: CLINIC | Age: 49
End: 2024-07-19
Payer: MEDICARE

## 2024-07-19 DIAGNOSIS — J41.1 MUCOPURULENT CHRONIC BRONCHITIS: Primary | ICD-10-CM

## 2024-07-19 PROCEDURE — 1111F DSCHRG MED/CURRENT MED MERGE: CPT | Performed by: INTERNAL MEDICINE

## 2024-07-19 PROCEDURE — 99495 TRANSJ CARE MGMT MOD F2F 14D: CPT | Performed by: INTERNAL MEDICINE

## 2024-07-19 PROCEDURE — 1126F AMNT PAIN NOTED NONE PRSNT: CPT | Performed by: INTERNAL MEDICINE

## 2024-07-19 NOTE — PROGRESS NOTES
Transitional Care Follow Up Visit  Subjective     Topher Stoll is a 48 y.o. male who presents for a transitional care management visit.    Within 48 business hours after discharge our office contacted him via telephone to coordinate his care and needs.      I reviewed and discussed the details of that call along with the discharge summary, hospital problems, inpatient lab results, inpatient diagnostic studies, and consultation reports with Topher.     Current outpatient and discharge medications have been reconciled for the patient.  Reviewed by: Joshua Hoffmann MD          6/20/2024     5:14 PM   Date of TCM Phone Call   [facility]   Date of Admission 6/16/2024   Date of Discharge 6/20/2024   Discharge Disposition Home or Self Care     Risk for Readmission (LACE) No data recorded    History of Present Illness   Course During Hospital Stay: 48-year-old male with a longstanding history of COPD with asthma and hypertension history of coronary artery disease came into the emergency room with increasing cough and shortness of breath.  On noting the respiratory distress he was placed on BiPAP but he did not tolerate it ABG showed a pH of 7.34 pCO2 of 62 and a pO2 of 64 a chest x-ray without infiltrate he was given neb treatment Solu-Medrol supplemental oxygen he was subsequently  placed on a prednisone taper on discharge as he continued to improve.  Coming in for follow-up today feels significantly better he has finished his antibiotics and steroid taper.  Using his nebulizer treatment regularly along with the steroid inhalers does not smoke     The following portions of the patient's history were reviewed and updated as appropriate: allergies, current medications, past family history, past medical history, past social history, past surgical history, and problem list.    Review of Systems   Constitutional: Negative.  Negative for activity change, appetite change, fatigue and fever.   HENT:  Negative for  congestion, ear discharge, ear pain and trouble swallowing.    Eyes:  Negative for photophobia and visual disturbance.   Respiratory:  Positive for cough and shortness of breath.    Cardiovascular:  Negative for chest pain and palpitations.   Gastrointestinal:  Negative for abdominal distention, abdominal pain, constipation, diarrhea, nausea and vomiting.   Endocrine: Negative.    Genitourinary:  Negative for dysuria, hematuria and urgency.   Musculoskeletal:  Positive for arthralgias. Negative for back pain, joint swelling and myalgias.   Skin:  Negative for color change and rash.   Allergic/Immunologic: Negative.    Neurological:  Negative for dizziness, weakness, light-headedness and headaches.   Hematological:  Negative for adenopathy. Does not bruise/bleed easily.   Psychiatric/Behavioral:  Negative for agitation, confusion and dysphoric mood. The patient is not nervous/anxious.        Objective   There were no vitals taken for this visit.  Physical Exam  Constitutional:       General: He is not in acute distress.     Appearance: He is well-developed.   HENT:      Nose: Nose normal.   Eyes:      General: No scleral icterus.     Conjunctiva/sclera: Conjunctivae normal.   Neck:      Thyroid: No thyromegaly.      Trachea: No tracheal deviation.   Cardiovascular:      Rate and Rhythm: Normal rate and regular rhythm.      Heart sounds: No murmur heard.     No friction rub.   Pulmonary:      Effort: No respiratory distress.      Breath sounds: No wheezing or rales.   Abdominal:      General: There is no distension.      Palpations: Abdomen is soft. There is no mass.      Tenderness: There is no abdominal tenderness. There is no guarding.   Musculoskeletal:         General: Deformity present. Normal range of motion.   Lymphadenopathy:      Cervical: No cervical adenopathy.   Skin:     General: Skin is warm and dry.      Findings: No erythema or rash.   Neurological:      Mental Status: He is alert and oriented to  person, place, and time.      Cranial Nerves: No cranial nerve deficit.      Coordination: Coordination normal.      Deep Tendon Reflexes: Reflexes are normal and symmetric.   Psychiatric:         Behavior: Behavior normal.         Thought Content: Thought content normal.         Judgment: Judgment normal.         Assessment & Plan   Diagnoses and all orders for this visit:    1. Mucopurulent chronic bronchitis (Primary) significantly better continue with aggressive pulmonary toilet and allergen avoidance.  Reviewed meds with him he does not need refills at this point

## 2024-07-22 ENCOUNTER — TELEPHONE (OUTPATIENT)
Dept: PULMONOLOGY | Facility: CLINIC | Age: 49
End: 2024-07-22
Payer: MEDICARE

## 2024-07-22 DIAGNOSIS — J44.1 COPD WITH ACUTE EXACERBATION: Primary | ICD-10-CM

## 2024-07-22 DIAGNOSIS — R06.02 SHORTNESS OF BREATH: ICD-10-CM

## 2024-07-22 RX ORDER — ALBUTEROL SULFATE 90 UG/1
2 AEROSOL, METERED RESPIRATORY (INHALATION) EVERY 4 HOURS PRN
Qty: 18 G | Refills: 0 | Status: SHIPPED | OUTPATIENT
Start: 2024-07-22

## 2024-07-22 NOTE — TELEPHONE ENCOUNTER
Caller: Topher Stoll    Relationship: Self    Best call back number: [phone]    Requested Prescriptions: albuterol sulfate  (90 Base) MCG/ACT inhaler   Requested Prescriptions      No prescriptions requested or ordered in this encounter        Pharmacy where request should be sent:  [id number] [place] [address] [address] AT [facility] - [phone]  - [phone]     Last office visit with prescribing clinician: 4/29/2024   Last telemedicine visit with prescribing clinician: Visit date not found   Next office visit with prescribing clinician: Visit date not found     Additional details provided by patient: PT STATES HE CALLED PHARMACY AND THEY ADVISED HE DOES NOT HAVE ANY MORE REFILLS ON THIS MEDICATION. PLEASE CONTACT PT TO ADVISE/ASSIST.     Does the patient have less than a 3 day supply:  [x] Yes  [] No    Would you like a call back once the refill request has been completed: [x] Yes [] No    If the office needs to give you a call back, can they leave a voicemail: [x] Yes [] No    Edelmira Gentile Rep   07/22/24 14:01 EDT

## 2024-07-30 ENCOUNTER — DISEASE STATE MANAGEMENT VISIT (OUTPATIENT)
Dept: PHARMACY | Facility: HOSPITAL | Age: 49
End: 2024-07-30
Payer: MEDICARE

## 2024-07-30 VITALS
OXYGEN SATURATION: 94 % | SYSTOLIC BLOOD PRESSURE: 140 MMHG | HEART RATE: 87 BPM | HEIGHT: 73 IN | WEIGHT: 189 LBS | BODY MASS INDEX: 25.05 KG/M2 | DIASTOLIC BLOOD PRESSURE: 87 MMHG

## 2024-07-30 DIAGNOSIS — R06.02 SHORTNESS OF BREATH: Primary | ICD-10-CM

## 2024-07-30 DIAGNOSIS — J44.9 MODERATE COPD (CHRONIC OBSTRUCTIVE PULMONARY DISEASE): ICD-10-CM

## 2024-07-30 DIAGNOSIS — J45.50 SEVERE PERSISTENT ASTHMA WITHOUT COMPLICATION: ICD-10-CM

## 2024-07-30 PROCEDURE — 99213 OFFICE O/P EST LOW 20 MIN: CPT | Performed by: NURSE PRACTITIONER

## 2024-07-30 PROCEDURE — 94618 PULMONARY STRESS TESTING: CPT | Performed by: NURSE PRACTITIONER

## 2024-07-30 NOTE — PROGRESS NOTES
[facility] Initial Visit       Patient Name: Topher Stoll  : 1975   MRN: [id number]   Sex: male  Care Team: Patient Care Team:  Joshua Hoffmann MD as PCP - General (Internal Medicine)  Daisy Chahal MD (Psychiatry)  Cleveland Waterman APRN as Nurse Practitioner (Gastroenterology)     Primary Care Provider: Joshua Hoffmann MD  Referring Provider: Joshua Hoffmann MD  Encounter Provider: LISA Bell      COPD Management Visit       History of Present Illness:  Topher Stoll 48 y.o. male presented to the clinic today regarding 4 week follow up from initial COPD visit. He has seen a large improvement with increase in compliance with Breztri and resuming Fasenra. He reports that he only has to use his SALEEM one to two times weekly. He verbalizes compliance with regimen. Patient voices understanding of how to use the inhaler appropriately and reviewed the COPD booklet.     Subjective      Past Medical History:   Past Medical History:   Diagnosis Date    Abdominal adhesions     Anxiety     Arthritis     Asthma     Cervical radiculopathy     Colitis     COPD (chronic obstructive pulmonary disease)     GERD (gastroesophageal reflux disease)     Heart attack     History of hepatitis C     Treated with Epclusa in 2019    History of recreational drug use     HTN (hypertension)     Impaired functional mobility, balance, gait, and endurance     Kidney cysts     Left hip pain     Liver disease     Low back pain     Migraines     Obstructive chronic bronchitis with exacerbation     Sleep apnea     mild    Tattoos        Past Surgical History:   Past Surgical History:   Procedure Laterality Date    BACK SURGERY      COLONOSCOPY      COLONOSCOPY N/A 2022    Procedure: COLONOSCOPY with biopsies;  Surgeon: Giancarlo Ruiz MD;  Location: [facility] ENDOSCOPY;  Service: Gastroenterology;  Laterality: N/A;    HERNIA REPAIR      HIP SPACER INSERTION  WITH ANTIBIOTIC CEMENT Left 1/15/2020    Procedure: TOTAL HIP IMPLANT REMOVAL WITH INSERTION OF ANTIBIOTIC SPACER LEFT;  Surgeon: Ba Ramirez MD;  Location:  [facility];  Service: Orthopedics    SHOULDER LIGAMENT REPAIR      right shoulder    SMALL INTESTINE SURGERY      Small bowell resection    TOTAL HIP ARTHROPLASTY Left     TOTAL HIP ARTHROPLASTY Right     TOTAL HIP ARTHROPLASTY REVISION Left 3/5/2020    Procedure: HIP REIMPLANT REVISION LEFT;  Surgeon: Ba Ramirez MD;  Location:  [facility];  Service: Orthopedics;  Laterality: Left;    UPPER GASTROINTESTINAL ENDOSCOPY         Family History:   Family History   Problem Relation Age of Onset    Arthritis Other     Hypertension Other     Migraines Other     Heart attack Other     Stroke Other     Colon cancer Neg Hx        Social History:   Social History     Socioeconomic History    Marital status:    Tobacco Use    Smoking status: Former     Current packs/day: 0.00     Average packs/day: 2.0 packs/day for 15.0 years (30.0 ttl pk-yrs)     Types: Cigarettes     Start date:      Quit date:      Years since quittin.5     Passive exposure: Current    Smokeless tobacco: Current     Types: Chew   Vaping Use    Vaping status: Never Used   Substance and Sexual Activity    Alcohol use: No     Comment: stopped drinking alcohol    Drug use: Not Currently     Comment: takes suboxone    Sexual activity: Defer       Tobacco History:   Social History     Tobacco Use   Smoking Status Former    Current packs/day: 0.00    Average packs/day: 2.0 packs/day for 15.0 years (30.0 ttl pk-yrs)    Types: Cigarettes    Start date:     Quit date: 2005    Years since quittin.5    Passive exposure: Current   Smokeless Tobacco Current    Types: Chew       Medications:     Current Outpatient Medications:     albuterol sulfate  (90 Base) MCG/ACT inhaler, Inhale 2 puffs Every 4 (Four) Hours As Needed for Wheezing., Disp: 18 g, Rfl: 0    Benralizumab  30 MG/ML solution auto-injector, Inject 1 mL under the skin into the appropriate area as directed Every 8 (Eight) Weeks., Disp: 1 mL, Rfl: 5    Budeson-Glycopyrrol-Formoterol (BREZTRI) 160-9-4.8 MCG/ACT aerosol inhaler, Inhale 2 puffs by mouth 2 (Two) Times a Day., Disp: 10.7 g, Rfl: 5    fluticasone (FLONASE) 50 MCG/ACT nasal spray, Instill 1 spray into the nostril(s) as directed by provider Daily., Disp: 16 mL, Rfl: 5    IPRATROPIUM BROMIDE IN, Inhale. 18 mcg, Disp: , Rfl:     lovastatin (MEVACOR) 40 MG tablet, TAKE 1 TABLET BY MOUTH EVERY DAY FOR CHOLESTEROL, Disp: 90 tablet, Rfl: 3    methadone (DOLOPHINE) 5 MG/5ML solution, Take 70 mL by mouth Daily., Disp: , Rfl:     omeprazole (priLOSEC) 40 MG capsule, Take 1 capsule by mouth Daily., Disp: 90 capsule, Rfl: 3    promethazine (PHENERGAN) 12.5 MG tablet, Take 1-2 tablets by mouth every 6-8 hours as needed for nausea and vomiting. Caution advised due to drowsiness., Disp: 15 tablet, Rfl: 0    traZODone (DESYREL) 300 MG tablet, Take 1 tablet by mouth Every Night., Disp: , Rfl:     Current Facility-Administered Medications:     Benralizumab solution auto-injector 30 mg, 1 mL, Subcutaneous, Every 8 Weeks, Destiny Mcknight APRN, 30 mg at 07/02/24 1030    Allergies:   Allergies   Allergen Reactions    Doxycycline Nausea And Vomiting         Spirometry     FEV1 Result: 63% predicted post-BD    COPD Classification: GOLD 2, Moderate; FEV1 50%-79% predicted    Eosinophil Count:   Eosinophils, Absolute   Date Value Ref Range Status   06/20/2024 0.01 0.00 - 0.40 10*3/mm3 Final       Vaccination Status: Influenza current? yes Pneumococcal current? yes    COPD Symptom Assessment          CAT Score today: 19  mMRC today: not completed    Exacerbation Assessment     How many times have you been hospitalized this year due to your COPD? 1    ED/UC visits this year due to COPD? 4      Education     Smoking Cessation: former smoker (quit 2005); continues to use dip but states he  "\"needs to quit\" since it has been causing significant nausea    Visit 3: The following information was reviewed today  Reviewed History, Questionnaires, scores, past and current COPD regimen  Assess adherence, inhaler technique  Inhaler affordability   COPD zones  Vaccinations/Infection prevention was discussed from COPD booklet  Nebulizer use/care        Pharmacological Treatment Plan     TREATMENT: Patient will continue Breztri as COPD maintenance inhaler. Patient has been compliant with regimen.  Continue Albuterol inhaler PRN  Continue Breztri 160-9-4.8 mcg (2 puff BID) .  COPD booklet reviewed and will review further education peices at next appointment.  Patient voiced understanding of how to use each inhaler and will call with any questions or concerns.   Patient also diagnosed with asthma, has not been compliant with Fasenra injections. He resumed injections on 7/2/2024. Will continue to receive in clinic every 8 weeks.  Patient voiced understanding of s/sx of exacerbation and will call the clinic within business hours or seek immediate care in ED.    Follow-up Treatment Plan: Follow up in clinic appointment on 9/24/2024.    Zoie Teresa, PharmD  07/30/2024   11:06 EDT    "

## 2024-07-30 NOTE — PROGRESS NOTES
"     Follow Up Office Visit      Patient Name: Topher Stoll    Chief Complaint:  COPD/asthma      History of Present Illness: Topher Stoll is a 48 y.o. male who is here today for follow up of COPD/asthma. Since last visit, he has been feeling \"pretty good\". He reports that he has been breathing better since he resumed Fasenra. He reports compliance with Breztri. Has only used his SALEEM a couple of times over the past 1 month. He reports that he needs to recertify for portable O2.    Modifying Factors: Worse with exertion and humidity    Subjective      Review of Systems:  Review of Systems   Constitutional:  Negative for fever and unexpected weight change.   Respiratory:  Positive for shortness of breath. Negative for cough and wheezing.    Cardiovascular:  Negative for chest pain and leg swelling.        Past Medical History:   Past Medical History:   Diagnosis Date    Abdominal adhesions     Anxiety     Arthritis     Asthma     Cervical radiculopathy     Colitis     COPD (chronic obstructive pulmonary disease)     GERD (gastroesophageal reflux disease)     Heart attack     History of hepatitis C     Treated with Epclusa in 2019    History of recreational drug use     HTN (hypertension)     Impaired functional mobility, balance, gait, and endurance     Kidney cysts     Left hip pain     Liver disease     Low back pain     Migraines     Obstructive chronic bronchitis with exacerbation     Sleep apnea     mild    Tattoos        Past Surgical History:   Past Surgical History:   Procedure Laterality Date    BACK SURGERY      COLONOSCOPY  2014    COLONOSCOPY N/A 1/4/2022    Procedure: COLONOSCOPY with biopsies;  Surgeon: Giancarlo Ruiz MD;  Location: [facility] ENDOSCOPY;  Service: Gastroenterology;  Laterality: N/A;    HERNIA REPAIR      HIP SPACER INSERTION WITH ANTIBIOTIC CEMENT Left 1/15/2020    Procedure: TOTAL HIP IMPLANT REMOVAL WITH INSERTION OF ANTIBIOTIC SPACER LEFT;  Surgeon: Ba Ramirez MD;  " Location:  [facility];  Service: Orthopedics    SHOULDER LIGAMENT REPAIR      right shoulder    SMALL INTESTINE SURGERY      Small bowell resection    TOTAL HIP ARTHROPLASTY Left     TOTAL HIP ARTHROPLASTY Right     TOTAL HIP ARTHROPLASTY REVISION Left 3/5/2020    Procedure: HIP REIMPLANT REVISION LEFT;  Surgeon: Ba Ramirez MD;  Location:  [facility];  Service: Orthopedics;  Laterality: Left;    UPPER GASTROINTESTINAL ENDOSCOPY         Family History:   Family History   Problem Relation Age of Onset    Arthritis Other     Hypertension Other     Migraines Other     Heart attack Other     Stroke Other     Colon cancer Neg Hx        Social History:   Social History     Socioeconomic History    Marital status:    Tobacco Use    Smoking status: Former     Current packs/day: 0.00     Average packs/day: 2.0 packs/day for 15.0 years (30.0 ttl pk-yrs)     Types: Cigarettes     Start date:      Quit date:      Years since quittin.6     Passive exposure: Current    Smokeless tobacco: Current     Types: Chew   Vaping Use    Vaping status: Never Used   Substance and Sexual Activity    Alcohol use: No     Comment: stopped drinking alcohol    Drug use: Not Currently     Comment: takes suboxone    Sexual activity: Defer       Current Medications:     Current Outpatient Medications:     albuterol sulfate  (90 Base) MCG/ACT inhaler, Inhale 2 puffs Every 4 (Four) Hours As Needed for Wheezing., Disp: 18 g, Rfl: 0    Benralizumab 30 MG/ML solution auto-injector, Inject 1 mL under the skin into the appropriate area as directed Every 8 (Eight) Weeks., Disp: 1 mL, Rfl: 5    Budeson-Glycopyrrol-Formoterol (BREZTRI) 160-9-4.8 MCG/ACT aerosol inhaler, Inhale 2 puffs by mouth 2 (Two) Times a Day., Disp: 10.7 g, Rfl: 5    fluticasone (FLONASE) 50 MCG/ACT nasal spray, Instill 1 spray into the nostril(s) as directed by provider Daily., Disp: 16 mL, Rfl: 5    IPRATROPIUM BROMIDE IN, Inhale. 18 mcg, Disp: , Rfl:     " lovastatin (MEVACOR) 40 MG tablet, TAKE 1 TABLET BY MOUTH EVERY DAY FOR CHOLESTEROL, Disp: 90 tablet, Rfl: 3    methadone (DOLOPHINE) 5 MG/5ML solution, Take 70 mL by mouth Daily., Disp: , Rfl:     omeprazole (priLOSEC) 40 MG capsule, Take 1 capsule by mouth Daily., Disp: 90 capsule, Rfl: 3    promethazine (PHENERGAN) 12.5 MG tablet, Take 1-2 tablets by mouth every 6-8 hours as needed for nausea and vomiting. Caution advised due to drowsiness., Disp: 15 tablet, Rfl: 0    traZODone (DESYREL) 300 MG tablet, Take 1 tablet by mouth Every Night., Disp: , Rfl:     Current Facility-Administered Medications:     Benralizumab solution auto-injector 30 mg, 1 mL, Subcutaneous, Every 8 Weeks, Destiny Mcknight APRN, 30 mg at 07/02/24 1030     Allergies:   Allergies   Allergen Reactions    Doxycycline Nausea And Vomiting       Objective     Physical Exam:  Vital Signs:   Vitals:    07/30/24 0849   BP: 140/87   Pulse: 87   SpO2: 94%   Weight: 85.7 kg (189 lb)   Height: 185.4 cm (73\")   ============================  ============================    6 MINUTE WALK TEST    Topher Stoll   1975             BASELINE   SpO2%: 94 % RA    Heart Rate 87   Blood Pressure 140/87     EXERCISE SpO2% HEART RATE RA or O2 @ LPM   1 MINUTE 94 71 RA   2 MINUTES 92 72 RA   3 MINUTES 92 88 RA   4 MINUTES 93 72 RA   5 MINUTES 92 71 RA   6 MINUTES 93 57 RA   (Number of laps: 6 X 200 Feet + Final partial lap: 0 Feet = 1200 Feet)            Distance Walked:  1200 Feet   SpO2% Post Exercise:  94 %   HR Post Exercise:  83     Reason to stop (if applicable):   ____ Chest Pain   ____ Light Headedness   ____ Dyspnea Unrelieved by Rest   ____ Abnormal Gait Pattern   ____ Severe Fatigue   ____ Other (Specify: __________________________)    Tech Comments (if any): NA     Test performed by: BB    ============================  ============================    Body mass index is 24.94 kg/m².    Physical Exam  Vitals reviewed.   Constitutional:       General: " He is not in acute distress.     Appearance: He is not toxic-appearing.   HENT:      Head: Normocephalic and atraumatic.      Mouth/Throat:      Mouth: Mucous membranes are moist.   Eyes:      Conjunctiva/sclera: Conjunctivae normal.   Cardiovascular:      Rate and Rhythm: Normal rate.      Heart sounds: Normal heart sounds.   Pulmonary:      Effort: Pulmonary effort is normal.      Breath sounds: Normal breath sounds.   Abdominal:      General: There is no distension.      Palpations: Abdomen is soft.   Musculoskeletal:         General: No swelling.      Cervical back: Neck supple.   Skin:     General: Skin is warm and dry.      Findings: No rash.   Neurological:      General: No focal deficit present.      Mental Status: He is alert and oriented to person, place, and time.   Psychiatric:         Mood and Affect: Mood normal.         Behavior: Behavior normal.       Assessment / Plan      Assessment/Plan:   Diagnoses and all orders for this visit:    1. Shortness of breath (Primary)  -     6 Minute Walk Test; Future  No need for supplemental O2 per walk test performed today.    2. Severe persistent asthma without complication  Good control since last visit. Continue current regimen, including Fasenra.    3. Moderate COPD (chronic obstructive pulmonary disease)  Continue current inhaled regimen. We discussed the risk and benefits of inhaled corticosteroids. Patient instructed to take them on a regular basis as prescribed. Patient instructed to rinse their mouth out after each use.        Follow Up:   Return in about 2 months (around 9/30/2024) for Recheck.  The patient was counseled on diagnostic results, risks and benefits of treatment options, risk factor modifications and the importance of treatment compliance. The patient was advised to contact the clinic with concerns or worsening symptoms.     LISA Bell   Pulmonary Medicine [place]     This document has been electronically signed by Destiny  Roxanna, APRN  July 30, 2024

## 2024-08-20 ENCOUNTER — DISEASE STATE MANAGEMENT VISIT (OUTPATIENT)
Dept: PHARMACY | Facility: HOSPITAL | Age: 49
End: 2024-08-20
Payer: MEDICARE

## 2024-08-20 NOTE — PROGRESS NOTES
[facility] Follow Up        Patient Name: Topher Stoll  : 1975   MRN: [id number]   Sex: male  Care Team: Patient Care Team:  Joshua Hoffmann MD as PCP - General (Internal Medicine)  Daisy Chahal MD (Psychiatry)  Cleveland Waterman APRN as Nurse Practitioner (Gastroenterology)     Primary Care Provider: Joshua Hoffmann MD  Referring Provider: No ref. provider found  Encounter Provider: Antwon Bartlett RPH      Chief Complaint: phone follow up visit for COPD    COPD Management Visit     History of Present Illness: Topher Stoll is a 48 y.o. male receiving COPD management services at the [facility]. Spoke with patient via phone today to conduct follow up after in clinic visit on .     Patient reports COPD symptoms are well controlled with Breztri inhaler and reports adherence to regimen however noted that patient is overdue for refill. Reminded patient the importance of adherence to regimen and taking inhaler as prescribed for maximum benefit. Patient states he will  inhaler this week. He is also getting Fasenra injections for asthma management, due next week.    Subjective      Past Medical History:   Past Medical History:   Diagnosis Date    Abdominal adhesions     Anxiety     Arthritis     Asthma     Cervical radiculopathy     Colitis     COPD (chronic obstructive pulmonary disease)     GERD (gastroesophageal reflux disease)     Heart attack     History of hepatitis C     Treated with Epclusa in 2019    History of recreational drug use     HTN (hypertension)     Impaired functional mobility, balance, gait, and endurance     Kidney cysts     Left hip pain     Liver disease     Low back pain     Migraines     Obstructive chronic bronchitis with exacerbation     Sleep apnea     mild    Tattoos        Past Surgical History:   Past Surgical History:   Procedure Laterality Date    BACK SURGERY      COLONOSCOPY       COLONOSCOPY N/A 2022    Procedure: COLONOSCOPY with biopsies;  Surgeon: Giancarlo Ruiz MD;  Location:  [facility];  Service: Gastroenterology;  Laterality: N/A;    HERNIA REPAIR      HIP SPACER INSERTION WITH ANTIBIOTIC CEMENT Left 1/15/2020    Procedure: TOTAL HIP IMPLANT REMOVAL WITH INSERTION OF ANTIBIOTIC SPACER LEFT;  Surgeon: Ba Ramirez MD;  Location:  [facility];  Service: Orthopedics    SHOULDER LIGAMENT REPAIR      right shoulder    SMALL INTESTINE SURGERY      Small bowell resection    TOTAL HIP ARTHROPLASTY Left     TOTAL HIP ARTHROPLASTY Right     TOTAL HIP ARTHROPLASTY REVISION Left 3/5/2020    Procedure: HIP REIMPLANT REVISION LEFT;  Surgeon: Ba Ramirez MD;  Location:  [facility];  Service: Orthopedics;  Laterality: Left;    UPPER GASTROINTESTINAL ENDOSCOPY         Family History:   Family History   Problem Relation Age of Onset    Arthritis Other     Hypertension Other     Migraines Other     Heart attack Other     Stroke Other     Colon cancer Neg Hx        Social History:   Social History     Socioeconomic History    Marital status:    Tobacco Use    Smoking status: Former     Current packs/day: 0.00     Average packs/day: 2.0 packs/day for 15.0 years (30.0 ttl pk-yrs)     Types: Cigarettes     Start date:      Quit date: 2005     Years since quittin.6     Passive exposure: Current    Smokeless tobacco: Current     Types: Chew   Vaping Use    Vaping status: Never Used   Substance and Sexual Activity    Alcohol use: No     Comment: stopped drinking alcohol    Drug use: Not Currently     Comment: takes suboxone    Sexual activity: Defer       Tobacco History:   Social History     Tobacco Use   Smoking Status Former    Current packs/day: 0.00    Average packs/day: 2.0 packs/day for 15.0 years (30.0 ttl pk-yrs)    Types: Cigarettes    Start date:     Quit date: 2005    Years since quittin.6    Passive exposure: Current   Smokeless Tobacco Current     Types: Chew       Medications:     Current Outpatient Medications:     albuterol sulfate  (90 Base) MCG/ACT inhaler, Inhale 2 puffs Every 4 (Four) Hours As Needed for Wheezing., Disp: 18 g, Rfl: 0    Benralizumab 30 MG/ML solution auto-injector, Inject 1 mL under the skin into the appropriate area as directed Every 8 (Eight) Weeks., Disp: 1 mL, Rfl: 5    Budeson-Glycopyrrol-Formoterol (BREZTRI) 160-9-4.8 MCG/ACT aerosol inhaler, Inhale 2 puffs by mouth 2 (Two) Times a Day., Disp: 10.7 g, Rfl: 5    fluticasone (FLONASE) 50 MCG/ACT nasal spray, Instill 1 spray into the nostril(s) as directed by provider Daily., Disp: 16 mL, Rfl: 5    IPRATROPIUM BROMIDE IN, Inhale. 18 mcg, Disp: , Rfl:     lovastatin (MEVACOR) 40 MG tablet, TAKE 1 TABLET BY MOUTH EVERY DAY FOR CHOLESTEROL, Disp: 90 tablet, Rfl: 3    methadone (DOLOPHINE) 5 MG/5ML solution, Take 70 mL by mouth Daily., Disp: , Rfl:     omeprazole (priLOSEC) 40 MG capsule, Take 1 capsule by mouth Daily., Disp: 90 capsule, Rfl: 3    promethazine (PHENERGAN) 12.5 MG tablet, Take 1-2 tablets by mouth every 6-8 hours as needed for nausea and vomiting. Caution advised due to drowsiness., Disp: 15 tablet, Rfl: 0    traZODone (DESYREL) 300 MG tablet, Take 1 tablet by mouth Every Night., Disp: , Rfl:     Current Facility-Administered Medications:     Benralizumab solution auto-injector 30 mg, 1 mL, Subcutaneous, Every 8 Weeks, Destiny Mcknight APRN, 30 mg at 07/02/24 1030    Allergies:   Allergies   Allergen Reactions    Doxycycline Nausea And Vomiting       Spirometry     FEV1 Result: Post-BD 63%    COPD Classification: GOLD 2, Moderate; FEV1 50%-79% predicted    Eosinophil Count:   Eosinophils, Absolute   Date Value Ref Range Status   06/20/2024 0.01 0.00 - 0.40 10*3/mm3 Final       Vaccination Status: Influenza current? yes Pneumococcal current? yes      Exacerbation Assessment     Has patient been hospitalized since last visit? N    Assessment & Plan      Treatment:  Continue Breztri 2 puffs BID. Patient reports adherence though overdue for refill  Importance of adherence to maintenance inhaler reviewed. Patient states he will  inhaler this week.  Continue rescue inhaler as needed.  No indication for rescue pack at this time. Patient confirms understanding of signs/symptoms of exacerbation and when to seek care in ED and contact clinic.      Follow Up     Patient will follow up on 9/24/24 in clinic. Has Fasenra injection scheduled on 8/27/24 as well.      Antwon Bartlett RPH  08/20/2024  12:42 EDT

## 2024-08-23 ENCOUNTER — OFFICE VISIT (OUTPATIENT)
Dept: INTERNAL MEDICINE | Facility: CLINIC | Age: 49
End: 2024-08-23
Payer: MEDICARE

## 2024-08-23 VITALS
SYSTOLIC BLOOD PRESSURE: 123 MMHG | BODY MASS INDEX: 23.78 KG/M2 | TEMPERATURE: 97.7 F | HEART RATE: 110 BPM | HEIGHT: 73 IN | RESPIRATION RATE: 20 BRPM | DIASTOLIC BLOOD PRESSURE: 83 MMHG | OXYGEN SATURATION: 93 % | WEIGHT: 179.4 LBS

## 2024-08-23 DIAGNOSIS — R11.0 NAUSEA: ICD-10-CM

## 2024-08-23 DIAGNOSIS — I10 PRIMARY HYPERTENSION: Primary | ICD-10-CM

## 2024-08-23 DIAGNOSIS — J44.1 CHRONIC OBSTRUCTIVE PULMONARY DISEASE WITH ACUTE EXACERBATION: ICD-10-CM

## 2024-08-23 DIAGNOSIS — R10.33 PERIUMBILICAL ABDOMINAL PAIN: ICD-10-CM

## 2024-08-23 DIAGNOSIS — M54.50 ACUTE MIDLINE LOW BACK PAIN WITHOUT SCIATICA: ICD-10-CM

## 2024-08-23 DIAGNOSIS — I25.10 ATHEROSCLEROSIS OF NATIVE CORONARY ARTERY OF NATIVE HEART WITHOUT ANGINA PECTORIS: ICD-10-CM

## 2024-08-23 PROBLEM — J96.10 CHRONIC RESPIRATORY FAILURE: Status: RESOLVED | Noted: 2024-06-17 | Resolved: 2024-08-23

## 2024-08-23 RX ORDER — PROMETHAZINE HYDROCHLORIDE 12.5 MG/1
TABLET ORAL
Qty: 15 TABLET | Refills: 0 | Status: SHIPPED | OUTPATIENT
Start: 2024-08-23 | End: 2024-08-24

## 2024-08-23 RX ORDER — BACLOFEN 10 MG/1
10 TABLET ORAL 3 TIMES DAILY
Qty: 30 TABLET | Refills: 0 | Status: SHIPPED | OUTPATIENT
Start: 2024-08-23

## 2024-08-23 NOTE — PROGRESS NOTES
Subjective   The ABCs of the Annual Wellness Visit  Medicare Wellness Visit      Topher Stoll is a 48 y.o. patient who presents for a Medicare Wellness Visit.    The following portions of the patient's history were reviewed and   updated as appropriate: allergies, current medications, past family history, past medical history, past social history, past surgical history, and problem list.    Compared to one year ago, the patient's physical   health is the same.  Compared to one year ago, the patient's mental   health is the same.    Recent Hospitalizations:  This patient has had a [facility] admission record on file within the last 365 days.  Current Medical Providers:  Patient Care Team:  Joshua Hoffmann MD as PCP - General (Internal Medicine)  Daisy Chahal MD (Psychiatry)  Cleveland Waterman APRN as Nurse Practitioner (Gastroenterology)    Outpatient Medications Prior to Visit   Medication Sig Dispense Refill    Benralizumab 30 MG/ML solution auto-injector Inject 1 mL under the skin into the appropriate area as directed Every 8 (Eight) Weeks. 1 mL 5    Budeson-Glycopyrrol-Formoterol (BREZTRI) 160-9-4.8 MCG/ACT aerosol inhaler Inhale 2 puffs by mouth 2 (Two) Times a Day. 10.7 g 5    fluticasone (FLONASE) 50 MCG/ACT nasal spray Instill 1 spray into the nostril(s) as directed by provider Daily. 16 mL 5    lovastatin (MEVACOR) 40 MG tablet TAKE 1 TABLET BY MOUTH EVERY DAY FOR CHOLESTEROL 90 tablet 3    methadone (DOLOPHINE) 5 MG/5ML solution Take 70 mL by mouth Daily.      omeprazole (priLOSEC) 40 MG capsule Take 1 capsule by mouth Daily. 90 capsule 3    traZODone (DESYREL) 300 MG tablet Take 1 tablet by mouth Every Night.      promethazine (PHENERGAN) 12.5 MG tablet Take 1-2 tablets by mouth every 6-8 hours as needed for nausea and vomiting. Caution advised due to drowsiness. 15 tablet 0    albuterol sulfate  (90 Base) MCG/ACT inhaler Inhale 2 puffs Every 4 (Four) Hours As Needed for  "Wheezing. (Patient not taking: Reported on 8/23/2024) 18 g 0    IPRATROPIUM BROMIDE IN Inhale. 18 mcg       Facility-Administered Medications Prior to Visit   Medication Dose Route Frequency Provider Last Rate Last Admin    Benralizumab solution auto-injector 30 mg  1 mL Subcutaneous Every 8 Weeks Destiny Mcknight APRN   30 mg at 07/02/24 1030     Opioid medication/s are on active medication list.  and I have evaluated his active treatment plan and pain score trends (see table).  There were no vitals filed for this visit.  I have reviewed the chart for potential of high risk medication and harmful drug interactions in the elderly.        Aspirin is not on active medication list.  Aspirin use is not indicated based on review of current medical condition/s. Risk of harm outweighs potential benefits.  .    Patient Active Problem List   Diagnosis    Atherosclerosis of coronary artery    Primary hypertension    Hyperlipidemia    Osteoporosis    Pulmonary emphysema    Chronic viral hepatitis B without delta agent and without coma    Hep C w/o coma, chronic    total hip explant, antibiotic spacer placement 1/15/2020    History of hepatitis B    Opioid dependence on agonist therapy    Chronic obstructive pulmonary disease with acute exacerbation    Severe persistent asthma without complication    COPD exacerbation    Moderate persistent asthma with exacerbation    COPD (chronic obstructive pulmonary disease)    COPD with acute exacerbation     Advance Care Planning Advance Directive is not on file.  ACP discussion was held with the patient during this visit. Patient does not have an advance directive, information provided.            Objective   Vitals:    08/23/24 1605   BP: 123/83   Pulse: 110   Resp: 20   Temp: 97.7 °F (36.5 °C)   SpO2: 93%   Weight: 76.7 kg (169 lb)   Height: 185.4 cm (73\")       Estimated body mass index is 22.3 kg/m² as calculated from the following:    Height as of this encounter: 185.4 cm (73\").   "  Weight as of this encounter: 76.7 kg (169 lb).    BMI is within normal parameters. No other follow-up for BMI required.       Does the patient have evidence of cognitive impairment? No                                                                                                Health  Risk Assessment    Smoking Status:  Social History     Tobacco Use   Smoking Status Former    Current packs/day: 0.00    Average packs/day: 2.0 packs/day for 15.0 years (30.0 ttl pk-yrs)    Types: Cigarettes    Start date:     Quit date:     Years since quittin.6    Passive exposure: Current   Smokeless Tobacco Current    Types: Chew     Alcohol Consumption:  Social History     Substance and Sexual Activity   Alcohol Use No    Comment: stopped drinking alcohol       Fall Risk Screen  STEADI Fall Risk Assessment has not been completed.    Depression Screenin/17/2024     1:37 PM   PHQ-2/PHQ-9 Depression Screening   Little Interest or Pleasure in Doing Things 0-->not at all   Feeling Down, Depressed or Hopeless 0-->not at all   PHQ-9: Brief Depression Severity Measure Score 0     Health Habits and Functional and Cognitive Screenin/28/2023     2:06 PM   Functional & Cognitive Status   Do you have difficulty preparing food and eating? No   Do you have difficulty bathing yourself, getting dressed or grooming yourself? No   Do you have difficulty using the toilet? No   Do you have difficulty moving around from place to place? No   Do you have trouble with steps or getting out of a bed or a chair? No   Current Diet Well Balanced Diet   Dental Exam Up to date   Eye Exam Up to date   Exercise (times per week) 0 times per week   Current Exercises Include No Regular Exercise   Do you need help using the phone?  No   Are you deaf or do you have serious difficulty hearing?  No   Do you need help to go to places out of walking distance? No   Do you need help shopping? No   Do you need help preparing meals?  No   Do  you need help with housework?  No   Do you need help with laundry? No   Do you need help taking your medications? No   Do you need help managing money? No   Do you ever drive or ride in a car without wearing a seat belt? No   Have you felt unusual stress, anger or loneliness in the last month? No   Who do you live with? Alone   If you need help, do you have trouble finding someone available to you? No   Have you been bothered in the last four weeks by sexual problems? No   Do you have difficulty concentrating, remembering or making decisions? No           Age-appropriate Screening Schedule:  Refer to the list below for future screening recommendations based on patient's age, sex and/or medical conditions. Orders for these recommended tests are listed in the plan section. The patient has been provided with a written plan.    Health Maintenance List  Health Maintenance   Topic Date Due    DXA SCAN  11/21/2015    Hepatitis B (3 of 4 - Hep B Twinrix 4-dose series) 02/08/2019    LIPID PANEL  12/10/2019    ANNUAL WELLNESS VISIT  07/28/2024    INFLUENZA VACCINE  08/01/2024    TDAP/TD VACCINES (2 - Td or Tdap) 11/09/2028    COLORECTAL CANCER SCREENING  01/04/2032    HEPATITIS C SCREENING  Completed    COVID-19 Vaccine  Completed    Pneumococcal Vaccine 0-64  Completed                                                                                                                                                CMS Preventative Services Quick Reference  Risk Factors Identified During Encounter  Chronic Pain: Natural history and expected course discussed. Questions answered.  Tobacco Use/Dependance Risk (use dotphrase .tobaccocessation for documentation)    The above risks/problems have been discussed with the patient.  Pertinent information has been shared with the patient in the After Visit Summary.  An After Visit Summary and PPPS were made available to the patient.    Follow Up:   Next Medicare Wellness visit to be  "scheduled in 1 year.         Additional E&M Note during same encounter follows:  Patient has additional, significant, and separately identifiable condition(s)/problem(s) that require work above and beyond the Medicare Wellness Visit     Chief Complaint  Medicare Wellness-subsequent (Follow up on GI issues /Wants to discuss meds )    Subjective   HPI  Topher is also being seen today for additional medical problem/s.  New onset intermittent abdo pain with nausea.  History of small bowel obstruction in the past has required at least 3 surgeries.  He denies any vomiting currently he has been passing flatus today has not had a bowel movement today.  No recent change in meds  About 1 week history of lower thoracic back pain without radiation no new trauma  Review of Systems   Constitutional:  Positive for fatigue. Negative for activity change, appetite change and fever.   HENT:  Negative for congestion, ear discharge, ear pain and trouble swallowing.    Eyes:  Negative for photophobia and visual disturbance.   Respiratory:  Negative for cough and shortness of breath.    Cardiovascular:  Negative for chest pain and palpitations.   Gastrointestinal:  Positive for abdominal pain. Negative for abdominal distention, constipation, diarrhea, nausea and vomiting.   Genitourinary:  Negative for dysuria, hematuria and urgency.   Musculoskeletal:  Positive for arthralgias and back pain. Negative for joint swelling and myalgias.   Skin:  Negative for color change and rash.   Neurological:  Negative for dizziness, weakness, light-headedness and confusion.   Hematological:  Negative for adenopathy. Does not bruise/bleed easily.   Psychiatric/Behavioral:  Positive for sleep disturbance. Negative for agitation and dysphoric mood. The patient is not nervous/anxious.               Objective   Vital Signs:  /83   Pulse 110   Temp 97.7 °F (36.5 °C)   Resp 20   Ht 185.4 cm (73\")   Wt 76.7 kg (169 lb)   SpO2 93%   BMI 22.30 kg/m² "   Physical Exam  Constitutional:       General: He is not in acute distress.     Appearance: He is well-developed.   HENT:      Nose: Nose normal.   Eyes:      General: No scleral icterus.     Conjunctiva/sclera: Conjunctivae normal.   Neck:      Thyroid: No thyromegaly.      Trachea: No tracheal deviation.   Cardiovascular:      Rate and Rhythm: Normal rate and regular rhythm.      Heart sounds: No murmur heard.     No friction rub.   Pulmonary:      Effort: No respiratory distress.      Breath sounds: No wheezing or rales.   Abdominal:      General: There is no distension.      Palpations: Abdomen is soft. There is no mass.      Tenderness: There is no abdominal tenderness. There is no guarding.   Musculoskeletal:         General: Deformity present. Normal range of motion.   Lymphadenopathy:      Cervical: No cervical adenopathy.   Skin:     General: Skin is warm and dry.      Findings: No erythema or rash.   Neurological:      Mental Status: He is alert and oriented to person, place, and time.      Cranial Nerves: No cranial nerve deficit.      Coordination: Coordination normal.      Deep Tendon Reflexes: Reflexes are normal and symmetric.   Psychiatric:         Behavior: Behavior normal.         Thought Content: Thought content normal.         Judgment: Judgment normal.               Assessment and Plan               Primary hypertension  Stable with current meds and low-salt diet  Chronic obstructive pulmonary disease with acute exacerbation    Continue with inhalers as quit smoking  Atherosclerosis of native coronary artery of native heart without angina pectoris  Continue with current meds angina free counseled about nicotine cessation  Periumbilical abdominal pain  No tenderness on palpation has active bowel sounds advised soft or liquid diet for now for bowel rest.  Needs to go to the emergency room if symptoms should get worse or he should have vomiting  Nausea    Acute midline low back pain without  sciatica  On methadone.  Added on muscle relaxant advised heat pad as needed  No orders of the defined types were placed in this encounter.            Follow Up   No follow-ups on file.  Patient was given instructions and counseling regarding his condition or for health maintenance advice. Please see specific information pulled into the AVS if appropriate.

## 2024-08-24 ENCOUNTER — APPOINTMENT (OUTPATIENT)
Dept: CT IMAGING | Facility: HOSPITAL | Age: 49
End: 2024-08-24
Payer: MEDICARE

## 2024-08-24 ENCOUNTER — HOSPITAL ENCOUNTER (EMERGENCY)
Facility: HOSPITAL | Age: 49
Discharge: HOME | End: 2024-08-24
Attending: STUDENT IN AN ORGANIZED HEALTH CARE EDUCATION/TRAINING PROGRAM
Payer: MEDICARE

## 2024-08-24 VITALS
BODY MASS INDEX: 23.86 KG/M2 | HEIGHT: 73 IN | RESPIRATION RATE: 20 BRPM | DIASTOLIC BLOOD PRESSURE: 53 MMHG | SYSTOLIC BLOOD PRESSURE: 101 MMHG | HEART RATE: 109 BPM | WEIGHT: 180 LBS | TEMPERATURE: 98.3 F | OXYGEN SATURATION: 100 %

## 2024-08-24 DIAGNOSIS — R10.84 GENERALIZED ABDOMINAL PAIN: Primary | ICD-10-CM

## 2024-08-24 DIAGNOSIS — R11.2 NAUSEA AND VOMITING, UNSPECIFIED VOMITING TYPE: ICD-10-CM

## 2024-08-24 LAB
ALBUMIN SERPL-MCNC: 5.2 G/DL (ref 3.5–5.2)
ALBUMIN/GLOB SERPL: 1.4 G/DL
ALP SERPL-CCNC: 161 U/L (ref 39–117)
ALT SERPL W P-5'-P-CCNC: 38 U/L (ref 1–41)
ANION GAP SERPL CALCULATED.3IONS-SCNC: 17.1 MMOL/L (ref 5–15)
ANION GAP SERPL CALCULATED.3IONS-SCNC: 24.4 MMOL/L (ref 5–15)
AST SERPL-CCNC: 124 U/L (ref 1–40)
BASOPHILS # BLD AUTO: 0.02 10*3/MM3 (ref 0–0.2)
BASOPHILS NFR BLD AUTO: 0.2 % (ref 0–1.5)
BILIRUB SERPL-MCNC: 0.9 MG/DL (ref 0–1.2)
BILIRUB UR QL STRIP: NEGATIVE
BUN SERPL-MCNC: 20 MG/DL (ref 6–20)
BUN SERPL-MCNC: 24 MG/DL (ref 6–20)
BUN/CREAT SERPL: 15.2 (ref 7–25)
BUN/CREAT SERPL: 15.7 (ref 7–25)
CALCIUM SPEC-SCNC: 8.6 MG/DL (ref 8.6–10.5)
CALCIUM SPEC-SCNC: 9.9 MG/DL (ref 8.6–10.5)
CHLORIDE SERPL-SCNC: 102 MMOL/L (ref 98–107)
CHLORIDE SERPL-SCNC: 92 MMOL/L (ref 98–107)
CLARITY UR: CLEAR
CO2 SERPL-SCNC: 21.6 MMOL/L (ref 22–29)
CO2 SERPL-SCNC: 21.9 MMOL/L (ref 22–29)
COLOR UR: YELLOW
CREAT SERPL-MCNC: 1.27 MG/DL (ref 0.76–1.27)
CREAT SERPL-MCNC: 1.58 MG/DL (ref 0.76–1.27)
D-LACTATE SERPL-SCNC: 1.8 MMOL/L (ref 0.5–2)
DEPRECATED RDW RBC AUTO: 37.5 FL (ref 37–54)
EGFRCR SERPLBLD CKD-EPI 2021: 53.3 ML/MIN/1.73
EGFRCR SERPLBLD CKD-EPI 2021: 69.3 ML/MIN/1.73
EOSINOPHIL # BLD AUTO: 0.01 10*3/MM3 (ref 0–0.4)
EOSINOPHIL NFR BLD AUTO: 0.1 % (ref 0.3–6.2)
ERYTHROCYTE [DISTWIDTH] IN BLOOD BY AUTOMATED COUNT: 13.5 % (ref 12.3–15.4)
GLOBULIN UR ELPH-MCNC: 3.7 GM/DL
GLUCOSE SERPL-MCNC: 112 MG/DL (ref 65–99)
GLUCOSE SERPL-MCNC: 91 MG/DL (ref 65–99)
GLUCOSE UR STRIP-MCNC: NEGATIVE MG/DL
HCT VFR BLD AUTO: 51.6 % (ref 37.5–51)
HGB BLD-MCNC: 17.6 G/DL (ref 13–17.7)
HGB UR QL STRIP.AUTO: NEGATIVE
HOLD SPECIMEN: NORMAL
HOLD SPECIMEN: NORMAL
IMM GRANULOCYTES # BLD AUTO: 0.05 10*3/MM3 (ref 0–0.05)
IMM GRANULOCYTES NFR BLD AUTO: 0.4 % (ref 0–0.5)
KETONES UR QL STRIP: ABNORMAL
LEUKOCYTE ESTERASE UR QL STRIP.AUTO: NEGATIVE
LIPASE SERPL-CCNC: 35 U/L (ref 13–60)
LYMPHOCYTES # BLD AUTO: 1.39 10*3/MM3 (ref 0.7–3.1)
LYMPHOCYTES NFR BLD AUTO: 11.3 % (ref 19.6–45.3)
MAGNESIUM SERPL-MCNC: 2.4 MG/DL (ref 1.6–2.6)
MCH RBC QN AUTO: 26.7 PG (ref 26.6–33)
MCHC RBC AUTO-ENTMCNC: 34.1 G/DL (ref 31.5–35.7)
MCV RBC AUTO: 78.3 FL (ref 79–97)
MONOCYTES # BLD AUTO: 0.66 10*3/MM3 (ref 0.1–0.9)
MONOCYTES NFR BLD AUTO: 5.4 % (ref 5–12)
NEUTROPHILS NFR BLD AUTO: 10.12 10*3/MM3 (ref 1.7–7)
NEUTROPHILS NFR BLD AUTO: 82.6 % (ref 42.7–76)
NITRITE UR QL STRIP: NEGATIVE
NRBC BLD AUTO-RTO: 0 /100 WBC (ref 0–0.2)
PH UR STRIP.AUTO: 5.5 [PH] (ref 5–8)
PLATELET # BLD AUTO: 295 10*3/MM3 (ref 140–450)
PMV BLD AUTO: 10 FL (ref 6–12)
POTASSIUM SERPL-SCNC: 4.1 MMOL/L (ref 3.5–5.2)
POTASSIUM SERPL-SCNC: 4.6 MMOL/L (ref 3.5–5.2)
PROT SERPL-MCNC: 8.9 G/DL (ref 6–8.5)
PROT UR QL STRIP: ABNORMAL
RBC # BLD AUTO: 6.59 10*6/MM3 (ref 4.14–5.8)
SODIUM SERPL-SCNC: 138 MMOL/L (ref 136–145)
SODIUM SERPL-SCNC: 141 MMOL/L (ref 136–145)
SP GR UR STRIP: >1.03 (ref 1–1.03)
UROBILINOGEN UR QL STRIP: ABNORMAL
WBC NRBC COR # BLD AUTO: 12.25 10*3/MM3 (ref 3.4–10.8)
WHOLE BLOOD HOLD COAG: NORMAL
WHOLE BLOOD HOLD SPECIMEN: NORMAL

## 2024-08-24 PROCEDURE — 96375 TX/PRO/DX INJ NEW DRUG ADDON: CPT

## 2024-08-24 PROCEDURE — 25010000002 DIPHENHYDRAMINE PER 50 MG: Performed by: PHYSICIAN ASSISTANT

## 2024-08-24 PROCEDURE — 83735 ASSAY OF MAGNESIUM: CPT | Performed by: PHYSICIAN ASSISTANT

## 2024-08-24 PROCEDURE — 83605 ASSAY OF LACTIC ACID: CPT | Performed by: STUDENT IN AN ORGANIZED HEALTH CARE EDUCATION/TRAINING PROGRAM

## 2024-08-24 PROCEDURE — 25010000002 ONDANSETRON PER 1 MG: Performed by: STUDENT IN AN ORGANIZED HEALTH CARE EDUCATION/TRAINING PROGRAM

## 2024-08-24 PROCEDURE — 80053 COMPREHEN METABOLIC PANEL: CPT | Performed by: STUDENT IN AN ORGANIZED HEALTH CARE EDUCATION/TRAINING PROGRAM

## 2024-08-24 PROCEDURE — 81003 URINALYSIS AUTO W/O SCOPE: CPT | Performed by: STUDENT IN AN ORGANIZED HEALTH CARE EDUCATION/TRAINING PROGRAM

## 2024-08-24 PROCEDURE — 74177 CT ABD & PELVIS W/CONTRAST: CPT

## 2024-08-24 PROCEDURE — 25010000002 MORPHINE PER 10 MG: Performed by: STUDENT IN AN ORGANIZED HEALTH CARE EDUCATION/TRAINING PROGRAM

## 2024-08-24 PROCEDURE — 83690 ASSAY OF LIPASE: CPT | Performed by: STUDENT IN AN ORGANIZED HEALTH CARE EDUCATION/TRAINING PROGRAM

## 2024-08-24 PROCEDURE — 99285 EMERGENCY DEPT VISIT HI MDM: CPT

## 2024-08-24 PROCEDURE — 25810000003 SODIUM CHLORIDE 0.9 % SOLUTION: Performed by: STUDENT IN AN ORGANIZED HEALTH CARE EDUCATION/TRAINING PROGRAM

## 2024-08-24 PROCEDURE — 36415 COLL VENOUS BLD VENIPUNCTURE: CPT

## 2024-08-24 PROCEDURE — 25810000003 SODIUM CHLORIDE 0.9 % SOLUTION: Performed by: PHYSICIAN ASSISTANT

## 2024-08-24 PROCEDURE — 96374 THER/PROPH/DIAG INJ IV PUSH: CPT

## 2024-08-24 PROCEDURE — 25510000001 IOPAMIDOL 61 % SOLUTION: Performed by: STUDENT IN AN ORGANIZED HEALTH CARE EDUCATION/TRAINING PROGRAM

## 2024-08-24 PROCEDURE — 85025 COMPLETE CBC W/AUTO DIFF WBC: CPT | Performed by: STUDENT IN AN ORGANIZED HEALTH CARE EDUCATION/TRAINING PROGRAM

## 2024-08-24 PROCEDURE — 25010000002 DROPERIDOL PER 5 MG: Performed by: PHYSICIAN ASSISTANT

## 2024-08-24 RX ORDER — PROMETHAZINE HYDROCHLORIDE 12.5 MG/1
12.5 TABLET ORAL EVERY 6 HOURS PRN
Qty: 20 TABLET | Refills: 0 | Status: SHIPPED | OUTPATIENT
Start: 2024-08-24

## 2024-08-24 RX ORDER — SODIUM CHLORIDE 0.9 % (FLUSH) 0.9 %
10 SYRINGE (ML) INJECTION AS NEEDED
Status: DISCONTINUED | OUTPATIENT
Start: 2024-08-24 | End: 2024-08-25 | Stop reason: HOSPADM

## 2024-08-24 RX ORDER — ONDANSETRON 2 MG/ML
4 INJECTION INTRAMUSCULAR; INTRAVENOUS ONCE
Status: COMPLETED | OUTPATIENT
Start: 2024-08-24 | End: 2024-08-24

## 2024-08-24 RX ORDER — DIPHENHYDRAMINE HYDROCHLORIDE 50 MG/ML
25 INJECTION INTRAMUSCULAR; INTRAVENOUS ONCE
Status: COMPLETED | OUTPATIENT
Start: 2024-08-24 | End: 2024-08-24

## 2024-08-24 RX ORDER — IOPAMIDOL 612 MG/ML
100 INJECTION, SOLUTION INTRAVASCULAR
Status: COMPLETED | OUTPATIENT
Start: 2024-08-24 | End: 2024-08-24

## 2024-08-24 RX ORDER — DROPERIDOL 2.5 MG/ML
1.25 INJECTION, SOLUTION INTRAMUSCULAR; INTRAVENOUS ONCE
Status: COMPLETED | OUTPATIENT
Start: 2024-08-24 | End: 2024-08-24

## 2024-08-24 RX ORDER — DICYCLOMINE HCL 20 MG
20 TABLET ORAL EVERY 6 HOURS PRN
Qty: 20 TABLET | Refills: 0 | Status: SHIPPED | OUTPATIENT
Start: 2024-08-24

## 2024-08-24 RX ADMIN — MORPHINE SULFATE 4 MG: 4 INJECTION, SOLUTION INTRAMUSCULAR; INTRAVENOUS at 18:24

## 2024-08-24 RX ADMIN — IOPAMIDOL 100 ML: 612 INJECTION, SOLUTION INTRAVENOUS at 19:15

## 2024-08-24 RX ADMIN — DROPERIDOL 1.25 MG: 2.5 INJECTION, SOLUTION INTRAMUSCULAR; INTRAVENOUS at 19:21

## 2024-08-24 RX ADMIN — SODIUM CHLORIDE 1000 ML: 9 INJECTION, SOLUTION INTRAVENOUS at 18:24

## 2024-08-24 RX ADMIN — ONDANSETRON 4 MG: 2 INJECTION INTRAMUSCULAR; INTRAVENOUS at 18:23

## 2024-08-24 RX ADMIN — DIPHENHYDRAMINE HYDROCHLORIDE 25 MG: 50 INJECTION, SOLUTION INTRAMUSCULAR; INTRAVENOUS at 19:21

## 2024-08-24 RX ADMIN — SODIUM CHLORIDE 1000 ML: 9 INJECTION, SOLUTION INTRAVENOUS at 20:23
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
## 2024-08-27 ENCOUNTER — DISEASE STATE MANAGEMENT VISIT (OUTPATIENT)
Dept: PHARMACY | Facility: HOSPITAL | Age: 49
End: 2024-08-27
Payer: MEDICARE

## 2024-08-27 PROCEDURE — G0463 HOSPITAL OUTPT CLINIC VISIT: HCPCS

## 2024-08-27 NOTE — PROGRESS NOTES
Patient here for Fasenra injection today. Patient is obtaining via pharmacy benefit.    Patient reports that he tolerated last injection well without ADRs.    Fasenra 30 mg SQ x 1 administered in clinic today in back of L arm.     NDC: 8768-4861-56  LOT: [id number]   EXP:     Patient will RTC in 8 weeks for next injection.    Fred Galvez, Pharmacy Intern  8/27/2024  08:51 EDT

## 2024-08-30 ENCOUNTER — TELEPHONE (OUTPATIENT)
Dept: PHARMACY | Facility: HOSPITAL | Age: 49
End: 2024-08-30
Payer: MEDICARE

## 2024-08-30 NOTE — TELEPHONE ENCOUNTER
Patient's mother stopped by clinic today to discuss returning O2 equipment to [employer]. She said they need a paper faxed to [phone] (with attn to Roman) stating that equipment needs to be picked up from their home.     Nataly, do you mind to send this to [employer]? Thanks!

## 2024-09-04 DIAGNOSIS — J44.1 COPD WITH ACUTE EXACERBATION: ICD-10-CM

## 2024-09-04 RX ORDER — ALBUTEROL SULFATE 90 UG/1
2 AEROSOL, METERED RESPIRATORY (INHALATION) EVERY 4 HOURS PRN
Qty: 18 G | Refills: 0 | Status: SHIPPED | OUTPATIENT
Start: 2024-09-04

## 2024-09-06 DIAGNOSIS — J44.1 COPD WITH ACUTE EXACERBATION: Primary | ICD-10-CM

## 2024-09-18 ENCOUNTER — OFFICE VISIT (OUTPATIENT)
Dept: PULMONOLOGY | Facility: CLINIC | Age: 49
End: 2024-09-18
Payer: MEDICARE

## 2024-09-18 ENCOUNTER — LAB (OUTPATIENT)
Dept: LAB | Facility: HOSPITAL | Age: 49
End: 2024-09-18
Payer: MEDICARE

## 2024-09-18 VITALS
OXYGEN SATURATION: 86 % | WEIGHT: 180 LBS | BODY MASS INDEX: 23.86 KG/M2 | DIASTOLIC BLOOD PRESSURE: 84 MMHG | HEIGHT: 73 IN | RESPIRATION RATE: 18 BRPM | HEART RATE: 103 BPM | SYSTOLIC BLOOD PRESSURE: 128 MMHG

## 2024-09-18 DIAGNOSIS — J30.89 OTHER ALLERGIC RHINITIS: ICD-10-CM

## 2024-09-18 DIAGNOSIS — J44.89 ASTHMA WITH COPD: ICD-10-CM

## 2024-09-18 DIAGNOSIS — R09.02 HYPOXIA: ICD-10-CM

## 2024-09-18 DIAGNOSIS — J45.51 SEVERE PERSISTENT ASTHMA WITH ACUTE EXACERBATION: Primary | ICD-10-CM

## 2024-09-18 PROCEDURE — 86003 ALLG SPEC IGE CRUDE XTRC EA: CPT

## 2024-09-18 PROCEDURE — 82785 ASSAY OF IGE: CPT

## 2024-09-18 RX ORDER — MONTELUKAST SODIUM 10 MG/1
10 TABLET ORAL NIGHTLY
Qty: 30 TABLET | Refills: 5 | Status: SHIPPED | OUTPATIENT
Start: 2024-09-18

## 2024-09-18 RX ORDER — METHYLPREDNISOLONE ACETATE 80 MG/ML
80 INJECTION, SUSPENSION INTRA-ARTICULAR; INTRALESIONAL; INTRAMUSCULAR; SOFT TISSUE ONCE
Status: COMPLETED | OUTPATIENT
Start: 2024-09-18 | End: 2024-09-18

## 2024-09-18 RX ADMIN — METHYLPREDNISOLONE ACETATE 80 MG: 80 INJECTION, SUSPENSION INTRA-ARTICULAR; INTRALESIONAL; INTRAMUSCULAR; SOFT TISSUE at 14:54

## 2024-09-19 DIAGNOSIS — J44.1 COPD WITH ACUTE EXACERBATION: ICD-10-CM

## 2024-09-19 RX ORDER — ALBUTEROL SULFATE 90 UG/1
2 AEROSOL, METERED RESPIRATORY (INHALATION) EVERY 4 HOURS PRN
Qty: 18 G | Refills: 5 | Status: SHIPPED | OUTPATIENT
Start: 2024-09-19

## 2024-09-20 DIAGNOSIS — J44.89 ASTHMA WITH COPD: ICD-10-CM

## 2024-09-23 LAB
A ALTERNATA IGE QN: <0.1 KU/L
A FUMIGATUS IGE QN: <0.1 KU/L
AMER ROACH IGE QN: <0.1 KU/L
BAHIA GRASS IGE QN: <0.1 KU/L
BERMUDA GRASS IGE QN: <0.1 KU/L
BOXELDER IGE QN: <0.1 KU/L
C HERBARUM IGE QN: <0.1 KU/L
CAT DANDER IGE QN: <0.1 KU/L
CMN PIGWEED IGE QN: <0.1 KU/L
COMMON RAGWEED IGE QN: <0.1 KU/L
CONV CLASS DESCRIPTION: NORMAL
D FARINAE IGE QN: <0.1 KU/L
D PTERONYSS IGE QN: <0.1 KU/L
DOG DANDER IGE QN: <0.1 KU/L
ENGL PLANTAIN IGE QN: <0.1 KU/L
HAZELNUT POLN IGE QN: <0.1 KU/L
IGE SERPL-ACNC: 65 IU/ML (ref 6–495)
JOHNSON GRASS IGE QN: <0.1 KU/L
KENT BLUE GRASS IGE QN: <0.1 KU/L
LONDON PLANE IGE QN: <0.1 KU/L
M RACEMOSUS IGE QN: <0.1 KU/L
MT JUNIPER IGE QN: <0.1 KU/L
MUGWORT IGE QN: <0.1 KU/L
NETTLE IGE QN: <0.1 KU/L
P NOTATUM IGE QN: <0.1 KU/L
S BOTRYOSUM IGE QN: <0.1 KU/L
SHEEP SORREL IGE QN: <0.1 KU/L
SWEET GUM IGE QN: <0.1 KU/L
WHITE ELM IGE QN: <0.1 KU/L
WHITE HICKORY IGE QN: <0.1 KU/L
WHITE MULBERRY IGE QN: <0.1 KU/L
WHITE OAK IGE QN: <0.1 KU/L

## 2024-10-02 NOTE — TELEPHONE ENCOUNTER
Caller: Basim Stoll    Relationship: Self    Best call back number: [phone]     Requested Prescriptions:   Requested Prescriptions     Pending Prescriptions Disp Refills    traZODone (DESYREL) 300 MG tablet       Sig: Take 1 tablet by mouth Every Night.        Pharmacy where request should be sent: [facility]     Last office visit with prescribing clinician: 8/23/2024   Last telemedicine visit with prescribing clinician: Visit date not found   Next office visit with prescribing clinician: Visit date not found     Additional details provided by patient: BASIM IS ASKING  MG TABLETS.  MG IS TOO BIG TO SWALLOW    Does the patient have less than a 3 day supply:  [x] Yes  [] No    Would you like a call back once the refill request has been completed: [] Yes [] No    If the office needs to give you a call back, can they leave a voicemail: [] Yes [] No    Edelmira Sierra Rep   10/02/24 13:47 EDT

## 2024-10-03 RX ORDER — TRAZODONE HYDROCHLORIDE 300 MG/1
300 TABLET ORAL NIGHTLY
Qty: 30 TABLET | Refills: 0 | Status: CANCELLED | OUTPATIENT
Start: 2024-10-03

## 2024-10-03 RX ORDER — TRAZODONE HYDROCHLORIDE 300 MG/1
300 TABLET ORAL NIGHTLY
Qty: 90 TABLET | Refills: 3
Start: 2024-10-03 | End: 2024-10-07 | Stop reason: SDUPTHER

## 2024-10-07 RX ORDER — TRAZODONE HYDROCHLORIDE 300 MG/1
300 TABLET ORAL NIGHTLY
Qty: 90 TABLET | Refills: 3 | Status: SHIPPED | OUTPATIENT
Start: 2024-10-07 | End: 2024-10-07

## 2024-10-07 RX ORDER — TRAZODONE HYDROCHLORIDE 300 MG/1
300 TABLET ORAL NIGHTLY
Qty: 90 TABLET | Refills: 3 | Status: SHIPPED | OUTPATIENT
Start: 2024-10-07

## 2024-10-08 ENCOUNTER — TELEPHONE (OUTPATIENT)
Dept: PULMONOLOGY | Facility: CLINIC | Age: 49
End: 2024-10-08

## 2024-10-08 RX ORDER — PREDNISONE 20 MG/1
40 TABLET ORAL DAILY
Qty: 10 TABLET | Refills: 0 | Status: SHIPPED | OUTPATIENT
Start: 2024-10-08

## 2024-10-08 NOTE — TELEPHONE ENCOUNTER
Caller: Topher Stoll    Relationship to patient: Self    Best call back number:     [phone] (Home)       Patient is needing: PT IS NEEDING PREDNISONE SENT TO   [facility] [phone]              
L lower back pain

## 2024-10-22 ENCOUNTER — PRIOR AUTHORIZATION (OUTPATIENT)
Dept: PHARMACY | Facility: HOSPITAL | Age: 49
End: 2024-10-22
Payer: MEDICARE

## 2024-10-22 NOTE — TELEPHONE ENCOUNTER
"Attempted ePA through [facility], \"[facility] is not able to process this request through ePA, please contact the plan at [phone] or fax in request to [phone]\"    Called and spoke with Jeremy at [facility]. PA submitted verbally and approved through 10/22/25. Faxing auth. CASE ID# [id number]  "

## 2024-10-27 ENCOUNTER — HOSPITAL ENCOUNTER (EMERGENCY)
Facility: HOSPITAL | Age: 49
Discharge: HOME | End: 2024-10-27
Attending: EMERGENCY MEDICINE | Admitting: EMERGENCY MEDICINE
Payer: MEDICARE

## 2024-10-27 ENCOUNTER — APPOINTMENT (OUTPATIENT)
Dept: GENERAL RADIOLOGY | Facility: HOSPITAL | Age: 49
End: 2024-10-27
Payer: MEDICARE

## 2024-10-27 VITALS
BODY MASS INDEX: 26.51 KG/M2 | SYSTOLIC BLOOD PRESSURE: 119 MMHG | TEMPERATURE: 98 F | HEART RATE: 88 BPM | OXYGEN SATURATION: 97 % | RESPIRATION RATE: 18 BRPM | DIASTOLIC BLOOD PRESSURE: 93 MMHG | WEIGHT: 200 LBS | HEIGHT: 73 IN

## 2024-10-27 DIAGNOSIS — J45.51 SEVERE PERSISTENT ASTHMA WITH EXACERBATION: Primary | ICD-10-CM

## 2024-10-27 LAB
A-A DO2: 15.7 MMHG
ALBUMIN SERPL-MCNC: 4.2 G/DL (ref 3.5–5.2)
ALBUMIN/GLOB SERPL: 1.3 G/DL
ALP SERPL-CCNC: 141 U/L (ref 39–117)
ALT SERPL W P-5'-P-CCNC: 11 U/L (ref 1–41)
ANION GAP SERPL CALCULATED.3IONS-SCNC: 12.1 MMOL/L (ref 5–15)
ARTERIAL PATENCY WRIST A: ABNORMAL
AST SERPL-CCNC: 16 U/L (ref 1–40)
ATMOSPHERIC PRESS: 739 MMHG
BASE EXCESS BLDA CALC-SCNC: 1.1 MMOL/L (ref 0–2)
BASOPHILS # BLD AUTO: 0.01 10*3/MM3 (ref 0–0.2)
BASOPHILS NFR BLD AUTO: 0.1 % (ref 0–1.5)
BDY SITE: ABNORMAL
BILIRUB SERPL-MCNC: 0.4 MG/DL (ref 0–1.2)
BUN SERPL-MCNC: 9 MG/DL (ref 6–20)
BUN/CREAT SERPL: 10 (ref 7–25)
CALCIUM SPEC-SCNC: 9.2 MG/DL (ref 8.6–10.5)
CHLORIDE SERPL-SCNC: 99 MMOL/L (ref 98–107)
CO2 SERPL-SCNC: 24.9 MMOL/L (ref 22–29)
COHGB MFR BLD: 0.9 % (ref 0–2)
CREAT SERPL-MCNC: 0.9 MG/DL (ref 0.76–1.27)
DEPRECATED RDW RBC AUTO: 40.2 FL (ref 37–54)
EGFRCR SERPLBLD CKD-EPI 2021: 104.7 ML/MIN/1.73
EOSINOPHIL # BLD AUTO: 0 10*3/MM3 (ref 0–0.4)
EOSINOPHIL NFR BLD AUTO: 0 % (ref 0.3–6.2)
ERYTHROCYTE [DISTWIDTH] IN BLOOD BY AUTOMATED COUNT: 13.1 % (ref 12.3–15.4)
GAS FLOW AIRWAY: 4 LPM
GLOBULIN UR ELPH-MCNC: 3.3 GM/DL
GLUCOSE SERPL-MCNC: 107 MG/DL (ref 65–99)
HCO3 BLDA-SCNC: 28.1 MMOL/L (ref 22–28)
HCT VFR BLD AUTO: 48.7 % (ref 37.5–51)
HCT VFR BLD CALC: 47.9 %
HGB BLD-MCNC: 15.7 G/DL (ref 13–17.7)
HOLD SPECIMEN: NORMAL
HOLD SPECIMEN: NORMAL
IMM GRANULOCYTES # BLD AUTO: 0.01 10*3/MM3 (ref 0–0.05)
IMM GRANULOCYTES NFR BLD AUTO: 0.1 % (ref 0–0.5)
LYMPHOCYTES # BLD AUTO: 1.35 10*3/MM3 (ref 0.7–3.1)
LYMPHOCYTES NFR BLD AUTO: 15 % (ref 19.6–45.3)
Lab: ABNORMAL
MCH RBC QN AUTO: 26.8 PG (ref 26.6–33)
MCHC RBC AUTO-ENTMCNC: 32.2 G/DL (ref 31.5–35.7)
MCV RBC AUTO: 83.2 FL (ref 79–97)
METHGB BLD QL: 0.6 % (ref 0–1.5)
MODALITY: ABNORMAL
MONOCYTES # BLD AUTO: 0.61 10*3/MM3 (ref 0.1–0.9)
MONOCYTES NFR BLD AUTO: 6.8 % (ref 5–12)
NEUTROPHILS NFR BLD AUTO: 7 10*3/MM3 (ref 1.7–7)
NEUTROPHILS NFR BLD AUTO: 78 % (ref 42.7–76)
NRBC BLD AUTO-RTO: 0 /100 WBC (ref 0–0.2)
NT-PROBNP SERPL-MCNC: 105.1 PG/ML (ref 0–450)
OXYHGB MFR BLDV: 92.9 % (ref 94–99)
PCO2 BLDA: 52.4 MM HG (ref 35–45)
PCO2 TEMP ADJ BLD: ABNORMAL MM[HG]
PH BLDA: 7.34 PH UNITS (ref 7.3–7.5)
PH, TEMP CORRECTED: ABNORMAL
PLATELET # BLD AUTO: 200 10*3/MM3 (ref 140–450)
PMV BLD AUTO: 9.9 FL (ref 6–12)
PO2 BLDA: 70.5 MM HG (ref 75–100)
PO2 TEMP ADJ BLD: ABNORMAL MM[HG]
POTASSIUM SERPL-SCNC: 3.7 MMOL/L (ref 3.5–5.2)
PROT SERPL-MCNC: 7.5 G/DL (ref 6–8.5)
RBC # BLD AUTO: 5.85 10*6/MM3 (ref 4.14–5.8)
SAO2 % BLDCOA: 94.3 % (ref 94–100)
SODIUM SERPL-SCNC: 136 MMOL/L (ref 136–145)
TROPONIN T SERPL HS-MCNC: <6 NG/L
VENTILATOR MODE: ABNORMAL
WBC NRBC COR # BLD AUTO: 8.98 10*3/MM3 (ref 3.4–10.8)
WHOLE BLOOD HOLD COAG: NORMAL
WHOLE BLOOD HOLD SPECIMEN: NORMAL

## 2024-10-27 PROCEDURE — 25010000002 DEXAMETHASONE SODIUM PHOSPHATE 10 MG/ML SOLUTION: Performed by: EMERGENCY MEDICINE

## 2024-10-27 PROCEDURE — 83050 HGB METHEMOGLOBIN QUAN: CPT

## 2024-10-27 PROCEDURE — 94799 UNLISTED PULMONARY SVC/PX: CPT

## 2024-10-27 PROCEDURE — 99284 EMERGENCY DEPT VISIT MOD MDM: CPT

## 2024-10-27 PROCEDURE — 94640 AIRWAY INHALATION TREATMENT: CPT

## 2024-10-27 PROCEDURE — 96375 TX/PRO/DX INJ NEW DRUG ADDON: CPT

## 2024-10-27 PROCEDURE — 82375 ASSAY CARBOXYHB QUANT: CPT

## 2024-10-27 PROCEDURE — 25010000002 MAGNESIUM SULFATE 2 GM/50ML SOLUTION: Performed by: EMERGENCY MEDICINE

## 2024-10-27 PROCEDURE — 84484 ASSAY OF TROPONIN QUANT: CPT | Performed by: EMERGENCY MEDICINE

## 2024-10-27 PROCEDURE — 82805 BLOOD GASES W/O2 SATURATION: CPT

## 2024-10-27 PROCEDURE — 36415 COLL VENOUS BLD VENIPUNCTURE: CPT

## 2024-10-27 PROCEDURE — 93005 ELECTROCARDIOGRAM TRACING: CPT | Performed by: EMERGENCY MEDICINE

## 2024-10-27 PROCEDURE — 85025 COMPLETE CBC W/AUTO DIFF WBC: CPT | Performed by: EMERGENCY MEDICINE

## 2024-10-27 PROCEDURE — 80053 COMPREHEN METABOLIC PANEL: CPT | Performed by: EMERGENCY MEDICINE

## 2024-10-27 PROCEDURE — 71045 X-RAY EXAM CHEST 1 VIEW: CPT

## 2024-10-27 PROCEDURE — 25010000002 METOCLOPRAMIDE PER 10 MG: Performed by: EMERGENCY MEDICINE

## 2024-10-27 PROCEDURE — 25010000002 KETOROLAC TROMETHAMINE PER 15 MG: Performed by: EMERGENCY MEDICINE

## 2024-10-27 PROCEDURE — 96365 THER/PROPH/DIAG IV INF INIT: CPT

## 2024-10-27 PROCEDURE — 83880 ASSAY OF NATRIURETIC PEPTIDE: CPT | Performed by: EMERGENCY MEDICINE

## 2024-10-27 PROCEDURE — 36600 WITHDRAWAL OF ARTERIAL BLOOD: CPT

## 2024-10-27 RX ORDER — METOCLOPRAMIDE HYDROCHLORIDE 5 MG/ML
10 INJECTION INTRAMUSCULAR; INTRAVENOUS ONCE
Status: COMPLETED | OUTPATIENT
Start: 2024-10-27 | End: 2024-10-27

## 2024-10-27 RX ORDER — DEXAMETHASONE SODIUM PHOSPHATE 10 MG/ML
10 INJECTION, SOLUTION INTRAMUSCULAR; INTRAVENOUS ONCE
Status: COMPLETED | OUTPATIENT
Start: 2024-10-27 | End: 2024-10-27

## 2024-10-27 RX ORDER — ALBUTEROL SULFATE 90 UG/1
2 INHALANT RESPIRATORY (INHALATION) ONCE
Status: COMPLETED | OUTPATIENT
Start: 2024-10-27 | End: 2024-10-27

## 2024-10-27 RX ORDER — PREDNISONE 20 MG/1
40 TABLET ORAL DAILY
Qty: 10 TABLET | Refills: 0 | Status: SHIPPED | OUTPATIENT
Start: 2024-10-27 | End: 2024-11-02

## 2024-10-27 RX ORDER — IPRATROPIUM BROMIDE AND ALBUTEROL SULFATE 2.5; .5 MG/3ML; MG/3ML
3 SOLUTION RESPIRATORY (INHALATION) ONCE
Status: COMPLETED | OUTPATIENT
Start: 2024-10-27 | End: 2024-10-27

## 2024-10-27 RX ORDER — MAGNESIUM SULFATE HEPTAHYDRATE 40 MG/ML
2 INJECTION, SOLUTION INTRAVENOUS ONCE
Status: COMPLETED | OUTPATIENT
Start: 2024-10-27 | End: 2024-10-27

## 2024-10-27 RX ORDER — METOCLOPRAMIDE 5 MG/1
5 TABLET ORAL 3 TIMES DAILY PRN
Qty: 15 TABLET | Refills: 0 | Status: SHIPPED | OUTPATIENT
Start: 2024-10-27

## 2024-10-27 RX ORDER — ALBUTEROL SULFATE 0.83 MG/ML
2.5 SOLUTION RESPIRATORY (INHALATION)
Status: COMPLETED | OUTPATIENT
Start: 2024-10-27 | End: 2024-10-27

## 2024-10-27 RX ORDER — KETOROLAC TROMETHAMINE 30 MG/ML
15 INJECTION, SOLUTION INTRAMUSCULAR; INTRAVENOUS ONCE
Status: COMPLETED | OUTPATIENT
Start: 2024-10-27 | End: 2024-10-27

## 2024-10-27 RX ORDER — SODIUM CHLORIDE 0.9 % (FLUSH) 0.9 %
10 SYRINGE (ML) INJECTION AS NEEDED
Status: DISCONTINUED | OUTPATIENT
Start: 2024-10-27 | End: 2024-10-27 | Stop reason: HOSPADM

## 2024-10-27 RX ORDER — BUTALBITAL, ACETAMINOPHEN AND CAFFEINE 50; 325; 40 MG/1; MG/1; MG/1
1 TABLET ORAL ONCE
Status: COMPLETED | OUTPATIENT
Start: 2024-10-27 | End: 2024-10-27

## 2024-10-27 RX ORDER — BUTALBITAL, ACETAMINOPHEN AND CAFFEINE 50; 325; 40 MG/1; MG/1; MG/1
1 TABLET ORAL EVERY 6 HOURS PRN
Qty: 15 TABLET | Refills: 0 | Status: SHIPPED | OUTPATIENT
Start: 2024-10-27

## 2024-10-27 RX ADMIN — DEXAMETHASONE SODIUM PHOSPHATE 10 MG: 10 INJECTION INTRAMUSCULAR; INTRAVENOUS at 18:07

## 2024-10-27 RX ADMIN — IPRATROPIUM BROMIDE AND ALBUTEROL SULFATE 3 ML: 2.5; .5 SOLUTION RESPIRATORY (INHALATION) at 17:44

## 2024-10-27 RX ADMIN — MAGNESIUM SULFATE HEPTAHYDRATE 2 G: 40 INJECTION, SOLUTION INTRAVENOUS at 18:07

## 2024-10-27 RX ADMIN — KETOROLAC TROMETHAMINE 15 MG: 30 INJECTION, SOLUTION INTRAMUSCULAR; INTRAVENOUS at 18:06

## 2024-10-27 RX ADMIN — ALBUTEROL SULFATE 2.5 MG: 2.5 SOLUTION RESPIRATORY (INHALATION) at 17:43

## 2024-10-27 RX ADMIN — METOCLOPRAMIDE 10 MG: 5 INJECTION, SOLUTION INTRAMUSCULAR; INTRAVENOUS at 18:07

## 2024-10-27 RX ADMIN — ALBUTEROL SULFATE 2 PUFF: 90 AEROSOL, METERED RESPIRATORY (INHALATION) at 20:07

## 2024-10-27 RX ADMIN — BUTALBITAL, ACETAMINOPHEN, AND CAFFEINE 1 TABLET: 50; 325; 40 TABLET ORAL at 19:07

## 2024-10-27 RX ADMIN — ALBUTEROL SULFATE 2.5 MG: 2.5 SOLUTION RESPIRATORY (INHALATION) at 17:41

## 2024-10-27 RX ADMIN — ALBUTEROL SULFATE 2.5 MG: 2.5 SOLUTION RESPIRATORY (INHALATION) at 17:42

## 2024-10-27 NOTE — ED PROVIDER NOTES
EMERGENCY DEPARTMENT ENCOUNTER    Pt Name: Topher Stoll  MRN: [id number]  Pt :   1975  Room Number:    Date of encounter:  10/27/2024  PCP: Joshua Hoffmann MD  ED Provider: Bang Camejo MD    Historian: Patient      HPI:  Chief Complaint   Patient presents with    Shortness of Breath          Context: Topher Stoll is a 48 y.o. male who presents to the ED c/o shortness of breath, present for couple days, acutely worse today, patient reports he is on oxygen 2 L, at baseline, reports cough, nonproductive, no fevers.  No chest pain, leg swelling, recent travel.  Patient hypoxic on arrival and increased oxygen      PAST MEDICAL HISTORY  Past Medical History:   Diagnosis Date    Abdominal adhesions     Anxiety     Arthritis     Asthma     Cervical radiculopathy     Colitis     COPD (chronic obstructive pulmonary disease)     GERD (gastroesophageal reflux disease)     Heart attack     History of hepatitis C     Treated with Epclusa in     History of recreational drug use     HTN (hypertension)     Impaired functional mobility, balance, gait, and endurance     Kidney cysts     Left hip pain     Liver disease     Low back pain     Migraines     Obstructive chronic bronchitis with exacerbation     Sleep apnea     mild    Tattoos          PAST SURGICAL HISTORY  Past Surgical History:   Procedure Laterality Date    BACK SURGERY      COLONOSCOPY      COLONOSCOPY N/A 2022    Procedure: COLONOSCOPY with biopsies;  Surgeon: Giancarlo Ruiz MD;  Location: [facility] ENDOSCOPY;  Service: Gastroenterology;  Laterality: N/A;    HERNIA REPAIR      HIP SPACER INSERTION WITH ANTIBIOTIC CEMENT Left 1/15/2020    Procedure: TOTAL HIP IMPLANT REMOVAL WITH INSERTION OF ANTIBIOTIC SPACER LEFT;  Surgeon: Ba Ramirez MD;  Location: [facility] OR;  Service: Orthopedics    SHOULDER LIGAMENT REPAIR      right shoulder    SMALL INTESTINE SURGERY      Small bowell resection    TOTAL HIP ARTHROPLASTY  Left     TOTAL HIP ARTHROPLASTY Right     TOTAL HIP ARTHROPLASTY REVISION Left 3/5/2020    Procedure: HIP REIMPLANT REVISION LEFT;  Surgeon: Ba Ramirez MD;  Location: [facility];  Service: Orthopedics;  Laterality: Left;    UPPER GASTROINTESTINAL ENDOSCOPY           FAMILY HISTORY  Family History   Problem Relation Age of Onset    Arthritis Other     Hypertension Other     Migraines Other     Heart attack Other     Stroke Other     Colon cancer Neg Hx          SOCIAL HISTORY  Social History     Socioeconomic History    Marital status:    Tobacco Use    Smoking status: Former     Current packs/day: 0.00     Average packs/day: 2.0 packs/day for 15.0 years (30.0 ttl pk-yrs)     Types: Cigarettes     Start date:      Quit date:      Years since quittin.8     Passive exposure: Current    Smokeless tobacco: Current     Types: Chew   Vaping Use    Vaping status: Never Used   Substance and Sexual Activity    Alcohol use: No     Comment: stopped drinking alcohol    Drug use: Not Currently     Comment: takes suboxone    Sexual activity: Defer         ALLERGIES  Doxycycline        REVIEW OF SYSTEMS  Review of Systems   Constitutional:  Negative for chills and fever.   HENT:  Negative for sore throat and trouble swallowing.    Eyes:  Negative for pain and redness.   Respiratory:  Positive for cough and shortness of breath.    Cardiovascular:  Negative for chest pain and leg swelling.   Gastrointestinal:  Negative for abdominal pain, nausea and vomiting.   Genitourinary:  Negative for dysuria and urgency.   Musculoskeletal:  Negative for back pain and neck pain.   Skin:  Negative for rash and wound.   Neurological:  Negative for dizziness and weakness.        All systems reviewed and negative except for those discussed in HPI.       PHYSICAL EXAM    I have reviewed the triage vital signs and nursing notes.    ED Triage Vitals [10/27/24 1718]   Temp Heart Rate Resp BP SpO2   98.2 °F (36.8 °C) 87 14  (!) 151/129 (!) 82 %      Temp src Heart Rate Source Patient Position BP Location FiO2 (%)   Oral Monitor Sitting Left arm --       Physical Exam  Constitutional:       Appearance: Normal appearance. He is not ill-appearing.   HENT:      Head: Normocephalic and atraumatic.      Right Ear: External ear normal.      Left Ear: External ear normal.      Nose: Nose normal.      Mouth/Throat:      Mouth: Mucous membranes are moist.      Pharynx: Oropharynx is clear.   Eyes:      Extraocular Movements: Extraocular movements intact.      Conjunctiva/sclera: Conjunctivae normal.      Pupils: Pupils are equal, round, and reactive to light.   Cardiovascular:      Rate and Rhythm: Normal rate and regular rhythm.      Pulses:           Radial pulses are 2+ on the right side and 2+ on the left side.      Heart sounds: No murmur heard.  Pulmonary:      Effort: Tachypnea present.      Breath sounds: Wheezing present.   Abdominal:      General: There is no distension.      Tenderness: There is no abdominal tenderness. There is no guarding.   Musculoskeletal:         General: No swelling or deformity.      Cervical back: Normal range of motion and neck supple.   Skin:     General: Skin is warm and dry.      Capillary Refill: Capillary refill takes less than 2 seconds.      Findings: No rash.   Neurological:      General: No focal deficit present.      Mental Status: He is alert and oriented to person, place, and time.            LAB RESULTS  Recent Results (from the past 24 hours)   Blood Gas, Arterial With Co-Ox    Collection Time: 10/27/24  5:39 PM    Specimen: Arterial Blood   Result Value Ref Range    Site Right Brachial     Michael's Test N/A     pH, Arterial 7.338 7.300 - 7.500 pH units    pCO2, Arterial 52.4 (H) 35.0 - 45.0 mm Hg    pO2, Arterial 70.5 (L) 75.0 - 100.0 mm Hg    HCO3, Arterial 28.1 (H) 22.0 - 28.0 mmol/L    Base Excess, Arterial 1.1 0.0 - 2.0 mmol/L    O2 Saturation, Arterial 94.3 94.0 - 100.0 %    Hematocrit,  Blood Gas 47.9 %    Oxyhemoglobin 92.9 (L) 94 - 99 %    Methemoglobin 0.60 0.00 - 1.50 %    Carboxyhemoglobin 0.9 0 - 2 %    A-a DO2 15.7 mmHg    Barometric Pressure for Blood Gas 739 mmHg    Modality Nasal Cannula     Flow Rate 4.0 lpm    Ventilator Mode NA     Collected by [id number]     pH, Temp Corrected      pCO2, Temperature Corrected      pO2, Temperature Corrected     Comprehensive Metabolic Panel    Collection Time: 10/27/24  6:25 PM    Specimen: Blood   Result Value Ref Range    Glucose 107 (H) 65 - 99 mg/dL    BUN 9 6 - 20 mg/dL    Creatinine 0.90 0.76 - 1.27 mg/dL    Sodium 136 136 - 145 mmol/L    Potassium 3.7 3.5 - 5.2 mmol/L    Chloride 99 98 - 107 mmol/L    CO2 24.9 22.0 - 29.0 mmol/L    Calcium 9.2 8.6 - 10.5 mg/dL    Total Protein 7.5 6.0 - 8.5 g/dL    Albumin 4.2 3.5 - 5.2 g/dL    ALT (SGPT) 11 1 - 41 U/L    AST (SGOT) 16 1 - 40 U/L    Alkaline Phosphatase 141 (H) 39 - 117 U/L    Total Bilirubin 0.4 0.0 - 1.2 mg/dL    Globulin 3.3 gm/dL    A/G Ratio 1.3 g/dL    BUN/Creatinine Ratio 10.0 7.0 - 25.0    Anion Gap 12.1 5.0 - 15.0 mmol/L    eGFR 104.7 >60.0 mL/min/1.73   BNP    Collection Time: 10/27/24  6:25 PM    Specimen: Blood   Result Value Ref Range    proBNP 105.1 0.0 - 450.0 pg/mL   Single High Sensitivity Troponin T    Collection Time: 10/27/24  6:25 PM    Specimen: Blood   Result Value Ref Range    HS Troponin T <6 <22 ng/L   Green Top (Gel)    Collection Time: 10/27/24  6:25 PM   Result Value Ref Range    Extra Tube Hold for add-ons.    Lavender Top    Collection Time: 10/27/24  6:25 PM   Result Value Ref Range    Extra Tube hold for add-on    Gold Top - SST    Collection Time: 10/27/24  6:25 PM   Result Value Ref Range    Extra Tube Hold for add-ons.    Light Blue Top    Collection Time: 10/27/24  6:25 PM   Result Value Ref Range    Extra Tube Hold for add-ons.    CBC Auto Differential    Collection Time: 10/27/24  6:25 PM    Specimen: Blood   Result Value Ref Range    WBC 8.98 3.40 - 10.80  10*3/mm3    RBC 5.85 (H) 4.14 - 5.80 10*6/mm3    Hemoglobin 15.7 13.0 - 17.7 g/dL    Hematocrit 48.7 37.5 - 51.0 %    MCV 83.2 79.0 - 97.0 fL    MCH 26.8 26.6 - 33.0 pg    MCHC 32.2 31.5 - 35.7 g/dL    RDW 13.1 12.3 - 15.4 %    RDW-SD 40.2 37.0 - 54.0 fl    MPV 9.9 6.0 - 12.0 fL    Platelets 200 140 - 450 10*3/mm3    Neutrophil % 78.0 (H) 42.7 - 76.0 %    Lymphocyte % 15.0 (L) 19.6 - 45.3 %    Monocyte % 6.8 5.0 - 12.0 %    Eosinophil % 0.0 (L) 0.3 - 6.2 %    Basophil % 0.1 0.0 - 1.5 %    Immature Grans % 0.1 0.0 - 0.5 %    Neutrophils, Absolute 7.00 1.70 - 7.00 10*3/mm3    Lymphocytes, Absolute 1.35 0.70 - 3.10 10*3/mm3    Monocytes, Absolute 0.61 0.10 - 0.90 10*3/mm3    Eosinophils, Absolute 0.00 0.00 - 0.40 10*3/mm3    Basophils, Absolute 0.01 0.00 - 0.20 10*3/mm3    Immature Grans, Absolute 0.01 0.00 - 0.05 10*3/mm3    nRBC 0.0 0.0 - 0.2 /100 WBC       If labs were ordered, I independently reviewed the results and considered them in treating the patient.        RADIOLOGY  No Radiology Exams Resulted Within Past 24 Hours        PROCEDURES    Procedures    Interpretations    O2 Sat: The patients oxygen saturation was 82% on  2 L Nasal Cannula.  This was independently interpreted by me as Hypoxic    EKG: I reviewed and independently interpreted the EKG as sinus rhythm rate 81 bpm, normal axis, normal intervals, no ST elevation, no T wave inversions    Cardiac Monitoring: I reviewed and independently interpreted the Rhythm Strip as Normal Sinus rhythm rate of 96    Radiology: I ordered and independently reviewed the above noted radiographic studies.  I viewed images of Chest Xray which showed No pulmonary process per my independent interpretation. See radiologist's dictation for official interpretation.         MEDICATIONS GIVEN IN ER    Medications   sodium chloride 0.9 % flush 10 mL (has no administration in time range)   albuterol sulfate HFA (PROVENTIL HFA;VENTOLIN HFA;PROAIR HFA) inhaler 2 puff (has no  administration in time range)   ketorolac (TORADOL) injection 15 mg (15 mg Intravenous Given 10/27/24 1806)   metoclopramide (REGLAN) injection 10 mg (10 mg Intravenous Given 10/27/24 1807)   dexAMETHasone sodium phosphate injection 10 mg (10 mg Intravenous Given 10/27/24 1807)   magnesium sulfate 2g/50 mL (PREMIX) infusion (0 g Intravenous Stopped 10/27/24 1848)   ipratropium-albuterol (DUO-NEB) nebulizer solution 3 mL (3 mL Nebulization Given 10/27/24 1744)   albuterol (PROVENTIL) nebulizer solution 0.083% 2.5 mg/3mL (2.5 mg Nebulization Given 10/27/24 1743)   butalbital-acetaminophen-caffeine (FIORICET, ESGIC) -40 MG per tablet 1 tablet (1 tablet Oral Given 10/27/24 1907)         MEDICAL DECISION MAKING, PROGRESS, and CONSULTS    All labs, if obtained, have been independently reviewed by me.  All radiology studies, if obtained, have been reviewed by me and the radiologist dictating the report.  All EKG's, if obtained, have been independently viewed and interpreted by me      Discussion below represents my analysis of pertinent findings related to patient's condition, differential diagnosis, treatment plan and final disposition.      Differential diagnosis:    48-year-old male presenting to the ED with complaint of shortness of breath, the patient has a history of severe asthma for which she is on chronic home oxygen, he is quite wheezy, tachypneic, I suspect the patient likely is having a severe asthma exacerbation.  Will obtain ABG, laboratory workup, chest x-ray, provide nebulized breathing treatments, steroids, magnesium, patient additionally is having a migraine, history of migraines unchanged from previous, will give Toradol, Reglan and as well as the magnesium and Decadron is having for his asthma exacerbation    Additional Sources:  External (non-ED) record review:  Pulmonology note 9/18/2024 regarding asthma       Orders placed during this visit:  Orders Placed This Encounter   Procedures    XR  Chest 1 View    [place] Draw    Comprehensive Metabolic Panel    BNP    Single High Sensitivity Troponin T    CBC Auto Differential    Blood Gas, Arterial -With Co-Ox Panel: Yes    Blood Gas, Arterial With Co-Ox    NPO Diet NPO Type: Strict NPO    Undress & Gown    Continuous Pulse Oximetry    Vital Signs    Oxygen Therapy- Nasal Cannula; Titrate 1-6 LPM Per SpO2; 90 - 95%    ECG 12 Lead ED Triage Standing Order; SOA    Insert Peripheral IV    CBC & Differential    Green Top (Gel)    Lavender Top    Gold Top - SST    Light Blue Top         Additional orders considered but not ordered:  None    ED Course:    Consultants:  None    ED Course as of 10/27/24 2004   Sun Oct 27, 2024   1752 Blood Gas, Arterial With Co-Ox(!)  ABG with well compensated baseline CO2 retention and mild arterial hypoxia [CS]   1900 Single High Sensitivity Troponin T  Troponin negative, this should rule out ACS in this low risk patient [CS]   1900 BNP  BNP negative, think this effectively rules out acute heart failure [CS]   2001 Patient reports he feels much better, his headache has resolved, his wheezing is significantly improved, he is back on his 2 L of oxygen satting in the mid 90s.  He feels well is requesting discharge home.  I think this is reasonable given his back to his baseline.  Will prescribe steroids, as well as Fioricet and Reglan for his headaches, and advise he return to the ED for new or concerning symptoms.  He voices understanding and is agreeable with the plan for discharge home [CS]      ED Course User Index  [CS] Bang Camejo MD           After my consideration of clinical presentation and any laboratory/radiology studies obtained, I discussed the findings with the patient/patient representative who is in agreement with the treatment plan and the final disposition. Risks and benefits of discharge were discussed.     AS OF 20:04 EDT VITALS:    BP - 119/93  HR - 87  TEMP - 98.2 °F (36.8 °C) (Oral)  O2 SATS -  94%    I reviewed the patients prescription monitoring report if available prior to discharge    DIAGNOSIS  Final diagnoses:   Severe persistent asthma with exacerbation         DISPOSITION  ED Disposition       ED Disposition   Discharge    Condition   Stable    Comment   --                   Please note that portions of this document were completed with voice recognition software.        Bang Camejo MD  10/27/24 2004

## 2024-10-31 ENCOUNTER — APPOINTMENT (OUTPATIENT)
Dept: CT IMAGING | Facility: HOSPITAL | Age: 49
End: 2024-10-31
Payer: MEDICARE

## 2024-10-31 ENCOUNTER — APPOINTMENT (OUTPATIENT)
Dept: GENERAL RADIOLOGY | Facility: HOSPITAL | Age: 49
End: 2024-10-31
Payer: MEDICARE

## 2024-10-31 ENCOUNTER — HOSPITAL ENCOUNTER (EMERGENCY)
Facility: HOSPITAL | Age: 49
Discharge: HOME | End: 2024-10-31
Attending: STUDENT IN AN ORGANIZED HEALTH CARE EDUCATION/TRAINING PROGRAM
Payer: MEDICARE

## 2024-10-31 VITALS
OXYGEN SATURATION: 94 % | DIASTOLIC BLOOD PRESSURE: 101 MMHG | RESPIRATION RATE: 18 BRPM | TEMPERATURE: 97.9 F | SYSTOLIC BLOOD PRESSURE: 129 MMHG | BODY MASS INDEX: 26.51 KG/M2 | WEIGHT: 200 LBS | HEIGHT: 73 IN | HEART RATE: 93 BPM

## 2024-10-31 DIAGNOSIS — S32.010A CLOSED COMPRESSION FRACTURE OF BODY OF L1 VERTEBRA: Primary | ICD-10-CM

## 2024-10-31 DIAGNOSIS — R10.9 ABDOMINAL PAIN, UNSPECIFIED ABDOMINAL LOCATION: ICD-10-CM

## 2024-10-31 LAB
ALBUMIN SERPL-MCNC: 4.6 G/DL (ref 3.5–5.2)
ALBUMIN/GLOB SERPL: 1.3 G/DL
ALP SERPL-CCNC: 149 U/L (ref 39–117)
ALT SERPL W P-5'-P-CCNC: 11 U/L (ref 1–41)
ANION GAP SERPL CALCULATED.3IONS-SCNC: 16.2 MMOL/L (ref 5–15)
AST SERPL-CCNC: 16 U/L (ref 1–40)
BACTERIA UR QL AUTO: ABNORMAL /HPF
BASOPHILS # BLD AUTO: 0.02 10*3/MM3 (ref 0–0.2)
BASOPHILS NFR BLD AUTO: 0.2 % (ref 0–1.5)
BILIRUB SERPL-MCNC: 0.8 MG/DL (ref 0–1.2)
BILIRUB UR QL STRIP: NEGATIVE
BUN SERPL-MCNC: 22 MG/DL (ref 6–20)
BUN/CREAT SERPL: 23.4 (ref 7–25)
CALCIUM SPEC-SCNC: 9.8 MG/DL (ref 8.6–10.5)
CHLORIDE SERPL-SCNC: 96 MMOL/L (ref 98–107)
CLARITY UR: CLEAR
CO2 SERPL-SCNC: 25.8 MMOL/L (ref 22–29)
COLOR UR: YELLOW
CREAT SERPL-MCNC: 0.94 MG/DL (ref 0.76–1.27)
DEPRECATED RDW RBC AUTO: 39.3 FL (ref 37–54)
EGFRCR SERPLBLD CKD-EPI 2021: 99.4 ML/MIN/1.73
EOSINOPHIL # BLD AUTO: 0.05 10*3/MM3 (ref 0–0.4)
EOSINOPHIL NFR BLD AUTO: 0.4 % (ref 0.3–6.2)
ERYTHROCYTE [DISTWIDTH] IN BLOOD BY AUTOMATED COUNT: 13.4 % (ref 12.3–15.4)
FLUAV RNA RESP QL NAA+PROBE: NOT DETECTED
FLUBV RNA RESP QL NAA+PROBE: NOT DETECTED
GLOBULIN UR ELPH-MCNC: 3.6 GM/DL
GLUCOSE SERPL-MCNC: 122 MG/DL (ref 65–99)
GLUCOSE UR STRIP-MCNC: NEGATIVE MG/DL
HCT VFR BLD AUTO: 51.1 % (ref 37.5–51)
HGB BLD-MCNC: 16.9 G/DL (ref 13–17.7)
HGB UR QL STRIP.AUTO: NEGATIVE
HYALINE CASTS UR QL AUTO: ABNORMAL /LPF
IMM GRANULOCYTES # BLD AUTO: 0.05 10*3/MM3 (ref 0–0.05)
IMM GRANULOCYTES NFR BLD AUTO: 0.4 % (ref 0–0.5)
KETONES UR QL STRIP: ABNORMAL
LEUKOCYTE ESTERASE UR QL STRIP.AUTO: NEGATIVE
LIPASE SERPL-CCNC: 23 U/L (ref 13–60)
LYMPHOCYTES # BLD AUTO: 1.57 10*3/MM3 (ref 0.7–3.1)
LYMPHOCYTES NFR BLD AUTO: 12.1 % (ref 19.6–45.3)
MCH RBC QN AUTO: 26.8 PG (ref 26.6–33)
MCHC RBC AUTO-ENTMCNC: 33.1 G/DL (ref 31.5–35.7)
MCV RBC AUTO: 81.1 FL (ref 79–97)
MONOCYTES # BLD AUTO: 0.99 10*3/MM3 (ref 0.1–0.9)
MONOCYTES NFR BLD AUTO: 7.6 % (ref 5–12)
MUCOUS THREADS URNS QL MICRO: ABNORMAL /HPF
NEUTROPHILS NFR BLD AUTO: 10.27 10*3/MM3 (ref 1.7–7)
NEUTROPHILS NFR BLD AUTO: 79.3 % (ref 42.7–76)
NITRITE UR QL STRIP: NEGATIVE
NRBC BLD AUTO-RTO: 0 /100 WBC (ref 0–0.2)
PH UR STRIP.AUTO: 5.5 [PH] (ref 5–8)
PLATELET # BLD AUTO: 248 10*3/MM3 (ref 140–450)
PMV BLD AUTO: 10.1 FL (ref 6–12)
POTASSIUM SERPL-SCNC: 3.8 MMOL/L (ref 3.5–5.2)
PROT SERPL-MCNC: 8.2 G/DL (ref 6–8.5)
PROT UR QL STRIP: ABNORMAL
RBC # BLD AUTO: 6.3 10*6/MM3 (ref 4.14–5.8)
RBC # UR STRIP: ABNORMAL /HPF
REF LAB TEST METHOD: ABNORMAL
SARS-COV-2 RNA RESP QL NAA+PROBE: NOT DETECTED
SODIUM SERPL-SCNC: 138 MMOL/L (ref 136–145)
SP GR UR STRIP: >1.03 (ref 1–1.03)
SQUAMOUS #/AREA URNS HPF: ABNORMAL /HPF
UROBILINOGEN UR QL STRIP: ABNORMAL
WBC # UR STRIP: ABNORMAL /HPF
WBC NRBC COR # BLD AUTO: 12.95 10*3/MM3 (ref 3.4–10.8)

## 2024-10-31 PROCEDURE — 25010000002 ONDANSETRON PER 1 MG: Performed by: NURSE PRACTITIONER

## 2024-10-31 PROCEDURE — 25010000002 KETOROLAC TROMETHAMINE PER 15 MG: Performed by: NURSE PRACTITIONER

## 2024-10-31 PROCEDURE — 96375 TX/PRO/DX INJ NEW DRUG ADDON: CPT

## 2024-10-31 PROCEDURE — 80053 COMPREHEN METABOLIC PANEL: CPT | Performed by: NURSE PRACTITIONER

## 2024-10-31 PROCEDURE — 83690 ASSAY OF LIPASE: CPT | Performed by: NURSE PRACTITIONER

## 2024-10-31 PROCEDURE — 99285 EMERGENCY DEPT VISIT HI MDM: CPT

## 2024-10-31 PROCEDURE — 85025 COMPLETE CBC W/AUTO DIFF WBC: CPT | Performed by: NURSE PRACTITIONER

## 2024-10-31 PROCEDURE — 96376 TX/PRO/DX INJ SAME DRUG ADON: CPT

## 2024-10-31 PROCEDURE — 72100 X-RAY EXAM L-S SPINE 2/3 VWS: CPT

## 2024-10-31 PROCEDURE — 72131 CT LUMBAR SPINE W/O DYE: CPT

## 2024-10-31 PROCEDURE — 74177 CT ABD & PELVIS W/CONTRAST: CPT

## 2024-10-31 PROCEDURE — 87636 SARSCOV2 & INF A&B AMP PRB: CPT | Performed by: NURSE PRACTITIONER

## 2024-10-31 PROCEDURE — 81001 URINALYSIS AUTO W/SCOPE: CPT | Performed by: NURSE PRACTITIONER

## 2024-10-31 PROCEDURE — 96374 THER/PROPH/DIAG INJ IV PUSH: CPT

## 2024-10-31 PROCEDURE — 25510000001 IOPAMIDOL 61 % SOLUTION: Performed by: STUDENT IN AN ORGANIZED HEALTH CARE EDUCATION/TRAINING PROGRAM

## 2024-10-31 PROCEDURE — 36415 COLL VENOUS BLD VENIPUNCTURE: CPT

## 2024-10-31 RX ORDER — ONDANSETRON 2 MG/ML
4 INJECTION INTRAMUSCULAR; INTRAVENOUS ONCE
Status: COMPLETED | OUTPATIENT
Start: 2024-10-31 | End: 2024-10-31

## 2024-10-31 RX ORDER — HYDROCODONE BITARTRATE AND ACETAMINOPHEN 5; 325 MG/1; MG/1
1 TABLET ORAL ONCE
Status: COMPLETED | OUTPATIENT
Start: 2024-10-31 | End: 2024-10-31

## 2024-10-31 RX ORDER — SODIUM CHLORIDE 0.9 % (FLUSH) 0.9 %
10 SYRINGE (ML) INJECTION AS NEEDED
Status: DISCONTINUED | OUTPATIENT
Start: 2024-10-31 | End: 2024-10-31 | Stop reason: HOSPADM

## 2024-10-31 RX ORDER — IOPAMIDOL 612 MG/ML
100 INJECTION, SOLUTION INTRAVASCULAR
Status: COMPLETED | OUTPATIENT
Start: 2024-10-31 | End: 2024-10-31

## 2024-10-31 RX ORDER — KETOROLAC TROMETHAMINE 30 MG/ML
30 INJECTION, SOLUTION INTRAMUSCULAR; INTRAVENOUS ONCE
Status: COMPLETED | OUTPATIENT
Start: 2024-10-31 | End: 2024-10-31

## 2024-10-31 RX ADMIN — HYDROCODONE BITARTRATE AND ACETAMINOPHEN 1 TABLET: 5; 325 TABLET ORAL at 10:15

## 2024-10-31 RX ADMIN — KETOROLAC TROMETHAMINE 30 MG: 30 INJECTION, SOLUTION INTRAMUSCULAR; INTRAVENOUS at 08:52

## 2024-10-31 RX ADMIN — KETOROLAC TROMETHAMINE 30 MG: 30 INJECTION, SOLUTION INTRAMUSCULAR; INTRAVENOUS at 11:55

## 2024-10-31 RX ADMIN — ONDANSETRON 4 MG: 2 INJECTION INTRAMUSCULAR; INTRAVENOUS at 08:52

## 2024-10-31 RX ADMIN — IOPAMIDOL 100 ML: 612 INJECTION, SOLUTION INTRAVENOUS at 09:20
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
## 2024-11-02 ENCOUNTER — APPOINTMENT (OUTPATIENT)
Dept: CT IMAGING | Facility: HOSPITAL | Age: 49
End: 2024-11-02
Payer: MEDICARE

## 2024-11-02 ENCOUNTER — HOSPITAL ENCOUNTER (EMERGENCY)
Facility: HOSPITAL | Age: 49
Discharge: HOME | End: 2024-11-02
Attending: EMERGENCY MEDICINE
Payer: MEDICARE

## 2024-11-02 VITALS
TEMPERATURE: 98.3 F | WEIGHT: 200 LBS | BODY MASS INDEX: 26.51 KG/M2 | OXYGEN SATURATION: 93 % | RESPIRATION RATE: 18 BRPM | HEART RATE: 105 BPM | DIASTOLIC BLOOD PRESSURE: 95 MMHG | HEIGHT: 73 IN | SYSTOLIC BLOOD PRESSURE: 129 MMHG

## 2024-11-02 DIAGNOSIS — J44.9 CHRONIC OBSTRUCTIVE PULMONARY DISEASE, UNSPECIFIED COPD TYPE: ICD-10-CM

## 2024-11-02 DIAGNOSIS — K52.9 ENTEROCOLITIS: Primary | ICD-10-CM

## 2024-11-02 LAB
ALBUMIN SERPL-MCNC: 4.1 G/DL (ref 3.5–5.2)
ALBUMIN/GLOB SERPL: 1.4 G/DL
ALP SERPL-CCNC: 124 U/L (ref 39–117)
ALT SERPL W P-5'-P-CCNC: 12 U/L (ref 1–41)
ANION GAP SERPL CALCULATED.3IONS-SCNC: 10.1 MMOL/L (ref 5–15)
AST SERPL-CCNC: 15 U/L (ref 1–40)
BASOPHILS # BLD AUTO: 0.01 10*3/MM3 (ref 0–0.2)
BASOPHILS NFR BLD AUTO: 0.1 % (ref 0–1.5)
BILIRUB SERPL-MCNC: 0.5 MG/DL (ref 0–1.2)
BUN SERPL-MCNC: 14 MG/DL (ref 6–20)
BUN/CREAT SERPL: 13.5 (ref 7–25)
CALCIUM SPEC-SCNC: 9.1 MG/DL (ref 8.6–10.5)
CHLORIDE SERPL-SCNC: 95 MMOL/L (ref 98–107)
CO2 SERPL-SCNC: 30.9 MMOL/L (ref 22–29)
CREAT SERPL-MCNC: 1.04 MG/DL (ref 0.76–1.27)
DEPRECATED RDW RBC AUTO: 37.6 FL (ref 37–54)
EGFRCR SERPLBLD CKD-EPI 2021: 87.5 ML/MIN/1.73
EOSINOPHIL # BLD AUTO: 0 10*3/MM3 (ref 0–0.4)
EOSINOPHIL NFR BLD AUTO: 0 % (ref 0.3–6.2)
ERYTHROCYTE [DISTWIDTH] IN BLOOD BY AUTOMATED COUNT: 13.2 % (ref 12.3–15.4)
GLOBULIN UR ELPH-MCNC: 2.9 GM/DL
GLUCOSE SERPL-MCNC: 124 MG/DL (ref 65–99)
HCT VFR BLD AUTO: 48.6 % (ref 37.5–51)
HGB BLD-MCNC: 16.4 G/DL (ref 13–17.7)
IMM GRANULOCYTES # BLD AUTO: 0.02 10*3/MM3 (ref 0–0.05)
IMM GRANULOCYTES NFR BLD AUTO: 0.3 % (ref 0–0.5)
LYMPHOCYTES # BLD AUTO: 1.29 10*3/MM3 (ref 0.7–3.1)
LYMPHOCYTES NFR BLD AUTO: 17.2 % (ref 19.6–45.3)
MCH RBC QN AUTO: 27 PG (ref 26.6–33)
MCHC RBC AUTO-ENTMCNC: 33.7 G/DL (ref 31.5–35.7)
MCV RBC AUTO: 79.9 FL (ref 79–97)
MONOCYTES # BLD AUTO: 0.73 10*3/MM3 (ref 0.1–0.9)
MONOCYTES NFR BLD AUTO: 9.8 % (ref 5–12)
NEUTROPHILS NFR BLD AUTO: 5.43 10*3/MM3 (ref 1.7–7)
NEUTROPHILS NFR BLD AUTO: 72.6 % (ref 42.7–76)
NRBC BLD AUTO-RTO: 0 /100 WBC (ref 0–0.2)
PLATELET # BLD AUTO: 218 10*3/MM3 (ref 140–450)
PMV BLD AUTO: 10.2 FL (ref 6–12)
POTASSIUM SERPL-SCNC: 3.5 MMOL/L (ref 3.5–5.2)
PROT SERPL-MCNC: 7 G/DL (ref 6–8.5)
RBC # BLD AUTO: 6.08 10*6/MM3 (ref 4.14–5.8)
SODIUM SERPL-SCNC: 136 MMOL/L (ref 136–145)
WBC NRBC COR # BLD AUTO: 7.48 10*3/MM3 (ref 3.4–10.8)

## 2024-11-02 PROCEDURE — 99285 EMERGENCY DEPT VISIT HI MDM: CPT

## 2024-11-02 PROCEDURE — 25010000002 DEXAMETHASONE SODIUM PHOSPHATE 10 MG/ML SOLUTION: Performed by: NURSE PRACTITIONER

## 2024-11-02 PROCEDURE — 25510000001 IOPAMIDOL 61 % SOLUTION: Performed by: EMERGENCY MEDICINE

## 2024-11-02 PROCEDURE — 85025 COMPLETE CBC W/AUTO DIFF WBC: CPT | Performed by: NURSE PRACTITIONER

## 2024-11-02 PROCEDURE — 80053 COMPREHEN METABOLIC PANEL: CPT | Performed by: NURSE PRACTITIONER

## 2024-11-02 PROCEDURE — 96374 THER/PROPH/DIAG INJ IV PUSH: CPT

## 2024-11-02 PROCEDURE — 96361 HYDRATE IV INFUSION ADD-ON: CPT

## 2024-11-02 PROCEDURE — 74177 CT ABD & PELVIS W/CONTRAST: CPT

## 2024-11-02 PROCEDURE — 96375 TX/PRO/DX INJ NEW DRUG ADDON: CPT

## 2024-11-02 PROCEDURE — 25810000003 SODIUM CHLORIDE 0.9 % SOLUTION: Performed by: NURSE PRACTITIONER

## 2024-11-02 PROCEDURE — 25010000002 ONDANSETRON PER 1 MG: Performed by: NURSE PRACTITIONER

## 2024-11-02 RX ORDER — IOPAMIDOL 612 MG/ML
100 INJECTION, SOLUTION INTRAVASCULAR
Status: COMPLETED | OUTPATIENT
Start: 2024-11-02 | End: 2024-11-02

## 2024-11-02 RX ORDER — DEXAMETHASONE SODIUM PHOSPHATE 10 MG/ML
10 INJECTION, SOLUTION INTRAMUSCULAR; INTRAVENOUS ONCE
Status: COMPLETED | OUTPATIENT
Start: 2024-11-02 | End: 2024-11-02

## 2024-11-02 RX ORDER — ONDANSETRON 2 MG/ML
4 INJECTION INTRAMUSCULAR; INTRAVENOUS ONCE
Status: COMPLETED | OUTPATIENT
Start: 2024-11-02 | End: 2024-11-02

## 2024-11-02 RX ORDER — PROMETHAZINE HYDROCHLORIDE 12.5 MG/1
12.5 TABLET ORAL EVERY 8 HOURS PRN
Qty: 9 TABLET | Refills: 0 | Status: SHIPPED | OUTPATIENT
Start: 2024-11-02 | End: 2024-11-07

## 2024-11-02 RX ADMIN — DEXAMETHASONE SODIUM PHOSPHATE 10 MG: 10 INJECTION INTRAMUSCULAR; INTRAVENOUS at 15:51

## 2024-11-02 RX ADMIN — SODIUM CHLORIDE 1000 ML: 9 INJECTION, SOLUTION INTRAVENOUS at 12:45

## 2024-11-02 RX ADMIN — ONDANSETRON 4 MG: 2 INJECTION INTRAMUSCULAR; INTRAVENOUS at 12:48

## 2024-11-02 RX ADMIN — IOPAMIDOL 100 ML: 612 INJECTION, SOLUTION INTRAVENOUS at 13:24

## 2024-11-02 NOTE — ED PROVIDER NOTES
Subjective:  History of Present Illness:    Patient is a 49-year-old with history of bowel obstruction.  Presents to the ER today with nausea vomiting times several days.  Patient also reports that has not had bowel movement in 5 days.  He denies abdominal pain.  He denies fever.  Denies dysuria frequency or hematuria.  Denies OTC medication or home remedy.  Denies alleviating or exacerbating factors.    Nurses Notes reviewed and agree, including vitals, allergies, social history and prior medical history.     REVIEW OF SYSTEMS: All systems reviewed and not pertinent unless noted.  Review of Systems   Gastrointestinal:  Positive for constipation, nausea and vomiting.   All other systems reviewed and are negative.      Past Medical History:   Diagnosis Date    Abdominal adhesions     Anxiety     Arthritis     Asthma     Cervical radiculopathy     Colitis     COPD (chronic obstructive pulmonary disease)     GERD (gastroesophageal reflux disease)     Heart attack     History of hepatitis C     Treated with Epclusa in 2019    History of recreational drug use     HTN (hypertension)     Impaired functional mobility, balance, gait, and endurance     Kidney cysts     Left hip pain     Liver disease     Low back pain     Migraines     Obstructive chronic bronchitis with exacerbation     Sleep apnea     mild    Tattoos        Allergies:    Doxycycline      Past Surgical History:   Procedure Laterality Date    BACK SURGERY      COLONOSCOPY  2014    COLONOSCOPY N/A 1/4/2022    Procedure: COLONOSCOPY with biopsies;  Surgeon: Giancarlo Ruiz MD;  Location: [facility];  Service: Gastroenterology;  Laterality: N/A;    HERNIA REPAIR      HIP SPACER INSERTION WITH ANTIBIOTIC CEMENT Left 1/15/2020    Procedure: TOTAL HIP IMPLANT REMOVAL WITH INSERTION OF ANTIBIOTIC SPACER LEFT;  Surgeon: Ba Ramirez MD;  Location: [facility] OR;  Service: Orthopedics    SHOULDER LIGAMENT REPAIR      right shoulder    SMALL INTESTINE  "SURGERY      Small bowell resection    TOTAL HIP ARTHROPLASTY Left     TOTAL HIP ARTHROPLASTY Right     TOTAL HIP ARTHROPLASTY REVISION Left 3/5/2020    Procedure: HIP REIMPLANT REVISION LEFT;  Surgeon: Ba Ramirez MD;  Location: [facility];  Service: Orthopedics;  Laterality: Left;    UPPER GASTROINTESTINAL ENDOSCOPY           Social History     Socioeconomic History    Marital status:    Tobacco Use    Smoking status: Former     Current packs/day: 0.00     Average packs/day: 2.0 packs/day for 15.0 years (30.0 ttl pk-yrs)     Types: Cigarettes     Start date:      Quit date:      Years since quittin.8     Passive exposure: Current    Smokeless tobacco: Current     Types: Chew   Vaping Use    Vaping status: Never Used   Substance and Sexual Activity    Alcohol use: No     Comment: stopped drinking alcohol    Drug use: Not Currently     Comment: takes suboxone    Sexual activity: Defer         Family History   Problem Relation Age of Onset    Arthritis Other     Hypertension Other     Migraines Other     Heart attack Other     Stroke Other     Colon cancer Neg Hx        Objective  Physical Exam:  /95   Pulse 105   Temp 98.3 °F (36.8 °C)   Resp 18   Ht 185.4 cm (73\")   Wt 90.7 kg (200 lb)   SpO2 93%   BMI 26.39 kg/m²      Physical Exam  Vitals and nursing note reviewed.   Constitutional:       Appearance: Normal appearance. He is normal weight.   HENT:      Head: Normocephalic and atraumatic.      Nose: Nose normal.      Mouth/Throat:      Mouth: Mucous membranes are moist.      Pharynx: Oropharynx is clear.   Eyes:      Extraocular Movements: Extraocular movements intact.      Conjunctiva/sclera: Conjunctivae normal.      Pupils: Pupils are equal, round, and reactive to light.   Cardiovascular:      Rate and Rhythm: Normal rate and regular rhythm.      Pulses: Normal pulses.      Heart sounds: Normal heart sounds.   Pulmonary:      Effort: Pulmonary effort is normal.      " Breath sounds: Wheezing present.   Abdominal:      General: Abdomen is flat. Bowel sounds are normal.      Palpations: Abdomen is soft.   Musculoskeletal:         General: Normal range of motion.      Cervical back: Normal range of motion and neck supple.   Skin:     General: Skin is warm.      Capillary Refill: Capillary refill takes less than 2 seconds.   Neurological:      General: No focal deficit present.      Mental Status: He is alert and oriented to person, place, and time. Mental status is at baseline.   Psychiatric:         Mood and Affect: Mood normal.         Behavior: Behavior normal.         Thought Content: Thought content normal.         Judgment: Judgment normal.         Procedures    ED Course:         Lab Results (last 24 hours)       Procedure Component Value Units Date/Time    CBC Auto Differential [[id number]]  (Abnormal) Collected: 11/02/24 1244    Specimen: Blood Updated: 11/02/24 1252     WBC 7.48 10*3/mm3      RBC 6.08 10*6/mm3      Hemoglobin 16.4 g/dL      Hematocrit 48.6 %      MCV 79.9 fL      MCH 27.0 pg      MCHC 33.7 g/dL      RDW 13.2 %      RDW-SD 37.6 fl      MPV 10.2 fL      Platelets 218 10*3/mm3      Neutrophil % 72.6 %      Lymphocyte % 17.2 %      Monocyte % 9.8 %      Eosinophil % 0.0 %      Basophil % 0.1 %      Immature Grans % 0.3 %      Neutrophils, Absolute 5.43 10*3/mm3      Lymphocytes, Absolute 1.29 10*3/mm3      Monocytes, Absolute 0.73 10*3/mm3      Eosinophils, Absolute 0.00 10*3/mm3      Basophils, Absolute 0.01 10*3/mm3      Immature Grans, Absolute 0.02 10*3/mm3      nRBC 0.0 /100 WBC     Comprehensive Metabolic Panel [[id number]]  (Abnormal) Collected: 11/02/24 1244    Specimen: Blood Updated: 11/02/24 1326     Glucose 124 mg/dL      BUN 14 mg/dL      Creatinine 1.04 mg/dL      Sodium 136 mmol/L      Potassium 3.5 mmol/L      Comment: Slight hemolysis detected by analyzer. Result may be falsely elevated.        Chloride 95 mmol/L      CO2 30.9 mmol/L       Calcium 9.1 mg/dL      Total Protein 7.0 g/dL      Albumin 4.1 g/dL      ALT (SGPT) 12 U/L      AST (SGOT) 15 U/L      Comment: Slight hemolysis detected by analyzer. Result may be falsely elevated.        Alkaline Phosphatase 124 U/L      Total Bilirubin 0.5 mg/dL      Globulin 2.9 gm/dL      A/G Ratio 1.4 g/dL      BUN/Creatinine Ratio 13.5     Anion Gap 10.1 mmol/L      eGFR 87.5 mL/min/1.73     Narrative:      GFR Normal >60  Chronic Kidney Disease <60  Kidney Failure <15               CT Abdomen Pelvis With Contrast    Result Date: 11/2/2024  PROCEDURE: CT ABDOMEN PELVIS W CONTRAST-  HISTORY: Rule out bowel obstruction  COMPARISON: October 31, 2024.  PROCEDURE: The patient was injected with IV contrast.  Axial images were obtained from the lung bases to the pubic symphysis by computed tomography.  FINDINGS:  ABDOMEN: Lung bases are clear. There is no free air beneath the diaphragm. No abnormally dilated loops of bowel. There are fluid levels within the colon, correlate for possible enterocolitis. The heart is normal size. There is a small hiatal hernia. The gallbladder is absent. Slight prominence of the intrahepatic ducts. Spleen is normal size. No pancreatic mass or inflammatory change. Adrenal glands are unremarkable. There is no hydronephrosis or discrete ureteral stone. Atherosclerosis without abdominal aortic aneurysm.  PELVIS: The appendix appears to be absent. Spray artifact from bilateral hip arthroplasties does limit assessment of the pelvis. The bladder is decompressed. No significant fluid collections are seen in the pelvis. No acute osseous changes.      Impression: Fluid levels within the colon, correlate for possible enterocolitis.   This study was performed with techniques to keep radiation doses as low as reasonably achievable (ALARA). Individualized dose reduction techniques using automated exposure control or adjustment of mA and/or kV according to the patient size were employed.   CTDI: 7.06  mGy DLP:335.97 mGy.cm   This report was signed and finalized on 11/2/2024 2:12 PM by Isma Mejia DO.          ISAURA      Initial impression of presenting illness: Patient is a 49-year-old with history of bowel obstruction.  Presents to the ER today with nausea vomiting times several days.  Patient also reports that has not had bowel movement in 5 days.  He denies abdominal pain.  He denies fever.  Denies dysuria frequency or hematuria.  Denies OTC medication or home remedy.  Denies alleviating or exacerbating factors.    DDX: includes but is not limited to: Gastroenteritis, enteritis colitis, gastritis, diverticulitis, bowel obstruction, ileus or other    Patient arrives stable with vitals interpreted by myself.     Pertinent features from physical exam: Fine expiratory wheezes heard in bilateral upper lobe.  Otherwise clear.  Abdo soft nontender.  Bowel sounds normal.  Card sounds normal..    Initial diagnostic plan: CBC, CMP, CT abdomen pelvis    Results from initial plan were reviewed and interpreted by me revealing CBC is within appropriate range.  CMP is within appropriate range.  CT abdomen pelvis with the following pression with fluid levels within the colon, correlate for possible enterocolitis    Diagnostic information from other sources: Chart review    Interventions / Re-evaluation: Vital signs stable throughout encounter    Results/clinical rationale were discussed with patient    Consultations/Discussion of results with other physicians: N/A    Disposition plan: Patient is hemodynamically stable nontoxic-appearing appropriate discharge.  Will send home with promethazine.  Upon discharging patient requested steroid injection for COPD.  Reports that he has recently been wheezing.  -----        Final diagnoses:   Enterocolitis   Chronic obstructive pulmonary disease, unspecified COPD type          Gomez Rodrigues, APRN  11/02/24 1736

## 2024-11-24 ENCOUNTER — HOSPITAL ENCOUNTER (INPATIENT)
Facility: HOSPITAL | Age: 49
LOS: 1 days | Discharge: HOME | End: 2024-11-27
Attending: EMERGENCY MEDICINE | Admitting: INTERNAL MEDICINE
Payer: MEDICARE

## 2024-11-24 ENCOUNTER — APPOINTMENT (OUTPATIENT)
Dept: GENERAL RADIOLOGY | Facility: HOSPITAL | Age: 49
End: 2024-11-24
Payer: MEDICARE

## 2024-11-24 DIAGNOSIS — J44.1 COPD WITH ACUTE EXACERBATION: ICD-10-CM

## 2024-11-24 DIAGNOSIS — J44.1 COPD EXACERBATION: Primary | ICD-10-CM

## 2024-11-24 LAB
A-A DO2: 114.4 MMHG
ALBUMIN SERPL-MCNC: 4.1 G/DL (ref 3.5–5.2)
ALBUMIN/GLOB SERPL: 1.4 G/DL
ALP SERPL-CCNC: 177 U/L (ref 39–117)
ALT SERPL W P-5'-P-CCNC: 10 U/L (ref 1–41)
ANION GAP SERPL CALCULATED.3IONS-SCNC: 11.4 MMOL/L (ref 5–15)
ARTERIAL PATENCY WRIST A: POSITIVE
AST SERPL-CCNC: 17 U/L (ref 1–40)
ATMOSPHERIC PRESS: 733 MMHG
BASE EXCESS BLDA CALC-SCNC: 0.8 MMOL/L (ref 0–2)
BASOPHILS # BLD AUTO: 0 10*3/MM3 (ref 0–0.2)
BASOPHILS NFR BLD AUTO: 0 % (ref 0–1.5)
BDY SITE: ABNORMAL
BILIRUB SERPL-MCNC: 0.3 MG/DL (ref 0–1.2)
BUN SERPL-MCNC: 7 MG/DL (ref 6–20)
BUN/CREAT SERPL: 8.4 (ref 7–25)
CALCIUM SPEC-SCNC: 9 MG/DL (ref 8.6–10.5)
CHLORIDE SERPL-SCNC: 100 MMOL/L (ref 98–107)
CO2 SERPL-SCNC: 23.6 MMOL/L (ref 22–29)
COHGB MFR BLD: 0.8 % (ref 0–2)
CREAT SERPL-MCNC: 0.83 MG/DL (ref 0.76–1.27)
DEPRECATED RDW RBC AUTO: 39.8 FL (ref 37–54)
EGFRCR SERPLBLD CKD-EPI 2021: 106.6 ML/MIN/1.73
EOSINOPHIL # BLD AUTO: 0.01 10*3/MM3 (ref 0–0.4)
EOSINOPHIL NFR BLD AUTO: 0.2 % (ref 0.3–6.2)
ERYTHROCYTE [DISTWIDTH] IN BLOOD BY AUTOMATED COUNT: 13.4 % (ref 12.3–15.4)
GAS FLOW AIRWAY: 4 LPM
GLOBULIN UR ELPH-MCNC: 2.9 GM/DL
GLUCOSE SERPL-MCNC: 119 MG/DL (ref 65–99)
HCO3 BLDA-SCNC: 27.2 MMOL/L (ref 22–28)
HCT VFR BLD AUTO: 45.6 % (ref 37.5–51)
HCT VFR BLD CALC: 47.2 %
HGB BLD-MCNC: 15.2 G/DL (ref 13–17.7)
HOLD SPECIMEN: NORMAL
HOLD SPECIMEN: NORMAL
IMM GRANULOCYTES # BLD AUTO: 0.01 10*3/MM3 (ref 0–0.05)
IMM GRANULOCYTES NFR BLD AUTO: 0.2 % (ref 0–0.5)
INHALED O2 CONCENTRATION: 36 %
LYMPHOCYTES # BLD AUTO: 1.83 10*3/MM3 (ref 0.7–3.1)
LYMPHOCYTES NFR BLD AUTO: 29.9 % (ref 19.6–45.3)
Lab: ABNORMAL
MCH RBC QN AUTO: 27.1 PG (ref 26.6–33)
MCHC RBC AUTO-ENTMCNC: 33.3 G/DL (ref 31.5–35.7)
MCV RBC AUTO: 81.3 FL (ref 79–97)
METHGB BLD QL: 0.5 % (ref 0–1.5)
MODALITY: ABNORMAL
MONOCYTES # BLD AUTO: 0.48 10*3/MM3 (ref 0.1–0.9)
MONOCYTES NFR BLD AUTO: 7.8 % (ref 5–12)
NEUTROPHILS NFR BLD AUTO: 3.79 10*3/MM3 (ref 1.7–7)
NEUTROPHILS NFR BLD AUTO: 61.9 % (ref 42.7–76)
NRBC BLD AUTO-RTO: 0 /100 WBC (ref 0–0.2)
NT-PROBNP SERPL-MCNC: 61.6 PG/ML (ref 0–900)
OXYHGB MFR BLDV: 94.8 % (ref 94–99)
PCO2 BLDA: 48.6 MM HG (ref 35–45)
PCO2 TEMP ADJ BLD: ABNORMAL MM[HG]
PH BLDA: 7.36 PH UNITS (ref 7.3–7.5)
PH, TEMP CORRECTED: ABNORMAL
PLATELET # BLD AUTO: 244 10*3/MM3 (ref 140–450)
PMV BLD AUTO: 10.2 FL (ref 6–12)
PO2 BLDA: 78.7 MM HG (ref 75–100)
PO2 TEMP ADJ BLD: ABNORMAL MM[HG]
POTASSIUM SERPL-SCNC: 3.7 MMOL/L (ref 3.5–5.2)
PROT SERPL-MCNC: 7 G/DL (ref 6–8.5)
RBC # BLD AUTO: 5.61 10*6/MM3 (ref 4.14–5.8)
SAO2 % BLDCOA: 96.1 % (ref 94–100)
SODIUM SERPL-SCNC: 135 MMOL/L (ref 136–145)
TROPONIN T SERPL HS-MCNC: <6 NG/L
VENTILATOR MODE: ABNORMAL
WBC NRBC COR # BLD AUTO: 6.12 10*3/MM3 (ref 3.4–10.8)
WHOLE BLOOD HOLD COAG: NORMAL
WHOLE BLOOD HOLD SPECIMEN: NORMAL

## 2024-11-24 PROCEDURE — 82805 BLOOD GASES W/O2 SATURATION: CPT

## 2024-11-24 PROCEDURE — 36600 WITHDRAWAL OF ARTERIAL BLOOD: CPT

## 2024-11-24 PROCEDURE — 94640 AIRWAY INHALATION TREATMENT: CPT

## 2024-11-24 PROCEDURE — 85025 COMPLETE CBC W/AUTO DIFF WBC: CPT | Performed by: EMERGENCY MEDICINE

## 2024-11-24 PROCEDURE — 71045 X-RAY EXAM CHEST 1 VIEW: CPT

## 2024-11-24 PROCEDURE — 83880 ASSAY OF NATRIURETIC PEPTIDE: CPT | Performed by: EMERGENCY MEDICINE

## 2024-11-24 PROCEDURE — 93005 ELECTROCARDIOGRAM TRACING: CPT | Performed by: EMERGENCY MEDICINE

## 2024-11-24 PROCEDURE — 84484 ASSAY OF TROPONIN QUANT: CPT | Performed by: EMERGENCY MEDICINE

## 2024-11-24 PROCEDURE — 82375 ASSAY CARBOXYHB QUANT: CPT

## 2024-11-24 PROCEDURE — 80053 COMPREHEN METABOLIC PANEL: CPT | Performed by: EMERGENCY MEDICINE

## 2024-11-24 PROCEDURE — 83050 HGB METHEMOGLOBIN QUAN: CPT

## 2024-11-24 PROCEDURE — 99285 EMERGENCY DEPT VISIT HI MDM: CPT | Performed by: EMERGENCY MEDICINE

## 2024-11-24 PROCEDURE — 25010000002 METHYLPREDNISOLONE PER 125 MG: Performed by: PHYSICIAN ASSISTANT

## 2024-11-24 PROCEDURE — 84145 PROCALCITONIN (PCT): CPT | Performed by: INTERNAL MEDICINE

## 2024-11-24 RX ORDER — METHYLPREDNISOLONE SODIUM SUCCINATE 125 MG/2ML
125 INJECTION, POWDER, LYOPHILIZED, FOR SOLUTION INTRAMUSCULAR; INTRAVENOUS ONCE
Status: COMPLETED | OUTPATIENT
Start: 2024-11-24 | End: 2024-11-24

## 2024-11-24 RX ORDER — SODIUM CHLORIDE 0.9 % (FLUSH) 0.9 %
10 SYRINGE (ML) INJECTION AS NEEDED
Status: DISCONTINUED | OUTPATIENT
Start: 2024-11-24 | End: 2024-11-25

## 2024-11-24 RX ORDER — IPRATROPIUM BROMIDE AND ALBUTEROL SULFATE 2.5; .5 MG/3ML; MG/3ML
3 SOLUTION RESPIRATORY (INHALATION) ONCE
Status: COMPLETED | OUTPATIENT
Start: 2024-11-24 | End: 2024-11-24

## 2024-11-24 RX ADMIN — IPRATROPIUM BROMIDE AND ALBUTEROL SULFATE 3 ML: 2.5; .5 SOLUTION RESPIRATORY (INHALATION) at 23:25

## 2024-11-24 RX ADMIN — METHYLPREDNISOLONE SODIUM SUCCINATE 125 MG: 125 INJECTION, POWDER, FOR SOLUTION INTRAMUSCULAR; INTRAVENOUS at 23:24

## 2024-11-25 LAB
B PARAPERT DNA SPEC QL NAA+PROBE: NOT DETECTED
B PERT DNA SPEC QL NAA+PROBE: NOT DETECTED
C PNEUM DNA NPH QL NAA+NON-PROBE: NOT DETECTED
FLUAV SUBTYP SPEC NAA+PROBE: NOT DETECTED
FLUBV RNA ISLT QL NAA+PROBE: NOT DETECTED
HADV DNA SPEC NAA+PROBE: NOT DETECTED
HCOV 229E RNA SPEC QL NAA+PROBE: NOT DETECTED
HCOV HKU1 RNA SPEC QL NAA+PROBE: NOT DETECTED
HCOV NL63 RNA SPEC QL NAA+PROBE: NOT DETECTED
HCOV OC43 RNA SPEC QL NAA+PROBE: NOT DETECTED
HMPV RNA NPH QL NAA+NON-PROBE: NOT DETECTED
HPIV1 RNA ISLT QL NAA+PROBE: NOT DETECTED
HPIV2 RNA SPEC QL NAA+PROBE: NOT DETECTED
HPIV3 RNA NPH QL NAA+PROBE: NOT DETECTED
HPIV4 P GENE NPH QL NAA+PROBE: NOT DETECTED
M PNEUMO IGG SER IA-ACNC: NOT DETECTED
PROCALCITONIN SERPL-MCNC: 0.03 NG/ML (ref 0–0.25)
RHINOVIRUS RNA SPEC NAA+PROBE: NOT DETECTED
RSV RNA NPH QL NAA+NON-PROBE: NOT DETECTED
SARS-COV-2 RNA NPH QL NAA+NON-PROBE: NOT DETECTED

## 2024-11-25 PROCEDURE — G0378 HOSPITAL OBSERVATION PER HR: HCPCS

## 2024-11-25 PROCEDURE — 0202U NFCT DS 22 TRGT SARS-COV-2: CPT | Performed by: INTERNAL MEDICINE

## 2024-11-25 PROCEDURE — 25010000002 PROCHLORPERAZINE 10 MG/2ML SOLUTION: Performed by: INTERNAL MEDICINE

## 2024-11-25 PROCEDURE — 94799 UNLISTED PULMONARY SVC/PX: CPT

## 2024-11-25 PROCEDURE — 94664 DEMO&/EVAL PT USE INHALER: CPT

## 2024-11-25 PROCEDURE — 94761 N-INVAS EAR/PLS OXIMETRY MLT: CPT

## 2024-11-25 PROCEDURE — 25010000002 MAGNESIUM SULFATE 2 GM/50ML SOLUTION: Performed by: PHYSICIAN ASSISTANT

## 2024-11-25 PROCEDURE — 99223 1ST HOSP IP/OBS HIGH 75: CPT | Performed by: INTERNAL MEDICINE

## 2024-11-25 PROCEDURE — 25010000002 METHYLPREDNISOLONE PER 125 MG: Performed by: INTERNAL MEDICINE

## 2024-11-25 RX ORDER — TRAZODONE HYDROCHLORIDE 50 MG/1
300 TABLET, FILM COATED ORAL NIGHTLY
Status: DISCONTINUED | OUTPATIENT
Start: 2024-11-25 | End: 2024-11-27 | Stop reason: HOSPADM

## 2024-11-25 RX ORDER — IPRATROPIUM BROMIDE AND ALBUTEROL SULFATE 2.5; .5 MG/3ML; MG/3ML
3 SOLUTION RESPIRATORY (INHALATION) EVERY 4 HOURS PRN
Status: DISCONTINUED | OUTPATIENT
Start: 2024-11-25 | End: 2024-11-27 | Stop reason: HOSPADM

## 2024-11-25 RX ORDER — ACETAMINOPHEN 325 MG/1
650 TABLET ORAL EVERY 4 HOURS PRN
Status: DISCONTINUED | OUTPATIENT
Start: 2024-11-25 | End: 2024-11-27 | Stop reason: HOSPADM

## 2024-11-25 RX ORDER — IPRATROPIUM BROMIDE AND ALBUTEROL SULFATE 2.5; .5 MG/3ML; MG/3ML
3 SOLUTION RESPIRATORY (INHALATION)
Status: DISCONTINUED | OUTPATIENT
Start: 2024-11-25 | End: 2024-11-27 | Stop reason: HOSPADM

## 2024-11-25 RX ORDER — ONDANSETRON 2 MG/ML
4 INJECTION INTRAMUSCULAR; INTRAVENOUS EVERY 6 HOURS PRN
Status: DISCONTINUED | OUTPATIENT
Start: 2024-11-25 | End: 2024-11-25

## 2024-11-25 RX ORDER — SODIUM CHLORIDE 0.9 % (FLUSH) 0.9 %
10 SYRINGE (ML) INJECTION EVERY 12 HOURS SCHEDULED
Status: DISCONTINUED | OUTPATIENT
Start: 2024-11-25 | End: 2024-11-27 | Stop reason: HOSPADM

## 2024-11-25 RX ORDER — SODIUM CHLORIDE 9 MG/ML
40 INJECTION, SOLUTION INTRAVENOUS AS NEEDED
Status: DISCONTINUED | OUTPATIENT
Start: 2024-11-25 | End: 2024-11-27 | Stop reason: HOSPADM

## 2024-11-25 RX ORDER — PANTOPRAZOLE SODIUM 40 MG/1
40 TABLET, DELAYED RELEASE ORAL
Status: DISCONTINUED | OUTPATIENT
Start: 2024-11-25 | End: 2024-11-27 | Stop reason: HOSPADM

## 2024-11-25 RX ORDER — PROCHLORPERAZINE EDISYLATE 5 MG/ML
5 INJECTION INTRAMUSCULAR; INTRAVENOUS EVERY 6 HOURS PRN
Status: DISCONTINUED | OUTPATIENT
Start: 2024-11-25 | End: 2024-11-27 | Stop reason: HOSPADM

## 2024-11-25 RX ORDER — BISACODYL 5 MG/1
5 TABLET, DELAYED RELEASE ORAL DAILY PRN
Status: DISCONTINUED | OUTPATIENT
Start: 2024-11-25 | End: 2024-11-27 | Stop reason: HOSPADM

## 2024-11-25 RX ORDER — BUTALBITAL, ACETAMINOPHEN AND CAFFEINE 50; 325; 40 MG/1; MG/1; MG/1
1 TABLET ORAL EVERY 6 HOURS PRN
Status: DISCONTINUED | OUTPATIENT
Start: 2024-11-25 | End: 2024-11-27 | Stop reason: HOSPADM

## 2024-11-25 RX ORDER — METHADONE HYDROCHLORIDE 10 MG/1
65 TABLET ORAL DAILY
Status: DISCONTINUED | OUTPATIENT
Start: 2024-11-25 | End: 2024-11-27 | Stop reason: HOSPADM

## 2024-11-25 RX ORDER — ACETAMINOPHEN 160 MG/5ML
650 SOLUTION ORAL EVERY 4 HOURS PRN
Status: DISCONTINUED | OUTPATIENT
Start: 2024-11-25 | End: 2024-11-27 | Stop reason: HOSPADM

## 2024-11-25 RX ORDER — CALCIUM CARBONATE 500 MG/1
2 TABLET, CHEWABLE ORAL 3 TIMES DAILY PRN
Status: DISCONTINUED | OUTPATIENT
Start: 2024-11-25 | End: 2024-11-27 | Stop reason: HOSPADM

## 2024-11-25 RX ORDER — GUAIFENESIN 600 MG/1
600 TABLET, EXTENDED RELEASE ORAL EVERY 12 HOURS SCHEDULED
Status: DISCONTINUED | OUTPATIENT
Start: 2024-11-25 | End: 2024-11-27 | Stop reason: HOSPADM

## 2024-11-25 RX ORDER — DICYCLOMINE HYDROCHLORIDE 10 MG/1
20 CAPSULE ORAL 3 TIMES DAILY PRN
Status: DISCONTINUED | OUTPATIENT
Start: 2024-11-25 | End: 2024-11-27 | Stop reason: HOSPADM

## 2024-11-25 RX ORDER — ALBUTEROL SULFATE 0.83 MG/ML
10 SOLUTION RESPIRATORY (INHALATION)
Status: COMPLETED | OUTPATIENT
Start: 2024-11-25 | End: 2024-11-25

## 2024-11-25 RX ORDER — MAGNESIUM SULFATE HEPTAHYDRATE 40 MG/ML
2 INJECTION, SOLUTION INTRAVENOUS ONCE
Status: COMPLETED | OUTPATIENT
Start: 2024-11-25 | End: 2024-11-25

## 2024-11-25 RX ORDER — ENOXAPARIN SODIUM 100 MG/ML
40 INJECTION SUBCUTANEOUS DAILY
Status: DISCONTINUED | OUTPATIENT
Start: 2024-11-25 | End: 2024-11-27 | Stop reason: HOSPADM

## 2024-11-25 RX ORDER — MONTELUKAST SODIUM 10 MG/1
10 TABLET ORAL NIGHTLY
Status: DISCONTINUED | OUTPATIENT
Start: 2024-11-25 | End: 2024-11-27 | Stop reason: HOSPADM

## 2024-11-25 RX ORDER — METHYLPREDNISOLONE SODIUM SUCCINATE 125 MG/2ML
60 INJECTION, POWDER, LYOPHILIZED, FOR SOLUTION INTRAMUSCULAR; INTRAVENOUS EVERY 8 HOURS
Status: DISCONTINUED | OUTPATIENT
Start: 2024-11-25 | End: 2024-11-27 | Stop reason: HOSPADM

## 2024-11-25 RX ORDER — BUDESONIDE 0.5 MG/2ML
0.5 INHALANT ORAL
Status: DISCONTINUED | OUTPATIENT
Start: 2024-11-25 | End: 2024-11-27 | Stop reason: HOSPADM

## 2024-11-25 RX ORDER — ACETAMINOPHEN 650 MG/1
650 SUPPOSITORY RECTAL EVERY 4 HOURS PRN
Status: DISCONTINUED | OUTPATIENT
Start: 2024-11-25 | End: 2024-11-27 | Stop reason: HOSPADM

## 2024-11-25 RX ORDER — POLYETHYLENE GLYCOL 3350 17 G/17G
17 POWDER, FOR SOLUTION ORAL DAILY PRN
Status: DISCONTINUED | OUTPATIENT
Start: 2024-11-25 | End: 2024-11-27 | Stop reason: HOSPADM

## 2024-11-25 RX ORDER — FLUTICASONE PROPIONATE 50 MCG
1 SPRAY, SUSPENSION (ML) NASAL DAILY
Status: DISCONTINUED | OUTPATIENT
Start: 2024-11-25 | End: 2024-11-27 | Stop reason: HOSPADM

## 2024-11-25 RX ORDER — SODIUM CHLORIDE 0.9 % (FLUSH) 0.9 %
10 SYRINGE (ML) INJECTION AS NEEDED
Status: DISCONTINUED | OUTPATIENT
Start: 2024-11-25 | End: 2024-11-27 | Stop reason: HOSPADM

## 2024-11-25 RX ORDER — LORAZEPAM 0.5 MG/1
1 TABLET ORAL EVERY 8 HOURS PRN
Status: DISCONTINUED | OUTPATIENT
Start: 2024-11-25 | End: 2024-11-27 | Stop reason: HOSPADM

## 2024-11-25 RX ORDER — BISACODYL 10 MG
10 SUPPOSITORY, RECTAL RECTAL DAILY PRN
Status: DISCONTINUED | OUTPATIENT
Start: 2024-11-25 | End: 2024-11-27 | Stop reason: HOSPADM

## 2024-11-25 RX ORDER — AMOXICILLIN 250 MG
2 CAPSULE ORAL 2 TIMES DAILY
Status: DISCONTINUED | OUTPATIENT
Start: 2024-11-25 | End: 2024-11-27 | Stop reason: HOSPADM

## 2024-11-25 RX ADMIN — LORAZEPAM 1 MG: 0.5 TABLET ORAL at 17:40

## 2024-11-25 RX ADMIN — Medication 10 ML: at 20:17

## 2024-11-25 RX ADMIN — PANTOPRAZOLE SODIUM 40 MG: 40 TABLET, DELAYED RELEASE ORAL at 06:23

## 2024-11-25 RX ADMIN — TRAZODONE HYDROCHLORIDE 300 MG: 50 TABLET ORAL at 20:16

## 2024-11-25 RX ADMIN — CALCIUM CARBONATE (ANTACID) CHEW TAB 500 MG 2 TABLET: 500 CHEW TAB at 21:31

## 2024-11-25 RX ADMIN — MONTELUKAST 10 MG: 10 TABLET, FILM COATED ORAL at 20:17

## 2024-11-25 RX ADMIN — METHADONE HYDROCHLORIDE 65 MG: 10 TABLET ORAL at 08:01

## 2024-11-25 RX ADMIN — SENNOSIDES AND DOCUSATE SODIUM 2 TABLET: 50; 8.6 TABLET ORAL at 20:15

## 2024-11-25 RX ADMIN — GUAIFENESIN 600 MG: 600 TABLET, MULTILAYER, EXTENDED RELEASE ORAL at 20:15

## 2024-11-25 RX ADMIN — BUDESONIDE 0.5 MG: 0.5 SUSPENSION RESPIRATORY (INHALATION) at 06:34

## 2024-11-25 RX ADMIN — ALBUTEROL SULFATE 10 MG: 2.5 SOLUTION RESPIRATORY (INHALATION) at 01:04

## 2024-11-25 RX ADMIN — Medication 10 ML: at 06:44

## 2024-11-25 RX ADMIN — METHYLPREDNISOLONE SODIUM SUCCINATE 60 MG: 125 INJECTION, POWDER, FOR SOLUTION INTRAMUSCULAR; INTRAVENOUS at 15:18

## 2024-11-25 RX ADMIN — TIZANIDINE 4 MG: 4 TABLET ORAL at 13:15

## 2024-11-25 RX ADMIN — IPRATROPIUM BROMIDE AND ALBUTEROL SULFATE 3 ML: 2.5; .5 SOLUTION RESPIRATORY (INHALATION) at 18:37

## 2024-11-25 RX ADMIN — LORAZEPAM 1 MG: 0.5 TABLET ORAL at 07:11

## 2024-11-25 RX ADMIN — IPRATROPIUM BROMIDE AND ALBUTEROL SULFATE 3 ML: 2.5; .5 SOLUTION RESPIRATORY (INHALATION) at 06:34

## 2024-11-25 RX ADMIN — BUDESONIDE 0.5 MG: 0.5 SUSPENSION RESPIRATORY (INHALATION) at 18:36

## 2024-11-25 RX ADMIN — PROCHLORPERAZINE EDISYLATE 5 MG: 5 INJECTION INTRAMUSCULAR; INTRAVENOUS at 06:44

## 2024-11-25 RX ADMIN — GUAIFENESIN 600 MG: 600 TABLET, MULTILAYER, EXTENDED RELEASE ORAL at 08:01

## 2024-11-25 RX ADMIN — BUTALBITAL, ACETAMINOPHEN, AND CAFFEINE 1 TABLET: 50; 325; 40 TABLET ORAL at 15:18

## 2024-11-25 RX ADMIN — MAGNESIUM SULFATE HEPTAHYDRATE 2 G: 40 INJECTION, SOLUTION INTRAVENOUS at 01:01

## 2024-11-25 RX ADMIN — BUTALBITAL, ACETAMINOPHEN, AND CAFFEINE 1 TABLET: 50; 325; 40 TABLET ORAL at 08:01

## 2024-11-25 RX ADMIN — METHYLPREDNISOLONE SODIUM SUCCINATE 60 MG: 125 INJECTION, POWDER, FOR SOLUTION INTRAMUSCULAR; INTRAVENOUS at 06:22

## 2024-11-25 RX ADMIN — SENNOSIDES AND DOCUSATE SODIUM 2 TABLET: 50; 8.6 TABLET ORAL at 08:01

## 2024-11-25 RX ADMIN — Medication 10 ML: at 08:03

## 2024-11-25 RX ADMIN — IPRATROPIUM BROMIDE AND ALBUTEROL SULFATE 3 ML: 2.5; .5 SOLUTION RESPIRATORY (INHALATION) at 12:54

## 2024-11-25 NOTE — SIGNIFICANT NOTE
Dr. Milligan called regarding orders and patient's complaint of shortness of breath and anxiety.  Dr. Milligan to place orders.

## 2024-11-25 NOTE — H&P
[facility]   HISTORY AND PHYSICAL      Name:  Topher Stoll   Age:  49 y.o.  Sex:  male  :  1975  MRN:  [id number]   Visit Number:  [id number]  Admission Date:  2024  Date Of Service:  24  Primary Care Physician:  Joshua Hoffmann MD    Chief Complaint:     Shortness of breath.    History Of Presenting Illness:      Topher Stoll is a 49-year-old male with a history of COPD on home oxygen at 2 L, hypertension, coronary artery disease, history of hep C with treatment in 2019, hepatitis B, anxiety, cervical radiculopathy on chronic methadone, obstructive sleep apnea, migraines was brought to the emergency room by family with symptoms of cough and shortness of breath since 2 days.  Patient apparently was out helping a friend and apparently was exposed to cold hair and started having shortness of breath associated with cough and some nausea.  Patient started having significant wheezing and tripoding despite taking his nebulizers at home.  He quit smoking several years ago.  He has history of obstructive sleep apnea but does not like CPAP and has not been using it.  Denies any fevers, chest pain.  He is currently not taking any medicines for blood pressure.    In the emergency room, he was afebrile and hemodynamically stable saturating at 86% on room air.  He was placed on 4 L of nasal cannula oxygen with improvement in saturations to mid 90s.  Initial ABG showed a pH of 7.35, pCO2 49, pO2 79, bicarb of 27 on 4 L of oxygen.  Initial troponin and proBNP were within normal range.  CMP was unremarkable except for a sodium of 135 and alkaline phosphatase of 177.  CBC was within normal range.  Portable chest x-ray showed emphysematous lung fields without any acute infiltrates.  Patient was given IV Solu-Medrol, bronchodilators, IV magnesium in the emergency room and was subsequently admitted to the medical floor with telemetry for management of acute COPD  exacerbation.    Review Of Systems:    All systems were reviewed and negative except as mentioned in history of presenting illness, assessment and plan.    Past Medical History: Patient's  has a past medical history of Abdominal adhesions, Anxiety, Arthritis, Asthma, Cervical radiculopathy, Colitis, COPD (chronic obstructive pulmonary disease), GERD (gastroesophageal reflux disease), Heart attack, History of hepatitis C, History of recreational drug use, HTN (hypertension), Impaired functional mobility, balance, gait, and endurance, Kidney cysts, Left hip pain, Liver disease, Low back pain, Migraines, Obstructive chronic bronchitis with exacerbation, Sleep apnea, and Tattoos.    Past Surgical History: Patient's  has a past surgical history that includes Back surgery; Hernia repair; Small intestine surgery; Total hip arthroplasty (Left); Shoulder Ligament Repair; Hip Spacer Insertion (Left, 1/15/2020); Revision total hip arthroplasty (Left, 3/5/2020); Colonoscopy (2014); Upper gastrointestinal endoscopy (2014); Colonoscopy (N/A, 1/4/2022); and Total hip arthroplasty (Right).    Social History: Patient's  reports that he quit smoking about 19 years ago. His smoking use included cigarettes. He started smoking about 34 years ago. He has a 30 pack-year smoking history. He has been exposed to tobacco smoke. His smokeless tobacco use includes chew. He reports that he does not currently use drugs. He reports that he does not drink alcohol.    Family History:  Patient's family history includes Arthritis in an other family member; Heart attack in an other family member; Hypertension in an other family member; Migraines in an other family member; Stroke in an other family member.     Allergies:      Doxycycline    Home Medications:    Prior to Admission Medications       Prescriptions Last Dose Informant Patient Reported? Taking?    albuterol sulfate HFA (Ventolin HFA) 108 (90 Base) MCG/ACT inhaler   No No    Inhale 2 puffs by  mouth Every 4 (Four) Hours As Needed for Wheezing.    baclofen (LIORESAL) 10 MG tablet   No No    Take 1 tablet by mouth 3 (Three) Times a Day.    Patient not taking:  Reported on 9/18/2024    Benralizumab 30 MG/ML solution auto-injector   No No    Inject 1 mL under the skin into the appropriate area as directed Every 8 (Eight) Weeks.    Benralizumab solution auto-injector 30 mg   No No    Budeson-Glycopyrrol-Formoterol (BREZTRI) 160-9-4.8 MCG/ACT aerosol inhaler   No No    Inhale 2 puffs by mouth 2 (Two) Times a Day.    butalbital-acetaminophen-caffeine (FIORICET, ESGIC) -40 MG per tablet   No No    Take 1 tablet by mouth Every 6 (Six) Hours As Needed for Headache.    dicyclomine (BENTYL) 20 MG tablet   No No    Take 1 tablet by mouth Every 6 (Six) Hours As Needed (abdominal cramps).    fluticasone (FLONASE) 50 MCG/ACT nasal spray   No No    Instill 1 spray into the nostril(s) as directed by provider Daily.    IPRATROPIUM BROMIDE IN   Yes No    Inhale. 18 mcg    ipratropium-albuterol (DUO-NEB) 0.5-2.5 mg/3 ml nebulizer   No No    Inhale contents of 1 vial through a nebulizer Every 4 (Four) Hours As Needed For Wheezing or Shortness of Breath    lovastatin (MEVACOR) 40 MG tablet   No No    TAKE 1 TABLET BY MOUTH EVERY DAY FOR CHOLESTEROL    Patient not taking:  Reported on 9/18/2024    methadone (DOLOPHINE) 5 MG/5ML solution  Self Yes No    Take 70 mL by mouth Daily.    metoclopramide (REGLAN) 5 MG tablet   No No    Take 1 tablet by mouth 3 (Three) Times a Day As Needed (Nasuea).    montelukast (Singulair) 10 MG tablet   No No    Take 1 tablet by mouth Every Night.    omeprazole (priLOSEC) 40 MG capsule   No No    Take 1 capsule by mouth Daily.    TiZANidine (ZANAFLEX) 4 MG capsule   No No    Take 1 tablet by mouth 3 times a day as needed muscle spasm    traZODone (DESYREL) 300 MG tablet   No No    Take 1 tablet by mouth Every Night.     ED Medications:    Medications   sodium chloride 0.9 % flush 10 mL (has no  "administration in time range)   methylPREDNISolone sodium succinate (SOLU-Medrol) injection 125 mg (125 mg Intravenous Given 11/24/24 2324)   ipratropium-albuterol (DUO-NEB) nebulizer solution 3 mL (3 mL Nebulization Given 11/24/24 2325)   albuterol (PROVENTIL) nebulizer solution 0.083% 2.5 mg/3mL (10 mg Nebulization Given 11/25/24 0104)   magnesium sulfate 2g/50 mL (PREMIX) infusion (0 g Intravenous Stopped 11/25/24 0148)     Vital Signs:  Temp:  [98.4 °F (36.9 °C)-98.6 °F (37 °C)] 98.4 °F (36.9 °C)  Heart Rate:  [79-98] 88  Resp:  [20-24] 20  BP: (126-140)/() 140/95        11/24/24 2250 11/25/24  0326   Weight: 90.7 kg (200 lb) 79.4 kg (175 lb 0.7 oz)     Body mass index is 23.09 kg/m².    Physical Exam:     Most recent vital Signs: /95 (BP Location: Left arm, Patient Position: Lying)   Pulse 88   Temp 98.4 °F (36.9 °C) (Oral)   Resp 20   Ht 185.4 cm (73\")   Wt 79.4 kg (175 lb 0.7 oz)   SpO2 90%   BMI 23.09 kg/m²     Physical Exam  Constitutional:       General: He is in acute distress.      Appearance: He is not ill-appearing.   HENT:      Head: Normocephalic and atraumatic.      Right Ear: External ear normal.      Left Ear: External ear normal.      Nose: Nose normal.      Mouth/Throat:      Mouth: Mucous membranes are moist.   Eyes:      Extraocular Movements: Extraocular movements intact.      Conjunctiva/sclera: Conjunctivae normal.   Cardiovascular:      Rate and Rhythm: Regular rhythm. Tachycardia present.      Pulses: Normal pulses.      Heart sounds: Normal heart sounds. No murmur heard.  Pulmonary:      Effort: Respiratory distress present.      Breath sounds: Wheezing present. No rales.      Comments: Bilateral extensive wheezing heard.  Accessory muscle use noted.  Abdominal:      General: Bowel sounds are normal.      Palpations: Abdomen is soft.      Tenderness: There is no abdominal tenderness. There is no guarding or rebound.   Musculoskeletal:         General: Normal range of " motion.      Cervical back: Neck supple.      Right lower leg: No edema.      Left lower leg: No edema.   Skin:     General: Skin is warm.      Findings: No erythema or rash.      Comments: Tattoos noted.   Neurological:      General: No focal deficit present.      Mental Status: He is alert and oriented to person, place, and time. Mental status is at baseline.   Psychiatric:         Mood and Affect: Mood normal.         Behavior: Behavior normal.      Comments: Anxious.       Laboratory data:    I have reviewed the labs done in the emergency room.    Results from last 7 days   Lab Units 11/24/24  2324   SODIUM mmol/L 135*   POTASSIUM mmol/L 3.7   CHLORIDE mmol/L 100   CO2 mmol/L 23.6   BUN mg/dL 7   CREATININE mg/dL 0.83   CALCIUM mg/dL 9.0   BILIRUBIN mg/dL 0.3   ALK PHOS U/L 177*   ALT (SGPT) U/L 10   AST (SGOT) U/L 17   GLUCOSE mg/dL 119*     Results from last 7 days   Lab Units 11/24/24  2324   WBC 10*3/mm3 6.12   HEMOGLOBIN g/dL 15.2   HEMATOCRIT % 45.6   PLATELETS 10*3/mm3 244       Results from last 7 days   Lab Units 11/24/24  2324   HSTROP T ng/L <6     Results from last 7 days   Lab Units 11/24/24  2324   PROBNP pg/mL 61.6       Results from last 7 days   Lab Units 11/24/24  2320   PH, ARTERIAL pH units 7.356   PO2 ART mm Hg 78.7   PCO2, ARTERIAL mm Hg 48.6*   HCO3 ART mmol/L 27.2     Radiology:    Portable chest x-ray done in the emergency room was reviewed by me.  It shows emphysematous lung fields with flattened diaphragms.  Increased bronchovascular markings noted in the lower zone but no definite infiltrate noted.  An official report is currently pending.    Assessment:    Acute on chronic hypoxic respiratory failure, POA.  Acute COPD exacerbation, POA.  Generalized anxiety disorder.  Chronic pain on chronic methadone.  History of hepatitis C status posttreatment.  Gastroesophageal reflux disease.  Chronic migraine.    Plan:    Respiratory failure/COPD exacerbation.  - Continue nasal cannula oxygen  to maintain saturations above 90%.  - Bronchodilator nebulization, budesonide, IV Solu-Medrol, mucolytic agents.  - Hold off on antibiotics therapy at this time.  - Obtain procalcitonin levels and respiratory panel.  - Patient would like to avoid use of BiPAP if possible.    Generalized anxiety disorder.  - Currently is not taking any medications at home but states that he gets Ativan while admitted, which helps him breathe better.    Chronic pain.  - Continue methadone.    Discussed with nursing staff at bedside.    Risk Assessment: Moderate  DVT Prophylaxis: Enoxaparin  Code Status: Full  Diet: Cardiac      Naga Milligan MD  11/25/24  05:59 EST    Dictated utilizing Dragon dictation.

## 2024-11-25 NOTE — ED PROVIDER NOTES
I performed a substantive part of the MDM during the patient's E/M visit. I personally made or approved the documented management plan and acknowledge its risk of complications. My personal findings/interpretations are below:    HPI/MDM:  Briefly, Topher Stoll is a 49 y.o. male presenting to the ED c/o: Shortness of breath, cough, patient with a history of COPD on home oxygen, after extensive breathing treatments, mag here patient did not improve and was admitted to the hospital for further management    Interpretations:    O2 Sat: The patients oxygen saturation was 92% on  4 L Nasal Cannula.  This was independently interpreted by me as Borderline    Cardiac Monitoring: I reviewed and independently interpreted the Rhythm Strip as Normal Sinus rhythm rate of 89    Radiology: I ordered and independently reviewed the above noted radiographic studies.  I viewed images of Chest Xray which showed No pulmonary process per my independent interpretation. See radiologist's dictation for official interpretation.     ED Course as of 11/25/24 0256   Sun Nov 24, 2024   2352 EKG: I reviewed and independently interpreted the EKG as:  Sinus rhythm rate of 91 bpm, normal axis, normal intervals, no ST elevation, no T wave inversions [TM]   Mon Nov 25, 2024   0150 This gentleman has a history of horrible COPD. On 2 L of oxygen continuously at home. Has been short of breath with a COPD exacerbation since last night. Has been using nebulizers at home without any improvement. Presented here hypoxic on his normal 2 L was initially increased to 4 L to maintain above 90%. Was given a DuoNeb, magnesium, Solu-Medrol and a continuous 1 hour albuterol neb. After these therapies he continues to be short of breath with diminished breath sounds and diffuse wheezing. On 2 L he is 89 to 90% at rest but complaining of increasing shortness of breath and his oxygen was increased here again. His labs are reassuring chest x-ray does not show any acute  process. Discussed admission versus discharge, patient states given his ongoing shortness of breath he does not feel comfortable going home and request admission for more aggressive treatment of his COPD. [TM]   0253 Discussed with Dr. Milligan agrees to evaluate patient for admission [CS]      ED Course User Index  [CS] Bang Camejo MD  [TM] Gomez Delgadillo PA-C              EMERGENCY DEPARTMENT ENCOUNTER    Pt Name: Topher Stoll  MRN: [id number]  Pt :   1975  Room Number:  10/10  Date of encounter:  2024  PCP: Joshua Hoffmann MD  ED Provider: Bang Camejo MD    Historian: Patient      HPI:  Chief Complaint   Patient presents with    Shortness of Breath          Context: Topher Stoll is a 49 y.o. male who presents to the ED c/o shortness of breath and cough with nausea.  Patient has a history of COPD wears 2 L of oxygen continuously at home.  Well-known to this emergency department.  Denies chest pain.  States yesterday was out helping a friend work on his car and believes the cold air caused a COPD exacerbation.  Used a DuoNeb prior to arrival with minimal improvement.  States he is desaturating at home with ambulation on his normal 2 L.  No recent antibiotic or steroid use.      PAST MEDICAL HISTORY  Past Medical History:   Diagnosis Date    Abdominal adhesions     Anxiety     Arthritis     Asthma     Cervical radiculopathy     Colitis     COPD (chronic obstructive pulmonary disease)     GERD (gastroesophageal reflux disease)     Heart attack     History of hepatitis C     Treated with Epclusa in 2019    History of recreational drug use     HTN (hypertension)     Impaired functional mobility, balance, gait, and endurance     Kidney cysts     Left hip pain     Liver disease     Low back pain     Migraines     Obstructive chronic bronchitis with exacerbation     Sleep apnea     mild    Tattoos          PAST SURGICAL HISTORY  Past Surgical History:   Procedure  Laterality Date    BACK SURGERY      COLONOSCOPY  2014    COLONOSCOPY N/A 2022    Procedure: COLONOSCOPY with biopsies;  Surgeon: Giancarlo Ruiz MD;  Location:  [facility];  Service: Gastroenterology;  Laterality: N/A;    HERNIA REPAIR      HIP SPACER INSERTION WITH ANTIBIOTIC CEMENT Left 1/15/2020    Procedure: TOTAL HIP IMPLANT REMOVAL WITH INSERTION OF ANTIBIOTIC SPACER LEFT;  Surgeon: Ba Ramirez MD;  Location:  [facility];  Service: Orthopedics    SHOULDER LIGAMENT REPAIR      right shoulder    SMALL INTESTINE SURGERY      Small bowell resection    TOTAL HIP ARTHROPLASTY Left     TOTAL HIP ARTHROPLASTY Right     TOTAL HIP ARTHROPLASTY REVISION Left 3/5/2020    Procedure: HIP REIMPLANT REVISION LEFT;  Surgeon: Ba Ramirez MD;  Location:  [facility];  Service: Orthopedics;  Laterality: Left;    UPPER GASTROINTESTINAL ENDOSCOPY           FAMILY HISTORY  Family History   Problem Relation Age of Onset    Arthritis Other     Hypertension Other     Migraines Other     Heart attack Other     Stroke Other     Colon cancer Neg Hx          SOCIAL HISTORY  Social History     Socioeconomic History    Marital status:    Tobacco Use    Smoking status: Former     Current packs/day: 0.00     Average packs/day: 2.0 packs/day for 15.0 years (30.0 ttl pk-yrs)     Types: Cigarettes     Start date:      Quit date:      Years since quittin.9     Passive exposure: Current    Smokeless tobacco: Current     Types: Chew   Vaping Use    Vaping status: Never Used   Substance and Sexual Activity    Alcohol use: No     Comment: stopped drinking alcohol    Drug use: Not Currently     Comment: takes suboxone    Sexual activity: Defer         ALLERGIES  Doxycycline        REVIEW OF SYSTEMS  Review of Systems   Constitutional: Negative.    HENT: Negative.     Eyes: Negative.    Respiratory:  Positive for shortness of breath.    Cardiovascular: Negative.    Gastrointestinal: Negative.     Genitourinary: Negative.    Musculoskeletal: Negative.    Skin: Negative.    Allergic/Immunologic: Negative.    Neurological: Negative.    Psychiatric/Behavioral: Negative.     All other systems reviewed and are negative.       All systems reviewed and negative except for those discussed in HPI.       PHYSICAL EXAM    I have reviewed the triage vital signs and nursing notes.    ED Triage Vitals [11/24/24 2250]   Temp Heart Rate Resp BP SpO2   98.6 °F (37 °C) 98 24 (!) 140/112 (!) 86 %      Temp src Heart Rate Source Patient Position BP Location FiO2 (%)   Oral Monitor Sitting Left arm --       Physical Exam  Vitals and nursing note reviewed.   Constitutional:       General: He is not in acute distress.     Appearance: He is well-developed and normal weight. He is not ill-appearing, toxic-appearing or diaphoretic.   HENT:      Head: Normocephalic and atraumatic.   Eyes:      Extraocular Movements: Extraocular movements intact.   Cardiovascular:      Rate and Rhythm: Normal rate and regular rhythm.   Pulmonary:      Effort: No respiratory distress.      Breath sounds: Decreased breath sounds and wheezing present. No rhonchi or rales.      Comments: Increased work of breathing with no significant respiratory distress.  Decreased breath sounds diffusely with wheezes in all lung fields.  Musculoskeletal:         General: Normal range of motion.      Cervical back: Normal range of motion.   Skin:     General: Skin is warm and dry.   Neurological:      General: No focal deficit present.      Mental Status: He is alert.   Psychiatric:         Mood and Affect: Mood normal.         Behavior: Behavior normal.            LAB RESULTS  Recent Results (from the past 24 hours)   Blood Gas, Arterial With Co-Ox    Collection Time: 11/24/24 11:20 PM    Specimen: Arterial Blood   Result Value Ref Range    Site Left Radial     Michael's Test Positive     pH, Arterial 7.356 7.300 - 7.500 pH units    pCO2, Arterial 48.6 (H) 35.0 - 45.0 mm  Hg    pO2, Arterial 78.7 75.0 - 100.0 mm Hg    HCO3, Arterial 27.2 22.0 - 28.0 mmol/L    Base Excess, Arterial 0.8 0.0 - 2.0 mmol/L    O2 Saturation, Arterial 96.1 94.0 - 100.0 %    Hematocrit, Blood Gas 47.2 %    Oxyhemoglobin 94.8 94 - 99 %    Methemoglobin 0.50 0.00 - 1.50 %    Carboxyhemoglobin 0.8 0 - 2 %    A-a DO2 114.4 mmHg    Barometric Pressure for Blood Gas 733 mmHg    Modality Nasal Cannula     FIO2 36 %    Flow Rate 4.0 lpm    Ventilator Mode NA     Collected by KJ     pH, Temp Corrected      pCO2, Temperature Corrected      pO2, Temperature Corrected     Comprehensive Metabolic Panel    Collection Time: 11/24/24 11:24 PM    Specimen: Blood   Result Value Ref Range    Glucose 119 (H) 65 - 99 mg/dL    BUN 7 6 - 20 mg/dL    Creatinine 0.83 0.76 - 1.27 mg/dL    Sodium 135 (L) 136 - 145 mmol/L    Potassium 3.7 3.5 - 5.2 mmol/L    Chloride 100 98 - 107 mmol/L    CO2 23.6 22.0 - 29.0 mmol/L    Calcium 9.0 8.6 - 10.5 mg/dL    Total Protein 7.0 6.0 - 8.5 g/dL    Albumin 4.1 3.5 - 5.2 g/dL    ALT (SGPT) 10 1 - 41 U/L    AST (SGOT) 17 1 - 40 U/L    Alkaline Phosphatase 177 (H) 39 - 117 U/L    Total Bilirubin 0.3 0.0 - 1.2 mg/dL    Globulin 2.9 gm/dL    A/G Ratio 1.4 g/dL    BUN/Creatinine Ratio 8.4 7.0 - 25.0    Anion Gap 11.4 5.0 - 15.0 mmol/L    eGFR 106.6 >60.0 mL/min/1.73   BNP    Collection Time: 11/24/24 11:24 PM    Specimen: Blood   Result Value Ref Range    proBNP 61.6 0.0 - 900.0 pg/mL   Single High Sensitivity Troponin T    Collection Time: 11/24/24 11:24 PM    Specimen: Blood   Result Value Ref Range    HS Troponin T <6 <22 ng/L   Green Top (Gel)    Collection Time: 11/24/24 11:24 PM   Result Value Ref Range    Extra Tube Hold for add-ons.    Lavender Top    Collection Time: 11/24/24 11:24 PM   Result Value Ref Range    Extra Tube hold for add-on    Gold Top - SST    Collection Time: 11/24/24 11:24 PM   Result Value Ref Range    Extra Tube Hold for add-ons.    Light Blue Top    Collection Time:  11/24/24 11:24 PM   Result Value Ref Range    Extra Tube Hold for add-ons.    CBC Auto Differential    Collection Time: 11/24/24 11:24 PM    Specimen: Blood   Result Value Ref Range    WBC 6.12 3.40 - 10.80 10*3/mm3    RBC 5.61 4.14 - 5.80 10*6/mm3    Hemoglobin 15.2 13.0 - 17.7 g/dL    Hematocrit 45.6 37.5 - 51.0 %    MCV 81.3 79.0 - 97.0 fL    MCH 27.1 26.6 - 33.0 pg    MCHC 33.3 31.5 - 35.7 g/dL    RDW 13.4 12.3 - 15.4 %    RDW-SD 39.8 37.0 - 54.0 fl    MPV 10.2 6.0 - 12.0 fL    Platelets 244 140 - 450 10*3/mm3    Neutrophil % 61.9 42.7 - 76.0 %    Lymphocyte % 29.9 19.6 - 45.3 %    Monocyte % 7.8 5.0 - 12.0 %    Eosinophil % 0.2 (L) 0.3 - 6.2 %    Basophil % 0.0 0.0 - 1.5 %    Immature Grans % 0.2 0.0 - 0.5 %    Neutrophils, Absolute 3.79 1.70 - 7.00 10*3/mm3    Lymphocytes, Absolute 1.83 0.70 - 3.10 10*3/mm3    Monocytes, Absolute 0.48 0.10 - 0.90 10*3/mm3    Eosinophils, Absolute 0.01 0.00 - 0.40 10*3/mm3    Basophils, Absolute 0.00 0.00 - 0.20 10*3/mm3    Immature Grans, Absolute 0.01 0.00 - 0.05 10*3/mm3    nRBC 0.0 0.0 - 0.2 /100 WBC       If labs were ordered, I independently reviewed the results and considered them in treating the patient.        RADIOLOGY  No Radiology Exams Resulted Within Past 24 Hours        PROCEDURES    Procedures    Interpretations    O2 Sat: The patients oxygen saturation was 86% on  2 L Nasal Cannula.  This was independently interpreted by me as Hypoxic    EKG: I reviewed and independently interpreted the EKG as sinus rhythm with a rate of 91.  No ST segment elevation    Cardiac Monitoring: I reviewed and independently interpreted the Rhythm Strip as Normal Sinus rhythm rate of 85    Radiology: I ordered and independently reviewed the above noted radiographic studies.  I viewed images of Chest Xray which showed No pulmonary process per my independent interpretation. See radiologist's dictation for official interpretation.     MEDICATIONS GIVEN IN ER    Medications   sodium  chloride 0.9 % flush 10 mL (has no administration in time range)   methylPREDNISolone sodium succinate (SOLU-Medrol) injection 125 mg (125 mg Intravenous Given 11/24/24 2324)   ipratropium-albuterol (DUO-NEB) nebulizer solution 3 mL (3 mL Nebulization Given 11/24/24 2325)   albuterol (PROVENTIL) nebulizer solution 0.083% 2.5 mg/3mL (10 mg Nebulization Given 11/25/24 0104)   magnesium sulfate 2g/50 mL (PREMIX) infusion (0 g Intravenous Stopped 11/25/24 0148)         MEDICAL DECISION MAKING, PROGRESS, and CONSULTS    All labs, if obtained, have been independently reviewed by me.  All radiology studies, if obtained, have been reviewed by me and the radiologist dictating the report.  All EKG's, if obtained, have been independently viewed and interpreted by me      Discussion below represents my analysis of pertinent findings related to patient's condition, differential diagnosis, treatment plan and final disposition.      Differential diagnosis:    COPD exacerbation, pneumonia, bronchitis    Additional Sources:  None      Orders placed during this visit:  Orders Placed This Encounter   Procedures    XR Chest 1 View    [place] Draw    Comprehensive Metabolic Panel    BNP    Single High Sensitivity Troponin T    CBC Auto Differential    Blood Gas, Arterial -With Co-Ox Panel: Yes    Blood Gas, Arterial With Co-Ox    NPO Diet NPO Type: Strict NPO    Continuous Pulse Oximetry    Vital Signs    Oxygen Therapy- Nasal Cannula; Titrate 1-6 LPM Per SpO2; 90 - 95%    ECG 12 Lead ED Triage Standing Order; SOA    Insert Peripheral IV    Initiate Observation Status    CBC & Differential    Green Top (Gel)    Lavender Top    Gold Top - SST    Light Blue Top         Additional orders considered but not ordered:  None    ED Course:    Consultants:  None    ED Course as of 11/25/24 0256   Sun Nov 24, 2024   2505 EKG: I reviewed and independently interpreted the EKG as:  Sinus rhythm rate of 91 bpm, normal axis, normal intervals, no ST  elevation, no T wave inversions [TM]   Mon Nov 25, 2024   0150 This gentleman has a history of horrible COPD. On 2 L of oxygen continuously at home. Has been short of breath with a COPD exacerbation since last night. Has been using nebulizers at home without any improvement. Presented here hypoxic on his normal 2 L was initially increased to 4 L to maintain above 90%. Was given a DuoNeb, magnesium, Solu-Medrol and a continuous 1 hour albuterol neb. After these therapies he continues to be short of breath with diminished breath sounds and diffuse wheezing. On 2 L he is 89 to 90% at rest but complaining of increasing shortness of breath and his oxygen was increased here again. His labs are reassuring chest x-ray does not show any acute process. Discussed admission versus discharge, patient states given his ongoing shortness of breath he does not feel comfortable going home and request admission for more aggressive treatment of his COPD. [TM]   0257 Discussed with Dr. Milligan agrees to evaluate patient for admission [CS]      ED Course User Index  [CS] Bang Camejo MD  [TM] Gomez Delgadillo PA-C           After my consideration of clinical presentation and any laboratory/radiology studies obtained, I discussed the findings with the patient/patient representative who is in agreement with the treatment plan and the final disposition. Risks and benefits of admission was discussed     AS OF 02:56 EST VITALS:    BP - 131/86  HR - 89  TEMP - 98.6 °F (37 °C) (Oral)  O2 SATS - 92%    I reviewed the patients prescription monitoring report if available prior to discharge    DIAGNOSIS  Final diagnoses:   COPD exacerbation         DISPOSITION  ED Disposition       ED Disposition   Decision to Admit    Condition   --    Comment   Level of Care: Telemetry [5]   Diagnosis: COPD exacerbation [061449]   Admitting Physician: LOLLY MILLIGAN [[id number]]   Attending Physician: LOLLY MILLIGAN [[id number]]                     Please  note that portions of this document were completed with voice recognition software.        Bang Camejo MD  11/25/24 0253

## 2024-11-25 NOTE — PLAN OF CARE
Goal Outcome Evaluation:              Outcome Evaluation: New admit.  VSS on 4L O2 via nasal cannula (2L - continuous baseline).  Alert and oriented x4.  SR noted on telemetry.  No complaints.  Continue with plan of care.

## 2024-11-25 NOTE — CASE MANAGEMENT/SOCIAL WORK
Discharge Planning Assessment   Rob     Patient Name: Topher Stoll  MRN: [id number]  Today's Date: 11/25/2024    Admit Date: 11/24/2024    Plan: Home with family   Discharge Needs Assessment       Row Name 11/25/24 1034       Living Environment    People in Home parent(s)    Current Living Arrangements home    Potentially Unsafe Housing Conditions none    In the past 12 months has the electric, gas, oil, or water company threatened to shut off services in your home? No    Primary Care Provided by self    Provides Primary Care For no one    Able to Return to Prior Arrangements yes       Resource/Environmental Concerns    Resource/Environmental Concerns none    Transportation Concerns none       Transportation Needs    In the past 12 months, has lack of transportation kept you from medical appointments or from getting medications? no    In the past 12 months, has lack of transportation kept you from meetings, work, or from getting things needed for daily living? No       Food Insecurity    Within the past 12 months, you worried that your food would run out before you got the money to buy more. Never true    Within the past 12 months, the food you bought just didn't last and you didn't have money to get more. Never true       Transition Planning    Patient/Family Anticipates Transition to home with family    Patient/Family Anticipated Services at Transition none    Transportation Anticipated family or friend will provide       Discharge Needs Assessment    Readmission Within the Last 30 Days no previous admission in last 30 days    Equipment Currently Used at Home oxygen;walker, standard    Concerns to be Addressed no discharge needs identified    Anticipated Changes Related to Illness none    Equipment Needed After Discharge none                   Discharge Plan       Row Name 11/25/24 1036       Plan    Plan Home with family    Patient/Family in Agreement with Plan yes    Plan Comments Met with patient at  bedside.Verified patient's address, phone number, contacts, physician and pharmacy. Patient has adequate transportation. Reports no financial or food insecurity. Patient lives with his parents. He wears oxygen at 2L provided by [employer], has a nebulizer and walker. He uses [facility] pharmacy. States he plans to return home once medically ready, has transport oxygen available and says he'll have a ride.    Final Discharge Disposition Code 01 - home or self-care                  Continued Care and Services - Admitted Since 11/24/2024    No active coordination exists for this encounter.       Selected Continued Care - Episodes Includes continued care and service providers with selected services from the active episodes listed below      COPD Episode start date: 7/2/2024   There are no active outsourced providers for this episode.             Asthma Episode start date: 6/29/2023   There are no active outsourced providers for this episode.                    Demographic Summary       Row Name 11/25/24 1034       General Information    Admission Type observation    Arrived From emergency department    Required Notices Provided Observation Status Notice    Referral Source admission list    Reason for Consult discharge planning    Preferred Language English       Contact Information    Permission Granted to Share Info With                    Functional Status       Row Name 11/25/24 1034       Functional Status    Usual Activity Tolerance moderate    Current Activity Tolerance moderate       Physical Activity    On average, how many days per week do you engage in moderate to strenuous exercise (like a brisk walk)? 0 days    On average, how many minutes do you engage in exercise at this level? 0 min    Number of minutes of exercise per week 0       Assessment of Health Literacy    How often do you have someone help you read hospital materials? Never    How often do you have problems learning about your medical  condition because of difficulty understanding written information? Never    How often do you have a problem understanding what is told to you about your medical condition? Never    How confident are you filling out medical forms by yourself? Extremely    Health Literacy Excellent       Functional Status, IADL    Medications assistive equipment    Meal Preparation assistive equipment    Housekeeping assistive equipment    Laundry assistive equipment    Shopping assistive equipment                   Psychosocial       Row Name 11/25/24 1039       Values/Beliefs    Spiritual, Cultural Beliefs, Yarsani Practices, Values that Affect Care no       Mental Health    Little interest or pleasure in doing things Not at all    Feeling down, depressed, or hopeless Not at all       Stress    Do you feel stress - tense, restless, nervous, or anxious, or unable to sleep at night because your mind is troubled all the time - these days? Not at all       Coping/Stress    Major Change/Loss/Stressor illness    Patient Personal Strengths able to adapt;resilient    Techniques to [place] with Loss/Stress/Change diversional activities    Reaction to Health Status accepting    Understanding of Condition and Treatment adequate understanding of medical condition;adequate understanding of treatment       Developmental Stage (Eriksson's Stages of Development)    Developmental Stage Stage 7 (35-65 years/Middle Adulthood) Generativity vs. Stagnation                   Abuse/Neglect    No documentation.                  Legal    No documentation.                  Substance Abuse    No documentation.                  Patient Forms    No documentation.                     Lionel Metzger RN

## 2024-11-25 NOTE — PHARMACY RECOMMENDATION
"Pharmacy Consult - Enoxaparin Dosing  Topher Stoll is a 49 y.o. male who has been consulted to dose enoxaparin for VTE prophylaxis.     Allergies  Doxycycline    Relevant clinical data and objective history reviewed:   [Ht: 185.4 cm (73\"); Wt: 79.4 kg (175 lb 0.7 oz)]  Body mass index is 23.09 kg/m².  Estimated Creatinine Clearance: 119.6 mL/min (by C-G formula based on SCr of 0.83 mg/dL).  Results from last 7 days   Lab Units 11/24/24  2324   HEMOGLOBIN g/dL 15.2   HEMATOCRIT % 45.6   PLATELETS 10*3/mm3 244   CREATININE mg/dL 0.83       Asessment/Plan  Initiate enoxaparin 40 mg subq q 24 hrs.  Pharmacy will monitor Mr. Stoll's renal function and clinical status and adjust the enoxaparin dose and/or frequency as needed.      Thank you for the opportunity to consult on this patient.    Parth Mckoy RP, Pharm.D.  11/25/24  06:09 EST    "

## 2024-11-25 NOTE — THERAPY DISCHARGE NOTE
PT order was received.  Patient was sitting up in bed when PT arrived.  We discussed the role of PT and agreed that he does not require the skills of PT at this time.  He verbalized good understanding of the importance of mobility and stated he will remain as mobile as possible in his room.  He is able to perform all mobility independently and does not require the skills of PT at this time.  PT is available if there is a change in his mobility status.  PT will sign off at this time.

## 2024-11-25 NOTE — PLAN OF CARE
Goal Outcome Evaluation:   Vss. Pt now on 8l hf to remain above 90.

## 2024-11-26 LAB
ANION GAP SERPL CALCULATED.3IONS-SCNC: 10.5 MMOL/L (ref 5–15)
BUN SERPL-MCNC: 13 MG/DL (ref 6–20)
BUN/CREAT SERPL: 15.1 (ref 7–25)
CALCIUM SPEC-SCNC: 9.7 MG/DL (ref 8.6–10.5)
CHLORIDE SERPL-SCNC: 98 MMOL/L (ref 98–107)
CO2 SERPL-SCNC: 25.5 MMOL/L (ref 22–29)
CREAT SERPL-MCNC: 0.86 MG/DL (ref 0.76–1.27)
DEPRECATED RDW RBC AUTO: 41.8 FL (ref 37–54)
EGFRCR SERPLBLD CKD-EPI 2021: 105.5 ML/MIN/1.73
ERYTHROCYTE [DISTWIDTH] IN BLOOD BY AUTOMATED COUNT: 13.7 % (ref 12.3–15.4)
GLUCOSE SERPL-MCNC: 143 MG/DL (ref 65–99)
HCT VFR BLD AUTO: 45.9 % (ref 37.5–51)
HGB BLD-MCNC: 14.6 G/DL (ref 13–17.7)
MCH RBC QN AUTO: 26.8 PG (ref 26.6–33)
MCHC RBC AUTO-ENTMCNC: 31.8 G/DL (ref 31.5–35.7)
MCV RBC AUTO: 84.2 FL (ref 79–97)
PLATELET # BLD AUTO: 219 10*3/MM3 (ref 140–450)
PMV BLD AUTO: 10.5 FL (ref 6–12)
POTASSIUM SERPL-SCNC: 4.9 MMOL/L (ref 3.5–5.2)
RBC # BLD AUTO: 5.45 10*6/MM3 (ref 4.14–5.8)
SODIUM SERPL-SCNC: 134 MMOL/L (ref 136–145)
WBC NRBC COR # BLD AUTO: 10.53 10*3/MM3 (ref 3.4–10.8)

## 2024-11-26 PROCEDURE — 99232 SBSQ HOSP IP/OBS MODERATE 35: CPT | Performed by: INTERNAL MEDICINE

## 2024-11-26 PROCEDURE — 94799 UNLISTED PULMONARY SVC/PX: CPT

## 2024-11-26 PROCEDURE — 80048 BASIC METABOLIC PNL TOTAL CA: CPT | Performed by: INTERNAL MEDICINE

## 2024-11-26 PROCEDURE — 85027 COMPLETE CBC AUTOMATED: CPT | Performed by: INTERNAL MEDICINE

## 2024-11-26 PROCEDURE — 25010000002 METHYLPREDNISOLONE PER 125 MG: Performed by: INTERNAL MEDICINE

## 2024-11-26 PROCEDURE — 94761 N-INVAS EAR/PLS OXIMETRY MLT: CPT

## 2024-11-26 PROCEDURE — 94664 DEMO&/EVAL PT USE INHALER: CPT

## 2024-11-26 RX ADMIN — LORAZEPAM 1 MG: 0.5 TABLET ORAL at 20:47

## 2024-11-26 RX ADMIN — LORAZEPAM 1 MG: 0.5 TABLET ORAL at 06:44

## 2024-11-26 RX ADMIN — GUAIFENESIN 600 MG: 600 TABLET, MULTILAYER, EXTENDED RELEASE ORAL at 08:48

## 2024-11-26 RX ADMIN — Medication 10 ML: at 00:04

## 2024-11-26 RX ADMIN — PANTOPRAZOLE SODIUM 40 MG: 40 TABLET, DELAYED RELEASE ORAL at 06:33

## 2024-11-26 RX ADMIN — METHYLPREDNISOLONE SODIUM SUCCINATE 60 MG: 125 INJECTION, POWDER, FOR SOLUTION INTRAMUSCULAR; INTRAVENOUS at 23:52

## 2024-11-26 RX ADMIN — TRAZODONE HYDROCHLORIDE 300 MG: 50 TABLET ORAL at 20:30

## 2024-11-26 RX ADMIN — CALCIUM CARBONATE (ANTACID) CHEW TAB 500 MG 2 TABLET: 500 CHEW TAB at 09:21

## 2024-11-26 RX ADMIN — SENNOSIDES AND DOCUSATE SODIUM 2 TABLET: 50; 8.6 TABLET ORAL at 20:29

## 2024-11-26 RX ADMIN — METHADONE HYDROCHLORIDE 65 MG: 10 TABLET ORAL at 08:48

## 2024-11-26 RX ADMIN — GUAIFENESIN 600 MG: 600 TABLET, MULTILAYER, EXTENDED RELEASE ORAL at 20:30

## 2024-11-26 RX ADMIN — Medication 10 ML: at 09:01

## 2024-11-26 RX ADMIN — IPRATROPIUM BROMIDE AND ALBUTEROL SULFATE 3 ML: 2.5; .5 SOLUTION RESPIRATORY (INHALATION) at 06:52

## 2024-11-26 RX ADMIN — TIZANIDINE 4 MG: 4 TABLET ORAL at 15:15

## 2024-11-26 RX ADMIN — IPRATROPIUM BROMIDE AND ALBUTEROL SULFATE 3 ML: 2.5; .5 SOLUTION RESPIRATORY (INHALATION) at 18:35

## 2024-11-26 RX ADMIN — METHYLPREDNISOLONE SODIUM SUCCINATE 60 MG: 125 INJECTION, POWDER, FOR SOLUTION INTRAMUSCULAR; INTRAVENOUS at 00:04

## 2024-11-26 RX ADMIN — Medication 10 ML: at 06:33

## 2024-11-26 RX ADMIN — MONTELUKAST 10 MG: 10 TABLET, FILM COATED ORAL at 20:30

## 2024-11-26 RX ADMIN — BUDESONIDE 0.5 MG: 0.5 SUSPENSION RESPIRATORY (INHALATION) at 06:52

## 2024-11-26 RX ADMIN — IPRATROPIUM BROMIDE AND ALBUTEROL SULFATE 3 ML: 2.5; .5 SOLUTION RESPIRATORY (INHALATION) at 12:25

## 2024-11-26 RX ADMIN — Medication 10 ML: at 20:31

## 2024-11-26 RX ADMIN — METHYLPREDNISOLONE SODIUM SUCCINATE 60 MG: 125 INJECTION, POWDER, FOR SOLUTION INTRAMUSCULAR; INTRAVENOUS at 15:15

## 2024-11-26 RX ADMIN — BUDESONIDE 0.5 MG: 0.5 SUSPENSION RESPIRATORY (INHALATION) at 18:35

## 2024-11-26 RX ADMIN — BUTALBITAL, ACETAMINOPHEN, AND CAFFEINE 1 TABLET: 50; 325; 40 TABLET ORAL at 08:48

## 2024-11-26 RX ADMIN — IPRATROPIUM BROMIDE AND ALBUTEROL SULFATE 3 ML: 2.5; .5 SOLUTION RESPIRATORY (INHALATION) at 00:24

## 2024-11-26 RX ADMIN — METHYLPREDNISOLONE SODIUM SUCCINATE 60 MG: 125 INJECTION, POWDER, FOR SOLUTION INTRAMUSCULAR; INTRAVENOUS at 06:33

## 2024-11-26 NOTE — PROGRESS NOTES
[facility]    Name:  Topher Stoll   Age:  49 y.o.  Sex:  male  :  1975  MRN:  [id number]   Visit Number:  [id number]  Admission Date:  2024  Date Of Service:  24  Primary Care Physician:  Joshua Hoffmann MD     LOS: 0 days :    Chief Complaint:      Shortness of breath.    Subjective:    Patient examined this morning.  Oxygen requirement titrated down overnight to 6 L.  Currently on 2 L during my exam.  Oxygen saturations stable.  Overall says he is feeling better.  Still with some wheezing on exam.    Hospital Course:    Topher Stoll is a 49-year-old male with a history of COPD on home oxygen at 2 L, hypertension, coronary artery disease, history of hep C with treatment in , hepatitis B, anxiety, cervical radiculopathy on chronic methadone, obstructive sleep apnea, migraines was brought to the emergency room by family with symptoms of cough and shortness of breath since 2 days.  Patient apparently was out helping a friend and apparently was exposed to cold hair and started having shortness of breath associated with cough and some nausea.  Patient started having significant wheezing and tripoding despite taking his nebulizers at home.  He quit smoking several years ago.  He has history of obstructive sleep apnea but does not like CPAP and has not been using it.  Denies any fevers, chest pain.  He is currently not taking any medicines for blood pressure.     In the emergency room, he was afebrile and hemodynamically stable saturating at 86% on room air.  He was placed on 4 L of nasal cannula oxygen with improvement in saturations to mid 90s.  Initial ABG showed a pH of 7.35, pCO2 49, pO2 79, bicarb of 27 on 4 L of oxygen.  Initial troponin and proBNP were within normal range.  CMP was unremarkable except for a sodium of 135 and alkaline phosphatase of 177.  CBC was within normal range.  Portable chest x-ray showed emphysematous lung fields  without any acute infiltrates.  Patient was given IV Solu-Medrol, bronchodilators, IV magnesium in the emergency room and was subsequently admitted to the medical floor with telemetry for management of acute COPD exacerbation.    Review of Systems:     All systems were reviewed and negative except as mentioned in subjective, assessment and plan.    Vital Signs:    Temp:  [98 °F (36.7 °C)-98.5 °F (36.9 °C)] 98 °F (36.7 °C)  Heart Rate:  [] 91  Resp:  [16-22] 16  BP: (121-149)/(79-97) 131/88    Intake and output:    I/O last 3 completed shifts:  In: 965 [P.O.:965]  Out: 1725 [Urine:1725]  I/O this shift:  In: 360 [P.O.:360]  Out: -     Physical Examination:    General Appearance:  Alert and cooperative.    Head:  Atraumatic and normocephalic.   Eyes: Conjunctivae and sclerae normal, no icterus. No pallor.   Throat: No oral lesions, no thrush, oral mucosa moist.   Neck: Supple, trachea midline, no thyromegaly.   Lungs:   Breath sounds heard bilaterally equally.  Nonlabored breathing. Faint wheezing bilaterally.    Heart:  Normal S1 and S2, no murmur, no gallop, no rub. No JVD.   Abdomen:   Normal bowel sounds, no masses, no organomegaly. Soft, nontender, nondistended, no rebound tenderness.   Extremities: Supple, no edema, no cyanosis, no clubbing.   Skin: No bleeding or rash.   Neurologic: Alert and oriented x 3. No facial asymmetry. Moves all four limbs. No tremors.      Laboratory results:    Results from last 7 days   Lab Units 11/26/24  0559 11/24/24  2324   SODIUM mmol/L 134* 135*   POTASSIUM mmol/L 4.9 3.7   CHLORIDE mmol/L 98 100   CO2 mmol/L 25.5 23.6   BUN mg/dL 13 7   CREATININE mg/dL 0.86 0.83   CALCIUM mg/dL 9.7 9.0   BILIRUBIN mg/dL  --  0.3   ALK PHOS U/L  --  177*   ALT (SGPT) U/L  --  10   AST (SGOT) U/L  --  17   GLUCOSE mg/dL 143* 119*     Results from last 7 days   Lab Units 11/26/24  0559 11/24/24  2324   WBC 10*3/mm3 10.53 6.12   HEMOGLOBIN g/dL 14.6 15.2   HEMATOCRIT % 45.9 45.6   PLATELETS  10*3/mm3 219 244         Results from last 7 days   Lab Units 11/24/24  2324   HSTROP T ng/L <6         Recent Labs     06/16/24  2104 10/27/24  1739 11/24/24  2320   PHART 7.341 7.338 7.356   JHL6YGL 62.5* 52.4* 48.6*   PO2ART 64.5* 70.5* 78.7   TNE8CIX 33.8* 28.1* 27.2   BASEEXCESS 5.7* 1.1 0.8      I have reviewed the patient's laboratory results.    Radiology results:    XR Chest 1 View    Result Date: 11/25/2024  PROCEDURE: XR CHEST 1 VW-    HISTORY: [facility] Triage Protocol  COMPARISON: October 27, 2024.  FINDINGS: The heart is normal in size. The mediastinum is unremarkable. There is emphysematous change. There is no acute infiltrate. There is no pneumothorax. There are no acute osseous abnormalities.      Impression: No acute cardiopulmonary process.        Images were reviewed, interpreted, and dictated by Dr. Elisabet Nguyễn MD Transcribed by Citlalli Hawk PA-C.  This report was signed and finalized on 11/25/2024 9:20 AM by Elisabet Nguyễn MD.     I have reviewed the patient's radiology reports.    Medication Review:     I have reviewed the patient's active and prn medications.     Problem List:      Acute on chronic respiratory failure with hypoxia    COPD exacerbation      Assessment:    Acute on chronic hypoxic respiratory failure, POA.  Acute COPD exacerbation, POA.  Generalized anxiety disorder.  Chronic pain on chronic methadone.  History of hepatitis C status posttreatment.  Gastroesophageal reflux disease.  Chronic migraine.       Plan:    Respiratory failure/COPD exacerbation.  - Continue nasal cannula oxygen to maintain saturations above 90%.  - Bronchodilator nebulization, budesonide, IV Solu-Medrol, mucolytic agents.  - Hold off on antibiotics therapy at this time.  - Respiratory panel negative  - Patient would like to avoid use of BiPAP if possible.     Generalized anxiety disorder.  - Currently is not taking any medications at home but states that he gets Ativan while admitted, which helps him  breathe better.     Chronic pain.  - Continue methadone.    DVT Prophylaxis: Enoxaparin  Code Status: Full  Diet: Cardiac  Discharge Plan: Likely home tomorrow    Antwon Espinoza DO  11/26/24  11:42 EST    Dictated utilizing Dragon dictation.

## 2024-11-26 NOTE — SIGNIFICANT NOTE
Dr. Milligan notified patient is complaining of heartburn symptoms and is requesting Tums.  Dr. Milligan to order PRN Tums; see orders.

## 2024-11-26 NOTE — PAYOR COMM NOTE
"TO:MK  FROM:ANNA PINA RN PHONE [phone]  CLINICALS REF# [id number]    Basim Stoll (49 y.o. Male)       Date of Birth   1975    Social Security Number       Address   [address] [place] [id number]    Home Phone   [phone]    MRN   [id number]       Taoist   Mandaeism    Marital Status                               Admission Date   11/24/24    Admission Type   Emergency    Admitting Provider   Naga Milligan MD    Attending Provider   Antwon Espinoza DO    Department, Room/Bed   [facility] TELEMETRY 4, 426/1       Discharge Date       Discharge Disposition       Discharge Destination                                 Attending Provider: Antwon Espinoza DO    Allergies: Doxycycline    Isolation: None   Infection: Hepatitis C (04/04/17), Hepatitis B (04/04/17)   Code Status: CPR    Ht: 185.4 cm (73\")   Wt: 79.4 kg (175 lb 0.7 oz)    Admission Cmt: None   Principal Problem: None                  Active Insurance as of 11/24/2024       Primary Coverage       Payor Plan Insurance Group Employer/Plan Group    MEDICARE MEDICARE A & B        Payor Plan Address Payor Plan Phone Number Payor Plan Fax Number Effective Dates    [address] [phone]  3/1/2011 - None Entered    [facility] [id number]         Subscriber Name Subscriber Birth Date Member ID       BASIM STOLL 1975 [id number]               Secondary Coverage       Payor Plan Insurance Group Employer/Plan Group    [place]        Payor Plan Address Payor Plan Phone Number Payor Plan Fax Number Effective Dates    [address] [phone]  2/1/2014 - None Entered    [place] [address]         Subscriber Name Subscriber Birth Date Member ID       BASIM STOLL 1975 [id number]                     Emergency Contacts        (Rel.) Home Phone Work Phone Mobile Phone    Josee Stoll (Mother) [phone] -- [phone]    Ernesto Stoll " (Brother) [phone] -- [phone]                 History & Physical        Naga Milligan MD at 24 0559            [facility]   HISTORY AND PHYSICAL      Name:  Topher Stoll   Age:  49 y.o.  Sex:  male  :  1975  MRN:  [id number]   Visit Number:  [id number]  Admission Date:  2024  Date Of Service:  24  Primary Care Physician:  Joshua Hoffmann MD    Chief Complaint:     Shortness of breath.    History Of Presenting Illness:      Topher Stoll is a 49-year-old male with a history of COPD on home oxygen at 2 L, hypertension, coronary artery disease, history of hep C with treatment in 2019, hepatitis B, anxiety, cervical radiculopathy on chronic methadone, obstructive sleep apnea, migraines was brought to the emergency room by family with symptoms of cough and shortness of breath since 2 days.  Patient apparently was out helping a friend and apparently was exposed to cold hair and started having shortness of breath associated with cough and some nausea.  Patient started having significant wheezing and tripoding despite taking his nebulizers at home.  He quit smoking several years ago.  He has history of obstructive sleep apnea but does not like CPAP and has not been using it.  Denies any fevers, chest pain.  He is currently not taking any medicines for blood pressure.    In the emergency room, he was afebrile and hemodynamically stable saturating at 86% on room air.  He was placed on 4 L of nasal cannula oxygen with improvement in saturations to mid 90s.  Initial ABG showed a pH of 7.35, pCO2 49, pO2 79, bicarb of 27 on 4 L of oxygen.  Initial troponin and proBNP were within normal range.  CMP was unremarkable except for a sodium of 135 and alkaline phosphatase of 177.  CBC was within normal range.  Portable chest x-ray showed emphysematous lung fields without any acute infiltrates.  Patient was given IV Solu-Medrol, bronchodilators, IV magnesium in the  emergency room and was subsequently admitted to the medical floor with telemetry for management of acute COPD exacerbation.    Review Of Systems:    All systems were reviewed and negative except as mentioned in history of presenting illness, assessment and plan.    Past Medical History: Patient's  has a past medical history of Abdominal adhesions, Anxiety, Arthritis, Asthma, Cervical radiculopathy, Colitis, COPD (chronic obstructive pulmonary disease), GERD (gastroesophageal reflux disease), Heart attack, History of hepatitis C, History of recreational drug use, HTN (hypertension), Impaired functional mobility, balance, gait, and endurance, Kidney cysts, Left hip pain, Liver disease, Low back pain, Migraines, Obstructive chronic bronchitis with exacerbation, Sleep apnea, and Tattoos.    Past Surgical History: Patient's  has a past surgical history that includes Back surgery; Hernia repair; Small intestine surgery; Total hip arthroplasty (Left); Shoulder Ligament Repair; Hip Spacer Insertion (Left, 1/15/2020); Revision total hip arthroplasty (Left, 3/5/2020); Colonoscopy (2014); Upper gastrointestinal endoscopy (2014); Colonoscopy (N/A, 1/4/2022); and Total hip arthroplasty (Right).    Social History: Patient's  reports that he quit smoking about 19 years ago. His smoking use included cigarettes. He started smoking about 34 years ago. He has a 30 pack-year smoking history. He has been exposed to tobacco smoke. His smokeless tobacco use includes chew. He reports that he does not currently use drugs. He reports that he does not drink alcohol.    Family History:  Patient's family history includes Arthritis in an other family member; Heart attack in an other family member; Hypertension in an other family member; Migraines in an other family member; Stroke in an other family member.     Allergies:      Doxycycline    Home Medications:    Prior to Admission Medications       Prescriptions Last Dose Informant Patient  Reported? Taking?    albuterol sulfate HFA (Ventolin HFA) 108 (90 Base) MCG/ACT inhaler   No No    Inhale 2 puffs by mouth Every 4 (Four) Hours As Needed for Wheezing.    baclofen (LIORESAL) 10 MG tablet   No No    Take 1 tablet by mouth 3 (Three) Times a Day.    Patient not taking:  Reported on 9/18/2024    Benralizumab 30 MG/ML solution auto-injector   No No    Inject 1 mL under the skin into the appropriate area as directed Every 8 (Eight) Weeks.    Benralizumab solution auto-injector 30 mg   No No    Budeson-Glycopyrrol-Formoterol (BREZTRI) 160-9-4.8 MCG/ACT aerosol inhaler   No No    Inhale 2 puffs by mouth 2 (Two) Times a Day.    butalbital-acetaminophen-caffeine (FIORICET, ESGIC) -40 MG per tablet   No No    Take 1 tablet by mouth Every 6 (Six) Hours As Needed for Headache.    dicyclomine (BENTYL) 20 MG tablet   No No    Take 1 tablet by mouth Every 6 (Six) Hours As Needed (abdominal cramps).    fluticasone (FLONASE) 50 MCG/ACT nasal spray   No No    Instill 1 spray into the nostril(s) as directed by provider Daily.    IPRATROPIUM BROMIDE IN   Yes No    Inhale. 18 mcg    ipratropium-albuterol (DUO-NEB) 0.5-2.5 mg/3 ml nebulizer   No No    Inhale contents of 1 vial through a nebulizer Every 4 (Four) Hours As Needed For Wheezing or Shortness of Breath    lovastatin (MEVACOR) 40 MG tablet   No No    TAKE 1 TABLET BY MOUTH EVERY DAY FOR CHOLESTEROL    Patient not taking:  Reported on 9/18/2024    methadone (DOLOPHINE) 5 MG/5ML solution  Self Yes No    Take 70 mL by mouth Daily.    metoclopramide (REGLAN) 5 MG tablet   No No    Take 1 tablet by mouth 3 (Three) Times a Day As Needed (Nasuea).    montelukast (Singulair) 10 MG tablet   No No    Take 1 tablet by mouth Every Night.    omeprazole (priLOSEC) 40 MG capsule   No No    Take 1 capsule by mouth Daily.    TiZANidine (ZANAFLEX) 4 MG capsule   No No    Take 1 tablet by mouth 3 times a day as needed muscle spasm    traZODone (DESYREL) 300 MG tablet   No No  "   Take 1 tablet by mouth Every Night.     ED Medications:    Medications   sodium chloride 0.9 % flush 10 mL (has no administration in time range)   methylPREDNISolone sodium succinate (SOLU-Medrol) injection 125 mg (125 mg Intravenous Given 11/24/24 2324)   ipratropium-albuterol (DUO-NEB) nebulizer solution 3 mL (3 mL Nebulization Given 11/24/24 2325)   albuterol (PROVENTIL) nebulizer solution 0.083% 2.5 mg/3mL (10 mg Nebulization Given 11/25/24 0104)   magnesium sulfate 2g/50 mL (PREMIX) infusion (0 g Intravenous Stopped 11/25/24 0148)     Vital Signs:  Temp:  [98.4 °F (36.9 °C)-98.6 °F (37 °C)] 98.4 °F (36.9 °C)  Heart Rate:  [79-98] 88  Resp:  [20-24] 20  BP: (126-140)/() 140/95        11/24/24 2250 11/25/24  0326   Weight: 90.7 kg (200 lb) 79.4 kg (175 lb 0.7 oz)     Body mass index is 23.09 kg/m².    Physical Exam:     Most recent vital Signs: /95 (BP Location: Left arm, Patient Position: Lying)   Pulse 88   Temp 98.4 °F (36.9 °C) (Oral)   Resp 20   Ht 185.4 cm (73\")   Wt 79.4 kg (175 lb 0.7 oz)   SpO2 90%   BMI 23.09 kg/m²     Physical Exam  Constitutional:       General: He is in acute distress.      Appearance: He is not ill-appearing.   HENT:      Head: Normocephalic and atraumatic.      Right Ear: External ear normal.      Left Ear: External ear normal.      Nose: Nose normal.      Mouth/Throat:      Mouth: Mucous membranes are moist.   Eyes:      Extraocular Movements: Extraocular movements intact.      Conjunctiva/sclera: Conjunctivae normal.   Cardiovascular:      Rate and Rhythm: Regular rhythm. Tachycardia present.      Pulses: Normal pulses.      Heart sounds: Normal heart sounds. No murmur heard.  Pulmonary:      Effort: Respiratory distress present.      Breath sounds: Wheezing present. No rales.      Comments: Bilateral extensive wheezing heard.  Accessory muscle use noted.  Abdominal:      General: Bowel sounds are normal.      Palpations: Abdomen is soft.      Tenderness: " There is no abdominal tenderness. There is no guarding or rebound.   Musculoskeletal:         General: Normal range of motion.      Cervical back: Neck supple.      Right lower leg: No edema.      Left lower leg: No edema.   Skin:     General: Skin is warm.      Findings: No erythema or rash.      Comments: Tattoos noted.   Neurological:      General: No focal deficit present.      Mental Status: He is alert and oriented to person, place, and time. Mental status is at baseline.   Psychiatric:         Mood and Affect: Mood normal.         Behavior: Behavior normal.      Comments: Anxious.       Laboratory data:    I have reviewed the labs done in the emergency room.    Results from last 7 days   Lab Units 11/24/24  2324   SODIUM mmol/L 135*   POTASSIUM mmol/L 3.7   CHLORIDE mmol/L 100   CO2 mmol/L 23.6   BUN mg/dL 7   CREATININE mg/dL 0.83   CALCIUM mg/dL 9.0   BILIRUBIN mg/dL 0.3   ALK PHOS U/L 177*   ALT (SGPT) U/L 10   AST (SGOT) U/L 17   GLUCOSE mg/dL 119*     Results from last 7 days   Lab Units 11/24/24  2324   WBC 10*3/mm3 6.12   HEMOGLOBIN g/dL 15.2   HEMATOCRIT % 45.6   PLATELETS 10*3/mm3 244       Results from last 7 days   Lab Units 11/24/24  2324   HSTROP T ng/L <6     Results from last 7 days   Lab Units 11/24/24  2324   PROBNP pg/mL 61.6       Results from last 7 days   Lab Units 11/24/24  2320   PH, ARTERIAL pH units 7.356   PO2 ART mm Hg 78.7   PCO2, ARTERIAL mm Hg 48.6*   HCO3 ART mmol/L 27.2     Radiology:    Portable chest x-ray done in the emergency room was reviewed by me.  It shows emphysematous lung fields with flattened diaphragms.  Increased bronchovascular markings noted in the lower zone but no definite infiltrate noted.  An official report is currently pending.    Assessment:    Acute on chronic hypoxic respiratory failure, POA.  Acute COPD exacerbation, POA.  Generalized anxiety disorder.  Chronic pain on chronic methadone.  History of hepatitis C status posttreatment.  Gastroesophageal  reflux disease.  Chronic migraine.    Plan:    Respiratory failure/COPD exacerbation.  - Continue nasal cannula oxygen to maintain saturations above 90%.  - Bronchodilator nebulization, budesonide, IV Solu-Medrol, mucolytic agents.  - Hold off on antibiotics therapy at this time.  - Obtain procalcitonin levels and respiratory panel.  - Patient would like to avoid use of BiPAP if possible.    Generalized anxiety disorder.  - Currently is not taking any medications at home but states that he gets Ativan while admitted, which helps him breathe better.    Chronic pain.  - Continue methadone.    Discussed with nursing staff at bedside.    Risk Assessment: Moderate  DVT Prophylaxis: Enoxaparin  Code Status: Full  Diet: Cardiac      Naga Milligan MD  11/25/24  05:59 EST    Dictated utilizing Dragon dictation.    Electronically signed by Naga Milligan MD at 11/25/24 3662          Emergency Department Notes        Bang Camejo MD at 11/24/24 2914          I performed a substantive part of the MDM during the patient's E/M visit. I personally made or approved the documented management plan and acknowledge its risk of complications. My personal findings/interpretations are below:    HPI/MDM:  Briefly, Topher Stoll is a 49 y.o. male presenting to the ED c/o: Shortness of breath, cough, patient with a history of COPD on home oxygen, after extensive breathing treatments, mag here patient did not improve and was admitted to the hospital for further management    Interpretations:    O2 Sat: The patients oxygen saturation was 92% on  4 L Nasal Cannula.  This was independently interpreted by me as Borderline    Cardiac Monitoring: I reviewed and independently interpreted the Rhythm Strip as Normal Sinus rhythm rate of 89    Radiology: I ordered and independently reviewed the above noted radiographic studies.  I viewed images of Chest Xray which showed No pulmonary process per my independent interpretation. See  radiologist's dictation for official interpretation.     ED Course as of 24 0256   Sun 2024   2352 EKG: I reviewed and independently interpreted the EKG as:  Sinus rhythm rate of 91 bpm, normal axis, normal intervals, no ST elevation, no T wave inversions [TM]   Mon 2024   0150 This gentleman has a history of horrible COPD. On 2 L of oxygen continuously at home. Has been short of breath with a COPD exacerbation since last night. Has been using nebulizers at home without any improvement. Presented here hypoxic on his normal 2 L was initially increased to 4 L to maintain above 90%. Was given a DuoNeb, magnesium, Solu-Medrol and a continuous 1 hour albuterol neb. After these therapies he continues to be short of breath with diminished breath sounds and diffuse wheezing. On 2 L he is 89 to 90% at rest but complaining of increasing shortness of breath and his oxygen was increased here again. His labs are reassuring chest x-ray does not show any acute process. Discussed admission versus discharge, patient states given his ongoing shortness of breath he does not feel comfortable going home and request admission for more aggressive treatment of his COPD. [TM]   0255 Discussed with Dr. Milligan agrees to evaluate patient for admission [CS]      ED Course User Index  [CS] Bang Camejo MD  [TM] Gomez Delgadillo PA-C              EMERGENCY DEPARTMENT ENCOUNTER    Pt Name: Topher Stoll  MRN: [id number]  Pt :   1975  Room Number:  10/10  Date of encounter:  2024  PCP: Joshua Hoffmann MD  ED Provider: Bang Camejo MD    Historian: Patient      HPI:  Chief Complaint   Patient presents with    Shortness of Breath          Context: Topher Stoll is a 49 y.o. male who presents to the ED c/o shortness of breath and cough with nausea.  Patient has a history of COPD wears 2 L of oxygen continuously at home.  Well-known to this emergency department.  Denies chest  pain.  States yesterday was out helping a friend work on his car and believes the cold air caused a COPD exacerbation.  Used a DuoNeb prior to arrival with minimal improvement.  States he is desaturating at home with ambulation on his normal 2 L.  No recent antibiotic or steroid use.      PAST MEDICAL HISTORY  Past Medical History:   Diagnosis Date    Abdominal adhesions     Anxiety     Arthritis     Asthma     Cervical radiculopathy     Colitis     COPD (chronic obstructive pulmonary disease)     GERD (gastroesophageal reflux disease)     Heart attack     History of hepatitis C     Treated with Epclusa in 2019    History of recreational drug use     HTN (hypertension)     Impaired functional mobility, balance, gait, and endurance     Kidney cysts     Left hip pain     Liver disease     Low back pain     Migraines     Obstructive chronic bronchitis with exacerbation     Sleep apnea     mild    Tattoos          PAST SURGICAL HISTORY  Past Surgical History:   Procedure Laterality Date    BACK SURGERY      COLONOSCOPY  2014    COLONOSCOPY N/A 1/4/2022    Procedure: COLONOSCOPY with biopsies;  Surgeon: Giancarlo Ruiz MD;  Location:  [facility];  Service: Gastroenterology;  Laterality: N/A;    HERNIA REPAIR      HIP SPACER INSERTION WITH ANTIBIOTIC CEMENT Left 1/15/2020    Procedure: TOTAL HIP IMPLANT REMOVAL WITH INSERTION OF ANTIBIOTIC SPACER LEFT;  Surgeon: Ba Ramirez MD;  Location:  [facility];  Service: Orthopedics    SHOULDER LIGAMENT REPAIR      right shoulder    SMALL INTESTINE SURGERY      Small bowell resection    TOTAL HIP ARTHROPLASTY Left     TOTAL HIP ARTHROPLASTY Right     TOTAL HIP ARTHROPLASTY REVISION Left 3/5/2020    Procedure: HIP REIMPLANT REVISION LEFT;  Surgeon: Ba Ramirez MD;  Location:  [facility];  Service: Orthopedics;  Laterality: Left;    UPPER GASTROINTESTINAL ENDOSCOPY  2014         FAMILY HISTORY  Family History   Problem Relation Age of Onset    Arthritis Other      Hypertension Other     Migraines Other     Heart attack Other     Stroke Other     Colon cancer Neg Hx          SOCIAL HISTORY  Social History     Socioeconomic History    Marital status:    Tobacco Use    Smoking status: Former     Current packs/day: 0.00     Average packs/day: 2.0 packs/day for 15.0 years (30.0 ttl pk-yrs)     Types: Cigarettes     Start date:      Quit date: 2005     Years since quittin.9     Passive exposure: Current    Smokeless tobacco: Current     Types: Chew   Vaping Use    Vaping status: Never Used   Substance and Sexual Activity    Alcohol use: No     Comment: stopped drinking alcohol    Drug use: Not Currently     Comment: takes suboxone    Sexual activity: Defer         ALLERGIES  Doxycycline        REVIEW OF SYSTEMS  Review of Systems   Constitutional: Negative.    HENT: Negative.     Eyes: Negative.    Respiratory:  Positive for shortness of breath.    Cardiovascular: Negative.    Gastrointestinal: Negative.    Genitourinary: Negative.    Musculoskeletal: Negative.    Skin: Negative.    Allergic/Immunologic: Negative.    Neurological: Negative.    Psychiatric/Behavioral: Negative.     All other systems reviewed and are negative.       All systems reviewed and negative except for those discussed in HPI.       PHYSICAL EXAM    I have reviewed the triage vital signs and nursing notes.    ED Triage Vitals [24 2250]   Temp Heart Rate Resp BP SpO2   98.6 °F (37 °C) 98 24 (!) 140/112 (!) 86 %      Temp src Heart Rate Source Patient Position BP Location FiO2 (%)   Oral Monitor Sitting Left arm --       Physical Exam  Vitals and nursing note reviewed.   Constitutional:       General: He is not in acute distress.     Appearance: He is well-developed and normal weight. He is not ill-appearing, toxic-appearing or diaphoretic.   HENT:      Head: Normocephalic and atraumatic.   Eyes:      Extraocular Movements: Extraocular movements intact.   Cardiovascular:      Rate and  Rhythm: Normal rate and regular rhythm.   Pulmonary:      Effort: No respiratory distress.      Breath sounds: Decreased breath sounds and wheezing present. No rhonchi or rales.      Comments: Increased work of breathing with no significant respiratory distress.  Decreased breath sounds diffusely with wheezes in all lung fields.  Musculoskeletal:         General: Normal range of motion.      Cervical back: Normal range of motion.   Skin:     General: Skin is warm and dry.   Neurological:      General: No focal deficit present.      Mental Status: He is alert.   Psychiatric:         Mood and Affect: Mood normal.         Behavior: Behavior normal.            LAB RESULTS  Recent Results (from the past 24 hours)   Blood Gas, Arterial With Co-Ox    Collection Time: 11/24/24 11:20 PM    Specimen: Arterial Blood   Result Value Ref Range    Site Left Radial     Michael's Test Positive     pH, Arterial 7.356 7.300 - 7.500 pH units    pCO2, Arterial 48.6 (H) 35.0 - 45.0 mm Hg    pO2, Arterial 78.7 75.0 - 100.0 mm Hg    HCO3, Arterial 27.2 22.0 - 28.0 mmol/L    Base Excess, Arterial 0.8 0.0 - 2.0 mmol/L    O2 Saturation, Arterial 96.1 94.0 - 100.0 %    Hematocrit, Blood Gas 47.2 %    Oxyhemoglobin 94.8 94 - 99 %    Methemoglobin 0.50 0.00 - 1.50 %    Carboxyhemoglobin 0.8 0 - 2 %    A-a DO2 114.4 mmHg    Barometric Pressure for Blood Gas 733 mmHg    Modality Nasal Cannula     FIO2 36 %    Flow Rate 4.0 lpm    Ventilator Mode NA     Collected by KJ     pH, Temp Corrected      pCO2, Temperature Corrected      pO2, Temperature Corrected     Comprehensive Metabolic Panel    Collection Time: 11/24/24 11:24 PM    Specimen: Blood   Result Value Ref Range    Glucose 119 (H) 65 - 99 mg/dL    BUN 7 6 - 20 mg/dL    Creatinine 0.83 0.76 - 1.27 mg/dL    Sodium 135 (L) 136 - 145 mmol/L    Potassium 3.7 3.5 - 5.2 mmol/L    Chloride 100 98 - 107 mmol/L    CO2 23.6 22.0 - 29.0 mmol/L    Calcium 9.0 8.6 - 10.5 mg/dL    Total Protein 7.0 6.0 - 8.5  g/dL    Albumin 4.1 3.5 - 5.2 g/dL    ALT (SGPT) 10 1 - 41 U/L    AST (SGOT) 17 1 - 40 U/L    Alkaline Phosphatase 177 (H) 39 - 117 U/L    Total Bilirubin 0.3 0.0 - 1.2 mg/dL    Globulin 2.9 gm/dL    A/G Ratio 1.4 g/dL    BUN/Creatinine Ratio 8.4 7.0 - 25.0    Anion Gap 11.4 5.0 - 15.0 mmol/L    eGFR 106.6 >60.0 mL/min/1.73   BNP    Collection Time: 11/24/24 11:24 PM    Specimen: Blood   Result Value Ref Range    proBNP 61.6 0.0 - 900.0 pg/mL   Single High Sensitivity Troponin T    Collection Time: 11/24/24 11:24 PM    Specimen: Blood   Result Value Ref Range    HS Troponin T <6 <22 ng/L   Green Top (Gel)    Collection Time: 11/24/24 11:24 PM   Result Value Ref Range    Extra Tube Hold for add-ons.    Lavender Top    Collection Time: 11/24/24 11:24 PM   Result Value Ref Range    Extra Tube hold for add-on    Gold Top - SST    Collection Time: 11/24/24 11:24 PM   Result Value Ref Range    Extra Tube Hold for add-ons.    Light Blue Top    Collection Time: 11/24/24 11:24 PM   Result Value Ref Range    Extra Tube Hold for add-ons.    CBC Auto Differential    Collection Time: 11/24/24 11:24 PM    Specimen: Blood   Result Value Ref Range    WBC 6.12 3.40 - 10.80 10*3/mm3    RBC 5.61 4.14 - 5.80 10*6/mm3    Hemoglobin 15.2 13.0 - 17.7 g/dL    Hematocrit 45.6 37.5 - 51.0 %    MCV 81.3 79.0 - 97.0 fL    MCH 27.1 26.6 - 33.0 pg    MCHC 33.3 31.5 - 35.7 g/dL    RDW 13.4 12.3 - 15.4 %    RDW-SD 39.8 37.0 - 54.0 fl    MPV 10.2 6.0 - 12.0 fL    Platelets 244 140 - 450 10*3/mm3    Neutrophil % 61.9 42.7 - 76.0 %    Lymphocyte % 29.9 19.6 - 45.3 %    Monocyte % 7.8 5.0 - 12.0 %    Eosinophil % 0.2 (L) 0.3 - 6.2 %    Basophil % 0.0 0.0 - 1.5 %    Immature Grans % 0.2 0.0 - 0.5 %    Neutrophils, Absolute 3.79 1.70 - 7.00 10*3/mm3    Lymphocytes, Absolute 1.83 0.70 - 3.10 10*3/mm3    Monocytes, Absolute 0.48 0.10 - 0.90 10*3/mm3    Eosinophils, Absolute 0.01 0.00 - 0.40 10*3/mm3    Basophils, Absolute 0.00 0.00 - 0.20 10*3/mm3     Immature Grans, Absolute 0.01 0.00 - 0.05 10*3/mm3    nRBC 0.0 0.0 - 0.2 /100 WBC       If labs were ordered, I independently reviewed the results and considered them in treating the patient.        RADIOLOGY  No Radiology Exams Resulted Within Past 24 Hours        PROCEDURES    Procedures    Interpretations    O2 Sat: The patients oxygen saturation was 86% on  2 L Nasal Cannula.  This was independently interpreted by me as Hypoxic    EKG: I reviewed and independently interpreted the EKG as sinus rhythm with a rate of 91.  No ST segment elevation    Cardiac Monitoring: I reviewed and independently interpreted the Rhythm Strip as Normal Sinus rhythm rate of 85    Radiology: I ordered and independently reviewed the above noted radiographic studies.  I viewed images of Chest Xray which showed No pulmonary process per my independent interpretation. See radiologist's dictation for official interpretation.     MEDICATIONS GIVEN IN ER    Medications   sodium chloride 0.9 % flush 10 mL (has no administration in time range)   methylPREDNISolone sodium succinate (SOLU-Medrol) injection 125 mg (125 mg Intravenous Given 11/24/24 2324)   ipratropium-albuterol (DUO-NEB) nebulizer solution 3 mL (3 mL Nebulization Given 11/24/24 2325)   albuterol (PROVENTIL) nebulizer solution 0.083% 2.5 mg/3mL (10 mg Nebulization Given 11/25/24 0104)   magnesium sulfate 2g/50 mL (PREMIX) infusion (0 g Intravenous Stopped 11/25/24 0148)         MEDICAL DECISION MAKING, PROGRESS, and CONSULTS    All labs, if obtained, have been independently reviewed by me.  All radiology studies, if obtained, have been reviewed by me and the radiologist dictating the report.  All EKG's, if obtained, have been independently viewed and interpreted by me      Discussion below represents my analysis of pertinent findings related to patient's condition, differential diagnosis, treatment plan and final disposition.      Differential diagnosis:    COPD exacerbation,  pneumonia, bronchitis    Additional Sources:  None      Orders placed during this visit:  Orders Placed This Encounter   Procedures    XR Chest 1 View    [place] Draw    Comprehensive Metabolic Panel    BNP    Single High Sensitivity Troponin T    CBC Auto Differential    Blood Gas, Arterial -With Co-Ox Panel: Yes    Blood Gas, Arterial With Co-Ox    NPO Diet NPO Type: Strict NPO    Continuous Pulse Oximetry    Vital Signs    Oxygen Therapy- Nasal Cannula; Titrate 1-6 LPM Per SpO2; 90 - 95%    ECG 12 Lead ED Triage Standing Order; SOA    Insert Peripheral IV    Initiate Observation Status    CBC & Differential    Green Top (Gel)    Lavender Top    Gold Top - SST    Light Blue Top         Additional orders considered but not ordered:  None    ED Course:    Consultants:  None    ED Course as of 11/25/24 0256   Sun Nov 24, 2024   2352 EKG: I reviewed and independently interpreted the EKG as:  Sinus rhythm rate of 91 bpm, normal axis, normal intervals, no ST elevation, no T wave inversions [TM]   Mon Nov 25, 2024   0150 This gentleman has a history of horrible COPD. On 2 L of oxygen continuously at home. Has been short of breath with a COPD exacerbation since last night. Has been using nebulizers at home without any improvement. Presented here hypoxic on his normal 2 L was initially increased to 4 L to maintain above 90%. Was given a DuoNeb, magnesium, Solu-Medrol and a continuous 1 hour albuterol neb. After these therapies he continues to be short of breath with diminished breath sounds and diffuse wheezing. On 2 L he is 89 to 90% at rest but complaining of increasing shortness of breath and his oxygen was increased here again. His labs are reassuring chest x-ray does not show any acute process. Discussed admission versus discharge, patient states given his ongoing shortness of breath he does not feel comfortable going home and request admission for more aggressive treatment of his COPD. [TM]   8335 Discussed with   Chel agrees to evaluate patient for admission [CS]      ED Course User Index  [CS] Bang Camejo MD  [TM] Gomez Delgadillo PA-C           After my consideration of clinical presentation and any laboratory/radiology studies obtained, I discussed the findings with the patient/patient representative who is in agreement with the treatment plan and the final disposition. Risks and benefits of admission was discussed     AS OF 02:56 EST VITALS:    BP - 131/86  HR - 89  TEMP - 98.6 °F (37 °C) (Oral)  O2 SATS - 92%    I reviewed the patients prescription monitoring report if available prior to discharge    DIAGNOSIS  Final diagnoses:   COPD exacerbation         DISPOSITION  ED Disposition       ED Disposition   Decision to Admit    Condition   --    Comment   Level of Care: Telemetry [5]   Diagnosis: COPD exacerbation [022228]   Admitting Physician: LOLLY BEAVERS [[id number]]   Attending Physician: LOLLY BEAVERS [[id number]]                     Please note that portions of this document were completed with voice recognition software.        Bang Camejo MD  11/25/24 0256      Electronically signed by Bang Camejo MD at 11/25/24 0256       Vital Signs (last day)       Date/Time Temp Temp src Pulse Resp BP Patient Position SpO2    11/26/24 1225 -- -- -- 16 -- -- --    11/26/24 0729 98 (36.7) Oral -- 16 131/88 Lying 95    11/26/24 0652 -- -- -- 18 -- -- --    11/26/24 0405 98.3 (36.8) Oral 91 18 121/82 Lying 97    11/26/24 0216 -- -- 77 -- -- -- --    11/26/24 0024 -- -- 91 18 -- -- 96    11/26/24 0009 -- -- 86 20 123/79 Lying 97    11/26/24 0000 -- -- 88 -- -- -- --    11/25/24 2359 98.2 (36.8) Oral 96 18 149/97 Lying 97    11/25/24 1912 98.2 (36.8) Oral 113 20 145/97 Lying 98    11/25/24 1803 -- -- -- 22 -- -- 97    11/25/24 1624 98.5 (36.9) Oral 101 20 136/96 Lying 95    11/25/24 1254 -- -- -- 18 -- -- --    11/25/24 1119 97.9 (36.6) Oral -- 20 143/92 Sitting 93    11/25/24 0852 -- -- -- --  152/102 -- --    11/25/24 0716 96.7 (35.9) Axillary 106 24 167/107 Sitting 90    11/25/24 0634 -- -- -- 18 -- -- 90    11/25/24 0626 -- -- 99 -- -- -- --    11/25/24 0602 -- -- 101 -- -- -- --    11/25/24 0400 -- -- 88 -- -- -- --    11/25/24 0326 98.4 (36.9) Oral 92 20 140/95 Lying 90    11/25/24 0247 -- -- 89 -- -- -- 92    11/25/24 0230 -- -- 84 20 131/86 Lying 95    11/25/24 0200 -- -- -- 20 126/88 Sitting 92    11/25/24 0130 -- -- 84 -- 131/82 -- 98    11/25/24 0104 -- -- -- 20 -- -- --    11/25/24 0042 -- -- 79 -- -- -- 91    11/25/24 0010 -- -- 81 22 -- Lying 94    11/25/24 0000 -- -- 86 20 130/96 Lying 93          Current Facility-Administered Medications   Medication Dose Route Frequency Provider Last Rate Last Admin    acetaminophen (TYLENOL) tablet 650 mg  650 mg Oral Q4H PRN Naga Milligan MD        Or    acetaminophen (TYLENOL) 160 MG/5ML oral solution 650 mg  650 mg Oral Q4H PRN Naga Milligan MD        Or    acetaminophen (TYLENOL) suppository 650 mg  650 mg Rectal Q4H PRN Naga Milligan MD        sennosides-docusate (PERICOLACE) 8.6-50 MG per tablet 2 tablet  2 tablet Oral BID Naga Milligan MD   2 tablet at 11/25/24 2015    And    polyethylene glycol (MIRALAX) packet 17 g  17 g Oral Daily PRN Naga Milligan MD        And    bisacodyl (DULCOLAX) EC tablet 5 mg  5 mg Oral Daily PRN Naga Milligan MD        And    bisacodyl (DULCOLAX) suppository 10 mg  10 mg Rectal Daily PRN Naga Milligan MD        budesonide (PULMICORT) nebulizer solution 0.5 mg  0.5 mg Nebulization BID - RT Naga Milligan MD   0.5 mg at 11/26/24 0652    butalbital-acetaminophen-caffeine (FIORICET, ESGIC) -40 MG per tablet 1 tablet  1 tablet Oral Q6H PRN Naga Milligan MD   1 tablet at 11/26/24 0848    calcium carbonate (TUMS) chewable tablet 500 mg (200 mg elemental)  2 tablet Oral TID PRN Naga Milligan MD   2 tablet at 11/26/24 0921    dicyclomine (BENTYL) capsule 20 mg  20 mg Oral TID PRN Naga Milligan MD        Enoxaparin Sodium  (LOVENOX) syringe 40 mg  40 mg Subcutaneous Daily Naga Milligan MD        fluticasone (FLONASE) 50 MCG/ACT nasal spray 1 spray  1 spray Nasal Daily Naga Milligan MD        guaiFENesin (MUCINEX) 12 hr tablet 600 mg  600 mg Oral Q12H Naga Milligan MD   600 mg at 11/26/24 0848    ipratropium-albuterol (DUO-NEB) nebulizer solution 3 mL  3 mL Nebulization Q4H PRN Naga Milligan MD   3 mL at 11/25/24 0634    ipratropium-albuterol (DUO-NEB) nebulizer solution 3 mL  3 mL Nebulization Q6H - RT Naga Milligan MD   3 mL at 11/26/24 1225    LORazepam (ATIVAN) tablet 1 mg  1 mg Oral Q8H PRN Naga Milligan MD   1 mg at 11/26/24 0644    methadone (DOLOPHINE) tablet 65 mg  65 mg Oral Daily Naga Milligan MD   65 mg at 11/26/24 0848    methylPREDNISolone sodium succinate (SOLU-Medrol) injection 60 mg  60 mg Intravenous Q8H Naga Milligan MD   60 mg at 11/26/24 0633    montelukast (SINGULAIR) tablet 10 mg  10 mg Oral Nightly Naga Milligan MD   10 mg at 11/25/24 2017    nicotine polacrilex (NICORETTE) gum 2 mg  2 mg Mouth/Throat Q1H PRN Antwon Espinoza DO        pantoprazole (PROTONIX) EC tablet 40 mg  40 mg Oral Q AM Naga Milligan MD   40 mg at 11/26/24 0633    Pharmacy to Dose enoxaparin (LOVENOX)   Not Applicable Continuous PRN Naga Milligan MD        prochlorperazine (COMPAZINE) injection 5 mg  5 mg Intravenous Q6H PRN Naga Milligan MD   5 mg at 11/25/24 0644    sodium chloride 0.9 % flush 10 mL  10 mL Intravenous Q12H Naga Milligan MD   10 mL at 11/26/24 0901    sodium chloride 0.9 % flush 10 mL  10 mL Intravenous PRN Naga Milligan MD   10 mL at 11/26/24 0633    sodium chloride 0.9 % infusion 40 mL  40 mL Intravenous PRN Naga Milligan MD        tiZANidine (ZANAFLEX) tablet 4 mg  4 mg Oral Q8H PRN Naga Milligan MD   4 mg at 11/25/24 1315    traZODone (DESYREL) tablet 300 mg  300 mg Oral Nightly Naga Milligan MD   300 mg at 11/25/24 2016     Lab Results (last 24 hours)       Procedure Component Value Units Date/Time    Basic Metabolic  Panel [[id number]]  (Abnormal) Collected: 24    Specimen: Blood Updated: 24     Glucose 143 mg/dL      BUN 13 mg/dL      Creatinine 0.86 mg/dL      Sodium 134 mmol/L      Potassium 4.9 mmol/L      Chloride 98 mmol/L      CO2 25.5 mmol/L      Calcium 9.7 mg/dL      BUN/Creatinine Ratio 15.1     Anion Gap 10.5 mmol/L      eGFR 105.5 mL/min/1.73     Narrative:      GFR Normal >60  Chronic Kidney Disease <60  Kidney Failure <15      CBC (No Diff) [[id number]]  (Normal) Collected: 24    Specimen: Blood Updated: 24     WBC 10.53 10*3/mm3      RBC 5.45 10*6/mm3      Hemoglobin 14.6 g/dL      Hematocrit 45.9 %      MCV 84.2 fL      MCH 26.8 pg      MCHC 31.8 g/dL      RDW 13.7 %      RDW-SD 41.8 fl      MPV 10.5 fL      Platelets 219 10*3/mm3           Imaging Results (Last 24 Hours)       ** No results found for the last 24 hours. **             Physician Progress Notes (last 48 hours)        Antwon Espinoza DO at 24 1134              [facility]    Name:  Topher Stoll   Age:  49 y.o.  Sex:  male  :  1975  MRN:  [id number]   Visit Number:  [id number]  Admission Date:  2024  Date Of Service:  24  Primary Care Physician:  Joshua Hoffmann MD     LOS: 0 days :    Chief Complaint:      Shortness of breath.    Subjective:    Patient examined this morning.  Oxygen requirement titrated down overnight to 6 L.  Currently on 2 L during my exam.  Oxygen saturations stable.  Overall says he is feeling better.  Still with some wheezing on exam.    Hospital Course:    Topher Stoll is a 49-year-old male with a history of COPD on home oxygen at 2 L, hypertension, coronary artery disease, history of hep C with treatment in 2019, hepatitis B, anxiety, cervical radiculopathy on chronic methadone, obstructive sleep apnea, migraines was brought to the emergency room by family with symptoms of cough and shortness of breath since  2 days.  Patient apparently was out helping a friend and apparently was exposed to cold hair and started having shortness of breath associated with cough and some nausea.  Patient started having significant wheezing and tripoding despite taking his nebulizers at home.  He quit smoking several years ago.  He has history of obstructive sleep apnea but does not like CPAP and has not been using it.  Denies any fevers, chest pain.  He is currently not taking any medicines for blood pressure.     In the emergency room, he was afebrile and hemodynamically stable saturating at 86% on room air.  He was placed on 4 L of nasal cannula oxygen with improvement in saturations to mid 90s.  Initial ABG showed a pH of 7.35, pCO2 49, pO2 79, bicarb of 27 on 4 L of oxygen.  Initial troponin and proBNP were within normal range.  CMP was unremarkable except for a sodium of 135 and alkaline phosphatase of 177.  CBC was within normal range.  Portable chest x-ray showed emphysematous lung fields without any acute infiltrates.  Patient was given IV Solu-Medrol, bronchodilators, IV magnesium in the emergency room and was subsequently admitted to the medical floor with telemetry for management of acute COPD exacerbation.    Review of Systems:     All systems were reviewed and negative except as mentioned in subjective, assessment and plan.    Vital Signs:    Temp:  [98 °F (36.7 °C)-98.5 °F (36.9 °C)] 98 °F (36.7 °C)  Heart Rate:  [] 91  Resp:  [16-22] 16  BP: (121-149)/(79-97) 131/88    Intake and output:    I/O last 3 completed shifts:  In: 965 [P.O.:965]  Out: 1725 [Urine:1725]  I/O this shift:  In: 360 [P.O.:360]  Out: -     Physical Examination:    General Appearance:  Alert and cooperative.    Head:  Atraumatic and normocephalic.   Eyes: Conjunctivae and sclerae normal, no icterus. No pallor.   Throat: No oral lesions, no thrush, oral mucosa moist.   Neck: Supple, trachea midline, no thyromegaly.   Lungs:   Breath sounds heard  bilaterally equally.  Nonlabored breathing. Faint wheezing bilaterally.    Heart:  Normal S1 and S2, no murmur, no gallop, no rub. No JVD.   Abdomen:   Normal bowel sounds, no masses, no organomegaly. Soft, nontender, nondistended, no rebound tenderness.   Extremities: Supple, no edema, no cyanosis, no clubbing.   Skin: No bleeding or rash.   Neurologic: Alert and oriented x 3. No facial asymmetry. Moves all four limbs. No tremors.      Laboratory results:    Results from last 7 days   Lab Units 11/26/24  0559 11/24/24  2324   SODIUM mmol/L 134* 135*   POTASSIUM mmol/L 4.9 3.7   CHLORIDE mmol/L 98 100   CO2 mmol/L 25.5 23.6   BUN mg/dL 13 7   CREATININE mg/dL 0.86 0.83   CALCIUM mg/dL 9.7 9.0   BILIRUBIN mg/dL  --  0.3   ALK PHOS U/L  --  177*   ALT (SGPT) U/L  --  10   AST (SGOT) U/L  --  17   GLUCOSE mg/dL 143* 119*     Results from last 7 days   Lab Units 11/26/24  0559 11/24/24  2324   WBC 10*3/mm3 10.53 6.12   HEMOGLOBIN g/dL 14.6 15.2   HEMATOCRIT % 45.9 45.6   PLATELETS 10*3/mm3 219 244         Results from last 7 days   Lab Units 11/24/24  2324   HSTROP T ng/L <6         Recent Labs     06/16/24  2104 10/27/24  1739 11/24/24  2320   PHART 7.341 7.338 7.356   [id number] 62.5* 52.4* 48.6*   PO2ART 64.5* 70.5* 78.7   [id number] 33.8* 28.1* 27.2   BASEEXCESS 5.7* 1.1 0.8      I have reviewed the patient's laboratory results.    Radiology results:    XR Chest 1 View    Result Date: 11/25/2024  PROCEDURE: XR CHEST 1 VW-    HISTORY: SOA Triage Protocol  COMPARISON: October 27, 2024.  FINDINGS: The heart is normal in size. The mediastinum is unremarkable. There is emphysematous change. There is no acute infiltrate. There is no pneumothorax. There are no acute osseous abnormalities.      Impression: No acute cardiopulmonary process.        Images were reviewed, interpreted, and dictated by Dr. Elisabet Nguyễn MD Transcribed by Citlalli Hawk PA-C.  This report was signed and finalized on 11/25/2024 9:20 AM by Elisabet Nguyễn,  MD.     I have reviewed the patient's radiology reports.    Medication Review:     I have reviewed the patient's active and prn medications.     Problem List:      Acute on chronic respiratory failure with hypoxia    COPD exacerbation      Assessment:    Acute on chronic hypoxic respiratory failure, POA.  Acute COPD exacerbation, POA.  Generalized anxiety disorder.  Chronic pain on chronic methadone.  History of hepatitis C status posttreatment.  Gastroesophageal reflux disease.  Chronic migraine.       Plan:    Respiratory failure/COPD exacerbation.  - Continue nasal cannula oxygen to maintain saturations above 90%.  - Bronchodilator nebulization, budesonide, IV Solu-Medrol, mucolytic agents.  - Hold off on antibiotics therapy at this time.  - Respiratory panel negative  - Patient would like to avoid use of BiPAP if possible.     Generalized anxiety disorder.  - Currently is not taking any medications at home but states that he gets Ativan while admitted, which helps him breathe better.     Chronic pain.  - Continue methadone.    DVT Prophylaxis: Enoxaparin  Code Status: Full  Diet: Cardiac  Discharge Plan: Likely home tomorrow    Antwon Espinoza DO  11/26/24  11:42 EST    Dictated utilizing Dragon dictation.        Electronically signed by Antwon Espinoza DO at 11/26/24 1144       Consult Notes (last 48 hours)  Notes from 11/24/24 1347 through 11/26/24 1347   No notes of this type exist for this encounter.

## 2024-11-26 NOTE — PLAN OF CARE
Goal Outcome Evaluation:      VSS. Pt down to 2lnc. Plan home tomorrow.

## 2024-11-26 NOTE — PLAN OF CARE
Goal Outcome Evaluation:           Progress: improving  Outcome Evaluation: VSS on 8L O2 - 10L O2 via nasal cannula (2L O2 baseline).  Oriented x4.  SR noted on telemetry.  Ordered PRN medication (see MAR) administered for complaint of heartburn.  Intake and output monitored.  Oriented x4.  No acute events noted during shift.  Continue with plan of care.

## 2024-11-26 NOTE — CASE MANAGEMENT/SOCIAL WORK
Case Management/Social Work    Patient Name:  Topher Stoll  YOB: 1975  MRN: [id number]  Admit Date:  11/24/2024        09:23 EST  Met with patient at bedside. He plans to return home once medically ready, has oxygen available at bedside for discharge. CM will continue to follow.      Electronically signed by:  Lionel Metzger RN  11/26/24 09:23 EST

## 2024-11-27 ENCOUNTER — READMISSION MANAGEMENT (OUTPATIENT)
Dept: CALL CENTER | Facility: HOSPITAL | Age: 49
End: 2024-11-27
Payer: MEDICARE

## 2024-11-27 VITALS
BODY MASS INDEX: 23.2 KG/M2 | HEART RATE: 80 BPM | DIASTOLIC BLOOD PRESSURE: 62 MMHG | WEIGHT: 175.04 LBS | TEMPERATURE: 98 F | HEIGHT: 73 IN | RESPIRATION RATE: 18 BRPM | SYSTOLIC BLOOD PRESSURE: 102 MMHG | OXYGEN SATURATION: 92 %

## 2024-11-27 PROCEDURE — 99239 HOSP IP/OBS DSCHRG MGMT >30: CPT | Performed by: INTERNAL MEDICINE

## 2024-11-27 PROCEDURE — 94799 UNLISTED PULMONARY SVC/PX: CPT

## 2024-11-27 PROCEDURE — 94761 N-INVAS EAR/PLS OXIMETRY MLT: CPT

## 2024-11-27 PROCEDURE — 94664 DEMO&/EVAL PT USE INHALER: CPT

## 2024-11-27 PROCEDURE — 25010000002 METHYLPREDNISOLONE PER 125 MG: Performed by: INTERNAL MEDICINE

## 2024-11-27 RX ORDER — PREDNISONE 10 MG/1
10 TABLET ORAL DAILY
Qty: 63 TABLET | Refills: 0 | Status: SHIPPED | OUTPATIENT
Start: 2024-11-27

## 2024-11-27 RX ADMIN — METHADONE HYDROCHLORIDE 65 MG: 10 TABLET ORAL at 08:59

## 2024-11-27 RX ADMIN — IPRATROPIUM BROMIDE AND ALBUTEROL SULFATE 3 ML: 2.5; .5 SOLUTION RESPIRATORY (INHALATION) at 03:57

## 2024-11-27 RX ADMIN — PANTOPRAZOLE SODIUM 40 MG: 40 TABLET, DELAYED RELEASE ORAL at 06:08

## 2024-11-27 RX ADMIN — METHYLPREDNISOLONE SODIUM SUCCINATE 60 MG: 125 INJECTION, POWDER, FOR SOLUTION INTRAMUSCULAR; INTRAVENOUS at 06:08

## 2024-11-27 RX ADMIN — BUDESONIDE 0.5 MG: 0.5 SUSPENSION RESPIRATORY (INHALATION) at 06:52

## 2024-11-27 RX ADMIN — IPRATROPIUM BROMIDE AND ALBUTEROL SULFATE 3 ML: 2.5; .5 SOLUTION RESPIRATORY (INHALATION) at 00:45

## 2024-11-27 RX ADMIN — IPRATROPIUM BROMIDE AND ALBUTEROL SULFATE 3 ML: 2.5; .5 SOLUTION RESPIRATORY (INHALATION) at 06:52

## 2024-11-27 NOTE — OUTREACH NOTE
Prep Survey      Flowsheet Row Responses   [facility] patient discharged from? [facility]   Is LACE score < 7 ? No   Eligibility [facility]   Date of Admission 11/24/24   Date of Discharge 11/27/24   Discharge diagnosis COPD exacerbation   Does the patient have one of the following disease processes/diagnoses(primary or secondary)? COPD   Prep survey completed? Yes            Breonna SONG - Registered Nurse

## 2024-11-27 NOTE — DISCHARGE SUMMARY
[facility]   DISCHARGE SUMMARY      Name:  Topher Stoll   Age:  49 y.o.  Sex:  male  :  1975  MRN:  [id number]   Visit Number:  [id number]    Admission Date:  2024  Date of Discharge:  2024  Primary Care Physician:  Joshua Hoffmann MD    Discharge Diagnoses:     Acute on chronic hypoxic respiratory failure, POA.  Acute COPD exacerbation, POA.  Generalized anxiety disorder.  Chronic pain on chronic methadone.  History of hepatitis C status posttreatment.  Gastroesophageal reflux disease.  Chronic migraine.       Problem List:     Active Hospital Problems    Diagnosis  POA    COPD exacerbation [J44.1]  Yes    Acute on chronic respiratory failure with hypoxia [J96.21]  Yes      Resolved Hospital Problems   No resolved problems to display.     Presenting Problem:    Chief Complaint   Patient presents with    Shortness of Breath      Consults:     Consulting Physician(s)                     None              Procedures Performed:        History of presenting illness/Hospital Course:    Topher Stoll is a 49-year-old male with a history of COPD on home oxygen at 2 L, hypertension, coronary artery disease, history of hep C with treatment in 2019, hepatitis B, anxiety, cervical radiculopathy on chronic methadone, obstructive sleep apnea, migraines was brought to the emergency room by family with symptoms of cough and shortness of breath since 2 days.  Patient apparently was out helping a friend and apparently was exposed to cold hair and started having shortness of breath associated with cough and some nausea.  Patient started having significant wheezing and tripoding despite taking his nebulizers at home.  He quit smoking several years ago.  He has history of obstructive sleep apnea but does not like CPAP and has not been using it.  Denies any fevers, chest pain.  He is currently not taking any medicines for blood pressure.     In the emergency room, he was afebrile  and hemodynamically stable saturating at 86% on room air.  He was placed on 4 L of nasal cannula oxygen with improvement in saturations to mid 90s.  Initial ABG showed a pH of 7.35, pCO2 49, pO2 79, bicarb of 27 on 4 L of oxygen.  Initial troponin and proBNP were within normal range.  CMP was unremarkable except for a sodium of 135 and alkaline phosphatase of 177.  CBC was within normal range.  Portable chest x-ray showed emphysematous lung fields without any acute infiltrates.  Patient was given IV Solu-Medrol, bronchodilators, IV magnesium in the emergency room and was subsequently admitted to the medical floor with telemetry for management of acute COPD exacerbation.    Respiratory failure/COPD exacerbation.  - Continue nasal cannula oxygen to maintain saturations above 90%.  - Bronchodilator nebulization, budesonide, IV Solu-Medrol, mucolytic agents.  - Hold off on antibiotics therapy at this time.  - Respiratory panel negative  --Patient stable on his home requirement of 2 L nasal cannula on day of discharge.  Discharged on prednisone taper.     Generalized anxiety disorder.  - Ativan as needed     Chronic pain.  - Continue methadone.    Follow-up with primary physician.  Referred to COPD disease state management clinic.    Vital Signs:    Temp:  [97.6 °F (36.4 °C)-98.1 °F (36.7 °C)] 98 °F (36.7 °C)  Heart Rate:  [] 80  Resp:  [16-20] 18  BP: (102-138)/(62-98) 102/62    Physical Exam:    General Appearance:  Alert and cooperative.    Head:  Atraumatic and normocephalic.   Eyes: Conjunctivae and sclerae normal, no icterus. No pallor.   Ears:  Ears with no abnormalities noted.   Throat: No oral lesions, no thrush, oral mucosa moist.   Neck: Supple, trachea midline, no thyromegaly.       Lungs:   Breath sounds heard bilaterally equally.  No crackles or wheezing. No Pleural rub or bronchial breathing.   Heart:  Normal S1 and S2, no murmur, No JVD.   Abdomen:   Normal bowel sounds, Soft, nontender,  nondistended, no rebound tenderness.   Extremities: Supple, no edema, no cyanosis, no clubbing.   Pulses: Pulses palpable bilaterally.   Skin: No bleeding or rash.   Neurologic: Alert and oriented x 3. No facial asymmetry. Moves all four limbs. No tremors.     Pertinent Lab Results:     Results from last 7 days   Lab Units 11/26/24  0559 11/24/24  2324   SODIUM mmol/L 134* 135*   POTASSIUM mmol/L 4.9 3.7   CHLORIDE mmol/L 98 100   CO2 mmol/L 25.5 23.6   BUN mg/dL 13 7   CREATININE mg/dL 0.86 0.83   CALCIUM mg/dL 9.7 9.0   BILIRUBIN mg/dL  --  0.3   ALK PHOS U/L  --  177*   ALT (SGPT) U/L  --  10   AST (SGOT) U/L  --  17   GLUCOSE mg/dL 143* 119*     Results from last 7 days   Lab Units 11/26/24  0559 11/24/24  2324   WBC 10*3/mm3 10.53 6.12   HEMOGLOBIN g/dL 14.6 15.2   HEMATOCRIT % 45.9 45.6   PLATELETS 10*3/mm3 219 244         Results from last 7 days   Lab Units 11/24/24  2324   HSTROP T ng/L <6     Results from last 7 days   Lab Units 11/24/24  2324   PROBNP pg/mL 61.6             Results from last 7 days   Lab Units 11/24/24  2320   PH, ARTERIAL pH units 7.356   PO2 ART mm Hg 78.7   PCO2, ARTERIAL mm Hg 48.6*   HCO3 ART mmol/L 27.2           Pertinent Radiology Results:    Imaging Results (All)       Procedure Component Value Units Date/Time    XR Chest 1 View [[id number]] Collected: 11/25/24 0920     Updated: 11/25/24 0922    Narrative:      PROCEDURE: XR CHEST 1 VW-        HISTORY: SOA Triage Protocol     COMPARISON: October 27, 2024.     FINDINGS: The heart is normal in size. The mediastinum is unremarkable.  There is emphysematous change. There is no acute infiltrate. There is no  pneumothorax. There are no acute osseous abnormalities.       Impression:      No acute cardiopulmonary process.                       Images were reviewed, interpreted, and dictated by Dr. Elisabet Nguyễn MD  Transcribed by Citlalli Hawk PA-C.     This report was signed and finalized on 11/25/2024 9:20 AM by Elisabet Nguyễn MD.                Echo:    Results for orders placed during the hospital encounter of 05/21/23    Adult Transthoracic Echo Complete W/ Cont if Necessary Per Protocol    Interpretation Summary    Left ventricular systolic function is normal. Estimated left ventricular EF = 65% Left ventricular ejection fraction appears to be 61 - 65%.    Left ventricular diastolic function was normal.    Condition on Discharge:      Stable.    Code status during the hospital stay:    Code Status and Medical Interventions: CPR (Attempt to Resuscitate); Full Support   Ordered at: 11/25/24 0603     Code Status (Patient has no pulse and is not breathing):    CPR (Attempt to Resuscitate)     Medical Interventions (Patient has pulse or is breathing):    Full Support     Discharge Disposition:    Home or Self Care    Discharge Medications:       Discharge Medications        New Medications        Instructions Start Date   nicotine polacrilex 2 MG gum  Commonly known as: NICORETTE   2 mg, Mouth/Throat, Every 1 Hour PRN      predniSONE 10 MG tablet  Commonly known as: DELTASONE   10 mg, Oral, Daily, 60 mg for 3 days, 50 mg 3 days, 40 mg 3 days, 30 mg 3 days, 20 mg 3 days, 10 mg 3 days             Continue These Medications        Instructions Start Date   Breztri Aerosphere 160-9-4.8 MCG/ACT aerosol inhaler  Generic drug: Budeson-Glycopyrrol-Formoterol   Inhale 2 puffs by mouth 2 (Two) Times a Day.      butalbital-acetaminophen-caffeine -40 MG per tablet  Commonly known as: FIORICET, ESGIC   1 tablet, Oral, Every 6 Hours PRN      dicyclomine 20 MG tablet  Commonly known as: BENTYL   20 mg, Oral, Every 6 Hours PRN      Fasenra Pen 30 MG/ML solution auto-injector  Generic drug: Benralizumab   30 mg, Subcutaneous, Every 8 Weeks      fluticasone 50 MCG/ACT nasal spray  Commonly known as: FLONASE   Instill 1 spray into the nostril(s) as directed by provider Daily.      IPRATROPIUM BROMIDE IN   Inhalation, 18 mcg      ipratropium-albuterol 0.5-2.5  mg/3 ml nebulizer  Commonly known as: DUO-NEB   Inhale contents of 1 vial through a nebulizer Every 4 (Four) Hours As Needed For Wheezing or Shortness of Breath      methadone 5 MG/5ML solution  Commonly known as: DOLOPHINE   70 mg, Oral, Daily      metoclopramide 5 MG tablet  Commonly known as: REGLAN   5 mg, Oral, 3 Times Daily PRN      montelukast 10 MG tablet  Commonly known as: Singulair   10 mg, Oral, Nightly      omeprazole 40 MG capsule  Commonly known as: priLOSEC   40 mg, Oral, Daily      TiZANidine 4 MG capsule  Commonly known as: ZANAFLEX   Take 1 tablet by mouth 3 times a day as needed muscle spasm      traZODone 300 MG tablet  Commonly known as: DESYREL   300 mg, Oral, Nightly      Ventolin  (90 Base) MCG/ACT inhaler  Generic drug: albuterol sulfate HFA   Inhale 2 puffs by mouth Every 4 (Four) Hours As Needed for Wheezing.             Stop These Medications      baclofen 10 MG tablet  Commonly known as: LIORESAL     lovastatin 40 MG tablet  Commonly known as: MEVACOR            Discharge Diet:     Diet Instructions       Diet: Cardiac Diets; Healthy Heart (2-3 Na+); Regular (IDDSI 7); Thin (IDDSI 0)      Discharge Diet: Cardiac Diets    Cardiac Diet: Healthy Heart (2-3 Na+)    Texture: Regular (IDDSI 7)    Fluid Consistency: Thin (IDDSI 0)          Activity at Discharge:     Activity Instructions       Activity as Tolerated            Follow-up Appointments:    Additional Instructions for the Follow-ups that You Need to Schedule       Ambulatory Referral to Disease State Management   As directed      To dept: [facility] [[id number]]   What program(s) are you referring for?: COPD   Follow-up needed: Yes        Discharge Follow-up with PCP   As directed       Currently Documented PCP:    Joshua Hoffmann MD    PCP Phone Number:    [phone]     Follow Up Details: 1 week               Follow-up Information       Joshua Hoffmann MD .    Specialty: Internal Medicine  Why: 1  week  Contact information:  [address]  [facility] [place] [address]  [phone]                           Future Appointments   Date Time Provider Department Center   1/20/2025  1:30 PM [facility]   1/20/2025  2:00 PM Delmy Rosas APRN [facility]   6/18/2025  2:30 PM Melina Olivas MD [facility]     Test Results Pending at Discharge:    Pending Results       None                 Antwon Espinoza DO  11/27/24  08:35 EST    Time: I spent >30 minutes on this discharge activity which included: face-to-face encounter with the patient, reviewing the data in the system, coordination of the care with the nursing staff as well as consultants, documentation, and entering orders.     Dictated utilizing Dragon dictation.

## 2024-11-27 NOTE — CASE MANAGEMENT/SOCIAL WORK
Case Management Discharge Note                Selected Continued Care - Admitted Since 11/24/2024       Destination    No services have been selected for the patient.                Durable Medical Equipment    No services have been selected for the patient.                Dialysis/Infusion    No services have been selected for the patient.                Home Medical Care    No services have been selected for the patient.                Therapy    No services have been selected for the patient.                Community Resources    No services have been selected for the patient.                Community & DME    No services have been selected for the patient.                    Selected Continued Care - Episodes Includes continued care and service providers with selected services from the active episodes listed below      COPD Episode start date: 7/2/2024   There are no active outsourced providers for this episode.             Asthma Episode start date: 6/29/2023   There are no active outsourced providers for this episode.                 Transportation Services  Private: Car    Final Discharge Disposition Code: 01 - home or self-care

## 2024-11-29 ENCOUNTER — TRANSITIONAL CARE MANAGEMENT TELEPHONE ENCOUNTER (OUTPATIENT)
Dept: CALL CENTER | Facility: HOSPITAL | Age: 49
End: 2024-11-29
Payer: MEDICARE

## 2024-11-29 NOTE — OUTREACH NOTE
Call Center TCM Note      Flowsheet Row Responses   [facility] patient discharged from? [facility]   Does the patient have one of the following disease processes/diagnoses(primary or secondary)? COPD   TCM attempt successful? Yes  [VR for Ji Tripp]   Call start time 0957   Unsuccessful attempts Attempt 1   Call end time 0958   Discharge diagnosis COPD exacerbation   Meds reviewed with patient/caregiver? Yes   Is the patient having any side effects they believe may be caused by any medication additions or changes? No   Does the patient have all medications ordered at discharge? Yes   Is the patient taking all medications as directed (includes completed medication regime)? Yes   Comments Hospital f/u 12/5/24@0845am   Does the patient have an appointment with their PCP within 7-14 days of discharge? Yes   Has home health visited the patient within 72 hours of discharge? N/A   What is the patient's perception of their health status since discharge? Improving  [pt brief and reports he is better, has steroids and is napping.  Confirmed appt.]   TCM call completed? Yes   Call end time 0958            Farrah Callejas RN    11/29/2024, 10:01 EST

## 2024-12-07 ENCOUNTER — APPOINTMENT (OUTPATIENT)
Dept: CT IMAGING | Facility: HOSPITAL | Age: 49
End: 2024-12-07
Payer: MEDICARE

## 2024-12-07 ENCOUNTER — HOSPITAL ENCOUNTER (EMERGENCY)
Facility: HOSPITAL | Age: 49
Discharge: ANOTHER HOSPITAL | End: 2024-12-08
Attending: EMERGENCY MEDICINE | Admitting: STUDENT IN AN ORGANIZED HEALTH CARE EDUCATION/TRAINING PROGRAM
Payer: MEDICARE

## 2024-12-07 DIAGNOSIS — K66.8 PNEUMOPERITONEUM: ICD-10-CM

## 2024-12-07 DIAGNOSIS — K56.609 SMALL BOWEL OBSTRUCTION: Primary | ICD-10-CM

## 2024-12-07 DIAGNOSIS — K63.1 SMALL BOWEL PERFORATION: ICD-10-CM

## 2024-12-07 LAB
ALBUMIN SERPL-MCNC: 4.1 G/DL (ref 3.5–5.2)
ALBUMIN/GLOB SERPL: 1.7 G/DL
ALP SERPL-CCNC: 136 U/L (ref 39–117)
ALT SERPL W P-5'-P-CCNC: 19 U/L (ref 1–41)
ANION GAP SERPL CALCULATED.3IONS-SCNC: 11.6 MMOL/L (ref 5–15)
AST SERPL-CCNC: 17 U/L (ref 1–40)
BASOPHILS # BLD AUTO: 0.01 10*3/MM3 (ref 0–0.2)
BASOPHILS NFR BLD AUTO: 0.1 % (ref 0–1.5)
BILIRUB SERPL-MCNC: 0.2 MG/DL (ref 0–1.2)
BUN SERPL-MCNC: 10 MG/DL (ref 6–20)
BUN/CREAT SERPL: 9.7 (ref 7–25)
CALCIUM SPEC-SCNC: 8.8 MG/DL (ref 8.6–10.5)
CHLORIDE SERPL-SCNC: 98 MMOL/L (ref 98–107)
CO2 SERPL-SCNC: 26.4 MMOL/L (ref 22–29)
CREAT SERPL-MCNC: 1.03 MG/DL (ref 0.76–1.27)
DEPRECATED RDW RBC AUTO: 40.5 FL (ref 37–54)
EGFRCR SERPLBLD CKD-EPI 2021: 88.5 ML/MIN/1.73
EOSINOPHIL # BLD AUTO: 0.01 10*3/MM3 (ref 0–0.4)
EOSINOPHIL NFR BLD AUTO: 0.1 % (ref 0.3–6.2)
ERYTHROCYTE [DISTWIDTH] IN BLOOD BY AUTOMATED COUNT: 13.7 % (ref 12.3–15.4)
GLOBULIN UR ELPH-MCNC: 2.4 GM/DL
GLUCOSE SERPL-MCNC: 111 MG/DL (ref 65–99)
HCT VFR BLD AUTO: 45.4 % (ref 37.5–51)
HGB BLD-MCNC: 15 G/DL (ref 13–17.7)
HOLD SPECIMEN: NORMAL
HOLD SPECIMEN: NORMAL
IMM GRANULOCYTES # BLD AUTO: 0.05 10*3/MM3 (ref 0–0.05)
IMM GRANULOCYTES NFR BLD AUTO: 0.4 % (ref 0–0.5)
LIPASE SERPL-CCNC: 17 U/L (ref 13–60)
LYMPHOCYTES # BLD AUTO: 1.44 10*3/MM3 (ref 0.7–3.1)
LYMPHOCYTES NFR BLD AUTO: 11.3 % (ref 19.6–45.3)
MCH RBC QN AUTO: 26.9 PG (ref 26.6–33)
MCHC RBC AUTO-ENTMCNC: 33 G/DL (ref 31.5–35.7)
MCV RBC AUTO: 81.4 FL (ref 79–97)
MONOCYTES # BLD AUTO: 0.53 10*3/MM3 (ref 0.1–0.9)
MONOCYTES NFR BLD AUTO: 4.2 % (ref 5–12)
NEUTROPHILS NFR BLD AUTO: 10.73 10*3/MM3 (ref 1.7–7)
NEUTROPHILS NFR BLD AUTO: 83.9 % (ref 42.7–76)
NRBC BLD AUTO-RTO: 0 /100 WBC (ref 0–0.2)
PLATELET # BLD AUTO: 281 10*3/MM3 (ref 140–450)
PMV BLD AUTO: 10.3 FL (ref 6–12)
POTASSIUM SERPL-SCNC: 4.1 MMOL/L (ref 3.5–5.2)
PROT SERPL-MCNC: 6.5 G/DL (ref 6–8.5)
RBC # BLD AUTO: 5.58 10*6/MM3 (ref 4.14–5.8)
SODIUM SERPL-SCNC: 136 MMOL/L (ref 136–145)
WBC NRBC COR # BLD AUTO: 12.77 10*3/MM3 (ref 3.4–10.8)
WHOLE BLOOD HOLD COAG: NORMAL
WHOLE BLOOD HOLD SPECIMEN: NORMAL

## 2024-12-07 PROCEDURE — 83690 ASSAY OF LIPASE: CPT | Performed by: PHYSICIAN ASSISTANT

## 2024-12-07 PROCEDURE — 96375 TX/PRO/DX INJ NEW DRUG ADDON: CPT

## 2024-12-07 PROCEDURE — 36415 COLL VENOUS BLD VENIPUNCTURE: CPT

## 2024-12-07 PROCEDURE — 99291 CRITICAL CARE FIRST HOUR: CPT | Performed by: EMERGENCY MEDICINE

## 2024-12-07 PROCEDURE — 99291 CRITICAL CARE FIRST HOUR: CPT

## 2024-12-07 PROCEDURE — 81001 URINALYSIS AUTO W/SCOPE: CPT | Performed by: PHYSICIAN ASSISTANT

## 2024-12-07 PROCEDURE — 96376 TX/PRO/DX INJ SAME DRUG ADON: CPT

## 2024-12-07 PROCEDURE — 80053 COMPREHEN METABOLIC PANEL: CPT | Performed by: PHYSICIAN ASSISTANT

## 2024-12-07 PROCEDURE — 74177 CT ABD & PELVIS W/CONTRAST: CPT

## 2024-12-07 PROCEDURE — 96365 THER/PROPH/DIAG IV INF INIT: CPT

## 2024-12-07 PROCEDURE — 85025 COMPLETE CBC W/AUTO DIFF WBC: CPT | Performed by: PHYSICIAN ASSISTANT

## 2024-12-08 ENCOUNTER — APPOINTMENT (OUTPATIENT)
Dept: GENERAL RADIOLOGY | Facility: HOSPITAL | Age: 49
End: 2024-12-08
Payer: MEDICARE

## 2024-12-08 VITALS
OXYGEN SATURATION: 93 % | TEMPERATURE: 98 F | DIASTOLIC BLOOD PRESSURE: 96 MMHG | HEIGHT: 73 IN | SYSTOLIC BLOOD PRESSURE: 132 MMHG | HEART RATE: 77 BPM | WEIGHT: 184.6 LBS | BODY MASS INDEX: 24.46 KG/M2 | RESPIRATION RATE: 18 BRPM

## 2024-12-08 LAB
AMORPH URATE CRY URNS QL MICRO: ABNORMAL /HPF
BACTERIA UR QL AUTO: ABNORMAL /HPF
BILIRUB UR QL STRIP: NEGATIVE
CLARITY UR: CLEAR
COLOR UR: YELLOW
D-LACTATE SERPL-SCNC: 1.4 MMOL/L (ref 0.5–2)
GLUCOSE UR STRIP-MCNC: NEGATIVE MG/DL
HGB UR QL STRIP.AUTO: ABNORMAL
HYALINE CASTS UR QL AUTO: ABNORMAL /LPF
KETONES UR QL STRIP: ABNORMAL
LEUKOCYTE ESTERASE UR QL STRIP.AUTO: NEGATIVE
NITRITE UR QL STRIP: NEGATIVE
PH UR STRIP.AUTO: 5.5 [PH] (ref 5–8)
PROT UR QL STRIP: ABNORMAL
RBC # UR STRIP: ABNORMAL /HPF
REF LAB TEST METHOD: ABNORMAL
SP GR UR STRIP: >=1.03 (ref 1–1.03)
SQUAMOUS #/AREA URNS HPF: ABNORMAL /HPF
UROBILINOGEN UR QL STRIP: ABNORMAL
WBC # UR STRIP: ABNORMAL /HPF

## 2024-12-08 PROCEDURE — 74018 RADEX ABDOMEN 1 VIEW: CPT

## 2024-12-08 PROCEDURE — 87186 SC STD MICRODIL/AGAR DIL: CPT | Performed by: PHYSICIAN ASSISTANT

## 2024-12-08 PROCEDURE — 87040 BLOOD CULTURE FOR BACTERIA: CPT | Performed by: PHYSICIAN ASSISTANT

## 2024-12-08 PROCEDURE — 87077 CULTURE AEROBIC IDENTIFY: CPT | Performed by: PHYSICIAN ASSISTANT

## 2024-12-08 PROCEDURE — 96376 TX/PRO/DX INJ SAME DRUG ADON: CPT

## 2024-12-08 PROCEDURE — 87154 CUL TYP ID BLD PTHGN 6+ TRGT: CPT | Performed by: PHYSICIAN ASSISTANT

## 2024-12-08 PROCEDURE — 25510000001 IOPAMIDOL 61 % SOLUTION: Performed by: STUDENT IN AN ORGANIZED HEALTH CARE EDUCATION/TRAINING PROGRAM

## 2024-12-08 PROCEDURE — 96375 TX/PRO/DX INJ NEW DRUG ADDON: CPT

## 2024-12-08 PROCEDURE — 96365 THER/PROPH/DIAG IV INF INIT: CPT

## 2024-12-08 PROCEDURE — 25010000002 MORPHINE PER 10 MG: Performed by: STUDENT IN AN ORGANIZED HEALTH CARE EDUCATION/TRAINING PROGRAM

## 2024-12-08 PROCEDURE — 83605 ASSAY OF LACTIC ACID: CPT | Performed by: PHYSICIAN ASSISTANT

## 2024-12-08 PROCEDURE — 25010000002 PIPERACILLIN SOD-TAZOBACTAM PER 1 G: Performed by: PHYSICIAN ASSISTANT

## 2024-12-08 PROCEDURE — 25010000002 ONDANSETRON PER 1 MG: Performed by: PHYSICIAN ASSISTANT

## 2024-12-08 PROCEDURE — 94640 AIRWAY INHALATION TREATMENT: CPT

## 2024-12-08 RX ORDER — ONDANSETRON 2 MG/ML
4 INJECTION INTRAMUSCULAR; INTRAVENOUS ONCE
Status: COMPLETED | OUTPATIENT
Start: 2024-12-08 | End: 2024-12-08

## 2024-12-08 RX ORDER — IOPAMIDOL 612 MG/ML
100 INJECTION, SOLUTION INTRAVASCULAR
Status: COMPLETED | OUTPATIENT
Start: 2024-12-08 | End: 2024-12-08

## 2024-12-08 RX ORDER — IPRATROPIUM BROMIDE AND ALBUTEROL SULFATE 2.5; .5 MG/3ML; MG/3ML
3 SOLUTION RESPIRATORY (INHALATION) ONCE
Status: COMPLETED | OUTPATIENT
Start: 2024-12-08 | End: 2024-12-08

## 2024-12-08 RX ADMIN — MORPHINE SULFATE 4 MG: 4 INJECTION, SOLUTION INTRAMUSCULAR; INTRAVENOUS at 03:13

## 2024-12-08 RX ADMIN — IPRATROPIUM BROMIDE AND ALBUTEROL SULFATE 3 ML: 2.5; .5 SOLUTION RESPIRATORY (INHALATION) at 00:32

## 2024-12-08 RX ADMIN — PIPERACILLIN SODIUM AND TAZOBACTAM SODIUM 3.38 G: 3; .375 INJECTION, POWDER, LYOPHILIZED, FOR SOLUTION INTRAVENOUS at 02:38

## 2024-12-08 RX ADMIN — ONDANSETRON 4 MG: 2 INJECTION INTRAMUSCULAR; INTRAVENOUS at 00:30

## 2024-12-08 RX ADMIN — MORPHINE SULFATE 4 MG: 4 INJECTION, SOLUTION INTRAMUSCULAR; INTRAVENOUS at 01:46

## 2024-12-08 RX ADMIN — MORPHINE SULFATE 4 MG: 4 INJECTION, SOLUTION INTRAMUSCULAR; INTRAVENOUS at 00:31

## 2024-12-08 RX ADMIN — IOPAMIDOL 100 ML: 612 INJECTION, SOLUTION INTRAVENOUS at 01:07
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
## 2024-12-09 LAB
BACTERIA BLD CULT: ABNORMAL
BOTTLE TYPE: ABNORMAL

## 2024-12-11 LAB
BACTERIA SPEC AEROBE CULT: ABNORMAL
GRAM STN SPEC: ABNORMAL
ISOLATED FROM: ABNORMAL

## 2024-12-13 ENCOUNTER — SPECIALTY PHARMACY (OUTPATIENT)
Dept: PHARMACY | Facility: HOSPITAL | Age: 49
End: 2024-12-13
Payer: MEDICARE

## 2024-12-13 LAB — BACTERIA SPEC AEROBE CULT: NORMAL

## 2024-12-14 ENCOUNTER — READMISSION MANAGEMENT (OUTPATIENT)
Dept: CALL CENTER | Facility: HOSPITAL | Age: 49
End: 2024-12-14
Payer: MEDICARE

## 2024-12-14 NOTE — OUTREACH NOTE
Prep Survey      Flowsheet Row Responses   [facility] patient discharged from? Non-[facility]   Is LACE score < 7 ? Non-BH Discharge   Eligibility [facility]   Date of Admission 12/08/24   Date of Discharge 12/14/24   Discharge Disposition Home or Self Care   Discharge diagnosis SOB   Does the patient have one of the following disease processes/diagnoses(primary or secondary)? Other   Does the patient have Home health ordered? No   Is there a DME ordered? No   Prep survey completed? Yes            ESTUARDO ROWAN - Registered Nurse

## 2024-12-16 ENCOUNTER — TRANSITIONAL CARE MANAGEMENT TELEPHONE ENCOUNTER (OUTPATIENT)
Dept: CALL CENTER | Facility: HOSPITAL | Age: 49
End: 2024-12-16
Payer: MEDICARE

## 2024-12-16 NOTE — OUTREACH NOTE
Call Center TCM Note      Flowsheet Row Responses   [facility] patient discharged from? Non-  []   Does the patient have one of the following disease processes/diagnoses(primary or secondary)? Other   TCM attempt successful? Yes   Call start time 1459   Call end time 1501   Discharge diagnosis SOB   Does the patient have all medications ordered at discharge? Yes   Is the patient taking all medications as directed (includes completed medication regime)? Yes   Does the patient have an appointment with their PCP within 7-14 days of discharge? No   Nursing Interventions Patient declined scheduling/rescheduling appointment at this time, Patient desires to follow up with specialty only   Has home health visited the patient within 72 hours of discharge? N/A   Psychosocial issues? No   What is the patient's perception of their health status since discharge? Improving   Is the patient/caregiver able to teach back signs and symptoms related to disease process for when to call PCP? Yes   Is the patient/caregiver able to teach back signs and symptoms related to disease process for when to call 911? Yes   Is the patient/caregiver able to teach back the hierarchy of who to call/visit for symptoms/problems? PCP, Specialist, Home health nurse, Urgent Care, ED, 911 Yes   Additional teach back comments Advised patient to follow discharge instructions.   TCM call completed? Yes   Wrap up additional comments Doing well, denies any questions or concerns, states he will be following up with his surgeon and does not wish to follow up with his PCP at this time.   Call end time 1501   Would this patient benefit from a Referral to Amb Social Work? No   Is the patient interested in additional calls from an ambulatory ? No            Narda Perez RN    12/16/2024, 15:02 EST

## 2024-12-16 NOTE — OUTREACH NOTE
Call Center TCM Note      Flowsheet Row Responses   [facility] facility patient discharged from? Non-  []   Does the patient have one of the following disease processes/diagnoses(primary or secondary)? Other   TCM attempt successful? No   Unsuccessful attempts Attempt 1            Narda Perez RN    12/16/2024, 13:03 EST

## 2024-12-18 ENCOUNTER — SPECIALTY PHARMACY (OUTPATIENT)
Dept: PHARMACY | Facility: HOSPITAL | Age: 49
End: 2024-12-18
Payer: MEDICARE

## 2024-12-18 RX ORDER — HYDROXYZINE PAMOATE 50 MG/1
50 CAPSULE ORAL EVERY 6 HOURS PRN
COMMUNITY
Start: 2024-12-14

## 2024-12-18 RX ORDER — METHOCARBAMOL 500 MG/1
1000 TABLET, FILM COATED ORAL 3 TIMES DAILY
COMMUNITY
Start: 2024-12-14

## 2024-12-18 NOTE — PROGRESS NOTES
Specialty Pharmacy Refill Coordination Note     Topher is a 49 y.o. male contacted today regarding refills of  [facility] specialty medication(s).    Reviewed and verified with patient:  Allergies  Meds       Specialty medication(s) and dose(s) confirmed: yes    Refill Questions      Flowsheet Row Most Recent Value   Changes to allergies? No   Changes to medications? No   New conditions or infections since last clinic visit No   Unplanned office visit, urgent care, ED, or hospital admission in the last 4 weeks  Yes  [BOWEL OBSTRUCTION]   How does patient/caregiver feel medication is working? Excellent   Financial problems or insurance changes  No   Since the previous refill, were any specialty medication doses or scheduled injections missed or delayed?  No   Does this patient require a clinical escalation to a pharmacist? No            Delivery Questions      Flowsheet Row Most Recent Value   Delivery method [employer]   Delivery address verified with patient/caregiver? Yes   Delivery address Home   Number of medications in delivery 1   Medication(s) being filled and delivered Benralizumab   Doses left of specialty medications 4   Copay verified? Yes   Copay amount $0   Copay form of payment No copayment ($0)   Ship Date 12/18/2024   Delivery Date 12/19/2024   Signature Required Yes                   Follow-up: 8 week(s)     Aimee Arana MA  Specialty Pharmacy Technician

## 2025-01-10 ENCOUNTER — SPECIALTY PHARMACY (OUTPATIENT)
Dept: PHARMACY | Facility: HOSPITAL | Age: 50
End: 2025-01-10
Payer: MEDICARE

## 2025-01-10 NOTE — PROGRESS NOTES
Specialty Pharmacy Patient Management Program  Asthma Initial Assessment       Topher Stoll is a 49 y.o. male referred by pulmonology originally on 6/13/23 to the [facility] for severe asthma. An initial outreach was again conducted, including assessment of therapy appropriateness and specialty medication education for Fasenra. The patient's current asthma regimen includes: Breztri and patient reports fair adherence to maintenance regimen. Patient is not a smoker and/or exposed to second hand smoke.      The patient was again introduced to services offered by [facility], including: regular assessments, refill coordination, curbside pick-up or mail order delivery options, prior authorization maintenance, and financial assistance programs as applicable. The patient was also provided with contact information for the pharmacy team.     Patient self rates asthma control as good. Uses rescue inhaler weekly.    Insurance Coverage & Financial Support  Medicare     Relevant Past Medical History and Comorbidities  Relevant medical history and concomitant health conditions were discussed with the patient. The patient's chart has been reviewed for relevant past medical history and comorbid conditions and updated as necessary.  Past Medical History:   Diagnosis Date    Abdominal adhesions     Anxiety     Arthritis     Asthma     Cervical radiculopathy     Colitis     COPD (chronic obstructive pulmonary disease)     GERD (gastroesophageal reflux disease)     Heart attack     History of hepatitis C     Treated with Epclusa in 2019    History of recreational drug use     HTN (hypertension)     Impaired functional mobility, balance, gait, and endurance     Kidney cysts     Left hip pain     Liver disease     Low back pain     Migraines     Obstructive chronic bronchitis with exacerbation     Sleep apnea     mild    Tattoos      Social History     Socioeconomic History    Marital  status:    Tobacco Use    Smoking status: Former     Current packs/day: 0.00     Average packs/day: 2.0 packs/day for 15.0 years (30.0 ttl pk-yrs)     Types: Cigarettes     Start date:      Quit date:      Years since quittin.0     Passive exposure: Current    Smokeless tobacco: Current     Types: Chew   Vaping Use    Vaping status: Never Used   Substance and Sexual Activity    Alcohol use: No     Comment: stopped drinking alcohol    Drug use: Not Currently     Comment: takes suboxone    Sexual activity: Defer     Problem list reviewed by Amarilis Jung RPH on 1/10/2025 at  9:44 AM    Drug-Disease Interactions (non-cardioselective beta blockers, NSAIDs):     Allergies  Known allergies and reactions were discussed with the patient. The patient's chart has been reviewed for allergy information and updated as necessary.   Allergies   Allergen Reactions    Doxycycline Nausea And Vomiting       Allergies reviewed by Amarilis Jung RPH on 1/10/2025 at  9:41 AM    Relevant Laboratory Values  Common labs          2024    23:24 2024    05:59 2024    21:24 2024    23:01   Common Labs   Glucose 119  143   111    BUN 7  13   10    Creatinine 0.83  0.86   1.03    Sodium 135  134   136    Potassium 3.7  4.9   4.1    Chloride 100  98   98    Calcium 9.0  9.7   8.8    Albumin 4.1    4.1    Total Bilirubin 0.3    0.2    Alkaline Phosphatase 177    136    AST (SGOT) 17    17    ALT (SGPT) 10    19    WBC 6.12  10.53  12.77     Hemoglobin 15.2  14.6  15.0     Hematocrit 45.6  45.9  45.4     Platelets 244  219  281         Lab Assessment:   Relevant laboratory values have been reviewed and were discussed with the patient. No adjustments are needed for the specialty medication(s) based on the labs.     Current Medication List  This medication list has been reviewed with the patient and evaluated for any interactions or necessary modifications/recommendations, and updated to include  all prescription medications, OTC medications, and supplements the patient is currently taking.  This list reflects what is contained in the patient's profile, which has also been marked as reviewed to communicate to other providers it is the most up to date version of the patient's current medication therapy.       Current Outpatient Medications:     fluticasone (FLONASE) 50 MCG/ACT nasal spray, Instill 1 spray into the nostril(s) as directed by provider Daily. (Patient taking differently: Administer 1 spray into the nostril(s) as directed by provider Daily As Needed.), Disp: 16 mL, Rfl: 5    omeprazole (priLOSEC) 40 MG capsule, Take 1 capsule by mouth Daily. (Patient taking differently: Take 1 capsule by mouth 2 (Two) Times a Day.), Disp: 90 capsule, Rfl: 3    albuterol sulfate HFA (Ventolin HFA) 108 (90 Base) MCG/ACT inhaler, Inhale 2 puffs by mouth Every 4 (Four) Hours As Needed for Wheezing., Disp: 18 g, Rfl: 5    Benralizumab 30 MG/ML solution auto-injector, Inject 1 mL under the skin into the appropriate area as directed Every 8 (Eight) Weeks., Disp: 1 mL, Rfl: 5    Budeson-Glycopyrrol-Formoterol (BREZTRI) 160-9-4.8 MCG/ACT aerosol inhaler, Inhale 2 puffs by mouth 2 (Two) Times a Day., Disp: 10.7 g, Rfl: 5    hydrOXYzine pamoate (VISTARIL) 50 MG capsule, Take 1 capsule by mouth Every 6 (Six) Hours As Needed., Disp: , Rfl:     ipratropium-albuterol (DUO-NEB) 0.5-2.5 mg/3 ml nebulizer, Inhale contents of 1 vial through a nebulizer Every 4 (Four) Hours As Needed For Wheezing or Shortness of Breath, Disp: 180 mL, Rfl: 3    methadone (DOLOPHINE) 5 MG/5ML solution, Take 70 mL by mouth Daily., Disp: , Rfl:     montelukast (Singulair) 10 MG tablet, Take 1 tablet by mouth Every Night., Disp: 30 tablet, Rfl: 5    naloxone (NARCAN) 4 MG/0.1ML nasal spray, Administer 1 spray into the nostril(s) as directed by provider., Disp: , Rfl:     nicotine polacrilex (NICORETTE) 2 MG gum, Chew 1 piece Every 1 (One) Hour As Needed  for Smoking Cessation. (Patient not taking: Reported on 12/18/2024), Disp: 40 each, Rfl: 0    TiZANidine (ZANAFLEX) 4 MG capsule, Take 1 tablet by mouth 3 times a day as needed muscle spasm (Patient not taking: Reported on 12/18/2024), Disp: 90 capsule, Rfl: 1  No current facility-administered medications for this visit.    Medicines reviewed by Amarilis Jung RP on 1/10/2025 at  9:44 AM    Drug-Drug Interactions:   No DDI with Fasenra therapy noted     Vaccination Status   COVID 19: Vaccinated   Influenza: Vaccinated   Pneumococcal: Vaccinated   Zoster: Unknown       Adherence, Self-Administration, and Current Therapy Problems  Adherence related to the patient's specialty therapy was discussed with the patient. The Adherence segment of this outreach has been reviewed and updated.     Is there a concern with patient's ability to self administer the medication correctly and without issue?: No  Were any potential barriers to adherence identified during the initial assessment or patient education?: No  Are there any concerns regarding the patient's understanding of the importance of medication adherence?: No  Methods for Supporting Patient Adherence and/or Self-Administration: N/A    Goals of Therapy  Treatment Goals: Risk Reduction and Symptom Control   Goals related to the patient's specialty therapy were discussed with the patient. The Patient Goals segment of this outreach has been reviewed and updated.   Goals Addressed Today        Specialty Pharmacy General Goal      Decrease in severe asthma symptoms to <3x/month               Initial Education Provided for Specialty Medication:  The patient has been provided with the following education and any applicable administration techniques (i.e. self-injection) have been demonstrated for the therapies indicated. All questions and concerns have been addressed prior to the patient receiving the medication, and the patient has verbalized understanding of the  education and any materials provided.  Additional patient education shall be provided and documented upon request by the patient, provider or payer.      Initial Education Provided for Fasenra  The patient has been provided with the following education for Fasenra. All questions and concerns have been addressed prior to the patient receiving the medication, and the patient has verbalized understanding of the education and any materials provided. Additional patient education shall be provided and documented upon request by the patient, provider or payer.      The following counseling points for Fasenra (benralizumab) were addressed:  Goal of treatment:  This medication is used for the maintenance treatment of severe asthma in combination with other asthma medicines. The goal of treatment is to improve your breathing and prevent severe asthma attacks by decreasing the release of inflammatory substances in your body.  Fasenra is not a rescue treatment  Directions for use:  Fasenra is injected under your skin (subcutaneously) once every 4 weeks for 3 doses then every 8 weeks. You will receive your first injection in clinic. The injection is given into one of the following areas: thigh, abdomen (at least 2 inches away from navel), or back of upper arm if given by someone else. Remove medication from the refrigerator and allow to sit at room temperature for 30 minutes prior to injection. Avoid administration in skin that is tender, bruised, red, or hard.  If you miss a dose, take it as soon as you remember. If it is close to the time for your next dose, skip the missed dose and then go back to your normal time. If you are unsure what you should do if you miss a dose, contact clinic for instructions.   Step-by-step administration education provided in clinic while giving first injection  Adverse effects:  Possible side effects of this medication include: injection site reaction, headache, and sore throat/signs of common  cold. You have a higher chance of getting an infection so wash your hands often and stay away from people with infections, colds, or flu.  Even though it may be rare, some people may have severe side effects when taking a medication. Go to the ED or call 911 right away if you have any of the following: signs of an allergic reaction like rash; hives; wheezing; chest tightness; trouble breathing, swallowing, or talking; swelling of the mouth, face, lips, tongue, or throat.   Other considerations:  Helminth infections: it is unknown if Fasenra will influence the immune response against parasitic infections. If you have a pre-existing helminth infection, this should be treated prior to starting Fasenra. If you become infected while taking Fasenra and do not respond to anti-helminth treatment, Fasenra will need to be discontinued until the infection resolves.  Vaccines: it is recommended that you are up to date with all immunizations before starting Fasenra. Alert your doctors and pharmacist that you are taking Fasenra prior to getting any vaccines. Live attenuated vaccines should be avoided while you are treated with Fasenra (MMR, chickenpox, yellow fever, Zostavax*)  *Shingrix recommended for shingles vaccination as this is not a live vaccine    Storage/Disposal  Store in the refrigerator in the original carton until it is time to use. If needed, you may store at room temperature for up to 14 days. Do not freeze or shake.  You can dispose of the empty injector in a sharps container. If this is not available, you may use an empty hard plastic container such as a milk jug or tide container.    Identified barriers to adherence/administration:       Medication Assessment, Plan, and Follow-Up:  Medication Therapy Changes: Patient will continue Fasenra 30 mg SQ every 8 weeks for severe asthma. Medication counseling provided to patient as documented above.  Related Plans, Therapy recommendations, or Therapy Problems to Be  Addressed: None   First three injections provided in clinic previously with last one being on 8/8/23. Patient provided step-by-step demonstration of appropriate injection technique. All questions and concerns addressed. Patient reports they are comfortable administering injections at home.  Advised patient on the importance of continuing maintenance inhaler regimen and the importance of rinsing mouth after ICS use. This injection does not replace your maintenance inhaler and is not used to treat an asthma attack (use a rescue inhaler).   Pharmacist to perform regular assessments no more than (6) months from the previous assessment.  Care Coordinator to set up future refill outreaches, coordinate prescription delivery, and escalate clinical questions to pharmacist.  Welcome information and patient satisfaction survey to be sent by specialty pharmacy team with patient's initial fill.    Attestation  Therapeutic appropriateness: Appropriate   I attest the patient was actively involved in and has agreed to the above plan of care. If the prescribed therapy is at any point deemed not appropriate based on the current or future assessments, a consultation will be initiated with the patient's specialty care provider to determine the best course of action. The revised plan of therapy will be documented along with any required assessments and/or additional patient education provided. Discussed aforementioned material with patient by phone.    Amarilis Jung, MerrickD  1/10/2025   09:57 EST

## 2025-01-21 ENCOUNTER — READMISSION MANAGEMENT (OUTPATIENT)
Dept: CALL CENTER | Facility: HOSPITAL | Age: 50
End: 2025-01-21
Payer: MEDICARE

## 2025-01-21 NOTE — OUTREACH NOTE
Prep Survey      Flowsheet Row Responses   [facility]? [facility]   Is LACE score < 7 ? Non-BH Discharge   Eligibility [facility]   Date of Admission 01/17/25   Date of Discharge 01/21/25   Discharge Disposition Home or Self Care   Discharge diagnosis Pneumoatosis intestinalis/Epigastric pain   Does the patient have one of the following disease processes/diagnoses(primary or secondary)? Other   Does the patient have Home health ordered? No   Prep survey completed? Yes            KAYLA A - Registered Nurse

## 2025-01-22 ENCOUNTER — TRANSITIONAL CARE MANAGEMENT TELEPHONE ENCOUNTER (OUTPATIENT)
Dept: CALL CENTER | Facility: HOSPITAL | Age: 50
End: 2025-01-22
Payer: MEDICARE

## 2025-01-22 NOTE — OUTREACH NOTE
Call Center TCM Note      Flowsheet Row Responses   [facility] patient discharged from? Non-  []   Does the patient have one of the following disease processes/diagnoses(primary or secondary)? Other   TCM attempt successful? Yes   Call start time 0914   Call end time 0916   Discharge diagnosis Pneumoatosis intestinalis/Epigastric pain   Does the patient have all medications ordered at discharge? Yes   Is the patient taking all medications as directed (includes completed medication regime)? Yes   Does the patient have an appointment with their PCP within 7-14 days of discharge? No appointments available   Nursing Interventions Routed TCM call to PCP office   Has home health visited the patient within 72 hours of discharge? N/A   Psychosocial issues? No   What is the patient's perception of their health status since discharge? Improving   Is the patient/caregiver able to teach back signs and symptoms related to disease process for when to call PCP? Yes   Is the patient/caregiver able to teach back signs and symptoms related to disease process for when to call 911? Yes   Is the patient/caregiver able to teach back the hierarchy of who to call/visit for symptoms/problems? PCP, Specialist, Home health nurse, Urgent Care, ED, 911 Yes   TCM call completed? Yes   Wrap up additional comments Doing well, denies any questions or concerns, attempted to schedule a hospital follow up appt with PCP but no available appts, will notify office to schedule.   Call end time 0916   Would this patient benefit from a Referral to Amb Social Work? No   Is the patient interested in additional calls from an ambulatory ? No            Narda Perez RN    1/22/2025, 09:16 EST

## 2025-01-22 NOTE — PROGRESS NOTES
Spoke with patient and made an appt. Advised if any issues or concerns don't hesitate to reach out. He voiced understanding.

## 2025-01-27 ENCOUNTER — OFFICE VISIT (OUTPATIENT)
Dept: INTERNAL MEDICINE | Facility: CLINIC | Age: 50
End: 2025-01-27
Payer: MEDICARE

## 2025-01-27 VITALS
HEIGHT: 73 IN | RESPIRATION RATE: 18 BRPM | OXYGEN SATURATION: 96 % | TEMPERATURE: 98.3 F | HEART RATE: 86 BPM | SYSTOLIC BLOOD PRESSURE: 118 MMHG | WEIGHT: 166 LBS | BODY MASS INDEX: 22 KG/M2 | DIASTOLIC BLOOD PRESSURE: 86 MMHG

## 2025-01-27 DIAGNOSIS — I10 PRIMARY HYPERTENSION: ICD-10-CM

## 2025-01-27 DIAGNOSIS — J45.50 SEVERE PERSISTENT ASTHMA WITHOUT COMPLICATION: Primary | ICD-10-CM

## 2025-01-27 DIAGNOSIS — K56.609 SMALL BOWEL OBSTRUCTION: ICD-10-CM

## 2025-01-27 PROBLEM — J44.1 COPD EXACERBATION: Status: RESOLVED | Noted: 2023-08-27 | Resolved: 2025-01-27

## 2025-01-27 PROBLEM — J45.41 MODERATE PERSISTENT ASTHMA WITH EXACERBATION: Status: RESOLVED | Noted: 2023-12-07 | Resolved: 2025-01-27

## 2025-01-27 PROBLEM — J96.21 ACUTE ON CHRONIC RESPIRATORY FAILURE WITH HYPOXIA: Status: RESOLVED | Noted: 2023-04-27 | Resolved: 2025-01-27

## 2025-01-27 PROBLEM — J44.9 COPD (CHRONIC OBSTRUCTIVE PULMONARY DISEASE): Status: RESOLVED | Noted: 2023-12-08 | Resolved: 2025-01-27

## 2025-01-27 PROBLEM — J44.1 COPD WITH ACUTE EXACERBATION: Status: RESOLVED | Noted: 2024-06-16 | Resolved: 2025-01-27

## 2025-01-27 PROCEDURE — 99495 TRANSJ CARE MGMT MOD F2F 14D: CPT | Performed by: INTERNAL MEDICINE

## 2025-01-27 PROCEDURE — 3074F SYST BP LT 130 MM HG: CPT | Performed by: INTERNAL MEDICINE

## 2025-01-27 PROCEDURE — 1126F AMNT PAIN NOTED NONE PRSNT: CPT | Performed by: INTERNAL MEDICINE

## 2025-01-27 PROCEDURE — 1160F RVW MEDS BY RX/DR IN RCRD: CPT | Performed by: INTERNAL MEDICINE

## 2025-01-27 PROCEDURE — 3079F DIAST BP 80-89 MM HG: CPT | Performed by: INTERNAL MEDICINE

## 2025-01-27 PROCEDURE — 1159F MED LIST DOCD IN RCRD: CPT | Performed by: INTERNAL MEDICINE

## 2025-01-27 PROCEDURE — 1111F DSCHRG MED/CURRENT MED MERGE: CPT | Performed by: INTERNAL MEDICINE

## 2025-01-27 RX ORDER — ONDANSETRON 4 MG/1
4 TABLET, ORALLY DISINTEGRATING ORAL EVERY 6 HOURS PRN
COMMUNITY
Start: 2025-01-21 | End: 2025-01-27 | Stop reason: SDUPTHER

## 2025-01-27 RX ORDER — ONDANSETRON 4 MG/1
4 TABLET, ORALLY DISINTEGRATING ORAL EVERY 8 HOURS PRN
Qty: 30 TABLET | Refills: 1 | Status: SHIPPED | OUTPATIENT
Start: 2025-01-27
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
## 2025-02-03 ENCOUNTER — OFFICE VISIT (OUTPATIENT)
Dept: PULMONOLOGY | Facility: CLINIC | Age: 50
End: 2025-02-03
Payer: MEDICARE

## 2025-02-03 VITALS
DIASTOLIC BLOOD PRESSURE: 74 MMHG | OXYGEN SATURATION: 96 % | HEIGHT: 73 IN | WEIGHT: 165 LBS | SYSTOLIC BLOOD PRESSURE: 118 MMHG | HEART RATE: 84 BPM | BODY MASS INDEX: 21.87 KG/M2 | RESPIRATION RATE: 18 BRPM

## 2025-02-03 DIAGNOSIS — J30.9 ALLERGIC RHINITIS, UNSPECIFIED SEASONALITY, UNSPECIFIED TRIGGER: ICD-10-CM

## 2025-02-03 DIAGNOSIS — J45.50 SEVERE PERSISTENT ASTHMA WITHOUT COMPLICATION: ICD-10-CM

## 2025-02-03 DIAGNOSIS — R06.02 SOB (SHORTNESS OF BREATH): ICD-10-CM

## 2025-02-03 DIAGNOSIS — J44.89 ASTHMA WITH COPD: ICD-10-CM

## 2025-02-03 DIAGNOSIS — J44.89 ASTHMA WITH COPD: Primary | ICD-10-CM

## 2025-02-03 RX ORDER — FLUTICASONE PROPIONATE 50 MCG
1 SPRAY, SUSPENSION (ML) NASAL DAILY
Qty: 16 G | Refills: 5 | Status: SHIPPED | OUTPATIENT
Start: 2025-02-03

## 2025-02-03 RX ORDER — ALBUTEROL SULFATE 90 UG/1
2 INHALANT RESPIRATORY (INHALATION) EVERY 4 HOURS PRN
Qty: 18 G | Refills: 3 | Status: SHIPPED | OUTPATIENT
Start: 2025-02-03

## 2025-02-03 RX ORDER — ALBUTEROL SULFATE 90 UG/1
2 INHALANT RESPIRATORY (INHALATION) EVERY 4 HOURS PRN
Qty: 18 G | Refills: 5 | Status: SHIPPED | OUTPATIENT
Start: 2025-02-03

## 2025-02-03 RX ORDER — MONTELUKAST SODIUM 10 MG/1
10 TABLET ORAL NIGHTLY
Qty: 30 TABLET | Refills: 5 | Status: SHIPPED | OUTPATIENT
Start: 2025-02-03

## 2025-02-03 NOTE — PROGRESS NOTES
"Chief Complaint   Patient presents with    Breathing Problem    Follow-up         Subjective   Topher Stoll is a 49 y.o. male.   The patient comes in today for follow-up of shortness of breath.    He had a recent hospitalization at [facility] due to abdominal pain and weight loss since abdominal surgery in December 2024.    Patient says that since the last office visit he has had no recent exacerbations. he denies any emergency room visits or hospitalizations, due to his asthma.     The patient says that he is compliant with pulmonary medicines, as prescribed. He has not been using Breztri twice a day because he has been so sick with abdominal issues. He has not needed Duonebs for awhile.    He uses SALEEM 1-2 times a week.     He takes singulair nightly.     He uses flonase as needed.     He continues taking Fasenra injections on a regular basis.     He only uses oxygen as needed, but has not needed it recently.     The following portions of the patient's history were reviewed and updated as appropriate: allergies, current medications, past family history, past medical history, past social history, and past surgical history.      Review of Systems   HENT:  Negative for sinus pressure, sneezing and sore throat.    Respiratory:  Negative for cough, chest tightness, shortness of breath and wheezing.    Psychiatric/Behavioral:  Negative for sleep disturbance.        Objective   Visit Vitals  /74   Pulse 84   Resp 18   Ht 185.4 cm (73\") Comment: pt reported   Wt 74.8 kg (165 lb)   SpO2 96%   BMI 21.77 kg/m²   ============================  ============================    6 MINUTE WALK TEST    Topher Stoll   1975             BASELINE   SpO2%: 96 % RA   Heart Rate 84   Blood Pressure 118/74     EXERCISE SpO2% HEART RATE RA or O2 @ LPM   1 MINUTE 94 85 RA   2 MINUTES 95 84 RA   3 MINUTES 94 95 RA   4 MINUTES 95 88 RA   5 MINUTES 95 82 RA   6 MINUTES 92 88 RA   (Number of laps: 8 X 36 meters + Final partial " lap:  meters = 288 meters)            Distance Walked:  288 Meters   SpO2% Post Exercise:  95 %   HR Post Exercise:  78     Reason to stop (if applicable):   ____ Chest Pain   ____ Light Headedness   ____ Dyspnea Unrelieved by Rest   ____ Abnormal Gait Pattern   ____ Severe Fatigue   ____ Other (Specify: __________________________)    Tech Comments (if any):       Test performed by: LD    ============================  ============================    Physical Exam  Vitals reviewed.   HENT:      Head: Atraumatic.      Mouth/Throat:      Mouth: Mucous membranes are moist.   Eyes:      Extraocular Movements: Extraocular movements intact.   Cardiovascular:      Rate and Rhythm: Normal rate and regular rhythm.   Pulmonary:      Effort: Pulmonary effort is normal. No respiratory distress.      Breath sounds: No wheezing.   Skin:     General: Skin is warm.   Neurological:      Mental Status: He is alert and oriented to person, place, and time.         Assessment & Plan   Diagnoses and all orders for this visit:    1. Asthma with COPD (Primary)  -     Overnight Sleep Oximetry Study; Future    2. Severe persistent asthma without complication    3. COPD with acute exacerbation  -     albuterol sulfate HFA (Ventolin HFA) 108 (90 Base) MCG/ACT inhaler; Inhale 2 puffs by mouth Every 4 (Four) Hours As Needed for Wheezing.  Dispense: 18 g; Refill: 5    4. Allergic rhinitis, unspecified seasonality, unspecified trigger  -     fluticasone (FLONASE) 50 MCG/ACT nasal spray; Instill 1 spray into the nostril(s) as directed by provider Daily.  Dispense: 16 g; Refill: 5    5. SOB (shortness of breath)  -     Converted Six Minute Walk    Other orders  -     Budeson-Glycopyrrol-Formoterol (BREZTRI) 160-9-4.8 MCG/ACT aerosol inhaler; Inhale 2 puffs by mouth 2 (Two) Times a Day.  Dispense: 10.7 g; Refill: 5  -     montelukast (Singulair) 10 MG tablet; Take 1 tablet by mouth Every Night.  Dispense: 30 tablet; Refill: 5           Return for keep  appt in June.    DISCUSSION (if any):  PFT today consistent with moderate obstruction. No restriction with air trapping suggested. Preserved diffusion capacity.    FeNO today 111 ppb.     Overnight pulse oximetry ordered to eval oxygen needs at night.     On 6 MWT, he did not display exercise hypoxemia.    I reviewed his labs and discussed the results with him returned within normal limits.     Latest Reference Range & Units 09/18/24 15:06   Cat Dander Class 0 kU/L <0.10   Dog Dander, IgE Class 0 kU/L <0.10   English Plantain Class 0 kU/L <0.10   Nettle Class 0 kU/L <0.10   Sweet Gum Class 0 kU/L <0.10   [place] Grass Class 0 kU/L <0.10   Bermuda Grass Class 0 kU/L <0.10   [place] Bluegrass IgE Class 0 kU/L <0.10   Sheep Sorrel Class 0 kU/L <0.10   Ar Grass Class 0 kU/L <0.10   Cockroach, American Class 0 kU/L <0.10   D. farinae (dust mite) Class 0 kU/L <0.10   D. pteronyssinus (dust mite) Class 0 kU/L <0.10   Class Description  Comment   Aspergillus fumigatus Class 0 kU/L <0.10   Cladosporium herbarum Class 0 kU/L <0.10   Mucor racemosus Class 0 kU/L <0.10   Stemphylium herbarum Class 0 kU/L <0.10   Barron, Mountain Class 0 kU/L <0.10   Elm, American Class 0 kU/L <0.10   Hazelnut Tree Class 0 kU/L <0.10   [place], White Class 0 kU/L <0.10   Maple/Iredell Class 0 kU/L <0.10   Maple Carman Lester Class 0 kU/L <0.10   [place], White Class 0 kU/L <0.10   White [place] Class 0 kU/L <0.10   Mugwort Class 0 kU/L <0.10   Pigweed, Rough/Common Class 0 kU/L <0.10   Ragweed, Common/Short Class 0 kU/L <0.10      Latest Reference Range & Units 09/18/24 15:06   IgE 6 - 495 IU/mL 65     His symptoms of asthma are under fair control at this time.  I have advised him to resume Breztri on a regular basis.  FeNO level continues to be elevated.  He has been sick and experienced nausea and vomiting and therefore had only been using Breztri occasionally.    Continue rescue medication as needed.    Continue Fasenra injection on a  regular basis.  His symptoms have improved since starting Fasenra.    Patient's medications for underlying asthma were reviewed with him in great detail.    Any needed adjustments to his pulmonary medications, either for clinical or insurance coverage reasons, have been made and are reflected in the orders.    Side effects of prescribed medications discussed with the patient.    Asthma action plan with discussed with him.    The patient was asked to call this office if the symptoms worsen.       Dictated utilizing Dragon dictation.    This document was electronically signed by LISA Hernandez February 3, 2025  14:46 EST

## 2025-02-11 ENCOUNTER — READMISSION MANAGEMENT (OUTPATIENT)
Dept: CALL CENTER | Facility: HOSPITAL | Age: 50
End: 2025-02-11
Payer: MEDICARE

## 2025-02-12 ENCOUNTER — TRANSITIONAL CARE MANAGEMENT TELEPHONE ENCOUNTER (OUTPATIENT)
Dept: CALL CENTER | Facility: HOSPITAL | Age: 50
End: 2025-02-12
Payer: MEDICARE

## 2025-02-12 ENCOUNTER — SPECIALTY PHARMACY (OUTPATIENT)
Dept: PHARMACY | Facility: HOSPITAL | Age: 50
End: 2025-02-12
Payer: MEDICARE

## 2025-02-12 NOTE — OUTREACH NOTE
Call Center TCM Note      Flowsheet Row Responses   [facility] patient discharged from? Non-   Does the patient have one of the following disease processes/diagnoses(primary or secondary)? Other   TCM attempt successful? Yes   Call start time 1053   Discharge diagnosis Small bowel obstruction   Meds reviewed with patient/caregiver? Yes   Is the patient taking all medications as directed (includes completed medication regime)? Yes   Medication comments using MOM   Comments Pt eligible for TCM f/u appt   Does the patient have an appointment with their PCP within 7-14 days of discharge? No   Nursing Interventions Patient declined scheduling/rescheduling appointment at this time, Patient desires to follow up with specialty only   DME comments Wearing night O2 @2L   Comments Pt reports that he continues having intake, following a full liquid and pureed diet. Pt denies N/V/D, he c/o ongoing constipation that he manages with OTC MOM. Pt c/o ongoing intermittent abdominal pain that is random, not reproduceable. Pt educated on importance of maintaining hydration, pt v/u. Pt is urinating WNL, he reports.   Did the patient receive a copy of their discharge instructions? Yes   What is the patient's perception of their health status since discharge? Improving   Is the patient/caregiver able to teach back signs and symptoms related to disease process for when to call PCP? Yes   Is the patient/caregiver able to teach back signs and symptoms related to disease process for when to call 911? Yes   TCM call completed? Yes            Charla Ham RN    2/12/2025, 11:04 EST

## 2025-02-12 NOTE — PROGRESS NOTES
Specialty Pharmacy Patient Management Program  Refill Outreach     Topher was contacted today regarding refills of their medication(s).    Refill Questions      Flowsheet Row Most Recent Value   Changes to allergies? No   Changes to medications? No   New conditions or infections since last clinic visit No   Unplanned office visit, urgent care, ED, or hospital admission in the last 4 weeks  No   How does patient/caregiver feel medication is working? Excellent   Financial problems or insurance changes  No   Since the previous refill, were any specialty medication doses or scheduled injections missed or delayed?  No   Does this patient require a clinical escalation to a pharmacist? No            Delivery Questions      Flowsheet Row Most Recent Value   Delivery method  at Pharmacy   Delivery address verified with patient/caregiver? Yes   Delivery address Other (Comment)  [pickup]   Number of medications in delivery 1   Medication(s) being filled and delivered Benralizumab   Doses left of specialty medications 2   Copay amount $0.00   Copay form of payment No copayment ($0)   Ship Date n/a, pickup   Delivery Date Selection --  [pickup]   Signature Required Yes            PT PICKED UP AT [facility] 02/10     Follow-up: 8 week(s)     Aimee Arana MA  2/12/2025  11:25 EST

## 2025-02-12 NOTE — OUTREACH NOTE
Prep Survey      Flowsheet Row Responses   [facility]? [facility]   Is LACE score < 7 ? Non-BH Discharge   Eligibility [facility]   Date of Admission 02/04/25   Date of Discharge 02/11/25   Discharge Disposition Home or Self Care   Discharge diagnosis Small bowel obstruction   Does the patient have one of the following disease processes/diagnoses(primary or secondary)? Other   Does the patient have Home health ordered? No   Prep survey completed? Yes            Akua Ashby Registered Nurse

## 2025-03-04 ENCOUNTER — TELEPHONE (OUTPATIENT)
Dept: PULMONOLOGY | Facility: CLINIC | Age: 50
End: 2025-03-04
Payer: MEDICARE

## 2025-03-04 RX ORDER — PREDNISONE 20 MG/1
40 TABLET ORAL DAILY
Qty: 10 TABLET | Refills: 0 | Status: SHIPPED | OUTPATIENT
Start: 2025-03-04

## 2025-03-04 NOTE — TELEPHONE ENCOUNTER
PATIENT IS AWARE PREDNISONE HAS BEEN SENT TO PHARMACY AND STATES HE IS USING HIS BREZTRI  2 PUFFS IN THE MORNING AND 2 PUFFS AT NIGHT.

## 2025-03-04 NOTE — TELEPHONE ENCOUNTER
Caller: Topher Stoll    Relationship to patient: Self    Best call back number: [phone]     Patient is needing: PT IS WANTING TO KNOW IF WE CAN CALL HIM A STEROID. HE COUGHS AND HAS A RUNNY NOSE EVERY TIME HE GOES OUTSIDE             [facility] - [place] [id number]  Phone: [phone]  Fax: [phone]

## 2025-03-07 NOTE — TELEPHONE ENCOUNTER
Caller: Topher Stoll    Relationship: Self    Best call back number: [phone]     Requested Prescriptions:   Requested Prescriptions     Pending Prescriptions Disp Refills    omeprazole (priLOSEC) 40 MG capsule 90 capsule 3     Sig: Take 1 capsule by mouth Daily.        Pharmacy where request should be sent: [facility]     Last office visit with prescribing clinician: 1/27/2025   Last telemedicine visit with prescribing clinician: Visit date not found   Next office visit with prescribing clinician: 7/28/2025     Additional details provided by patient: PHARMACY STATES THEY NEED A NEW PRESCRIPTION IN ORDER TO FILL THIS. PATIENT IS UNSURE IF HE IS TO BE TAKING ONE OR TWO CAPSULES A DAY.    Does the patient have less than a 3 day supply:  [x] Yes  [] No    Would you like a call back once the refill request has been completed: [] Yes [x] No    If the office needs to give you a call back, can they leave a voicemail: [] Yes [x] No    Edelmira Davidson   03/07/25 13:27 EST

## 2025-03-08 RX ORDER — OMEPRAZOLE 40 MG/1
40 CAPSULE, DELAYED RELEASE ORAL DAILY
Qty: 90 CAPSULE | Refills: 3 | Status: SHIPPED | OUTPATIENT
Start: 2025-03-08

## 2025-03-18 DIAGNOSIS — J44.1 CHRONIC OBSTRUCTIVE PULMONARY DISEASE WITH ACUTE EXACERBATION: Primary | ICD-10-CM

## 2025-03-19 DIAGNOSIS — J44.89 ASTHMA WITH COPD: ICD-10-CM

## 2025-03-25 ENCOUNTER — TELEPHONE (OUTPATIENT)
Dept: PULMONOLOGY | Facility: CLINIC | Age: 50
End: 2025-03-25

## 2025-03-25 NOTE — TELEPHONE ENCOUNTER
Hub staff attempted to follow warm transfer process and was unsuccessful     Caller: CARMEL    Relationship to patient:     Best call back number: [phone]    Patient is needing: CARMEL HAS QUESTIONS ABOUT ORDERS FOR PT

## 2025-04-08 ENCOUNTER — SPECIALTY PHARMACY (OUTPATIENT)
Dept: PHARMACY | Facility: HOSPITAL | Age: 50
End: 2025-04-08
Payer: MEDICARE

## 2025-04-08 NOTE — PROGRESS NOTES
Specialty Pharmacy Patient Management Program  Refill Outreach     Topher was contacted today regarding refills of their medication(s).    Refill Questions      Flowsheet Row Most Recent Value   Changes to allergies? No   Changes to medications? No   New conditions or infections since last clinic visit No   Unplanned office visit, urgent care, ED, or hospital admission in the last 4 weeks  No   How does patient/caregiver feel medication is working? Excellent   Financial problems or insurance changes  No   Since the previous refill, were any specialty medication doses or scheduled injections missed or delayed?  No   Does this patient require a clinical escalation to a pharmacist? No            Delivery Questions      Flowsheet Row Most Recent Value   Delivery method  at Pharmacy   Medication(s) being filled and delivered Benralizumab   Doses left of specialty medications 2   Copay verified? Yes   Copay amount $0   Copay form of payment No copayment ($0)   Signature Required Yes   Do you consent to receive electronic handouts?  No                 Follow-up: 8 week(s)     Aimee Arana MA  4/8/2025  08:52 EDT

## 2025-04-15 ENCOUNTER — TELEPHONE (OUTPATIENT)
Dept: PULMONOLOGY | Facility: CLINIC | Age: 50
End: 2025-04-15
Payer: MEDICARE

## 2025-04-15 NOTE — TELEPHONE ENCOUNTER
We had received a call from Frank at [facility] about this patients oxygen stating that it was not approved and patient was going to need to do a titration study due to medicare guidelines.    After speaking with the patient he stated that he has not been on a machine for years due to not being able to tolerate a machine.(This is mentioned in his note from 9/26/2022). He has only been on oxygen since then. Patient also has oxygen 24/7 or with exertion and at night. Patient has a POC through [employer].     I have called [facility] back to explain this to them about the patient and that he only has Oxygen for exertion and at night and patient is NOT on a CPAP machine.     Patient stated that he does not have any issues and is only using [employer].

## 2025-04-24 ENCOUNTER — HOSPITAL ENCOUNTER (INPATIENT)
Facility: HOSPITAL | Age: 50
LOS: 1 days | Discharge: HOME | End: 2025-04-25
Attending: EMERGENCY MEDICINE | Admitting: FAMILY MEDICINE
Payer: MEDICARE

## 2025-04-24 ENCOUNTER — APPOINTMENT (OUTPATIENT)
Dept: GENERAL RADIOLOGY | Facility: HOSPITAL | Age: 50
End: 2025-04-24
Payer: MEDICARE

## 2025-04-24 ENCOUNTER — APPOINTMENT (OUTPATIENT)
Dept: CT IMAGING | Facility: HOSPITAL | Age: 50
End: 2025-04-24
Payer: MEDICARE

## 2025-04-24 DIAGNOSIS — F41.0 PANIC ATTACKS: Chronic | ICD-10-CM

## 2025-04-24 DIAGNOSIS — J44.1 COPD EXACERBATION: Primary | ICD-10-CM

## 2025-04-24 DIAGNOSIS — F11.20 OPIOID DEPENDENCE ON AGONIST THERAPY: ICD-10-CM

## 2025-04-24 LAB
ALBUMIN SERPL-MCNC: 4.6 G/DL (ref 3.5–5.2)
ALBUMIN/GLOB SERPL: 1.4 G/DL
ALP SERPL-CCNC: 175 U/L (ref 39–117)
ALT SERPL W P-5'-P-CCNC: 17 U/L (ref 1–41)
ANION GAP SERPL CALCULATED.3IONS-SCNC: 12.5 MMOL/L (ref 5–15)
AST SERPL-CCNC: 19 U/L (ref 1–40)
BASOPHILS # BLD AUTO: 0.01 10*3/MM3 (ref 0–0.2)
BASOPHILS NFR BLD AUTO: 0.1 % (ref 0–1.5)
BILIRUB SERPL-MCNC: 0.4 MG/DL (ref 0–1.2)
BUN SERPL-MCNC: 9 MG/DL (ref 6–20)
BUN/CREAT SERPL: 9.8 (ref 7–25)
CALCIUM SPEC-SCNC: 9.5 MG/DL (ref 8.6–10.5)
CHLORIDE SERPL-SCNC: 99 MMOL/L (ref 98–107)
CO2 SERPL-SCNC: 28.5 MMOL/L (ref 22–29)
CREAT SERPL-MCNC: 0.92 MG/DL (ref 0.76–1.27)
D-LACTATE SERPL-SCNC: 1.4 MMOL/L (ref 0.5–2)
DEPRECATED RDW RBC AUTO: 42.5 FL (ref 37–54)
EGFRCR SERPLBLD CKD-EPI 2021: 101.3 ML/MIN/1.73
EOSINOPHIL # BLD AUTO: 0 10*3/MM3 (ref 0–0.4)
EOSINOPHIL NFR BLD AUTO: 0 % (ref 0.3–6.2)
ERYTHROCYTE [DISTWIDTH] IN BLOOD BY AUTOMATED COUNT: 15.3 % (ref 12.3–15.4)
FLUAV SUBTYP SPEC NAA+PROBE: NOT DETECTED
FLUBV RNA ISLT QL NAA+PROBE: NOT DETECTED
GEN 5 1HR TROPONIN T REFLEX: <6 NG/L
GLOBULIN UR ELPH-MCNC: 3.2 GM/DL
GLUCOSE BLDC GLUCOMTR-MCNC: 110 MG/DL (ref 70–130)
GLUCOSE SERPL-MCNC: 112 MG/DL (ref 65–99)
HCT VFR BLD AUTO: 54.2 % (ref 37.5–51)
HGB BLD-MCNC: 17.1 G/DL (ref 13–17.7)
HOLD SPECIMEN: NORMAL
HOLD SPECIMEN: NORMAL
IMM GRANULOCYTES # BLD AUTO: 0.05 10*3/MM3 (ref 0–0.05)
IMM GRANULOCYTES NFR BLD AUTO: 0.5 % (ref 0–0.5)
LYMPHOCYTES # BLD AUTO: 2.55 10*3/MM3 (ref 0.7–3.1)
LYMPHOCYTES NFR BLD AUTO: 23.7 % (ref 19.6–45.3)
MCH RBC QN AUTO: 25.4 PG (ref 26.6–33)
MCHC RBC AUTO-ENTMCNC: 31.5 G/DL (ref 31.5–35.7)
MCV RBC AUTO: 80.4 FL (ref 79–97)
MONOCYTES # BLD AUTO: 0.94 10*3/MM3 (ref 0.1–0.9)
MONOCYTES NFR BLD AUTO: 8.7 % (ref 5–12)
NEUTROPHILS NFR BLD AUTO: 67 % (ref 42.7–76)
NEUTROPHILS NFR BLD AUTO: 7.2 10*3/MM3 (ref 1.7–7)
NRBC BLD AUTO-RTO: 0 /100 WBC (ref 0–0.2)
NT-PROBNP SERPL-MCNC: 255.1 PG/ML (ref 0–900)
PLATELET # BLD AUTO: 304 10*3/MM3 (ref 140–450)
PMV BLD AUTO: 10.4 FL (ref 6–12)
POTASSIUM SERPL-SCNC: 3.9 MMOL/L (ref 3.5–5.2)
PROCALCITONIN SERPL-MCNC: 0.04 NG/ML (ref 0–0.25)
PROT SERPL-MCNC: 7.8 G/DL (ref 6–8.5)
RBC # BLD AUTO: 6.74 10*6/MM3 (ref 4.14–5.8)
SARS-COV-2 RNA RESP QL NAA+PROBE: NOT DETECTED
SODIUM SERPL-SCNC: 140 MMOL/L (ref 136–145)
TROPONIN T NUMERIC DELTA: NORMAL
TROPONIN T SERPL HS-MCNC: 7 NG/L
WBC NRBC COR # BLD AUTO: 10.75 10*3/MM3 (ref 3.4–10.8)
WHOLE BLOOD HOLD COAG: NORMAL
WHOLE BLOOD HOLD SPECIMEN: NORMAL

## 2025-04-24 PROCEDURE — 80053 COMPREHEN METABOLIC PANEL: CPT | Performed by: EMERGENCY MEDICINE

## 2025-04-24 PROCEDURE — 25010000002 DOXYCYCLINE 100 MG RECONSTITUTED SOLUTION 1 EACH VIAL: Performed by: FAMILY MEDICINE

## 2025-04-24 PROCEDURE — 25010000002 AMPICILLIN-SULBACTAM PER 1.5 G: Performed by: FAMILY MEDICINE

## 2025-04-24 PROCEDURE — 84145 PROCALCITONIN (PCT): CPT | Performed by: PHYSICIAN ASSISTANT

## 2025-04-24 PROCEDURE — 85025 COMPLETE CBC W/AUTO DIFF WBC: CPT | Performed by: EMERGENCY MEDICINE

## 2025-04-24 PROCEDURE — 94799 UNLISTED PULMONARY SVC/PX: CPT

## 2025-04-24 PROCEDURE — 25010000002 HYDROMORPHONE 1 MG/ML SOLUTION: Performed by: EMERGENCY MEDICINE

## 2025-04-24 PROCEDURE — 25510000001 IOPAMIDOL 61 % SOLUTION: Performed by: EMERGENCY MEDICINE

## 2025-04-24 PROCEDURE — 82948 REAGENT STRIP/BLOOD GLUCOSE: CPT

## 2025-04-24 PROCEDURE — 25010000002 PROCHLORPERAZINE 10 MG/2ML SOLUTION: Performed by: EMERGENCY MEDICINE

## 2025-04-24 PROCEDURE — 94640 AIRWAY INHALATION TREATMENT: CPT

## 2025-04-24 PROCEDURE — 83605 ASSAY OF LACTIC ACID: CPT | Performed by: PHYSICIAN ASSISTANT

## 2025-04-24 PROCEDURE — 99285 EMERGENCY DEPT VISIT HI MDM: CPT | Performed by: EMERGENCY MEDICINE

## 2025-04-24 PROCEDURE — 87636 SARSCOV2 & INF A&B AMP PRB: CPT | Performed by: EMERGENCY MEDICINE

## 2025-04-24 PROCEDURE — 93005 ELECTROCARDIOGRAM TRACING: CPT | Performed by: EMERGENCY MEDICINE

## 2025-04-24 PROCEDURE — 74177 CT ABD & PELVIS W/CONTRAST: CPT

## 2025-04-24 PROCEDURE — 25010000002 KETOROLAC TROMETHAMINE PER 15 MG: Performed by: PHYSICIAN ASSISTANT

## 2025-04-24 PROCEDURE — 25010000002 DIPHENHYDRAMINE PER 50 MG: Performed by: EMERGENCY MEDICINE

## 2025-04-24 PROCEDURE — 25010000002 METHYLPREDNISOLONE PER 125 MG: Performed by: PHYSICIAN ASSISTANT

## 2025-04-24 PROCEDURE — 94760 N-INVAS EAR/PLS OXIMETRY 1: CPT

## 2025-04-24 PROCEDURE — 84484 ASSAY OF TROPONIN QUANT: CPT | Performed by: EMERGENCY MEDICINE

## 2025-04-24 PROCEDURE — 94761 N-INVAS EAR/PLS OXIMETRY MLT: CPT

## 2025-04-24 PROCEDURE — 83880 ASSAY OF NATRIURETIC PEPTIDE: CPT | Performed by: EMERGENCY MEDICINE

## 2025-04-24 PROCEDURE — 71045 X-RAY EXAM CHEST 1 VIEW: CPT

## 2025-04-24 PROCEDURE — 99222 1ST HOSP IP/OBS MODERATE 55: CPT | Performed by: FAMILY MEDICINE

## 2025-04-24 PROCEDURE — 25010000002 ONDANSETRON PER 1 MG: Performed by: PHYSICIAN ASSISTANT

## 2025-04-24 RX ORDER — IOPAMIDOL 612 MG/ML
100 INJECTION, SOLUTION INTRAVASCULAR
Status: COMPLETED | OUTPATIENT
Start: 2025-04-24 | End: 2025-04-24

## 2025-04-24 RX ORDER — MONTELUKAST SODIUM 10 MG/1
10 TABLET ORAL NIGHTLY
Status: DISCONTINUED | OUTPATIENT
Start: 2025-04-24 | End: 2025-04-25 | Stop reason: HOSPADM

## 2025-04-24 RX ORDER — IPRATROPIUM BROMIDE AND ALBUTEROL SULFATE 2.5; .5 MG/3ML; MG/3ML
3 SOLUTION RESPIRATORY (INHALATION)
Status: DISCONTINUED | OUTPATIENT
Start: 2025-04-24 | End: 2025-04-25 | Stop reason: HOSPADM

## 2025-04-24 RX ORDER — IPRATROPIUM BROMIDE AND ALBUTEROL SULFATE 2.5; .5 MG/3ML; MG/3ML
3 SOLUTION RESPIRATORY (INHALATION)
Status: DISCONTINUED | OUTPATIENT
Start: 2025-04-24 | End: 2025-04-24

## 2025-04-24 RX ORDER — METHYLPREDNISOLONE SODIUM SUCCINATE 125 MG/2ML
125 INJECTION, POWDER, LYOPHILIZED, FOR SOLUTION INTRAMUSCULAR; INTRAVENOUS ONCE
Status: COMPLETED | OUTPATIENT
Start: 2025-04-24 | End: 2025-04-24

## 2025-04-24 RX ORDER — FLUTICASONE PROPIONATE 50 MCG
1 SPRAY, SUSPENSION (ML) NASAL DAILY
Status: DISCONTINUED | OUTPATIENT
Start: 2025-04-24 | End: 2025-04-25 | Stop reason: HOSPADM

## 2025-04-24 RX ORDER — KETOROLAC TROMETHAMINE 30 MG/ML
15 INJECTION, SOLUTION INTRAMUSCULAR; INTRAVENOUS ONCE
Status: COMPLETED | OUTPATIENT
Start: 2025-04-24 | End: 2025-04-24

## 2025-04-24 RX ORDER — PANTOPRAZOLE SODIUM 40 MG/1
40 TABLET, DELAYED RELEASE ORAL
Status: DISCONTINUED | OUTPATIENT
Start: 2025-04-25 | End: 2025-04-25 | Stop reason: HOSPADM

## 2025-04-24 RX ORDER — IPRATROPIUM BROMIDE AND ALBUTEROL SULFATE 2.5; .5 MG/3ML; MG/3ML
3 SOLUTION RESPIRATORY (INHALATION) ONCE
Status: COMPLETED | OUTPATIENT
Start: 2025-04-24 | End: 2025-04-24

## 2025-04-24 RX ORDER — ACETAMINOPHEN 325 MG/1
650 TABLET ORAL EVERY 6 HOURS PRN
Status: DISCONTINUED | OUTPATIENT
Start: 2025-04-24 | End: 2025-04-25 | Stop reason: HOSPADM

## 2025-04-24 RX ORDER — ONDANSETRON 2 MG/ML
4 INJECTION INTRAMUSCULAR; INTRAVENOUS ONCE
Status: COMPLETED | OUTPATIENT
Start: 2025-04-24 | End: 2025-04-24

## 2025-04-24 RX ORDER — ALBUTEROL SULFATE 0.83 MG/ML
2.5 SOLUTION RESPIRATORY (INHALATION) EVERY 4 HOURS PRN
Status: DISCONTINUED | OUTPATIENT
Start: 2025-04-24 | End: 2025-04-25 | Stop reason: HOSPADM

## 2025-04-24 RX ORDER — METHYLPREDNISOLONE SODIUM SUCCINATE 125 MG/2ML
62.5 INJECTION, POWDER, LYOPHILIZED, FOR SOLUTION INTRAMUSCULAR; INTRAVENOUS DAILY
Status: DISCONTINUED | OUTPATIENT
Start: 2025-04-25 | End: 2025-04-25 | Stop reason: HOSPADM

## 2025-04-24 RX ORDER — GUAIFENESIN/DEXTROMETHORPHAN 100-10MG/5
10 SYRUP ORAL EVERY 6 HOURS PRN
Status: DISCONTINUED | OUTPATIENT
Start: 2025-04-24 | End: 2025-04-25 | Stop reason: HOSPADM

## 2025-04-24 RX ORDER — ONDANSETRON 4 MG/1
4 TABLET, ORALLY DISINTEGRATING ORAL EVERY 8 HOURS PRN
Status: DISCONTINUED | OUTPATIENT
Start: 2025-04-24 | End: 2025-04-25 | Stop reason: HOSPADM

## 2025-04-24 RX ORDER — HYDROXYZINE PAMOATE 25 MG/1
50 CAPSULE ORAL EVERY 6 HOURS PRN
Status: DISCONTINUED | OUTPATIENT
Start: 2025-04-24 | End: 2025-04-25 | Stop reason: HOSPADM

## 2025-04-24 RX ORDER — DIPHENHYDRAMINE HYDROCHLORIDE 50 MG/ML
25 INJECTION, SOLUTION INTRAMUSCULAR; INTRAVENOUS ONCE
Status: COMPLETED | OUTPATIENT
Start: 2025-04-24 | End: 2025-04-24

## 2025-04-24 RX ORDER — GUAIFENESIN 600 MG/1
1200 TABLET, EXTENDED RELEASE ORAL EVERY 12 HOURS SCHEDULED
Status: DISCONTINUED | OUTPATIENT
Start: 2025-04-24 | End: 2025-04-25 | Stop reason: HOSPADM

## 2025-04-24 RX ORDER — IPRATROPIUM BROMIDE AND ALBUTEROL SULFATE 2.5; .5 MG/3ML; MG/3ML
9 SOLUTION RESPIRATORY (INHALATION) ONCE
Status: COMPLETED | OUTPATIENT
Start: 2025-04-24 | End: 2025-04-24

## 2025-04-24 RX ORDER — SODIUM CHLORIDE 0.9 % (FLUSH) 0.9 %
10 SYRINGE (ML) INJECTION AS NEEDED
Status: DISCONTINUED | OUTPATIENT
Start: 2025-04-24 | End: 2025-04-25 | Stop reason: HOSPADM

## 2025-04-24 RX ORDER — PROCHLORPERAZINE EDISYLATE 5 MG/ML
5 INJECTION INTRAMUSCULAR; INTRAVENOUS EVERY 6 HOURS PRN
Status: DISCONTINUED | OUTPATIENT
Start: 2025-04-24 | End: 2025-04-25 | Stop reason: HOSPADM

## 2025-04-24 RX ADMIN — DIPHENHYDRAMINE HYDROCHLORIDE 25 MG: 50 INJECTION, SOLUTION INTRAMUSCULAR; INTRAVENOUS at 10:50

## 2025-04-24 RX ADMIN — IPRATROPIUM BROMIDE AND ALBUTEROL SULFATE 9 ML: 2.5; .5 SOLUTION RESPIRATORY (INHALATION) at 09:34

## 2025-04-24 RX ADMIN — Medication 10 ML: at 20:29

## 2025-04-24 RX ADMIN — HYDROMORPHONE HYDROCHLORIDE 1 MG: 1 INJECTION, SOLUTION INTRAMUSCULAR; INTRAVENOUS; SUBCUTANEOUS at 10:49

## 2025-04-24 RX ADMIN — AMPICILLIN SODIUM AND SULBACTAM SODIUM 3000 MG: 2; 1 INJECTION, POWDER, FOR SOLUTION INTRAMUSCULAR; INTRAVENOUS at 14:15

## 2025-04-24 RX ADMIN — AMPICILLIN SODIUM AND SULBACTAM SODIUM 3000 MG: 2; 1 INJECTION, POWDER, FOR SOLUTION INTRAMUSCULAR; INTRAVENOUS at 20:30

## 2025-04-24 RX ADMIN — METHYLPREDNISOLONE SODIUM SUCCINATE 125 MG: 125 INJECTION, POWDER, FOR SOLUTION INTRAMUSCULAR; INTRAVENOUS at 08:57

## 2025-04-24 RX ADMIN — DOXYCYCLINE 100 MG: 100 INJECTION, POWDER, LYOPHILIZED, FOR SOLUTION INTRAVENOUS at 15:38

## 2025-04-24 RX ADMIN — IOPAMIDOL 100 ML: 612 INJECTION, SOLUTION INTRAVENOUS at 11:16

## 2025-04-24 RX ADMIN — IPRATROPIUM BROMIDE AND ALBUTEROL SULFATE 3 ML: 2.5; .5 SOLUTION RESPIRATORY (INHALATION) at 08:50

## 2025-04-24 RX ADMIN — FLUTICASONE PROPIONATE 1 SPRAY: 50 SPRAY, METERED NASAL at 14:15

## 2025-04-24 RX ADMIN — IPRATROPIUM BROMIDE AND ALBUTEROL SULFATE 3 ML: 2.5; .5 SOLUTION RESPIRATORY (INHALATION) at 19:16

## 2025-04-24 RX ADMIN — KETOROLAC TROMETHAMINE 15 MG: 30 INJECTION, SOLUTION INTRAMUSCULAR; INTRAVENOUS at 08:57

## 2025-04-24 RX ADMIN — GUAIFENESIN 1200 MG: 600 TABLET, EXTENDED RELEASE ORAL at 14:15

## 2025-04-24 RX ADMIN — MONTELUKAST 10 MG: 10 TABLET, FILM COATED ORAL at 20:29

## 2025-04-24 RX ADMIN — ONDANSETRON 4 MG: 2 INJECTION INTRAMUSCULAR; INTRAVENOUS at 08:57

## 2025-04-24 RX ADMIN — GUAIFENESIN 1200 MG: 600 TABLET, EXTENDED RELEASE ORAL at 20:29

## 2025-04-24 RX ADMIN — PROCHLORPERAZINE EDISYLATE 5 MG: 5 INJECTION INTRAMUSCULAR; INTRAVENOUS at 10:49

## 2025-04-24 NOTE — PLAN OF CARE
Goal Outcome Evaluation:      New admission from ED. IV antibiotics initiated. VSS. On 2L nasal cannula. No complaints from patient at this time. Plan of care ongoing.
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
## 2025-04-25 ENCOUNTER — READMISSION MANAGEMENT (OUTPATIENT)
Dept: CALL CENTER | Facility: HOSPITAL | Age: 50
End: 2025-04-25
Payer: MEDICARE

## 2025-04-25 VITALS
SYSTOLIC BLOOD PRESSURE: 129 MMHG | TEMPERATURE: 98 F | HEIGHT: 73 IN | BODY MASS INDEX: 23.26 KG/M2 | DIASTOLIC BLOOD PRESSURE: 76 MMHG | OXYGEN SATURATION: 95 % | WEIGHT: 175.49 LBS | RESPIRATION RATE: 16 BRPM | HEART RATE: 64 BPM

## 2025-04-25 PROBLEM — F41.0 PANIC ATTACKS: Chronic | Status: ACTIVE | Noted: 2025-04-25

## 2025-04-25 LAB
ANION GAP SERPL CALCULATED.3IONS-SCNC: 11.3 MMOL/L (ref 5–15)
BASOPHILS # BLD AUTO: 0.01 10*3/MM3 (ref 0–0.2)
BASOPHILS NFR BLD AUTO: 0.1 % (ref 0–1.5)
BUN SERPL-MCNC: 17 MG/DL (ref 6–20)
BUN/CREAT SERPL: 25.8 (ref 7–25)
CALCIUM SPEC-SCNC: 9.6 MG/DL (ref 8.6–10.5)
CHLORIDE SERPL-SCNC: 95 MMOL/L (ref 98–107)
CO2 SERPL-SCNC: 29.7 MMOL/L (ref 22–29)
CREAT SERPL-MCNC: 0.66 MG/DL (ref 0.76–1.27)
DEPRECATED RDW RBC AUTO: 41.3 FL (ref 37–54)
EGFRCR SERPLBLD CKD-EPI 2021: 114.3 ML/MIN/1.73
EOSINOPHIL # BLD AUTO: 0 10*3/MM3 (ref 0–0.4)
EOSINOPHIL NFR BLD AUTO: 0 % (ref 0.3–6.2)
ERYTHROCYTE [DISTWIDTH] IN BLOOD BY AUTOMATED COUNT: 14.6 % (ref 12.3–15.4)
GLUCOSE SERPL-MCNC: 108 MG/DL (ref 65–99)
HCT VFR BLD AUTO: 47.6 % (ref 37.5–51)
HGB BLD-MCNC: 15.6 G/DL (ref 13–17.7)
IMM GRANULOCYTES # BLD AUTO: 0.06 10*3/MM3 (ref 0–0.05)
IMM GRANULOCYTES NFR BLD AUTO: 0.4 % (ref 0–0.5)
LYMPHOCYTES # BLD AUTO: 1.81 10*3/MM3 (ref 0.7–3.1)
LYMPHOCYTES NFR BLD AUTO: 13.6 % (ref 19.6–45.3)
MAGNESIUM SERPL-MCNC: 2.1 MG/DL (ref 1.6–2.6)
MCH RBC QN AUTO: 25.7 PG (ref 26.6–33)
MCHC RBC AUTO-ENTMCNC: 32.8 G/DL (ref 31.5–35.7)
MCV RBC AUTO: 78.5 FL (ref 79–97)
MONOCYTES # BLD AUTO: 1.05 10*3/MM3 (ref 0.1–0.9)
MONOCYTES NFR BLD AUTO: 7.9 % (ref 5–12)
NEUTROPHILS NFR BLD AUTO: 10.42 10*3/MM3 (ref 1.7–7)
NEUTROPHILS NFR BLD AUTO: 78 % (ref 42.7–76)
NRBC BLD AUTO-RTO: 0 /100 WBC (ref 0–0.2)
PLATELET # BLD AUTO: 296 10*3/MM3 (ref 140–450)
PMV BLD AUTO: 10.4 FL (ref 6–12)
POTASSIUM SERPL-SCNC: 3.8 MMOL/L (ref 3.5–5.2)
RBC # BLD AUTO: 6.06 10*6/MM3 (ref 4.14–5.8)
SODIUM SERPL-SCNC: 136 MMOL/L (ref 136–145)
WBC NRBC COR # BLD AUTO: 13.35 10*3/MM3 (ref 3.4–10.8)

## 2025-04-25 PROCEDURE — 94799 UNLISTED PULMONARY SVC/PX: CPT

## 2025-04-25 PROCEDURE — 80048 BASIC METABOLIC PNL TOTAL CA: CPT | Performed by: FAMILY MEDICINE

## 2025-04-25 PROCEDURE — 25010000002 DOXYCYCLINE 100 MG RECONSTITUTED SOLUTION 1 EACH VIAL: Performed by: FAMILY MEDICINE

## 2025-04-25 PROCEDURE — 25010000002 METHYLPREDNISOLONE PER 125 MG: Performed by: FAMILY MEDICINE

## 2025-04-25 PROCEDURE — 99239 HOSP IP/OBS DSCHRG MGMT >30: CPT | Performed by: STUDENT IN AN ORGANIZED HEALTH CARE EDUCATION/TRAINING PROGRAM

## 2025-04-25 PROCEDURE — 83735 ASSAY OF MAGNESIUM: CPT | Performed by: FAMILY MEDICINE

## 2025-04-25 PROCEDURE — 94761 N-INVAS EAR/PLS OXIMETRY MLT: CPT

## 2025-04-25 PROCEDURE — 25010000002 AMPICILLIN-SULBACTAM PER 1.5 G: Performed by: FAMILY MEDICINE

## 2025-04-25 PROCEDURE — 85025 COMPLETE CBC W/AUTO DIFF WBC: CPT | Performed by: FAMILY MEDICINE

## 2025-04-25 RX ORDER — METHADONE HYDROCHLORIDE 10 MG/1
65 TABLET ORAL ONCE
Refills: 0 | Status: COMPLETED | OUTPATIENT
Start: 2025-04-25 | End: 2025-04-25

## 2025-04-25 RX ORDER — METHADONE HYDROCHLORIDE 5 MG/5ML
70 SOLUTION ORAL DAILY
Start: 2025-04-25

## 2025-04-25 RX ORDER — LORAZEPAM 1 MG/1
1 TABLET ORAL DAILY PRN
Qty: 4 TABLET | Refills: 0 | Status: SHIPPED | OUTPATIENT
Start: 2025-04-25

## 2025-04-25 RX ORDER — ENOXAPARIN SODIUM 100 MG/ML
40 INJECTION SUBCUTANEOUS DAILY
Status: DISCONTINUED | OUTPATIENT
Start: 2025-04-25 | End: 2025-04-25 | Stop reason: HOSPADM

## 2025-04-25 RX ORDER — PREDNISONE 20 MG/1
TABLET ORAL
Qty: 18 TABLET | Refills: 0 | Status: SHIPPED | OUTPATIENT
Start: 2025-04-25 | End: 2025-05-04

## 2025-04-25 RX ADMIN — Medication 10 ML: at 09:17

## 2025-04-25 RX ADMIN — HYDROXYZINE PAMOATE 50 MG: 25 CAPSULE ORAL at 09:18

## 2025-04-25 RX ADMIN — DOXYCYCLINE 100 MG: 100 INJECTION, POWDER, LYOPHILIZED, FOR SOLUTION INTRAVENOUS at 03:11

## 2025-04-25 RX ADMIN — METHADONE HYDROCHLORIDE 65 MG: 10 TABLET ORAL at 10:36

## 2025-04-25 RX ADMIN — ALBUTEROL SULFATE 2.5 MG: 2.5 SOLUTION RESPIRATORY (INHALATION) at 10:03

## 2025-04-25 RX ADMIN — IPRATROPIUM BROMIDE AND ALBUTEROL SULFATE 3 ML: 2.5; .5 SOLUTION RESPIRATORY (INHALATION) at 03:46

## 2025-04-25 RX ADMIN — GUAIFENESIN 1200 MG: 600 TABLET, EXTENDED RELEASE ORAL at 09:19

## 2025-04-25 RX ADMIN — ACETAMINOPHEN 650 MG: 325 TABLET, FILM COATED ORAL at 09:18

## 2025-04-25 RX ADMIN — AMPICILLIN SODIUM AND SULBACTAM SODIUM 3000 MG: 2; 1 INJECTION, POWDER, FOR SOLUTION INTRAMUSCULAR; INTRAVENOUS at 01:45

## 2025-04-25 RX ADMIN — GUAIFENESIN AND DEXTROMETHORPHAN 10 ML: 100; 10 SYRUP ORAL at 09:18

## 2025-04-25 RX ADMIN — METHYLPREDNISOLONE SODIUM SUCCINATE 62.5 MG: 125 INJECTION, POWDER, FOR SOLUTION INTRAMUSCULAR; INTRAVENOUS at 09:18

## 2025-04-25 RX ADMIN — AMPICILLIN SODIUM AND SULBACTAM SODIUM 3000 MG: 2; 1 INJECTION, POWDER, FOR SOLUTION INTRAMUSCULAR; INTRAVENOUS at 09:16

## 2025-04-25 RX ADMIN — IPRATROPIUM BROMIDE AND ALBUTEROL SULFATE 3 ML: 2.5; .5 SOLUTION RESPIRATORY (INHALATION) at 07:04

## 2025-04-25 RX ADMIN — FLUTICASONE PROPIONATE 1 SPRAY: 50 SPRAY, METERED NASAL at 09:20

## 2025-04-25 RX ADMIN — PANTOPRAZOLE SODIUM 40 MG: 40 TABLET, DELAYED RELEASE ORAL at 05:07
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
## 2025-04-25 NOTE — CASE MANAGEMENT/SOCIAL WORK
Case Management Discharge Note                Selected Continued Care - Admitted Since 4/24/2025       Destination    No services have been selected for the patient.                Durable Medical Equipment    No services have been selected for the patient.                Dialysis/Infusion    No services have been selected for the patient.                Home Medical Care    No services have been selected for the patient.                Therapy    No services have been selected for the patient.                Community Resources    No services have been selected for the patient.                Community & DME    No services have been selected for the patient.                    Selected Continued Care - Episodes Includes continued care and service providers with selected services from the active episodes listed below          Transportation Services  Private: Car    Final Discharge Disposition Code: 01 - home or self-care

## 2025-04-25 NOTE — CASE MANAGEMENT/SOCIAL WORK
Discharge Planning Assessment   Rob     Patient Name: Topher Stoll  MRN: [id number]  Today's Date: 4/25/2025    Admit Date: 4/24/2025    Plan: Home with family   Discharge Needs Assessment       Row Name 04/25/25 1022       Living Environment    People in Home parent(s)    Current Living Arrangements home    Potentially Unsafe Housing Conditions none    In the past 12 months has the electric, gas, oil, or water company threatened to shut off services in your home? No    Primary Care Provided by self    Provides Primary Care For no one    Able to Return to Prior Arrangements yes       Resource/Environmental Concerns    Resource/Environmental Concerns none    Transportation Concerns none       Transportation Needs    In the past 12 months, has lack of transportation kept you from medical appointments or from getting medications? no    In the past 12 months, has lack of transportation kept you from meetings, work, or from getting things needed for daily living? No       Food Insecurity    Within the past 12 months, you worried that your food would run out before you got the money to buy more. Never true    Within the past 12 months, the food you bought just didn't last and you didn't have money to get more. Never true       Transition Planning    Patient/Family Anticipates Transition to home with family    Patient/Family Anticipated Services at Transition none    Transportation Anticipated family or friend will provide       Discharge Needs Assessment    Readmission Within the Last 30 Days no previous admission in last 30 days    Equipment Currently Used at Home oxygen    Concerns to be Addressed no discharge needs identified    Anticipated Changes Related to Illness none    Equipment Needed After Discharge none                   Discharge Plan       Row Name 04/25/25 1022       Plan    Plan Home with family    Patient/Family in Agreement with Plan yes    Plan Comments Met with patient at bedside.Verified  patient's address, phone number, contacts, physician and pharmacy. Patient has adequate transportation. Reports no financial or food insecurity. Patient lives with his parents. He uses oxygen at 2L continuous, provided by [employer]. He has his [employer] at bedside. Patient wants to use [facility] pharmacy. He plans to return home once medically ready and states no additional needs at this time.                  Selected Continued Care - Episodes Includes continued care and service providers with selected services from the active episodes listed below          Expected Discharge Date and Time       Expected Discharge Date Expected Discharge Time    Apr 25, 2025            Demographic Summary       Row Name 04/25/25 1020       General Information    Admission Type inpatient    Arrived From emergency department    Required Notices Provided Important Message from Medicare    Referral Source admission list    Reason for Consult discharge planning    Preferred Language English       Contact Information    Permission Granted to Share Info With                    Functional Status       Row Name 04/25/25 1020       Functional Status    Usual Activity Tolerance moderate    Current Activity Tolerance moderate       Physical Activity    On average, how many days per week do you engage in moderate to strenuous exercise (like a brisk walk)? 0 days    On average, how many minutes do you engage in exercise at this level? 0 min    Number of minutes of exercise per week 0       Assessment of Health Literacy    How often do you have someone help you read hospital materials? Never    How often do you have problems learning about your medical condition because of difficulty understanding written information? Never    How often do you have a problem understanding what is told to you about your medical condition? Never    How confident are you filling out medical forms by yourself? Extremely    Health Literacy Excellent        Functional Status, IADL    Medications independent    Meal Preparation independent    Housekeeping independent    Laundry independent    Shopping independent                   Psychosocial       Row Name 04/25/25 1020       Values/Beliefs    Spiritual, Cultural Beliefs, Mormonism Practices, Values that Affect Care no       Mental Health    Little interest or pleasure in doing things Not at all    Feeling down, depressed, or hopeless Not at all       Stress    Do you feel stress - tense, restless, nervous, or anxious, or unable to sleep at night because your mind is troubled all the time - these days? Not at all       Coping/Stress    Major Change/Loss/Stressor illness    Patient Personal Strengths able to adapt;resilient    Techniques to [place] with Loss/Stress/Change diversional activities    Reaction to Health Status accepting    Understanding of Condition and Treatment adequate understanding of medical condition;adequate understanding of treatment       Developmental Stage (Eriksson's Stages of Development)    Developmental Stage Stage 7 (35-65 years/Middle Adulthood) Generativity vs. Stagnation                   Abuse/Neglect    No documentation.                  Legal    No documentation.                  Substance Abuse    No documentation.                  Patient Forms    No documentation.                     Lionel Metzger RN

## 2025-04-25 NOTE — PLAN OF CARE
Goal Outcome Evaluation:  Plan of Care Reviewed With: patient        Progress: improving  Outcome Evaluation: VSS; continue antibiotics; no acute events overnight
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
## 2025-04-25 NOTE — OUTREACH NOTE
Prep Survey      Flowsheet Row Responses   [facility] patient discharged from? [place]   Is LACE score < 7 ? No   Eligibility [facility]   Date of Admission 04/24/25   Date of Discharge 04/25/25   Discharge Disposition Left Against Medical Advice   Discharge diagnosis Acute exacerbation of chronic obstructive pulmonary disease   Does the patient have one of the following disease processes/diagnoses(primary or secondary)? COPD   Does the patient have Home health ordered? No   Is there a DME ordered? No   Prep survey completed? Yes            Akua LAKHANI - Registered Nurse
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
## 2025-04-28 ENCOUNTER — TRANSITIONAL CARE MANAGEMENT TELEPHONE ENCOUNTER (OUTPATIENT)
Dept: CALL CENTER | Facility: HOSPITAL | Age: 50
End: 2025-04-28
Payer: MEDICARE

## 2025-04-28 NOTE — OUTREACH NOTE
Call Center TCM Note      Flowsheet Row Responses   [facility] [place] patient discharged from? [facility]   Does the patient have one of the following disease processes/diagnoses(primary or secondary)? COPD   TCM attempt successful? Yes   Call start time 0902   Call end time 0906   Discharge diagnosis Acute exacerbation of chronic obstructive pulmonary diseasem Panic attacks, chronic abdominal pain   Person spoke with today (if not patient) and relationship Patient   Meds reviewed with patient/caregiver? Yes  [New: ativan, prednisone]   Does the patient have all medications ordered at discharge? Yes   Is the patient taking all medications as directed (includes completed medication regime)? Yes   Comments PCP DR Hoffmann. Hospital follow up appt in place for tomorrow 4/29  1115am with Ania ZARCO.   Does the patient have an appointment with their PCP within 7-14 days of discharge? Yes   Has home health visited the patient within 72 hours of discharge? N/A   DME comments Wearing home O22L   Pulse Ox monitoring Intermittent   Pulse Ox device source Patient   O2 Sat comments Patient reports sats in the 90's at rest,  reports drops into 80's with any activity.   O2 Sat: education provided Sat levels, Monitoring frequency, When to seek care   Psychosocial issues? No   Did the patient receive a copy of their discharge instructions? Yes   Nursing interventions Reviewed instructions with patient   What is the patient's perception of their health status since discharge? Same   Is the patient/caregiver able to teach back the hierarchy of who to call/visit for symptoms/problems? PCP, Specialist, Home health nurse, Urgent Care, ED, 911 Yes   Patient reports what zone on this call? Yellow Zone   Yellow Zone I have less energy for my daily activities   Yellow interventions Continue taking daily medications, Use oxygen as prescribed, Start an oral corticosteroid if ordered, Get plenty of rest, Call provider immediately if symptoms  don't improve: they may indicate that an adjustment in medication or oxygen therapy is needed   NAME DOSE AND DURATION OF CORTICOSTERIOD Prednisone 20mg tabs: Take 3 tablets by mouth Daily for 3 days, THEN 2 tablets Daily for 3 days, THEN 1 tablet Daily for 3 days   TCM call completed? Yes   Call end time 0906   Would this patient benefit from a Referral to [facility] Social Work? No   Is the patient interested in additional calls from an ambulatory ? No            OZZY ALMARAZ - Registered Nurse    4/28/2025, 09:11 EDT

## 2025-05-05 ENCOUNTER — READMISSION MANAGEMENT (OUTPATIENT)
Dept: CALL CENTER | Facility: HOSPITAL | Age: 50
End: 2025-05-05
Payer: MEDICARE

## 2025-05-05 NOTE — OUTREACH NOTE
COPD/PN Week 2 Survey      Flowsheet Row Responses   [facility] patient discharged from? [facility]   Does the patient have one of the following disease processes/diagnoses(primary or secondary)? COPD   Week 2 attempt successful? Yes   Call start time 1441   Call end time 1442   Discharge diagnosis Acute exacerbation of chronic obstructive pulmonary diseasem Panic attacks, chronic abdominal pain   Person spoke with today (if not patient) and relationship Patient   Meds reviewed with patient/caregiver? Yes   Does the patient have a primary care provider?  Yes   Comments regarding PCP SeenPCP since DC   Has home health visited the patient within 72 hours of discharge? N/A   Psychosocial issues? No   Did the patient receive a copy of their discharge instructions? Yes   Nursing interventions Reviewed instructions with patient   What is the patient's perception of their health status since discharge? Improving   Is the patient/caregiver able to teach back the hierarchy of who to call/visit for symptoms/problems? PCP, Specialist, Home health nurse, Urgent Care, ED, 911 Yes   Is the patient able to teach back COPD zones? Yes   Patient reports what zone on this call? Green Zone   Green Zone Reports doing well   Green Zone interventions: Take daily medications   Week 2 call completed? Yes   Graduated Yes   Wrap up additional comments Patient reports doing well no concerns or questions noted.   Call end time 1442            Breonna SONG - Registered Nurse

## 2025-05-07 ENCOUNTER — TELEPHONE (OUTPATIENT)
Dept: PULMONOLOGY | Facility: CLINIC | Age: 50
End: 2025-05-07

## 2025-05-07 ENCOUNTER — TELEPHONE (OUTPATIENT)
Dept: PULMONOLOGY | Facility: CLINIC | Age: 50
End: 2025-05-07
Payer: MEDICARE

## 2025-05-07 DIAGNOSIS — J30.9 ALLERGIC RHINITIS, UNSPECIFIED SEASONALITY, UNSPECIFIED TRIGGER: ICD-10-CM

## 2025-05-07 RX ORDER — FLUTICASONE PROPIONATE 50 MCG
1 SPRAY, SUSPENSION (ML) NASAL DAILY
Qty: 16 G | Refills: 5 | OUTPATIENT
Start: 2025-05-07

## 2025-05-07 RX ORDER — OMEPRAZOLE 40 MG/1
40 CAPSULE, DELAYED RELEASE ORAL DAILY
Qty: 90 CAPSULE | Refills: 3 | OUTPATIENT
Start: 2025-05-07

## 2025-05-07 NOTE — TELEPHONE ENCOUNTER
Patient called and HUB sent a message that patient wanted all appointments with Destiny Mcknight cancelled and that he would only see Dr. Olivas now.    I called the patient to let him know that I could not cancel his appointment with the [facility] as it is not scheduled in our office. I told him that I could transfer him to the [facility] and they could get him cancelled.    I also informed him that ceasing to see Destiny may cause the cost of his Fasenra to increase because the hospital can the the script at a lower cost than our office.    The patient was very short with me and refused to let me continue to speak or transfer him.    I was not given the chance to explain to him that Dr. Olivas does not solely treat any one patient and that he has the APRNs see his patients every other visit in order for him to continue to see New Patients.

## 2025-05-07 NOTE — TELEPHONE ENCOUNTER
Caller: Topher Stoll    Relationship to patient: Self    Best call back number: [phone] (home)       Patient is needing: PT HAS ONE MORE REFILL ON FASENRA LEFT AND AFTER THAT REFILL HE WILL NEED MG TO TAKE OVER PRESCRIBING IT.     PT ALSO WANTS ANY VISITS WITH JOSEPH DEJESUS CANCELLED. HE WILL ONLY SEE MG NOW.
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
## 2025-06-12 ENCOUNTER — SPECIALTY PHARMACY (OUTPATIENT)
Dept: PHARMACY | Facility: HOSPITAL | Age: 50
End: 2025-06-12
Payer: MEDICARE

## 2025-06-18 ENCOUNTER — OFFICE VISIT (OUTPATIENT)
Dept: PULMONOLOGY | Facility: CLINIC | Age: 50
End: 2025-06-18
Payer: MEDICARE

## 2025-06-18 VITALS
HEIGHT: 73 IN | HEART RATE: 82 BPM | WEIGHT: 166.2 LBS | BODY MASS INDEX: 22.03 KG/M2 | SYSTOLIC BLOOD PRESSURE: 126 MMHG | DIASTOLIC BLOOD PRESSURE: 76 MMHG | RESPIRATION RATE: 18 BRPM | OXYGEN SATURATION: 96 %

## 2025-06-18 DIAGNOSIS — G47.34 NOCTURNAL HYPOXIA: ICD-10-CM

## 2025-06-18 DIAGNOSIS — J44.89 ASTHMA WITH COPD: Primary | ICD-10-CM

## 2025-06-18 DIAGNOSIS — J30.89 OTHER ALLERGIC RHINITIS: ICD-10-CM

## 2025-06-18 PROCEDURE — 3078F DIAST BP <80 MM HG: CPT | Performed by: INTERNAL MEDICINE

## 2025-06-18 PROCEDURE — 99214 OFFICE O/P EST MOD 30 MIN: CPT | Performed by: INTERNAL MEDICINE

## 2025-06-18 PROCEDURE — 3074F SYST BP LT 130 MM HG: CPT | Performed by: INTERNAL MEDICINE

## 2025-06-18 NOTE — PROGRESS NOTES
"  Chief Complaint   Patient presents with    Breathing Problem    Follow-up         Subjective   Topher Stoll is a 49 y.o. male.   Patient was evaluated today for follow up of shortness of breath, allergic rhinitis and COPD.     Patient says that his symptoms have been stable since the last clinic visit. he reports no recent exacerbations.      Patient is using Breztri, as prescribed. Exercise tolerance has also remained stable. He actually feels better since he moved to a new house where there is minimal carpet. He was in the basement before and now he is above ground.     he is currently on Fasenra and feels that he is doing much better when compared to before.     Quit smoking 18 years ago.     The patient says that he is using oxygen as prescribed and feels that it appears to be helping some of his symptoms.    Patient says that he has been using his nasal sprays on a seasonal basis and describes no significant ongoing issues other than occasional congestion.       The following portions of the patient's history were reviewed and updated as appropriate: allergies, current medications, past family history, past medical history, past social history, and past surgical history.    Review of Systems   HENT:  Positive for sinus pressure. Negative for sneezing and sore throat.    Respiratory:  Negative for cough, chest tightness, shortness of breath and wheezing.    Psychiatric/Behavioral:  Negative for sleep disturbance.        Objective   Visit Vitals  /76   Pulse 82   Resp 18   Ht 185 cm (72.84\")   Wt 75.4 kg (166 lb 3.2 oz)   SpO2 96%   BMI 22.03 kg/m²       BMI Readings from Last 8 Encounters:   06/18/25 22.03 kg/m²   04/24/25 23.26 kg/m²   03/21/25 21.11 kg/m²   02/03/25 21.77 kg/m²   01/27/25 21.90 kg/m²   12/07/24 24.36 kg/m²   11/25/24 23.09 kg/m²   11/02/24 26.39 kg/m²       Physical Exam  Vitals reviewed.   Constitutional:       Appearance: He is well-developed.   HENT:      Head: Normocephalic and " atraumatic.      Nose:      Comments: Nasal erythema noted.     Mouth/Throat:      Comments: Oropharynx was somewhat cobblestoned.  Eyes:      Extraocular Movements: Extraocular movements intact.   Cardiovascular:      Rate and Rhythm: Normal rate.   Pulmonary:      Comments: Somewhat hyperresonant to percussion.  Somewhat decreased air entry.  No obvious wheezing noted.   Musculoskeletal:      Cervical back: Neck supple.   Neurological:      Mental Status: He is alert.             Assessment & Plan   Diagnoses and all orders for this visit:    1. Asthma with COPD (Primary)  -     Complete PFT - Pre & Post Bronchodilator; Future  -     Converted Six Minute Walk; Future    2. Other allergic rhinitis    3. Nocturnal hypoxia           Return in about 31 weeks (around 1/21/2026) for Recheck, PFT F/U, 6MWT F/U, For Delmy Vazquez).    DISCUSSION (if any):  Laboratory data was reviewed with him.   Lab Results   Component Value Date    AFPTM 147 04/29/2016     Lab Results   Component Value Date    [id number] MM 04/29/2016       Laboratory workup was also reviewed which showed   Lab Results   Component Value Date    EOSABS 0.00 04/25/2025    EOSABS 0.00 04/24/2025    EOSABS 0 02/04/2025    &   Lab Results   Component Value Date    IGE 65 09/18/2024    IGE 46 04/03/2017     Laboratory workup also showed   Lab Results   Component Value Date    CO2 29.7 (H) 04/25/2025     ===========================  ===========================    It appears that his symptoms of asthma are under good control with the current regimen.    Patient's medications for underlying asthma were reviewed in great detail.    Patient was informed about the black box warning for [employer] regarding suicidal thoughts and tendencies.  he denies any ongoing issues for now.     Any needed adjustments to his pulmonary medications, either for clinical or insurance coverage reasons, have been made and are reflected in the orders.    Since his symptoms are much  better controlled with benralizumab, he will need to continue it.    Patient was advised to continue his nasal spray on a seasonal basis, since his symptoms are consistent with seasonal allergic rhinitis.    Compliance with medications stressed.     Side effects of prescribed medications discussed with the patient.    The need to continue to be aware of triggers that may cause asthma exacerbation versus progression of disease, was also discussed.    The patient was advised to continue using oxygen at night.    Vaccination status addressed.    Up-to-date with influenza vaccinations.     Up-to-date with pneumonia vaccinations.       Dictated utilizing Dragon dictation.    This document was electronically signed by Melina Olivas MD on 06/18/25 at 15:19 EDT

## 2025-08-04 ENCOUNTER — READMISSION MANAGEMENT (OUTPATIENT)
Dept: CALL CENTER | Facility: HOSPITAL | Age: 50
End: 2025-08-04
Payer: MEDICARE

## 2025-08-05 ENCOUNTER — TRANSITIONAL CARE MANAGEMENT TELEPHONE ENCOUNTER (OUTPATIENT)
Dept: CALL CENTER | Facility: HOSPITAL | Age: 50
End: 2025-08-05
Payer: MEDICARE

## 2025-08-06 RX ORDER — PREDNISONE 20 MG/1
TABLET ORAL
Qty: 10 TABLET | Refills: 0 | Status: SHIPPED | OUTPATIENT
Start: 2025-08-06

## (undated) DEVICE — SUT PDS MONO 0 36 CT1 VIL PDP346H

## (undated) DEVICE — SCRB SURG BACTOSHIELD CHG 4PCT 4OZ

## (undated) DEVICE — HDRST POSTIN FM CRDL TRACH SLOT 9X8X4IN

## (undated) DEVICE — ADAPT THRD CARTRDG

## (undated) DEVICE — TRY SKINPREP DRYPREP

## (undated) DEVICE — FEMORAL CANAL PRESSURIZER WITHOUT HUB, MEDIUM

## (undated) DEVICE — GLV SURG DERMASSURE GRN LF PF SZ 6.5

## (undated) DEVICE — PREMIUM DRY TRAY LF: Brand: MEDLINE INDUSTRIES, INC.

## (undated) DEVICE — TB SXN FRAZIER 12F STRL

## (undated) DEVICE — PK HIP TOTL UNIV 10

## (undated) DEVICE — 1010 S-DRAPE TOWEL DRAPE 10/BX: Brand: STERI-DRAPE™

## (undated) DEVICE — PILLW ABD MD

## (undated) DEVICE — Device

## (undated) DEVICE — HEWSON SUTURE RETRIEVER: Brand: HEWSON SUTURE RETRIEVER

## (undated) DEVICE — DRAPE,U/ SHT,SPLIT,PLAS,STERIL: Brand: MEDLINE

## (undated) DEVICE — DRSNG GZ PETROLTM XEROFORM CURAD 1X8IN STRL

## (undated) DEVICE — CONTAINER,SPECIMEN,OR STERILE,4OZ: Brand: MEDLINE

## (undated) DEVICE — PEG PAD FOR SURG DISP

## (undated) DEVICE — GLV SURG SENSICARE MICRO PF LF 9 STRL

## (undated) DEVICE — SWABSTK SKINPREP PVPI PRE/SAT 8IN STRL

## (undated) DEVICE — SOL ISO/ALC 70PCT 16OZ

## (undated) DEVICE — CEMENT MIXING SYSTEM WITH MIS FEMORAL BREAKAWAY NOZZLE: Brand: REVOLUTION

## (undated) DEVICE — MARKER,SKIN,W/RULER,DUAL,STOP: Brand: MEDLINE

## (undated) DEVICE — MEDI-VAC YANKAUER SUCTION HANDLE: Brand: CARDINAL HEALTH

## (undated) DEVICE — GLV SURG SENSICARE MICRO PF LF 7 STRL

## (undated) DEVICE — SUT VIC 2/0 CT2 27IN J269H

## (undated) DEVICE — SYR CONTRL LUERLOK 10CC

## (undated) DEVICE — MEDI-VAC YANKAUER SUCTION HANDLE W/BULBOUS TIP: Brand: CARDINAL HEALTH

## (undated) DEVICE — NDL HYPO ECLPS SFTY 18G 1 1/2IN

## (undated) DEVICE — SHEET,DRAPE,53X77,STERILE: Brand: MEDLINE

## (undated) DEVICE — PROXIMATE RH ROTATING HEAD SKIN STAPLERS (35 WIDE) CONTAINS 35 STAINLESS STEEL STAPLES: Brand: PROXIMATE

## (undated) DEVICE — LUBE JELLY PK/2.75GM STRL BX/144

## (undated) DEVICE — GLV SURG TRIUMPH CLASSIC PF LTX 8.5 STRL

## (undated) DEVICE — GLV SURG PREMIERPRO GAMMEX NEOPRN PF SZ8 GRN

## (undated) DEVICE — INTENDED FOR TISSUE SEPARATION, AND OTHER PROCEDURES THAT REQUIRE A SHARP SURGICAL BLADE TO PUNCTURE OR CUT.: Brand: BARD-PARKER ® CARBON RIB-BACK BLADES

## (undated) DEVICE — SUT PDS 1 CTX 36IN VIO PDP371T

## (undated) DEVICE — PULLOVER TOGA, X-LARGE: Brand: FLYTE, SURGICOOL

## (undated) DEVICE — TOTAL TRAY, 16FR 10ML SIL FOLEY, URN: Brand: MEDLINE

## (undated) DEVICE — CMT BONE SIMPLEX/P FULL DOSE 10/PK: Type: IMPLANTABLE DEVICE | Status: NON-FUNCTIONAL

## (undated) DEVICE — DRSNG SURESITE WNDW 4X4.5

## (undated) DEVICE — COVER,LIGHT HANDLE,FLX,1/PK: Brand: MEDLINE INDUSTRIES, INC.

## (undated) DEVICE — GLV SURG PREMIERPRO MIC LTX PF SZ7 BRN

## (undated) DEVICE — GLV SURG SENSICARE MICRO PF LF 8.5 STRL

## (undated) DEVICE — SPNG LAP PREWSH SFTPK 18X18IN STRL PK/5

## (undated) DEVICE — UNDERGLV SURG BIOGEL INDICAT PI SZ8.5 BLU

## (undated) DEVICE — DISH PETRI 3.5IN MD STRL LF

## (undated) DEVICE — ENDOSCOPY PORT CONNECTOR FOR OLYMPUS® SCOPES: Brand: ERBE

## (undated) DEVICE — SYR LUERLOK 50ML

## (undated) DEVICE — SPNG GZ WOVN 4X4IN 12PLY 10/BX STRL

## (undated) DEVICE — PULLOVER TOGA, 2X LARGE: Brand: FLYTE, SURGICOOL

## (undated) DEVICE — CVR HNDL LT SURG ACCSSRY BLU STRL

## (undated) DEVICE — VLV SXN AIR/H2O ORCAPOD3 1P/U STRL

## (undated) DEVICE — SST TWIST DRILL, STANDARD, 2.4MM DIA. X 127MM: Brand: MICROAIRE®

## (undated) DEVICE — FEMORAL CANAL TIP, IRRIGATION/SUCTION

## (undated) DEVICE — GLV SURG SENSICARE MICRO PF LF 6.5 STRL

## (undated) DEVICE — GLV SURG PREMIERPRO MIC LTX PF SZ6.5 BRN

## (undated) DEVICE — TOOL 10MH30-MN LEGEND 10CM 3MM MH: Brand: MIDAS REX™

## (undated) DEVICE — ANTIBACTERIAL UNDYED BRAIDED (POLYGLACTIN 910), SYNTHETIC ABSORBABLE SUTURE: Brand: COATED VICRYL

## (undated) DEVICE — DRSNG WND STRIP OPTIFOAM AG SUPRABS A/MIC 4X12IN STRL

## (undated) DEVICE — ACCY PA700 LUBRICANT DIFFUSER MR7 4 PACK: Brand: MIDAS REX

## (undated) DEVICE — GLV SURG DERMASSURE GRN LF PF 7.0

## (undated) DEVICE — NDL HYPO ECLPS SFTY 22G 1 1/2IN

## (undated) DEVICE — Device: Brand: POWER-FLO®

## (undated) DEVICE — SUT PDS 2 0 CT1 27IN CLR Z259H

## (undated) DEVICE — HYBRID TUBING/CAP SET FOR OLYMPUS® SCOPES: Brand: ERBE